# Patient Record
Sex: FEMALE | Race: WHITE | HISPANIC OR LATINO | Employment: FULL TIME | URBAN - METROPOLITAN AREA
[De-identification: names, ages, dates, MRNs, and addresses within clinical notes are randomized per-mention and may not be internally consistent; named-entity substitution may affect disease eponyms.]

---

## 2017-03-30 ENCOUNTER — GENERIC CONVERSION - ENCOUNTER (OUTPATIENT)
Dept: OTHER | Facility: OTHER | Age: 22
End: 2017-03-30

## 2017-04-06 ENCOUNTER — GENERIC CONVERSION - ENCOUNTER (OUTPATIENT)
Dept: OTHER | Facility: OTHER | Age: 22
End: 2017-04-06

## 2017-04-06 ENCOUNTER — ALLSCRIPTS OFFICE VISIT (OUTPATIENT)
Dept: PERINATAL CARE | Facility: CLINIC | Age: 22
End: 2017-04-06
Payer: COMMERCIAL

## 2017-04-06 PROCEDURE — G0108 DIAB MANAGE TRN  PER INDIV: HCPCS | Performed by: OBSTETRICS & GYNECOLOGY

## 2017-04-10 ENCOUNTER — APPOINTMENT (OUTPATIENT)
Dept: PERINATAL CARE | Facility: CLINIC | Age: 22
End: 2017-04-10
Payer: COMMERCIAL

## 2017-04-10 PROCEDURE — G0108 DIAB MANAGE TRN  PER INDIV: HCPCS | Performed by: OBSTETRICS & GYNECOLOGY

## 2017-04-12 ENCOUNTER — GENERIC CONVERSION - ENCOUNTER (OUTPATIENT)
Dept: OTHER | Facility: OTHER | Age: 22
End: 2017-04-12

## 2017-04-24 ENCOUNTER — LAB CONVERSION - ENCOUNTER (OUTPATIENT)
Dept: OTHER | Facility: OTHER | Age: 22
End: 2017-04-24

## 2017-04-24 ENCOUNTER — APPOINTMENT (OUTPATIENT)
Dept: PERINATAL CARE | Facility: CLINIC | Age: 22
End: 2017-04-24
Payer: COMMERCIAL

## 2017-04-24 DIAGNOSIS — O24.414 INSULIN CONTROLLED GESTATIONAL DIABETES MELLITUS DURING PREGNANCY: ICD-10-CM

## 2017-04-24 PROCEDURE — G0108 DIAB MANAGE TRN  PER INDIV: HCPCS | Performed by: OBSTETRICS & GYNECOLOGY

## 2017-04-27 ENCOUNTER — ALLSCRIPTS OFFICE VISIT (OUTPATIENT)
Dept: PERINATAL CARE | Facility: CLINIC | Age: 22
End: 2017-04-27
Payer: COMMERCIAL

## 2017-04-27 ENCOUNTER — GENERIC CONVERSION - ENCOUNTER (OUTPATIENT)
Dept: OTHER | Facility: OTHER | Age: 22
End: 2017-04-27

## 2017-04-27 PROCEDURE — 76811 OB US DETAILED SNGL FETUS: CPT | Performed by: OBSTETRICS & GYNECOLOGY

## 2017-04-27 PROCEDURE — 76817 TRANSVAGINAL US OBSTETRIC: CPT | Performed by: OBSTETRICS & GYNECOLOGY

## 2017-05-04 ENCOUNTER — LAB CONVERSION - ENCOUNTER (OUTPATIENT)
Dept: OTHER | Facility: OTHER | Age: 22
End: 2017-05-04

## 2017-05-04 LAB
A/G RATIO (HISTORICAL): 1.4 (CALC) (ref 1–2.5)
ALBUMIN SERPL BCP-MCNC: 3.2 G/DL (ref 3.6–5.1)
ALP SERPL-CCNC: 79 U/L (ref 33–115)
ALT SERPL W P-5'-P-CCNC: 14 U/L (ref 6–29)
AST SERPL W P-5'-P-CCNC: 11 U/L (ref 10–30)
BILIRUB SERPL-MCNC: 0.4 MG/DL (ref 0.2–1.2)
BUN SERPL-MCNC: 5 MG/DL (ref 7–25)
BUN/CREA RATIO (HISTORICAL): 8 (CALC) (ref 6–22)
CALCIUM SERPL-MCNC: 8.4 MG/DL (ref 8.6–10.2)
CHLORIDE SERPL-SCNC: 106 MMOL/L (ref 98–110)
CO2 SERPL-SCNC: 23 MMOL/L (ref 20–31)
CREAT SERPL-MCNC: 0.62 MG/DL (ref 0.5–1.1)
EGFR AFRICAN AMERICAN (HISTORICAL): 150 ML/MIN/1.73M2
EGFR-AMERICAN CALC (HISTORICAL): 130 ML/MIN/1.73M2
EST. AVERAGE GLUCOSE BLD GHB EST-MCNC: 108 (CALC)
EST. AVERAGE GLUCOSE BLD GHB EST-MCNC: 6 (CALC)
GAMMA GLOBULIN (HISTORICAL): 2.3 G/DL (CALC) (ref 1.9–3.7)
GLUCOSE (HISTORICAL): 186 MG/DL (ref 65–99)
HBA1C MFR BLD HPLC: 5.4 % OF TOTAL HGB
POTASSIUM SERPL-SCNC: 3.8 MMOL/L (ref 3.5–5.3)
SODIUM SERPL-SCNC: 137 MMOL/L (ref 135–146)
TOTAL PROTEIN (HISTORICAL): 5.5 G/DL (ref 6.1–8.1)

## 2017-05-15 ENCOUNTER — APPOINTMENT (OUTPATIENT)
Dept: PERINATAL CARE | Facility: CLINIC | Age: 22
End: 2017-05-15
Payer: COMMERCIAL

## 2017-05-15 PROCEDURE — G0108 DIAB MANAGE TRN  PER INDIV: HCPCS | Performed by: OBSTETRICS & GYNECOLOGY

## 2017-05-25 ENCOUNTER — GENERIC CONVERSION - ENCOUNTER (OUTPATIENT)
Dept: OTHER | Facility: OTHER | Age: 22
End: 2017-05-25

## 2017-05-25 ENCOUNTER — ALLSCRIPTS OFFICE VISIT (OUTPATIENT)
Dept: PERINATAL CARE | Facility: CLINIC | Age: 22
End: 2017-05-25
Payer: COMMERCIAL

## 2017-05-25 PROCEDURE — 76816 OB US FOLLOW-UP PER FETUS: CPT | Performed by: OBSTETRICS & GYNECOLOGY

## 2017-06-19 ENCOUNTER — APPOINTMENT (OUTPATIENT)
Dept: PERINATAL CARE | Facility: CLINIC | Age: 22
End: 2017-06-19
Payer: COMMERCIAL

## 2017-06-19 PROCEDURE — G0108 DIAB MANAGE TRN  PER INDIV: HCPCS | Performed by: OBSTETRICS & GYNECOLOGY

## 2017-07-13 ENCOUNTER — GENERIC CONVERSION - ENCOUNTER (OUTPATIENT)
Dept: OTHER | Facility: OTHER | Age: 22
End: 2017-07-13

## 2017-07-17 ENCOUNTER — APPOINTMENT (OUTPATIENT)
Dept: PERINATAL CARE | Facility: CLINIC | Age: 22
End: 2017-07-17
Payer: COMMERCIAL

## 2017-07-17 ENCOUNTER — ALLSCRIPTS OFFICE VISIT (OUTPATIENT)
Dept: PERINATAL CARE | Facility: CLINIC | Age: 22
End: 2017-07-17
Payer: COMMERCIAL

## 2017-07-17 ENCOUNTER — GENERIC CONVERSION - ENCOUNTER (OUTPATIENT)
Dept: OTHER | Facility: OTHER | Age: 22
End: 2017-07-17

## 2017-07-17 PROCEDURE — 76816 OB US FOLLOW-UP PER FETUS: CPT | Performed by: OBSTETRICS & GYNECOLOGY

## 2017-07-17 PROCEDURE — G0108 DIAB MANAGE TRN  PER INDIV: HCPCS | Performed by: OBSTETRICS & GYNECOLOGY

## 2017-07-17 PROCEDURE — 76820 UMBILICAL ARTERY ECHO: CPT | Performed by: OBSTETRICS & GYNECOLOGY

## 2017-08-03 ENCOUNTER — GENERIC CONVERSION - ENCOUNTER (OUTPATIENT)
Dept: OTHER | Facility: OTHER | Age: 22
End: 2017-08-03

## 2017-08-03 ENCOUNTER — ALLSCRIPTS OFFICE VISIT (OUTPATIENT)
Dept: PERINATAL CARE | Facility: CLINIC | Age: 22
End: 2017-08-03
Payer: COMMERCIAL

## 2017-08-03 PROCEDURE — 76816 OB US FOLLOW-UP PER FETUS: CPT | Performed by: OBSTETRICS & GYNECOLOGY

## 2017-08-03 PROCEDURE — 76819 FETAL BIOPHYS PROFIL W/O NST: CPT | Performed by: OBSTETRICS & GYNECOLOGY

## 2018-01-08 ENCOUNTER — TRANSCRIBE ORDERS (OUTPATIENT)
Dept: URGENT CARE | Age: 23
End: 2018-01-08

## 2018-01-08 ENCOUNTER — APPOINTMENT (OUTPATIENT)
Dept: URGENT CARE | Age: 23
End: 2018-01-08
Payer: COMMERCIAL

## 2018-01-08 PROCEDURE — G0382 LEV 3 HOSP TYPE B ED VISIT: HCPCS | Performed by: PREVENTIVE MEDICINE

## 2018-01-09 NOTE — MISCELLANEOUS
Message   DM Non-compliance [facility]:     Date: 2017     REED: 2017     Dear Lucy Muniz:     It has come to our attention that you have not followed through with your recommended diabetes plan of care  As a patient that is currently receiving treatment for diabetes during pregnancy, you have been counseled regarding the following: the importance of regular blood glucose (sugar) monitoring; reporting the blood glucose levels to our office; nutrition and medication adjustments as directed by our office  You are receiving this letter because our records indicate that you are currently non-compliant with your treatment for the following reason(s):   Reporting of your blood glucose monitoring is not up to date  The last report of your blood sugar was on 2017  Blood glucose (sugar) levels that are high during pregnancy can result in problems for both mother and unborn child  These include, but are not limited to;     - Fetal macrosomia (large baby)  This can lead to shoulder dystocia (dislocated shoulder in the baby during delivery), need for a , vaginal and rectal tearing for the mother      -  hypoglycemia (low blood sugar in the baby after delivery)  This can lead to admission to the intensive care unit, and the need for intravenous glucose (sugar) given to the baby  - Fetal demise (death) or stillbirth  - [place] respiratory distress ( being unable to breathe on it's own)  Can lead to intensive care unit admission      - Pre term labor  We have been unable to resolve these concerns with you directly  Due to the complexity of this treatment plan, we kindly request that you contact us to resolve these outstanding issues  Please be advised that continued non-compliance may result in consequences, including but not limited to, our inability to continue to prescribe this treatment plan for you, and possible discharge from our care       Upon receipt of this notice, please contact us at [phone] to arrange a necessary follow-up  Thank you for allowing us to continue to care for you  Sincerely,     [facility] Diabetes and Pregnancy Team            Patient Care Team    Care Team Member Role Specialty Office Number   Randa RODRÍGUEZ , MD, error Specialist - [phone]   Harshal Monge [phone]   Segundo RODRÍGUEZ  Specialist - [phone]   Cristofer RODRÍGUEZ   Specialist - [phone]   Michelle Tate MD  Obstetrics/Gynecology [phone]     Signatures   Electronically signed by : Mya Woods; 2017  1:32PM EST                       (Author)

## 2018-01-12 VITALS
HEIGHT: 72 IN | WEIGHT: 267 LBS | DIASTOLIC BLOOD PRESSURE: 77 MMHG | SYSTOLIC BLOOD PRESSURE: 112 MMHG | BODY MASS INDEX: 36.16 KG/M2

## 2018-01-12 VITALS
SYSTOLIC BLOOD PRESSURE: 127 MMHG | BODY MASS INDEX: 39.68 KG/M2 | HEIGHT: 72 IN | WEIGHT: 293 LBS | DIASTOLIC BLOOD PRESSURE: 77 MMHG

## 2018-01-12 VITALS
HEIGHT: 72 IN | BODY MASS INDEX: 38.71 KG/M2 | SYSTOLIC BLOOD PRESSURE: 119 MMHG | DIASTOLIC BLOOD PRESSURE: 79 MMHG | WEIGHT: 285.8 LBS

## 2018-01-14 VITALS
DIASTOLIC BLOOD PRESSURE: 71 MMHG | HEIGHT: 72 IN | WEIGHT: 262 LBS | BODY MASS INDEX: 35.49 KG/M2 | SYSTOLIC BLOOD PRESSURE: 108 MMHG

## 2018-01-15 NOTE — PROGRESS NOTES
MAY 25 2017         RE: Neoma Stains                                To: BIANCA Márquez    MR#: [id number]   : OCT Kavithaurt: [id number]:[facility]                             Fax: [phone]   (Exam #: [id number])      The LMP of this 22year old,  G1, P0-0-0-0 patient was unknown, her   working REED is AUG 23 2017 and the current gestational age is 32 weeks 5   days by 1st Trimester Sono  A sonographic examination was performed on MAY   25 2017 using real time equipment  The ultrasound examination was   performed using abdominal technique  The patient has a BMI of 36 2  Her   blood pressure today was 112/77  Earliest ultrasound found in her record: 2017   8w2d  2017 REED      Cardiac motion was observed at 134 bpm       INDICATIONS      diabetes, gestational, class A2   increased BMI      Exam Types      Level I      RESULTS      Fetus # 1 of 1   Vertex presentation   Fetal growth appeared normal   Placenta Location = Anterior   No placenta previa   Placenta Grade = I      MEASUREMENTS (* Included In Average GA)      AC              24 6 cm        28 weeks 6 days* (65%)   BPD              7 7 cm        31 weeks 0 days* (>95%)   HC              28 5 cm        31 weeks 0 days* (>95%)   Femur            5 6 cm        29 weeks 4 days* (75%)      Cerebellum       3 7 cm        32 weeks 1 day   CisternaMagna    9 3 mm      HC/AC           1 16   FL/AC           0 23   FL/BPD          0 73   EFW (Ac/Fl/Hc)  1404 grams - 3 lbs 2 oz                 (76%)      THE AVERAGE GESTATIONAL AGE is 30 weeks 1 day +/- 18 days  AMNIOTIC FLUID      Q1: 4 6      Q2: 6 2      Q3: 1 7      Q4: 6 3   ROBERTA Total = 18 8 cm   Amniotic Fluid: Normal      ANATOMY DETAILS      Visualized Appearing Sonographically Normal:   HEAD: (Calvarium, BPD Level, Cavum, Lateral Ventricles, Cerebellum);      TH  CAV : (Diaphragm);     HEART: (Four Chamber View, Proximal Left   Outflow, Proximal Right Outflow, Interventricular Septum, Interatrial   Septum, Cardiac Axis, Cardiac Position);    STOMACH, RIGHT KIDNEY, LEFT   KIDNEY, BLADDER, PLACENTA      ANATOMY COMMENTS      The fetal anatomy was completed on a previous scan  IMPRESSION      Tomlinson IUP   30 weeks and 1 day by this ultrasound  (REED=AUG 2 2017)   27 weeks and 5 days by 1st Tri Sono  (REED=AUG 19 2017)   Vertex presentation   Fetal growth appeared normal   Regular fetal heart rate of 134 bpm   Anterior placenta   No placenta previa      GENERAL COMMENT      The patient was informed of the findings and counseled about the   limitations of the exam in detecting all forms of fetal congenital   abnormalities  She denies any vaginal bleeding or uterine cramping/contractions  She does   feel fetal movement  She last called in her sugars on May 15  At that   time fastings were  and 2 hours were 83- 207 with most less than   130  Her doses at that time were increased to Basaglar 42 units at   nighttime and Humalog 5 units 3 times a day with meals  She had her book   of sugars with her today which I reviewed as she did not call them in on   5/18/17 as she was instructed to  Her fasting blood sugars are still   elevated to 90- 119  but her 2hr pp are now less then 131  The baby is   measuring 17 days ahead of dates  She and the FOB are both 6 ft tall  Follow up recommended:   1  Recommend a follow up growth scan in 4 weeks  2  Recommend starting twice weekly nst and weekly amanda from 32 weeks on   locally  3  I have faxed her glucoses to our diabetes educators for their review  Up to this point they have been increasing her Basaglar by 6 u when ever   her sugars are not in control  She will get an email in the next 24 hrs   with her new insulin doses           The face to face time, in addition to time spent discussing ultrasound   results, was approximately 15 minutes, greater than 50% of which was spent   during counseling and coordination of care  HILL Vargas M D     Maternal-Fetal Medicine   Electronically signed 05/25/17 19:06

## 2018-01-16 NOTE — PROGRESS NOTES
Social History    · Never a smoker    Current Meds   1  Pre- TABS; Take 1 tablet daily; Therapy: (Recorded:2017) to Recorded    Allergies    1  No Known Drug Allergies    2  No Known Environmental Allergies   3  No Known Food Allergies    Provider Comments  Provider Comments:   REED: 2017  EGA: 20 5/7 weeks             Dear Dr Rodriguez Backers: Thank you for referring your patient to the Diabetes and Pregnancy Program at [address]  Speaks mostly Bulgarian; used [employer] #[id number]  The patient attended Class 1 received the following education:    Â· Pathophysiology of diabetes and pregnancy  This includes maternal-fetal complications such as fetal macrosomia,  hypoglycemia, polyhydramnios, increased incidence of  section, pre-term labor and in severe cases, fetal demise and stillbirth  Â· Self-monitoring of blood glucose levels: fasting (goal 60mg/dl to 90mg/dl) and two hours after the start of the meal less (goal less than 120mg/dl)  The patient was provided with a Niurka blood glucose meter and minimal supplies  Advised patient to get remainder of supplies through her [facility] physician  Â· Medical Nutrition Therapy for diabetes and pregnancy  The patient was provided with a 2500 calorie gestational diabetes meal plan and the following was reviewed:   o Basic review of macronutrients   o Meal pattern should consist of three small meals and three snacks daily  o Carbohydrate gram amounts per meal   o Appropriate serving sizes for carbohydrates and proteins  o Incorporating protein at each meal and snack  o Maintain a three day food diary and bring to class 2  Â· Report blood glucose levels to the [facility]'s Maternal-Fetal Medicine office weekly or as directed:  o Phone : [phone]  o Fax: [phone]  o Email: [email or web address]    The patient is scheduled to attend class 2 on Monday, 4/10/17   Additionally, fetal ultrasound evaluation by the Perinatologist has been scheduled to assure continuity of care  Please contact the Diabetes and Pregnancy Program at [phone] if you have any questions  Time spent with patient 11:35-12:45; time spent face to face counseling greater than 50% of the appointment  Sincerely,   Dary , MS, RD, CDE, LDN  Diabetes Educator   Diabetes and Pregnancy Program          Future Appointments    Date/Time Provider Specialty Site   04/10/2017 03:00 PM Gestational Diabetic, Schedule  [facility] [address]     Signatures   Electronically signed by : Aris Meehan RD; Apr 6 2017  4:26PM EST                       (Author)    Electronically signed by : Amelia Schmidt;  Apr 10 2017 12:54PM EST                       (Author)

## 2018-01-16 NOTE — PROGRESS NOTES
2017         RE: Bridgette Old                                To: BIANCA Goodrich    MR#: [id number]   : OCT Kavithaurt: [id number]:[facility]                             Fax: [phone]   (Exam #: [id number])      The LMP of this 22year old,  G1, P0-0-0-0 patient was unknown, her   working REED is AUG 23 2017 and the current gestational age is 27 weeks 2   days by 1st Trimester Sono  A sonographic examination was performed on 2017 using real time equipment  The ultrasound examination was   performed using abdominal technique  The patient has a BMI of 38 6  Her   blood pressure today was 119/79  Earliest ultrasound found in her record: 2017   8w2d  2017 REED            Aster Quinteros has no complaints today  She reports regular fetal movements and   denies problems related to vaginal bleeding,  labor, or   hypertension  She is currently treated with 50 units of Basaglar at night,   along with 5 units of Humalog before breakfast and lunch and 6 units   before dinner  She has been non-compliant with respect to self reporting   of blood glucose values to the Diabetes and Pregnancy program in the   [address] [place] [address]        Cardiac motion was observed at 129 bpm       INDICATIONS      diabetes, gestational, class A2   obesity      Exam Types      Level I      RESULTS      Fetus # 1 of 1   Vertex presentation   Possible macrosomia   Placenta Location = Anterior   No placenta previa   Placenta Grade = I      MEASUREMENTS (* Included In Average GA)      AC              38 0 cm        42 weeks 2 days* (>95%)   BPD              9 8 cm        40 weeks 0 days* (>95%)   HC              34 4 cm        39 weeks 1 day * (95%)   Femur            6 6 cm        33 weeks 6 days* (31%)      HC/AC           0 91   FL/AC           0 17   FL/BPD          0 68   EFW (Ac/Fl/Hc)  3830 grams - 8 lbs 7 oz                 (>95%)      THE AVERAGE GESTATIONAL AGE is 38 weeks 6 days +/- 21 days       AMNIOTIC FLUID      Q1: 4 7      Q2: 9 3      Q3: 3 2      Q4: 5 4   ROBERTA Total = 22 6 cm   Amniotic Fluid: POLYHYDRAMNIOS      ANATOMY DETAILS      Visualized Appearing Sonographically Normal:   HEAD: (Calvarium, BPD Level);    TH  CAV : (Diaphragm); HEART: (Four   Chamber View, Proximal Left Outflow, Proximal Right Outflow);    STOMACH,   RIGHT KIDNEY, LEFT KIDNEY, BLADDER, PLACENTA      Not Visualized:   HEAD: (Cavum, Lateral Ventricles, Cerebellum)      BIOPHYSICAL PROFILE      The Biophysical Profile score was 8/8  Breathin  Movement: 2  Tone: 2  AFV: 2      IMPRESSION      Tomlinson IUP   38 weeks and 6 days by this ultrasound  (REED=2017)   35 weeks and 2 days by 1st Tri Sono  (REED=AUG 19 2017)   Vertex presentation   Growth assessment indicated possible macrosomia   Regular fetal heart rate of 129 bpm   Polyhydramnios   Anterior placenta   No placenta previa      GENERAL COMMENT      No fetal structural abnormality is identified on the Level I survey today   in a difficult and limited study secondary to constraints related to   maternal obesity and the late gestational age  In particular, cardiac   anatomy is suboptimally imaged  Polyhydramnios is present  Evaluation of   biometry reveals an estimated fetal weight placed at greater than the 95th   percentile for this gestational age  Today's ultrasound findings and suggested follow-up were discussed in   detail with [facility]  She will return to the [facility]  in 2 weeks   for ultrasound evaluation to assess fetal growth  Twice per week non   stress testing and weekly biophysical profile testing is recommended for   the indications of insulin-requiring gestational diabetes and   polyhydramnios  [facility] should maintain contact with the Diabetes and   Pregnancy program in the [facility]  at least once a week for review   of her blood glucose values and adjustment of insulin doses        The face to face time, in addition to time spent discussing ultrasound   results, was approximately 10 minutes, greater than 50% of which was spent   during counseling and coordination of care  HILL Jalloh M D     Maternal-Fetal Medicine   Electronically signed 07/17/17 14:25

## 2018-01-17 NOTE — PROGRESS NOTES
2017         RE: Janelle Bocanegra                                To: BIANCA Albright    MR#: [id number]   : OCT Janae: [id number]:[facility]                             Fax: [phone]   (Exam #: [id number])      The LMP of this 22year old,  G1, P0-0-0-0 patient was unknown, her   working REED is AUG 23 2017 and the current gestational age is 20 weeks 5   days by 1st Trimester Sono  A sonographic examination was performed on 2017 using real time equipment  The ultrasound examination was   performed using abdominal & vaginal techniques  The patient has a BMI of   35 5  Her blood pressure today was 108/71  Earliest ultrasound found in her record: 2017   8w2d  2017 REED      This is the patient's first pregnancy and she was diagnosed with early   gestational diabetes based upon a very elevated one hour glucose challenge   of 245 mg/dL  The patient has morbid obesity with a BMI of greater than   35  She denies the current use of tobacco, alcohol, or drugs  She   currently takes Tylenol with codeine when necessary for headaches as well   as Fioricet when necessary  She takes prenatal vitamins and is currently   being treated with Macrobid for urinary tract infection  She has no   significant drug allergies  Multiple family members have had medical   conditions none of which appear to be significantly genetic in nature from   a congenital birth defects standpoint  A review of systems is otherwise   negative  On exam, the patient appears well, in no acute distress, and her   abdomen is nontender        Cardiac motion was observed at 136 bpm       INDICATIONS      morbid obesity   fetal anatomical survey   diabetes, gestational, class A2      Exam Types      LEVEL II   Transvaginal      RESULTS      Fetus # 1 of 1   Vertex presentation   Fetal growth appeared normal   Placenta Location = Anterior   No placenta previa   Placenta Grade = I      MEASUREMENTS (* Included In Average GA)      AC              19 6 cm        24 weeks 1 day * (54%)   BPD              6 1 cm        24 weeks 6 days* (73%)   HC              22 0 cm        23 weeks 6 days* (48%)   Femur            4 4 cm        24 weeks 5 days* (57%)      Nuchal Fold      4 8 mm   NBL              8 0 mm      Humerus          4 1 cm        24 weeks 6 days  (67%)      Cerebellum       2 7 cm        25 weeks 1 day   Biorbit          3 7 cm        23 weeks 4 days   CisternaMagna    6 2 mm      HC/AC           1 12   FL/AC           0 22   FL/BPD          0 72   EFW (Ac/Fl/Hc)   682 grams - 1 lbs 8 oz                 (50%)      THE AVERAGE GESTATIONAL AGE is 24 weeks 3 days +/- 14 days  AMNIOTIC FLUID         Largest Vertical Pocket = 4 0 cm   Amniotic Fluid: Normal      CERVICAL EVALUATION      SUPINE      Cervical Length: 4 40 cm      OTHER TEST RESULTS           Funneling?: No             Dynamic Changes?: No        Resp  To TFP?: No      ANATOMY      Head                                    Normal   Face/Neck                               Normal   Th  Cav  Normal   Heart                                   Normal   Abd  Cav  Normal   Stomach                                 Normal   Right Kidney                            Normal   Left Kidney                             Normal   Bladder                                 Normal   Abd  Wall                               Normal   Spine                                   Normal   Extrems                                 Normal   Genitalia                               Normal   Placenta                                Normal   Umbl   Cord                              Normal   Uterus                                  Normal   PCI                                     Normal      ANATOMY DETAILS      Visualized Appearing Sonographically Normal:   HEAD: (Calvarium, BPD Level, Cavum, Lateral Ventricles, Choroid Plexus, Cerebellum, Cisterna Magna);    FACE/NECK: (Neck, Nuchal Fold, Profile,   Orbits, Nose/Lips, Palate, Face);    TH  CAV  : (Lungs, Diaphragm); HEART: (Four Chamber View, Proximal Left Outflow, Proximal Right Outflow,   3VV, 3 Vessel Trachea, Short Axis of Greater Vessels, Ductal Arch, Aortic   Arch, Interventricular Septum, Interatrial Septum, IVC, SVC, Cardiac Axis,   Cardiac Position);    ABD  CAV : (Liver);    STOMACH, RIGHT KIDNEY, LEFT   KIDNEY, BLADDER, ABD  WALL, SPINE: (Cervical Spine, Thoracic Spine, Lumbar   Spine, Sacrum);    EXTREMS: (Lt Humerus, Rt Humerus, Lt Forearm, Rt   Forearm, Lt Hand, Rt Hand, Lt Femur, Rt Femur, Lt Low Leg, Rt Low Leg, Lt   Foot, Rt Foot);    GENITALIA (Male), PLACENTA, UMBL  CORD, UTERUS, PCI      ADNEXA      The left ovary appeared normal and measured 2 5 x 1 8 x 1 6 cm with a   volume of 3 8 cc  The right ovary appeared normal and measured 4 2 x 2 7 x   2 5 cm with a volume of 14 8 cc  IMPRESSION      Tomlinson IUP   24 weeks and 3 days by this ultrasound  (REED=AUG 14 2017)   23 weeks and 5 days by 1st Tri Sono  (REED=AUG 19 2017)   Vertex presentation   Fetal growth appeared normal   Normal anatomy survey   Regular fetal heart rate of 136 bpm   Anterior placenta   No placenta previa      GENERAL COMMENT      The patient had a quad screen which adjusted her age-related risk for   Trisomy 21 of 1:1171 to a risk of 1:5000  Her risk for trisomy 25 after   screening is 1:3019  Her MSAFP is negative for an open neural tube   defect  The patient understands that while reassuring, a normal   ultrasound cannot exclude the possibility of fetal aneuploidy  The fetal anatomic survey is complete  There is no sonographic evidence   of fetal abnormalities at this time  Good fetal movement and tone are   seen    The amniotic fluid volume appears normal   The placenta is anterior   and it appears sonographically normal   A transvaginal ultrasound was   performed to assess the cervix, which was not seen well transabdominally  The cervical length was 4 4 centimeters, which is normal for the current   gestational age  There was no significant funneling or dynamic changes   appreciated  The patient was informed of today's findings and all of her   questions were answered  The limitations of ultrasound were reviewed with   the patient, which she accepts  We talked about the patient's current state of her diabetes  She   continues to maintain close contact with our diabetes in pregnancy program   and is checking her blood sugars regularly and communicating with our   diabetes in pregnancy program   Her fasting this morning was 96 mg/dL  She continues to have chronically elevated fasting blood sugars and   postprandial breakfast and dinners as well  She recently began on 30   units of Basalglar but will likely need to increase both her basal insulin   and perhaps add a meal insulin if her dietary changes do not suffice  We discussed appropriate follow-up in detail and I recommend the patient   return in 4 weeks for a fetal growth evaluation  Please note, in addition to the time spent discussing the results of the   ultrasound, I spent approximately 10 minutes of face-to-face time with the   patient, greater than 50% of which was spent in counseling and the   coordination of care for this patient  Thank you very much for allowing us to participate in the care of this   very nice patient  Should you have any questions, please do not hesitate   to contact our office  HILL Bueno M D     Electronically signed 04/27/17 15:42

## 2018-01-18 NOTE — PROGRESS NOTES
AUG 3 2017         RE: Cynthia Zaman                                To: BIANCA Saldivar    MR#: [id number]   : OCT Janae: [id number]                             Fax: [phone]   (Exam #: [id number])      The LMP of this 22year old,  G1, P0-0-0-0 patient was unknown, her   working REED is AUG 23 2017 and the current gestational age is 42 weeks 5   days by 1st Trimester Sono  A sonographic examination was performed on AUG   3 2017 using real time equipment  The ultrasound examination was performed   using abdominal technique  The patient has a BMI of 41 0  Her blood   pressure today was 127/77  Earliest ultrasound found in her record: 2017   8w2d  2017 REED            Renay Hinojosa has no complaints  She reports regular fetal movements  She is   currently treated with 50 units of Basaglar at night along with 5 units of   Humalog before breakfast, 5 units before lunch, and 6 units before dinner  Cardiac motion was observed at 140 bpm       INDICATIONS      diabetes, gestational, class A2   morbid obesity      Exam Types      Level I   Biophysical Profile      RESULTS      Fetus # 1 of 1   Vertex presentation   Probable macrosomia   Placenta Location = Anterior   Placenta Grade = II      MEASUREMENTS (* Included In Average GA)      AC              41 9 cm        46 weeks 4 days* (>95%)   BPD             10 7 cm        44 weeks 2 days* (>95%)   HC              38 0 cm        45 weeks 0 days* (>95%)   Femur            8 0 cm        40 weeks 0 days* (>95%)      HC/AC           0 91   FL/AC           0 19   FL/BPD          0 75   EFW (Ac/Fl/Hc)  5479 grams - 12 lbs 1 oz                 (>95%)      THE AVERAGE GESTATIONAL AGE is 44 weeks 0 days +/- 21 days  AMNIOTIC FLUID      Q1: 6 8      Q2: 7 1      Q3: 4 5      Q4:   ROBERTA Total = 18 4 cm      ANATOMY DETAILS      Visualized Appearing Sonographically Normal:   HEAD: (Calvarium, BPD Level);     HEART: (Proximal Left Outflow, Proximal   Right Outflow, Cardiac Axis, Cardiac Position);    STOMACH, RIGHT KIDNEY,   PLACENTA      Suboptimally Visualized:   HEART: (Four Chamber View);    LEFT KIDNEY, BLADDER      Not Visualized:   HEAD: (Cavum, Lateral Ventricles, Cerebellum);    TH  CAV : (Diaphragm)      BIOPHYSICAL PROFILE      The Biophysical Profile score was 8/8  Breathin  Movement: 2  Tone: 2  AFV: 2      IMPRESSION      [place] IUP   44 weeks and 0 days by this ultrasound  (REED=2017)   37 weeks and 5 days by 1st Tri Sono  (REED=AUG 19 2017)   Vertex presentation   Growth assessment indicated probable macrosomia   Fetal heart rate of 140 bpm   Anterior placenta      GENERAL COMMENT      No fetal structural abnormality is identified on the Level I survey today   in an extremely difficult and very limited study secondary to the   constraints related to maternal morbid obesity and the late gestational   age  The amniotic fluid volume is normal  Evaluation of biometry reveals   suspected evolving macrosomia  The biophysical profile is normal       Today's ultrasound findings and suggested management were discussed in   detail with [facility]  Daily third trimester fetal kick counting was   discussed at the visit today  We discussed my concerns related to   suspected macrosomia in this pregnancy complicated by insulin-requiring   gestational diabetes  The offspring of women with gestational diabetes are   at an increased risk of macrosomia,  hypoglycemia,   hyperbilirubinemia, shoulder dystocia, and birth trauma  The risk for   stillbirth is also significantly increased  Given the ultrasound findings   today, consideration of delivery by elective  section should be   considered within the next few days  I do not recommend waiting until 39   weeks gestation for delivery, given my concerns related to an increased   stillbirth risk    Neonatalology should be made aware prior to delivery of   the potential for hypoglycemia, hyperbilirubinemia, and other metabolic   problems  The face to face time, in addition to time spent discussing ultrasound   results, was approximately 10 minutes, greater than 50% of which was spent   during counseling and coordination of care  HILL Rader M D     Maternal-Fetal Medicine   Electronically signed 08/03/17 19:07

## 2018-01-22 VITALS
SYSTOLIC BLOOD PRESSURE: 117 MMHG | WEIGHT: 264 LBS | DIASTOLIC BLOOD PRESSURE: 53 MMHG | BODY MASS INDEX: 35.76 KG/M2 | HEIGHT: 72 IN

## 2018-03-29 LAB
AMORPHOUS MATERIAL (HISTORICAL): ABNORMAL
BACTERIA UR QL AUTO: ABNORMAL
BILIRUB UR QL STRIP: NEGATIVE MG/DL
BILIRUB UR QL STRIP: NEGATIVE MG/DL
CASTS/CASTS TYPE (HISTORICAL): ABNORMAL /LPF
CLARITY UR: ABNORMAL
CLARITY UR: CLEAR
COLOR UR: YELLOW
COLOR UR: YELLOW
COMMENT (HISTORICAL): ABNORMAL
CRYSTAL TYPE (HISTORICAL): ABNORMAL /HPF
GLUCOSE UR STRIP-MCNC: NEGATIVE MG/DL
GLUCOSE UR STRIP-MCNC: NEGATIVE MG/DL
HGB UR QL STRIP.AUTO: ABNORMAL
HGB UR QL STRIP.AUTO: NEGATIVE
KETONES UR STRIP-MCNC: NEGATIVE MG/DL
KETONES UR STRIP-MCNC: NEGATIVE MG/DL
LEUKOCYTE ESTERASE UR QL STRIP: 100
LEUKOCYTE ESTERASE UR QL STRIP: ABNORMAL
MUCOUS THREADS URNS QL MICRO: ABNORMAL
NITRITE UR QL STRIP: NEGATIVE
NITRITE UR QL STRIP: NEGATIVE
NON-SQ EPI CELLS URNS QL MICRO: ABNORMAL
OTHER STN SPEC: ABNORMAL
PH UR STRIP.AUTO: 5 [PH] (ref 4.5–8)
PH UR STRIP.AUTO: 6 [PH] (ref 4.5–8)
PREGNANCY TEST URINE (HISTORICAL): NEGATIVE
PROT UR STRIP-MCNC: NEGATIVE MG/DL
PROT UR STRIP-MCNC: NEGATIVE MG/DL
RBC #/AREA URNS AUTO: ABNORMAL /HPF
SP GR UR STRIP.AUTO: 1.02 (ref 1–1.04)
SP GR UR STRIP.AUTO: 1.02 (ref 1–1.04)
UROBILINOGEN UR QL STRIP.AUTO: NEGATIVE MG/DL (ref 0–1)
UROBILINOGEN UR QL STRIP.AUTO: NEGATIVE MG/DL (ref 0–1)
WBC #/AREA URNS AUTO: 6 /HPF

## 2018-04-23 LAB — PREGNANCY TEST URINE (HISTORICAL): NEGATIVE

## 2018-06-27 ENCOUNTER — APPOINTMENT (OUTPATIENT)
Dept: URGENT CARE | Facility: CLINIC | Age: 23
End: 2018-06-27
Payer: OTHER MISCELLANEOUS

## 2018-06-27 ENCOUNTER — APPOINTMENT (OUTPATIENT)
Dept: RADIOLOGY | Facility: CLINIC | Age: 23
End: 2018-06-27
Payer: OTHER MISCELLANEOUS

## 2018-06-27 DIAGNOSIS — M25.531 RIGHT WRIST PAIN: ICD-10-CM

## 2018-06-27 DIAGNOSIS — M25.531 RIGHT WRIST PAIN: Primary | ICD-10-CM

## 2018-06-27 PROCEDURE — 73130 X-RAY EXAM OF HAND: CPT

## 2018-06-27 PROCEDURE — 99283 EMERGENCY DEPT VISIT LOW MDM: CPT | Performed by: PHYSICIAN ASSISTANT

## 2018-06-27 PROCEDURE — G0382 LEV 3 HOSP TYPE B ED VISIT: HCPCS | Performed by: PHYSICIAN ASSISTANT

## 2018-06-27 PROCEDURE — 73110 X-RAY EXAM OF WRIST: CPT

## 2018-07-03 ENCOUNTER — APPOINTMENT (OUTPATIENT)
Dept: URGENT CARE | Facility: CLINIC | Age: 23
End: 2018-07-03
Payer: OTHER MISCELLANEOUS

## 2018-07-03 PROCEDURE — 99213 OFFICE O/P EST LOW 20 MIN: CPT | Performed by: PHYSICIAN ASSISTANT

## 2018-07-13 ENCOUNTER — APPOINTMENT (OUTPATIENT)
Dept: URGENT CARE | Facility: CLINIC | Age: 23
End: 2018-07-13
Payer: OTHER MISCELLANEOUS

## 2018-07-13 PROCEDURE — 99213 OFFICE O/P EST LOW 20 MIN: CPT | Performed by: PHYSICIAN ASSISTANT

## 2018-08-27 ENCOUNTER — TRANSCRIBE ORDERS (OUTPATIENT)
Dept: ADMINISTRATIVE | Facility: HOSPITAL | Age: 23
End: 2018-08-27

## 2018-08-27 ENCOUNTER — HOSPITAL ENCOUNTER (OUTPATIENT)
Dept: NON INVASIVE DIAGNOSTICS | Facility: HOSPITAL | Age: 23
Discharge: HOME | End: 2018-08-27

## 2018-08-27 DIAGNOSIS — E66.01 MORBID OBESITY (HCC): Primary | ICD-10-CM

## 2018-08-27 DIAGNOSIS — E66.01 MORBID OBESITY (HCC): ICD-10-CM

## 2018-12-05 ENCOUNTER — APPOINTMENT (EMERGENCY)
Dept: ULTRASOUND IMAGING | Facility: HOSPITAL | Age: 23
End: 2018-12-05
Payer: COMMERCIAL

## 2018-12-05 ENCOUNTER — HOSPITAL ENCOUNTER (EMERGENCY)
Facility: HOSPITAL | Age: 23
Discharge: HOME | End: 2018-12-05
Attending: EMERGENCY MEDICINE | Admitting: EMERGENCY MEDICINE
Payer: COMMERCIAL

## 2018-12-05 VITALS
DIASTOLIC BLOOD PRESSURE: 62 MMHG | TEMPERATURE: 99.3 F | BODY MASS INDEX: 34.58 KG/M2 | HEART RATE: 89 BPM | OXYGEN SATURATION: 100 % | WEIGHT: 255 LBS | RESPIRATION RATE: 16 BRPM | SYSTOLIC BLOOD PRESSURE: 122 MMHG

## 2018-12-05 DIAGNOSIS — R11.10 VOMITING: Primary | ICD-10-CM

## 2018-12-05 DIAGNOSIS — R19.7 DIARRHEA: ICD-10-CM

## 2018-12-05 DIAGNOSIS — R74.01 TRANSAMINITIS: ICD-10-CM

## 2018-12-05 LAB
ALBUMIN SERPL BCP-MCNC: 4.2 G/DL (ref 3–5.2)
ALP SERPL-CCNC: 104 U/L (ref 43–122)
ALT SERPL W P-5'-P-CCNC: 279 U/L (ref 9–52)
ANION GAP SERPL CALCULATED.3IONS-SCNC: 8 MMOL/L (ref 5–14)
AST SERPL W P-5'-P-CCNC: 411 U/L (ref 14–36)
BILIRUB SERPL-MCNC: 2.5 MG/DL
BILIRUB UR QL STRIP: NEGATIVE
BUN SERPL-MCNC: 12 MG/DL (ref 5–25)
CALCIUM SERPL-MCNC: 9 MG/DL (ref 8.4–10.2)
CHLORIDE SERPL-SCNC: 105 MMOL/L (ref 97–108)
CLARITY UR: CLEAR
CO2 SERPL-SCNC: 26 MMOL/L (ref 22–30)
COLOR UR: YELLOW
CREAT SERPL-MCNC: 0.75 MG/DL (ref 0.6–1.2)
ERYTHROCYTE [DISTWIDTH] IN BLOOD BY AUTOMATED COUNT: 12.9 %
EXT PREG TEST URINE: NEGATIVE
GFR SERPL CREATININE-BSD FRML MDRD: 114 ML/MIN/1.73SQ M
GLUCOSE SERPL-MCNC: 179 MG/DL (ref 70–99)
GLUCOSE UR STRIP-MCNC: NEGATIVE MG/DL
HCT VFR BLD AUTO: 42 % (ref 36–46)
HGB BLD-MCNC: 13.8 G/DL (ref 12–16)
HGB UR QL STRIP.AUTO: NEGATIVE
KETONES UR STRIP-MCNC: NEGATIVE MG/DL
LEUKOCYTE ESTERASE UR QL STRIP: NEGATIVE
LIPASE SERPL-CCNC: 45 U/L (ref 23–300)
LYMPHOCYTES # BLD AUTO: 0.69 THOUSAND/UL (ref 0.5–4)
LYMPHOCYTES # BLD AUTO: 8 % (ref 20–50)
MCH RBC QN AUTO: 29.2 PG (ref 26–34)
MCHC RBC AUTO-ENTMCNC: 32.8 G/DL (ref 31–36)
MCV RBC AUTO: 89 FL (ref 80–100)
MONOCYTES # BLD AUTO: 0.26 THOUSAND/UL (ref 0.2–0.9)
MONOCYTES NFR BLD AUTO: 3 % (ref 1–10)
NEUTS BAND NFR BLD MANUAL: 5 % (ref 0–8)
NEUTS SEG # BLD: 7.65 THOUSAND/UL (ref 1.8–7.8)
NEUTS SEG NFR BLD AUTO: 84 %
NITRITE UR QL STRIP: NEGATIVE
PH UR STRIP.AUTO: 6 [PH] (ref 4.5–8)
PLATELET # BLD AUTO: 140 THOUSANDS/UL (ref 150–450)
PLATELET BLD QL SMEAR: ADEQUATE
PMV BLD AUTO: 10.5 FL (ref 8.9–12.7)
POTASSIUM SERPL-SCNC: 4.2 MMOL/L (ref 3.6–5)
PROT SERPL-MCNC: 7.4 G/DL (ref 5.9–8.4)
PROT UR STRIP-MCNC: NEGATIVE MG/DL
RBC # BLD AUTO: 4.72 MILLION/UL (ref 4–5.2)
RBC MORPH BLD: NORMAL
S PYO AG THROAT QL: NEGATIVE
SODIUM SERPL-SCNC: 139 MMOL/L (ref 137–147)
SP GR UR STRIP.AUTO: 1.01 (ref 1–1.04)
TOTAL CELLS COUNTED SPEC: 100
UROBILINOGEN UA: NEGATIVE MG/DL
WBC # BLD AUTO: 8.6 THOUSAND/UL (ref 4.5–11)

## 2018-12-05 PROCEDURE — 76705 ECHO EXAM OF ABDOMEN: CPT

## 2018-12-05 PROCEDURE — 87430 STREP A AG IA: CPT | Performed by: PHYSICIAN ASSISTANT

## 2018-12-05 PROCEDURE — 81025 URINE PREGNANCY TEST: CPT | Performed by: PHYSICIAN ASSISTANT

## 2018-12-05 PROCEDURE — 81003 URINALYSIS AUTO W/O SCOPE: CPT | Performed by: PHYSICIAN ASSISTANT

## 2018-12-05 PROCEDURE — 96374 THER/PROPH/DIAG INJ IV PUSH: CPT

## 2018-12-05 PROCEDURE — 80053 COMPREHEN METABOLIC PANEL: CPT | Performed by: PHYSICIAN ASSISTANT

## 2018-12-05 PROCEDURE — 83690 ASSAY OF LIPASE: CPT | Performed by: PHYSICIAN ASSISTANT

## 2018-12-05 PROCEDURE — 85007 BL SMEAR W/DIFF WBC COUNT: CPT | Performed by: PHYSICIAN ASSISTANT

## 2018-12-05 PROCEDURE — 80074 ACUTE HEPATITIS PANEL: CPT | Performed by: PHYSICIAN ASSISTANT

## 2018-12-05 PROCEDURE — 36415 COLL VENOUS BLD VENIPUNCTURE: CPT | Performed by: PHYSICIAN ASSISTANT

## 2018-12-05 PROCEDURE — 99284 EMERGENCY DEPT VISIT MOD MDM: CPT

## 2018-12-05 PROCEDURE — 96375 TX/PRO/DX INJ NEW DRUG ADDON: CPT

## 2018-12-05 PROCEDURE — 85027 COMPLETE CBC AUTOMATED: CPT | Performed by: PHYSICIAN ASSISTANT

## 2018-12-05 PROCEDURE — 96361 HYDRATE IV INFUSION ADD-ON: CPT

## 2018-12-05 RX ORDER — ONDANSETRON 2 MG/ML
4 INJECTION INTRAMUSCULAR; INTRAVENOUS ONCE
Status: COMPLETED | OUTPATIENT
Start: 2018-12-05 | End: 2018-12-05

## 2018-12-05 RX ORDER — ONDANSETRON 4 MG/1
4 TABLET, FILM COATED ORAL EVERY 8 HOURS PRN
Qty: 10 TABLET | Refills: 0 | Status: SHIPPED | OUTPATIENT
Start: 2018-12-05 | End: 2019-06-04

## 2018-12-05 RX ORDER — KETOROLAC TROMETHAMINE 30 MG/ML
15 INJECTION, SOLUTION INTRAMUSCULAR; INTRAVENOUS ONCE
Status: COMPLETED | OUTPATIENT
Start: 2018-12-05 | End: 2018-12-05

## 2018-12-05 RX ADMIN — SODIUM CHLORIDE 1000 ML: 9 INJECTION, SOLUTION INTRAVENOUS at 12:10

## 2018-12-05 RX ADMIN — ONDANSETRON HYDROCHLORIDE 4 MG: 2 INJECTION, SOLUTION INTRAMUSCULAR; INTRAVENOUS at 12:10

## 2018-12-05 RX ADMIN — KETOROLAC TROMETHAMINE 15 MG: 30 INJECTION, SOLUTION INTRAMUSCULAR; INTRAVENOUS at 12:58

## 2018-12-05 NOTE — ED NOTES
Pt returns from [address] at this time via transporter        Ocie Reason, RN  12/05/18 6833

## 2018-12-05 NOTE — ED NOTES
Pt encouraged to urinate when able  Pt denies complaints at this time  NSS infusing, pt stable, and in no distress        Nickolas Watts RN  12/05/18 4400

## 2018-12-05 NOTE — ED NOTES
Pt has not had urge to urinate yet, encouraged to provide specimen when able  NSS Infusing per order, pt comfortable, in no distress at this time        Cindy Fuchs, ROSALINO  12/05/18 0176

## 2018-12-05 NOTE — DISCHARGE INSTRUCTIONS
You need to follow up with Gastroenterology doctor because to have increased liver function tests  At this time we do not know why they are elevated however there is a hepatitis panel sent out for evaluation  Please return with any worsening symptoms  Náuseas y vómitos agudos   LO QUE NECESITA SABER:   Las náuseas y los vómitos agudos comienzan de forma repentina, [place] rápidamente y davila un período breve de [place]  INSTRUCCIONES SOBRE EL JAIME HOSPITALARIA:   Regrese a la clover de emergencias si:   · Usted nota jonathan en sanchez vómito o en deidra evacuaciones  · Usted siente un dolor súbito e intenso en el pecho y la parte superior de sanchez abdomen después de tener vómitos qamar o tratar de vomitar  · Usted tiene el lev y el pecho inflamados  · [employer], tiene frío, sed y sequedad en los ojos y la boca  · Usted está orinando muy poco o nada en absoluto  · Usted tiene debilidad muscular, calambres en las piernas y dificultad para respirar  · Sanchez corazón late más rápido que de costumbre  · Usted continúa vomitando por más de 48 horas  Pregúntele a sanchez Leamon Handsome vitaminas y minerales son adecuados para usted  · Usted tiene arcadas secas (vómitos sin que salga nada) frecuentes  · Deidra náusea y vómitos no mejoran ni desaparecen después de usar el medicamento  · Usted tiene preguntas o inquietudes acerca de sanchez condición o tratamiento  Medicamentos:  Es posible que usted necesite alguno de los siguientes:  · Medicamentos,  se puede administrar para calmarle el estómago y detener deidra vómitos  Usted también puede necesitar medicamentos para ayudarlo a sentirse más relajado o para detener las náuseas y los vómitos causados por el mareo por movimiento  · Se usan los estimulantes gastrointestinales  para ayudar a vaciar sanchez estómago y los intestinos  [place] puede ayudar para reducir las náuseas y el vómito  · [place] deidra medicamentos chato se le haya indicado    Consulte con sanchez médico si usted yvan que escamilla medicamento no le está ayudando o si presenta efectos secundarios  Infórmele si es alérgico a cualquier medicamento  Mantenga ashlee lista actualizada de los [place], las vitaminas y los productos herbales que yogesh  Incluya los siguientes datos de los medicamentos: cantidad, frecuencia y motivo de administración  Traiga con usted la lista o los envases de la píldoras a deidra citas de seguimiento  Lleve la lista de los medicamentos con usted en tanya de ashlee emergencia  Evite o controle las náuseas y los vómitos agudos:   · No consuma alcohol  El alcohol podría causarle malestar o irritación estomacal  Ashlee cantidad elevada de alcohol también puede causar náuseas y vómitos agudos  · Controle el estrés  Los irais de [place] que son el resultado de tensión nerviosa pueden causar náuseas y vómitos  Busque la manera de relajarse y controlar el estrés  Descanse y [employer]  · [employer] líquidos chato se le indique  Los vómitos pueden llevar a la deshidratación  Es importante beber más líquidos para ayudar a reemplazar los fluidos corporales perdidos  Pregunte a escamilla médico sobre la cantidad de líquido que necesita yusef todos los días y cuáles le recomienda  Escamilla médico podría recomendarle que tome ashlee solución de rehidratación oral (SRO)  Las soluciones de rehidratación oral contienen la cantidad [place] de [place], sales y azúcar que necesita para restituir los líquidos que perdió escamilla organismo  Pregunte qué tipo de solución de rehidratación oral debe usar, qué cantidad debe yusef y dónde puede obtenerla  · Ingiera comidas más pequeñas, más a menudo  Coma pequeñas cantidades de comida cada 2 o 3 horas, incluso si no tiene hambre  Deidra náuseas podrían disminuir si tiene comida en el estómago  · Hable con escamilla médico antes de yusef medicamentos de [employer]  Estos medicamentos pueden causar problemas serios si los [place] junto con ciertos medicamentos o si tiene determinadas condiciones médicas  Podrían tener problemas si [place] yazan dosis demasiado radha o los [place] phyllis más tiempo de lo indicado  Siga las indicaciones de la etiqueta al pie de la [place]  Acuda a deidra consultas de control con sanchez médico según le indicaron  Anote las preguntas que tenga para no olvidarse de [employer] visitas de seguimiento  © 2017 [address] Information is for End User's use only and may not be sold, redistributed or otherwise used for commercial purposes  All illustrations and images included in CareNotes® are the copyrighted property of A D A M , Inc  or Ritesh Pelaez  Esta información es sólo para uso en educación  Sanchez intención no es darle un consejo médico sobre enfermedades o tratamientos  Colsulte con sanchez Verlon Stacey farmacéutico antes de seguir cualquier régimen médico para saber si es seguro y efectivo para usted  Diarrea aguda   CUIDADO AMBULATORIO:   La diarrea aguda  comienza rápidamente y dura poco tiempo, usualmente de 1 a 3 días  Puede durar hasta 2 semanas  Signos y síntomas que pueden ocurrir con la diarrea:  Que tenga 3 o más episodios de diarrea  Puede ser difícil de controlar sanchez diarrea  Usted también podría presentar alguno de los siguientes:  · Efrain Spoon y escalofríos    · Dolor de zachary o dolor abdominal    · Náuseas y vómitos    · Síntomas de deshidratación chato sed, disminución de la orina, piel seca, ojos hundidos, o latido cardíaco rápido y yana  Busque atención médica de inmediato si:   · Se siente confundido  · Sanchez ritmo cardíaco es más lento que lo normal      · Deidra ojos se arleen demasiado hundidos o no le salen lágrimas cuando llora  · Usted orina menos de lo normal o sanchez orina es de color amarillo oscuro  · Deidra evacuaciones intestinales tienen mucosidad o [employer]  · Usted tiene dolor abdominal intenso  · Usted no puede yusef ningún líquido  Pregúntele a sanchez Ritika Pel vitaminas y minerales son adecuados para usted    · Deidra síntomas no mejoran con el tratamiento  · Usted tiene fiebre por encima de los 38 50? (38 5?)  · Tiene dificultad para ingerir alimentos y líquidos porque tiene vómitos  · Usted tiene sed o la boca seca  · Escamilla diarrea no mejora dentro de 7 días  · Usted tiene preguntas o inquietudes acerca de escamilla condición o cuidado  El tratamiento para la diarrea aguda  puede incluir medicamentos para disminuir o detener escamilla diarrea  También es probable que usted necesite medicamentos para tratar [place]  Cuidados personales:   · [place] líquidos chato se le haya indicado  Los líquidos evitarán la deshidratación que es provocada por la diarrea  Pregunte a escamilla médico sobre la cantidad de líquido que necesita yusef todos los días y cuáles le recomienda  Es posible que necesite yusef yazan solución de rehidratación oral (SRO)  [address] cantidades exactas de agua, sal y azúcar que usted necesita para reemplazar los líquidos corporales  Se puede comprar sales para rehidratación oral en la mayoría de los supermercados y [place]  · Coma alimentos que micheal fáciles de digerir  Algunos ejemplos incluyen arroz, lentejas, cereales, plátanos, patatas y pan  También se incluyen algunas frutas (plátano, melón), verduras jaqueline cocidas y augusto [place]  Evite alimentos altos en [place] y azúcar  También evite la cafeína, el alcohol, los lácteos y las augusto kim hasta que la diarrea haya desaparecido  Prevenga la diarrea:   · Lávese las jac frecuentemente  Utilice agua y [place]  Lávese las jac antes de comer o preparar alimentos  También lávese alo jac después del ir al baño  Utilice alcohol en gel si no tiene [place] y [place]  · Mantenga las superficies del baño limpias  para ayudar a evitar la propagación de gérmenes que provocan la diarrea [place]  · [address] frutas y verduras antes de consumirlas  [place] puede ayudar a [facility] gérmenes causantes de diarrea   Si es posible, retire la piel de frutas y verduras o cocínelas jaqueline antes de comerlas  · Cocine la carne chato se indica  ¨ Cocine la carne picada  a 160 °F      ¨ Cocine la carne de ave picada, la carne de ave entera o los fitzgerald de carne de ave  a 165 °F chato mínimo  Retire la carne del noreen  Deje reposar phyllis 3 minutos antes de comerla  ¨ Cocine los fitzgerald enteros de carne que no sea de ave  a 145 °F chato mínimo  Retire la carne del noreen  Deje reposar phyllis 3 minutos antes de comerla  · [employer] platos que tocaron la carne cruda con [place] con jabón  [place] incluye tablas de cortar, utensilios, platos y recipientes  · Coloque carne cruda o cocida en el refrigerador tan pronto chato sea posible  Las bacterias pueden crecer en la carne que permanece a temperatura ambiente phyllis [place]  · No coma ostras, almejas o mejillones crudos o poco cocidos  Estos alimentos pueden estar contaminados y causar infección  · [place] agua filtrada o tratada solo cuando viaja  No ponga hielo en alo bebidas  Rochelle agua embotellada siempre que sea posible  Acuda a alo consultas de control con sanchez médico según le indicaron  Anote alo preguntas para que se acuerde de hacerlas phyllis alo visitas  © 2017 [address] Information is for End User's use only and may not be sold, redistributed or otherwise used for commercial purposes  All illustrations and images included in CareNotes® are the copyrighted property of A D A M , Inc  or Ritesh Pelaez  Esta información es sólo para uso en educación  Sanchez intención no es darle un consejo médico sobre enfermedades o tratamientos  Colsulte con sanchez Davida [place] farmacéutico antes de seguir cualquier régimen médico para saber si es seguro y efectivo para usted  Panel de función hepática   INFORMACIÓN GENERAL:  ¿Qué es anand panel? Los paneles de laboratorio (un conjunto de exámenes) se realizan por muchas razones   Pueden llevarse a cabo para investigación rutinaria de evelia o sospecha de enfermedad o toxicidad  También pueden ser utilizados para determinar si la efectividad de un medicamento mejora o PAMELAGEORGEYOUSIFANGELICA  Los paneles de laboratorio pueden medir el éxito o fracaso de un tratamiento y pueden ser solicitados por razones médicas o legales  Los exámenes incluidos en un panel pueden requerir yazan o más muestras de diferentes tipos, por [place], [place] de [place] y [facility]  Las [place] pueden ser tomadas en diferentes tiempos y [place] métodos  Los siguientes son exámenes que pueden ser incluidos en anand panel:   · Medición de alanina aminotransferasa  · Medición de albúmina, marcial  · Medición de aspartato aminotransferasa  · Medición de fosfatasa alcalina sérica  · Medición directa de bilirrubina  · Medición sérica de bilirrubina no conjugada  · Medición sérica de gamma-glutamil transferasa  · Medición sérica de proteínas totales  · Medición total de bilirrubina  · Tiempo de protrombina    ACUERDOS SOBRE ESCAMILLA CUIDADO:   ted tiene el derecho de participar en la planificación de alo cuidados  Para ayudar en esta planificación; ted debe informarse acerca de escamilla estado de evelia y [place] la forma chato puede tratarse  De gail [place] y alo médicos pueden hablar acerca de alo opciones y decidir el cuidado que se usará phyllis escamilla tratamiento  Usted siempre tiene el derecho a rechazar escamilla tratamiento  © Copyright [employer] 2018  Information is for End User's use only and may not be sold, redistributed or otherwise used for commercial purposes  The above information is an  only  It is not intended as a substitute for medical advice for your individual conditions or treatments  Talk to your doctor, nurse or pharmacist before following any medical regimen to see if it is safe and effective for you

## 2018-12-05 NOTE — ED PROVIDER NOTES
History  Chief Complaint   Patient presents with    Vomiting     "I have vomiting and fever and sore throat and headache"     This is a 72-year-old female patient whose had vomiting chills sore throat headache with intermittent diarrhea for the last 2 days  Father with the same  She is having difficulty holding water down  The headache is diffuse nonfocal no blurred vision or double vision no photophobia  No neck pain  She not take a temperature  No cough congestion mild sore throat when she swallows  No chest pain or shortness of breath no toe older back pain no flank pain  No urgency frequency dysuria  Nothing makes it better or worse  Patient received fluids antiemetics and blood work  Patient denies alcohol use            Prior to Admission Medications   Prescriptions Last Dose Informant Patient Reported? Taking?   metFORMIN (GLUCOPHAGE) 1000 MG tablet   Yes Yes   Sig: Take 700 mg by mouth 2 (two) times a day with meals      Facility-Administered Medications: None       History reviewed  No pertinent past medical history  Past Surgical History:   Procedure Laterality Date     SECTION  2017    CHOLECYSTECTOMY         Family History   Problem Relation Age of Onset    Heart disease Family         CARDIOVASCULAR DISEASE     I have reviewed and agree with the history as documented      Social History   Substance Use Topics    Smoking status: Never Smoker    Smokeless tobacco: Never Used      Comment: NO TOBACCO USE    Alcohol use No        Review of Systems    Physical Exam  Physical Exam    Vital Signs  ED Triage Vitals   Temperature Pulse Respirations Blood Pressure SpO2   18 1123 18 1123 18 1123 18 1123 18 1123   99 °F (37 2 °C) (!) 120 18 107/66 98 %      Temp Source Heart Rate Source Patient Position - Orthostatic VS BP Location FiO2 (%)   18 1123 18 1123 18 1255 18 1255 --   Tympanic Monitor Sitting Left arm       Pain Score       12/05/18 1123       Worst Possible Pain           Vitals:    12/05/18 1123 12/05/18 1255   BP: 107/66 122/62   Pulse: (!) 120 89   Patient Position - Orthostatic VS:  Sitting       Visual Acuity      ED Medications  Medications   sodium chloride 0 9 % bolus 1,000 mL (0 mL Intravenous Stopped 12/5/18 1310)   ondansetron (ZOFRAN) injection 4 mg (4 mg Intravenous Given 12/5/18 1210)   ketorolac (TORADOL) injection 15 mg (15 mg Intravenous Given 12/5/18 1258)       Diagnostic Studies  Results Reviewed     Procedure Component Value Units Date/Time    UA w Reflex to Microscopic w Reflex to Culture [[id number]]  (Normal) Collected:  12/05/18 1410    Lab Status:  Final result Specimen:  Urine from Urine, Clean Catch Updated:  12/05/18 1435     Color, UA Yellow     Clarity, UA Clear     Specific Gravity, UA 1 010     pH, UA 6 0     Leukocytes, UA Negative     Nitrite, UA Negative     Protein, UA Negative mg/dl      Glucose, UA Negative mg/dl      Ketones, UA Negative mg/dl      Bilirubin, UA Negative     Blood, UA Negative     UROBILINOGEN UA Negative mg/dL     Hepatitis panel, acute [[id number]] Collected:  12/05/18 1344    Lab Status:   In process Specimen:  Blood from Arm, Right Updated:  12/05/18 1359    Lipase [[id number]]  (Normal) Collected:  12/05/18 1209    Lab Status:  Final result Specimen:  Blood from Arm, Right Updated:  12/05/18 1313     Lipase 45 u/L     POCT pregnancy, urine [[id number]]  (Normal) Resulted:  12/05/18 1256    Lab Status:  Final result Updated:  12/05/18 1256     EXT PREG TEST UR (Ref: Negative) negative    Rapid Strep A Screen Only, Adults [[id number]]  (Normal) Collected:  12/05/18 1209    Lab Status:  Final result Specimen:  Throat from Throat Updated:  12/05/18 1251     Rapid Strep A Screen Negative    Comprehensive metabolic panel [[id number]]  (Abnormal) Collected:  12/05/18 1209    Lab Status:  Final result Specimen:  Blood from Arm, Right Updated:  12/05/18 1250     Sodium 139 mmol/L      Potassium 4 2 mmol/L      Chloride 105 mmol/L      CO2 26 mmol/L      ANION GAP 8 mmol/L      BUN 12 mg/dL      Creatinine 0 75 mg/dL      Glucose 179 (H) mg/dL      Calcium 9 0 mg/dL       (H) U/L       (H) U/L      Alkaline Phosphatase 104 U/L      Total Protein 7 4 g/dL      Albumin 4 2 g/dL      Total Bilirubin 2 50 (H) mg/dL      eGFR 114 ml/min/1 73sq m     Narrative:         National Kidney Disease Education Program recommendations are as follows:  GFR calculation is accurate only with a steady state creatinine  Chronic Kidney disease less than 60 ml/min/1 73 sq  meters  Kidney failure less than 15 ml/min/1 73 sq  meters  CBC and differential [[id number]]  (Abnormal) Collected:  12/05/18 1209    Lab Status:  Final result Specimen:  Blood from Arm, Right Updated:  12/05/18 1222     WBC 8 60 Thousand/uL      RBC 4 72 Million/uL      Hemoglobin 13 8 g/dL      Hematocrit 42 0 %      MCV 89 fL      MCH 29 2 pg      MCHC 32 8 g/dL      RDW 12 9 %      MPV 10 5 fL      Platelets 424 (L) Thousands/uL                  US gallbladder   Final Result by Vincent Hewitt MD (12/05 1346)   Suboptimal study due to patient's body habitus and overlying bowel gas  1   Hepatomegaly with moderate diffuse hepatic steatosis  2   Status post cholecystectomy  No intrahepatic biliary ductal dilation  Proximal CBD is within normal limits  Distal CBD is obscured by bowel gas           Workstation performed: [id number]                    Procedures  Procedures       Phone Contacts  ED Phone Contact    ED Course                               MDM  Number of Diagnoses or Management Options  Diarrhea:   Transaminitis:   Vomiting:   Diagnosis management comments: Patient feels better however she does have some transaminitis I did send her for an ultrasound even though she did have a cholecystitis which showed no bile duct dilatation her lipase is normal   Patient did have a hepatitis panel sent off today  Patient not jaundice  I explained to the patient detail or discharge her home on Zofran with a follow-up to GI and give her family doctor  CritCare Time    Disposition  Final diagnoses:   Vomiting   Diarrhea   Transaminitis     Time reflects when diagnosis was documented in both MDM as applicable and the Disposition within this note     Time User Action Codes Description Comment    12/5/2018  2:37 PM Andie Slider Add [R11 10] Vomiting     12/5/2018  2:38 PM DinbalaiFélix Add [R19 7] Diarrhea     12/5/2018  2:38 PM [place], [address] Add [R74 0] Transaminitis       ED Disposition     ED Disposition Condition Comment    Discharge  [facility] discharge to home/self care  Condition at discharge: Good        Follow-up Information     Follow up With Specialties Details Why Contact Info Additional Information    Taylor Castellanos MD Family Medicine Schedule an appointment as soon as possible for a visit  [address] [place] [address] [facility] G[facility]ology Schedule an appointment as soon as possible for a visit You must follow up because your elevated liver functions  [facility] [id number]  Juanjose Almazan [address] [facility], [address] [place] [address], [place], [place], [phone]          Patient's Medications   Discharge Prescriptions    ONDANSETRON (ZOFRAN) 4 MG TABLET    Take 1 tablet (4 mg total) by mouth every 8 (eight) hours as needed for nausea or vomiting for up to 3 days       Start Date: 12/5/2018 End Date: 12/8/2018       Order Dose: 4 mg       Quantity: 10 tablet    Refills: 0     No discharge procedures on file      ED Provider  Electronically Signed by           Dianne Mary PA-C  12/05/18 6806

## 2018-12-06 LAB
HAV IGM SER QL: NORMAL
HBV CORE IGM SER QL: NORMAL
HBV SURFACE AG SER QL: NORMAL
HCV AB SER QL: NORMAL

## 2019-02-14 ENCOUNTER — TELEPHONE (OUTPATIENT)
Dept: BARIATRICS | Facility: CLINIC | Age: 24
End: 2019-02-14

## 2019-03-18 PROBLEM — K82.9 GALLBLADDER DISEASE: Status: ACTIVE | Noted: 2017-09-26

## 2019-03-22 ENCOUNTER — HOSPITAL ENCOUNTER (EMERGENCY)
Facility: HOSPITAL | Age: 24
Discharge: HOME | End: 2019-03-22
Attending: EMERGENCY MEDICINE
Payer: COMMERCIAL

## 2019-03-22 VITALS
HEIGHT: 72 IN | OXYGEN SATURATION: 98 % | RESPIRATION RATE: 20 BRPM | WEIGHT: 260 LBS | SYSTOLIC BLOOD PRESSURE: 127 MMHG | TEMPERATURE: 98.3 F | HEART RATE: 74 BPM | BODY MASS INDEX: 35.21 KG/M2 | DIASTOLIC BLOOD PRESSURE: 72 MMHG

## 2019-03-22 DIAGNOSIS — Z32.02 NEGATIVE PREGNANCY TEST: ICD-10-CM

## 2019-03-22 DIAGNOSIS — M54.9 MUSCULOSKELETAL BACK PAIN: Primary | ICD-10-CM

## 2019-03-22 LAB
BILIRUB UR QL STRIP: NEGATIVE
CLARITY UR: CLEAR
COLOR UR: YELLOW
COLOR, POC: YELLOW
EXT PREG TEST URINE: NEGATIVE
GLUCOSE UR STRIP-MCNC: NEGATIVE MG/DL
HGB UR QL STRIP.AUTO: NEGATIVE
KETONES UR STRIP-MCNC: NEGATIVE MG/DL
LEUKOCYTE ESTERASE UR QL STRIP: NEGATIVE
NITRITE UR QL STRIP: NEGATIVE
PH UR STRIP.AUTO: 6 [PH] (ref 4.5–8)
PROT UR STRIP-MCNC: NEGATIVE MG/DL
SP GR UR STRIP.AUTO: 1.02 (ref 1–1.03)
UROBILINOGEN UR QL STRIP.AUTO: 0.2 E.U./DL

## 2019-03-22 PROCEDURE — 99283 EMERGENCY DEPT VISIT LOW MDM: CPT

## 2019-03-22 PROCEDURE — 81025 URINE PREGNANCY TEST: CPT | Performed by: PHYSICIAN ASSISTANT

## 2019-03-22 PROCEDURE — 81003 URINALYSIS AUTO W/O SCOPE: CPT

## 2019-03-22 RX ORDER — LIDOCAINE 50 MG/G
1 PATCH TOPICAL ONCE
Status: DISCONTINUED | OUTPATIENT
Start: 2019-03-22 | End: 2019-03-22 | Stop reason: HOSPADM

## 2019-03-22 RX ORDER — NAPROXEN 500 MG/1
500 TABLET ORAL 2 TIMES DAILY WITH MEALS
Qty: 10 TABLET | Refills: 0 | Status: SHIPPED | OUTPATIENT
Start: 2019-03-22 | End: 2019-06-04 | Stop reason: ALTCHOICE

## 2019-03-22 RX ORDER — CYCLOBENZAPRINE HCL 10 MG
10 TABLET ORAL 3 TIMES DAILY PRN
Qty: 15 TABLET | Refills: 0 | Status: SHIPPED | OUTPATIENT
Start: 2019-03-22 | End: 2019-06-04 | Stop reason: ALTCHOICE

## 2019-03-22 RX ADMIN — LIDOCAINE 1 PATCH: 50 PATCH CUTANEOUS at 08:44

## 2019-03-22 NOTE — ED PROVIDER NOTES
History  Chief Complaint   Patient presents with    Back Pain     Pt c/o right shoulder and right lower back pain     72-year-old female with no significant past history presents for evaluation of back pain started this morning  Patient reports that she woke up and started having mid back pain as well as abdominal cramping  Patient reports that she does not get her period due to being on a specific birth control which patient is unable to say  She reports that 3 days ago she had some nausea  Reports that she took a home pregnancy test which was negative however reports that she would like when here  She denies any vaginal bleeding, vaginal discharge, dysuria, hematuria, diarrhea, constipation, fever  Patient denies any paresthesias, loss of bowel or bladder control, saddle anesthesia  She is able to walk without any difficulty  Prior to Admission Medications   Prescriptions Last Dose Informant Patient Reported? Taking?   metFORMIN (GLUCOPHAGE) 1000 MG tablet   Yes No   Sig: Take 700 mg by mouth 2 (two) times a day with meals   ondansetron (ZOFRAN) 4 mg tablet   No No   Sig: Take 1 tablet (4 mg total) by mouth every 8 (eight) hours as needed for nausea or vomiting for up to 3 days   phentermine (ADIPEX-P) 37 5 MG tablet   Yes No   Sig: TAKE 1 TABLET EVERY DAY IN THE MORNING BEFORE BREAKFAST      Facility-Administered Medications: None       History reviewed  No pertinent past medical history  Past Surgical History:   Procedure Laterality Date     SECTION  2017    CHOLECYSTECTOMY         Family History   Problem Relation Age of Onset    Heart disease Family         CARDIOVASCULAR DISEASE     I have reviewed and agree with the history as documented      Social History     Tobacco Use    Smoking status: Never Smoker    Smokeless tobacco: Never Used    Tobacco comment: NO TOBACCO USE   Substance Use Topics    Alcohol use: No    Drug use: No        Review of Systems Constitutional: Negative for chills and fever  Gastrointestinal: Positive for abdominal pain  Negative for nausea and vomiting  Genitourinary: Negative for flank pain  Musculoskeletal: Positive for back pain and myalgias  Negative for arthralgias and joint swelling  Skin: Negative for color change and wound  Neurological: Negative for weakness and numbness  Physical Exam  Physical Exam   Constitutional: She is oriented to person, place, and time  She appears well-developed and well-nourished  She is cooperative  No distress  HENT:   Head: Normocephalic and atraumatic  Cardiovascular: Normal rate and normal heart sounds  No murmur heard  Pulmonary/Chest: Effort normal and breath sounds normal    Abdominal: Soft  Bowel sounds are normal  She exhibits no distension and no mass  There is generalized tenderness  There is no rebound and no guarding  No hernia  Obese abdomen  Musculoskeletal: Normal range of motion  She exhibits tenderness  She exhibits no edema or deformity  Right shoulder: She exhibits tenderness  She exhibits normal range of motion, no bony tenderness, no swelling, no effusion, no crepitus, no deformity, no laceration, no spasm, normal pulse and normal strength  Lumbar back: She exhibits tenderness  She exhibits normal range of motion, no bony tenderness, no swelling, no edema, no deformity and no laceration  Back:    Full AROM and 5/5 strength in LE and UE b/l  Full AROM of back  No bruising, deformity, rash, step-off, midline tenderness noted  Neurological: She is alert and oriented to person, place, and time  Skin: Skin is warm  No rash noted  She is not diaphoretic  No erythema  Psychiatric: She has a normal mood and affect         Vital Signs  ED Triage Vitals [03/22/19 0828]   Temperature Pulse Respirations Blood Pressure SpO2   98 3 °F (36 8 °C) 74 20 127/72 98 %      Temp Source Heart Rate Source Patient Position - Orthostatic VS BP Location FiO2 (%)   Tympanic Monitor Sitting Right arm --      Pain Score       Worst Possible Pain           Vitals:    03/22/19 0828   BP: 127/72   Pulse: 74   Patient Position - Orthostatic VS: Sitting         Visual Acuity      ED Medications  Medications   lidocaine (LIDODERM) 5 % patch 1 patch (1 patch Topical Medication Applied 3/22/19 0844)       Diagnostic Studies  Results Reviewed     Procedure Component Value Units Date/Time    POCT pregnancy, urine [[id number]]  (Normal) Resulted:  03/22/19 0858    Lab Status:  Final result Updated:  03/22/19 0858     EXT PREG TEST UR (Ref: Negative) negative    POCT urinalysis dipstick [[id number]]  (Normal) Resulted:  03/22/19 0854    Lab Status:  Final result Specimen:  Urine Updated:  03/22/19 0854     Color, UA yellow    ED Urine Macroscopic [[id number]] Collected:  03/22/19 0854    Lab Status:  Final result Specimen:  Urine Updated:  03/22/19 0854     Color, UA Yellow     Clarity, UA Clear     pH, UA 6 0     Leukocytes, UA Negative     Nitrite, UA Negative     Protein, UA Negative mg/dl      Glucose, UA Negative mg/dl      Ketones, UA Negative mg/dl      Urobilinogen, UA 0 2 E U /dl      Bilirubin, UA Negative     Blood, UA Negative     Specific Gravity, UA 1 020    Narrative:       CLINITEK RESULT                 No orders to display              Procedures  Procedures       Phone Contacts  ED Phone Contact    ED Course                               MDM    Disposition  Final diagnoses:   Musculoskeletal back pain   Negative pregnancy test     Time reflects when diagnosis was documented in both MDM as applicable and the Disposition within this note     Time User Action Codes Description Comment    3/22/2019  9:01 AM Amilcar [place] Add [M54 9] Musculoskeletal back pain     3/22/2019  9:01 AM Amilcar [place] Add [Z32 02] Negative pregnancy test       ED Disposition     ED Disposition Condition Date/Time Comment    Discharge Stable Fri Mar 22, 2019  9:01 AM Christiano Oakley discharge to home/self care  Follow-up Information     Follow up With Specialties Details Why Contact Info Additional [address] [facility] Schedule an appointment as soon as possible for a visit in 1 week Follow up for re-check of symptoms [place] [address] [address] [facility] [address]  [address] [place] [address], [phone]          Patient's Medications   Discharge Prescriptions    CYCLOBENZAPRINE (FLEXERIL) 10 MG TABLET    Take 1 tablet (10 mg total) by mouth 3 (three) times a day as needed for muscle spasms for up to 5 days       Start Date: 3/22/2019 End Date: 3/27/2019       Order Dose: 10 mg       Quantity: 15 tablet    Refills: 0    NAPROXEN (NAPROSYN) 500 MG TABLET    Take 1 tablet (500 mg total) by mouth 2 (two) times a day with meals for 5 days       Start Date: 3/22/2019 End Date: 3/27/2019       Order Dose: 500 mg       Quantity: 10 tablet    Refills: 0     No discharge procedures on file      ED Provider  Electronically Signed by           Mary Tate PA-C  03/22/19 5805

## 2019-04-03 ENCOUNTER — OFFICE VISIT (OUTPATIENT)
Dept: BARIATRICS | Facility: CLINIC | Age: 24
End: 2019-04-03

## 2019-04-03 VITALS
DIASTOLIC BLOOD PRESSURE: 68 MMHG | TEMPERATURE: 98 F | SYSTOLIC BLOOD PRESSURE: 112 MMHG | HEART RATE: 80 BPM | WEIGHT: 264.4 LBS | RESPIRATION RATE: 16 BRPM | BODY MASS INDEX: 37.85 KG/M2 | HEIGHT: 70 IN

## 2019-04-03 DIAGNOSIS — E66.9 OBESITY, CLASS II, BMI 35-39.9: Primary | ICD-10-CM

## 2019-04-03 PROCEDURE — RECHECK

## 2019-04-08 ENCOUNTER — OFFICE VISIT (OUTPATIENT)
Dept: BARIATRICS | Facility: CLINIC | Age: 24
End: 2019-04-08

## 2019-04-08 VITALS — WEIGHT: 264.3 LBS | BODY MASS INDEX: 37.84 KG/M2 | HEIGHT: 70 IN

## 2019-04-08 DIAGNOSIS — E66.9 OBESITY, CLASS II, BMI 35-39.9: Primary | ICD-10-CM

## 2019-04-08 PROCEDURE — RECHECK

## 2019-05-13 ENCOUNTER — HOSPITAL ENCOUNTER (EMERGENCY)
Facility: HOSPITAL | Age: 24
Discharge: HOME | End: 2019-05-13
Attending: EMERGENCY MEDICINE | Admitting: EMERGENCY MEDICINE
Payer: COMMERCIAL

## 2019-05-13 ENCOUNTER — APPOINTMENT (EMERGENCY)
Dept: RADIOLOGY | Facility: HOSPITAL | Age: 24
End: 2019-05-13
Payer: COMMERCIAL

## 2019-05-13 VITALS
WEIGHT: 268.9 LBS | DIASTOLIC BLOOD PRESSURE: 55 MMHG | BODY MASS INDEX: 38.58 KG/M2 | OXYGEN SATURATION: 99 % | RESPIRATION RATE: 14 BRPM | SYSTOLIC BLOOD PRESSURE: 128 MMHG | TEMPERATURE: 100.8 F | HEART RATE: 108 BPM

## 2019-05-13 DIAGNOSIS — R50.9 FEVER: Primary | ICD-10-CM

## 2019-05-13 LAB
BACTERIA UR QL AUTO: ABNORMAL /HPF
BILIRUB UR QL STRIP: NEGATIVE
CLARITY UR: ABNORMAL
COLOR UR: YELLOW
EXT PREG TEST URINE: NEGATIVE
FLUAV + FLUBV RNA ISLT NAA+PROBE: NOT DETECTED
FLUAV + FLUBV RNA ISLT NAA+PROBE: NOT DETECTED
GLUCOSE UR STRIP-MCNC: NEGATIVE MG/DL
HGB UR QL STRIP.AUTO: NEGATIVE
KETONES UR STRIP-MCNC: NEGATIVE MG/DL
LEUKOCYTE ESTERASE UR QL STRIP: 100
MUCOUS THREADS UR QL AUTO: ABNORMAL
NITRITE UR QL STRIP: NEGATIVE
NON-SQ EPI CELLS URNS QL MICRO: ABNORMAL /HPF
PH UR STRIP.AUTO: 6 [PH]
PROT UR STRIP-MCNC: NEGATIVE MG/DL
RBC #/AREA URNS AUTO: ABNORMAL /HPF
SP GR UR STRIP.AUTO: 1.01 (ref 1–1.04)
UROBILINOGEN UA: NEGATIVE MG/DL
WBC #/AREA URNS AUTO: ABNORMAL /HPF

## 2019-05-13 PROCEDURE — 81001 URINALYSIS AUTO W/SCOPE: CPT | Performed by: EMERGENCY MEDICINE

## 2019-05-13 PROCEDURE — 99283 EMERGENCY DEPT VISIT LOW MDM: CPT

## 2019-05-13 PROCEDURE — 99283 EMERGENCY DEPT VISIT LOW MDM: CPT | Performed by: EMERGENCY MEDICINE

## 2019-05-13 PROCEDURE — 71046 X-RAY EXAM CHEST 2 VIEWS: CPT

## 2019-05-13 PROCEDURE — 87502 INFLUENZA DNA AMP PROBE: CPT | Performed by: EMERGENCY MEDICINE

## 2019-05-13 PROCEDURE — 81003 URINALYSIS AUTO W/O SCOPE: CPT | Performed by: EMERGENCY MEDICINE

## 2019-05-13 PROCEDURE — 81025 URINE PREGNANCY TEST: CPT | Performed by: EMERGENCY MEDICINE

## 2019-05-13 RX ORDER — ACETAMINOPHEN 325 MG/1
975 TABLET ORAL ONCE
Status: COMPLETED | OUTPATIENT
Start: 2019-05-13 | End: 2019-05-13

## 2019-05-13 RX ORDER — IBUPROFEN 600 MG/1
600 TABLET ORAL ONCE
Status: COMPLETED | OUTPATIENT
Start: 2019-05-13 | End: 2019-05-13

## 2019-05-13 RX ADMIN — IBUPROFEN 600 MG: 600 TABLET ORAL at 18:52

## 2019-05-13 RX ADMIN — ACETAMINOPHEN 975 MG: 325 TABLET ORAL at 17:39

## 2019-06-02 PROBLEM — E66.812 CLASS 2 OBESITY DUE TO EXCESS CALORIES IN ADULT: Status: ACTIVE | Noted: 2019-06-02

## 2019-06-02 PROBLEM — E66.09 CLASS 2 OBESITY DUE TO EXCESS CALORIES IN ADULT: Status: ACTIVE | Noted: 2019-06-02

## 2019-06-02 PROBLEM — E11.9 DIABETES MELLITUS TYPE 2 IN NONOBESE (HCC): Status: ACTIVE | Noted: 2019-06-02

## 2019-06-04 ENCOUNTER — HOSPITAL ENCOUNTER (EMERGENCY)
Facility: HOSPITAL | Age: 24
Discharge: HOME | End: 2019-06-05
Attending: EMERGENCY MEDICINE | Admitting: EMERGENCY MEDICINE
Payer: COMMERCIAL

## 2019-06-04 ENCOUNTER — OFFICE VISIT (OUTPATIENT)
Dept: BARIATRICS | Facility: CLINIC | Age: 24
End: 2019-06-04

## 2019-06-04 ENCOUNTER — OFFICE VISIT (OUTPATIENT)
Dept: FAMILY MEDICINE CLINIC | Facility: CLINIC | Age: 24
End: 2019-06-04

## 2019-06-04 VITALS
OXYGEN SATURATION: 98 % | RESPIRATION RATE: 18 BRPM | SYSTOLIC BLOOD PRESSURE: 142 MMHG | HEART RATE: 94 BPM | TEMPERATURE: 97.2 F | BODY MASS INDEX: 37.88 KG/M2 | WEIGHT: 264 LBS | DIASTOLIC BLOOD PRESSURE: 80 MMHG

## 2019-06-04 VITALS
RESPIRATION RATE: 16 BRPM | OXYGEN SATURATION: 99 % | HEART RATE: 74 BPM | SYSTOLIC BLOOD PRESSURE: 135 MMHG | DIASTOLIC BLOOD PRESSURE: 96 MMHG | TEMPERATURE: 98.5 F

## 2019-06-04 VITALS — WEIGHT: 265.3 LBS | BODY MASS INDEX: 38.07 KG/M2

## 2019-06-04 DIAGNOSIS — R10.13 EPIGASTRIC PAIN: ICD-10-CM

## 2019-06-04 DIAGNOSIS — E11.8 TYPE 2 DIABETES MELLITUS WITH COMPLICATION, WITHOUT LONG-TERM CURRENT USE OF INSULIN (HCC): Primary | ICD-10-CM

## 2019-06-04 DIAGNOSIS — E66.9 OBESITY, CLASS II, BMI 35-39.9: Primary | ICD-10-CM

## 2019-06-04 DIAGNOSIS — E66.01 CLASS 2 SEVERE OBESITY DUE TO EXCESS CALORIES WITH SERIOUS COMORBIDITY AND BODY MASS INDEX (BMI) OF 37.0 TO 37.9 IN ADULT (HCC): ICD-10-CM

## 2019-06-04 DIAGNOSIS — K29.70 GASTRITIS: Primary | ICD-10-CM

## 2019-06-04 DIAGNOSIS — N39.0 UTI (URINARY TRACT INFECTION): ICD-10-CM

## 2019-06-04 PROBLEM — E11.69 DIABETES MELLITUS TYPE 2 IN OBESE (HCC): Status: ACTIVE | Noted: 2019-06-02

## 2019-06-04 LAB — SL AMB POCT HEMOGLOBIN AIC: 7.3 (ref ?–6.5)

## 2019-06-04 PROCEDURE — 96361 HYDRATE IV INFUSION ADD-ON: CPT

## 2019-06-04 PROCEDURE — 83036 HEMOGLOBIN GLYCOSYLATED A1C: CPT | Performed by: INTERNAL MEDICINE

## 2019-06-04 PROCEDURE — RECHECK

## 2019-06-04 PROCEDURE — 81025 URINE PREGNANCY TEST: CPT | Performed by: EMERGENCY MEDICINE

## 2019-06-04 PROCEDURE — 99214 OFFICE O/P EST MOD 30 MIN: CPT | Performed by: INTERNAL MEDICINE

## 2019-06-04 PROCEDURE — 99282 EMERGENCY DEPT VISIT SF MDM: CPT | Performed by: EMERGENCY MEDICINE

## 2019-06-04 PROCEDURE — 3051F HG A1C>EQUAL 7.0%<8.0%: CPT | Performed by: FAMILY MEDICINE

## 2019-06-04 PROCEDURE — 99283 EMERGENCY DEPT VISIT LOW MDM: CPT

## 2019-06-04 RX ORDER — PANTOPRAZOLE SODIUM 40 MG/1
40 TABLET, DELAYED RELEASE ORAL DAILY
COMMUNITY
End: 2019-06-11 | Stop reason: SDUPTHER

## 2019-06-04 RX ORDER — LIDOCAINE HYDROCHLORIDE 20 MG/ML
15 SOLUTION OROPHARYNGEAL ONCE
Status: COMPLETED | OUTPATIENT
Start: 2019-06-05 | End: 2019-06-05

## 2019-06-04 RX ORDER — PANTOPRAZOLE SODIUM 40 MG/1
40 INJECTION, POWDER, FOR SOLUTION INTRAVENOUS ONCE
Status: COMPLETED | OUTPATIENT
Start: 2019-06-05 | End: 2019-06-05

## 2019-06-04 RX ORDER — PANTOPRAZOLE SODIUM 40 MG/1
40 TABLET, DELAYED RELEASE ORAL DAILY
Qty: 7 TABLET | Refills: 0 | Status: SHIPPED | OUTPATIENT
Start: 2019-06-04 | End: 2019-06-04

## 2019-06-04 RX ORDER — MAGNESIUM HYDROXIDE/ALUMINUM HYDROXICE/SIMETHICONE 120; 1200; 1200 MG/30ML; MG/30ML; MG/30ML
15 SUSPENSION ORAL ONCE
Status: COMPLETED | OUTPATIENT
Start: 2019-06-05 | End: 2019-06-05

## 2019-06-04 RX ADMIN — SODIUM CHLORIDE 1000 ML: 9 INJECTION, SOLUTION INTRAVENOUS at 23:59

## 2019-06-05 LAB
ALBUMIN SERPL BCP-MCNC: 4 G/DL (ref 3–5.2)
ALP SERPL-CCNC: 90 U/L (ref 43–122)
ALT SERPL W P-5'-P-CCNC: 36 U/L (ref 9–52)
ANION GAP SERPL CALCULATED.3IONS-SCNC: 7 MMOL/L (ref 5–14)
AST SERPL W P-5'-P-CCNC: 29 U/L (ref 14–36)
BACTERIA UR QL AUTO: ABNORMAL /HPF
BASOPHILS # BLD AUTO: 0 THOUSANDS/ΜL (ref 0–0.1)
BASOPHILS NFR BLD AUTO: 0 % (ref 0–1)
BILIRUB SERPL-MCNC: 1.3 MG/DL
BILIRUB UR QL STRIP: NEGATIVE
BUN SERPL-MCNC: 8 MG/DL (ref 5–25)
CALCIUM SERPL-MCNC: 9.8 MG/DL (ref 8.4–10.2)
CHLORIDE SERPL-SCNC: 100 MMOL/L (ref 97–108)
CLARITY UR: CLEAR
CO2 SERPL-SCNC: 29 MMOL/L (ref 22–30)
COLOR UR: YELLOW
CREAT SERPL-MCNC: 0.56 MG/DL (ref 0.6–1.2)
EOSINOPHIL # BLD AUTO: 0.1 THOUSAND/ΜL (ref 0–0.4)
EOSINOPHIL NFR BLD AUTO: 1 % (ref 0–6)
ERYTHROCYTE [DISTWIDTH] IN BLOOD BY AUTOMATED COUNT: 13.2 %
EXT PREG TEST URINE: NEGATIVE
GFR SERPL CREATININE-BSD FRML MDRD: 133 ML/MIN/1.73SQ M
GLUCOSE SERPL-MCNC: 155 MG/DL (ref 70–99)
GLUCOSE UR STRIP-MCNC: ABNORMAL MG/DL
HCT VFR BLD AUTO: 39.5 % (ref 36–46)
HGB BLD-MCNC: 13.1 G/DL (ref 12–16)
HGB UR QL STRIP.AUTO: NEGATIVE
KETONES UR STRIP-MCNC: NEGATIVE MG/DL
LEUKOCYTE ESTERASE UR QL STRIP: NEGATIVE
LYMPHOCYTES # BLD AUTO: 1.2 THOUSANDS/ΜL (ref 0.5–4)
LYMPHOCYTES NFR BLD AUTO: 17 % (ref 25–45)
MCH RBC QN AUTO: 29.3 PG (ref 26–34)
MCHC RBC AUTO-ENTMCNC: 33.1 G/DL (ref 31–36)
MCV RBC AUTO: 89 FL (ref 80–100)
MONOCYTES # BLD AUTO: 0.7 THOUSAND/ΜL (ref 0.2–0.9)
MONOCYTES NFR BLD AUTO: 10 % (ref 1–10)
NEUTROPHILS # BLD AUTO: 5.3 THOUSANDS/ΜL (ref 1.8–7.8)
NEUTS SEG NFR BLD AUTO: 72 % (ref 45–65)
NITRITE UR QL STRIP: POSITIVE
NON-SQ EPI CELLS URNS QL MICRO: ABNORMAL /HPF
PH UR STRIP.AUTO: 6.5 [PH]
PLATELET # BLD AUTO: 146 THOUSANDS/UL (ref 150–450)
PMV BLD AUTO: 10.7 FL (ref 8.9–12.7)
POTASSIUM SERPL-SCNC: 3.9 MMOL/L (ref 3.6–5)
PROT SERPL-MCNC: 7.2 G/DL (ref 5.9–8.4)
PROT UR STRIP-MCNC: NEGATIVE MG/DL
RBC # BLD AUTO: 4.46 MILLION/UL (ref 4–5.2)
RBC #/AREA URNS AUTO: ABNORMAL /HPF
SODIUM SERPL-SCNC: 136 MMOL/L (ref 137–147)
SP GR UR STRIP.AUTO: 1.02 (ref 1–1.04)
UROBILINOGEN UA: NEGATIVE MG/DL
WBC # BLD AUTO: 7.3 THOUSAND/UL (ref 4.5–11)
WBC #/AREA URNS AUTO: ABNORMAL /HPF

## 2019-06-05 PROCEDURE — C9113 INJ PANTOPRAZOLE SODIUM, VIA: HCPCS | Performed by: EMERGENCY MEDICINE

## 2019-06-05 PROCEDURE — 81001 URINALYSIS AUTO W/SCOPE: CPT | Performed by: EMERGENCY MEDICINE

## 2019-06-05 PROCEDURE — 85025 COMPLETE CBC W/AUTO DIFF WBC: CPT | Performed by: EMERGENCY MEDICINE

## 2019-06-05 PROCEDURE — 96374 THER/PROPH/DIAG INJ IV PUSH: CPT

## 2019-06-05 PROCEDURE — 36415 COLL VENOUS BLD VENIPUNCTURE: CPT | Performed by: EMERGENCY MEDICINE

## 2019-06-05 PROCEDURE — 80053 COMPREHEN METABOLIC PANEL: CPT | Performed by: EMERGENCY MEDICINE

## 2019-06-05 RX ORDER — NITROFURANTOIN 25; 75 MG/1; MG/1
100 CAPSULE ORAL 2 TIMES DAILY
Qty: 10 CAPSULE | Refills: 0 | Status: SHIPPED | OUTPATIENT
Start: 2019-06-05 | End: 2020-01-30 | Stop reason: ALTCHOICE

## 2019-06-05 RX ADMIN — ALUMINUM HYDROXIDE, MAGNESIUM HYDROXIDE, AND SIMETHICONE 15 ML: 200; 200; 20 SUSPENSION ORAL at 00:03

## 2019-06-05 RX ADMIN — LIDOCAINE HYDROCHLORIDE 15 ML: 20 SOLUTION ORAL; TOPICAL at 00:03

## 2019-06-05 RX ADMIN — PANTOPRAZOLE SODIUM 40 MG: 40 INJECTION, POWDER, FOR SOLUTION INTRAVENOUS at 00:01

## 2019-06-11 ENCOUNTER — OFFICE VISIT (OUTPATIENT)
Dept: FAMILY MEDICINE CLINIC | Facility: CLINIC | Age: 24
End: 2019-06-11

## 2019-06-11 VITALS
BODY MASS INDEX: 36.3 KG/M2 | DIASTOLIC BLOOD PRESSURE: 82 MMHG | OXYGEN SATURATION: 98 % | WEIGHT: 253 LBS | RESPIRATION RATE: 16 BRPM | TEMPERATURE: 97 F | SYSTOLIC BLOOD PRESSURE: 120 MMHG | HEART RATE: 94 BPM

## 2019-06-11 DIAGNOSIS — R10.13 EPIGASTRIC PAIN: Primary | ICD-10-CM

## 2019-06-11 PROCEDURE — 99213 OFFICE O/P EST LOW 20 MIN: CPT | Performed by: INTERNAL MEDICINE

## 2019-06-11 PROCEDURE — 3725F SCREEN DEPRESSION PERFORMED: CPT | Performed by: INTERNAL MEDICINE

## 2019-06-11 RX ORDER — SUCRALFATE 1 G/1
1 TABLET ORAL 2 TIMES DAILY
Qty: 60 TABLET | Refills: 1 | Status: SHIPPED | OUTPATIENT
Start: 2019-06-11 | End: 2020-01-30 | Stop reason: ALTCHOICE

## 2019-06-11 RX ORDER — PANTOPRAZOLE SODIUM 40 MG/1
40 TABLET, DELAYED RELEASE ORAL DAILY
Qty: 30 TABLET | Refills: 1 | Status: SHIPPED | OUTPATIENT
Start: 2019-06-11 | End: 2020-01-30 | Stop reason: ALTCHOICE

## 2019-06-14 ENCOUNTER — TELEPHONE (OUTPATIENT)
Dept: FAMILY MEDICINE CLINIC | Facility: CLINIC | Age: 24
End: 2019-06-14

## 2019-08-06 ENCOUNTER — OFFICE VISIT (OUTPATIENT)
Dept: BARIATRICS | Facility: CLINIC | Age: 24
End: 2019-08-06

## 2019-08-06 VITALS — WEIGHT: 263 LBS | BODY MASS INDEX: 37.74 KG/M2

## 2019-08-06 DIAGNOSIS — E66.9 DIABETES MELLITUS TYPE 2 IN OBESE (HCC): ICD-10-CM

## 2019-08-06 DIAGNOSIS — E66.9 OBESITY, CLASS I, BMI 30-34.9: Primary | ICD-10-CM

## 2019-08-06 DIAGNOSIS — E66.9 OBESITY, CLASS II, BMI 35-39.9: ICD-10-CM

## 2019-08-06 DIAGNOSIS — Z01.810 PREOPERATIVE CARDIOVASCULAR EXAMINATION: ICD-10-CM

## 2019-08-06 DIAGNOSIS — E11.69 DIABETES MELLITUS TYPE 2 IN OBESE (HCC): ICD-10-CM

## 2019-08-06 DIAGNOSIS — E66.01 CLASS 2 SEVERE OBESITY DUE TO EXCESS CALORIES WITH SERIOUS COMORBIDITY AND BODY MASS INDEX (BMI) OF 37.0 TO 37.9 IN ADULT (HCC): Primary | ICD-10-CM

## 2019-08-06 PROCEDURE — RECHECK

## 2019-08-06 NOTE — PROGRESS NOTES
[facility]  # [id number]  [address] F/U  Pt reports eliminating soda  Reviewed handout on emotional eating  pt shares she usually eats for physical reasons but agreed to complete food/mood log for next visit  She states she has greatly decreased portion size but isn't always able to measure, kate when at work  She denies any increase in current stress  She says she is walking - an hour- in the am  She should continue to read Germán Rico manual re: ongoing lifestyle changes needed  Pt goals include completing food/mood log , exercising, practicing mindful eating bhvrs as well as downloading [place] [employer] sylvia

## 2019-09-10 ENCOUNTER — OFFICE VISIT (OUTPATIENT)
Dept: CARDIOLOGY CLINIC | Facility: CLINIC | Age: 24
End: 2019-09-10
Payer: COMMERCIAL

## 2019-09-10 VITALS
HEIGHT: 72 IN | SYSTOLIC BLOOD PRESSURE: 92 MMHG | WEIGHT: 265 LBS | HEART RATE: 73 BPM | BODY MASS INDEX: 35.89 KG/M2 | DIASTOLIC BLOOD PRESSURE: 68 MMHG

## 2019-09-10 DIAGNOSIS — Z01.818 PRE-OPERATIVE CLEARANCE: Primary | ICD-10-CM

## 2019-09-10 DIAGNOSIS — I10 ESSENTIAL HYPERTENSION: ICD-10-CM

## 2019-09-10 PROCEDURE — 99243 OFF/OP CNSLTJ NEW/EST LOW 30: CPT | Performed by: INTERNAL MEDICINE

## 2019-09-10 PROCEDURE — 93000 ELECTROCARDIOGRAM COMPLETE: CPT | Performed by: INTERNAL MEDICINE

## 2019-09-10 NOTE — LETTER
September 10, 2019     Sofie Watkins MD  [address]  [facility]    Patient: Naye Cano   YOB: 1995   Date of Visit: 9/10/2019       Dear Dr Sanjay Garibay: Thank you for referring Naye Cano to me for evaluation  Below are my notes for this consultation  If you have questions, please do not hesitate to call me  I look forward to following your patient along with you  Sincerely,        Maurilio Patel MD        CC: Dino Franks MD  9/10/2019 10:48 AM  Sign at close encounter                                            Cardiology Consultation     Naye Cano  [id number]  1995  [address]  [address] [facility] [address]      HPI:  Patient is here for cardiovascular evaluation preoperatively for bariatric surgery  I previously saw the patient in 2017 for postpartum hypertension and possible postpartum cardiomyopathy  Patient with a history of eclampsia with her pregnancy ending with a   She had a healthy baby  There was some question of post partum cardiomyopathy but it never was conclusively documented  She did have postpartum hypertension which has subsequently resolved and she is off all antihypertensive medications with a normal to low blood pressure  She denies chest discomfort of any kind  She denies unusual shortness of breath  She denies palpitations  She denies leg edema  She denies dizziness/lightheadedness or near-syncope/syncope  1  Pre-operative clearance  POCT ECG    Echo complete with contrast if indicated   2   Essential hypertension  Echo complete with contrast if indicated     Patient Active Problem List   Diagnosis    Postpartum cardiomyopathy    Gallbladder disease    Class 2 obesity due to excess calories in adult    Diabetes mellitus type 2 in obese (HCC)    Epigastric pain    Essential hypertension     Past Medical History:   Diagnosis Date    Diabetes mellitus ([facility] [address] )     Obesity (BMI 35 0-39 9 without comorbidity)      Social History     Socioeconomic History    Marital status: Single     Spouse name: Not on file    Number of children: Not on file    Years of education: Not on file    Highest education level: Not on file   Occupational History    Not on file   Social Needs    Financial resource strain: Not on file    Food insecurity:     Worry: Not on file     Inability: Not on file    Transportation needs:     Medical: Not on file     Non-medical: Not on file   Tobacco Use    Smoking status: Never Smoker    Smokeless tobacco: Never Used    Tobacco comment: NO TOBACCO USE   Substance and Sexual Activity    Alcohol use: No    Drug use: No    Sexual activity: Yes     Partners: Female     Birth control/protection: IUD   Lifestyle    Physical activity:     Days per week: Not on file     Minutes per session: Not on file    Stress: Not on file   Relationships    Social connections:     Talks on phone: Not on file     Gets together: Not on file     Attends Denominational service: Not on file     Active member of club or organization: Not on file     Attends meetings of clubs or organizations: Not on file     Relationship status: Not on file    Intimate partner violence:     Fear of current or ex partner: Not on file     Emotionally abused: Not on file     Physically abused: Not on file     Forced sexual activity: Not on file   Other Topics Concern    Not on file   Social History Narrative    Not on file      Family History   Problem Relation Age of Onset    Heart disease Family         CARDIOVASCULAR DISEASE    Diabetes Mother     Other Mother         gastric bypass    Diabetes Father     Diabetes Sister     Sleep apnea Sister     Sleep apnea Brother         2 of the 3 borthers have EAGLE    Diabetes Maternal Grandmother     Diabetes Paternal Grandmother      Past Surgical History:   Procedure Laterality Date     SECTION 08/05/2017    CHOLECYSTECTOMY         Current Outpatient Medications:     metFORMIN (GLUCOPHAGE) 1000 MG tablet, Take 1 tablet (1,000 mg total) by mouth 2 (two) times a day with meals, Disp: 60 tablet, Rfl: 2    nitrofurantoin (MACROBID) 100 mg capsule, Take 1 capsule (100 mg total) by mouth 2 (two) times a day (Patient not taking: Reported on 6/11/2019), Disp: 10 capsule, Rfl: 0    pantoprazole (PROTONIX) 40 mg tablet, Take 1 tablet (40 mg total) by mouth daily (Patient not taking: Reported on 9/10/2019), Disp: 30 tablet, Rfl: 1    sucralfate (CARAFATE) 1 g tablet, Take 1 tablet (1 g total) by mouth 2 (two) times a day (Patient not taking: Reported on 9/10/2019), Disp: 60 tablet, Rfl: 1  No Known Allergies  Vitals:    09/10/19 1026   BP: 92/68   BP Location: Left arm   Patient Position: Sitting   Cuff Size: Large   Pulse: 73   Weight: 120 kg (265 lb)   Height: 6' (1 829 m)       Review of Systems:  Review of Systems   Constitutional: Negative  HENT: Negative  Respiratory: Negative for chest tightness and shortness of breath  Cardiovascular: Negative for chest pain and leg swelling  Gastrointestinal: Negative  Endocrine: Negative  Genitourinary: Negative  Musculoskeletal: Negative  Skin: Negative  Allergic/Immunologic: Negative  Neurological: Negative  Hematological: Negative  Psychiatric/Behavioral: Negative  Physical Exam:  Physical Exam   Constitutional: She is oriented to person, place, and time  She appears well-developed and well-nourished  Obese, BMI 35 9   HENT:   Head: Normocephalic and atraumatic  Neck: Normal range of motion  Neck supple  No JVD present  No tracheal deviation present  No thyromegaly present  Cardiovascular: Normal rate, regular rhythm, normal heart sounds and intact distal pulses  Exam reveals no gallop and no friction rub  No murmur heard  Pulmonary/Chest: Effort normal  No respiratory distress  She has no rales  Abdominal: Soft  Bowel sounds are normal    Musculoskeletal: Normal range of motion  She exhibits no edema  Neurological: She is alert and oriented to person, place, and time  Skin: Skin is warm and dry  Psychiatric: She has a normal mood and affect  Her behavior is normal        Discussion/Summary:  1  Echocardiogram  2  If patient has maintained good left ventricular systolic function, she will be approved for bariatric surgery

## 2019-09-10 NOTE — PROGRESS NOTES
Cardiology Consultation     Juwan Walters  [id number]  1995  [address] [facility] CARDIOLOGY [place] [id number]      HPI:  Patient is here for cardiovascular evaluation preoperatively for bariatric surgery  I previously saw the patient in 2017 for postpartum hypertension and possible postpartum cardiomyopathy  Patient with a history of eclampsia with her pregnancy ending with a   She had a healthy baby  There was some question of post partum cardiomyopathy but it never was conclusively documented  She did have postpartum hypertension which has subsequently resolved and she is off all antihypertensive medications with a normal to low blood pressure  She denies chest discomfort of any kind  She denies unusual shortness of breath  She denies palpitations  She denies leg edema  She denies dizziness/lightheadedness or near-syncope/syncope  1  Pre-operative clearance  POCT ECG    Echo complete with contrast if indicated   2   Essential hypertension  Echo complete with contrast if indicated     Patient Active Problem List   Diagnosis    Postpartum cardiomyopathy    Gallbladder disease    Class 2 obesity due to excess calories in adult    Diabetes mellitus type 2 in obese ([place] [address] )    Epigastric pain    Essential hypertension     Past Medical History:   Diagnosis Date    Diabetes mellitus ([place] [address] )     Obesity (BMI 35 0-39 9 without comorbidity)      Social History     Socioeconomic History    Marital status: Single     Spouse name: Not on file    Number of children: Not on file    Years of education: Not on file    Highest education level: Not on file   Occupational History    Not on file   Social Needs    Financial resource strain: Not on file    Food insecurity:     Worry: Not on file     Inability: Not on file    Transportation needs:     Medical: Not on file     Non-medical: Not on file   Tobacco Use    Smoking status: Never Smoker    Smokeless tobacco: Never Used    Tobacco comment: NO TOBACCO USE   Substance and Sexual Activity    Alcohol use: No    Drug use: No    Sexual activity: Yes     Partners: Female     Birth control/protection: IUD   Lifestyle    Physical activity:     Days per week: Not on file     Minutes per session: Not on file    Stress: Not on file   Relationships    Social connections:     Talks on phone: Not on file     Gets together: Not on file     Attends Zoroastrianism service: Not on file     Active member of club or organization: Not on file     Attends meetings of clubs or organizations: Not on file     Relationship status: Not on file    Intimate partner violence:     Fear of current or ex partner: Not on file     Emotionally abused: Not on file     Physically abused: Not on file     Forced sexual activity: Not on file   Other Topics Concern    Not on file   Social History Narrative    Not on file      Family History   Problem Relation Age of Onset    Heart disease Family         CARDIOVASCULAR DISEASE    Diabetes Mother     Other Mother         gastric bypass    Diabetes Father     Diabetes Sister     Sleep apnea Sister     Sleep apnea Brother         2 of the 3 borthers have EAGLE    Diabetes Maternal Grandmother     Diabetes Paternal Grandmother      Past Surgical History:   Procedure Laterality Date     SECTION  2017    CHOLECYSTECTOMY         Current Outpatient Medications:     metFORMIN (GLUCOPHAGE) 1000 MG tablet, Take 1 tablet (1,000 mg total) by mouth 2 (two) times a day with meals, Disp: 60 tablet, Rfl: 2    nitrofurantoin (MACROBID) 100 mg capsule, Take 1 capsule (100 mg total) by mouth 2 (two) times a day (Patient not taking: Reported on 2019), Disp: 10 capsule, Rfl: 0    pantoprazole (PROTONIX) 40 mg tablet, Take 1 tablet (40 mg total) by mouth daily (Patient not taking: Reported on 9/10/2019), Disp: 30 tablet, Rfl: 1    sucralfate (CARAFATE) 1 g tablet, Take 1 tablet (1 g total) by mouth 2 (two) times a day (Patient not taking: Reported on 9/10/2019), Disp: 60 tablet, Rfl: 1  No Known Allergies  Vitals:    09/10/19 1026   BP: 92/68   BP Location: Left arm   Patient Position: Sitting   Cuff Size: Large   Pulse: 73   Weight: 120 kg (265 lb)   Height: 6' (1 829 m)       Review of Systems:  Review of Systems   Constitutional: Negative  HENT: Negative  Respiratory: Negative for chest tightness and shortness of breath  Cardiovascular: Negative for chest pain and leg swelling  Gastrointestinal: Negative  Endocrine: Negative  Genitourinary: Negative  Musculoskeletal: Negative  Skin: Negative  Allergic/Immunologic: Negative  Neurological: Negative  Hematological: Negative  Psychiatric/Behavioral: Negative  Physical Exam:  Physical Exam   Constitutional: She is oriented to person, place, and time  She appears well-developed and well-nourished  Obese, BMI 35 9   HENT:   Head: Normocephalic and atraumatic  Neck: Normal range of motion  Neck supple  No JVD present  No tracheal deviation present  No thyromegaly present  Cardiovascular: Normal rate, regular rhythm, normal heart sounds and intact distal pulses  Exam reveals no gallop and no friction rub  No murmur heard  Pulmonary/Chest: Effort normal  No respiratory distress  She has no rales  Abdominal: Soft  Bowel sounds are normal    Musculoskeletal: Normal range of motion  She exhibits no edema  Neurological: She is alert and oriented to person, place, and time  Skin: Skin is warm and dry  Psychiatric: She has a normal mood and affect  Her behavior is normal        Discussion/Summary:  1  Echocardiogram  2  If patient has maintained good left ventricular systolic function, she will be approved for bariatric surgery

## 2019-09-10 NOTE — LETTER
September 10, 2019     Shaun Reyes MD  [address]  [facility]    Patient: [facility]   YOB: 1995   Date of Visit: 9/10/2019       Dear Dr Geena Muhammad: Thank you for referring [facility] to me for evaluation  Below are my notes for this consultation  If you have questions, please do not hesitate to call me  I look forward to following your patient along with you  Sincerely,        Alanna Bassett MD        CC: No Recipients  Alanna Bassett MD  9/10/2019 10:48 AM  Sign at close encounter                                            Cardiology Consultation     [facility]  [id number]  1995  [address]  [address] [facility] [address]      HPI:  Patient is here for cardiovascular evaluation preoperatively for bariatric surgery  I previously saw the patient in 2017 for postpartum hypertension and possible postpartum cardiomyopathy  Patient with a history of eclampsia with her pregnancy ending with a   She had a healthy baby  There was some question of post partum cardiomyopathy but it never was conclusively documented  She did have postpartum hypertension which has subsequently resolved and she is off all antihypertensive medications with a normal to low blood pressure  She denies chest discomfort of any kind  She denies unusual shortness of breath  She denies palpitations  She denies leg edema  She denies dizziness/lightheadedness or near-syncope/syncope  1  Pre-operative clearance  POCT ECG    Echo complete with contrast if indicated   2   Essential hypertension  Echo complete with contrast if indicated     Patient Active Problem List   Diagnosis    Postpartum cardiomyopathy    Gallbladder disease    Class 2 obesity due to excess calories in adult    Diabetes mellitus type 2 in obese (HCC)    Epigastric pain    Essential hypertension     Past Medical History:   Diagnosis Date    Diabetes mellitus ([facility] [address] )     Obesity (BMI 35 0-39 9 without comorbidity)      Social History     Socioeconomic History    Marital status: Single     Spouse name: Not on file    Number of children: Not on file    Years of education: Not on file    Highest education level: Not on file   Occupational History    Not on file   Social Needs    Financial resource strain: Not on file    Food insecurity:     Worry: Not on file     Inability: Not on file    Transportation needs:     Medical: Not on file     Non-medical: Not on file   Tobacco Use    Smoking status: Never Smoker    Smokeless tobacco: Never Used    Tobacco comment: NO TOBACCO USE   Substance and Sexual Activity    Alcohol use: No    Drug use: No    Sexual activity: Yes     Partners: Female     Birth control/protection: IUD   Lifestyle    Physical activity:     Days per week: Not on file     Minutes per session: Not on file    Stress: Not on file   Relationships    Social connections:     Talks on phone: Not on file     Gets together: Not on file     Attends Spiritism service: Not on file     Active member of club or organization: Not on file     Attends meetings of clubs or organizations: Not on file     Relationship status: Not on file    Intimate partner violence:     Fear of current or ex partner: Not on file     Emotionally abused: Not on file     Physically abused: Not on file     Forced sexual activity: Not on file   Other Topics Concern    Not on file   Social History Narrative    Not on file      Family History   Problem Relation Age of Onset    Heart disease Family         CARDIOVASCULAR DISEASE    Diabetes Mother     Other Mother         gastric bypass    Diabetes Father     Diabetes Sister     Sleep apnea Sister     Sleep apnea Brother         2 of the 3 borthers have EAGLE    Diabetes Maternal Grandmother     Diabetes Paternal Grandmother      Past Surgical History:   Procedure Laterality Date     SECTION 08/05/2017    CHOLECYSTECTOMY         Current Outpatient Medications:     metFORMIN (GLUCOPHAGE) 1000 MG tablet, Take 1 tablet (1,000 mg total) by mouth 2 (two) times a day with meals, Disp: 60 tablet, Rfl: 2    nitrofurantoin (MACROBID) 100 mg capsule, Take 1 capsule (100 mg total) by mouth 2 (two) times a day (Patient not taking: Reported on 6/11/2019), Disp: 10 capsule, Rfl: 0    pantoprazole (PROTONIX) 40 mg tablet, Take 1 tablet (40 mg total) by mouth daily (Patient not taking: Reported on 9/10/2019), Disp: 30 tablet, Rfl: 1    sucralfate (CARAFATE) 1 g tablet, Take 1 tablet (1 g total) by mouth 2 (two) times a day (Patient not taking: Reported on 9/10/2019), Disp: 60 tablet, Rfl: 1  No Known Allergies  Vitals:    09/10/19 1026   BP: 92/68   BP Location: Left arm   Patient Position: Sitting   Cuff Size: Large   Pulse: 73   Weight: 120 kg (265 lb)   Height: 6' (1 829 m)       Review of Systems:  Review of Systems   Constitutional: Negative  HENT: Negative  Respiratory: Negative for chest tightness and shortness of breath  Cardiovascular: Negative for chest pain and leg swelling  Gastrointestinal: Negative  Endocrine: Negative  Genitourinary: Negative  Musculoskeletal: Negative  Skin: Negative  Allergic/Immunologic: Negative  Neurological: Negative  Hematological: Negative  Psychiatric/Behavioral: Negative  Physical Exam:  Physical Exam   Constitutional: She is oriented to person, place, and time  She appears well-developed and well-nourished  Obese, BMI 35 9   HENT:   Head: Normocephalic and atraumatic  Neck: Normal range of motion  Neck supple  No JVD present  No tracheal deviation present  No thyromegaly present  Cardiovascular: Normal rate, regular rhythm, normal heart sounds and intact distal pulses  Exam reveals no gallop and no friction rub  No murmur heard  Pulmonary/Chest: Effort normal  No respiratory distress  She has no rales  Abdominal: Soft  Bowel sounds are normal    Musculoskeletal: Normal range of motion  She exhibits no edema  Neurological: She is alert and oriented to person, place, and time  Skin: Skin is warm and dry  Psychiatric: She has a normal mood and affect  Her behavior is normal        Discussion/Summary:  1  Echocardiogram  2  If patient has maintained good left ventricular systolic function, she will be approved for bariatric surgery

## 2019-09-12 ENCOUNTER — OFFICE VISIT (OUTPATIENT)
Dept: BARIATRICS | Facility: CLINIC | Age: 24
End: 2019-09-12

## 2019-09-12 VITALS — BODY MASS INDEX: 37.56 KG/M2 | HEIGHT: 70 IN | WEIGHT: 262.4 LBS

## 2019-09-12 DIAGNOSIS — E66.9 OBESITY, CLASS II, BMI 35-39.9: Primary | ICD-10-CM

## 2019-09-12 PROCEDURE — RECHECK

## 2019-09-12 NOTE — PROGRESS NOTES
Bariatric Follow Up Nutrition Note    Preop  Preop Program 2/6 weight checks-missed appts, need to go back    Type of surgery  Preop  Surgery Date: TBD  Surgeon: [address]  26 y o   female  Height 6' (1 829 m), weight 119 kg (262 lb 6 4 oz)  Body mass index is 35 59 kg/m²      Weight on Day of Weight Loss Surgery: preop  Weight in (lb) to have BMI = 25: 174 lbs  Pre-Op Excess Wt: 90 5 lbs  Pt to maintain weight now with BMI of [address] Equation-2600 cals to maintain wt    Review of History and Medications   Past Medical History:   Diagnosis Date    Diabetes mellitus ([facility] [address] )     Obesity (BMI 35 0-39 9 without comorbidity)      Past Surgical History:   Procedure Laterality Date     SECTION  2017    CHOLECYSTECTOMY       Social History     Socioeconomic History    Marital status: Single     Spouse name: Not on file    Number of children: Not on file    Years of education: Not on file    Highest education level: Not on file   Occupational History    Not on file   Social Needs    Financial resource strain: Not on file    Food insecurity:     Worry: Not on file     Inability: Not on file    Transportation needs:     Medical: Not on file     Non-medical: Not on file   Tobacco Use    Smoking status: Never Smoker    Smokeless tobacco: Never Used    Tobacco comment: NO TOBACCO USE   Substance and Sexual Activity    Alcohol use: No    Drug use: No    Sexual activity: Yes     Partners: Female     Birth control/protection: IUD   Lifestyle    Physical activity:     Days per week: Not on file     Minutes per session: Not on file    Stress: Not on file   Relationships    Social connections:     Talks on phone: Not on file     Gets together: Not on file     Attends Sikhism service: Not on file     Active member of club or organization: Not on file     Attends meetings of clubs or organizations: Not on file     Relationship status: Not on file  Intimate partner violence:     Fear of current or ex partner: Not on file     Emotionally abused: Not on file     Physically abused: Not on file     Forced sexual activity: Not on file   Other Topics Concern    Not on file   Social History Narrative    Not on file       Current Outpatient Medications:     metFORMIN (GLUCOPHAGE) 1000 MG tablet, Take 1 tablet (1,000 mg total) by mouth 2 (two) times a day with meals, Disp: 60 tablet, Rfl: 2    nitrofurantoin (MACROBID) 100 mg capsule, Take 1 capsule (100 mg total) by mouth 2 (two) times a day (Patient not taking: Reported on 6/11/2019), Disp: 10 capsule, Rfl: 0    pantoprazole (PROTONIX) 40 mg tablet, Take 1 tablet (40 mg total) by mouth daily (Patient not taking: Reported on 9/10/2019), Disp: 30 tablet, Rfl: 1    sucralfate (CARAFATE) 1 g tablet, Take 1 tablet (1 g total) by mouth 2 (two) times a day (Patient not taking: Reported on 9/10/2019), Disp: 60 tablet, Rfl: 1    Food Intake and Lifestyle Assessment   Food Intake Assessment completed via 24 hour recall  Breakfast: 2 eggs 1 pc cheese and water  Snack: cheese stick   Lunch: salad with chicken with oil and salt  Snack: 2 strawberries or 5 grapes  Dinner: rice,(1/3 c ) salad, meat  Snack: none  Beverage intake: more water, cut juice down to 2 oz  Diet texture/stage: regular  Protein supplement: none  Estimated protein intake per day: 60 gm/day  Estimated fluid intake per day: ~100 oz  Meals eaten away from home: 1-2 times/week  Typical meal pattern: 3 meals per day and 2-3 snacks per day  Eating Behaviors: Appropriate portion sizes, Does not drink with meals and waits 60-minutes after meal before resuming drinking and Large portion sizes    Food allergies or intolerances: none  Cultural or Restorationism considerations:     Physical Assessment  Nutrition Related Findings  Stopped drinking juice, no fried foods   Less carbs    Physical Activity  Types of exercise: Walking 1 hour 5 days/week  Current physical limitations: none    Psychosocial Assessment   Support systems: parent(s) significant other  Socioeconomic factors: lives with child and boyfriend    Nutrition Diagnosis  Diagnosis: Overweight / Obesity (NC-3 3)  Related to: Physical inactivity and Excessive energy intake  As Evidenced by: BMI >25     Interventions and Teaching   Patient educated on post-op nutrition guidelines  Patient educated and handouts provided    Surgical changes to stomach / GI  Capacity of post-surgery stomach  Diet progression  Adequate hydration  Sugar and fat restriction to decrease "dumping syndrome"  Fat restriction to decrease steatorrhea  Exercise  Suggestions for pre-op diet  Nutrition considerations after surgery  Protein supplements  Meal planning and preparation  Appropriate carbohydrate, protein, and fat intake, and food/fluid choices to maximize safe weight loss, nutrient intake, and tolerance   Dietary and lifestyle changes  Possible problems with poor eating habits  Intuitive eating  Techniques for self monitoring and keeping daily food journal  Vitamin / Mineral supplementation of Multivitamin with minerals and Vitamin D    Education provided to: patient    Barriers to learning: Did not need   Readiness to change: action    Comprehension: verbalizes understanding     Expected Compliance: good    Goals  Food journal, Complete lession plans 1-6, Eat 3 meals per day and eat less carbs and smaller portions     Time Spent:   30 Minutes

## 2019-09-19 ENCOUNTER — HOSPITAL ENCOUNTER (OUTPATIENT)
Dept: NON INVASIVE DIAGNOSTICS | Facility: CLINIC | Age: 24
Discharge: HOME | End: 2019-09-19
Payer: COMMERCIAL

## 2019-09-19 DIAGNOSIS — I10 ESSENTIAL HYPERTENSION: ICD-10-CM

## 2019-09-19 DIAGNOSIS — Z01.818 PRE-OPERATIVE CLEARANCE: ICD-10-CM

## 2019-09-19 PROCEDURE — 93306 TTE W/DOPPLER COMPLETE: CPT

## 2019-09-19 PROCEDURE — 93306 TTE W/DOPPLER COMPLETE: CPT | Performed by: INTERNAL MEDICINE

## 2019-10-04 ENCOUNTER — TELEPHONE (OUTPATIENT)
Dept: OTHER | Facility: OTHER | Age: 24
End: 2019-10-04

## 2019-10-21 ENCOUNTER — OFFICE VISIT (OUTPATIENT)
Dept: FAMILY MEDICINE CLINIC | Facility: CLINIC | Age: 24
End: 2019-10-21

## 2019-10-21 VITALS
TEMPERATURE: 97.5 F | OXYGEN SATURATION: 98 % | HEIGHT: 70 IN | DIASTOLIC BLOOD PRESSURE: 70 MMHG | WEIGHT: 260 LBS | HEART RATE: 82 BPM | BODY MASS INDEX: 37.22 KG/M2 | SYSTOLIC BLOOD PRESSURE: 100 MMHG | RESPIRATION RATE: 18 BRPM

## 2019-10-21 DIAGNOSIS — E66.9 DIABETES MELLITUS TYPE 2 IN OBESE (HCC): ICD-10-CM

## 2019-10-21 DIAGNOSIS — Z23 NEED FOR INFLUENZA VACCINATION: ICD-10-CM

## 2019-10-21 DIAGNOSIS — E11.69 DIABETES MELLITUS TYPE 2 IN OBESE (HCC): ICD-10-CM

## 2019-10-21 DIAGNOSIS — R07.9 CHEST PAIN, UNSPECIFIED TYPE: Primary | ICD-10-CM

## 2019-10-21 PROCEDURE — 99213 OFFICE O/P EST LOW 20 MIN: CPT | Performed by: FAMILY MEDICINE

## 2019-10-21 PROCEDURE — 90472 IMMUNIZATION ADMIN EACH ADD: CPT | Performed by: FAMILY MEDICINE

## 2019-10-21 PROCEDURE — 1036F TOBACCO NON-USER: CPT | Performed by: FAMILY MEDICINE

## 2019-10-21 PROCEDURE — 90682 RIV4 VACC RECOMBINANT DNA IM: CPT | Performed by: FAMILY MEDICINE

## 2019-10-21 NOTE — LETTER
October 21, 2019     Patient: Fabiola Green   YOB: 1995   Date of Visit: 10/21/2019       To Whom it May Concern: Fabiola Green is under my professional care  She was seen in my office on 10/21/2019  She may return to work on 10/22 with no limitations       If you have any questions or concerns, please don't hesitate to call           Sincerely,          Carmencita Loera MD

## 2019-10-21 NOTE — ASSESSMENT & PLAN NOTE
- One episode of acute chest pain and shortness of breath for which patient was seen in the Emergency department   - Patient's labs in the ED were unremarkable  EKG showed no evidence of acute ischemia or abnormalities and chest x-ray was negative  - Most recent ECHO in September 9361: Systolic function was normal  Ejection fraction was estimated to be 60 %  There were no regional wall motion abnormalities; wall thickness was normal    - Asymptomatic at this time; will continue to monitor

## 2019-10-21 NOTE — PROGRESS NOTES
Assessment/Plan:    Chest pain  - One episode of acute chest pain and shortness of breath for which patient was seen in the Emergency department   - Patient's labs in the ED were unremarkable  EKG showed no evidence of acute ischemia or abnormalities and chest x-ray was negative  - Most recent ECHO in September 7148: Systolic function was normal  Ejection fraction was estimated to be 60 %  There were no regional wall motion abnormalities; wall thickness was normal    - Asymptomatic at this time; will continue to monitor        Diagnoses and all orders for this visit:    Chest pain, unspecified type    Diabetes mellitus type 2 in obese ([facility] [address] )  -     Hemoglobin A1C; Future    Need for influenza vaccination  -     Cancel: influenza vaccine, 2186-5517, quadrivalent, 0 5 mL, preservative-free, for adult and pediatric patients 6 mos+ (AFLURIA, FLUARIX, FLULAVAL, FLUZONE)  -     influenza vaccine, 8212-2308, quadrivalent, recombinant, PF, 0 5 mL, for patients 18 yr+ (FLUBLOK)          Subjective:      Patient ID: Greg Bell is a 26 y o  female  HPI     Greg Bell is a 52-year-old female with a past medical history of diabetes, morbid obesity and postpartum cardiomyopathy who is being seen for follow up visit after she was seen in the Emergency department 10/17 due to acute onset chest pain and shortness of breath  Patient states prior to the onset of her symptoms she was at work prior to arrival when they activated a fire simulation without warning the workers  Patient states as she was running outside she began to feel the chest pain and shortness of breath  Patient has been following with cardiology since being diagnosed with postpartum cardiomyopathy with her last appointment in September 2019 where she had a repeat ECHO which was normal   Patient was also previously on medication for hypertension which was discontinued by her PCP at the time  Patient's labs in the ED were unremarkable   EKG showed no evidence of acute ischemia or abnormalities and chest x-ray was negative  Patient was discharged in a stable condition and states that she Patient currently states she is feeling much better  The following portions of the patient's history were reviewed and updated as appropriate: allergies, current medications, past family history, past medical history, past social history, past surgical history and problem list     Review of Systems   Constitutional: Negative for activity change, appetite change, chills and fever  HENT: Negative for congestion, ear pain, rhinorrhea, sinus pain and sore throat  Eyes: Negative for pain and visual disturbance  Respiratory: Negative for cough, chest tightness, shortness of breath and wheezing  Cardiovascular: Negative for chest pain, palpitations and leg swelling  Gastrointestinal: Negative for abdominal pain, constipation, diarrhea, nausea and vomiting  Endocrine: Negative for cold intolerance, heat intolerance and polyuria  Genitourinary: Negative for difficulty urinating, dysuria and pelvic pain  Musculoskeletal: Negative for arthralgias, back pain, gait problem and myalgias  Skin: Negative for color change, pallor, rash and wound  Neurological: Negative for dizziness, weakness, light-headedness, numbness and headaches  Hematological: Negative for adenopathy  Does not bruise/bleed easily  Psychiatric/Behavioral: Negative for agitation, behavioral problems, confusion, decreased concentration and dysphoric mood  The patient is not nervous/anxious  Objective:      /70   Pulse 82   Temp 97 5 °F (36 4 °C) (Temporal)   Resp 18   Ht 5' 10" (1 778 m)   Wt 118 kg (260 lb)   SpO2 98%   BMI 37 31 kg/m²          Physical Exam   Constitutional: She is oriented to person, place, and time  She appears well-developed and well-nourished  No distress  HENT:   Head: Normocephalic and atraumatic     Right Ear: External ear normal    Left Ear: External ear normal    Nose: Nose normal    Mouth/Throat: Oropharynx is clear and moist  No oropharyngeal exudate  Eyes: Pupils are equal, round, and reactive to light  Conjunctivae and EOM are normal  Right eye exhibits no discharge  Left eye exhibits no discharge  No scleral icterus  Neck: Normal range of motion  Neck supple  No thyromegaly present  Cardiovascular: Normal rate, regular rhythm and intact distal pulses  No murmur heard  Pulmonary/Chest: Effort normal  No respiratory distress  She has no wheezes  She has no rales  She exhibits no tenderness  Abdominal: Soft  Bowel sounds are normal  She exhibits no distension and no mass  There is no tenderness  There is no rebound and no guarding  Musculoskeletal: Normal range of motion  She exhibits no edema, tenderness or deformity  Neurological: She is alert and oriented to person, place, and time  She has normal reflexes  Coordination normal    Skin: Skin is warm  No rash noted  She is not diaphoretic  No erythema  No pallor  Psychiatric: She has a normal mood and affect   Her behavior is normal  Judgment and thought content normal

## 2019-10-23 ENCOUNTER — OFFICE VISIT (OUTPATIENT)
Dept: BARIATRICS | Facility: CLINIC | Age: 24
End: 2019-10-23

## 2019-10-23 VITALS — WEIGHT: 261.9 LBS | BODY MASS INDEX: 37.58 KG/M2

## 2019-10-23 DIAGNOSIS — E66.9 OBESITY, CLASS II, BMI 35-39.9: Primary | ICD-10-CM

## 2019-10-23 PROCEDURE — RECHECK

## 2019-10-23 NOTE — PROGRESS NOTES
Behavioral health F/U  Pt reports she continues to make positive changes  She reports continuing to decrease carbs, sugary foods and portion size  She also shares she exercises daily walking or yoga  She says she has become more aware of eating triggers and states she rarely eats for emotional reasons  She is having difficulty with baristastic sylvia  Tried to assist   She will talk with RD at next appt

## 2019-10-24 PROCEDURE — 90471 IMMUNIZATION ADMIN: CPT | Performed by: FAMILY MEDICINE

## 2019-10-24 PROCEDURE — 90686 IIV4 VACC NO PRSV 0.5 ML IM: CPT | Performed by: FAMILY MEDICINE

## 2019-11-21 ENCOUNTER — OFFICE VISIT (OUTPATIENT)
Dept: BARIATRICS | Facility: CLINIC | Age: 24
End: 2019-11-21

## 2019-11-21 VITALS — BODY MASS INDEX: 38.07 KG/M2 | WEIGHT: 265.9 LBS | HEIGHT: 70 IN

## 2019-11-21 DIAGNOSIS — E66.9 OBESITY, CLASS II, BMI 35-39.9: Primary | ICD-10-CM

## 2019-11-21 PROCEDURE — RECHECK

## 2019-11-21 NOTE — PROGRESS NOTES
Bariatric Follow Up Nutrition Note    Preop  Preop Program 3/6 weight checks-missed appts, need to go back    Type of surgery  Preop  Surgery Date: TBD  Surgeon: [address]  22 y o   female  Height 5' 10" (1 778 m), weight 121 kg (265 lb 14 4 oz)  Body mass index is 38 15 kg/m²      Weight on Day of Weight Loss Surgery: preop  Weight in (lb) to have BMI = 25: 174 lbs  Pre-Op Excess Wt: 90 5 lbs    [address]  Jeor Equation-2600 cals to maintain wt                                         2100 cals to lose 1 lb/week                                         1600 cals to lose 2 lb/week    Review of History and Medications   Past Medical History:   Diagnosis Date    Diabetes mellitus ([facility] [address] )     Obesity (BMI 35 0-39 9 without comorbidity)      Past Surgical History:   Procedure Laterality Date     SECTION  2017    CHOLECYSTECTOMY       Social History     Socioeconomic History    Marital status: Single     Spouse name: Not on file    Number of children: Not on file    Years of education: Not on file    Highest education level: Not on file   Occupational History    Not on file   Social Needs    Financial resource strain: Not on file    Food insecurity:     Worry: Not on file     Inability: Not on file    Transportation needs:     Medical: Not on file     Non-medical: Not on file   Tobacco Use    Smoking status: Never Smoker    Smokeless tobacco: Never Used    Tobacco comment: NO TOBACCO USE   Substance and Sexual Activity    Alcohol use: No    Drug use: No    Sexual activity: Yes     Partners: Female     Birth control/protection: IUD   Lifestyle    Physical activity:     Days per week: Not on file     Minutes per session: Not on file    Stress: Not on file   Relationships    Social connections:     Talks on phone: Not on file     Gets together: Not on file     Attends Holiness service: Not on file     Active member of club or organization: Not on file     Attends meetings of clubs or organizations: Not on file     Relationship status: Not on file    Intimate partner violence:     Fear of current or ex partner: Not on file     Emotionally abused: Not on file     Physically abused: Not on file     Forced sexual activity: Not on file   Other Topics Concern    Not on file   Social History Narrative    Not on file       Current Outpatient Medications:     metFORMIN (GLUCOPHAGE) 1000 MG tablet, Take 1 tablet (1,000 mg total) by mouth 2 (two) times a day with meals, Disp: 60 tablet, Rfl: 2    nitrofurantoin (MACROBID) 100 mg capsule, Take 1 capsule (100 mg total) by mouth 2 (two) times a day (Patient not taking: Reported on 6/11/2019), Disp: 10 capsule, Rfl: 0    pantoprazole (PROTONIX) 40 mg tablet, Take 1 tablet (40 mg total) by mouth daily (Patient not taking: Reported on 9/10/2019), Disp: 30 tablet, Rfl: 1    sucralfate (CARAFATE) 1 g tablet, Take 1 tablet (1 g total) by mouth 2 (two) times a day (Patient not taking: Reported on 9/10/2019), Disp: 60 tablet, Rfl: 1    Food Intake and Lifestyle Assessment   Food Intake Assessment completed via 24 hour recall  Breakfast: oatmeal or 2 eggs  1 pc cheese and water  Snack: cheese stick   Lunch: salad with chicken with oil and salt  Snack: 2 strawberries or 5 grapes  Dinner:  salad, meat  Snack: juice and cheese stick  Beverage intake: more water, back to drinking a little soda and more juice  Diet texture/stage: regular  Protein supplement: none  Estimated protein intake per day: 60 gm/day  Estimated fluid intake per day: ~100 oz  Meals eaten away from home: 1-2 times/week  Typical meal pattern: 3 meals per day and 2-3 snacks per day  Eating Behaviors: Appropriate portion sizes, Does not drink with meals and waits 60-minutes after meal before resuming drinking and Large portion sizes    Food allergies or intolerances: none  Cultural or Confucianist considerations:     Physical Assessment  Nutrition Related Findings  Drinking juice, no fried foods  Less carbs    Physical Activity  Types of exercise: Walking 1 hour 5 days/week  Current physical limitations: none    Psychosocial Assessment   Support systems: parent(s) significant other  Socioeconomic factors: lives with child and boyfriend    Nutrition Diagnosis  Diagnosis: Overweight / Obesity (NC-3 3)  Related to: Physical inactivity and Excessive energy intake  As Evidenced by: BMI >25     Interventions and Teaching   Patient educated on post-op nutrition guidelines  Patient educated and handouts provided    Surgical changes to stomach / GI  Capacity of post-surgery stomach  Diet progression  Adequate hydration  Sugar and fat restriction to decrease "dumping syndrome"  Fat restriction to decrease steatorrhea  Exercise  Suggestions for pre-op diet  Nutrition considerations after surgery  Protein supplements  Meal planning and preparation  Appropriate carbohydrate, protein, and fat intake, and food/fluid choices to maximize safe weight loss, nutrient intake, and tolerance   Dietary and lifestyle changes  Possible problems with poor eating habits  Intuitive eating  Techniques for self monitoring and keeping daily food journal  Vitamin / Mineral supplementation of Multivitamin with minerals and Vitamin D    Education provided to: patient    Barriers to learning: Language used  #[id number]  Readiness to change: action    Comprehension: verbalizes understanding     Expected Compliance: good    Goals  Food journal, Complete lession plans 1-6, Eat 3 meals per day and eat less carbs and smaller portions     Time Spent:   30 Minutes

## 2019-12-29 ENCOUNTER — HOSPITAL ENCOUNTER (EMERGENCY)
Facility: HOSPITAL | Age: 24
Discharge: HOME | End: 2019-12-29
Attending: EMERGENCY MEDICINE
Payer: COMMERCIAL

## 2019-12-29 ENCOUNTER — APPOINTMENT (EMERGENCY)
Dept: CT IMAGING | Facility: HOSPITAL | Age: 24
End: 2019-12-29
Payer: COMMERCIAL

## 2019-12-29 ENCOUNTER — APPOINTMENT (EMERGENCY)
Dept: RADIOLOGY | Facility: HOSPITAL | Age: 24
End: 2019-12-29
Payer: COMMERCIAL

## 2019-12-29 VITALS
WEIGHT: 271.17 LBS | OXYGEN SATURATION: 100 % | TEMPERATURE: 98.2 F | BODY MASS INDEX: 38.91 KG/M2 | HEART RATE: 66 BPM | SYSTOLIC BLOOD PRESSURE: 150 MMHG | DIASTOLIC BLOOD PRESSURE: 53 MMHG | RESPIRATION RATE: 16 BRPM

## 2019-12-29 DIAGNOSIS — S20.20XA TRAUMATIC ECCHYMOSIS OF CHEST, INITIAL ENCOUNTER: ICD-10-CM

## 2019-12-29 DIAGNOSIS — M62.838 NECK MUSCLE SPASM: ICD-10-CM

## 2019-12-29 DIAGNOSIS — M25.551 RIGHT HIP PAIN: ICD-10-CM
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
## 2019-12-29 DIAGNOSIS — M25.511 RIGHT SHOULDER PAIN: ICD-10-CM

## 2019-12-29 LAB
ANION GAP BLD CALC-SCNC: 15 MMOL/L (ref 4–13)
BUN BLD-MCNC: 11 MG/DL (ref 5–25)
CA-I BLD-SCNC: 1.2 MMOL/L (ref 1.12–1.32)
CHLORIDE BLD-SCNC: 102 MMOL/L (ref 100–108)
CREAT BLD-MCNC: 0.6 MG/DL (ref 0.6–1.3)
GFR SERPL CREATININE-BSD FRML MDRD: 129 ML/MIN/1.73SQ M
GLUCOSE SERPL-MCNC: 239 MG/DL (ref 65–140)
HCT VFR BLD CALC: 40 % (ref 34.8–46.1)
HGB BLDA-MCNC: 13.6 G/DL (ref 11.5–15.4)
PCO2 BLD: 27 MMOL/L (ref 21–32)
POTASSIUM BLD-SCNC: 3.6 MMOL/L (ref 3.5–5.3)
SODIUM BLD-SCNC: 140 MMOL/L (ref 136–145)
SPECIMEN SOURCE: ABNORMAL

## 2019-12-29 PROCEDURE — 96361 HYDRATE IV INFUSION ADD-ON: CPT

## 2019-12-29 PROCEDURE — 71260 CT THORAX DX C+: CPT

## 2019-12-29 PROCEDURE — 99285 EMERGENCY DEPT VISIT HI MDM: CPT

## 2019-12-29 PROCEDURE — 73030 X-RAY EXAM OF SHOULDER: CPT

## 2019-12-29 PROCEDURE — 85014 HEMATOCRIT: CPT

## 2019-12-29 PROCEDURE — 96360 HYDRATION IV INFUSION INIT: CPT

## 2019-12-29 PROCEDURE — 74177 CT ABD & PELVIS W/CONTRAST: CPT

## 2019-12-29 PROCEDURE — 80047 BASIC METABLC PNL IONIZED CA: CPT

## 2019-12-29 PROCEDURE — 99284 EMERGENCY DEPT VISIT MOD MDM: CPT | Performed by: EMERGENCY MEDICINE

## 2019-12-29 RX ORDER — METHOCARBAMOL 500 MG/1
500 TABLET, FILM COATED ORAL ONCE
Status: COMPLETED | OUTPATIENT
Start: 2019-12-29 | End: 2019-12-29

## 2019-12-29 RX ORDER — METHOCARBAMOL 500 MG/1
500 TABLET, FILM COATED ORAL 2 TIMES DAILY
Qty: 14 TABLET | Refills: 0 | Status: SHIPPED | OUTPATIENT
Start: 2019-12-29 | End: 2020-02-12 | Stop reason: ALTCHOICE

## 2019-12-29 RX ORDER — KETOROLAC TROMETHAMINE 30 MG/ML
15 INJECTION, SOLUTION INTRAMUSCULAR; INTRAVENOUS ONCE
Status: DISCONTINUED | OUTPATIENT
Start: 2019-12-29 | End: 2019-12-29

## 2019-12-29 RX ORDER — ACETAMINOPHEN 325 MG/1
975 TABLET ORAL ONCE
Status: COMPLETED | OUTPATIENT
Start: 2019-12-29 | End: 2019-12-29

## 2019-12-29 RX ADMIN — ACETAMINOPHEN 975 MG: 325 TABLET ORAL at 12:44

## 2019-12-29 RX ADMIN — IOHEXOL 100 ML: 350 INJECTION, SOLUTION INTRAVENOUS at 13:22

## 2019-12-29 RX ADMIN — SODIUM CHLORIDE 1000 ML: 0.9 INJECTION, SOLUTION INTRAVENOUS at 12:52

## 2019-12-29 RX ADMIN — METHOCARBAMOL TABLETS 500 MG: 500 TABLET, COATED ORAL at 12:44
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
## 2019-12-29 NOTE — DISCHARGE INSTRUCTIONS
Take Tylenol, Advil and Robaxin as prescribed  Return to the emergency department for the following, but not limited to numbness, tingling, inability to move her neck, chest pain, shortness of breath, lightheadedness or dizziness, fevers, abdominal pain, loss of bowel or bladder control

## 2019-12-29 NOTE — ED NOTES
Patient sleeping, wakes to verbal stimuli, reports 10/10 pain, no improvement following medication administration        Nesha Angel RN  12/29/19 0955

## 2020-01-30 ENCOUNTER — OFFICE VISIT (OUTPATIENT)
Dept: BARIATRICS | Facility: CLINIC | Age: 25
End: 2020-01-30
Payer: COMMERCIAL

## 2020-01-30 VITALS
BODY MASS INDEX: 37.29 KG/M2 | SYSTOLIC BLOOD PRESSURE: 118 MMHG | HEIGHT: 70 IN | HEART RATE: 73 BPM | DIASTOLIC BLOOD PRESSURE: 60 MMHG | TEMPERATURE: 98.3 F | WEIGHT: 260.5 LBS

## 2020-01-30 DIAGNOSIS — E66.9 DIABETES MELLITUS TYPE 2 IN OBESE (HCC): ICD-10-CM

## 2020-01-30 DIAGNOSIS — E11.69 DIABETES MELLITUS TYPE 2 IN OBESE (HCC): ICD-10-CM

## 2020-01-30 DIAGNOSIS — E66.01 CLASS 2 SEVERE OBESITY DUE TO EXCESS CALORIES WITH SERIOUS COMORBIDITY AND BODY MASS INDEX (BMI) OF 37.0 TO 37.9 IN ADULT (HCC): Primary | ICD-10-CM

## 2020-01-30 DIAGNOSIS — I10 ESSENTIAL HYPERTENSION: ICD-10-CM

## 2020-01-30 PROCEDURE — 3074F SYST BP LT 130 MM HG: CPT | Performed by: SURGERY

## 2020-01-30 PROCEDURE — 3078F DIAST BP <80 MM HG: CPT | Performed by: SURGERY

## 2020-01-30 PROCEDURE — 99204 OFFICE O/P NEW MOD 45 MIN: CPT | Performed by: SURGERY

## 2020-01-30 NOTE — PROGRESS NOTES
BARIATRIC INITIAL CONSULT - BARIATRIC SURGERY    Helen Barfield 22 y o  female MRN: [id number]  Unit/Bed#:  Encounter: [id number]      HPI:  Helen Barfield is a 22 y o  female who presents with a longstanding history of morbid obesity and inability to sustain a meaningful weight loss  Here today to discuss bariatric options  Visit type: initial visit    Symptoms: excess weight and inability to loss weight    Associated Symptoms: depressed mood and anxiety    Associated Conditions: abdominal obesity and hypergycemia  Disease Complications: diabetes and hypertension  Weight Loss Interest: high  Previous Diet Trials: low calorie     Exercise Frequency:infrequency  Types of Exercise: walking        Review of Systems   All other systems reviewed and are negative  Historical Information   Past Medical History:   Diagnosis Date    Diabetes mellitus ([address] )     Obesity (BMI 35 0-39 9 without comorbidity)      Past Surgical History:   Procedure Laterality Date     SECTION  2017    CHOLECYSTECTOMY       Social History   Social History     Substance and Sexual Activity   Alcohol Use No     Social History     Substance and Sexual Activity   Drug Use No     Social History     Tobacco Use   Smoking Status Never Smoker   Smokeless Tobacco Never Used   Tobacco Comment    NO TOBACCO USE     Family History: non-contributory    Meds/Allergies   all medications and allergies reviewed  No Known Allergies    Objective       Current Vitals:   Blood Pressure: 118/60 (20 1152)  Pulse: 73 (20 1152)  Temperature: 98 3 °F (36 8 °C) (20 1152)  Temp Source: Tympanic (20 1152)  Height: 5' 10" (177 8 cm) (20 1152)  Weight - Scale: 118 kg (260 lb 8 oz) (20 1152)        Invasive Devices     None                 Physical Exam   Constitutional: She is oriented to person, place, and time  She appears well-developed and well-nourished  No distress     HENT:   Head: Normocephalic and atraumatic  Right Ear: External ear normal    Left Ear: External ear normal    Nose: Nose normal    Eyes: Conjunctivae are normal  Right eye exhibits no discharge  Left eye exhibits no discharge  No scleral icterus  Neurological: She is alert and oriented to person, place, and time  Skin: She is not diaphoretic  Psychiatric: She has a normal mood and affect  Her behavior is normal  Judgment and thought content normal    Vitals reviewed  Lab Results: I have personally reviewed pertinent lab results  Imaging: I have personally reviewed pertinent reports  EKG, Pathology, and Other Studies: I have personally reviewed pertinent reports  Assessment/PLAN:    22 y o  yo female with a long standing h/o of obesity and inability to sustain any meaningful weight loss on her own despite several attempts  She is interested in the Laparoscopic sleeve gastrectomy  As a part of her pre op evaluation, she will be referred to a cardiologist and for a sleep evaluation and consult  She needs an EGD to evaluate the anatomy of her GI tract prior to the operation  I have spent over 45 minutes with her face to face in the office today discussing her options and details of the surgery  We have seen an animation of the surgery on the computer that illustrates how the operation is done and how the anatomy will be altered with the procedure  Over 50% of this was coordinating care  She was given the opportunity to ask questions and I have answered all of them  I have discussed and educated the patient with regards to the components of our multidisciplinary program and the importance of compliance and follow up in the post operative period  The patient was also instructed with regards to the importance of behavior modification, nutritional counseling, support meeting attendance and lifestyle changes that are important to ensure success       Although there is a great statistical chance of improvement or even resolution of most of her associated comorbidities, the results vary from patient to patient and they largely depend on her commitment and compliance  She needs to lose 12 lbs prior to the operation        Justin Alvarez MD  1/30/2020  12:02 PM

## 2020-01-30 NOTE — H&P (VIEW-ONLY)
BARIATRIC INITIAL CONSULT - BARIATRIC SURGERY    Apple Stein 22 y o  female MRN: [id number]  Unit/Bed#:  Encounter: [id number]      HPI:  Apple Stein is a 22 y o  female who presents with a longstanding history of morbid obesity and inability to sustain a meaningful weight loss  Here today to discuss bariatric options  Visit type: initial visit    Symptoms: excess weight and inability to loss weight    Associated Symptoms: depressed mood and anxiety    Associated Conditions: abdominal obesity and hypergycemia  Disease Complications: diabetes and hypertension  Weight Loss Interest: high  Previous Diet Trials: low calorie     Exercise Frequency:infrequency  Types of Exercise: walking        Review of Systems   All other systems reviewed and are negative  Historical Information   Past Medical History:   Diagnosis Date    Diabetes mellitus ([address] )     Obesity (BMI 35 0-39 9 without comorbidity)      Past Surgical History:   Procedure Laterality Date     SECTION  2017    CHOLECYSTECTOMY       Social History   Social History     Substance and Sexual Activity   Alcohol Use No     Social History     Substance and Sexual Activity   Drug Use No     Social History     Tobacco Use   Smoking Status Never Smoker   Smokeless Tobacco Never Used   Tobacco Comment    NO TOBACCO USE     Family History: non-contributory    Meds/Allergies   all medications and allergies reviewed  No Known Allergies    Objective       Current Vitals:   Blood Pressure: 118/60 (20 1152)  Pulse: 73 (20 1152)  Temperature: 98 3 °F (36 8 °C) (20 1152)  Temp Source: Tympanic (20 1152)  Height: 5' 10" (177 8 cm) (20 1152)  Weight - Scale: 118 kg (260 lb 8 oz) (20 1152)        Invasive Devices     None                 Physical Exam   Constitutional: She is oriented to person, place, and time  She appears well-developed and well-nourished  No distress     HENT:   Head: Normocephalic and atraumatic  Right Ear: External ear normal    Left Ear: External ear normal    Nose: Nose normal    Eyes: Conjunctivae are normal  Right eye exhibits no discharge  Left eye exhibits no discharge  No scleral icterus  Neurological: She is alert and oriented to person, place, and time  Skin: She is not diaphoretic  Psychiatric: She has a normal mood and affect  Her behavior is normal  Judgment and thought content normal    Vitals reviewed  Lab Results: I have personally reviewed pertinent lab results  Imaging: I have personally reviewed pertinent reports  EKG, Pathology, and Other Studies: I have personally reviewed pertinent reports  Assessment/PLAN:    22 y o  yo female with a long standing h/o of obesity and inability to sustain any meaningful weight loss on her own despite several attempts  She is interested in the Laparoscopic sleeve gastrectomy  As a part of her pre op evaluation, she will be referred to a cardiologist and for a sleep evaluation and consult  She needs an EGD to evaluate the anatomy of her GI tract prior to the operation  I have spent over 45 minutes with her face to face in the office today discussing her options and details of the surgery  We have seen an animation of the surgery on the computer that illustrates how the operation is done and how the anatomy will be altered with the procedure  Over 50% of this was coordinating care  She was given the opportunity to ask questions and I have answered all of them  I have discussed and educated the patient with regards to the components of our multidisciplinary program and the importance of compliance and follow up in the post operative period  The patient was also instructed with regards to the importance of behavior modification, nutritional counseling, support meeting attendance and lifestyle changes that are important to ensure success       Although there is a great statistical chance of improvement or even resolution of most of her associated comorbidities, the results vary from patient to patient and they largely depend on her commitment and compliance  She needs to lose 12 lbs prior to the operation        Nettie Ernst MD  1/30/2020  12:02 PM

## 2020-01-30 NOTE — LETTER
2020     Kody Echeverria MD  [address]  [facility]    Patient: Anthony Llamas   YOB: 1995   Date of Visit: 2020       Dear Dr Jeanette Gunderson: Thank you for referring Anthony Llamas to me for evaluation  Below are my notes for this consultation  If you have questions, please do not hesitate to call me  I look forward to following your patient along with you  Sincerely,        Dasia Jaramillo MD        CC: No Recipients  Dasia Jaramillo MD  2020 12:09 PM  Sign at close encounter      [address] [place] [address] 22 y o  female MRN: [id number]  Unit/Bed#:  Encounter: [id number]      HPI:  Anthony Llamas is a 22 y o  female who presents with a longstanding history of morbid obesity and inability to sustain a meaningful weight loss  Here today to discuss bariatric options  Visit type: initial visit    Symptoms: excess weight and inability to loss weight    Associated Symptoms: depressed mood and anxiety    Associated Conditions: abdominal obesity and hypergycemia  Disease Complications: diabetes and hypertension  Weight Loss Interest: high  Previous Diet Trials: low calorie     Exercise Frequency:infrequency  Types of Exercise: walking        Review of Systems   All other systems reviewed and are negative        Historical Information   Past Medical History:   Diagnosis Date    Diabetes mellitus ([address] )     Obesity (BMI 35 0-39 9 without comorbidity)      Past Surgical History:   Procedure Laterality Date     SECTION  2017    CHOLECYSTECTOMY       Social History   Social History     Substance and Sexual Activity   Alcohol Use No     Social History     Substance and Sexual Activity   Drug Use No     Social History     Tobacco Use   Smoking Status Never Smoker   Smokeless Tobacco Never Used   Tobacco Comment    NO TOBACCO USE     Family History: non-contributory    Meds/Allergies   all medications and allergies reviewed  No Known Allergies    Objective       Current Vitals:   Blood Pressure: 118/60 (01/30/20 1152)  Pulse: 73 (01/30/20 1152)  Temperature: 98 3 °F (36 8 °C) (01/30/20 1152)  Temp Source: Tympanic (01/30/20 1152)  Height: 5' 10" (177 8 cm) (01/30/20 1152)  Weight - Scale: 118 kg (260 lb 8 oz) (01/30/20 1152)        Invasive Devices     None                 Physical Exam   Constitutional: She is oriented to person, place, and time  She appears well-developed and well-nourished  No distress  HENT:   Head: Normocephalic and atraumatic  Right Ear: External ear normal    Left Ear: External ear normal    Nose: Nose normal    Eyes: Conjunctivae are normal  Right eye exhibits no discharge  Left eye exhibits no discharge  No scleral icterus  Neurological: She is alert and oriented to person, place, and time  Skin: She is not diaphoretic  Psychiatric: She has a normal mood and affect  Her behavior is normal  Judgment and thought content normal    Vitals reviewed  Lab Results: I have personally reviewed pertinent lab results  Imaging: I have personally reviewed pertinent reports  EKG, Pathology, and Other Studies: I have personally reviewed pertinent reports  Assessment/PLAN:    22 y o  yo female with a long standing h/o of obesity and inability to sustain any meaningful weight loss on her own despite several attempts  She is interested in the Laparoscopic sleeve gastrectomy  As a part of her pre op evaluation, she will be referred to a cardiologist and for a sleep evaluation and consult  She needs an EGD to evaluate the anatomy of her GI tract prior to the operation  I have spent over 45 minutes with her face to face in the office today discussing her options and details of the surgery  We have seen an animation of the surgery on the computer that illustrates how the operation is done and how the anatomy will be altered with the procedure   Over 50% of this was coordinating care  She was given the opportunity to ask questions and I have answered all of them  I have discussed and educated the patient with regards to the components of our multidisciplinary program and the importance of compliance and follow up in the post operative period  The patient was also instructed with regards to the importance of behavior modification, nutritional counseling, support meeting attendance and lifestyle changes that are important to ensure success  Although there is a great statistical chance of improvement or even resolution of most of her associated comorbidities, the results vary from patient to patient and they largely depend on her commitment and compliance  She needs to lose 12 lbs prior to the operation        Nettie Ernst MD  1/30/2020  12:02 PM

## 2020-02-11 ENCOUNTER — ANESTHESIA EVENT (OUTPATIENT)
Dept: GASTROENTEROLOGY | Facility: HOSPITAL | Age: 25
End: 2020-02-11

## 2020-02-12 ENCOUNTER — HOSPITAL ENCOUNTER (OUTPATIENT)
Dept: GASTROENTEROLOGY | Facility: HOSPITAL | Age: 25
Setting detail: OUTPATIENT SURGERY
Discharge: HOME | End: 2020-02-12
Attending: SURGERY
Payer: COMMERCIAL

## 2020-02-12 ENCOUNTER — ANESTHESIA (OUTPATIENT)
Dept: GASTROENTEROLOGY | Facility: HOSPITAL | Age: 25
End: 2020-02-12

## 2020-02-12 VITALS
HEIGHT: 70 IN | DIASTOLIC BLOOD PRESSURE: 64 MMHG | BODY MASS INDEX: 37.22 KG/M2 | OXYGEN SATURATION: 98 % | WEIGHT: 260 LBS | RESPIRATION RATE: 16 BRPM | TEMPERATURE: 98.5 F | SYSTOLIC BLOOD PRESSURE: 115 MMHG | HEART RATE: 84 BPM

## 2020-02-12 DIAGNOSIS — E66.01 MORBID OBESITY (HCC): ICD-10-CM

## 2020-02-12 LAB
EXT PREGNANCY TEST URINE: NEGATIVE
EXT. CONTROL: NORMAL

## 2020-02-12 PROCEDURE — 81025 URINE PREGNANCY TEST: CPT | Performed by: ANESTHESIOLOGY

## 2020-02-12 PROCEDURE — 88305 TISSUE EXAM BY PATHOLOGIST: CPT | Performed by: PATHOLOGY

## 2020-02-12 PROCEDURE — 43239 EGD BIOPSY SINGLE/MULTIPLE: CPT | Performed by: SURGERY

## 2020-02-12 RX ORDER — PROPOFOL 10 MG/ML
INJECTION, EMULSION INTRAVENOUS AS NEEDED
Status: DISCONTINUED | OUTPATIENT
Start: 2020-02-12 | End: 2020-02-12 | Stop reason: SURG

## 2020-02-12 RX ORDER — SODIUM CHLORIDE 9 MG/ML
125 INJECTION, SOLUTION INTRAVENOUS CONTINUOUS
Status: DISCONTINUED | OUTPATIENT
Start: 2020-02-12 | End: 2020-02-16 | Stop reason: HOSPADM

## 2020-02-12 RX ADMIN — PROPOFOL 200 MG: 10 INJECTION, EMULSION INTRAVENOUS at 10:16

## 2020-02-12 RX ADMIN — PROPOFOL 100 MG: 10 INJECTION, EMULSION INTRAVENOUS at 10:21

## 2020-02-12 RX ADMIN — PROPOFOL 100 MG: 10 INJECTION, EMULSION INTRAVENOUS at 10:17

## 2020-02-12 RX ADMIN — PROPOFOL 100 MG: 10 INJECTION, EMULSION INTRAVENOUS at 10:18

## 2020-02-12 RX ADMIN — SODIUM CHLORIDE 125 ML/HR: 0.9 INJECTION, SOLUTION INTRAVENOUS at 09:17

## 2020-02-12 NOTE — DISCHARGE INSTRUCTIONS
Gastritis   WHAT YOU NEED TO KNOW:   Gastritis is inflammation or irritation of the lining of your stomach  DISCHARGE INSTRUCTIONS:   Call 911 for any of the following:   · You develop chest pain or shortness of breath  Seek care immediately if:   · You vomit blood  · You have black or bloody bowel movements  · You have severe stomach or back pain  Contact your healthcare provider if:   · You have a fever  · You have new or worsening symptoms, even after treatment  · You have questions or concerns about your condition or care  Medicines:   · Medicines  may be given to help treat a bacterial infection or decrease stomach acid  · Take your medicine as directed  Contact your healthcare provider if you think your medicine is not helping or if you have side effects  Tell him or her if you are allergic to any medicine  Keep a list of the medicines, vitamins, and herbs you take  Include the amounts, and when and why you take them  Bring the list or the pill bottles to follow-up visits  Carry your medicine list with you in case of an emergency  Manage or prevent gastritis:   · Do not smoke  Nicotine and other chemicals in cigarettes and cigars can make your symptoms worse and cause lung damage  Ask your healthcare provider for information if you currently smoke and need help to quit  E-cigarettes or smokeless tobacco still contain nicotine  Talk to your healthcare provider before you use these products  · Do not drink alcohol  Alcohol can prevent healing and make your gastritis worse  Talk to your healthcare provider if you need help to stop drinking  · Do not take NSAIDs or aspirin unless directed  These and similar medicines can cause irritation  If your healthcare provider says it is okay to take NSAIDs, take them with food  · Do not eat foods that cause irritation  Foods such as oranges and salsa can cause burning or pain  Eat a variety of healthy foods   Examples include fruits (not citrus), vegetables, low-fat dairy products, beans, whole-grain breads, and lean meats and fish  Try to eat small meals, and drink water with your meals  Do not eat for at least 3 hours before you go to bed  · Find ways to relax and decrease stress  Stress can increase stomach acid and make gastritis worse  Activities such as yoga, meditation, or listening to music can help you relax  Spend time with friends, or do things you enjoy  Follow up with your healthcare provider as directed: You may need ongoing tests or treatment, or referral to a gastroenterologist  Write down your questions so you remember to ask them during your visits  © 2017 [employer] Information is for End User's use only and may not be sold, redistributed or otherwise used for commercial purposes  All illustrations and images included in CareNotes® are the copyrighted property of [employer]  or Ritesh Pelaez  The above information is an  only  It is not intended as medical advice for individual conditions or treatments  Talk to your doctor, nurse or pharmacist before following any medical regimen to see if it is safe and effective for you

## 2020-02-12 NOTE — INTERVAL H&P NOTE
H&P reviewed  After examining the patient I find no changes in the patients condition since the H&P had been written      Vitals:    02/12/20 0914   BP: 112/76   Pulse: 80   Resp: 18   Temp: 98 5 °F (36 9 °C)   SpO2: 98%

## 2020-02-12 NOTE — ANESTHESIA PREPROCEDURE EVALUATION
Review of Systems/Medical History  Patient summary reviewed  Chart reviewed  No history of anesthetic complications     Cardiovascular  Hypertension controlled,   Comment: Postpartum CMP,  Pulmonary  Negative pulmonary ROS        GI/Hepatic  Negative GI/hepatic ROS          Negative  ROS        Endo/Other  Diabetes well controlled type 2 Oral agent,   Obesity    GYN  Negative gynecology ROS          Hematology  Negative hematology ROS      Musculoskeletal  Negative musculoskeletal ROS        Neurology  Negative neurology ROS      Psychology   Negative psychology ROS              Physical Exam    Airway    Mallampati score: II  TM Distance: >3 FB  Neck ROM: full     Dental   No notable dental hx     Cardiovascular  Rhythm: regular, Rate: normal, Cardiovascular exam normal    Pulmonary  Pulmonary exam normal Breath sounds clear to auscultation,     Other Findings        Anesthesia Plan  ASA Score- 2     Anesthesia Type- IV sedation with anesthesia with ASA Monitors  Additional Monitors:   Airway Plan:         Plan Factors-Patient not instructed to abstain from smoking on day of procedure  Patient did not smoke on day of surgery  Induction- intravenous  Postoperative Plan-     Informed Consent- Anesthetic plan and risks discussed with patient  I personally reviewed this patient with the CRNA  Discussed and agreed on the Anesthesia Plan with the CRNA  Kaleigh Burroughs

## 2020-02-20 ENCOUNTER — OFFICE VISIT (OUTPATIENT)
Dept: BARIATRICS | Facility: CLINIC | Age: 25
End: 2020-02-20

## 2020-02-20 VITALS — WEIGHT: 260 LBS | BODY MASS INDEX: 37.22 KG/M2 | HEIGHT: 70 IN

## 2020-02-20 DIAGNOSIS — E66.9 OBESITY, CLASS II, BMI 35-39.9: Primary | ICD-10-CM

## 2020-02-20 PROCEDURE — RECHECK

## 2020-02-20 NOTE — PROGRESS NOTES
# [id number] ( part of interview- d/c by ) pt able to understand quite a bit of English on her own  She reports greatly decreasing soda, juice, bread- still struggling with eating less rice  She reports increasing protein and vegetables inclusing eggs cheese, fish, broccoli  Reports following 30/60 rule, thoroughly chewing food as well as sipping water  Reports little desire to eat for emotions but stress management skills/ relations skills explored with pt  Working 6 days/wk at [employer]- She reports lots of walking/exercise   Coworkers, boyfriend, sibs, parents supportive of plan for surgery and in general Overall seems to be doing well and understanding of lifestyle changes needed for success post surgery                       Weight History

## 2020-03-19 ENCOUNTER — OFFICE VISIT (OUTPATIENT)
Dept: BARIATRICS | Facility: CLINIC | Age: 25
End: 2020-03-19

## 2020-03-19 VITALS — BODY MASS INDEX: 37.31 KG/M2 | WEIGHT: 260.6 LBS | HEIGHT: 70 IN

## 2020-03-19 DIAGNOSIS — E66.9 OBESITY, CLASS II, BMI 35-39.9: Primary | ICD-10-CM

## 2020-03-19 PROCEDURE — RECHECK

## 2020-03-19 NOTE — PROGRESS NOTES
Bariatric Follow Up Nutrition Note    Preop  Preop Program  weight checks    Type of surgery  Preop  Surgery Date: TBD  Surgeon: Undecided    Nutrition Assessment   Hope   22 y o   female  Height 5' 10" (1 778 m), weight 118 kg (260 lb 9 6 oz)  Body mass index is 37 39 kg/m²      Weight on Day of Weight Loss Surgery: preop  Weight in (lb) to have BMI = 25: 174 lbs  Pre-Op Excess Wt: 90 5 lbs    [address]  Jeor Equation-2600 cals to maintain wt                                         2100 cals to lose 1 lb/week                                         1600 cals to lose 2 lb/week    Review of History and Medications   Past Medical History:   Diagnosis Date    Diabetes mellitus [address] [address] )     Obesity (BMI 35 0-39 9 without comorbidity)      Past Surgical History:   Procedure Laterality Date     SECTION  2017    CHOLECYSTECTOMY       Social History     Socioeconomic History    Marital status: Single     Spouse name: Not on file    Number of children: Not on file    Years of education: Not on file    Highest education level: Not on file   Occupational History    Not on file   Social Needs    Financial resource strain: Not on file    Food insecurity:     Worry: Not on file     Inability: Not on file    Transportation needs:     Medical: Not on file     Non-medical: Not on file   Tobacco Use    Smoking status: Never Smoker    Smokeless tobacco: Never Used    Tobacco comment: NO TOBACCO USE   Substance and Sexual Activity    Alcohol use: No    Drug use: No    Sexual activity: Yes     Partners: Female     Birth control/protection: IUD   Lifestyle    Physical activity:     Days per week: Not on file     Minutes per session: Not on file    Stress: Not on file   Relationships    Social connections:     Talks on phone: Not on file     Gets together: Not on file     Attends Yazdanism service: Not on file     Active member of club or organization: Not on file     Attends meetings of clubs or organizations: Not on file     Relationship status: Not on file    Intimate partner violence:     Fear of current or ex partner: Not on file     Emotionally abused: Not on file     Physically abused: Not on file     Forced sexual activity: Not on file   Other Topics Concern    Not on file   Social History Narrative    Not on file       Current Outpatient Medications:     metFORMIN (GLUCOPHAGE) 1000 MG tablet, Take 1 tablet (1,000 mg total) by mouth 2 (two) times a day with meals, Disp: 60 tablet, Rfl: 2    Pediatric Multiple Vit-Vit C (VITAMIN DAILY PO), Take by mouth, Disp: , Rfl:     Food Intake and Lifestyle Assessment   Food Intake Assessment completed via 24 hour recall  Breakfast: oatmeal or 2 eggs  1 pc cheese and water  Snack: cheese stick   Lunch: salad with chicken with oil and salt in a  spinach wrap  Snack: 2 strawberries or 5 grapes  Dinner:  Salad with meat and cheese and protein shake  Snack:  cheese stick  Beverage intake: water and Gatorade zero  Diet texture/stage: regular  Protein supplement: none  Estimated protein intake per day: 60 gm/day  Estimated fluid intake per day: ~100 oz  Meals eaten away from home: 0  Typical meal pattern: 3 meals per day and 2-3 snacks per day  Eating Behaviors: Appropriate portion sizes, Does not drink with meals and waits 60-minutes after meal before resuming drinking and Large portion sizes    Food allergies or intolerances: none  Cultural or Roman Catholic considerations:     Physical Assessment  Nutrition Related Findings  Drinking juice, no fried foods   Less carbs    Physical Activity  Types of exercise: 30 minutes of stairs and ab work 3 times/week  Current physical limitations: none    Psychosocial Assessment   Support systems: parent(s) significant other  Socioeconomic factors: lives with child and boyfriend    Nutrition Diagnosis  Diagnosis: Overweight / Obesity (NC-3 3)  Related to: Physical inactivity and Excessive energy intake  As Evidenced by: BMI >25     Interventions and Teaching   Patient educated on post-op nutrition guidelines  Patient educated and handouts provided    Surgical changes to stomach / GI  Capacity of post-surgery stomach  Diet progression  Adequate hydration  Sugar and fat restriction to decrease "dumping syndrome"  Fat restriction to decrease steatorrhea  Exercise  Suggestions for pre-op diet  Nutrition considerations after surgery  Protein supplements  Meal planning and preparation  Appropriate carbohydrate, protein, and fat intake, and food/fluid choices to maximize safe weight loss, nutrient intake, and tolerance   Dietary and lifestyle changes  Possible problems with poor eating habits  Intuitive eating  Techniques for self monitoring and keeping daily food journal  Vitamin / Mineral supplementation of Multivitamin with minerals and Vitamin D    Education provided to: patient    Barriers to learning: Language used  #[id number]  Readiness to change: action    Comprehension: verbalizes understanding     Expected Compliance: good    Goals  Food journal, Complete lession plans 1-6, Eat 3 meals per day and eat less carbs and smaller portions   Increase exercise to 5 days/week, suggested youtube, Workouts with Alessia     Time Spent:   30 Minutes

## 2020-04-08 ENCOUNTER — APPOINTMENT (EMERGENCY)
Dept: RADIOLOGY | Facility: HOSPITAL | Age: 25
End: 2020-04-08
Payer: COMMERCIAL

## 2020-04-08 ENCOUNTER — HOSPITAL ENCOUNTER (EMERGENCY)
Facility: HOSPITAL | Age: 25
Discharge: HOME | End: 2020-04-09
Attending: EMERGENCY MEDICINE | Admitting: EMERGENCY MEDICINE
Payer: COMMERCIAL

## 2020-04-08 VITALS
DIASTOLIC BLOOD PRESSURE: 71 MMHG | HEART RATE: 104 BPM | OXYGEN SATURATION: 97 % | SYSTOLIC BLOOD PRESSURE: 99 MMHG | RESPIRATION RATE: 20 BRPM | TEMPERATURE: 100.1 F

## 2020-04-08 DIAGNOSIS — U07.1 COVID-19: Primary | ICD-10-CM

## 2020-04-08 LAB
ALBUMIN SERPL BCP-MCNC: 4 G/DL (ref 3–5.2)
ALP SERPL-CCNC: 82 U/L (ref 43–122)
ALT SERPL W P-5'-P-CCNC: 43 U/L (ref 9–52)
ANION GAP SERPL CALCULATED.3IONS-SCNC: 9 MMOL/L (ref 5–14)
AST SERPL W P-5'-P-CCNC: 32 U/L (ref 14–36)
BASOPHILS # BLD AUTO: 0 THOUSANDS/ΜL (ref 0–0.1)
BASOPHILS NFR BLD AUTO: 0 % (ref 0–1)
BILIRUB SERPL-MCNC: 0.7 MG/DL
BUN SERPL-MCNC: 5 MG/DL (ref 5–25)
CALCIUM SERPL-MCNC: 8.8 MG/DL (ref 8.4–10.2)
CHLORIDE SERPL-SCNC: 102 MMOL/L (ref 97–108)
CO2 SERPL-SCNC: 27 MMOL/L (ref 22–30)
CREAT SERPL-MCNC: 0.54 MG/DL (ref 0.6–1.2)
EOSINOPHIL # BLD AUTO: 0 THOUSAND/ΜL (ref 0–0.4)
EOSINOPHIL NFR BLD AUTO: 0 % (ref 0–6)
ERYTHROCYTE [DISTWIDTH] IN BLOOD BY AUTOMATED COUNT: 12.7 %
EXT PREG TEST URINE: NEGATIVE
EXT. CONTROL ED NAV: NORMAL
GFR SERPL CREATININE-BSD FRML MDRD: 133 ML/MIN/1.73SQ M
GLUCOSE SERPL-MCNC: 211 MG/DL (ref 70–99)
HCT VFR BLD AUTO: 40.9 % (ref 36–46)
HGB BLD-MCNC: 14.1 G/DL (ref 12–16)
LYMPHOCYTES # BLD AUTO: 1.1 THOUSANDS/ΜL (ref 0.5–4)
LYMPHOCYTES NFR BLD AUTO: 21 % (ref 25–45)
MCH RBC QN AUTO: 30.2 PG (ref 26–34)
MCHC RBC AUTO-ENTMCNC: 34.5 G/DL (ref 31–36)
MCV RBC AUTO: 88 FL (ref 80–100)
MONOCYTES # BLD AUTO: 0.4 THOUSAND/ΜL (ref 0.2–0.9)
MONOCYTES NFR BLD AUTO: 8 % (ref 1–10)
NEUTROPHILS # BLD AUTO: 3.8 THOUSANDS/ΜL (ref 1.8–7.8)
NEUTS SEG NFR BLD AUTO: 71 % (ref 45–65)
PLATELET # BLD AUTO: 133 THOUSANDS/UL (ref 150–450)
PMV BLD AUTO: 10.1 FL (ref 8.9–12.7)
POTASSIUM SERPL-SCNC: 3.9 MMOL/L (ref 3.6–5)
PROT SERPL-MCNC: 7.6 G/DL (ref 5.9–8.4)
RBC # BLD AUTO: 4.67 MILLION/UL (ref 4–5.2)
SODIUM SERPL-SCNC: 138 MMOL/L (ref 137–147)
WBC # BLD AUTO: 5.3 THOUSAND/UL (ref 4.5–11)

## 2020-04-08 PROCEDURE — 80053 COMPREHEN METABOLIC PANEL: CPT | Performed by: EMERGENCY MEDICINE

## 2020-04-08 PROCEDURE — 96361 HYDRATE IV INFUSION ADD-ON: CPT

## 2020-04-08 PROCEDURE — 36415 COLL VENOUS BLD VENIPUNCTURE: CPT | Performed by: EMERGENCY MEDICINE

## 2020-04-08 PROCEDURE — 81025 URINE PREGNANCY TEST: CPT | Performed by: EMERGENCY MEDICINE

## 2020-04-08 PROCEDURE — 99285 EMERGENCY DEPT VISIT HI MDM: CPT

## 2020-04-08 PROCEDURE — 85025 COMPLETE CBC W/AUTO DIFF WBC: CPT | Performed by: EMERGENCY MEDICINE

## 2020-04-08 PROCEDURE — 71045 X-RAY EXAM CHEST 1 VIEW: CPT

## 2020-04-08 PROCEDURE — 96374 THER/PROPH/DIAG INJ IV PUSH: CPT

## 2020-04-08 PROCEDURE — 99284 EMERGENCY DEPT VISIT MOD MDM: CPT | Performed by: EMERGENCY MEDICINE

## 2020-04-08 RX ORDER — AZITHROMYCIN 250 MG/1
TABLET, FILM COATED ORAL
Qty: 6 TABLET | Refills: 0 | Status: SHIPPED | OUTPATIENT
Start: 2020-04-08 | End: 2020-04-12

## 2020-04-08 RX ORDER — KETOROLAC TROMETHAMINE 30 MG/ML
15 INJECTION, SOLUTION INTRAMUSCULAR; INTRAVENOUS ONCE
Status: COMPLETED | OUTPATIENT
Start: 2020-04-08 | End: 2020-04-08

## 2020-04-08 RX ADMIN — SODIUM CHLORIDE 1000 ML: 0.9 INJECTION, SOLUTION INTRAVENOUS at 23:16

## 2020-04-08 RX ADMIN — KETOROLAC TROMETHAMINE 15 MG: 30 INJECTION, SOLUTION INTRAMUSCULAR; INTRAVENOUS at 23:16

## 2020-04-15 DIAGNOSIS — Z01.818 PREOP TESTING: Primary | ICD-10-CM

## 2020-04-27 ENCOUNTER — OFFICE VISIT (OUTPATIENT)
Dept: BARIATRICS | Facility: CLINIC | Age: 25
End: 2020-04-27

## 2020-04-27 VITALS — BODY MASS INDEX: 37.39 KG/M2 | HEIGHT: 70 IN

## 2020-04-27 DIAGNOSIS — E66.9 OBESITY, CLASS II, BMI 35-39.9: Primary | ICD-10-CM

## 2020-04-27 PROCEDURE — RECHECK

## 2020-05-11 ENCOUNTER — HOSPITAL ENCOUNTER (EMERGENCY)
Facility: HOSPITAL | Age: 25
Discharge: HOME | End: 2020-05-11
Attending: EMERGENCY MEDICINE | Admitting: EMERGENCY MEDICINE
Payer: COMMERCIAL

## 2020-05-11 VITALS
DIASTOLIC BLOOD PRESSURE: 76 MMHG | OXYGEN SATURATION: 99 % | SYSTOLIC BLOOD PRESSURE: 122 MMHG | TEMPERATURE: 97.7 F | BODY MASS INDEX: 36.19 KG/M2 | HEART RATE: 77 BPM | WEIGHT: 252.25 LBS | RESPIRATION RATE: 18 BRPM

## 2020-05-11 DIAGNOSIS — R21 RASH: Primary | ICD-10-CM

## 2020-05-11 DIAGNOSIS — Z76.0 ENCOUNTER FOR MEDICATION REFILL: ICD-10-CM

## 2020-05-11 LAB — GLUCOSE SERPL-MCNC: 203 MG/DL (ref 65–140)

## 2020-05-11 PROCEDURE — 82948 REAGENT STRIP/BLOOD GLUCOSE: CPT

## 2020-05-11 PROCEDURE — 99284 EMERGENCY DEPT VISIT MOD MDM: CPT | Performed by: EMERGENCY MEDICINE

## 2020-05-11 PROCEDURE — 99283 EMERGENCY DEPT VISIT LOW MDM: CPT

## 2020-05-11 RX ORDER — CLOTRIMAZOLE 1 %
CREAM (GRAM) TOPICAL
Qty: 15 G | Refills: 0 | Status: SHIPPED | OUTPATIENT
Start: 2020-05-11 | End: 2020-05-13 | Stop reason: ALTCHOICE

## 2020-05-13 ENCOUNTER — TELEMEDICINE (OUTPATIENT)
Dept: FAMILY MEDICINE CLINIC | Facility: CLINIC | Age: 25
End: 2020-05-13

## 2020-05-13 DIAGNOSIS — R21 RASH: ICD-10-CM

## 2020-05-13 DIAGNOSIS — Z76.0 ENCOUNTER FOR MEDICATION REFILL: Primary | ICD-10-CM

## 2020-05-13 PROBLEM — R10.13 EPIGASTRIC PAIN: Status: RESOLVED | Noted: 2019-06-04 | Resolved: 2020-05-13

## 2020-05-13 PROBLEM — R07.9 CHEST PAIN: Status: RESOLVED | Noted: 2019-10-21 | Resolved: 2020-05-13

## 2020-05-13 PROCEDURE — 99213 OFFICE O/P EST LOW 20 MIN: CPT | Performed by: FAMILY MEDICINE

## 2020-05-13 PROCEDURE — T1015 CLINIC SERVICE: HCPCS | Performed by: FAMILY MEDICINE

## 2020-05-13 RX ORDER — CLOTRIMAZOLE 1 %
CREAM (GRAM) TOPICAL
Qty: 15 G | Refills: 0 | Status: SHIPPED | OUTPATIENT
Start: 2020-05-13 | End: 2020-07-16

## 2020-06-03 ENCOUNTER — OFFICE VISIT (OUTPATIENT)
Dept: BARIATRICS | Facility: CLINIC | Age: 25
End: 2020-06-03

## 2020-06-03 DIAGNOSIS — E66.9 OBESITY, CLASS II, BMI 35-39.9: Primary | ICD-10-CM

## 2020-06-03 PROCEDURE — RECHECK

## 2020-06-04 VITALS — HEIGHT: 70 IN | BODY MASS INDEX: 36.19 KG/M2

## 2020-06-16 ENCOUNTER — TELEPHONE (OUTPATIENT)
Dept: CARDIOLOGY CLINIC | Facility: CLINIC | Age: 25
End: 2020-06-16

## 2020-06-17 ENCOUNTER — OFFICE VISIT (OUTPATIENT)
Dept: CARDIOLOGY CLINIC | Facility: CLINIC | Age: 25
End: 2020-06-17
Payer: COMMERCIAL

## 2020-06-17 VITALS
DIASTOLIC BLOOD PRESSURE: 70 MMHG | WEIGHT: 254.2 LBS | BODY MASS INDEX: 36.47 KG/M2 | SYSTOLIC BLOOD PRESSURE: 120 MMHG | HEART RATE: 66 BPM | TEMPERATURE: 98.3 F

## 2020-06-17 DIAGNOSIS — Z01.810 PREOP CARDIOVASCULAR EXAM: Primary | ICD-10-CM

## 2020-06-17 DIAGNOSIS — E11.69 DIABETES MELLITUS TYPE 2 IN OBESE (HCC): ICD-10-CM

## 2020-06-17 DIAGNOSIS — E66.9 DIABETES MELLITUS TYPE 2 IN OBESE (HCC): ICD-10-CM

## 2020-06-17 DIAGNOSIS — E66.9 OBESITY (BMI 35.0-39.9 WITHOUT COMORBIDITY): ICD-10-CM

## 2020-06-17 DIAGNOSIS — I10 ESSENTIAL HYPERTENSION: ICD-10-CM

## 2020-06-17 PROCEDURE — 93000 ELECTROCARDIOGRAM COMPLETE: CPT | Performed by: INTERNAL MEDICINE

## 2020-06-17 PROCEDURE — 99243 OFF/OP CNSLTJ NEW/EST LOW 30: CPT | Performed by: INTERNAL MEDICINE

## 2020-07-06 ENCOUNTER — OFFICE VISIT (OUTPATIENT)
Dept: BARIATRICS | Facility: CLINIC | Age: 25
End: 2020-07-06

## 2020-07-06 VITALS — HEIGHT: 70 IN | BODY MASS INDEX: 36.36 KG/M2 | WEIGHT: 254 LBS

## 2020-07-06 DIAGNOSIS — E66.9 OBESITY, CLASS II, BMI 35-39.9: Primary | ICD-10-CM

## 2020-07-06 PROCEDURE — RECHECK

## 2020-07-06 NOTE — PROGRESS NOTES
Bariatric Follow Up Nutrition Note Televisit  Name was verified by patient stating name? yes   verified by patient stating? yes  You verified the patient is alone? yes  This visit is free       Preop  Preop Program  weight checks    Type of surgery  Preop  Surgery Date: TBD  Surgeon: Belen AGUILAR, [facility]  22 y o   female  Height 5' 10" (1 778 m), weight 115 kg (254 lb), not currently breastfeeding  Body mass index is 36 45 kg/m²     Pt does not have a scale to weigh herself    Weight on Day of Weight Loss Surgery: preop  Weight in (lb) to have BMI = 25: 174 lbs  Pre-Op Excess Wt: 90 5 lbs    [address]  Jeor Equation-2600 cals to maintain wt                                         2100 cals to lose 1 lb/week                                         1600 cals to lose 2 lb/week    Review of History and Medications   Past Medical History:   Diagnosis Date    Diabetes mellitus ([facility] [address] )     Obesity (BMI 35 0-39 9 without comorbidity)      Past Surgical History:   Procedure Laterality Date     SECTION  2017    CHOLECYSTECTOMY       Social History     Socioeconomic History    Marital status: Single     Spouse name: Not on file    Number of children: Not on file    Years of education: Not on file    Highest education level: Not on file   Occupational History    Not on file   Social Needs    Financial resource strain: Not on file    Food insecurity:     Worry: Not on file     Inability: Not on file    Transportation needs:     Medical: Not on file     Non-medical: Not on file   Tobacco Use    Smoking status: Never Smoker    Smokeless tobacco: Never Used    Tobacco comment: NO TOBACCO USE   Substance and Sexual Activity    Alcohol use: No    Drug use: No    Sexual activity: Yes     Partners: Female     Birth control/protection: IUD   Lifestyle    Physical activity:     Days per week: Not on file     Minutes per session: Not on file    Stress: Not on file Relationships    Social connections:     Talks on phone: Not on file     Gets together: Not on file     Attends Lutheran service: Not on file     Active member of club or organization: Not on file     Attends meetings of clubs or organizations: Not on file     Relationship status: Not on file    Intimate partner violence:     Fear of current or ex partner: Not on file     Emotionally abused: Not on file     Physically abused: Not on file     Forced sexual activity: Not on file   Other Topics Concern    Not on file   Social History Narrative    Not on file       Current Outpatient Medications:     clotrimazole (LOTRIMIN) 1 % cream, Apply to affected area 2 times daily (Patient not taking: Reported on 6/17/2020), Disp: 15 g, Rfl: 0    hydrocortisone 2 5 % cream, Apply topically 2 (two) times a day (Patient not taking: Reported on 6/17/2020), Disp: 30 g, Rfl: 1    metFORMIN (GLUCOPHAGE) 1000 MG tablet, Take 1 tablet (1,000 mg total) by mouth 2 (two) times a day with meals, Disp: 90 tablet, Rfl: 1    Pediatric Multiple Vit-Vit C (VITAMIN DAILY PO), Take by mouth, Disp: , Rfl:     Food Intake and Lifestyle Assessment   Food Intake Assessment completed via 24 hour recall  Breakfast: oatmeal or 2 eggs  1 pc cheese and water  Snack: cheese stick   Lunch: salad with chicken with oil and salt in a  spinach wrap  Snack: 2 strawberries or 5 grapes  Dinner:  Salad with meat and cheese and protein shake  Snack:  cheese stick  Beverage intake: water and Gatorade zero  Diet texture/stage: regular  Protein supplement: none  Estimated protein intake per day: 60 gm/day  Estimated fluid intake per day: ~100 oz  Meals eaten away from home: 0  Typical meal pattern: 3 meals per day and 2-3 snacks per day  Eating Behaviors: Appropriate portion sizes, Does not drink with meals and waits 60-minutes after meal before resuming drinking and Large portion sizes    Food allergies or intolerances: none  Cultural or Lutheran considerations:     Physical Assessment  Nutrition Related Findings  Drinking juice, no fried foods  Less carbs    Physical Activity  Types of exercise: Doing exercises on BIMA 3-4 times/week for 40 miinutes  Current physical limitations: none    Psychosocial Assessment   Support systems: parent(s) significant other  Socioeconomic factors: lives with child and boyfriend    Nutrition Diagnosis  Diagnosis: Overweight / Obesity (NC-3 3)  Related to: Physical inactivity and Excessive energy intake  As Evidenced by: BMI >25     Interventions and Teaching   Patient educated on post-op nutrition guidelines  Patient educated and handouts provided    Surgical changes to stomach / GI  Capacity of post-surgery stomach  Diet progression  Adequate hydration  Sugar and fat restriction to decrease "dumping syndrome"  Fat restriction to decrease steatorrhea  Exercise  Suggestions for pre-op diet  Nutrition considerations after surgery  Protein supplements  Meal planning and preparation  Appropriate carbohydrate, protein, and fat intake, and food/fluid choices to maximize safe weight loss, nutrient intake, and tolerance   Dietary and lifestyle changes  Possible problems with poor eating habits  Intuitive eating  Techniques for self monitoring and keeping daily food journal  Vitamin / Mineral supplementation of Multivitamin with minerals and Vitamin D    Education provided to: patient    Barriers to learning: Language used  [id number] on [employer]  Readiness to change: action    Comprehension: verbalizes understanding     Expected Compliance: good    Goals  Food journal, Complete lession plans 1-6, Eat 3 meals per day and eat less carbs and smaller portions     Pt needs labs including HgA1C  Pt thought her appt was tomorrow, I did a televisit with her since she was weighed at cardiology appt 6/17       Time Spent:   15 Minutes

## 2020-07-07 ENCOUNTER — APPOINTMENT (EMERGENCY)
Dept: RADIOLOGY | Facility: HOSPITAL | Age: 25
End: 2020-07-07
Payer: COMMERCIAL

## 2020-07-07 ENCOUNTER — APPOINTMENT (OUTPATIENT)
Dept: LAB | Facility: HOSPITAL | Age: 25
End: 2020-07-07
Payer: COMMERCIAL

## 2020-07-07 ENCOUNTER — HOSPITAL ENCOUNTER (EMERGENCY)
Facility: HOSPITAL | Age: 25
Discharge: HOME | End: 2020-07-07
Attending: EMERGENCY MEDICINE | Admitting: EMERGENCY MEDICINE
Payer: COMMERCIAL

## 2020-07-07 VITALS
SYSTOLIC BLOOD PRESSURE: 113 MMHG | RESPIRATION RATE: 18 BRPM | DIASTOLIC BLOOD PRESSURE: 64 MMHG | TEMPERATURE: 98 F | WEIGHT: 252.3 LBS | BODY MASS INDEX: 36.2 KG/M2 | HEART RATE: 74 BPM | OXYGEN SATURATION: 100 %

## 2020-07-07 DIAGNOSIS — Z01.818 PREOP TESTING: ICD-10-CM

## 2020-07-07 DIAGNOSIS — R07.89 ATYPICAL CHEST PAIN: Primary | ICD-10-CM

## 2020-07-07 LAB
ALBUMIN SERPL BCP-MCNC: 4 G/DL (ref 3–5.2)
ALP SERPL-CCNC: 84 U/L (ref 43–122)
ALT SERPL W P-5'-P-CCNC: 34 U/L (ref 9–52)
ANION GAP SERPL CALCULATED.3IONS-SCNC: 10 MMOL/L (ref 5–14)
AST SERPL W P-5'-P-CCNC: 28 U/L (ref 14–36)
BASOPHILS # BLD AUTO: 0 THOUSANDS/ΜL (ref 0–0.1)
BASOPHILS NFR BLD AUTO: 0 % (ref 0–1)
BILIRUB SERPL-MCNC: 2.3 MG/DL
BUN SERPL-MCNC: 12 MG/DL (ref 5–25)
CALCIUM SERPL-MCNC: 9.3 MG/DL (ref 8.4–10.2)
CHLORIDE SERPL-SCNC: 101 MMOL/L (ref 97–108)
CHOLEST SERPL-MCNC: 148 MG/DL
CO2 SERPL-SCNC: 23 MMOL/L (ref 22–30)
CREAT SERPL-MCNC: 0.65 MG/DL (ref 0.6–1.2)
EOSINOPHIL # BLD AUTO: 0 THOUSAND/ΜL (ref 0–0.4)
EOSINOPHIL NFR BLD AUTO: 0 % (ref 0–6)
ERYTHROCYTE [DISTWIDTH] IN BLOOD BY AUTOMATED COUNT: 13 %
EST. AVERAGE GLUCOSE BLD GHB EST-MCNC: 169 MG/DL
GFR SERPL CREATININE-BSD FRML MDRD: 126 ML/MIN/1.73SQ M
GLUCOSE P FAST SERPL-MCNC: 227 MG/DL (ref 70–99)
GLUCOSE SERPL-MCNC: 265 MG/DL (ref 65–140)
HBA1C MFR BLD: 7.5 %
HCT VFR BLD AUTO: 41 % (ref 36–46)
HDLC SERPL-MCNC: 31 MG/DL
HGB BLD-MCNC: 13.7 G/DL (ref 12–16)
LDLC SERPL CALC-MCNC: 88 MG/DL
LYMPHOCYTES # BLD AUTO: 1.5 THOUSANDS/ΜL (ref 0.5–4)
LYMPHOCYTES NFR BLD AUTO: 21 % (ref 25–45)
MCH RBC QN AUTO: 30.3 PG (ref 26–34)
MCHC RBC AUTO-ENTMCNC: 33.5 G/DL (ref 31–36)
MCV RBC AUTO: 90 FL (ref 80–100)
MONOCYTES # BLD AUTO: 0.5 THOUSAND/ΜL (ref 0.2–0.9)
MONOCYTES NFR BLD AUTO: 6 % (ref 1–10)
NEUTROPHILS # BLD AUTO: 5.3 THOUSANDS/ΜL (ref 1.8–7.8)
NEUTS SEG NFR BLD AUTO: 72 % (ref 45–65)
NONHDLC SERPL-MCNC: 117 MG/DL
PLATELET # BLD AUTO: 145 THOUSANDS/UL (ref 150–450)
PMV BLD AUTO: 11.2 FL (ref 8.9–12.7)
POTASSIUM SERPL-SCNC: 3.9 MMOL/L (ref 3.6–5)
PROT SERPL-MCNC: 7.2 G/DL (ref 5.9–8.4)
RBC # BLD AUTO: 4.53 MILLION/UL (ref 4–5.2)
SODIUM SERPL-SCNC: 134 MMOL/L (ref 137–147)
TRIGL SERPL-MCNC: 146 MG/DL
TSH SERPL DL<=0.05 MIU/L-ACNC: 0.99 UIU/ML (ref 0.47–4.68)
WBC # BLD AUTO: 7.4 THOUSAND/UL (ref 4.5–11)

## 2020-07-07 PROCEDURE — 83036 HEMOGLOBIN GLYCOSYLATED A1C: CPT

## 2020-07-07 PROCEDURE — 85025 COMPLETE CBC W/AUTO DIFF WBC: CPT

## 2020-07-07 PROCEDURE — 82948 REAGENT STRIP/BLOOD GLUCOSE: CPT

## 2020-07-07 PROCEDURE — 96372 THER/PROPH/DIAG INJ SC/IM: CPT

## 2020-07-07 PROCEDURE — 80061 LIPID PANEL: CPT

## 2020-07-07 PROCEDURE — 84443 ASSAY THYROID STIM HORMONE: CPT

## 2020-07-07 PROCEDURE — 99284 EMERGENCY DEPT VISIT MOD MDM: CPT | Performed by: EMERGENCY MEDICINE

## 2020-07-07 PROCEDURE — 80053 COMPREHEN METABOLIC PANEL: CPT

## 2020-07-07 PROCEDURE — 71046 X-RAY EXAM CHEST 2 VIEWS: CPT

## 2020-07-07 PROCEDURE — 36415 COLL VENOUS BLD VENIPUNCTURE: CPT

## 2020-07-07 PROCEDURE — 3051F HG A1C>EQUAL 7.0%<8.0%: CPT | Performed by: FAMILY MEDICINE

## 2020-07-07 PROCEDURE — 93005 ELECTROCARDIOGRAM TRACING: CPT

## 2020-07-07 PROCEDURE — 3051F HG A1C>EQUAL 7.0%<8.0%: CPT | Performed by: SURGERY

## 2020-07-07 PROCEDURE — 99285 EMERGENCY DEPT VISIT HI MDM: CPT

## 2020-07-07 RX ORDER — KETOROLAC TROMETHAMINE 30 MG/ML
30 INJECTION, SOLUTION INTRAMUSCULAR; INTRAVENOUS ONCE
Status: COMPLETED | OUTPATIENT
Start: 2020-07-07 | End: 2020-07-07

## 2020-07-07 RX ORDER — CYCLOBENZAPRINE HCL 10 MG
10 TABLET ORAL ONCE
Status: COMPLETED | OUTPATIENT
Start: 2020-07-07 | End: 2020-07-07

## 2020-07-07 RX ORDER — NAPROXEN 500 MG/1
500 TABLET ORAL 2 TIMES DAILY WITH MEALS
Qty: 30 TABLET | Refills: 0 | Status: SHIPPED | OUTPATIENT
Start: 2020-07-07 | End: 2020-07-16

## 2020-07-07 RX ORDER — CYCLOBENZAPRINE HCL 10 MG
10 TABLET ORAL 2 TIMES DAILY PRN
Qty: 20 TABLET | Refills: 0 | Status: SHIPPED | OUTPATIENT
Start: 2020-07-07 | End: 2020-07-16

## 2020-07-07 RX ADMIN — CYCLOBENZAPRINE HYDROCHLORIDE 10 MG: 10 TABLET, FILM COATED ORAL at 22:20

## 2020-07-07 RX ADMIN — KETOROLAC TROMETHAMINE 30 MG: 30 INJECTION, SOLUTION INTRAMUSCULAR at 22:19

## 2020-07-08 ENCOUNTER — TELEPHONE (OUTPATIENT)
Dept: BARIATRICS | Facility: CLINIC | Age: 25
End: 2020-07-08

## 2020-07-08 DIAGNOSIS — R17 ELEVATED BILIRUBIN: Primary | ICD-10-CM

## 2020-07-08 DIAGNOSIS — Z01.818 PREOPERATIVE TESTING: ICD-10-CM

## 2020-07-08 LAB
ATRIAL RATE: 69 BPM
P AXIS: 45 DEGREES
PR INTERVAL: 168 MS
QRS AXIS: 12 DEGREES
QRSD INTERVAL: 98 MS
QT INTERVAL: 378 MS
QTC INTERVAL: 405 MS
T WAVE AXIS: 26 DEGREES
VENTRICULAR RATE: 69 BPM

## 2020-07-08 PROCEDURE — 93010 ELECTROCARDIOGRAM REPORT: CPT | Performed by: INTERNAL MEDICINE

## 2020-07-08 NOTE — ED PROVIDER NOTES
History  Chief Complaint   Patient presents with    Chest Pain     Pt reports midsternal non radiating chest pain (pressure) since 1030 this am  Pt reports dizziness, intermittent nausea, and dyspnea  23 yo morbidly obese female with a history of DM presents to the ED complaining of chest pain  The patient reports an intermittent midsternal chest "pressure" since around 10:30 am today  The pain is nonradiating and nonexertional  (+) Mild shortness of breath at onset of pain, now resolved  (+) Nausea but no vomiting  (+) Transient dizziness earlier today (also resolved)  No LE swelling/pain  No cough or hemoptysis  She denies fevers/chills  No other specific complaints  Prior to Admission Medications   Prescriptions Last Dose Informant Patient Reported? Taking?    Pediatric Multiple Vit-Vit C (VITAMIN DAILY PO)   Yes No   Sig: Take by mouth   clotrimazole (LOTRIMIN) 1 % cream   No No   Sig: Apply to affected area 2 times daily   Patient not taking: Reported on 2020   hydrocortisone 2 5 % cream   No No   Sig: Apply topically 2 (two) times a day   Patient not taking: Reported on 2020   metFORMIN (GLUCOPHAGE) 1000 MG tablet   No No   Sig: Take 1 tablet (1,000 mg total) by mouth 2 (two) times a day with meals      Facility-Administered Medications: None       Past Medical History:   Diagnosis Date    Diabetes mellitus ([address] )     Obesity (BMI 35 0-39 9 without comorbidity)        Past Surgical History:   Procedure Laterality Date     SECTION  2017    CHOLECYSTECTOMY         Family History   Problem Relation Age of Onset    Heart disease Family         CARDIOVASCULAR DISEASE    Diabetes Mother     Other Mother         gastric bypass    Diabetes Father     Diabetes Sister     Sleep apnea Sister     Sleep apnea Brother         2 of the 3 borthers have EAGLE    Diabetes Maternal Grandmother     Diabetes Paternal Grandmother      I have reviewed and agree with the history as documented  E-Cigarette/Vaping    E-Cigarette Use Never User      E-Cigarette/Vaping Substances     Social History     Tobacco Use    Smoking status: Never Smoker    Smokeless tobacco: Never Used    Tobacco comment: NO TOBACCO USE   Substance Use Topics    Alcohol use: No    Drug use: No       Review of Systems   Constitutional: Negative for chills and fever  HENT: Negative for sore throat  Eyes: Negative for visual disturbance  Respiratory: Positive for shortness of breath  Negative for cough and wheezing  Cardiovascular: Positive for chest pain  Gastrointestinal: Positive for nausea  Negative for abdominal pain, diarrhea and vomiting  Endocrine: Negative for cold intolerance and heat intolerance  Genitourinary: Negative for dysuria and frequency  Musculoskeletal: Negative for back pain  Skin: Negative for rash  Allergic/Immunologic: Negative for immunocompromised state  Neurological: Positive for dizziness  Negative for weakness, light-headedness, numbness and headaches  Hematological: Negative for adenopathy  Psychiatric/Behavioral: Negative for self-injury  Physical Exam  Physical Exam   Constitutional: She is oriented to person, place, and time  She appears well-developed and well-nourished  No distress  HENT:   Head: Normocephalic and atraumatic  Eyes: Pupils are equal, round, and reactive to light  EOM are normal    Neck: Normal range of motion  Neck supple  Cardiovascular: Normal rate and regular rhythm  Pulmonary/Chest: Effort normal and breath sounds normal    Abdominal: Soft  She exhibits no distension  There is no tenderness  Musculoskeletal: Normal range of motion  She exhibits no edema  Neurological: She is alert and oriented to person, place, and time  Skin: Skin is warm and dry  Psychiatric: She has a normal mood and affect         Vital Signs  ED Triage Vitals [07/07/20 2102]   Temperature Pulse Respirations Blood Pressure SpO2   98 °F (36 7 °C) 83 18 130/74 96 %      Temp Source Heart Rate Source Patient Position - Orthostatic VS BP Location FiO2 (%)   Tympanic Monitor Sitting Left arm --      Pain Score       Worst Possible Pain           Vitals:    07/07/20 2102 07/07/20 2223   BP: 130/74 113/64   Pulse: 83 74   Patient Position - Orthostatic VS: Sitting Lying         Visual Acuity      ED Medications  Medications   cyclobenzaprine (FLEXERIL) tablet 10 mg (10 mg Oral Given 7/7/20 2220)   ketorolac (TORADOL) injection 30 mg (30 mg Intramuscular Given 7/7/20 2219)       Diagnostic Studies  Results Reviewed     Procedure Component Value Units Date/Time    Fingerstick Glucose (POCT) [[id number]]  (Abnormal) Collected:  07/07/20 2115    Lab Status:  Final result Updated:  07/07/20 2120     POC Glucose 265 mg/dl                  XR chest 2 views    (Results Pending)              Procedures  ECG 12 Lead Documentation Only  Date/Time: 7/7/2020 10:25 PM  Performed by: Karlee Vera MD  Authorized by: Karlee Vera MD     Indications / Diagnosis:  Chest pain  ECG reviewed by me, the ED Provider: yes    Patient location:  ED  Interpretation:     Interpretation: normal    Rate:     ECG rate:  69 bpm    ECG rate assessment: normal    Rhythm:     Rhythm: sinus rhythm    Ectopy:     Ectopy: none    QRS:     QRS axis:  Normal  Conduction:     Conduction: normal    ST segments:     ST segments:  Normal  T waves:     T waves: normal               ED Course       US AUDIT      Most Recent Value   Initial Alcohol Screen: US AUDIT-C    1  How often do you have a drink containing alcohol?  0 Filed at: 07/07/2020 2103   2  How many drinks containing alcohol do you have on a typical day you are drinking? 0 Filed at: 07/07/2020 2103   3a  Male UNDER 65: How often do you have five or more drinks on one occasion? 0 Filed at: 07/07/2020 2103   3b  FEMALE Any Age, or MALE 65+: How often do you have 4 or more drinks on one occassion?   0 Filed at: 07/07/2020 2103 Audit-C Score  0 Filed at: 07/07/2020 2103                  CHERRI/DAST-10      Most Recent Value   How many times in the past year have you    Used an illegal drug or used a prescription medication for non-medical reasons? Never Filed at: 07/07/2020 2103                                [facility] [place]  Number of Diagnoses or Management Options  Atypical chest pain:   Diagnosis management comments: The patient is well appearing with stable vital signs and a benign exam  EKG and CXR unremarkable  Blood glucose mildly elevated  Very low clinical suspicion for ACS, TAD, and PE  The patient is PERC negative  Plan for supportive care and close PCP follow up later this week for reassessment/further workup  The patient is agreeable to this plan  Strict return precautions provided  Amount and/or Complexity of Data Reviewed  Clinical lab tests: reviewed and ordered  Tests in the radiology section of CPT®: ordered and reviewed          Disposition  Final diagnoses:   Atypical chest pain     Time reflects when diagnosis was documented in both MDM as applicable and the Disposition within this note     Time User Action Codes Description Comment    7/7/2020 10:15 PM Scottie Frazier [R07 [id number]] Atypical chest pain       ED Disposition     ED Disposition Condition Date/Time Comment    Discharge Stable Tu Jul 7, 2020 10:15 PM [facility] discharge to home/self care              Follow-up Information     Follow up With Specialties Details Why Contact Info Additional [address] Family Medicine Schedule an appointment as soon as possible for a visit   [address], [address] [id number]  [address], [address], [address] [place], [place], [facility]          Patient's Medications   Discharge Prescriptions    CYCLOBENZAPRINE (FLEXERIL) 10 MG TABLET    Take 1 tablet (10 mg total) by mouth 2 (two) times a day as needed for muscle spasms       Start Date: 7/7/2020  End Date: --       Order Dose: 10 mg       Quantity: 20 tablet    Refills: 0    NAPROXEN (NAPROSYN) 500 MG TABLET    Take 1 tablet (500 mg total) by mouth 2 (two) times a day with meals       Start Date: 7/7/2020  End Date: --       Order Dose: 500 mg       Quantity: 30 tablet    Refills: 0     No discharge procedures on file      PDMP Review     None          ED Provider  Electronically Signed by           Kassi Brice MD  07/07/20 5450

## 2020-07-09 NOTE — TELEPHONE ENCOUNTER
I called the patient today advising of the pre-op blood work needed to check on kidney functions  Patient reports she is going on Saturday morning  I told her I will call her once I hear from the insurance company on her prior-auth

## 2020-07-10 ENCOUNTER — TELEPHONE (OUTPATIENT)
Dept: BARIATRICS | Facility: CLINIC | Age: 25
End: 2020-07-10

## 2020-07-10 ENCOUNTER — PREP FOR PROCEDURE (OUTPATIENT)
Dept: BARIATRICS | Facility: CLINIC | Age: 25
End: 2020-07-10

## 2020-07-10 DIAGNOSIS — E66.9 TYPE 2 DIABETES MELLITUS WITH OBESITY (HCC): ICD-10-CM

## 2020-07-10 DIAGNOSIS — K82.9 GALL BLADDER DISEASE: ICD-10-CM

## 2020-07-10 DIAGNOSIS — E66.01 MORBID OBESITY (HCC): Primary | ICD-10-CM

## 2020-07-10 DIAGNOSIS — E11.69 TYPE 2 DIABETES MELLITUS WITH OBESITY (HCC): ICD-10-CM

## 2020-07-10 DIAGNOSIS — I10 BENIGN HYPERTENSION: ICD-10-CM

## 2020-07-10 DIAGNOSIS — Z01.818 PRE-OPERATIVE CLEARANCE: Primary | ICD-10-CM

## 2020-07-10 NOTE — TELEPHONE ENCOUNTER
Left message for patient asking for a return call  I need to tell her she's been approved for surgery from insurance  Calling to offer her a surgery date     Awaiting call

## 2020-07-11 ENCOUNTER — APPOINTMENT (OUTPATIENT)
Dept: LAB | Facility: MEDICAL CENTER | Age: 25
End: 2020-07-11
Payer: COMMERCIAL

## 2020-07-11 DIAGNOSIS — Z01.818 PREOPERATIVE TESTING: Primary | ICD-10-CM

## 2020-07-11 DIAGNOSIS — R17 ELEVATED BILIRUBIN: ICD-10-CM

## 2020-07-11 DIAGNOSIS — Z01.818 PRE-OPERATIVE CLEARANCE: ICD-10-CM

## 2020-07-11 LAB
BILIRUB DIRECT SERPL-MCNC: 0.29 MG/DL (ref 0–0.2)
BILIRUB SERPL-MCNC: 1.37 MG/DL (ref 0.2–1)

## 2020-07-11 PROCEDURE — 82247 BILIRUBIN TOTAL: CPT

## 2020-07-11 PROCEDURE — U0003 INFECTIOUS AGENT DETECTION BY NUCLEIC ACID (DNA OR RNA); SEVERE ACUTE RESPIRATORY SYNDROME CORONAVIRUS 2 (SARS-COV-2) (CORONAVIRUS DISEASE [COVID-19]), AMPLIFIED PROBE TECHNIQUE, MAKING USE OF HIGH THROUGHPUT TECHNOLOGIES AS DESCRIBED BY CMS-2020-01-R: HCPCS

## 2020-07-11 PROCEDURE — 36415 COLL VENOUS BLD VENIPUNCTURE: CPT

## 2020-07-11 PROCEDURE — 82248 BILIRUBIN DIRECT: CPT

## 2020-07-13 LAB
INPATIENT: NORMAL
SARS-COV-2 RNA SPEC QL NAA+PROBE: NOT DETECTED

## 2020-07-14 ENCOUNTER — TELEPHONE (OUTPATIENT)
Dept: BARIATRICS | Facility: CLINIC | Age: 25
End: 2020-07-14

## 2020-07-16 ENCOUNTER — CLINICAL SUPPORT (OUTPATIENT)
Dept: BARIATRICS | Facility: CLINIC | Age: 25
End: 2020-07-16

## 2020-07-16 DIAGNOSIS — E66.9 OBESITY (BMI 35.0-39.9 WITHOUT COMORBIDITY): Primary | ICD-10-CM

## 2020-07-16 PROCEDURE — RECHECK: Performed by: DIETITIAN, REGISTERED

## 2020-07-16 RX ORDER — POLYETHYLENE GLYCOL 3350 17 G/17G
17 POWDER, FOR SOLUTION ORAL
COMMUNITY
End: 2020-11-12 | Stop reason: ALTCHOICE

## 2020-07-16 NOTE — PRE-PROCEDURE INSTRUCTIONS
Pre-Surgery Instructions:   Medication Instructions    metFORMIN (GLUCOPHAGE) 1000 MG tablet Instructed patient per Anesthesia Guidelines   Multiple Vitamins-Minerals (MULTIVITAMIN ADULT PO) Instructed patient per Anesthesia Guidelines   polyethylene glycol (MIRALAX) 17 g packet Instructed patient per Anesthesia Guidelines  No meds needed am of surgery per anesthesia

## 2020-07-16 NOTE — PROGRESS NOTES
Weight Management Nutrition Virtual TEAMS Class      Diagnosis: Morbid Obesity     Bariatric Surgeon: Dr Nolan Husbands     Surgery: Sleeve Gastrectomy scheduled for 7/20/2020     Class: Pre-operative class     Topics discussed today include:      fluid goals post op, protein goals post op, constipation, chew food well, exercise, avoidance of alcohol, PPI use, diet progression, hypoglycemia, dumping syndrome, protein supplems, vitamin/mineral supplements, calcium supplements, additional vitamin B12, iron supplements and fat soluble vitamins   Pt  educated on the pre operative liver shrinking diet  Pt understands that the diet needs to be followed for 2 weeks prior to surgery  Handout reviewed  Emphasized the need to drink 80 ounces of fluid per day while on the diet  Contact information for any questions/concerns     Patient was able to verbalize basic diet (protein, fluid, vitamin and mineral) recommendations and possible nutrition-related complications   Yes

## 2020-07-17 ENCOUNTER — ANESTHESIA EVENT (OUTPATIENT)
Dept: PERIOP | Facility: HOSPITAL | Age: 25
DRG: 403 | End: 2020-07-17
Payer: COMMERCIAL

## 2020-07-17 ENCOUNTER — TELEPHONE (OUTPATIENT)
Dept: BARIATRICS | Facility: CLINIC | Age: 25
End: 2020-07-17

## 2020-07-17 ENCOUNTER — OFFICE VISIT (OUTPATIENT)
Dept: BARIATRICS | Facility: CLINIC | Age: 25
End: 2020-07-17
Payer: COMMERCIAL

## 2020-07-17 VITALS
SYSTOLIC BLOOD PRESSURE: 130 MMHG | HEIGHT: 70 IN | BODY MASS INDEX: 35.43 KG/M2 | DIASTOLIC BLOOD PRESSURE: 72 MMHG | TEMPERATURE: 98 F | HEART RATE: 76 BPM | WEIGHT: 247.5 LBS

## 2020-07-17 DIAGNOSIS — E66.9 DIABETES MELLITUS TYPE 2 IN OBESE (HCC): ICD-10-CM

## 2020-07-17 DIAGNOSIS — E11.69 DIABETES MELLITUS TYPE 2 IN OBESE (HCC): ICD-10-CM

## 2020-07-17 DIAGNOSIS — E66.9 OBESITY, CLASS II, BMI 35-39.9: Primary | ICD-10-CM

## 2020-07-17 DIAGNOSIS — E66.9 OBESITY (BMI 35.0-39.9 WITHOUT COMORBIDITY): Primary | ICD-10-CM

## 2020-07-17 DIAGNOSIS — I10 ESSENTIAL HYPERTENSION: ICD-10-CM

## 2020-07-17 PROBLEM — E66.812 OBESITY, CLASS II, BMI 35-39.9: Status: ACTIVE | Noted: 2020-07-17

## 2020-07-17 PROCEDURE — 1036F TOBACCO NON-USER: CPT | Performed by: SURGERY

## 2020-07-17 PROCEDURE — 3051F HG A1C>EQUAL 7.0%<8.0%: CPT | Performed by: SURGERY

## 2020-07-17 PROCEDURE — 99213 OFFICE O/P EST LOW 20 MIN: CPT | Performed by: SURGERY

## 2020-07-17 RX ORDER — CEFAZOLIN SODIUM 2 G/50ML
2000 SOLUTION INTRAVENOUS ONCE
Status: CANCELLED | OUTPATIENT
Start: 2020-07-20 | End: 2020-07-17

## 2020-07-17 RX ORDER — CELECOXIB 200 MG/1
200 CAPSULE ORAL ONCE
Status: CANCELLED | OUTPATIENT
Start: 2020-07-20 | End: 2020-07-17

## 2020-07-17 RX ORDER — OMEPRAZOLE 20 MG/1
20 CAPSULE, DELAYED RELEASE ORAL DAILY
Qty: 30 CAPSULE | Refills: 3 | Status: SHIPPED | OUTPATIENT
Start: 2020-07-17 | End: 2021-05-27 | Stop reason: ALTCHOICE

## 2020-07-17 RX ORDER — ACETAMINOPHEN 325 MG/1
975 TABLET ORAL ONCE
Status: CANCELLED | OUTPATIENT
Start: 2020-07-20 | End: 2020-07-17

## 2020-07-17 RX ORDER — SCOLOPAMINE TRANSDERMAL SYSTEM 1 MG/1
1 PATCH, EXTENDED RELEASE TRANSDERMAL ONCE
Status: CANCELLED | OUTPATIENT
Start: 2020-07-20 | End: 2020-07-17

## 2020-07-17 RX ORDER — GABAPENTIN 300 MG/1
600 CAPSULE ORAL ONCE
Status: CANCELLED | OUTPATIENT
Start: 2020-07-20 | End: 2020-07-17

## 2020-07-17 RX ORDER — OXYCODONE HYDROCHLORIDE 5 MG/1
5 TABLET ORAL EVERY 4 HOURS PRN
Qty: 10 TABLET | Refills: 0 | Status: SHIPPED | OUTPATIENT
Start: 2020-07-20 | End: 2020-11-12 | Stop reason: ALTCHOICE

## 2020-07-17 NOTE — H&P
BARIATRIC H&P - BARIATRIC SURGERY  David Ibrahim 22 y o  female MRN: [id number]  Unit/Bed#:  Encounter: [id number]      HPI:  David Ibrahim is a 22 y o  female who presents with a long-standing history of morbid obesity  She was found to be a good candidate to undergo a bariatric operation upon [facility]  She is here today to discuss details of her surgery  Review of Systems   All other systems reviewed and are negative  Historical Information   Past Medical History:   Diagnosis Date    Diabetes mellitus ([address] )     NIDDM    History of transfusion     after  - had fever with transfusion    Obesity (BMI 35 0-39 9 without comorbidity)     Uses Faroese as primary spoken language      Past Surgical History:   Procedure Laterality Date     SECTION  2017    CHOLECYSTECTOMY       Social History   Social History     Substance and Sexual Activity   Alcohol Use No     Social History     Substance and Sexual Activity   Drug Use No     Social History     Tobacco Use   Smoking Status Never Smoker   Smokeless Tobacco Never Used   Tobacco Comment    NO TOBACCO USE     Family History: non-contributory    Meds/Allergies   all medications and allergies reviewed  No Known Allergies    Objective     Current Vitals:   Blood Pressure: 130/72 (20 1120)  Pulse: 76 (20 1120)  Temperature: 98 °F (36 7 °C) (20 1120)  Temp Source: Temporal (20 1120)  Height: 5' 10" (177 8 cm) (20 1120)  Weight - Scale: 112 kg (247 lb 8 oz) (20 1120)      Invasive Devices     None                 Physical Exam   Constitutional: She is oriented to person, place, and time  Vital signs are normal  She appears well-developed and well-nourished  No distress  HENT:   Head: Normocephalic and atraumatic  Nose: Nose normal    Mouth/Throat: Oropharynx is clear and moist    Eyes: Conjunctivae are normal  Right eye exhibits no discharge   Left eye exhibits no discharge  No scleral icterus  Neck: Normal range of motion  Neck supple  Cardiovascular: Normal rate, regular rhythm, normal heart sounds and intact distal pulses  Pulmonary/Chest: Effort normal and breath sounds normal  No stridor  No respiratory distress  She has no wheezes  She has no rales  She exhibits no tenderness  Abdominal: Soft  Normal appearance and bowel sounds are normal  There is no tenderness  There is no rebound, no guarding and no CVA tenderness  Abdomen is obese, soft and benign  Well-healed Pfannenstiel incision from previous    Musculoskeletal: Normal range of motion  She exhibits no deformity  Lymphadenopathy:     She has no cervical adenopathy  Neurological: She is alert and oriented to person, place, and time  Skin: Skin is warm and dry  No rash noted  She is not diaphoretic  No erythema  Psychiatric: She has a normal mood and affect  Her behavior is normal  Judgment and thought content normal    Nursing note and vitals reviewed  Lab Results: I have personally reviewed pertinent lab results  Imaging: I have personally reviewed pertinent reports  EKG, Pathology, and Other Studies: I have personally reviewed pertinent reports  The endoscopy showed gastritis and moderate amount of bile refluxing into the antrum and body of the stomach  The biopsies revealed  A  Stomach, gastric bx, r/o h  pylori:  - Mild chronic inactive gastritis  - Negative for H  pylori by routine H&E   - Negative for malignancy       Assessment/PLAN:    22 y o  female morbidly obese found to be a good candidate to undergo a weight loss operation upon being enrolled here at the [facility]     Patient has a long history of morbid obesity and is presenting to discuss the surgical weight loss options  Despite the patient best efforts patient was unable to lose any meaningful or sustainable weight using nonsurgical means  We had a long discussion regarding all the surgical weight-loss options at our disposal at this point and reviewed the risks and benefits of each procedure in details as it relates to her age, BMI and medical conditions  She has been pre certified to undergo a Laparoscopic sleeve gastrectomy  We have discussed the 14%-20% incidence of post op heartburn after the sleeve gastrectomy and the fact that if this cannot be controlled with medication or conservative measures it might need to be converted to a Allen-en-Y gastric bypass  We have also discussed the fact that the patient needs to be followed up postoperatively and potentially get screening endoscopies in the future to rule out any development of pathology as a result of the sleeve gastrectomy  Here today to review her pre op test results  Has been medically cleared for the procedure  I have discussed with her at length the risks and benefits of the operation and reiterated the components of our multidisciplinary program and the importance of compliance and follow up in the post operative period  Although there is a great statistical chance of improvement or even resolution of most of her associated comorbidities, the results vary from patient to patient and they largely depend on her commitment  The patient was also instructed with regards to the importance of behavior modification, nutritional counseling, support meeting attendance and lifestyle changes that are important to ensure success  She was given the opportunity to ask questions and I have answered all of them  I have addressed with the patient the level of CODE STATUS for this hospital stay and after explaining the different options currently she wishes to be a Level I  She understands and wishes to proceed  She has lost all the weight required prior to surgery      Tiffanie Pacheco MD  7/17/2020  11:36 AM

## 2020-07-17 NOTE — TELEPHONE ENCOUNTER
Pre-op call was made to patient to follow up on how they are doing and to remind them to continue with all medical and dietary directions that were given at pre-op class  They were encouraged to purchase all vitamins and protein shakes for post op use as well as to begin Miralax three days prior to surgery as directed in Section 6 of their manual   They were reminded of the Ensure Pre-surgery drinks protocol and to bring their completed yellow form with them to surgery as well as their CPAP-BiPAP machine if they use one  Lastly, they were informed that they would be weighed the morning of surgery

## 2020-07-17 NOTE — H&P (VIEW-ONLY)
BARIATRIC H&P - BARIATRIC SURGERY  Graciela Flores 22 y o  female MRN: [id number]  Unit/Bed#:  Encounter: [id number]      HPI:  Graciela Flores is a 22 y o  female who presents with a long-standing history of morbid obesity  She was found to be a good candidate to undergo a bariatric operation upon [facility]  She is here today to discuss details of her surgery  Review of Systems   All other systems reviewed and are negative  Historical Information   Past Medical History:   Diagnosis Date    Diabetes mellitus ([facility] [address] )     NIDDM    History of transfusion     after  - had fever with transfusion    Obesity (BMI 35 0-39 9 without comorbidity)     Uses Croatian as primary spoken language      Past Surgical History:   Procedure Laterality Date     SECTION  2017    CHOLECYSTECTOMY       Social History   Social History     Substance and Sexual Activity   Alcohol Use No     Social History     Substance and Sexual Activity   Drug Use No     Social History     Tobacco Use   Smoking Status Never Smoker   Smokeless Tobacco Never Used   Tobacco Comment    NO TOBACCO USE     Family History: non-contributory    Meds/Allergies   all medications and allergies reviewed  No Known Allergies    Objective     Current Vitals:   Blood Pressure: 130/72 (20 1120)  Pulse: 76 (20 1120)  Temperature: 98 °F (36 7 °C) (20 1120)  Temp Source: Temporal (20 1120)  Height: 5' 10" (177 8 cm) (20 1120)  Weight - Scale: 112 kg (247 lb 8 oz) (20 1120)      Invasive Devices     None                 Physical Exam   Constitutional: She is oriented to person, place, and time  Vital signs are normal  She appears well-developed and well-nourished  No distress  HENT:   Head: Normocephalic and atraumatic  Nose: Nose normal    Mouth/Throat: Oropharynx is clear and moist    Eyes: Conjunctivae are normal  Right eye exhibits no discharge   Left eye exhibits no discharge  No scleral icterus  Neck: Normal range of motion  Neck supple  Cardiovascular: Normal rate, regular rhythm, normal heart sounds and intact distal pulses  Pulmonary/Chest: Effort normal and breath sounds normal  No stridor  No respiratory distress  She has no wheezes  She has no rales  She exhibits no tenderness  Abdominal: Soft  Normal appearance and bowel sounds are normal  There is no tenderness  There is no rebound, no guarding and no CVA tenderness  Abdomen is obese, soft and benign  Well-healed Pfannenstiel incision from previous    Musculoskeletal: Normal range of motion  She exhibits no deformity  Lymphadenopathy:     She has no cervical adenopathy  Neurological: She is alert and oriented to person, place, and time  Skin: Skin is warm and dry  No rash noted  She is not diaphoretic  No erythema  Psychiatric: She has a normal mood and affect  Her behavior is normal  Judgment and thought content normal    Nursing note and vitals reviewed  Lab Results: I have personally reviewed pertinent lab results  Imaging: I have personally reviewed pertinent reports  EKG, Pathology, and Other Studies: I have personally reviewed pertinent reports  The endoscopy showed gastritis and moderate amount of bile refluxing into the antrum and body of the stomach  The biopsies revealed  A  Stomach, gastric bx, r/o h  pylori:  - Mild chronic inactive gastritis  - Negative for H  pylori by routine H&E   - Negative for malignancy       Assessment/PLAN:    22 y o  female morbidly obese found to be a good candidate to undergo a weight loss operation upon being enrolled here at the [facility]     Patient has a long history of morbid obesity and is presenting to discuss the surgical weight loss options  Despite the patient best efforts patient was unable to lose any meaningful or sustainable weight using nonsurgical means  We had a long discussion regarding all the surgical weight-loss options at our disposal at this point and reviewed the risks and benefits of each procedure in details as it relates to her age, BMI and medical conditions  She has been pre certified to undergo a Laparoscopic sleeve gastrectomy  We have discussed the 14%-20% incidence of post op heartburn after the sleeve gastrectomy and the fact that if this cannot be controlled with medication or conservative measures it might need to be converted to a Allen-en-Y gastric bypass  We have also discussed the fact that the patient needs to be followed up postoperatively and potentially get screening endoscopies in the future to rule out any development of pathology as a result of the sleeve gastrectomy  Here today to review her pre op test results  Has been medically cleared for the procedure  I have discussed with her at length the risks and benefits of the operation and reiterated the components of our multidisciplinary program and the importance of compliance and follow up in the post operative period  Although there is a great statistical chance of improvement or even resolution of most of her associated comorbidities, the results vary from patient to patient and they largely depend on her commitment  The patient was also instructed with regards to the importance of behavior modification, nutritional counseling, support meeting attendance and lifestyle changes that are important to ensure success  She was given the opportunity to ask questions and I have answered all of them  I have addressed with the patient the level of CODE STATUS for this hospital stay and after explaining the different options currently she wishes to be a Level I  She understands and wishes to proceed  She has lost all the weight required prior to surgery      Dasia Jaramillo MD  7/17/2020  11:36 AM

## 2020-07-20 ENCOUNTER — HOSPITAL ENCOUNTER (INPATIENT)
Facility: HOSPITAL | Age: 25
LOS: 1 days | Discharge: HOME | DRG: 403 | End: 2020-07-21
Attending: SURGERY | Admitting: SURGERY
Payer: COMMERCIAL

## 2020-07-20 ENCOUNTER — ANESTHESIA (OUTPATIENT)
Dept: PERIOP | Facility: HOSPITAL | Age: 25
DRG: 403 | End: 2020-07-20
Payer: COMMERCIAL

## 2020-07-20 DIAGNOSIS — E66.9 OBESITY (BMI 35.0-39.9 WITHOUT COMORBIDITY): ICD-10-CM

## 2020-07-20 DIAGNOSIS — E66.01 MORBID OBESITY (HCC): ICD-10-CM

## 2020-07-20 DIAGNOSIS — E66.01 CLASS 2 SEVERE OBESITY DUE TO EXCESS CALORIES WITH SERIOUS COMORBIDITY AND BODY MASS INDEX (BMI) OF 37.0 TO 37.9 IN ADULT (HCC): ICD-10-CM

## 2020-07-20 DIAGNOSIS — E11.69 DIABETES MELLITUS TYPE 2 IN OBESE (HCC): Primary | ICD-10-CM

## 2020-07-20 DIAGNOSIS — E66.9 TYPE 2 DIABETES MELLITUS WITH OBESITY (HCC): ICD-10-CM

## 2020-07-20 DIAGNOSIS — E11.69 TYPE 2 DIABETES MELLITUS WITH OBESITY (HCC): ICD-10-CM

## 2020-07-20 DIAGNOSIS — E66.9 DIABETES MELLITUS TYPE 2 IN OBESE (HCC): Primary | ICD-10-CM

## 2020-07-20 DIAGNOSIS — K82.9 GALL BLADDER DISEASE: ICD-10-CM

## 2020-07-20 DIAGNOSIS — I10 BENIGN HYPERTENSION: ICD-10-CM

## 2020-07-20 PROBLEM — I15.8 OTHER SECONDARY HYPERTENSION: Status: ACTIVE | Noted: 2019-09-10

## 2020-07-20 LAB
EXT PREGNANCY TEST URINE: NEGATIVE
EXT. CONTROL: NORMAL
GLUCOSE SERPL-MCNC: 156 MG/DL (ref 65–140)
GLUCOSE SERPL-MCNC: 218 MG/DL (ref 65–140)
GLUCOSE SERPL-MCNC: 245 MG/DL (ref 65–140)
GLUCOSE SERPL-MCNC: 260 MG/DL (ref 65–140)
GLUCOSE SERPL-MCNC: 290 MG/DL (ref 65–140)

## 2020-07-20 PROCEDURE — 43775 LAP SLEEVE GASTRECTOMY: CPT | Performed by: SURGERY

## 2020-07-20 PROCEDURE — C9290 INJ, BUPIVACAINE LIPOSOME: HCPCS | Performed by: SURGERY

## 2020-07-20 PROCEDURE — 82948 REAGENT STRIP/BLOOD GLUCOSE: CPT

## 2020-07-20 PROCEDURE — 88307 TISSUE EXAM BY PATHOLOGIST: CPT | Performed by: PATHOLOGY

## 2020-07-20 PROCEDURE — 99219 PR INITIAL OBSERVATION CARE/DAY 50 MINUTES: CPT | Performed by: PHYSICIAN ASSISTANT

## 2020-07-20 PROCEDURE — 0DB64Z3 EXCISION OF STOMACH, PERCUTANEOUS ENDOSCOPIC APPROACH, VERTICAL: ICD-10-PCS | Performed by: SURGERY

## 2020-07-20 PROCEDURE — C9113 INJ PANTOPRAZOLE SODIUM, VIA: HCPCS | Performed by: SURGERY

## 2020-07-20 PROCEDURE — 81025 URINE PREGNANCY TEST: CPT | Performed by: ANESTHESIOLOGY

## 2020-07-20 DEVICE — SEAMGUARD STPL REINF ENDO GIA ULTRA UNIV 60 PURPLE: Type: IMPLANTABLE DEVICE | Site: ABDOMEN | Status: FUNCTIONAL

## 2020-07-20 DEVICE — SEAMGUARD STPL REINF ENDO GIA ULTRA UNV 60 BLACK: Type: IMPLANTABLE DEVICE | Site: ABDOMEN | Status: FUNCTIONAL

## 2020-07-20 RX ORDER — PROMETHAZINE HYDROCHLORIDE 25 MG/ML
25 INJECTION, SOLUTION INTRAMUSCULAR; INTRAVENOUS EVERY 4 HOURS PRN
Status: DISCONTINUED | OUTPATIENT
Start: 2020-07-20 | End: 2020-07-21 | Stop reason: HOSPADM

## 2020-07-20 RX ORDER — CELECOXIB 200 MG/1
200 CAPSULE ORAL ONCE
Status: COMPLETED | OUTPATIENT
Start: 2020-07-20 | End: 2020-07-20

## 2020-07-20 RX ORDER — ACETAMINOPHEN 325 MG/1
975 TABLET ORAL EVERY 8 HOURS SCHEDULED
Status: DISCONTINUED | OUTPATIENT
Start: 2020-07-20 | End: 2020-07-21 | Stop reason: HOSPADM

## 2020-07-20 RX ORDER — SODIUM CHLORIDE, SODIUM LACTATE, POTASSIUM CHLORIDE, CALCIUM CHLORIDE 600; 310; 30; 20 MG/100ML; MG/100ML; MG/100ML; MG/100ML
75 INJECTION, SOLUTION INTRAVENOUS CONTINUOUS
Status: DISCONTINUED | OUTPATIENT
Start: 2020-07-20 | End: 2020-07-21 | Stop reason: HOSPADM

## 2020-07-20 RX ORDER — SODIUM CHLORIDE 9 MG/ML
125 INJECTION, SOLUTION INTRAVENOUS CONTINUOUS
Status: DISCONTINUED | OUTPATIENT
Start: 2020-07-20 | End: 2020-07-20 | Stop reason: HOSPADM

## 2020-07-20 RX ORDER — MORPHINE SULFATE 4 MG/ML
4 INJECTION, SOLUTION INTRAMUSCULAR; INTRAVENOUS EVERY 2 HOUR PRN
Status: DISCONTINUED | OUTPATIENT
Start: 2020-07-20 | End: 2020-07-21 | Stop reason: HOSPADM

## 2020-07-20 RX ORDER — ONDANSETRON 2 MG/ML
INJECTION INTRAMUSCULAR; INTRAVENOUS AS NEEDED
Status: DISCONTINUED | OUTPATIENT
Start: 2020-07-20 | End: 2020-07-20 | Stop reason: SURG

## 2020-07-20 RX ORDER — FENTANYL CITRATE/PF 50 MCG/ML
50 SYRINGE (ML) INJECTION
Status: DISCONTINUED | OUTPATIENT
Start: 2020-07-20 | End: 2020-07-20 | Stop reason: HOSPADM

## 2020-07-20 RX ORDER — OXYCODONE HCL 5 MG/5 ML
10 SOLUTION, ORAL ORAL EVERY 4 HOURS PRN
Status: DISCONTINUED | OUTPATIENT
Start: 2020-07-20 | End: 2020-07-21 | Stop reason: HOSPADM

## 2020-07-20 RX ORDER — ROCURONIUM BROMIDE 10 MG/ML
INJECTION, SOLUTION INTRAVENOUS AS NEEDED
Status: DISCONTINUED | OUTPATIENT
Start: 2020-07-20 | End: 2020-07-20 | Stop reason: SURG

## 2020-07-20 RX ORDER — SIMETHICONE 80 MG
80 TABLET,CHEWABLE ORAL EVERY 12 HOURS
Status: DISCONTINUED | OUTPATIENT
Start: 2020-07-20 | End: 2020-07-21 | Stop reason: HOSPADM

## 2020-07-20 RX ORDER — ONDANSETRON 2 MG/ML
4 INJECTION INTRAMUSCULAR; INTRAVENOUS EVERY 4 HOURS PRN
Status: DISCONTINUED | OUTPATIENT
Start: 2020-07-20 | End: 2020-07-21 | Stop reason: HOSPADM

## 2020-07-20 RX ORDER — MAGNESIUM HYDROXIDE 1200 MG/15ML
LIQUID ORAL AS NEEDED
Status: DISCONTINUED | OUTPATIENT
Start: 2020-07-20 | End: 2020-07-20 | Stop reason: HOSPADM

## 2020-07-20 RX ORDER — ONDANSETRON 2 MG/ML
4 INJECTION INTRAMUSCULAR; INTRAVENOUS ONCE AS NEEDED
Status: DISCONTINUED | OUTPATIENT
Start: 2020-07-20 | End: 2020-07-20 | Stop reason: HOSPADM

## 2020-07-20 RX ORDER — ACETAMINOPHEN 325 MG/1
975 TABLET ORAL ONCE
Status: COMPLETED | OUTPATIENT
Start: 2020-07-20 | End: 2020-07-20

## 2020-07-20 RX ORDER — HYDROMORPHONE HCL/PF 1 MG/ML
SYRINGE (ML) INJECTION AS NEEDED
Status: DISCONTINUED | OUTPATIENT
Start: 2020-07-20 | End: 2020-07-20 | Stop reason: SURG

## 2020-07-20 RX ORDER — HYDROMORPHONE HCL/PF 1 MG/ML
0.5 SYRINGE (ML) INJECTION
Status: DISCONTINUED | OUTPATIENT
Start: 2020-07-20 | End: 2020-07-20 | Stop reason: HOSPADM

## 2020-07-20 RX ORDER — HYDROMORPHONE HCL/PF 1 MG/ML
1 SYRINGE (ML) INJECTION EVERY 4 HOURS PRN
Status: DISCONTINUED | OUTPATIENT
Start: 2020-07-20 | End: 2020-07-21 | Stop reason: HOSPADM

## 2020-07-20 RX ORDER — BUPIVACAINE HYDROCHLORIDE 5 MG/ML
INJECTION, SOLUTION EPIDURAL; INTRACAUDAL AS NEEDED
Status: DISCONTINUED | OUTPATIENT
Start: 2020-07-20 | End: 2020-07-20 | Stop reason: HOSPADM

## 2020-07-20 RX ORDER — GABAPENTIN 300 MG/1
600 CAPSULE ORAL ONCE
Status: COMPLETED | OUTPATIENT
Start: 2020-07-20 | End: 2020-07-20

## 2020-07-20 RX ORDER — KETOROLAC TROMETHAMINE 30 MG/ML
15 INJECTION, SOLUTION INTRAMUSCULAR; INTRAVENOUS EVERY 6 HOURS SCHEDULED
Status: DISPENSED | OUTPATIENT
Start: 2020-07-20 | End: 2020-07-21

## 2020-07-20 RX ORDER — PROPOFOL 10 MG/ML
INJECTION, EMULSION INTRAVENOUS AS NEEDED
Status: DISCONTINUED | OUTPATIENT
Start: 2020-07-20 | End: 2020-07-20 | Stop reason: SURG

## 2020-07-20 RX ORDER — FENTANYL CITRATE 50 UG/ML
INJECTION, SOLUTION INTRAMUSCULAR; INTRAVENOUS AS NEEDED
Status: DISCONTINUED | OUTPATIENT
Start: 2020-07-20 | End: 2020-07-20 | Stop reason: SURG

## 2020-07-20 RX ORDER — SCOLOPAMINE TRANSDERMAL SYSTEM 1 MG/1
1 PATCH, EXTENDED RELEASE TRANSDERMAL ONCE
Status: DISCONTINUED | OUTPATIENT
Start: 2020-07-20 | End: 2020-07-20

## 2020-07-20 RX ORDER — PANTOPRAZOLE SODIUM 40 MG/1
40 INJECTION, POWDER, FOR SOLUTION INTRAVENOUS
Status: DISCONTINUED | OUTPATIENT
Start: 2020-07-20 | End: 2020-07-21 | Stop reason: HOSPADM

## 2020-07-20 RX ORDER — MEPERIDINE HYDROCHLORIDE 25 MG/ML
12.5 INJECTION INTRAMUSCULAR; INTRAVENOUS; SUBCUTANEOUS ONCE AS NEEDED
Status: DISCONTINUED | OUTPATIENT
Start: 2020-07-20 | End: 2020-07-20 | Stop reason: HOSPADM

## 2020-07-20 RX ORDER — GLYCOPYRROLATE 0.2 MG/ML
INJECTION INTRAMUSCULAR; INTRAVENOUS AS NEEDED
Status: DISCONTINUED | OUTPATIENT
Start: 2020-07-20 | End: 2020-07-20 | Stop reason: SURG

## 2020-07-20 RX ORDER — MIDAZOLAM HYDROCHLORIDE 2 MG/2ML
INJECTION, SOLUTION INTRAMUSCULAR; INTRAVENOUS AS NEEDED
Status: DISCONTINUED | OUTPATIENT
Start: 2020-07-20 | End: 2020-07-20 | Stop reason: SURG

## 2020-07-20 RX ORDER — ACETAMINOPHEN 160 MG/5ML
325 SUSPENSION, ORAL (FINAL DOSE FORM) ORAL EVERY 8 HOURS PRN
Status: DISCONTINUED | OUTPATIENT
Start: 2020-07-20 | End: 2020-07-21 | Stop reason: HOSPADM

## 2020-07-20 RX ORDER — OXYCODONE HCL 5 MG/5 ML
5 SOLUTION, ORAL ORAL EVERY 4 HOURS PRN
Status: DISCONTINUED | OUTPATIENT
Start: 2020-07-20 | End: 2020-07-21 | Stop reason: HOSPADM

## 2020-07-20 RX ORDER — NEOSTIGMINE METHYLSULFATE 1 MG/ML
INJECTION INTRAVENOUS AS NEEDED
Status: DISCONTINUED | OUTPATIENT
Start: 2020-07-20 | End: 2020-07-20 | Stop reason: SURG

## 2020-07-20 RX ORDER — CEFAZOLIN SODIUM 2 G/50ML
2000 SOLUTION INTRAVENOUS ONCE
Status: COMPLETED | OUTPATIENT
Start: 2020-07-20 | End: 2020-07-20

## 2020-07-20 RX ORDER — METOCLOPRAMIDE HYDROCHLORIDE 5 MG/ML
10 INJECTION INTRAMUSCULAR; INTRAVENOUS EVERY 6 HOURS PRN
Status: DISCONTINUED | OUTPATIENT
Start: 2020-07-20 | End: 2020-07-21 | Stop reason: HOSPADM

## 2020-07-20 RX ADMIN — ENOXAPARIN SODIUM 40 MG: 40 INJECTION SUBCUTANEOUS at 09:33

## 2020-07-20 RX ADMIN — FENTANYL CITRATE 50 MCG: 50 INJECTION, SOLUTION INTRAMUSCULAR; INTRAVENOUS at 12:26

## 2020-07-20 RX ADMIN — ACETAMINOPHEN 975 MG: 325 TABLET ORAL at 08:30

## 2020-07-20 RX ADMIN — SCOPALAMINE 1 PATCH: 1 PATCH, EXTENDED RELEASE TRANSDERMAL at 08:33

## 2020-07-20 RX ADMIN — SIMETHICONE 80 MG: 80 TABLET, CHEWABLE ORAL at 17:30

## 2020-07-20 RX ADMIN — KETOROLAC TROMETHAMINE 15 MG: 30 INJECTION, SOLUTION INTRAMUSCULAR at 14:43

## 2020-07-20 RX ADMIN — ACETAMINOPHEN 975 MG: 325 TABLET ORAL at 14:43

## 2020-07-20 RX ADMIN — FENTANYL CITRATE 100 MCG: 50 INJECTION, SOLUTION INTRAMUSCULAR; INTRAVENOUS at 10:31

## 2020-07-20 RX ADMIN — INSULIN LISPRO 2 UNITS: 100 INJECTION, SOLUTION INTRAVENOUS; SUBCUTANEOUS at 17:47

## 2020-07-20 RX ADMIN — FENTANYL CITRATE 100 MCG: 50 INJECTION, SOLUTION INTRAMUSCULAR; INTRAVENOUS at 10:44

## 2020-07-20 RX ADMIN — ROCURONIUM BROMIDE 50 MG: 10 INJECTION, SOLUTION INTRAVENOUS at 10:31

## 2020-07-20 RX ADMIN — CELECOXIB 200 MG: 200 CAPSULE ORAL at 08:30

## 2020-07-20 RX ADMIN — ONDANSETRON 4 MG: 2 INJECTION INTRAMUSCULAR; INTRAVENOUS at 10:37

## 2020-07-20 RX ADMIN — ACETAMINOPHEN 975 MG: 325 TABLET ORAL at 21:37

## 2020-07-20 RX ADMIN — INSULIN LISPRO 1 UNITS: 100 INJECTION, SOLUTION INTRAVENOUS; SUBCUTANEOUS at 21:43

## 2020-07-20 RX ADMIN — LIDOCAINE HYDROCHLORIDE 100 MG: 20 INJECTION, SOLUTION INTRAVENOUS at 10:31

## 2020-07-20 RX ADMIN — NEOSTIGMINE METHYLSULFATE 3 MG: 1 INJECTION, SOLUTION INTRAVENOUS at 11:32

## 2020-07-20 RX ADMIN — GABAPENTIN 600 MG: 300 CAPSULE ORAL at 08:30

## 2020-07-20 RX ADMIN — PROPOFOL 200 MG: 10 INJECTION, EMULSION INTRAVENOUS at 10:31

## 2020-07-20 RX ADMIN — HYDROMORPHONE HYDROCHLORIDE 0.5 MG: 1 INJECTION, SOLUTION INTRAMUSCULAR; INTRAVENOUS; SUBCUTANEOUS at 10:35

## 2020-07-20 RX ADMIN — SODIUM CHLORIDE, SODIUM LACTATE, POTASSIUM CHLORIDE, AND CALCIUM CHLORIDE 75 ML/HR: .6; .31; .03; .02 INJECTION, SOLUTION INTRAVENOUS at 13:59

## 2020-07-20 RX ADMIN — KETOROLAC TROMETHAMINE 15 MG: 30 INJECTION, SOLUTION INTRAMUSCULAR at 21:39

## 2020-07-20 RX ADMIN — PANTOPRAZOLE SODIUM 40 MG: 40 INJECTION, POWDER, FOR SOLUTION INTRAVENOUS at 14:47

## 2020-07-20 RX ADMIN — FENTANYL CITRATE 50 MCG: 50 INJECTION, SOLUTION INTRAMUSCULAR; INTRAVENOUS at 11:04

## 2020-07-20 RX ADMIN — MIDAZOLAM 2 MG: 1 INJECTION INTRAMUSCULAR; INTRAVENOUS at 10:26

## 2020-07-20 RX ADMIN — SODIUM CHLORIDE 125 ML/HR: 0.9 INJECTION, SOLUTION INTRAVENOUS at 09:06

## 2020-07-20 RX ADMIN — CEFAZOLIN SODIUM 2000 MG: 2 SOLUTION INTRAVENOUS at 09:46

## 2020-07-20 RX ADMIN — GLYCOPYRROLATE 0.4 MG: 0.2 INJECTION, SOLUTION INTRAMUSCULAR; INTRAVENOUS at 11:32

## 2020-07-20 RX ADMIN — FENTANYL CITRATE 50 MCG: 50 INJECTION, SOLUTION INTRAMUSCULAR; INTRAVENOUS at 12:20

## 2020-07-20 RX ADMIN — SODIUM CHLORIDE: 0.9 INJECTION, SOLUTION INTRAVENOUS at 10:43

## 2020-07-20 NOTE — ASSESSMENT & PLAN NOTE
Lab Results   Component Value Date    HGBA1C 7 5 (H) 07/07/2020       Recent Labs     07/20/20  0901 07/20/20  1211 07/20/20  1327   POCGLU 290* 218* 260*       Blood Sugar Average: Last 72 hrs:  (P) 256   Glucose elevated postop >200  Will initiate sliding scale insulin with Accu-Cheks  Home regimen metformin, likely can be discharged off this medication with PCP follow-up

## 2020-07-20 NOTE — ASSESSMENT & PLAN NOTE
Patient is postop day 0 from lap sleeve gastrectomy with Dr Aliza Frank and pain control per bariatric surgery   Encourage incentive spirometry   DVT prophylaxis with Lovenox

## 2020-07-20 NOTE — ASSESSMENT & PLAN NOTE
History of postpartum hypertension  BP control at this time  Continue to monitor frequently while inpatient  Not maintained on any medications outpatient  Continue PCP follow up

## 2020-07-20 NOTE — ANESTHESIA PREPROCEDURE EVALUATION
Review of Systems/Medical History  Patient summary reviewed  Chart reviewed  No history of anesthetic complications     Cardiovascular  Hypertension controlled,   Comment: Postpartum CMP,  Pulmonary  Negative pulmonary ROS        GI/Hepatic  Negative GI/hepatic ROS          Negative  ROS        Endo/Other  Diabetes well controlled type 2 Oral agent,   Obesity    GYN  Negative gynecology ROS          Hematology  Negative hematology ROS      Musculoskeletal  Negative musculoskeletal ROS        Neurology  Negative neurology ROS      Psychology   Negative psychology ROS              Physical Exam    Airway    Mallampati score: II  TM Distance: >3 FB  Neck ROM: full     Dental   No notable dental hx     Cardiovascular  Rhythm: regular, Rate: normal, Cardiovascular exam normal    Pulmonary  Pulmonary exam normal Breath sounds clear to auscultation,     Other Findings        Anesthesia Plan  ASA Score- 2     Anesthesia Type- general with ASA Monitors  Additional Monitors:   Airway Plan: ETT  Comment: SCOP patch ordered  POCT glucose=290 (after carb drink this AM)  HgbA1C in 7's  No prior insulin  No Tx ordered preo-procedure        Plan Factors-Patient not instructed to abstain from smoking on day of procedure  Patient did not smoke on day of surgery  Induction- intravenous  Postoperative Plan- Plan for postoperative opioid use  Planned trial extubation    Informed Consent- Anesthetic plan and risks discussed with patient

## 2020-07-20 NOTE — INTERVAL H&P NOTE
H&P reviewed  After examining the patient I find no changes in the patients condition since the H&P had been written      Vitals:    07/20/20 0843   BP: 103/57   Pulse: 75   Resp: 16   Temp: 98 1 °F (36 7 °C)   SpO2: 94%

## 2020-07-20 NOTE — PLAN OF CARE
Problem: PAIN - ADULT  Goal: Verbalizes/displays adequate comfort level or baseline comfort level  Description  Interventions:  - Encourage patient to monitor pain and request assistance  - Assess pain using appropriate pain scale  - Administer analgesics based on type and severity of pain and evaluate response  - Implement non-pharmacological measures as appropriate and evaluate response  - Consider cultural and social influences on pain and pain management  - Notify physician/advanced practitioner if interventions unsuccessful or patient reports new pain  Outcome: Progressing     Problem: SAFETY ADULT  Goal: Patient will remain free of falls  Description  INTERVENTIONS:  - Assess patient frequently for physical needs  -  Identify cognitive and physical deficits and behaviors that affect risk of falls    -  [place] fall precautions as indicated by assessment   - Educate patient/family on patient safety including physical limitations  - Instruct patient to call for assistance with activity based on assessment  - Modify environment to reduce risk of injury  - Consider OT/PT consult to assist with strengthening/mobility  Outcome: Progressing  Goal: Maintain or return to baseline ADL function  Description  INTERVENTIONS:  -  Assess patient's ability to carry out ADLs; assess patient's baseline for ADL function and identify physical deficits which impact ability to perform ADLs (bathing, care of mouth/teeth, toileting, grooming, dressing, etc )  - Assess/evaluate cause of self-care deficits   - Assess range of motion  - Assess patient's mobility; develop plan if impaired  - Assess patient's need for assistive devices and provide as appropriate  - Encourage maximum independence but intervene and supervise when necessary  - Involve family in performance of ADLs  - Assess for home care needs following discharge   - Consider OT consult to assist with ADL evaluation and planning for discharge  - Provide patient education as appropriate  Outcome: Progressing  Goal: Maintain or return mobility status to optimal level  Description  INTERVENTIONS:  - Assess patient's baseline mobility status (ambulation, transfers, stairs, etc )    - Identify cognitive and physical deficits and behaviors that affect mobility  - Identify mobility aids required to assist with transfers and/or ambulation (gait belt, sit-to-stand, lift, walker, cane, etc )  - [place] fall precautions as indicated by assessment  - Record patient progress and toleration of activity level on Mobility SBAR; progress patient to next Phase/Stage  - Instruct patient to call for assistance with activity based on assessment  - Consider rehabilitation consult to assist with strengthening/weightbearing, etc   Outcome: Progressing     Problem: DISCHARGE PLANNING  Goal: Discharge to home or other facility with appropriate resources  Description  INTERVENTIONS:  - Identify barriers to discharge w/patient and caregiver  - Arrange for needed discharge resources and transportation as appropriate  - Identify discharge learning needs (meds, wound care, etc )  - Arrange for interpretive services to assist at discharge as needed  - Refer to Case Management Department for coordinating discharge planning if the patient needs post-hospital services based on physician/advanced practitioner order or complex needs related to functional status, cognitive ability, or social support system  Outcome: Progressing     Problem: Knowledge Deficit  Goal: Patient/family/caregiver demonstrates understanding of disease process, treatment plan, medications, and discharge instructions  Description  Complete learning assessment and assess knowledge base    Interventions:  - Provide teaching at level of understanding  - Provide teaching via preferred learning methods  Outcome: Progressing

## 2020-07-20 NOTE — OP NOTE
[facility]   [address] [facility], [address]  [phone] (Fax)      Operative Report  LAP SLEEVE GASTRECTOMY, INTRAOP EGD     Patient Name: Aileen Doe    :  1995  MRN: [id number]  Patient Location: [facility] OR ROOM 08  Surgery Date : 2020  Surgeons:  Surgeon(s) and Role:     * Nicole Mckeon MD - Primary     * Vivian Hansen MD  - [address]    Diagnosis:    Pre-Op Diagnosis Codes: Morbid obesity ([facility] [address] ) [E66 01]  Body mass index is 36 16 kg/m²  Benign hypertension [I10]  Type 2 diabetes mellitus with obesity (HCC) [E11 69, E66 9]  Gall bladder disease [K82 9]  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]    Post-Op Diagnosis Codes:     * Morbid obesity ([facility] [address] ) [E66 01]     * Body mass index is 36 16 kg/m²  * Benign hypertension [I10]     * Type 2 diabetes mellitus with obesity (HCC) [E11 69, E66 9]     * Gall bladder disease [K82 9]     * 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]    Procedure  1  Laparoscopic Sleeve Gastrectomy  2  Intraoperative Endoscopy    Specimen(s):  ID Type Source Tests Collected by Time Destination   1 : Portion of stomach Tissue Stomach TISSUE EXAM Nicole Mckeon MD 2020 1059        Estimated Blood Loss:    20 cc    Anesthesia Type:     General    Operative Indications: Morbid obesity ([facility] [address] ) [E66 01]  Benign hypertension [I10]  Type 2 diabetes mellitus with obesity (HCC) [E11 69, E66 9]  Gall bladder disease [K82 9]  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]  Body mass index is 36 16 kg/m²  Operative Findings:    Normal    Complications:     None    Procedure and Technique:    INDICATION    Aileen Doe is a 22 y o  female with a Body mass index is 36 16 kg/m²  and a long standing history of morbid obesity and inability to lose a significant amount of weight on its own    This patient was found to be a good candidate to undergo a bariatric procedure upon being enrolled here at the [facility]  OPERATIVE TECHNIQUE    The patient was taken to the operating room and placed in a supine position  A dose of IV antibiotic prophylaxis that consisted of Ancef 2g was given  Also 40 mg of subcutaneous Enoxaparin to prevent deep vein thrombosis were administered  Sequential compression devices were placed on both lower extremities  After satisfactory general anesthesia induction and endotracheal intubation was achieved, the extremities were placed and properly secured to prevent neuromuscular damage as best as possible  Subsequently, the abdominal wall was prepped and draped in a surgical standard sterile fashion  After a timeout was done and the patient was properly identified and the type of procedure was confirmed a supra-umbilical transverse skin incision was made and the subcutaneous tissues dissected  Access to the peritoneal cavity was gained with the Visiport trocar  With this device, we were able to visualize the layers of the abdominal wall, and enter the peritoneal cavity under direct visualization  Pneumoperitoneum was then established with CO2 insufflation  A four quadrant transversus abdominis plane block was performed under direct laparoscopic vision  After this was completed four additional trocars were placed: a 12 mm in the right upper quadrant subcostal position in the anterior axillary line, a 15-mm port was placed in the right flank midclavicular line, a 12-mm port was placed in the left upper quadrant subcostal position in the midclavicular line and another 12-mm port was placed in the left quadrant anterior axillary line lateral to the supraumbilical port  The [facility] liver retractor was placed in the subxiphoid position through the use of a 5-mm trocar incision  The patient was repositioned to a reverse Trendelenburg position  With the trocars in place, the dissection was begun   We divided the gastrocolic ligament with the energy device to enter the lesser sac  We continued to divide this ligament along the greater curvature of the stomach towards the angle of His  Special care was taken while dividing the short gastric vessels close to the spleen  This process was completely hemostatic  We then turned to the creation of an elongated and thin gastric pouch  A 36 Salvadorean calibration tube was placed by the anesthesia staff into the stomach under our laparoscopic surveillance  Once the tip of the bougie was confirmed to be next to the pylorus, serial firings of the laparoscopic stapler with 60-mm cartridges were utilized  We started in a point inferior to the incisura angularis and the Crows foot nerve looking to preserve the gastric emptying  This was 5 to 6 centimeters proximal to the pylorus  The staple lines were reinforced with buttressing material  We created a pouch based on the lesser curve, and in vertical orientation  We continued the vertical serial firings of the stapler to the angle of His gently cinching the bougie with our laparoscopic stapler looking to create a thin pouch  As we approached the fundus of the stomach and the angle of His, the stapler loads were changed appropriately according to the variable thickness of the tissue  This completely  the pouch from the remnant stomach  We then turned our attention to the newly created pouch and examined it for bleeding or obvious defects on the staple lines and none were found  The distal stomach pouch was occluded with a Ebony clamp, and an EGD as well as an air insufflation test was performed  Neither intraoperative bleeding nor leaks were detected  The periumbilical trocar site was dilated and the gastric remnant was externalized through it and passed off the surgical field to be sent to pathology  The sponge, needle and instrument count was reported complete      The previously dilated trocar site and the 15 mm were then closed with use of a suture closure device and a figure-of-eight with absorbable suture  The pneumoperitoneum was evacuated using the [facility] filter and smoke evacuator  The liver retractor and the remainder ports were then removed under direct laparoscopic visualization and no back bleeding was noted  The skin incisions were all closed with 4-0 absorbable subcuticular suture  The patient tolerated the procedure well, was extubated uneventfully and was transferred to the recovery room in stable condition  I was present for the entire length of the procedure as the attending of record  No qualified resident was available to assist   The presence of an assistant was necessary for camera holding, traction and counter traction and for help with suturing and stapling in addition to performing the intraop-EGD        Patient Disposition:    PACU     Signature: Raffy Alvarez MD  Date: July 20, 2020  Time: 11:22 AM

## 2020-07-20 NOTE — CONSULTS
[address] Internal Medicine  Consult- Helen Barfield 1995, 22 y o  female MRN: [id number]    Unit/Bed#: E5 -01 Encounter: [id number]    Primary Care Provider: Lilli Singh MD   Date and time admitted to hospital: 7/20/2020  7:58 AM      Inpatient consult to Internal Medicine  Consult performed by: Bobbi aBshir PA-C  Consult ordered by: Nettie Ernst MD          Class 2 obesity due to excess calories in adult  Assessment & Plan  Patient is postop day 0 from lap sleeve gastrectomy with Dr Juliette Gaitan and pain control per bariatric surgery   Encourage incentive spirometry   DVT prophylaxis with Lovenox    * Diabetes mellitus type 2 in obese [facility])  Assessment & Plan  Lab Results   Component Value Date    HGBA1C 7 5 (H) 07/07/2020       Recent Labs     07/20/20  0901 07/20/20  1211 07/20/20  1327   POCGLU 290* 218* 260*       Blood Sugar Average: Last 72 hrs:  (P) 256   Glucose elevated postop >200  Will initiate sliding scale insulin with Accu-Cheks  Home regimen metformin, likely can be discharged off this medication with PCP follow-up    Other secondary hypertension  Assessment & Plan  History of postpartum hypertension  BP control at this time  Continue to monitor frequently while inpatient  Not maintained on any medications outpatient  Continue PCP follow up       VTE Prophylaxis: Enoxaparin (Lovenox)  / sequential compression device     Recommendations for Discharge:  · Monitor blood glucose while inpatient, likely discontinue metformin at discharge  · Close PCP follow-up    Counseling / Coordination of Care Time: 45 minutes  Greater than 50% of total time spent on patient counseling and coordination of care  Collaboration of Care: Were Recommendations Directly Discussed with Primary Treatment Team? - No     History of Present Illness:    Helen Barfield is a 22 y o  female who is originally admitted to the bariatric surgery service due to morbid obesity   We are consulted for medical management  Patient notes she is feeling okay after surgery  Rates her pain a 7 on a 10, location in her abdomen  Patient reports she follows outpatient with her PCP for her diabetes  Takes metformin, has never been on insulin  Patient with history of hypertension in her postpartum period, notes this has completely resolved  Is not maintained on any antihypertensives outpatient  Patient denies any chest pain or shortness of breath  Review of Systems:    Review of Systems   Constitutional: Negative for chills and fever  HENT: Negative for trouble swallowing  Eyes: Negative for visual disturbance  Respiratory: Negative for cough and shortness of breath  Cardiovascular: Negative for chest pain  Gastrointestinal: Positive for abdominal pain  Negative for vomiting  Musculoskeletal: Negative for gait problem  Skin: Negative for rash  Allergic/Immunologic: Negative for immunocompromised state  Neurological: Negative for dizziness  Psychiatric/Behavioral: Negative for confusion  Past Medical and Surgical History:     Past Medical History:   Diagnosis Date    Diabetes mellitus ([facility] [address] )     NIDDM    History of transfusion     after  - had fever with transfusion    Obesity (BMI 35 0-39 9 without comorbidity)     Uses Maldivian as primary spoken language        Past Surgical History:   Procedure Laterality Date     SECTION  2017    CHOLECYSTECTOMY         Meds/Allergies:    PTA meds:   Prior to Admission Medications   Prescriptions Last Dose Informant Patient Reported? Taking?    Multiple Vitamins-Minerals (MULTIVITAMIN ADULT PO) 2020 at Unknown time  Yes Yes   Sig: Take by mouth daily   enoxaparin (LOVENOX) 40 mg/0 4 mL   No No   Sig: Inject 0 4 mL (40 mg total) under the skin daily for 13 days   Patient taking differently: Inject 40 mg under the skin daily Start PostOp   metFORMIN (GLUCOPHAGE) 1000 MG tablet 2020 at Unknown time  No Yes   Sig: Take 1 tablet (1,000 mg total) by mouth 2 (two) times a day with meals   omeprazole (PriLOSEC) 20 mg delayed release capsule   No No   Sig: Take 1 capsule (20 mg total) by mouth daily   Patient taking differently: Take 20 mg by mouth daily Start PostOp   oxyCODONE (ROXICODONE) 5 mg immediate release tablet   No No   Sig: Take 1 tablet (5 mg total) by mouth every 4 (four) hours as needed for moderate painMax Daily Amount: 30 mg   Patient taking differently: Take 5 mg by mouth every 4 (four) hours as needed for moderate pain Start Post Op   polyethylene glycol (MIRALAX) 17 g packet 7/19/2020 at Unknown time  Yes Yes   Sig: Take 17 g by mouth 3 days prior to surgery      Facility-Administered Medications: None       Allergies: No Known Allergies    Social History:     Marital Status: Single    Substance Use History:   Social History     Substance and Sexual Activity   Alcohol Use No     Social History     Tobacco Use   Smoking Status Never Smoker   Smokeless Tobacco Never Used     Social History     Substance and Sexual Activity   Drug Use No       Family History:    Family History   Problem Relation Age of Onset    Heart disease Family         CARDIOVASCULAR DISEASE    Diabetes Mother     Other Mother         gastric bypass    Diabetes Father     Diabetes Sister     Sleep apnea Sister     Sleep apnea Brother         2 of the 3 borthers have EAGLE    Diabetes Maternal Grandmother     Diabetes Paternal Grandmother        Physical Exam:     Vitals:   Blood Pressure: 133/83 (07/20/20 1600)  Pulse: 83 (07/20/20 1600)  Temperature: (!) 96 9 °F (36 1 °C) (07/20/20 1325)  Temp Source: Temporal (07/20/20 1325)  Respirations: 18 (07/20/20 1600)  Height: 5' 10" (177 8 cm) (07/20/20 0843)  Weight - Scale: 114 kg (251 lb 15 8 oz) (07/20/20 0843)  SpO2: 100 % (07/20/20 1600)    Physical Exam   Constitutional: She is oriented to person, place, and time  No distress  HENT:   Head: Normocephalic and atraumatic     Eyes: Conjunctivae are normal  No scleral icterus  Neck: Neck supple  Cardiovascular: Normal rate, regular rhythm and normal heart sounds  Pulmonary/Chest: Effort normal and breath sounds normal  She has no wheezes  Abdominal: Soft  Bowel sounds are normal  She exhibits no distension  Musculoskeletal: She exhibits no edema  Neurological: She is alert and oriented to person, place, and time  Skin: Skin is warm and dry  Psychiatric: She has a normal mood and affect  Her behavior is normal  Thought content normal          Additional Data:     Lab Results: I have personally reviewed pertinent reports  Lab Results   Component Value Date/Time    HGBA1C 7 5 (H) 07/07/2020 01:10 PM    HGBA1C 7 3 (A) 06/04/2019 02:16 PM    HGBA1C 5 4 05/03/2017 08:35 AM     Results from last 7 days   Lab Units 07/20/20  1701 07/20/20  1327 07/20/20  1211 07/20/20  0901   POC GLUCOSE mg/dl 245* 260* 218* 290*           Imaging: I have personally reviewed pertinent reports  No orders to display       EKG, Pathology, and Other Studies Reviewed on Admission:   · EKG:  Normal sinus rhythm    ** Please Note: This note has been constructed using a voice recognition system   **

## 2020-07-21 VITALS
OXYGEN SATURATION: 99 % | WEIGHT: 251.99 LBS | TEMPERATURE: 97.5 F | DIASTOLIC BLOOD PRESSURE: 72 MMHG | RESPIRATION RATE: 18 BRPM | BODY MASS INDEX: 36.07 KG/M2 | HEIGHT: 70 IN | HEART RATE: 67 BPM | SYSTOLIC BLOOD PRESSURE: 120 MMHG

## 2020-07-21 LAB
ANION GAP SERPL CALCULATED.3IONS-SCNC: 7 MMOL/L (ref 4–13)
BUN SERPL-MCNC: 10 MG/DL (ref 5–25)
CALCIUM SERPL-MCNC: 8.3 MG/DL (ref 8.3–10.1)
CHLORIDE SERPL-SCNC: 104 MMOL/L (ref 100–108)
CO2 SERPL-SCNC: 27 MMOL/L (ref 21–32)
CREAT SERPL-MCNC: 0.74 MG/DL (ref 0.6–1.3)
ERYTHROCYTE [DISTWIDTH] IN BLOOD BY AUTOMATED COUNT: 12.2 % (ref 11.6–15.1)
GFR SERPL CREATININE-BSD FRML MDRD: 115 ML/MIN/1.73SQ M
GLUCOSE SERPL-MCNC: 165 MG/DL (ref 65–140)
GLUCOSE SERPL-MCNC: 184 MG/DL (ref 65–140)
HCT VFR BLD AUTO: 39.3 % (ref 34.8–46.1)
HGB BLD-MCNC: 12.4 G/DL (ref 11.5–15.4)
MCH RBC QN AUTO: 29.7 PG (ref 26.8–34.3)
MCHC RBC AUTO-ENTMCNC: 31.6 G/DL (ref 31.4–37.4)
MCV RBC AUTO: 94 FL (ref 82–98)
PLATELET # BLD AUTO: 142 THOUSANDS/UL (ref 149–390)
PMV BLD AUTO: 12.2 FL (ref 8.9–12.7)
POTASSIUM SERPL-SCNC: 4 MMOL/L (ref 3.5–5.3)
RBC # BLD AUTO: 4.17 MILLION/UL (ref 3.81–5.12)
SODIUM SERPL-SCNC: 138 MMOL/L (ref 136–145)
WBC # BLD AUTO: 7.24 THOUSAND/UL (ref 4.31–10.16)

## 2020-07-21 PROCEDURE — C9113 INJ PANTOPRAZOLE SODIUM, VIA: HCPCS | Performed by: SURGERY

## 2020-07-21 PROCEDURE — 80048 BASIC METABOLIC PNL TOTAL CA: CPT | Performed by: SURGERY

## 2020-07-21 PROCEDURE — 99024 POSTOP FOLLOW-UP VISIT: CPT | Performed by: PHYSICIAN ASSISTANT

## 2020-07-21 PROCEDURE — 99024 POSTOP FOLLOW-UP VISIT: CPT | Performed by: SURGERY

## 2020-07-21 PROCEDURE — 99232 SBSQ HOSP IP/OBS MODERATE 35: CPT | Performed by: INTERNAL MEDICINE

## 2020-07-21 PROCEDURE — 82948 REAGENT STRIP/BLOOD GLUCOSE: CPT

## 2020-07-21 PROCEDURE — 85027 COMPLETE CBC AUTOMATED: CPT | Performed by: SURGERY

## 2020-07-21 RX ORDER — ACETAMINOPHEN 325 MG/1
975 TABLET ORAL EVERY 8 HOURS SCHEDULED
Qty: 30 TABLET | Refills: 0 | Status: SHIPPED | OUTPATIENT
Start: 2020-07-21 | End: 2020-11-12 | Stop reason: ALTCHOICE

## 2020-07-21 RX ADMIN — PANTOPRAZOLE SODIUM 40 MG: 40 INJECTION, POWDER, FOR SOLUTION INTRAVENOUS at 08:56

## 2020-07-21 RX ADMIN — ENOXAPARIN SODIUM 40 MG: 40 INJECTION SUBCUTANEOUS at 08:56

## 2020-07-21 RX ADMIN — KETOROLAC TROMETHAMINE 15 MG: 30 INJECTION, SOLUTION INTRAMUSCULAR at 02:33

## 2020-07-21 RX ADMIN — SIMETHICONE 80 MG: 80 TABLET, CHEWABLE ORAL at 05:30

## 2020-07-21 RX ADMIN — INSULIN LISPRO 1 UNITS: 100 INJECTION, SOLUTION INTRAVENOUS; SUBCUTANEOUS at 08:57

## 2020-07-21 RX ADMIN — ACETAMINOPHEN 975 MG: 325 TABLET ORAL at 05:30

## 2020-07-21 RX ADMIN — SODIUM CHLORIDE, SODIUM LACTATE, POTASSIUM CHLORIDE, AND CALCIUM CHLORIDE 75 ML/HR: .6; .31; .03; .02 INJECTION, SOLUTION INTRAVENOUS at 02:32

## 2020-07-21 NOTE — UTILIZATION REVIEW
Initial Clinical Review    Elective   IP      surgical procedure    Age/Sex: 22 y o  female     Surgery Date:    7/20/20    1  Procedure: Laparoscopic Sleeve Gastrectomy  2   Intraoperative Endoscopy       Anesthesia:    general    Operative Findings:    normal    POD#1 Progress Note:   7/21   D/C  home    Admission Orders: Date/Time/Statement: Admission Orders (From admission, onward)     Ordered        07/20/20 1127  Inpatient Admission  Once                   Orders Placed This Encounter   Procedures    Inpatient Admission     Standing Status:   Standing     Number of Occurrences:   1     Order Specific Question:   Admitting Physician     Answer:   Ritika Bonilla [[id number]]     Order Specific Question:   Level of Care     Answer:   Med Surg [16]     Order Specific Question:   Bed Type     Answer:   Bariatric [1]     Order Specific Question:   Estimated length of stay     Answer:   One Midnight     Vital Signs: /72 (BP Location: Right arm)   Pulse 67   Temp 97 5 °F (36 4 °C) (Temporal)   Resp 18   Ht 5' 10" (1 778 m)   Wt 114 kg (251 lb 15 8 oz)   LMP 07/13/2020   SpO2 99%   Breastfeeding No   BMI 36 16 kg/m²      Diet:   Bariatric  Cl liq  diet    Mobility:    OOB as  tolerated    DVT Prophylaxis:    SCD'S    Medications/Pain Control:   Scheduled Medications:    Medications:  acetaminophen 975 mg Oral Q8H [address]   enoxaparin 40 mg Subcutaneous Q24H [address]   insulin lispro 1-5 Units Subcutaneous TID AC   insulin lispro 1-5 Units Subcutaneous HS   pantoprazole 40 mg Intravenous Q24H LINN   simethicone 80 mg Oral Q12H     Continuous IV Infusions:    lactated ringers 75 mL/hr Intravenous Continuous     PRN Meds:    acetaminophen 325 mg Oral Q8H PRN   HYDROmorphone 1 mg Intravenous Q4H PRN   metoclopramide 10 mg Intravenous Q6H PRN   morphine injection 4 mg Intravenous Q2H PRN   morphine injection 2 mg Intravenous Q2H PRN   ondansetron 4 mg Intravenous Q4H PRN   oxyCODONE 10 mg Oral Q4H PRN   oxyCODONE 5 mg Oral Q4H PRN   phenol 2 spray Mouth/Throat Q2H PRN   promethazine 25 mg Intravenous Q4H PRN         Network Utilization Review Department  [email or web address]  ATTENTION: Please call with any questions or concerns to [phone] and carefully listen to the prompts so that you are directed to the right person  All voicemails are confidential   Cely Neves all requests for admission clinical reviews, approved or denied determinations and any other requests to dedicated fax number below belonging to the campus where the patient is receiving treatment   List of dedicated fax numbers for the Facilities:  [address] DENIALS (Administrative/Medical Necessity) [phone]   1000  [facility] (Maternity/NICU/Pediatrics) [phone] 5400 [facility] [phone]   Val Thrasher [phone]   [facility] [phone]   [facility] [phone]   [address] [place] [address] [phone]   [facility]  [phone]   [facility]  2401 [facility] 1000 [address] [facility] [phone]

## 2020-07-21 NOTE — NURSING NOTE
Agree with previous nurse's assessment  Incisions CDI, pain adequately controlled  IV removed  Lovenox teaching provided  Discussed medications, follow-up appts, etc  Patient walked out with family and RN upon discharge  Picked up meds and [facility] on the way out

## 2020-07-21 NOTE — UTILIZATION REVIEW
Notification of Inpatient Admission/Inpatient Authorization Request   This is a Notification of Inpatient Admission for [address]  Be advised that this patient was admitted to our facility under Inpatient Status  Contact Ruba Higuera at [phone] for additional admission information  [address] [facility] DEPT  DEDICATED [phone]  Patient Name:   Apple Stein   YOB: 1995       [address]   [address]:   Leonardo Roblero  Tax ID: [id number]  NPI: [id number] Attending Provider/NPI:  Phone:  Address: Radha Dye [[id number]]  [phone]  Same as the facility   Place of Service Code: 24 Place of Service Name:  [address] [place] [address]   Start Date: 7/20/20 1127 Discharge Date & Time: No discharge date for patient encounter  Type of Admission: Inpatient Status Discharge Disposition   (if discharged): Home/Self Care   Patient Diagnoses: Morbid obesity ([facility] [address] ) [E66 01]  Benign hypertension [I10]  Type 2 diabetes mellitus with obesity ([facility] [address] ) [E11 69, E66 9]  Gall bladder disease [K82 9]  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]     Orders: Admission Orders (From admission, onward)     Ordered        07/20/20 1127  Inpatient Admission  Once                    Assigned Utilization Review Contact: [address] [place] [address] Utilization Review Department  Phone: [phone]; Fax [phone]  Email: [email or web address]   ATTENTION PAYERS: Please call the assigned Utilization  directly with any questions or concerns ALL voicemails in the department are confidential  Send all requests for admission clinical reviews, approved or denied determinations and any other requests to dedicated fax number belonging to the campus where the patient is receiving treatment

## 2020-07-21 NOTE — DISCHARGE SUMMARY
Discharge Summary - Irma Bobo 22 y o  female MRN: [id number]    Unit/Bed#: E5 -01 Encounter: [id number]      Pre-Operative Diagnosis: Pre-Op Diagnosis Codes:     * Morbid obesity ([facility] [address] ) [E66 01]     * Benign hypertension [I10]     * Type 2 diabetes mellitus with obesity ([facility] ) [E11 69, E66 9]     * Gall bladder disease [K82 9]     * 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]    Post-Operative Diagnosis: Post-Op Diagnosis Codes:     * Morbid obesity ([facility] [address] ) [E66 01]     * Benign hypertension [I10]     * Type 2 diabetes mellitus with obesity ([facility] ) [E11 69, E66 9]     * Gall bladder disease [K82 9]     * 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]    Procedures Performed:  Procedure(s):  LAP SLEEVE GASTRECTOMY, INTRAOP EGD    Surgeon: Nitin Chisholm MD    See H & P for full details of admission and Operative Note for full details of operations performed  Patient tolerated surgery well without complications  In the morning postoperative Day 1, the patient had mild nausea and abdominal pain  Tolerated a clear liquid diet without vomiting  Able to ambulate and voiding independently  Patient was deemed ready for discharge home  Patient was seen and examined prior to discharge  Provisions for Follow-Up Care:  See After Visit Summary/Discharge Instructions for information related to follow-up care and home orders  Disposition: Home, in stable condition  Planned Readmission: No    Discharge Medications:  See After Visit Summary/Discharge Instructions for reconciled discharge medications provided to patient and family  Post Operative instructions: Reviewed with patient and/or family      Signature:   Lazaro Calhoun PA-C  Date: 7/21/2020 Time: 9:28 AM

## 2020-07-21 NOTE — PROGRESS NOTES
Progress Note - Vincent Sheehan 1995, 22 y o  female MRN: [id number]    Unit/Bed#: E5 -01 Encounter: [id number]    Primary Care Provider: Mily Richardson MD   Date and time admitted to hospital: 2020  7:58 AM        * Class 2 obesity due to excess calories in adult  Assessment & Plan  Patient is postop day 1 from lap sleeve gastrectomy with Dr De Leon Sees and pain control per bariatric surgery   Encourage incentive spirometry   DVT prophylaxis with Lovenox    Other secondary hypertension  Assessment & Plan  History of postpartum hypertension  BP control at this time  Not maintained on any medications outpatient  Continue PCP follow up     Diabetes mellitus type 2 in obese [facility])  Assessment & Plan  Lab Results   Component Value Date    HGBA1C 7 5 (H) 2020       Recent Labs     20  1327 20  1701 20  2045 20  0804   POCGLU 260* 245* 156* 184*       Blood Sugar Average: Last 72 hrs:  (P) 225 5     SS while IP  Home regimen metformin, likely can be discharged off this medication with PCP follow-up        VTE Pharmacologic Prophylaxis:   Pharmacologic: Enoxaparin (Lovenox)  Mechanical VTE Prophylaxis in Place: Yes    Patient Centered Rounds: I have performed bedside rounds with nursing staff today  Discussions with Specialists or Other Care Team Provider:  Nurse,    Education and Discussions with Family / Patient:  Patient    Time Spent for Care: 20 minutes  More than 50% of total time spent on counseling and coordination of care as described above  Current Length of Stay: 1 day(s)    Current Patient Status: Inpatient   Certification Statement: The patient will continue to require additional inpatient hospital stay due to Pending discharge    Discharge Plan:  Discharge per bariatric    Code Status: Level 1 - Full Code      Subjective:   Patient seen and evaluated ascites    Tolerating diet well, offers no complaints    Objective:     Vitals:   Temp (24hrs), Av 9 °F (36 6 °C), Min:96 9 °F (36 1 °C), Max:98 5 °F (36 9 °C)    Temp:  [96 9 °F (36 1 °C)-98 5 °F (36 9 °C)] 97 5 °F (36 4 °C)  HR:  [] 67  Resp:  [16-22] 18  BP: (113-144)/(64-88) 120/72  SpO2:  [96 %-100 %] 99 %  Body mass index is 36 16 kg/m²  Input and Output Summary (last 24 hours): Intake/Output Summary (Last 24 hours) at 7/21/2020 1021  Last data filed at 7/21/2020 0534  Gross per 24 hour   Intake 3190 ml   Output 220 ml   Net 2970 ml       Physical Exam:     Physical Exam   Constitutional: She is oriented to person, place, and time  No distress  obese   HENT:   Head: Atraumatic  Eyes: EOM are normal    Neck: Neck supple  Cardiovascular: Normal rate, regular rhythm and normal heart sounds  Exam reveals no gallop and no friction rub  No murmur heard  Pulmonary/Chest: Effort normal and breath sounds normal  No respiratory distress  She has no wheezes  Abdominal: Soft  She exhibits no distension  There is tenderness  Musculoskeletal: She exhibits no edema  Neurological: She is alert and oriented to person, place, and time  No cranial nerve deficit  Skin: Skin is warm and dry  Psychiatric: Her behavior is normal        Additional Data:     Labs:    Results from last 7 days   Lab Units 07/21/20  0520   WBC Thousand/uL 7 24   HEMOGLOBIN g/dL 12 4   HEMATOCRIT % 39 3   PLATELETS Thousands/uL 142*     Results from last 7 days   Lab Units 07/21/20  0520   SODIUM mmol/L 138   POTASSIUM mmol/L 4 0   CHLORIDE mmol/L 104   CO2 mmol/L 27   BUN mg/dL 10   CREATININE mg/dL 0 74   ANION GAP mmol/L 7   CALCIUM mg/dL 8 3   GLUCOSE RANDOM mg/dL 165*         Results from last 7 days   Lab Units 07/21/20  0804 07/20/20  2045 07/20/20  1701 07/20/20  1327 07/20/20  1211 07/20/20  0901   POC GLUCOSE mg/dl 184* 156* 245* 260* 218* 290*                   * I Have Reviewed All Lab Data Listed Above  * Additional Pertinent Lab Tests Reviewed:  [address] Admission Reviewed    Imaging:    Imaging Reports Reviewed Today Include: all  Imaging Personally Reviewed by Myself Includes:      Recent Cultures (last 7 days):           Last 24 Hours Medication List:     Current Facility-Administered Medications:  acetaminophen 325 mg Oral Q8H PRN Irma Lara MD    acetaminophen 975 mg Oral [facility] Irma Lara MD    enoxaparin 40 mg Subcutaneous Q24H Willy Winslow MD    HYDROmorphone 1 mg Intravenous Q4H PRN Irma Lara MD    insulin lispro 1-5 Units Subcutaneous TID AC TiannaDAREK Cheek-FRANCESCA    insulin lispro 1-5 Units Subcutaneous HS Tianna Evans PA-C    lactated ringers 75 mL/hr Intravenous Continuous Irma Lara MD Last Rate: 75 mL/hr (07/21/20 0232)   metoclopramide 10 mg Intravenous Q6H PRN Irma Lara MD    morphine injection 4 mg Intravenous Q2H PRN Irma Lara MD    morphine injection 2 mg Intravenous Q2H PRN Irma Lara MD    ondansetron 4 mg Intravenous Q4H PRN Irma Lara MD    oxyCODONE 10 mg Oral Q4H PRN Irma Lara MD    oxyCODONE 5 mg Oral Q4H PRN Irma Lara MD    pantoprazole 40 mg Intravenous Q24H Willy Winslow MD    phenol 2 spray Mouth/Throat Q2H PRN Irma Lara MD    promethazine 25 mg Intravenous Q4H PRN Irma Lara MD    simethicone 80 mg Oral Q12H Irma Lara MD         Today, Patient Was Seen By: Kathy Odom MD    ** Please Note: Dictation voice to text software may have been used in the creation of this document   **

## 2020-07-21 NOTE — ASSESSMENT & PLAN NOTE
History of postpartum hypertension  BP control at this time  Not maintained on any medications outpatient  Continue PCP follow up

## 2020-07-21 NOTE — ASSESSMENT & PLAN NOTE
Lab Results   Component Value Date    HGBA1C 7 5 (H) 07/07/2020       Recent Labs     07/20/20  1327 07/20/20  1701 07/20/20  2045 07/21/20  0804   POCGLU 260* 245* 156* 184*       Blood Sugar Average: Last 72 hrs:  (P) 225 5     SS while IP  Home regimen metformin, likely can be discharged off this medication with PCP follow-up

## 2020-07-21 NOTE — ASSESSMENT & PLAN NOTE
Patient is postop day 1 from lap sleeve gastrectomy with Dr Eliu Leonard and pain control per bariatric surgery   Encourage incentive spirometry   DVT prophylaxis with Lovenox

## 2020-07-21 NOTE — PROGRESS NOTES
Progress Note - Bariatric Surgery   Michael Martin 22 y o  female MRN: [id number]  Unit/Bed#: E5 -01 Encounter: [id number]      Subjective/Objective     Subjective: Patient with morbid obesity s/p laparoscopic sleeve gastrectomy   Tolerating liquid diet without nausea or vomiting, pain adequately controlled on oral pain medication, ambulating without assistance, voiding well, using incentive spirometer  Denies fevers, chills, sweats, SOB, CP, calf pain  Objective:    /83 (BP Location: Right arm)   Pulse 72   Temp 97 5 °F (36 4 °C) (Temporal)   Resp 18   Ht 5' 10" (1 778 m)   Wt 114 kg (251 lb 15 8 oz)   LMP 07/13/2020   SpO2 97%   Breastfeeding No   BMI 36 16 kg/m²       Intake/Output Summary (Last 24 hours) at 7/21/2020 0730  Last data filed at 7/21/2020 0534  Gross per 24 hour   Intake 3190 ml   Output 220 ml   Net 2970 ml       Invasive Devices     Peripheral Intravenous Line            Peripheral IV 07/20/20 Left Hand less than 1 day                  Physical Exam    General Appearance:    Alert, cooperative, no distress, appears stated age   Head:    Normocephalic, without obvious abnormality, atraumatic   Lungs:     Respirations unlabored   Heart:    Regular rate and rhythm   Abdomen:     Soft, appropriate tenderness,  no masses, no organomegaly,   non distended   Extremities:   Extremities normal, atraumatic, no cyanosis or edema   Neurologic:  Incision:  Psych:   Normal strength and sensation    Clean, dry, and intact    Normal mood and affect       Lab, Imaging and other studies:  I have personally reviewed pertinent lab results    , CBC:   Lab Results   Component Value Date    WBC 7 24 07/21/2020    HGB 12 4 07/21/2020    HCT 39 3 07/21/2020    MCV 94 07/21/2020     (L) 07/21/2020    MCH 29 7 07/21/2020    MCHC 31 6 07/21/2020    RDW 12 2 07/21/2020    MPV 12 2 07/21/2020   , CMP:   Lab Results   Component Value Date    SODIUM 138 07/21/2020    K 4 0 07/21/2020     07/21/2020    CO2 27 07/21/2020    BUN 10 07/21/2020    CREATININE 0 74 07/21/2020    CALCIUM 8 3 07/21/2020    EGFR 115 07/21/2020        VTE Mechanical Prophylaxis: sequential compression device    Assessment/Plan  1)  Morbid Obesity s/p laparoscopic sleeve gastrectomy with stable post op course  Patient afebrile and hemodynamically stable  Encourage PO fluids, ambulation, and incentive spirometry  Labs reviewed  Okay to administer Lovenox  Plan of care was discussed with patient and patient's nurse  Care plan discussed with Dr Gallo Generous: Continue bariatric clear liquid diet, ambulation, incentive spirometry and if patient continues to improve clinically will discharge to home this afternoon

## 2020-07-21 NOTE — DISCHARGE INSTR - AVS FIRST PAGE
[facility] [address] [facility]   Take Tylenol as instructed  Stay hydrated and follow your discharge diet progression   Mild nausea is ok as long as you can drink fluids  Take your omeprazole daily  Crush or cut your pills and open capsules, mix with liquid to drink    Follow up with Dr Eduardo Mcfadden and your PCP within the next week

## 2020-07-21 NOTE — DISCHARGE INSTRUCTIONS
Bariatric/Weight Loss Surgery  Hospital Discharge Instructions  1  ACTIVITY:  a  Progress as feels comfortable - a good rule is:  if you are doing something and it begins to hurt, stop doing the activity  Walk every hour while at home  b  Doreen Glasgow may walk stairs if you do so slowly  c  You may shower 48 hours after surgery  d  Use your incentive spirometer 10 times per hour while awake for 1 week  e  Do NOT drive for 48 hours after surgery  No driving 24 hours after taking certain prescription pain medications   Examples of such medication are Percocet, Darvocet, Oxycodone, Tylenol #3, and Tylenol with Codeine  2  DIET  a  Stay on a liquid diet for 7 days after your surgery date, sipping slowly  Refer to your manual for examples of choices  Remember to keep your liquids sugar free or low calorie  You may have protein drinks  Make sure to drink 48 to 64 ounces per day of fluids  b  Doreen Glasgow may advance to a pureed diet one week after surgery as instructed by your diet progression pamphlet  Once you get approval from your surgeon at your first post operative visit you may advance to the soft diet  3  MEDICATIONS:  a  The abdominal nerve block will wear off during the first 1-2 days that you are home, and you may become sore  Continue to take your Tylenol and your pain medication as instructed  b  Start vitamins and minerals when you get home  c  Anti-acid Medication as per prescription  d  Other medications as indicated on the Physician Patient Discharge Instructions form given to you at the time of discharge  e  Make sure that you are splitting your pill or tablet medications in halves or fourths or even crushing them before you take them  Capsules should be opened and mixed with water or jello  You need to do this for at least 4 weeks after surgery  Eventually you will be able to take your medications the regular way as they were prescribed     f  Doreen Glasgow will need to consult with your Family Doctor in regards to all your prescribed medication, particularly those for blood pressure and diabetes  As you lose weight, medical conditions may change, requiring an alteration or elimination of the drug dose  g  DO NOT TAKE BIRTH CONTROL(BC) MEDICATIONS, INSERT BC VAGINAL RINGS, OR PLACE IUD OR ANY OTHER BC METHODS UNTIL 31 DAYS FROM DAY OF DISCHARGE FROM HOSPITAL  THIS PLACES YOU AT HIGH RISK FOR A POTENTIALLY LIFE THREATENING BLOOD CLOT  Remember to always use barrier methods for birth control and speak to your GYN about using two forms of birth control to start 31 days after surgery  It is very important to avoid pregnancy until at least 18-24 months after surgery  4  INCISION CARE  a  You may shower and get incisions wet 2 days after surgery  No soaking tub baths or swimming for 30 days after surgery  Keep abdominal area and incisions clean  Use soap and water to create a good lather and rinse off  Do not scrub incisions  b  If you have a drain, empty the drain as the nurses instructed  5  FOLLOW-UP APPOINTMENT should be made for one week after discharge  Call surgeons office at [phone] to schedule an appointment      6  CALL YOUR DOCTOR FOR:  pain not controlled by pain medications, a temperature greater than 101 5° F, any increase or change in drainage or redness from any incision, any vomiting or inability to keep liquids down, shortness of breath, shoulder pain, or bleeding

## 2020-07-22 ENCOUNTER — TELEPHONE (OUTPATIENT)
Dept: MEDSURG UNIT | Facility: HOSPITAL | Age: 25
End: 2020-07-22

## 2020-07-22 NOTE — UTILIZATION REVIEW
Notification of Discharge  This is a Notification of Discharge from our facility [address]  Please be advised that this patient has been discharge from our facility  Below you will find the admission and discharge date and time including the patients disposition  PRESENTATION DATE: 7/20/2020  7:58 AM  OBS ADMISSION DATE:   IP ADMISSION DATE: 7/20/20 1127   DISCHARGE DATE: 7/21/2020 11:25 AM  DISPOSITION: Home/Self Care Home/Self Care   Admission Orders listed below:  Admission Orders (From admission, onward)     Ordered        07/20/20 1127  Inpatient Admission  Once                   Please contact the UR Department if additional information is required to close this patient's authorization/case  [address] [employer] Utilization Review Department  Main: [phone] x carefully listen to the prompts  All voicemails are confidential   [email or web address]  Send all requests for admission clinical reviews, approved or denied determinations and any other requests to dedicated fax number below belonging to the campus where the patient is receiving treatment   List of dedicated fax numbers:  [address] DENIALS (Administrative/Medical Necessity) [phone]   [address] (Maternity/NICU/Pediatrics) [phone]   Shyam Congress [phone]     [address] [phone]   [facility] [phone]   Lisa Favio [facility] [address] [facility] [phone]   [facility]  [phone]   [address] [facility] [address]  [address] [facility] [address] [facility] [phone]

## 2020-07-30 ENCOUNTER — OFFICE VISIT (OUTPATIENT)
Dept: BARIATRICS | Facility: CLINIC | Age: 25
End: 2020-07-30

## 2020-07-30 VITALS
DIASTOLIC BLOOD PRESSURE: 80 MMHG | WEIGHT: 231.5 LBS | HEIGHT: 70 IN | SYSTOLIC BLOOD PRESSURE: 122 MMHG | TEMPERATURE: 98 F | BODY MASS INDEX: 33.14 KG/M2 | HEART RATE: 84 BPM

## 2020-07-30 DIAGNOSIS — E66.9 OBESITY, CLASS I, BMI 30-34.9: Primary | ICD-10-CM

## 2020-07-30 DIAGNOSIS — Z98.84 BARIATRIC SURGERY STATUS: ICD-10-CM

## 2020-07-30 DIAGNOSIS — E66.9 OBESITY, CLASS II, BMI 35-39.9: Primary | ICD-10-CM

## 2020-07-30 PROCEDURE — 99024 POSTOP FOLLOW-UP VISIT: CPT | Performed by: SURGERY

## 2020-07-30 PROCEDURE — 3008F BODY MASS INDEX DOCD: CPT | Performed by: FAMILY MEDICINE

## 2020-07-30 PROCEDURE — 1111F DSCHRG MED/CURRENT MED MERGE: CPT | Performed by: SURGERY

## 2020-07-30 PROCEDURE — RECHECK: Performed by: DIETITIAN, REGISTERED

## 2020-07-30 PROCEDURE — 3008F BODY MASS INDEX DOCD: CPT | Performed by: SURGERY

## 2020-07-30 NOTE — PROGRESS NOTES
Assessment/Plan:     Patient ID: Carole Parker is a 22 y o  female  There are no diagnoses linked to this encounter       - Status post Vertical Sleeve Gastrectomy with Dr Julian Fuentes on 07/20/2020  Patient is presenting for the first postoperative visit  Patient's hospital stay was uneventful without any complications  Patient is doing well, has no complaints, and is taking vitamins and omeprazole as instructed  Currently tolerating the blenderized diet, will advance to soft diet  Patient will also be meeting with our dietician today to review her vitamin and mineral supplements and also go over her diet and emphasize postoperative commitment and compliance  The patient was also instructed to start exercising on a regular basis  F/U in 4 weeks  - Follow-up in 1 month  We kindly ask that your arrive 15 minutes before your scheduled appointment time with your provider to allow our staff to room you, get your vital signs and update your chart  - Get lab work done prior to next visit  Please call the office if you need a script  - Call our office if you have any problems with abdominal pain especially associated with fever, chills, nausea, vomiting or any other concerns  - All  Post-bariatric surgery patients should be aware that very small quantities of any alcohol can cause impairment and it is very possible not to feel the effect  The effect can be in the system for several hours  It is also a stomach irritant  - It is advised to AVOID alcohol, Nonsteroidal antiinflammatory drugs (NSAIDS) and nicotine of all forms   Any of these can cause stomach irritation/pain  Keep up the good work! Subjective:      Patient ID: Carole Parker is a 22 y o  female  -s/p Vertical Sleeve Gastrectomy with Dr Julian Fuentes on 07/20/2020  Presents to the office today for first post operative visit  Tolerating diet without issues; denies N/V, dysphagia, reflux  Overall doing Well      Current BMI is Body mass index is 33 22 kg/m²  The following portions of the patient's history were reviewed and updated as appropriate: allergies, current medications, past family history, past medical history, past social history, past surgical history and problem list     Review of Systems   All other systems reviewed and are negative  Objective:    /80 (BP Location: Right arm, Patient Position: Sitting)   Pulse 84   Temp 98 °F (36 7 °C) (Temporal)   Ht 5' 10" (1 778 m)   Wt 105 kg (231 lb 8 oz)   LMP 07/13/2020   BMI 33 22 kg/m²      Physical Exam   Constitutional: She is oriented to person, place, and time  She appears well-developed and well-nourished  Eyes: Pupils are equal, round, and reactive to light  Conjunctivae and EOM are normal    Neck: Normal range of motion  Neck supple  Cardiovascular: Normal rate, regular rhythm and normal heart sounds  Pulmonary/Chest: Effort normal and breath sounds normal    Abdominal:   Abdomen soft, nondistended, minimally tender at incision sites  Incisions healing well without evidence of infection  Musculoskeletal: Normal range of motion  Neurological: She is alert and oriented to person, place, and time  Skin: Skin is warm and dry  Psychiatric: She has a normal mood and affect   Her behavior is normal  Judgment and thought content normal

## 2020-07-30 NOTE — PROGRESS NOTES
Weight Management Nutrition Class     Diagnosis: Obesity    Bariatric Surgeon: Dr Nito Dowling    Surgery: Vertical Sleeve Gastrectomy    Class: first post op note    Topics discussed today include:     fluid goals post op, protein goals post op, constipation, chew food well, exercise, avoidance of alcohol, PPI use, diet progression, protein supplems, vitamin/mineral supplements and calcium supplements    Patient was able to verbalize basic diet (protein, fluid, vitamin and mineral) recommendations and possible nutrition-related complications   Yes     Cas [address], #[id number]

## 2020-08-14 ENCOUNTER — HOSPITAL ENCOUNTER (EMERGENCY)
Facility: HOSPITAL | Age: 25
Discharge: HOME | End: 2020-08-14
Attending: EMERGENCY MEDICINE
Payer: COMMERCIAL

## 2020-08-14 ENCOUNTER — APPOINTMENT (EMERGENCY)
Dept: RADIOLOGY | Facility: HOSPITAL | Age: 25
End: 2020-08-14
Payer: COMMERCIAL

## 2020-08-14 VITALS
BODY MASS INDEX: 32.87 KG/M2 | RESPIRATION RATE: 16 BRPM | OXYGEN SATURATION: 100 % | DIASTOLIC BLOOD PRESSURE: 70 MMHG | SYSTOLIC BLOOD PRESSURE: 123 MMHG | TEMPERATURE: 99 F | WEIGHT: 229.06 LBS | HEART RATE: 89 BPM

## 2020-08-14 DIAGNOSIS — M25.562 BILATERAL KNEE PAIN: Primary | ICD-10-CM

## 2020-08-14 DIAGNOSIS — M25.561 BILATERAL KNEE PAIN: Primary | ICD-10-CM

## 2020-08-14 LAB
EXT PREG TEST URINE: NEGATIVE
EXT. CONTROL ED NAV: NORMAL

## 2020-08-14 PROCEDURE — 99284 EMERGENCY DEPT VISIT MOD MDM: CPT

## 2020-08-14 PROCEDURE — 96372 THER/PROPH/DIAG INJ SC/IM: CPT

## 2020-08-14 PROCEDURE — 99284 EMERGENCY DEPT VISIT MOD MDM: CPT | Performed by: PHYSICIAN ASSISTANT

## 2020-08-14 PROCEDURE — 73560 X-RAY EXAM OF KNEE 1 OR 2: CPT

## 2020-08-14 PROCEDURE — 81025 URINE PREGNANCY TEST: CPT | Performed by: PHYSICIAN ASSISTANT

## 2020-08-14 RX ORDER — KETOROLAC TROMETHAMINE 10 MG/1
10 TABLET, FILM COATED ORAL EVERY 6 HOURS PRN
Qty: 20 TABLET | Refills: 0 | Status: SHIPPED | OUTPATIENT
Start: 2020-08-14 | End: 2020-11-12 | Stop reason: ALTCHOICE

## 2020-08-14 RX ORDER — KETOROLAC TROMETHAMINE 30 MG/ML
15 INJECTION, SOLUTION INTRAMUSCULAR; INTRAVENOUS ONCE
Status: COMPLETED | OUTPATIENT
Start: 2020-08-14 | End: 2020-08-14

## 2020-08-14 RX ADMIN — KETOROLAC TROMETHAMINE 15 MG: 30 INJECTION, SOLUTION INTRAMUSCULAR at 02:13

## 2020-08-14 NOTE — ED PROVIDER NOTES
History  Chief Complaint   Patient presents with    Leg Pain     Bilateral leg pain     Patient presents for evaluation of bilateral knee pain ongoing for 20 minutes  Denies any injury or trauma  No fall  Denies any swelling or calf pain  Patient recently had gastric sleeve surgery  Patient is obese  Denies any activity yesterday  Cannot recall an inciting event  Did not try anything for symptoms prior to arrival   Pain is localized only to her knees and describes pain as aching and worse with movement and putting weight on her knees  No other symptoms or complaints  Prior to Admission Medications   Prescriptions Last Dose Informant Patient Reported? Taking?    Multiple Vitamins-Minerals (MULTIVITAMIN ADULT PO)   Yes No   Sig: Take by mouth daily   acetaminophen (TYLENOL) 325 mg tablet   No No   Sig: Take 3 tablets (975 mg total) by mouth every 8 (eight) hours   enoxaparin (LOVENOX) 40 mg/0 4 mL   No No   Sig: Inject 0 4 mL (40 mg total) under the skin daily for 13 days   Patient taking differently: Inject 40 mg under the skin daily Start PostOp   omeprazole (PriLOSEC) 20 mg delayed release capsule   No No   Sig: Take 1 capsule (20 mg total) by mouth daily   Patient taking differently: Take 20 mg by mouth daily Start PostOp   oxyCODONE (ROXICODONE) 5 mg immediate release tablet   No No   Sig: Take 1 tablet (5 mg total) by mouth every 4 (four) hours as needed for moderate painMax Daily Amount: 30 mg   Patient not taking: Reported on 2020   polyethylene glycol (MIRALAX) 17 g packet   Yes No   Sig: Take 17 g by mouth 3 days prior to surgery      Facility-Administered Medications: None       Past Medical History:   Diagnosis Date    Diabetes mellitus ([facility] [address] )     NIDDM    History of transfusion     after  - had fever with transfusion    Obesity (BMI 35 0-39 9 without comorbidity)     Uses Amharic as primary spoken language        Past Surgical History:   Procedure Laterality Date     SECTION  2017    CHOLECYSTECTOMY      CT LAP, URI RESTRICT PROC, LONGITUDINAL GASTRECTOMY N/A 2020    Procedure: LAP SLEEVE GASTRECTOMY, INTRAOP EGD;  Surgeon: Aure Coronel MD;  Location: [facility] OR;  Service: Bariatrics       Family History   Problem Relation Age of Onset    Heart disease Family         CARDIOVASCULAR DISEASE    Diabetes Mother     Other Mother         gastric bypass    Diabetes Father     Diabetes Sister     Sleep apnea Sister     Sleep apnea Brother         2 of the 3 borthers have EAGLE    Diabetes Maternal Grandmother     Diabetes Paternal Grandmother      I have reviewed and agree with the history as documented  E-Cigarette/Vaping    E-Cigarette Use Never User      E-Cigarette/Vaping Substances     Social History     Tobacco Use    Smoking status: Never Smoker    Smokeless tobacco: Never Used   Substance Use Topics    Alcohol use: No    Drug use: No       Review of Systems   Constitutional: Negative for chills and fever  HENT: Negative for congestion, ear pain and sore throat  Eyes: Negative for pain  Respiratory: Negative for cough and shortness of breath  Cardiovascular: Negative for chest pain  Gastrointestinal: Negative for abdominal pain, nausea and vomiting  Genitourinary: Negative for dysuria  Musculoskeletal: Positive for arthralgias  Negative for back pain  Skin: Negative for rash  Neurological: Negative for dizziness and numbness  Psychiatric/Behavioral: Negative for suicidal ideas  All other systems reviewed and are negative  Physical Exam  Physical Exam  Vitals signs reviewed  Constitutional:       Appearance: She is well-developed  HENT:      Head: Normocephalic and atraumatic  Right Ear: External ear normal       Left Ear: External ear normal       Nose: Nose normal    Neck:      Musculoskeletal: Normal range of motion and neck supple     Cardiovascular:      Rate and Rhythm: Normal rate and regular rhythm  Heart sounds: Normal heart sounds  Pulmonary:      Effort: Pulmonary effort is normal       Breath sounds: Normal breath sounds  Abdominal:      General: Bowel sounds are normal       Palpations: Abdomen is soft  Tenderness: There is no abdominal tenderness  Musculoskeletal: Normal range of motion  General: Tenderness present  No swelling, deformity or signs of injury  Right lower leg: No edema  Left lower leg: No edema  Comments: Pain noted at bilateral knee caps  No swelling, redness, effusion noted  No calf pain  No swelling or edema  Passive range of motion intact  Patient states she cannot perform active range of motion of her knees  Skin:     General: Skin is warm and dry  Capillary Refill: Capillary refill takes less than 2 seconds  Neurological:      Mental Status: She is alert and oriented to person, place, and time     Psychiatric:         Behavior: Behavior normal          Vital Signs  ED Triage Vitals [08/14/20 0158]   Temperature Pulse Respirations Blood Pressure SpO2   99 °F (37 2 °C) 89 16 123/70 100 %      Temp Source Heart Rate Source Patient Position - Orthostatic VS BP Location FiO2 (%)   Tympanic Monitor Sitting Left arm --      Pain Score       Worst Possible Pain           Vitals:    08/14/20 0158   BP: 123/70   Pulse: 89   Patient Position - Orthostatic VS: Sitting         Visual Acuity      ED Medications  Medications   ketorolac (TORADOL) injection 15 mg (15 mg Intramuscular Given 8/14/20 0213)       Diagnostic Studies  Results Reviewed     Procedure Component Value Units Date/Time    POCT pregnancy, urine [[id number]]  (Normal) Resulted:  08/14/20 0213    Lab Status:  Final result Updated:  08/14/20 0213     EXT PREG TEST UR (Ref: Negative) negative     Control valid                 XR knee 1 or 2 views right    (Results Pending)   XR knee 1 or 2 views left    (Results Pending)              Procedures  Procedures         ED Course US AUDIT      Most Recent Value   Initial Alcohol Screen: US AUDIT-C    1  How often do you have a drink containing alcohol?  0 Filed at: 08/14/2020 0157   2  How many drinks containing alcohol do you have on a typical day you are drinking? 0 Filed at: 08/14/2020 0157   3b  FEMALE Any Age, or MALE 65+: How often do you have 4 or more drinks on one occassion? 0 Filed at: 08/14/2020 0157   Audit-C Score  0 Filed at: 08/14/2020 0157                  CHERRI/DAST-10      Most Recent Value   How many times in the past year have you    Used an illegal drug or used a prescription medication for non-medical reasons? Never Filed at: 08/14/2020 0159                                MDM  Number of Diagnoses or Management Options  Bilateral knee pain:   Diagnosis management comments: Imaging negative  Ambulating without difficulty  Given Toradol in ED  Stable for discharge  Patient verbalizes understanding and agrees with plan  The management plan was discussed in detail with the patient at bedside and all questions were answered  Prior to discharge, I provided both verbal and written instructions  I discussed with the patient the signs and symptoms for which to return to the emergency department  All questions were answered and patient was comfortable with the plan of care and discharged to home  The patient agrees to return to the Emergency Department for concerns and/or progression of illness  Disposition  Final diagnoses:   Bilateral knee pain     Time reflects when diagnosis was documented in both MDM as applicable and the Disposition within this note     Time User Action Codes Description Comment    8/14/2020  2:40 AM Major Johnson Add [O60 307,  M25 562] Bilateral knee pain       ED Disposition     ED Disposition Condition Date/Time Comment    Discharge Stable Fri Aug 14, 2020  2:40 AM Santos Juarez discharge to home/self care              Follow-up Information     Follow up With Specialties Details Why Contact Info Additional Information     [facility]   [address]  [place] [address] [facility] [address] [phone] [address] [place] [address] Specialists [place], [address], [address], [place], [place], [address] [phone]          Discharge Medication List as of 8/14/2020  2:48 AM      START taking these medications    Details   ketorolac (TORADOL) 10 mg tablet Take 1 tablet (10 mg total) by mouth every 6 (six) hours as needed for moderate pain, Starting Fri 8/14/2020, Print         CONTINUE these medications which have NOT CHANGED    Details   acetaminophen (TYLENOL) 325 mg tablet Take 3 tablets (975 mg total) by mouth every 8 (eight) hours, Starting Tue 7/21/2020, Normal      enoxaparin (LOVENOX) 40 mg/0 4 mL Inject 0 4 mL (40 mg total) under the skin daily for 13 days, Starting Mon 7/20/2020, Until Sun 8/2/2020, Normal      Multiple Vitamins-Minerals (MULTIVITAMIN ADULT PO) Take by mouth daily, Historical Med      omeprazole (PriLOSEC) 20 mg delayed release capsule Take 1 capsule (20 mg total) by mouth daily, Starting Fri 7/17/2020, Normal      oxyCODONE (ROXICODONE) 5 mg immediate release tablet Take 1 tablet (5 mg total) by mouth every 4 (four) hours as needed for moderate painMax Daily Amount: 30 mg, Starting Mon 7/20/2020, Normal      polyethylene glycol (MIRALAX) 17 g packet Take 17 g by mouth 3 days prior to surgery, Historical Med           No discharge procedures on file      PDMP Review     None          ED Provider  Electronically Signed by           Mary Pollard PA-C  08/14/20 0793

## 2020-08-14 NOTE — DISCHARGE INSTRUCTIONS
Please follow up with orthopedics  Take medication as prescribed  Please return to the ER with any worsening symptoms

## 2020-09-02 ENCOUNTER — TELEPHONE (OUTPATIENT)
Dept: BARIATRICS | Facility: CLINIC | Age: 25
End: 2020-09-02

## 2020-09-02 NOTE — TELEPHONE ENCOUNTER
Reached out to patient to follow up on how she was feeling per shyanne, patient did not answer left her a message and also informed her that she would be receiving a questionnaire in the mail and if she could please fill out and send back

## 2020-09-17 ENCOUNTER — OFFICE VISIT (OUTPATIENT)
Dept: BARIATRICS | Facility: CLINIC | Age: 25
End: 2020-09-17

## 2020-09-17 VITALS — HEIGHT: 70 IN | WEIGHT: 219.6 LBS | BODY MASS INDEX: 31.44 KG/M2 | TEMPERATURE: 97.7 F

## 2020-09-17 DIAGNOSIS — E66.9 OBESITY, CLASS I, BMI 30-34.9: Primary | ICD-10-CM

## 2020-09-17 PROCEDURE — RECHECK

## 2020-09-17 NOTE — PROGRESS NOTES
Weight Management Nutrition Class     Diagnosis: Morbid Obesity    Bariatric Surgeon: Dr Harini Ovalles    Surgery: Vertical Sleeve Gastrectomy    Class: 5 week post op     Topics discussed today include:     fluid goals post op, protein goals post op, constipation, chew food well, exercise, avoidance of alcohol, PPI use, diet progression, hypoglycemia, dumping syndrome, protein supplems, vitamin/mineral supplements, calcium supplements and need to continue omeprozole    Patient was able to verbalize basic diet (protein, fluid, vitamin and mineral) recommendations and possible nutrition-related complications  Yes     [place], [address]

## 2020-10-29 ENCOUNTER — TELEPHONE (OUTPATIENT)
Dept: BARIATRICS | Facility: CLINIC | Age: 25
End: 2020-10-29

## 2020-11-12 ENCOUNTER — LAB (OUTPATIENT)
Dept: LAB | Facility: HOSPITAL | Age: 25
End: 2020-11-12
Payer: COMMERCIAL

## 2020-11-12 ENCOUNTER — OFFICE VISIT (OUTPATIENT)
Dept: BARIATRICS | Facility: CLINIC | Age: 25
End: 2020-11-12
Payer: COMMERCIAL

## 2020-11-12 ENCOUNTER — OFFICE VISIT (OUTPATIENT)
Dept: FAMILY MEDICINE CLINIC | Facility: CLINIC | Age: 25
End: 2020-11-12

## 2020-11-12 VITALS
HEIGHT: 70 IN | WEIGHT: 208.1 LBS | BODY MASS INDEX: 29.79 KG/M2 | HEART RATE: 60 BPM | RESPIRATION RATE: 15 BRPM | TEMPERATURE: 97.4 F | SYSTOLIC BLOOD PRESSURE: 126 MMHG | DIASTOLIC BLOOD PRESSURE: 64 MMHG

## 2020-11-12 VITALS
HEART RATE: 80 BPM | HEIGHT: 70 IN | RESPIRATION RATE: 18 BRPM | WEIGHT: 211 LBS | SYSTOLIC BLOOD PRESSURE: 120 MMHG | DIASTOLIC BLOOD PRESSURE: 64 MMHG | BODY MASS INDEX: 30.21 KG/M2 | TEMPERATURE: 97.9 F | OXYGEN SATURATION: 98 %

## 2020-11-12 DIAGNOSIS — Z48.815 ENCOUNTER FOR SURGICAL AFTERCARE FOLLOWING SURGERY OF DIGESTIVE SYSTEM: Primary | ICD-10-CM

## 2020-11-12 DIAGNOSIS — Z23 NEED FOR VACCINATION: ICD-10-CM

## 2020-11-12 DIAGNOSIS — K91.2 POSTSURGICAL MALABSORPTION: ICD-10-CM

## 2020-11-12 DIAGNOSIS — Z98.84 BARIATRIC SURGERY STATUS: ICD-10-CM

## 2020-11-12 DIAGNOSIS — R42 DIZZINESS: ICD-10-CM

## 2020-11-12 DIAGNOSIS — E11.69 DIABETES MELLITUS TYPE 2 IN OBESE (HCC): Primary | ICD-10-CM

## 2020-11-12 DIAGNOSIS — Z48.815 ENCOUNTER FOR SURGICAL AFTERCARE FOLLOWING SURGERY OF DIGESTIVE SYSTEM: ICD-10-CM

## 2020-11-12 DIAGNOSIS — E66.9 DIABETES MELLITUS TYPE 2 IN OBESE (HCC): Primary | ICD-10-CM

## 2020-11-12 PROBLEM — E66.09 CLASS 2 OBESITY DUE TO EXCESS CALORIES IN ADULT: Status: RESOLVED | Noted: 2019-06-02 | Resolved: 2020-11-12

## 2020-11-12 PROBLEM — E66.812 CLASS 2 OBESITY DUE TO EXCESS CALORIES IN ADULT: Status: RESOLVED | Noted: 2019-06-02 | Resolved: 2020-11-12

## 2020-11-12 PROBLEM — Z01.810 PREOPERATIVE CARDIOVASCULAR EXAMINATION: Status: RESOLVED | Noted: 2020-06-17 | Resolved: 2020-11-12

## 2020-11-12 PROBLEM — I15.8 OTHER SECONDARY HYPERTENSION: Status: RESOLVED | Noted: 2019-09-10 | Resolved: 2020-11-12

## 2020-11-12 PROBLEM — R21 RASH: Status: RESOLVED | Noted: 2020-05-13 | Resolved: 2020-11-12

## 2020-11-12 LAB
25(OH)D3 SERPL-MCNC: 18.8 NG/ML (ref 30–100)
ALBUMIN SERPL BCP-MCNC: 3.9 G/DL (ref 3–5.2)
ALP SERPL-CCNC: 72 U/L (ref 43–122)
ALT SERPL W P-5'-P-CCNC: 21 U/L (ref 9–52)
ANION GAP SERPL CALCULATED.3IONS-SCNC: 6 MMOL/L (ref 5–14)
AST SERPL W P-5'-P-CCNC: 19 U/L (ref 14–36)
BILIRUB SERPL-MCNC: 1.5 MG/DL
BUN SERPL-MCNC: 11 MG/DL (ref 5–25)
CALCIUM SERPL-MCNC: 9.1 MG/DL (ref 8.4–10.2)
CHLORIDE SERPL-SCNC: 105 MMOL/L (ref 97–108)
CO2 SERPL-SCNC: 27 MMOL/L (ref 22–30)
CREAT SERPL-MCNC: 0.56 MG/DL (ref 0.6–1.2)
ERYTHROCYTE [DISTWIDTH] IN BLOOD BY AUTOMATED COUNT: 13.8 %
FERRITIN SERPL-MCNC: 140 NG/ML (ref 8–388)
FOLATE SERPL-MCNC: 1.3 NG/ML (ref 3.1–17.5)
GFR SERPL CREATININE-BSD FRML MDRD: 131 ML/MIN/1.73SQ M
GLUCOSE P FAST SERPL-MCNC: 112 MG/DL (ref 70–99)
HCT VFR BLD AUTO: 37.4 % (ref 36–46)
HGB BLD-MCNC: 12.4 G/DL (ref 12–16)
IRON SATN MFR SERPL: 33 %
IRON SERPL-MCNC: 82 UG/DL (ref 50–170)
MCH RBC QN AUTO: 30.4 PG (ref 26–34)
MCHC RBC AUTO-ENTMCNC: 33.1 G/DL (ref 31–36)
MCV RBC AUTO: 92 FL (ref 80–100)
PLATELET # BLD AUTO: 128 THOUSANDS/UL (ref 150–450)
PMV BLD AUTO: 11.9 FL (ref 8.9–12.7)
POTASSIUM SERPL-SCNC: 3.8 MMOL/L (ref 3.6–5)
PROT SERPL-MCNC: 6.9 G/DL (ref 5.9–8.4)
PTH-INTACT SERPL-MCNC: 45.8 PG/ML (ref 16.7–78.9)
RBC # BLD AUTO: 4.08 MILLION/UL (ref 4–5.2)
SODIUM SERPL-SCNC: 138 MMOL/L (ref 137–147)
TIBC SERPL-MCNC: 250 UG/DL (ref 250–450)
VIT B12 SERPL-MCNC: 181 PG/ML (ref 100–900)
WBC # BLD AUTO: 5.3 THOUSAND/UL (ref 4.5–11)

## 2020-11-12 PROCEDURE — 3008F BODY MASS INDEX DOCD: CPT | Performed by: PHYSICIAN ASSISTANT

## 2020-11-12 PROCEDURE — 90682 RIV4 VACC RECOMBINANT DNA IM: CPT

## 2020-11-12 PROCEDURE — 3725F SCREEN DEPRESSION PERFORMED: CPT | Performed by: FAMILY MEDICINE

## 2020-11-12 PROCEDURE — 82728 ASSAY OF FERRITIN: CPT

## 2020-11-12 PROCEDURE — 83540 ASSAY OF IRON: CPT

## 2020-11-12 PROCEDURE — 3078F DIAST BP <80 MM HG: CPT | Performed by: PHYSICIAN ASSISTANT

## 2020-11-12 PROCEDURE — 3074F SYST BP LT 130 MM HG: CPT | Performed by: PHYSICIAN ASSISTANT

## 2020-11-12 PROCEDURE — 84590 ASSAY OF VITAMIN A: CPT

## 2020-11-12 PROCEDURE — 90471 IMMUNIZATION ADMIN: CPT

## 2020-11-12 PROCEDURE — 84425 ASSAY OF VITAMIN B-1: CPT

## 2020-11-12 PROCEDURE — 1036F TOBACCO NON-USER: CPT | Performed by: PHYSICIAN ASSISTANT

## 2020-11-12 PROCEDURE — 90715 TDAP VACCINE 7 YRS/> IM: CPT

## 2020-11-12 PROCEDURE — 90732 PPSV23 VACC 2 YRS+ SUBQ/IM: CPT

## 2020-11-12 PROCEDURE — 82746 ASSAY OF FOLIC ACID SERUM: CPT

## 2020-11-12 PROCEDURE — 83550 IRON BINDING TEST: CPT

## 2020-11-12 PROCEDURE — 80053 COMPREHEN METABOLIC PANEL: CPT

## 2020-11-12 PROCEDURE — 85027 COMPLETE CBC AUTOMATED: CPT

## 2020-11-12 PROCEDURE — 3008F BODY MASS INDEX DOCD: CPT | Performed by: FAMILY MEDICINE

## 2020-11-12 PROCEDURE — 1036F TOBACCO NON-USER: CPT | Performed by: FAMILY MEDICINE

## 2020-11-12 PROCEDURE — 36415 COLL VENOUS BLD VENIPUNCTURE: CPT

## 2020-11-12 PROCEDURE — 99213 OFFICE O/P EST LOW 20 MIN: CPT | Performed by: FAMILY MEDICINE

## 2020-11-12 PROCEDURE — 84630 ASSAY OF ZINC: CPT

## 2020-11-12 PROCEDURE — 82607 VITAMIN B-12: CPT

## 2020-11-12 PROCEDURE — 82306 VITAMIN D 25 HYDROXY: CPT

## 2020-11-12 PROCEDURE — 90472 IMMUNIZATION ADMIN EACH ADD: CPT

## 2020-11-12 PROCEDURE — 99214 OFFICE O/P EST MOD 30 MIN: CPT | Performed by: PHYSICIAN ASSISTANT

## 2020-11-12 PROCEDURE — 83970 ASSAY OF PARATHORMONE: CPT

## 2020-11-12 RX ORDER — IBUPROFEN 200 MG
1 CAPSULE ORAL DAILY
COMMUNITY
End: 2021-05-27 | Stop reason: ALTCHOICE

## 2020-11-14 ENCOUNTER — HOSPITAL ENCOUNTER (EMERGENCY)
Facility: HOSPITAL | Age: 25
Discharge: HOME | End: 2020-11-14
Attending: EMERGENCY MEDICINE | Admitting: EMERGENCY MEDICINE
Payer: COMMERCIAL

## 2020-11-14 VITALS
DIASTOLIC BLOOD PRESSURE: 55 MMHG | TEMPERATURE: 98.7 F | WEIGHT: 210.98 LBS | HEART RATE: 55 BPM | RESPIRATION RATE: 16 BRPM | OXYGEN SATURATION: 97 % | BODY MASS INDEX: 30.27 KG/M2 | SYSTOLIC BLOOD PRESSURE: 105 MMHG

## 2020-11-14 DIAGNOSIS — R42 LIGHTHEADEDNESS: ICD-10-CM

## 2020-11-14 DIAGNOSIS — R51.9 HEADACHE: Primary | ICD-10-CM

## 2020-11-14 PROCEDURE — 96374 THER/PROPH/DIAG INJ IV PUSH: CPT

## 2020-11-14 PROCEDURE — 96375 TX/PRO/DX INJ NEW DRUG ADDON: CPT

## 2020-11-14 PROCEDURE — 99284 EMERGENCY DEPT VISIT MOD MDM: CPT

## 2020-11-14 PROCEDURE — 96361 HYDRATE IV INFUSION ADD-ON: CPT

## 2020-11-14 PROCEDURE — 99285 EMERGENCY DEPT VISIT HI MDM: CPT | Performed by: EMERGENCY MEDICINE

## 2020-11-14 RX ORDER — METOCLOPRAMIDE HYDROCHLORIDE 5 MG/ML
10 INJECTION INTRAMUSCULAR; INTRAVENOUS ONCE
Status: COMPLETED | OUTPATIENT
Start: 2020-11-14 | End: 2020-11-14

## 2020-11-14 RX ORDER — KETOROLAC TROMETHAMINE 30 MG/ML
15 INJECTION, SOLUTION INTRAMUSCULAR; INTRAVENOUS ONCE
Status: COMPLETED | OUTPATIENT
Start: 2020-11-14 | End: 2020-11-14

## 2020-11-14 RX ADMIN — KETOROLAC TROMETHAMINE 15 MG: 30 INJECTION, SOLUTION INTRAMUSCULAR at 10:09

## 2020-11-14 RX ADMIN — SODIUM CHLORIDE 1000 ML: 0.9 INJECTION, SOLUTION INTRAVENOUS at 10:10

## 2020-11-14 RX ADMIN — METOCLOPRAMIDE 10 MG: 5 INJECTION, SOLUTION INTRAMUSCULAR; INTRAVENOUS at 10:09

## 2020-11-15 LAB
ATRIAL RATE: 65 BPM
P AXIS: 38 DEGREES
PR INTERVAL: 158 MS
QRS AXIS: 90 DEGREES
QRSD INTERVAL: 98 MS
QT INTERVAL: 394 MS
QTC INTERVAL: 409 MS
T WAVE AXIS: 31 DEGREES
VENTRICULAR RATE: 65 BPM

## 2020-11-15 PROCEDURE — 93010 ELECTROCARDIOGRAM REPORT: CPT | Performed by: INTERNAL MEDICINE

## 2020-11-16 ENCOUNTER — TELEPHONE (OUTPATIENT)
Dept: BARIATRICS | Facility: CLINIC | Age: 25
End: 2020-11-16

## 2020-11-17 DIAGNOSIS — E55.9 VITAMIN D DEFICIENCY: Primary | ICD-10-CM

## 2020-11-17 DIAGNOSIS — K91.2 POSTSURGICAL MALABSORPTION: ICD-10-CM

## 2020-11-17 DIAGNOSIS — Z98.84 BARIATRIC SURGERY STATUS: ICD-10-CM

## 2020-11-17 DIAGNOSIS — Z48.815 ENCOUNTER FOR SURGICAL AFTERCARE FOLLOWING SURGERY OF DIGESTIVE SYSTEM: ICD-10-CM

## 2020-11-17 RX ORDER — ERGOCALCIFEROL 1.25 MG/1
50000 CAPSULE ORAL
Qty: 24 CAPSULE | Refills: 0 | Status: SHIPPED | OUTPATIENT
Start: 2020-11-19 | End: 2021-05-27 | Stop reason: ALTCHOICE

## 2020-11-19 LAB — ZINC SERPL-MCNC: 74 UG/DL (ref 56–134)

## 2020-11-21 LAB — VIT B1 BLD-SCNC: 79 NMOL/L (ref 66.5–200)

## 2020-11-23 DIAGNOSIS — Z98.84 BARIATRIC SURGERY STATUS: Primary | ICD-10-CM

## 2020-11-23 DIAGNOSIS — E66.9 OBESITY, CLASS I, BMI 30-34.9: ICD-10-CM

## 2020-11-23 DIAGNOSIS — Z48.815 ENCOUNTER FOR SURGICAL AFTERCARE FOLLOWING SURGERY OF DIGESTIVE SYSTEM: ICD-10-CM

## 2020-11-23 DIAGNOSIS — K91.2 POSTSURGICAL MALABSORPTION: ICD-10-CM

## 2020-11-28 LAB — VIT A SERPL-MCNC: 37.2 UG/DL (ref 18.9–57.3)

## 2020-12-03 ENCOUNTER — OFFICE VISIT (OUTPATIENT)
Dept: BARIATRICS | Facility: CLINIC | Age: 25
End: 2020-12-03

## 2020-12-03 VITALS — BODY MASS INDEX: 29.19 KG/M2 | TEMPERATURE: 97.5 F | WEIGHT: 203.9 LBS | HEIGHT: 70 IN

## 2020-12-03 DIAGNOSIS — E66.3 OVERWEIGHT: Primary | ICD-10-CM

## 2020-12-03 PROCEDURE — 3008F BODY MASS INDEX DOCD: CPT | Performed by: FAMILY MEDICINE

## 2020-12-03 PROCEDURE — RECHECK

## 2020-12-03 PROCEDURE — 3008F BODY MASS INDEX DOCD: CPT | Performed by: PHYSICIAN ASSISTANT

## 2021-03-02 ENCOUNTER — HOSPITAL ENCOUNTER (EMERGENCY)
Facility: HOSPITAL | Age: 26
Discharge: HOME | End: 2021-03-02
Attending: EMERGENCY MEDICINE | Admitting: EMERGENCY MEDICINE
Payer: COMMERCIAL

## 2021-03-02 VITALS
SYSTOLIC BLOOD PRESSURE: 116 MMHG | HEART RATE: 72 BPM | TEMPERATURE: 97.8 F | OXYGEN SATURATION: 100 % | DIASTOLIC BLOOD PRESSURE: 56 MMHG | RESPIRATION RATE: 16 BRPM | WEIGHT: 201.6 LBS | BODY MASS INDEX: 28.93 KG/M2

## 2021-03-02 DIAGNOSIS — M54.9 BACK PAIN: Primary | ICD-10-CM

## 2021-03-02 LAB
EXT PREG TEST URINE: NEGATIVE
EXT. CONTROL ED NAV: NORMAL

## 2021-03-02 PROCEDURE — 99283 EMERGENCY DEPT VISIT LOW MDM: CPT

## 2021-03-02 PROCEDURE — 99284 EMERGENCY DEPT VISIT MOD MDM: CPT | Performed by: EMERGENCY MEDICINE

## 2021-03-02 PROCEDURE — 81025 URINE PREGNANCY TEST: CPT | Performed by: EMERGENCY MEDICINE

## 2021-03-02 RX ORDER — IBUPROFEN 600 MG/1
600 TABLET ORAL ONCE
Status: COMPLETED | OUTPATIENT
Start: 2021-03-02 | End: 2021-03-02

## 2021-03-02 RX ORDER — LIDOCAINE 50 MG/G
1 PATCH TOPICAL ONCE
Status: DISCONTINUED | OUTPATIENT
Start: 2021-03-02 | End: 2021-03-02 | Stop reason: HOSPADM

## 2021-03-02 RX ORDER — IBUPROFEN 600 MG/1
600 TABLET ORAL EVERY 6 HOURS PRN
Qty: 30 TABLET | Refills: 0 | Status: SHIPPED | OUTPATIENT
Start: 2021-03-02 | End: 2021-04-02 | Stop reason: ALTCHOICE

## 2021-03-02 RX ORDER — LIDOCAINE 50 MG/G
OINTMENT TOPICAL AS NEEDED
Qty: 35.44 G | Refills: 0 | Status: SHIPPED | OUTPATIENT
Start: 2021-03-02 | End: 2021-04-02 | Stop reason: ALTCHOICE

## 2021-03-02 RX ADMIN — LIDOCAINE 1 PATCH: 50 PATCH TOPICAL at 18:43

## 2021-03-02 RX ADMIN — IBUPROFEN 600 MG: 600 TABLET, FILM COATED ORAL at 18:43

## 2021-03-02 NOTE — ED PROVIDER NOTES
History  Chief Complaint   Patient presents with    Back Pain     x5 days intermittednt fron the neck down to the lower back, denies injury/trauma  Took tyleon at 1300 with no help  Denies fevers N/V/D  Patient is a 81-year-old female complaining of a 5 day history of intermittent pain in the lower cervical area back that radiates down to the lower back  Denies any trauma to the area  No numbness weakness or tingling  Has been taking Tylenol without any relief  No bowel or bladder incontinence no history of similar pain in the past   Movement activity makes it worse rest makes it better  Prior to Admission Medications   Prescriptions Last Dose Informant Patient Reported? Taking?    Multiple Vitamins-Minerals (MULTIVITAMIN ADULT PO)   Yes No   Sig: Take by mouth daily   calcium carbonate (OS-GABI) 1250 (500 Ca) MG tablet   Yes No   Sig: Take 1 tablet by mouth daily   ergocalciferol (VITAMIN D2) 50,000 units   No No   Sig: Take 1 capsule (50,000 Units total) by mouth 2 (two) times a week with meals   omeprazole (PriLOSEC) 20 mg delayed release capsule   No No   Sig: Take 1 capsule (20 mg total) by mouth daily   Patient taking differently: Take 20 mg by mouth daily Start PostOp      Facility-Administered Medications: None       Past Medical History:   Diagnosis Date    Diabetes mellitus ([facility] [address] )     NIDDM    History of transfusion     after  - had fever with transfusion    Obesity (BMI 35 0-39 9 without comorbidity)     Uses Frisian as primary spoken language        Past Surgical History:   Procedure Laterality Date     SECTION  2017    CHOLECYSTECTOMY      HI LAP, URI RESTRICT PROC, LONGITUDINAL GASTRECTOMY N/A 2020    Procedure: LAP SLEEVE GASTRECTOMY, INTRAOP EGD;  Surgeon: Abdiel Ventura MD;  Location: [facility];  Service: Bariatrics       Family History   Problem Relation Age of Onset    Heart disease Family         CARDIOVASCULAR DISEASE    Diabetes Mother  Other Mother         gastric bypass    Diabetes Father     Diabetes Sister     Sleep apnea Sister     Sleep apnea Brother         2 of the 3 borthers have EAGLE    Diabetes Maternal Grandmother     Diabetes Paternal Grandmother      I have reviewed and agree with the history as documented  E-Cigarette/Vaping    E-Cigarette Use Never User      E-Cigarette/Vaping Substances     Social History     Tobacco Use    Smoking status: Never Smoker    Smokeless tobacco: Never Used   Substance Use Topics    Alcohol use: No    Drug use: No       Review of Systems   Constitutional: Negative  HENT: Negative  Eyes: Negative  Respiratory: Negative  Cardiovascular: Negative  Gastrointestinal: Negative  Endocrine: Negative  Genitourinary: Negative  Musculoskeletal: Positive for back pain  Skin: Negative  Allergic/Immunologic: Negative  Neurological: Negative  Negative for weakness and numbness  Hematological: Negative  Psychiatric/Behavioral: Negative  All other systems reviewed and are negative  Physical Exam  Physical Exam  Vitals signs and nursing note reviewed  Constitutional:       Appearance: Normal appearance  She is normal weight  HENT:      Head: Normocephalic and atraumatic  Nose: Nose normal    Neck:      Musculoskeletal: Normal range of motion and neck supple  Cardiovascular:      Rate and Rhythm: Normal rate and regular rhythm  Pulses: Normal pulses  Heart sounds: Normal heart sounds  Pulmonary:      Effort: Pulmonary effort is normal       Breath sounds: Normal breath sounds  Abdominal:      General: Bowel sounds are normal       Palpations: Abdomen is soft  Musculoskeletal: Normal range of motion  Comments: No point tenderness palpation no step-off or deformity along the spine   Skin:     General: Skin is warm and dry  Capillary Refill: Capillary refill takes less than 2 seconds     Neurological:      General: No focal deficit present  Mental Status: She is alert and oriented to person, place, and time  Mental status is at baseline  Sensory: No sensory deficit  Motor: No weakness  Gait: Gait normal       Deep Tendon Reflexes: Reflexes normal    Psychiatric:         Mood and Affect: Mood normal          Behavior: Behavior normal          Vital Signs  ED Triage Vitals [03/02/21 1828]   Temperature Pulse Respirations Blood Pressure SpO2   97 8 °F (36 6 °C) 72 16 116/56 100 %      Temp Source Heart Rate Source Patient Position - Orthostatic VS BP Location FiO2 (%)   Tympanic Monitor Sitting Left arm --      Pain Score       9           Vitals:    03/02/21 1828   BP: 116/56   Pulse: 72   Patient Position - Orthostatic VS: Sitting         Visual Acuity      ED Medications  Medications   lidocaine (LIDODERM) 5 % patch 1 patch (has no administration in time range)   ibuprofen (MOTRIN) tablet 600 mg (has no administration in time range)       Diagnostic Studies  Results Reviewed     Procedure Component Value Units Date/Time    POCT pregnancy, urine [[id number]]  (Normal) Resulted: 03/02/21 1837    Lab Status: Final result Updated: 03/02/21 1838     EXT PREG TEST UR (Ref: Negative) negative     Control valid                 No orders to display              Procedures  Procedures         ED Course                             SBIRT 20yo+      Most Recent Value   SBIRT (24 yo +)   In order to provide better care to our patients, we are screening all of our patients for alcohol and drug use  Would it be okay to ask you these screening questions? Yes Filed at: 03/02/2021 1836   Initial Alcohol Screen: US AUDIT-C    1  How often do you have a drink containing alcohol?  0 Filed at: 03/02/2021 1836   2  How many drinks containing alcohol do you have on a typical day you are drinking? 0 Filed at: 03/02/2021 1836   3b  FEMALE Any Age, or MALE 65+: How often do you have 4 or more drinks on one occassion?   0 Filed at: 03/02/2021 1836   Audit-C Score  0 Filed at: 03/02/2021 1836   CHERRI: How many times in the past year have you    Used an illegal drug or used a prescription medication for non-medical reasons? Never Filed at: 03/02/2021 1836                    [facility]  Number of Diagnoses or Management Options  Back pain:      Amount and/or Complexity of Data Reviewed  Obtain history from someone other than the patient: yes        Disposition  Final diagnoses:   Back pain     Time reflects when diagnosis was documented in both MDM as applicable and the Disposition within this note     Time User Action Codes Description Comment    3/2/2021  6:40 PM Vicenta Nissen Add [M54 9] Back pain       ED Disposition     ED Disposition Condition Date/Time Comment    Discharge Stable Tue Mar 2, 2021  6:40 PM [facility] discharge to home/self care              Follow-up Information     Follow up With Specialties Details Why Contact Info Additional [address]   [address], [facility] [address]  [address] [place] [address], [address], [address] [place], [place], [address] [place] [address]          Patient's Medications   Discharge Prescriptions    IBUPROFEN (MOTRIN) 600 MG TABLET    Take 1 tablet (600 mg total) by mouth every 6 (six) hours as needed for mild pain       Start Date: 3/2/2021  End Date: --       Order Dose: 600 mg       Quantity: 30 tablet    Refills: 0    LIDOCAINE (XYLOCAINE) 5 % OINTMENT    Apply topically as needed for mild pain       Start Date: 3/2/2021  End Date: --       Order Dose: --       Quantity: 35 44 g    Refills: 0         PDMP Review     None          ED Provider  Electronically Signed by           Mecca Ortiz MD  03/02/21 6257

## 2021-03-03 ENCOUNTER — NURSE TRIAGE (OUTPATIENT)
Dept: PHYSICAL THERAPY | Facility: OTHER | Age: 26
End: 2021-03-03

## 2021-03-03 DIAGNOSIS — M54.9 ACUTE BILATERAL BACK PAIN, UNSPECIFIED BACK LOCATION: Primary | ICD-10-CM

## 2021-03-03 NOTE — TELEPHONE ENCOUNTER
used for triage  #[id number]    Additional Information   Negative: Is this related to a work injury?  Negative: Is this related to an MVA?  Negative: Are you currently recieving homecare services?  Negative: Has the patient had unexplained weight loss?  Negative: Does the patient have a fever?  Negative: Is the patient experiencing urine retention?  Negative: Is the patient experiencing acute drop foot or paralysis?  Negative: Is the patient experiencing blood in sputum?  Negative: Has the patient experienced major trauma? (fall from height, high speed collision, direct blow to spine) and is also experiencing nausea, light-headedness, or loss of consciousness?  Negative: Is this a chronic condition? Background - Initial Assessment  Clinical complaint: c/o "pain in the entire back starting at the bottom of my neck down to the lower back " States itradiates to right leg at times  Pain has been present for 6 days  No injury or incident noted   No history of back pain  Date of onset: 6 days  Frequency of pain: constant  Quality of pain: squeezing type pain    Protocols used: [facility] COMPREHENSIVE SPINE PROGRAM PROTOCOL    This RN did review in detail the Comprehensive Spine Program and what we can provide for their back pain  Patient is agreeable to being triaged by this RN and would like to proceed with Physical Therapy  Referral was placed for Physical Therapy at the [facility] site  Patients information was sent to the  to make evaluation appointment  Patient made aware that the PT office  will be calling to schedule the appointment  Patient was provided with the phone number to the PT office  No further questions and/or concerns were voiced by the patient at this time  Patient states understanding of the referral that was placed  Referral Closed

## 2021-03-09 ENCOUNTER — EVALUATION (OUTPATIENT)
Dept: PHYSICAL THERAPY | Facility: CLINIC | Age: 26
End: 2021-03-09
Payer: COMMERCIAL

## 2021-03-09 VITALS — DIASTOLIC BLOOD PRESSURE: 60 MMHG | HEART RATE: 60 BPM | SYSTOLIC BLOOD PRESSURE: 112 MMHG

## 2021-03-09 DIAGNOSIS — M54.9 ACUTE BILATERAL BACK PAIN, UNSPECIFIED BACK LOCATION: ICD-10-CM

## 2021-03-09 PROCEDURE — 97162 PT EVAL MOD COMPLEX 30 MIN: CPT | Performed by: PHYSICAL THERAPIST

## 2021-03-09 PROCEDURE — 97112 NEUROMUSCULAR REEDUCATION: CPT | Performed by: PHYSICAL THERAPIST

## 2021-03-09 PROCEDURE — 97110 THERAPEUTIC EXERCISES: CPT | Performed by: PHYSICAL THERAPIST

## 2021-03-09 NOTE — PROGRESS NOTES
PT Evaluation     Today's date: 3/9/2021  Patient name: Raeann Anna  : 1995  MRN: [id number]  Referring provider: Tra Bolaños PT  Dx:   Encounter Diagnosis     ICD-10-CM    1  Acute bilateral back pain, unspecified back location  M54 9 Ambulatory referral to PT spine                  Assessment  Assessment details: Pt is a 26y o  year old female presenting to physical therapy for Acute bilateral back pain in thoracic and lumbar spine over the past 2 weeks  She presents via comp spine program and is mod risk based on Start Back Assessment tool  She presents with thoracic hypomobility with limited flexion AROM and will benefit from mobility based program with stability training to address muscle and core weakness  Pt will benefit from skilled physical therapy to address functional limitations noted in evaluation and meet patient goals  Impairments: abnormal or restricted ROM, abnormal movement, activity intolerance, impaired physical strength, lacks appropriate home exercise program, pain with function, poor posture  and poor body mechanics    Symptom irritability: highUnderstanding of Dx/Px/POC: good   Prognosis: good    Goals  ST  Pt will reduce pain to 6/10 or less  2  Pt will improve TA activation to good    LT  Pt will improve b/l trunk rotation to nil deficits to allow for trunk rotation  2  Pt will have min deficits or better with lumbar flexion to allow for forward bending    3  Pt will be I w HEP    Plan  Patient would benefit from: PT eval and skilled physical therapy  Planned modality interventions: biofeedback, TENS, thermotherapy: hydrocollator packs and electrical stimulation/Russian stimulation  Planned therapy interventions: abdominal trunk stabilization, joint mobilization, manual therapy, neuromuscular re-education, patient education, postural training, strengthening, stretching, therapeutic activities, therapeutic exercise, functional ROM exercises, flexibility and home exercise program  Frequency: 2x week  Duration in weeks: 4        Subjective Evaluation    History of Present Illness  Mechanism of injury: Pt reports onset of cervical and thoracic back pain about 2 weeks ago with insidious onset  She occasionally gets pain down the R UE and LE but hasn't had it in the past week  Pain tends to be in different spots and feels like a stabbing sensation  Works as  but does not have pain at work  Pain is worse in the evening  Denies numbness/tingling, night pain, no sensory changes  Not a recurrent problem   Pain  Current pain rating: 10  At worst pain rating: 10  Quality: sharp  Aggravating factors: standing, walking and lifting      Diagnostic Tests  No diagnostic tests performed  Treatments  Current treatment: medication and physical therapy  Patient Goals  Patient goals for therapy: decreased pain, increased motion, increased strength and independence with ADLs/IADLs          Objective     Palpation   Left   Tenderness of the erector spinae, cervical interspinals, cervical paraspinals, lumbar paraspinals and quadratus lumborum  Right   Tenderness of the erector spinae, cervical interspinals, cervical paraspinals, lumbar paraspinals and quadratus lumborum       Active Range of Motion   Cervical/Thoracic Spine       Cervical    Flexion:  WFL  Extension:  WFL  Left lateral flexion:  WFL  Right lateral flexion:  WFL  Left rotation:  WFL  Right rotation:  [facility]    Thoracic    Flexion:  Restriction level: minimal  Extension:  with pain Restriction level: minimal  Left lateral flexion:  with pain Restriction level: minimal  Right lateral flexion:  with pain Restriction level: minimal  Left rotation:  Restriction level: moderate  Right rotation:  with pain Restriction level: moderate    Lumbar   Flexion:  Restriction level: moderate  Extension:  with pain Restriction level: minimal  Left lateral flexion:  WFL  Right lateral flexion: WFL  Left rotation:  with pain Restriction level: minimal  Right rotation:  [facility]    Joint Play   Joints within functional limits: L1, L2, L3, L4, L5 and S1     Hypomobile: T1, T2, T3, T4, T5, T6, T7, T8, T9, T10, T11 and T12     Pain: T3, T4, T5, T6, T7, T8, T9, T10, T11, T12, L1, L2, L3, L4, L5 and S1     Strength/Myotome Testing   Cervical Spine     Left   Interossei strength (t1): 4    Right   Interossei strength (t1): 4    Left Shoulder     Planes of Motion   Flexion: 4-   Abduction: 4-     Isolated Muscles   Middle trapezius: 3+     Right Shoulder     Planes of Motion   Flexion: 4-   Abduction: 4-     Isolated Muscles   Middle trapezius: 3+     Left Elbow   Flexion: 4  Extension: 4    Right Elbow   Flexion: 4  Extension: 4    Left Wrist/Hand   Wrist extension: 4+  Wrist flexion: 4+  Thumb extension: 4    Right Wrist/Hand   Wrist extension: 4+  Wrist flexion: 4+  Thumb extension: 4    Lumbar   Left   Heel walk: normal  Toe walk: normal    Right   Heel walk: normal  Toe walk: normal    Left Hip   Planes of Motion   Flexion: 4-    Right Hip   Planes of Motion   Flexion: 3+    Left Knee   Flexion: 4+  Extension: 4+    Right Knee   Flexion: 4+  Extension: 4+    Additional Strength Details  Poor squat mechanics w mild back pain limiting depth  Muscle Activation   Patient able to activate left transverse abdominals and right transverse abdominals  Additional Muscle Activation Details  Poor TA activation    Tests       Thoracic   Positive slump test      Left Shoulder   Negative ULTT1  Right Shoulder   Negative ULTT1  Lumbar   Negative prone instability   Left   Positive passive SLR     Negative slump test      Right   Positive passive SLR and slump test               Precautions:     Date 3/9            Visit # 1            FOTO 49/70             Re-eval IE              Manuals             PA Mobs             LE stretching                                       Neuro Re-Ed             Cat-Camel 10x Bird Dog 10x LE only            Bridge             Clam                                                    Ther Ex             Elliptical/Bike             Pec Stretch             Open Book 2x20"            Ham/ITB Str 2x20"            Prone I,Y,T's             Rows/Ext             UTR             SB Rolling             Ther Activity             Squat                          Gait Training                                       Modalities

## 2021-03-11 ENCOUNTER — OFFICE VISIT (OUTPATIENT)
Dept: BARIATRICS | Facility: CLINIC | Age: 26
End: 2021-03-11
Payer: COMMERCIAL

## 2021-03-11 VITALS
BODY MASS INDEX: 27.92 KG/M2 | DIASTOLIC BLOOD PRESSURE: 58 MMHG | RESPIRATION RATE: 17 BRPM | HEART RATE: 99 BPM | SYSTOLIC BLOOD PRESSURE: 102 MMHG | WEIGHT: 195 LBS | TEMPERATURE: 97.7 F | HEIGHT: 70 IN

## 2021-03-11 DIAGNOSIS — K91.2 POSTSURGICAL MALABSORPTION: ICD-10-CM

## 2021-03-11 DIAGNOSIS — Z98.84 BARIATRIC SURGERY STATUS: ICD-10-CM

## 2021-03-11 DIAGNOSIS — Z48.815 ENCOUNTER FOR SURGICAL AFTERCARE FOLLOWING SURGERY OF DIGESTIVE SYSTEM: Primary | ICD-10-CM

## 2021-03-11 PROCEDURE — 99214 OFFICE O/P EST MOD 30 MIN: CPT | Performed by: PHYSICIAN ASSISTANT

## 2021-03-11 PROCEDURE — 1036F TOBACCO NON-USER: CPT | Performed by: PHYSICIAN ASSISTANT

## 2021-03-11 NOTE — PATIENT INSTRUCTIONS
· Follow-up for annual  We kindly ask that your arrive 15 minutes before your scheduled appointment time with your provider to allow our staff to room you, get your vital signs and update your chart  · Recheck your vitamin D and folate  Please call the office if you need a script  It is recommended to check with your insurance BEFORE getting labs done to make sure they are covered by your policy  · Call our office if you have any problems with abdominal pain especially associated with fever, chills, nausea, vomiting or any other concerns  · All  Post-bariatric surgery patients should be aware that very small quantities of any alcohol can cause impairment and it is very possible not to feel the effect  The effect can be in the system for several hours  It is also a stomach irritant  · It is advised to AVOID alcohol, Nonsteroidal antiinflammatory drugs (NSAIDS) and nicotine of all forms   Any of these can cause stomach irritation/pain  · Discussed the effects of alcohol on a bariatric patient and the increased impairment risk  · Keep up the good work!

## 2021-03-11 NOTE — PROGRESS NOTES
Assessment/Plan:  [employer] used      Patient ID: Tony Marks is a 26 y o  female  Bariatric Surgery Status    Status Herman Perales 07/20/2020  Pavithrarefugio Coronado Presents to the office today for 3rd postop  · Continued/Maintain healthy weight loss with good nutrition intakes  · Adequate hydration with at least 64oz  fluid intake  · Follow diet as discussed  · Follow vitamin and mineral recommendations as reviewed with you  · Exercise as tolerated  · Follow-up for annual  We kindly ask that your arrive 15 minutes before your scheduled appointment time with your provider to allow our staff to room you, get your vital signs and update your chart  · Recheck your vitamin D and folate  Please call the office if you need a script  It is recommended to check with your insurance BEFORE getting labs done to make sure they are covered by your policy  · Call our office if you have any problems with abdominal pain especially associated with fever, chills, nausea, vomiting or any other concerns  · All  Post-bariatric surgery patients should be aware that very small quantities of any alcohol can cause impairment and it is very possible not to feel the effect  The effect can be in the system for several hours  It is also a stomach irritant  · It is advised to AVOID alcohol, Nonsteroidal antiinflammatory drugs (NSAIDS) and nicotine of all forms   Any of these can cause stomach irritation/pain  · Discussed the effects of alcohol on a bariatric patient and the increased impairment risk  · Keep up the good work! Postsurgical Malabsorption   -At risk for malabsorption of vitamins/minerals secondary to malabsorption and restriction of intake from bariatric surgery  -Currently taking adequate postop bariatric surgery vitamin supplementation  -Last set of bariatric labs completed on 11/2020  Showed low folate and vit D   Patient completed prescription vit D and is taking folic acid daily  -repeat vit D and folate now; remainder of labs will be checked again at annual  -Patient received education about the importance of adhering to a lifelong supplementation regimen to avoid vitamin/mineral deficiencies      Diagnoses and all orders for this visit:    Encounter for surgical aftercare following surgery of digestive system    Bariatric surgery status    Postsurgical malabsorption         Subjective:      Patient ID: Raeann Anna is a 26 y o  female  Luis Dickey 07/20/2020  Ray Wakefield Presents to the office today for 3rd postop  Tolerating diet without issues; denies N/V, dysphagia, reflux  Overall doing Well  Initial:260  Current:195  EWL: (Weight loss is ahead of schedule at this post surgical period )  Roshan: current  Current BMI is Body mass index is 27 98 kg/m²  · Tolerating a regular diet-yes  · Eating at least 60 grams of protein per day-yes  · Following 30/60 minute rule with liquids-yes  · Drinking at least 64 ounces of fluid per day-yes  · Drinking carbonated beverages-no  · Sufficient exercise-yes  · Using NSAIDs regularly-no  · Using nicotine-no  · Using alcohol-no  · Supplements: calcium + bariatric mvi + folic acid   Will start to taper off omeprazole as we discussed     · EWL is 77%, which places the patient ahead of schedule for expected post surgical weight loss at this time  The following portions of the patient's history were reviewed and updated as appropriate: allergies, current medications, past family history, past medical history, past social history, past surgical history and problem list     Review of Systems   Constitutional: Negative  Respiratory: Negative  Cardiovascular: Negative  Gastrointestinal: Negative      Neurological: Positive for headaches (frequent HAs is scheduled to follow up with PCP - advised on increasing fluid intakes daily and consistency with vitamins in the meantime)  Psychiatric/Behavioral: Negative  Objective:    /58 (BP Location: Left arm, Patient Position: Sitting, Cuff Size: Large)   Pulse 99   Temp 97 7 °F (36 5 °C) (Tympanic)   Resp 17   Ht 5' 10" (1 778 m)   Wt 88 5 kg (195 lb)   BMI 27 98 kg/m²      Physical Exam  Vitals signs and nursing note reviewed  Constitutional:       Appearance: Normal appearance  HENT:      Head: Normocephalic and atraumatic  Eyes:      Extraocular Movements: Extraocular movements intact  Pupils: Pupils are equal, round, and reactive to light  Neck:      Musculoskeletal: Normal range of motion  Cardiovascular:      Rate and Rhythm: Normal rate and regular rhythm  Pulmonary:      Effort: Pulmonary effort is normal       Breath sounds: Normal breath sounds  Abdominal:      Palpations: Abdomen is soft  Musculoskeletal: Normal range of motion  Skin:     General: Skin is warm and dry  Neurological:      General: No focal deficit present  Mental Status: She is alert and oriented to person, place, and time     Psychiatric:         Mood and Affect: Mood normal          Behavior: Behavior normal

## 2021-03-16 ENCOUNTER — APPOINTMENT (OUTPATIENT)
Dept: PHYSICAL THERAPY | Facility: CLINIC | Age: 26
End: 2021-03-16
Payer: COMMERCIAL

## 2021-03-19 ENCOUNTER — APPOINTMENT (OUTPATIENT)
Dept: PHYSICAL THERAPY | Facility: CLINIC | Age: 26
End: 2021-03-19
Payer: COMMERCIAL

## 2021-03-23 ENCOUNTER — APPOINTMENT (OUTPATIENT)
Dept: PHYSICAL THERAPY | Facility: CLINIC | Age: 26
End: 2021-03-23
Payer: COMMERCIAL

## 2021-03-26 ENCOUNTER — APPOINTMENT (OUTPATIENT)
Dept: PHYSICAL THERAPY | Facility: CLINIC | Age: 26
End: 2021-03-26
Payer: COMMERCIAL

## 2021-04-01 ENCOUNTER — OFFICE VISIT (OUTPATIENT)
Dept: FAMILY MEDICINE CLINIC | Facility: CLINIC | Age: 26
End: 2021-04-01

## 2021-04-01 VITALS
TEMPERATURE: 98 F | BODY MASS INDEX: 28.49 KG/M2 | HEIGHT: 70 IN | HEART RATE: 80 BPM | DIASTOLIC BLOOD PRESSURE: 64 MMHG | OXYGEN SATURATION: 98 % | RESPIRATION RATE: 18 BRPM | SYSTOLIC BLOOD PRESSURE: 114 MMHG | WEIGHT: 199 LBS

## 2021-04-01 DIAGNOSIS — G43.109 CHRONIC MIGRAINE WITH AURA: ICD-10-CM

## 2021-04-01 DIAGNOSIS — E11.69 DIABETES MELLITUS TYPE 2 IN OBESE (HCC): Primary | ICD-10-CM

## 2021-04-01 DIAGNOSIS — E66.9 DIABETES MELLITUS TYPE 2 IN OBESE (HCC): Primary | ICD-10-CM

## 2021-04-01 LAB — SL AMB POCT HEMOGLOBIN AIC: 5.8 (ref ?–6.5)

## 2021-04-01 PROCEDURE — 3044F HG A1C LEVEL LT 7.0%: CPT | Performed by: PHYSICIAN ASSISTANT

## 2021-04-01 PROCEDURE — 3008F BODY MASS INDEX DOCD: CPT | Performed by: PHYSICIAN ASSISTANT

## 2021-04-01 PROCEDURE — 83036 HEMOGLOBIN GLYCOSYLATED A1C: CPT | Performed by: FAMILY MEDICINE

## 2021-04-01 PROCEDURE — 99213 OFFICE O/P EST LOW 20 MIN: CPT | Performed by: FAMILY MEDICINE

## 2021-04-01 RX ORDER — SUMATRIPTAN 25 MG/1
25 TABLET, FILM COATED ORAL ONCE AS NEEDED
Qty: 15 TABLET | Refills: 0 | Status: SHIPPED | OUTPATIENT
Start: 2021-04-01 | End: 2021-07-26

## 2021-04-01 RX ORDER — TOPIRAMATE 25 MG/1
25 TABLET ORAL 2 TIMES DAILY
Qty: 60 TABLET | Refills: 1 | Status: SHIPPED | OUTPATIENT
Start: 2021-04-01 | End: 2021-05-28

## 2021-04-01 NOTE — PROGRESS NOTES
Assessment/Plan:    Chronic migraine with aura  - No abnormalities on neurological examination however due to the increased frequency of patient's  headaches she would benefit from a daily prophylactic medication  Will start patient on Topamax 25 mg BID and titrate upwards as necessary  Patient can also take sumatriptan at the start of headaches and can repeat one dose an hour later and will also start patient on a daily magnesium supplement  Advised patient to avoid foods containing caffeine, MSG, aged cheeses etc  Will follow up in 6 weeks  Diabetes mellitus type 2 in obese [facility])    Lab Results   Component Value Date    HGBA1C 5 8 04/01/2021     Well controlled        Diagnoses and all orders for this visit:    Diabetes mellitus type 2 in obese ([facility] [address] )  -     POCT hemoglobin A1c    Chronic migraine with aura  -     topiramate (TOPAMAX) 25 mg tablet; Take 1 tablet (25 mg total) by mouth 2 (two) times a day  -     SUMAtriptan (IMITREX) 25 mg tablet; Take 1 tablet (25 mg total) by mouth once as needed for migraine for up to 1 dose  -     Magnesium Oxide -Mg Supplement 400 MG CAPS; Take 1 capsule (400 mg total) by mouth daily    Other orders  -     Cancel: IRIS Diabetic eye exam  -     Cancel: Microalbumin / creatinine urine ratio          Subjective:      Patient ID: Shonda Addison is a 26 y o  female  [facility] Interpretation Services utilized as patient is primarily Polish speaking  HPI     Shonda Addison is a 26year old female with a past medical history of well controlled type 2 diabetes who presents today with a chief complaint of migraine headaches  Patient states that this has been a chronic issue and that before she was getting headaches maybe once or twice a week but now they are occurring on a daily basis   Patient states that these headaches occur in the frontal region and are associated with light and noise sensitivity are accompanied by flashes of light in the vision and blurry vision also  Patient denies any nausea and vomiting  Review of Systems   Constitutional: Negative  HENT: Negative  Eyes: Negative  Respiratory: Negative  Cardiovascular: Negative  Gastrointestinal: Negative  Genitourinary: Negative  Musculoskeletal: Negative  Skin: Negative  Neurological: Positive for headaches  Negative for dizziness  Psychiatric/Behavioral: Negative  Objective:      /64 (BP Location: Left arm, Patient Position: Sitting, Cuff Size: Large)   Pulse 80   Temp 98 °F (36 7 °C) (Temporal)   Resp 18   Ht 5' 10" (1 778 m)   Wt 90 3 kg (199 lb)   SpO2 98%   BMI 28 55 kg/m²          Physical Exam  Constitutional:       General: She is not in acute distress  Appearance: Normal appearance  HENT:      Head: Normocephalic and atraumatic  Nose: Nose normal       Mouth/Throat:      Mouth: Mucous membranes are moist       Pharynx: No oropharyngeal exudate or posterior oropharyngeal erythema  Eyes:      General:         Right eye: No discharge  Left eye: No discharge  Extraocular Movements: Extraocular movements intact  Pupils: Pupils are equal, round, and reactive to light  Neck:      Musculoskeletal: Normal range of motion  Cardiovascular:      Rate and Rhythm: Normal rate  Pulmonary:      Effort: Pulmonary effort is normal  No respiratory distress  Musculoskeletal: Normal range of motion  Neurological:      General: No focal deficit present  Mental Status: She is alert and oriented to person, place, and time  Cranial Nerves: No cranial nerve deficit  Sensory: No sensory deficit  Motor: No weakness  Coordination: Romberg sign negative  Coordination normal  Heel to [facility] normal       Gait: Gait normal       Deep Tendon Reflexes: Reflexes normal       Reflex Scores:       Bicep reflexes are 2+ on the right side and 2+ on the left side         Brachioradialis reflexes are 2+ on the right side and 2+ on the left side  Patellar reflexes are 2+ on the right side and 2+ on the left side    Psychiatric:         Mood and Affect: Mood normal          Behavior: Behavior normal

## 2021-04-02 PROBLEM — G43.E09 CHRONIC MIGRAINE WITH AURA: Status: ACTIVE | Noted: 2021-04-02

## 2021-04-02 PROBLEM — G43.109 CHRONIC MIGRAINE WITH AURA: Status: ACTIVE | Noted: 2021-04-02

## 2021-04-02 PROBLEM — Z98.84 STATUS POST LAPAROSCOPIC SLEEVE GASTRECTOMY: Status: ACTIVE | Noted: 2021-04-02

## 2021-04-02 PROBLEM — Z23 NEED FOR VACCINATION: Status: RESOLVED | Noted: 2020-11-12 | Resolved: 2021-04-02

## 2021-04-02 PROBLEM — E66.3 OVERWEIGHT WITH BODY MASS INDEX (BMI) OF 28 TO 28.9 IN ADULT: Status: ACTIVE | Noted: 2020-07-17

## 2021-04-02 NOTE — ASSESSMENT & PLAN NOTE
- No abnormalities on neurological examination however due to the increased frequency of patient's  headaches she would benefit from a daily prophylactic medication  Will start patient on Topamax 25 mg BID and titrate upwards as necessary  Patient can also take sumatriptan at the start of headaches and can repeat one dose an hour later and will also start patient on a daily magnesium supplement  Advised patient to avoid foods containing caffeine, MSG, aged cheeses etc  Will follow up in 6 weeks

## 2021-04-21 ENCOUNTER — OFFICE VISIT (OUTPATIENT)
Dept: FAMILY MEDICINE CLINIC | Facility: CLINIC | Age: 26
End: 2021-04-21

## 2021-04-21 VITALS
OXYGEN SATURATION: 98 % | BODY MASS INDEX: 28.06 KG/M2 | SYSTOLIC BLOOD PRESSURE: 110 MMHG | TEMPERATURE: 98 F | HEIGHT: 70 IN | HEART RATE: 76 BPM | WEIGHT: 196 LBS | RESPIRATION RATE: 16 BRPM | DIASTOLIC BLOOD PRESSURE: 80 MMHG

## 2021-04-21 DIAGNOSIS — N90.7 VULVAR CYST: Primary | ICD-10-CM

## 2021-04-21 PROCEDURE — 3008F BODY MASS INDEX DOCD: CPT | Performed by: PHYSICIAN ASSISTANT

## 2021-04-21 PROCEDURE — 99214 OFFICE O/P EST MOD 30 MIN: CPT | Performed by: NURSE PRACTITIONER

## 2021-04-21 RX ORDER — CEPHALEXIN 500 MG/1
500 CAPSULE ORAL EVERY 6 HOURS SCHEDULED
Qty: 40 CAPSULE | Refills: 0 | Status: SHIPPED | OUTPATIENT
Start: 2021-04-21 | End: 2021-05-01

## 2021-04-21 NOTE — PATIENT INSTRUCTIONS
Quiste   LO QUE NECESITA SABER:   Un quiste es un bulto delio y firme que se puede encontrar en cualquier parte del cuerpo  Los quistes pueden crecer [employer] no son cancerosos  No se necesita tratamiento si usted no presenta síntomas  Los quistes se pueden abrir y drenar si se infectan o causan problemas  Los [employer] crecer, crecer y dificultarle a usted alo actividades diarias  Es probable que usted también necesite antibióticos si hay infección  También es probable que usted necesite cirugía para remover el quiste por completo  INSTRUCCIONES SOBRE EL JAIME HOSPITALARIA:   Medicamentos:  · Los antibióticos podrían administrarse para tratar o evitar yazan infección  · [place] alo medicamentos chato se le haya indicado  Consulte con escamilla médico si usted yvan que escamilla medicamento no le está ayudando o si presenta efectos secundarios  Infórmele si es alérgico a algún medicamento  Mantenga yazan lista actualizada de los [place], las vitaminas y los productos herbales que yogesh  Incluya los siguientes datos de los medicamentos: cantidad, frecuencia y motivo de administración  Traiga con usted la lista o los envases de las píldoras a alo citas de seguimiento  Lleve la lista de los medicamentos con usted en tanya de yazan emergencia  Regrese a la clover de emergencias si:  · Usted presenta fiebre  · El área alrededor de escamilla quiste se inflama, se enrojece y le duele  · Escamilla quiste continúa drenando por 2 días después que usted empieza a yusef antibióticos  Siga las instrucciones de escamilla médico sobre el cuidado de alo heridas: Si a usted le willson removido o drenado un quiste, cuide de escamilla herida según indicaciones  Lave cuidadosamente la herida con agua y [place]  Seque el área y póngale vendajes nuevos y limpios chato le indicaron  Cambie alo vendajes cuando se mojen o ensucien  Acuda a alo consultas de control con escamilla médico según le indicaron   Es probable que necesiten revisar escamilla herida si escamilla Prabhakar Pontiff removido en el departamento de emergencias  Es posible que deba consultar con un cirujano si no fue posible quitarle el quiste  Anote alo preguntas para que se acuerde de hacerlas phyllis alo consultas  © Copyright [facility] [address] Information is for End User's use only and may not be sold, redistributed or otherwise used for commercial purposes  All illustrations and images included in CareNotes® are the copyrighted property of A D A M , [employer]  or [address] [place] [address] es sólo para uso en educación  Escamilla intención no es darle un consejo médico sobre enfermedades o tratamientos  Colsulte con escamilla Verlon Stacey farmacéutico antes de seguir cualquier régimen médico para saber si es seguro y efectivo para usted

## 2021-04-21 NOTE — LETTER
April 21, 2021     Patient: Haily Bonilla   YOB: 1995   Date of Visit: 4/21/2021       To Whom it May Concern: Haily Bonilla is under my professional care  She was seen in my office on 4/21/2021  She may return to work on 04/25/2021  If you have any questions or concerns, please don't hesitate to call           Sincerely,          [employer] [facility]        CC: No Recipients

## 2021-04-22 PROBLEM — N90.7 VULVAR CYST: Status: ACTIVE | Noted: 2021-04-22

## 2021-04-22 NOTE — PROGRESS NOTES
Assessment/Plan:    Problem List Items Addressed This Visit        Genitourinary    Vulvar cyst - Primary     Likely cyst vs  Ingrown hair or folliculitis  No erythema, swelling, or drainage  Since this has recently grown and increased in pain per pt report, will treat with abx but will defer I&D and/or cystectomy to OBGYN - advised pt to make f/u appt with GYN  Relevant Medications    cephalexin (KEFLEX) 500 mg capsule            No follow-ups on file  Subjective:     HPI: Aly Medina is a 26 y o  female who  has a past medical history of Diabetes mellitus ([address] ), History of transfusion, Obesity (BMI 35 0-39 9 without comorbidity), and Uses Slovenian as primary spoken language  who presented to the office today for Same-Day Care appointment for evaluation of cyst on right labia majora  She states this has been present for many months and sometimes shrinks but only swells back again  It was tolerable up until now wherein it causes pain to even walk  She states there is sometimes clear or cloudy discharge  She denies fever  She has not seen her GYN for this problem        The following portions of the patient's history were reviewed and updated as appropriate: allergies, current medications, past family history, past medical history, past social history, past surgical history, and problem list     Current Outpatient Medications on File Prior to Visit   Medication Sig Dispense Refill    calcium carbonate (OS-GABI) 1250 (500 Ca) MG tablet Take 1 tablet by mouth daily      Magnesium Oxide -Mg Supplement 400 MG CAPS Take 1 capsule (400 mg total) by mouth daily 90 capsule 0    Multiple Vitamins-Minerals (MULTIVITAMIN ADULT PO) Take by mouth daily      SUMAtriptan (IMITREX) 25 mg tablet Take 1 tablet (25 mg total) by mouth once as needed for migraine for up to 1 dose 15 tablet 0    topiramate (TOPAMAX) 25 mg tablet Take 1 tablet (25 mg total) by mouth 2 (two) times a day 60 tablet 1    ergocalciferol (VITAMIN D2) 50,000 units Take 1 capsule (50,000 Units total) by mouth 2 (two) times a week with meals 24 capsule 0    omeprazole (PriLOSEC) 20 mg delayed release capsule Take 1 capsule (20 mg total) by mouth daily (Patient taking differently: Take 20 mg by mouth daily Start PostOp) 30 capsule 3     No current facility-administered medications on file prior to visit  Review of Systems   Constitutional: Negative for chills and fever  HENT: Negative for ear pain and sore throat  Eyes: Negative for pain and visual disturbance  Respiratory: Negative for cough and shortness of breath  Cardiovascular: Negative for chest pain and palpitations  Gastrointestinal: Negative for abdominal pain and vomiting  Genitourinary: Negative for dysuria and hematuria  Vulvar cyst, tender   Musculoskeletal: Negative for arthralgias and back pain  Skin: Negative for color change and rash  Neurological: Negative for seizures and syncope  All other systems reviewed and are negative  BMI Counseling: Body mass index is 28 12 kg/m²  The BMI is above normal  Nutrition recommendations include decreasing portion sizes, encouraging healthy choices of fruits and vegetables, decreasing fast food intake, consuming healthier snacks, limiting drinks that contain sugar, moderation in carbohydrate intake, increasing intake of lean protein, reducing intake of saturated and trans fat and reducing intake of cholesterol  Exercise recommendations include moderate physical activity 150 minutes/week, exercising 3-5 times per week and obtaining a gym membership  No pharmacotherapy was ordered  Depression Screening and Follow-up Plan: Patient's depression screening was positive with a PHQ-2 score of 0  Clincally patient does not have depression  No treatment is required            Objective:    /80 (BP Location: Right arm, Patient Position: Sitting, Cuff Size: Standard)   Pulse 76   Temp 98 °F (36 7 °C) (Temporal)   Resp 16   Ht 5' 10" (1 778 m)   Wt 88 9 kg (196 lb)   SpO2 98%   BMI 28 12 kg/m²     Physical Exam  Vitals signs reviewed  Constitutional:       General: She is not in acute distress  Appearance: Normal appearance  HENT:      Head: Normocephalic  Right Ear: External ear normal       Left Ear: External ear normal       Nose: Nose normal  No congestion  Mouth/Throat:      Mouth: Mucous membranes are moist    Eyes:      Extraocular Movements: Extraocular movements intact  Conjunctiva/sclera: Conjunctivae normal       Pupils: Pupils are equal, round, and reactive to light  Neck:      Musculoskeletal: Normal range of motion and neck supple  No muscular tenderness  Cardiovascular:      Rate and Rhythm: Normal rate and regular rhythm  Pulses: Normal pulses  Heart sounds: Normal heart sounds  No murmur  No friction rub  No gallop  Pulmonary:      Effort: Pulmonary effort is normal       Breath sounds: Normal breath sounds  No wheezing  Abdominal:      General: Bowel sounds are normal       Palpations: Abdomen is soft  Tenderness: There is no abdominal tenderness  Genitourinary:      Musculoskeletal: Normal range of motion  Right lower leg: No edema  Left lower leg: No edema  Skin:     General: Skin is warm and dry  Capillary Refill: Capillary refill takes less than 2 seconds  Neurological:      General: No focal deficit present  Mental Status: She is alert and oriented to person, place, and time  Psychiatric:         Mood and Affect: Mood normal          Behavior: Behavior normal          LES Spivey  04/22/21  11:05 AM    Patient Instructions   Quiste   LO QUE NECESITA SABER:   Un quiste es un bulto delio y [employer]  Rubina se puede encontrar en cualquier parte del cuerpo  Los quistes pueden crecer [employer] no son cancerosos  No se necesita tratamiento si usted no presenta síntomas   Los quistes se pueden abrir y drenar si se infectan o causan problemas  Los [employer] crecer, crecer y dificultarle a usted alo actividades diarias  Es probable que usted también necesite antibióticos si hay infección  También es probable que usted necesite cirugía para remover el quiste por completo  INSTRUCCIONES SOBRE EL JAIME HOSPITALARIA:   Medicamentos:  · Los antibióticos podrían administrarse para tratar o evitar yazan infección  · [place] alo medicamentos chato se le haya indicado  Consulte con escamilla médico si usted yvan que escamilla medicamento no le está ayudando o si presenta efectos secundarios  Infórmele si es alérgico a algún medicamento  Mantenga yazan lista actualizada de los [place], las vitaminas y los productos herbales que yogesh  Incluya los siguientes datos de los medicamentos: cantidad, frecuencia y motivo de administración  Traiga con usted la lista o los envases de las píldoras a alo citas de seguimiento  Lleve la lista de los medicamentos con usted en tanya de yazan emergencia  Regrese a la clover de emergencias si:  · Usted presenta fiebre  · El área alrededor de escamilla quiste se inflama, se enrojece y le duele  · Escamilla quiste continúa drenando por 2 días después que usted empieza a yusef antibióticos  Siga las instrucciones de escamilla médico sobre el cuidado de alo heridas: Si a usted le willson removido o drenado un quiste, cuide de escamilla herida según indicaciones  Lave cuidadosamente la herida con agua y [place]  Seque el área y póngale vendajes nuevos y limpios chato le indicaron  Cambie alo vendajes cuando se mojen o ensucien  Acuda a alo consultas de control con escamilla médico según le indicaron  Es probable que necesiten revisar escamilla herida si escamilla quiste haya sido removido en el departamento de emergencias  Es posible que deba consultar con un cirujano si no fue posible quitarle el quiste  Anote alo preguntas para que se acuerde de hacerlas phyllis alo consultas    © Copyright [employer] 2020 Information is for End User's use only and may not be sold, redistributed or otherwise used for commercial purposes  All illustrations and images included in CareNotes® are the copyrighted property of A D A M , Inc  or [address] [place] [address] es sólo para uso en educación  Escamilla intención no es darle un consejo médico sobre enfermedades o tratamientos  Colsulte con escamilla Nidia Antis farmacéutico antes de seguir cualquier régimen médico para saber si es seguro y efectivo para usted

## 2021-04-22 NOTE — ASSESSMENT & PLAN NOTE
Likely cyst vs  Ingrown hair or folliculitis  No erythema, swelling, or drainage  Since this has recently grown and increased in pain per pt report, will treat with abx but will defer I&D and/or cystectomy to OBGYN - advised pt to make f/u appt with GYN

## 2021-05-05 ENCOUNTER — HOSPITAL ENCOUNTER (EMERGENCY)
Facility: HOSPITAL | Age: 26
Discharge: HOME | End: 2021-05-05
Attending: EMERGENCY MEDICINE | Admitting: EMERGENCY MEDICINE
Payer: COMMERCIAL

## 2021-05-05 ENCOUNTER — APPOINTMENT (EMERGENCY)
Dept: CT IMAGING | Facility: HOSPITAL | Age: 26
End: 2021-05-05
Payer: COMMERCIAL

## 2021-05-05 VITALS
BODY MASS INDEX: 28.02 KG/M2 | SYSTOLIC BLOOD PRESSURE: 108 MMHG | DIASTOLIC BLOOD PRESSURE: 58 MMHG | OXYGEN SATURATION: 100 % | TEMPERATURE: 98.3 F | WEIGHT: 195.31 LBS | HEART RATE: 71 BPM | RESPIRATION RATE: 16 BRPM

## 2021-05-05 DIAGNOSIS — N20.0 KIDNEY STONE: Primary | ICD-10-CM

## 2021-05-05 LAB
ANION GAP SERPL CALCULATED.3IONS-SCNC: 5 MMOL/L (ref 5–14)
BACTERIA UR QL AUTO: ABNORMAL /HPF
BASOPHILS # BLD AUTO: 0.08 THOUSAND/UL (ref 0–0.1)
BASOPHILS NFR MAR MANUAL: 1 % (ref 0–1)
BILIRUB UR QL STRIP: NEGATIVE
BUN SERPL-MCNC: 8 MG/DL (ref 5–25)
CALCIUM SERPL-MCNC: 8.8 MG/DL (ref 8.4–10.2)
CAOX CRY URNS QL MICRO: ABNORMAL /HPF
CHLORIDE SERPL-SCNC: 106 MMOL/L (ref 97–108)
CLARITY UR: CLEAR
CO2 SERPL-SCNC: 27 MMOL/L (ref 22–30)
COLOR UR: YELLOW
CREAT SERPL-MCNC: 0.52 MG/DL (ref 0.6–1.2)
EOSINOPHIL # BLD AUTO: 0.08 THOUSAND/UL (ref 0–0.4)
EOSINOPHIL NFR BLD MANUAL: 1 % (ref 0–6)
ERYTHROCYTE [DISTWIDTH] IN BLOOD BY AUTOMATED COUNT: 12.8 %
EXT PREG TEST URINE: NEGATIVE
EXT. CONTROL ED NAV: NORMAL
GFR SERPL CREATININE-BSD FRML MDRD: 134 ML/MIN/1.73SQ M
GLUCOSE SERPL-MCNC: 90 MG/DL (ref 70–99)
GLUCOSE UR STRIP-MCNC: NEGATIVE MG/DL
HCT VFR BLD AUTO: 39.2 % (ref 36–46)
HGB BLD-MCNC: 13 G/DL (ref 12–16)
HGB UR QL STRIP.AUTO: 50
KETONES UR STRIP-MCNC: NEGATIVE MG/DL
LEUKOCYTE ESTERASE UR QL STRIP: NEGATIVE
LYMPHOCYTES # BLD AUTO: 1.76 THOUSAND/UL (ref 0.5–4)
LYMPHOCYTES # BLD AUTO: 22 % (ref 25–45)
MCH RBC QN AUTO: 30.4 PG (ref 26–34)
MCHC RBC AUTO-ENTMCNC: 33.2 G/DL (ref 31–36)
MCV RBC AUTO: 92 FL (ref 80–100)
MONOCYTES # BLD AUTO: 0.32 THOUSAND/UL (ref 0.2–0.9)
MONOCYTES NFR BLD AUTO: 4 % (ref 1–10)
MUCOUS THREADS UR QL AUTO: ABNORMAL
NEUTS BAND NFR BLD MANUAL: 2 % (ref 0–8)
NEUTS SEG # BLD: 5.76 THOUSAND/UL (ref 1.8–7.8)
NEUTS SEG NFR BLD AUTO: 70 %
NITRITE UR QL STRIP: NEGATIVE
NON-SQ EPI CELLS URNS QL MICRO: ABNORMAL /HPF
PH UR STRIP.AUTO: 7 [PH]
PLATELET # BLD AUTO: 127 THOUSANDS/UL (ref 150–450)
PLATELET BLD QL SMEAR: ADEQUATE
PMV BLD AUTO: 10.8 FL (ref 8.9–12.7)
POTASSIUM SERPL-SCNC: 4 MMOL/L (ref 3.6–5)
PROT UR STRIP-MCNC: NEGATIVE MG/DL
RBC # BLD AUTO: 4.27 MILLION/UL (ref 4–5.2)
RBC #/AREA URNS AUTO: ABNORMAL /HPF
RBC MORPH BLD: NORMAL
SODIUM SERPL-SCNC: 138 MMOL/L (ref 137–147)
SP GR UR STRIP.AUTO: 1.01 (ref 1–1.04)
TOTAL CELLS COUNTED SPEC: 100
UROBILINOGEN UA: 4 MG/DL
WBC # BLD AUTO: 8 THOUSAND/UL (ref 4.5–11)
WBC #/AREA URNS AUTO: ABNORMAL /HPF

## 2021-05-05 PROCEDURE — 81003 URINALYSIS AUTO W/O SCOPE: CPT | Performed by: EMERGENCY MEDICINE

## 2021-05-05 PROCEDURE — 85007 BL SMEAR W/DIFF WBC COUNT: CPT | Performed by: EMERGENCY MEDICINE

## 2021-05-05 PROCEDURE — 74176 CT ABD & PELVIS W/O CONTRAST: CPT

## 2021-05-05 PROCEDURE — 36415 COLL VENOUS BLD VENIPUNCTURE: CPT | Performed by: EMERGENCY MEDICINE

## 2021-05-05 PROCEDURE — 96374 THER/PROPH/DIAG INJ IV PUSH: CPT

## 2021-05-05 PROCEDURE — 81001 URINALYSIS AUTO W/SCOPE: CPT | Performed by: EMERGENCY MEDICINE

## 2021-05-05 PROCEDURE — 85027 COMPLETE CBC AUTOMATED: CPT | Performed by: EMERGENCY MEDICINE

## 2021-05-05 PROCEDURE — 80048 BASIC METABOLIC PNL TOTAL CA: CPT | Performed by: EMERGENCY MEDICINE

## 2021-05-05 PROCEDURE — 81025 URINE PREGNANCY TEST: CPT | Performed by: EMERGENCY MEDICINE

## 2021-05-05 PROCEDURE — 99284 EMERGENCY DEPT VISIT MOD MDM: CPT | Performed by: EMERGENCY MEDICINE

## 2021-05-05 PROCEDURE — 99284 EMERGENCY DEPT VISIT MOD MDM: CPT

## 2021-05-05 PROCEDURE — 96375 TX/PRO/DX INJ NEW DRUG ADDON: CPT

## 2021-05-05 RX ORDER — ONDANSETRON 2 MG/ML
4 INJECTION INTRAMUSCULAR; INTRAVENOUS ONCE
Status: COMPLETED | OUTPATIENT
Start: 2021-05-05 | End: 2021-05-05

## 2021-05-05 RX ORDER — OXYCODONE HYDROCHLORIDE AND ACETAMINOPHEN 5; 325 MG/1; MG/1
1 TABLET ORAL EVERY 6 HOURS PRN
Qty: 14 TABLET | Refills: 0 | Status: SHIPPED | OUTPATIENT
Start: 2021-05-05 | End: 2021-05-15

## 2021-05-05 RX ORDER — TAMSULOSIN HYDROCHLORIDE 0.4 MG/1
0.4 CAPSULE ORAL
Qty: 7 CAPSULE | Refills: 0 | Status: SHIPPED | OUTPATIENT
Start: 2021-05-05 | End: 2021-06-13

## 2021-05-05 RX ORDER — KETOROLAC TROMETHAMINE 30 MG/ML
30 INJECTION, SOLUTION INTRAMUSCULAR; INTRAVENOUS ONCE
Status: COMPLETED | OUTPATIENT
Start: 2021-05-05 | End: 2021-05-05

## 2021-05-05 RX ADMIN — KETOROLAC TROMETHAMINE 30 MG: 30 INJECTION, SOLUTION INTRAMUSCULAR; INTRAVENOUS at 16:37

## 2021-05-05 RX ADMIN — ONDANSETRON 4 MG: 2 INJECTION INTRAMUSCULAR; INTRAVENOUS at 16:37

## 2021-05-05 NOTE — Clinical Note
Onelia Green was seen and treated in our emergency department on 5/5/2021  Diagnosis:     Tracy Tejada  may return to work on return date  She may return on this date: 05/06/2021         If you have any questions or concerns, please don't hesitate to call        Lexie Osborne MD    ______________________________           _______________          _______________  Hospital Representative                              Date                                Time

## 2021-05-07 ENCOUNTER — IMMUNIZATIONS (OUTPATIENT)
Dept: FAMILY MEDICINE CLINIC | Facility: HOSPITAL | Age: 26
End: 2021-05-07

## 2021-05-07 DIAGNOSIS — Z23 ENCOUNTER FOR IMMUNIZATION: Primary | ICD-10-CM

## 2021-05-07 PROCEDURE — 0012A SARS-COV-2 / COVID-19 MRNA VACCINE (MODERNA) 100 MCG: CPT

## 2021-05-07 PROCEDURE — 91301 SARS-COV-2 / COVID-19 MRNA VACCINE (MODERNA) 100 MCG: CPT

## 2021-05-10 ENCOUNTER — TELEPHONE (OUTPATIENT)
Dept: UROLOGY | Facility: MEDICAL CENTER | Age: 26
End: 2021-05-10

## 2021-05-10 NOTE — TELEPHONE ENCOUNTER
Please Triage - Abiola  New Patient-     What is the reason for the patients appointment? patient called stating she has 4 mm kidney stone right side  She is having pain a lot of pain pain level 9   Patient is at work and gets out at 230 pm  She would like to know how to proceed  Imaging/Lab Results:ct scan in epc      Do we accept the patient's insurance or is the patient Self-Pay? Provider & Plan: Candice Felipe  Member ID#: Has the patient had any previous urologist(s)?no        Have patient records been requested? Has the patient had any outside testing done?       Does the patient have a personal history of cancer?no      Patient can be reached at :[phone]

## 2021-05-11 NOTE — TELEPHONE ENCOUNTER
Called patient - LMOM to call the office to set up an appointment  If patient calls back, Dr Wade Frost has openings on 05/13/21

## 2021-05-13 ENCOUNTER — OFFICE VISIT (OUTPATIENT)
Dept: UROLOGY | Facility: MEDICAL CENTER | Age: 26
End: 2021-05-13
Payer: COMMERCIAL

## 2021-05-13 VITALS — WEIGHT: 195 LBS | HEIGHT: 70 IN | BODY MASS INDEX: 27.92 KG/M2

## 2021-05-13 DIAGNOSIS — N20.1 CALCULUS OF URETER: Primary | ICD-10-CM

## 2021-05-13 PROCEDURE — 3008F BODY MASS INDEX DOCD: CPT | Performed by: UROLOGY

## 2021-05-13 PROCEDURE — 1036F TOBACCO NON-USER: CPT | Performed by: UROLOGY

## 2021-05-13 PROCEDURE — 99244 OFF/OP CNSLTJ NEW/EST MOD 40: CPT | Performed by: UROLOGY

## 2021-05-13 NOTE — PROGRESS NOTES
HISTORY:    One week severe Rt flank pain  Ct shows 4 mm stone just above bladder  Still with bad pain         ASSESSMENT / PLAN:    CT films reviewed and shown to pt  Options discussed - she wants to try to pass the stone  But, if no luck, will need uscope, laser, stent  Risks outlined  She knows to call within few days if needed  The following portions of the patient's history were reviewed and updated as appropriate: allergies, current medications, past family history, past medical history, past social history, past surgical history and problem list     Review of Systems   All other systems reviewed and are negative  Objective:     Physical Exam  Constitutional:       General: She is not in acute distress  Appearance: She is well-developed  She is not diaphoretic  HENT:      Head: Normocephalic and atraumatic  Eyes:      General: No scleral icterus  Pulmonary:      Effort: Pulmonary effort is normal    Skin:     Coloration: Skin is not pale  Neurological:      Mental Status: She is alert and oriented to person, place, and time  Psychiatric:         Behavior: Behavior normal          Thought Content:  Thought content normal          Judgment: Judgment normal            No results found for: PSA]  BUN   Date Value Ref Range Status   05/05/2021 8 5 - 25 mg/dL Final   05/03/2017 5 (L) 7 - 25 mg/dL Final     Creatinine   Date Value Ref Range Status   05/05/2021 0 52 (L) 0 60 - 1 20 mg/dL Final     Comment:     Standardized to IDMS reference method   05/03/2017 0 62 0 50 - 1 10 mg/dL Final     No components found for: CBC      Patient Active Problem List   Diagnosis    Postpartum cardiomyopathy    Gallbladder disease    Diabetes mellitus type 2 in obese ([facility] [address] )    Overweight with body mass index (BMI) of 28 to 28 9 in adult    Chronic migraine with aura    Status post laparoscopic sleeve gastrectomy    Vulvar cyst    Calculus of ureter        Diagnoses and all orders for this visit:    Calculus of ureter           Patient ID: Jaylan Mancia is a 26 y o  female        Current Outpatient Medications:     calcium carbonate (OS-GABI) 1250 (500 Ca) MG tablet, Take 1 tablet by mouth daily, Disp: , Rfl:     ergocalciferol (VITAMIN D2) 50,000 units, Take 1 capsule (50,000 Units total) by mouth 2 (two) times a week with meals, Disp: 24 capsule, Rfl: 0    Magnesium Oxide -Mg Supplement 400 MG CAPS, Take 1 capsule (400 mg total) by mouth daily, Disp: 90 capsule, Rfl: 0    Multiple Vitamins-Minerals (MULTIVITAMIN ADULT PO), Take by mouth daily, Disp: , Rfl:     omeprazole (PriLOSEC) 20 mg delayed release capsule, Take 1 capsule (20 mg total) by mouth daily (Patient taking differently: Take 20 mg by mouth daily Start PostOp), Disp: 30 capsule, Rfl: 3    oxyCODONE-acetaminophen (PERCOCET) 5-325 mg per tablet, Take 1 tablet by mouth every 6 (six) hours as needed for moderate pain for up to 10 daysMax Daily Amount: 4 tablets, Disp: 14 tablet, Rfl: 0    SUMAtriptan (IMITREX) 25 mg tablet, Take 1 tablet (25 mg total) by mouth once as needed for migraine for up to 1 dose, Disp: 15 tablet, Rfl: 0    tamsulosin (FLOMAX) 0 4 mg, Take 1 capsule (0 4 mg total) by mouth daily with dinner, Disp: 7 capsule, Rfl: 0    topiramate (TOPAMAX) 25 mg tablet, Take 1 tablet (25 mg total) by mouth 2 (two) times a day, Disp: 60 tablet, Rfl: 1    Past Medical History:   Diagnosis Date    Diabetes mellitus (HCC)     NIDDM    History of transfusion     after  - had fever with transfusion    Obesity (BMI 35 0-39 9 without comorbidity)     Uses Ukrainian as primary spoken language        Past Surgical History:   Procedure Laterality Date     SECTION  2017    CHOLECYSTECTOMY      SC LAP, URI RESTRICT PROC, LONGITUDINAL GASTRECTOMY N/A 2020    Procedure: LAP SLEEVE GASTRECTOMY, INTRAOP EGD;  Surgeon: Jay Rivas MD;  Location: [facility];  Service: Bariatrics       Social History

## 2021-05-14 ENCOUNTER — TELEPHONE (OUTPATIENT)
Dept: UROLOGY | Facility: MEDICAL CENTER | Age: 26
End: 2021-05-14

## 2021-05-14 NOTE — TELEPHONE ENCOUNTER
Dr Andrade Ball, please call patient to discuss surgery  I am holding next Thursday 5/20/2021 at [facility] if you want to offer it to her

## 2021-05-14 NOTE — TELEPHONE ENCOUNTER
----- Message from Amanda Thrasher MD sent at 5/13/2021  2:00 PM EDT -----  OV today, but couldn't finish discussion of procedure due to building evac for 'gas leak ' I think she'll need #5, help me to remember to call her with  tomorrow to discuss

## 2021-05-27 ENCOUNTER — HOSPITAL ENCOUNTER (EMERGENCY)
Facility: HOSPITAL | Age: 26
Discharge: HOME | End: 2021-05-27
Attending: EMERGENCY MEDICINE
Payer: COMMERCIAL

## 2021-05-27 VITALS
DIASTOLIC BLOOD PRESSURE: 56 MMHG | OXYGEN SATURATION: 98 % | HEART RATE: 75 BPM | TEMPERATURE: 98.2 F | WEIGHT: 193.12 LBS | BODY MASS INDEX: 27.71 KG/M2 | RESPIRATION RATE: 18 BRPM | SYSTOLIC BLOOD PRESSURE: 116 MMHG

## 2021-05-27 DIAGNOSIS — J02.9 PHARYNGITIS: Primary | ICD-10-CM

## 2021-05-27 DIAGNOSIS — J30.2 SEASONAL ALLERGIC RHINITIS, UNSPECIFIED TRIGGER: ICD-10-CM

## 2021-05-27 LAB — S PYO DNA THROAT QL NAA+PROBE: NORMAL

## 2021-05-27 PROCEDURE — 99282 EMERGENCY DEPT VISIT SF MDM: CPT | Performed by: STUDENT IN AN ORGANIZED HEALTH CARE EDUCATION/TRAINING PROGRAM

## 2021-05-27 PROCEDURE — 99283 EMERGENCY DEPT VISIT LOW MDM: CPT

## 2021-05-27 PROCEDURE — 87651 STREP A DNA AMP PROBE: CPT | Performed by: STUDENT IN AN ORGANIZED HEALTH CARE EDUCATION/TRAINING PROGRAM

## 2021-05-27 RX ORDER — FLUTICASONE PROPIONATE 50 MCG
1 SPRAY, SUSPENSION (ML) NASAL DAILY
Qty: 16 G | Refills: 0 | Status: SHIPPED | OUTPATIENT
Start: 2021-05-27 | End: 2021-07-26

## 2021-05-27 RX ORDER — FEXOFENADINE HYDROCHLORIDE 60 MG/1
60 TABLET, FILM COATED ORAL 2 TIMES DAILY
Qty: 20 TABLET | Refills: 0 | Status: SHIPPED | OUTPATIENT
Start: 2021-05-27 | End: 2021-07-26

## 2021-05-27 NOTE — ED PROVIDER NOTES
History  Chief Complaint   Patient presents with    Sore Throat     Pt  reports sore throat for the past 5 days with nasal congestion and left ear pain  Pietro Dillard is a 27 yo female with no PMHx who presents to the ED with sore throat and bilateral ear pain for 5 days  She notes her congestion started 2 days ago  She states she was previously healthy  She denies palliative or provactive factors, she states she has not tried taking any medications for the pain  She describes her throat and ear pain as a stabbing 10/10 pain with no radiation  She states her symptoms has been constant since onset  She denies facial and teeth pain  She states she has never experienced these symptoms before  Patient received the first dose of her COVID vaccination series 5/7  History provided by:  Parent   used: No    Sore Throat  Location:  Generalized  Quality:  Sharp  Severity:  Severe  Onset quality:  Sudden  Duration:  5 days  Timing:  Constant  Chronicity:  New  Relieved by:  Nothing  Worsened by:  Nothing  Ineffective treatments:  None tried  Associated symptoms: ear pain and sinus congestion    Associated symptoms: no abdominal pain, no adenopathy, no chest pain, no chills, no cough, no drooling, no fever, no shortness of breath, no trouble swallowing and no voice change    Ear pain:     Location:  Bilateral    Severity:  Severe    Onset quality:  Sudden    Duration:  5 days    Timing:  Constant    Chronicity:  New  Risk factors: no sick contacts        Prior to Admission Medications   Prescriptions Last Dose Informant Patient Reported? Taking?    Multiple Vitamins-Minerals (MULTIVITAMIN ADULT PO) 5/27/2021 at Unknown time Self Yes Yes   Sig: Take by mouth daily   SUMAtriptan (IMITREX) 25 mg tablet Past Month at Unknown time Self No Yes   Sig: Take 1 tablet (25 mg total) by mouth once as needed for migraine for up to 1 dose   tamsulosin (FLOMAX) 0 4 mg 5/26/2021 at Unknown time Self No Yes   Sig: Take 1 capsule (0 4 mg total) by mouth daily with dinner   topiramate (TOPAMAX) 25 mg tablet Past Month at Unknown time Self No Yes   Sig: Take 1 tablet (25 mg total) by mouth 2 (two) times a day      Facility-Administered Medications: None       Past Medical History:   Diagnosis Date    Diabetes mellitus ([facility] [address] )     NIDDM    History of transfusion     after  - had fever with transfusion    Obesity (BMI 35 0-39 9 without comorbidity)     Uses Lithuanian as primary spoken language        Past Surgical History:   Procedure Laterality Date     SECTION  2017    CHOLECYSTECTOMY      MS LAP, URI RESTRICT PROC, LONGITUDINAL GASTRECTOMY N/A 2020    Procedure: LAP SLEEVE GASTRECTOMY, INTRAOP EGD;  Surgeon: Tiffanie Pacheco MD;  Location: [facility];  Service: Bariatrics       Family History   Problem Relation Age of Onset    Heart disease Family         CARDIOVASCULAR DISEASE    Diabetes Mother     Other Mother         gastric bypass    Diabetes Father     Diabetes Sister     Sleep apnea Sister     Sleep apnea Brother         2 of the 3 borthers have EAGLE    Diabetes Maternal Grandmother     Diabetes Paternal Grandmother      I have reviewed and agree with the history as documented  E-Cigarette/Vaping    E-Cigarette Use Never User      E-Cigarette/Vaping Substances     Social History     Tobacco Use    Smoking status: Never Smoker    Smokeless tobacco: Never Used   Substance Use Topics    Alcohol use: No    Drug use: No       Review of Systems   Constitutional: Negative for chills and fever  HENT: Positive for ear pain and sore throat  Negative for drooling, trouble swallowing and voice change  Eyes: Negative for pain and visual disturbance  Respiratory: Negative for cough and shortness of breath  Cardiovascular: Negative for chest pain and palpitations  Gastrointestinal: Negative for abdominal pain and vomiting  Musculoskeletal: Negative for arthralgias and back pain  Hematological: Negative for adenopathy  All other systems reviewed and are negative  Physical Exam  Physical Exam  Constitutional:       General: She is not in acute distress  Appearance: She is not ill-appearing  HENT:      Head: Normocephalic and atraumatic  Right Ear: Tympanic membrane and ear canal normal       Left Ear: Tympanic membrane and ear canal normal       Nose: No rhinorrhea  Mouth/Throat:      Mouth: Mucous membranes are moist       Pharynx: Uvula midline  Posterior oropharyngeal erythema (mild erythema) present  No pharyngeal swelling, oropharyngeal exudate or uvula swelling  Tonsils: No tonsillar exudate or tonsillar abscesses  Neck:      Musculoskeletal: Normal range of motion and neck supple  Cardiovascular:      Rate and Rhythm: Normal rate and regular rhythm  Heart sounds: Normal heart sounds  Pulmonary:      Effort: Pulmonary effort is normal       Breath sounds: Normal breath sounds  Abdominal:      Palpations: Abdomen is soft  Tenderness: There is no abdominal tenderness  Lymphadenopathy:      Cervical: No cervical adenopathy  Skin:     General: Skin is warm and dry  Neurological:      General: No focal deficit present  Mental Status: She is alert     Psychiatric:         Mood and Affect: Mood normal          Vital Signs  ED Triage Vitals [05/27/21 1606]   Temperature Pulse Respirations Blood Pressure SpO2   98 2 °F (36 8 °C) 75 18 116/56 98 %      Temp Source Heart Rate Source Patient Position - Orthostatic VS BP Location FiO2 (%)   Oral Monitor Sitting Right arm --      Pain Score       --           Vitals:    05/27/21 1606   BP: 116/56   Pulse: 75   Patient Position - Orthostatic VS: Sitting         Visual Acuity      ED Medications  Medications - No data to display    Diagnostic Studies  Results Reviewed     Procedure Component Value Units Date/Time    Strep A PCR [[id number]]  (Normal) Collected: 05/27/21 1633    Lab Status: Final result Specimen: Throat Updated: 05/27/21 1752     STREP A PCR None Detected                 No orders to display              Procedures  Procedures         ED Course                             SBIRT 20yo+      Most Recent Value   SBIRT (25 yo +)   In order to provide better care to our patients, we are screening all of our patients for alcohol and drug use  Would it be okay to ask you these screening questions? No Filed at: 05/27/2021 1634                    MDM  Number of Diagnoses or Management Options  Pharyngitis: new and requires workup  Seasonal allergic rhinitis, unspecified trigger: new and requires workup     Amount and/or Complexity of Data Reviewed  Clinical lab tests: ordered and reviewed    Patient Progress  Patient progress: stable      Disposition  Final diagnoses:   Pharyngitis   Seasonal allergic rhinitis, unspecified trigger     Time reflects when diagnosis was documented in both MDM as applicable and the Disposition within this note     Time User Action Codes Description Comment    5/27/2021  4:31 PM Shamika Frazier [J02 9] Pharyngitis     5/27/2021  4:34 PM Shamika Frazier [J30 2] Seasonal allergic rhinitis, unspecified trigger       ED Disposition     ED Disposition Condition Date/Time Comment    Discharge Stable Thu May 27, 2021  4:31 PM Richar Germain discharge to home/self care              Follow-up Information     Follow up With Specialties Details Why Contact Info    [employer]  Call   [phone]            Discharge Medication List as of 5/27/2021  4:37 PM      START taking these medications    Details   fexofenadine (ALLEGRA) 60 MG tablet Take 1 tablet (60 mg total) by mouth 2 (two) times a day, Starting Thu 5/27/2021, Normal      fluticasone (FLONASE) 50 mcg/act nasal spray 1 spray into each nostril daily, Starting Thu 5/27/2021, Normal         CONTINUE these medications which have NOT CHANGED    Details   Multiple Vitamins-Minerals (MULTIVITAMIN ADULT PO) Take by mouth daily, Historical Med      SUMAtriptan (IMITREX) 25 mg tablet Take 1 tablet (25 mg total) by mouth once as needed for migraine for up to 1 dose, Starting Thu 4/1/2021, Normal      tamsulosin (FLOMAX) 0 4 mg Take 1 capsule (0 4 mg total) by mouth daily with dinner, Starting Wed 5/5/2021, Normal      topiramate (TOPAMAX) 25 mg tablet Take 1 tablet (25 mg total) by mouth 2 (two) times a day, Starting Thu 4/1/2021, Normal           No discharge procedures on file      PDMP Review     None          ED Provider  Electronically Signed by           Denisse Benz PA-C  05/27/21 1922

## 2021-05-28 DIAGNOSIS — G43.109 CHRONIC MIGRAINE WITH AURA: ICD-10-CM

## 2021-05-28 RX ORDER — TOPIRAMATE 25 MG/1
TABLET ORAL
Qty: 60 TABLET | Refills: 1 | Status: SHIPPED | OUTPATIENT
Start: 2021-05-28 | End: 2021-07-26

## 2021-06-02 ENCOUNTER — IMMUNIZATIONS (OUTPATIENT)
Dept: FAMILY MEDICINE CLINIC | Facility: HOSPITAL | Age: 26
End: 2021-06-02

## 2021-06-02 DIAGNOSIS — Z23 ENCOUNTER FOR IMMUNIZATION: Primary | ICD-10-CM

## 2021-06-02 PROCEDURE — 0012A SARS-COV-2 / COVID-19 MRNA VACCINE (MODERNA) 100 MCG: CPT

## 2021-06-02 PROCEDURE — 91301 SARS-COV-2 / COVID-19 MRNA VACCINE (MODERNA) 100 MCG: CPT

## 2021-06-13 ENCOUNTER — HOSPITAL ENCOUNTER (EMERGENCY)
Facility: HOSPITAL | Age: 26
Discharge: HOME | End: 2021-06-13
Attending: EMERGENCY MEDICINE | Admitting: EMERGENCY MEDICINE
Payer: COMMERCIAL

## 2021-06-13 ENCOUNTER — APPOINTMENT (EMERGENCY)
Dept: CT IMAGING | Facility: HOSPITAL | Age: 26
End: 2021-06-13
Payer: COMMERCIAL

## 2021-06-13 VITALS
OXYGEN SATURATION: 100 % | RESPIRATION RATE: 16 BRPM | DIASTOLIC BLOOD PRESSURE: 56 MMHG | SYSTOLIC BLOOD PRESSURE: 103 MMHG | HEART RATE: 58 BPM | TEMPERATURE: 98.2 F | BODY MASS INDEX: 27.39 KG/M2 | WEIGHT: 190.92 LBS

## 2021-06-13 DIAGNOSIS — N20.1 LEFT URETERAL STONE: Primary | ICD-10-CM

## 2021-06-13 LAB
ANION GAP SERPL CALCULATED.3IONS-SCNC: 8 MMOL/L (ref 4–13)
BACTERIA UR QL AUTO: ABNORMAL /HPF
BASOPHILS # BLD AUTO: 0.02 THOUSANDS/ΜL (ref 0–0.1)
BASOPHILS NFR BLD AUTO: 0 % (ref 0–1)
BILIRUB UR QL STRIP: NEGATIVE
BUN SERPL-MCNC: 16 MG/DL (ref 5–25)
CALCIUM SERPL-MCNC: 8.6 MG/DL (ref 8.3–10.1)
CHLORIDE SERPL-SCNC: 106 MMOL/L (ref 100–108)
CLARITY UR: ABNORMAL
CO2 SERPL-SCNC: 28 MMOL/L (ref 21–32)
COLOR UR: YELLOW
CREAT SERPL-MCNC: 0.81 MG/DL (ref 0.6–1.3)
EOSINOPHIL # BLD AUTO: 0.04 THOUSAND/ΜL (ref 0–0.61)
EOSINOPHIL NFR BLD AUTO: 1 % (ref 0–6)
ERYTHROCYTE [DISTWIDTH] IN BLOOD BY AUTOMATED COUNT: 12.1 % (ref 11.6–15.1)
EXT PREG TEST URINE: NEGATIVE
EXT. CONTROL ED NAV: NORMAL
GFR SERPL CREATININE-BSD FRML MDRD: 102 ML/MIN/1.73SQ M
GLUCOSE SERPL-MCNC: 155 MG/DL (ref 65–140)
GLUCOSE UR STRIP-MCNC: NEGATIVE MG/DL
HCT VFR BLD AUTO: 34.6 % (ref 34.8–46.1)
HGB BLD-MCNC: 11.3 G/DL (ref 11.5–15.4)
HGB UR QL STRIP.AUTO: ABNORMAL
IMM GRANULOCYTES # BLD AUTO: 0.02 THOUSAND/UL (ref 0–0.2)
IMM GRANULOCYTES NFR BLD AUTO: 0 % (ref 0–2)
KETONES UR STRIP-MCNC: NEGATIVE MG/DL
LEUKOCYTE ESTERASE UR QL STRIP: NEGATIVE
LYMPHOCYTES # BLD AUTO: 1.8 THOUSANDS/ΜL (ref 0.6–4.47)
LYMPHOCYTES NFR BLD AUTO: 22 % (ref 14–44)
MCH RBC QN AUTO: 30 PG (ref 26.8–34.3)
MCHC RBC AUTO-ENTMCNC: 32.7 G/DL (ref 31.4–37.4)
MCV RBC AUTO: 92 FL (ref 82–98)
MONOCYTES # BLD AUTO: 0.61 THOUSAND/ΜL (ref 0.17–1.22)
MONOCYTES NFR BLD AUTO: 8 % (ref 4–12)
NEUTROPHILS # BLD AUTO: 5.64 THOUSANDS/ΜL (ref 1.85–7.62)
NEUTS SEG NFR BLD AUTO: 69 % (ref 43–75)
NITRITE UR QL STRIP: NEGATIVE
NON-SQ EPI CELLS URNS QL MICRO: ABNORMAL /HPF
NRBC BLD AUTO-RTO: 0 /100 WBCS
PH UR STRIP.AUTO: 6 [PH] (ref 4.5–8)
PLATELET # BLD AUTO: 170 THOUSANDS/UL (ref 149–390)
PMV BLD AUTO: 11.3 FL (ref 8.9–12.7)
POTASSIUM SERPL-SCNC: 3.9 MMOL/L (ref 3.5–5.3)
PROT UR STRIP-MCNC: ABNORMAL MG/DL
RBC # BLD AUTO: 3.77 MILLION/UL (ref 3.81–5.12)
RBC #/AREA URNS AUTO: ABNORMAL /HPF
SODIUM SERPL-SCNC: 142 MMOL/L (ref 136–145)
SP GR UR STRIP.AUTO: >=1.03 (ref 1–1.03)
UROBILINOGEN UR QL STRIP.AUTO: 2 E.U./DL
WBC # BLD AUTO: 8.13 THOUSAND/UL (ref 4.31–10.16)
WBC #/AREA URNS AUTO: ABNORMAL /HPF

## 2021-06-13 PROCEDURE — 96361 HYDRATE IV INFUSION ADD-ON: CPT

## 2021-06-13 PROCEDURE — 81001 URINALYSIS AUTO W/SCOPE: CPT

## 2021-06-13 PROCEDURE — G1004 CDSM NDSC: HCPCS

## 2021-06-13 PROCEDURE — 85025 COMPLETE CBC W/AUTO DIFF WBC: CPT | Performed by: PHYSICIAN ASSISTANT

## 2021-06-13 PROCEDURE — 81025 URINE PREGNANCY TEST: CPT | Performed by: PHYSICIAN ASSISTANT

## 2021-06-13 PROCEDURE — 96375 TX/PRO/DX INJ NEW DRUG ADDON: CPT

## 2021-06-13 PROCEDURE — 99284 EMERGENCY DEPT VISIT MOD MDM: CPT | Performed by: PHYSICIAN ASSISTANT

## 2021-06-13 PROCEDURE — 74176 CT ABD & PELVIS W/O CONTRAST: CPT

## 2021-06-13 PROCEDURE — 80048 BASIC METABOLIC PNL TOTAL CA: CPT | Performed by: PHYSICIAN ASSISTANT

## 2021-06-13 PROCEDURE — 99284 EMERGENCY DEPT VISIT MOD MDM: CPT

## 2021-06-13 PROCEDURE — 96374 THER/PROPH/DIAG INJ IV PUSH: CPT

## 2021-06-13 PROCEDURE — 36415 COLL VENOUS BLD VENIPUNCTURE: CPT | Performed by: PHYSICIAN ASSISTANT

## 2021-06-13 RX ORDER — ONDANSETRON 4 MG/1
4 TABLET, ORALLY DISINTEGRATING ORAL EVERY 6 HOURS PRN
Qty: 20 TABLET | Refills: 0 | Status: SHIPPED | OUTPATIENT
Start: 2021-06-13 | End: 2021-07-26

## 2021-06-13 RX ORDER — TAMSULOSIN HYDROCHLORIDE 0.4 MG/1
0.4 CAPSULE ORAL
Qty: 7 CAPSULE | Refills: 0 | Status: SHIPPED | OUTPATIENT
Start: 2021-06-13 | End: 2021-07-06 | Stop reason: SDUPTHER

## 2021-06-13 RX ORDER — OXYCODONE HYDROCHLORIDE AND ACETAMINOPHEN 5; 325 MG/1; MG/1
1 TABLET ORAL EVERY 6 HOURS PRN
Qty: 10 TABLET | Refills: 0 | Status: SHIPPED | OUTPATIENT
Start: 2021-06-13 | End: 2021-07-26

## 2021-06-13 RX ORDER — KETOROLAC TROMETHAMINE 30 MG/ML
15 INJECTION, SOLUTION INTRAMUSCULAR; INTRAVENOUS ONCE
Status: DISCONTINUED | OUTPATIENT
Start: 2021-06-13 | End: 2021-06-13

## 2021-06-13 RX ORDER — ONDANSETRON 2 MG/ML
4 INJECTION INTRAMUSCULAR; INTRAVENOUS ONCE
Status: COMPLETED | OUTPATIENT
Start: 2021-06-13 | End: 2021-06-13

## 2021-06-13 RX ORDER — KETOROLAC TROMETHAMINE 30 MG/ML
15 INJECTION, SOLUTION INTRAMUSCULAR; INTRAVENOUS ONCE
Status: COMPLETED | OUTPATIENT
Start: 2021-06-13 | End: 2021-06-13

## 2021-06-13 RX ORDER — KETOROLAC TROMETHAMINE 10 MG/1
10 TABLET, FILM COATED ORAL EVERY 6 HOURS PRN
Qty: 20 TABLET | Refills: 0 | Status: SHIPPED | OUTPATIENT
Start: 2021-06-13 | End: 2021-07-26

## 2021-06-13 RX ADMIN — KETOROLAC TROMETHAMINE 15 MG: 30 INJECTION, SOLUTION INTRAMUSCULAR; INTRAVENOUS at 20:39

## 2021-06-13 RX ADMIN — ONDANSETRON 4 MG: 2 INJECTION INTRAMUSCULAR; INTRAVENOUS at 20:33

## 2021-06-13 RX ADMIN — SODIUM CHLORIDE 1000 ML: 0.9 INJECTION, SOLUTION INTRAVENOUS at 20:33

## 2021-06-13 NOTE — Clinical Note
Althea Casillas was seen and treated in our emergency department on 6/13/2021  Diagnosis:     Yamarys    She may return on this date: 06/16/2021         If you have any questions or concerns, please don't hesitate to call        Mary Jane Valentin PA-C    ______________________________           _______________          _______________  Hospital Representative                              Date                                Time

## 2021-06-14 NOTE — DISCHARGE INSTRUCTIONS
Please refer to the attached information for strict return instructions  If symptoms worsen or new symptoms develop please return to the ER  Please follow up with urology for re-evaluation  Drink plenty of water at home

## 2021-06-17 NOTE — ED PROVIDER NOTES
History  Chief Complaint   Patient presents with    Flank Pain     reports difficutly urinating, pain with urination, left flank pain  Víctor Villa is a 25 yo F presenting with left sided flank pain as well as some burning with urination and urinary frequency over the past day  She reports symptoms feel similar to previous episode of kidney stone  She reports nausea without vomiting  Denies diarrhea, constipation, or blood in stool  No medications prior to arrival for symptoms  History provided by:  Patient   used: No    Flank Pain  Pain location:  L flank  Pain radiates to:  Does not radiate  Duration:  1 day  Timing:  Constant  Chronicity:  Recurrent  Context: not alcohol use, not eating, not sick contacts and not trauma    Relieved by:  None tried  Worsened by:  Nothing  Ineffective treatments:  None tried  Associated symptoms: dysuria and nausea    Associated symptoms: no chest pain, no chills, no constipation, no cough, no diarrhea, no fever, no hematuria, no melena, no shortness of breath, no sore throat, no vaginal bleeding, no vaginal discharge and no vomiting    Risk factors: no alcohol abuse, has not had multiple surgeries and not pregnant        Prior to Admission Medications   Prescriptions Last Dose Informant Patient Reported? Taking?    Multiple Vitamins-Minerals (MULTIVITAMIN ADULT PO) Not Taking at Unknown time Self Yes No   Sig: Take by mouth daily   SUMAtriptan (IMITREX) 25 mg tablet Not Taking at Unknown time Self No No   Sig: Take 1 tablet (25 mg total) by mouth once as needed for migraine for up to 1 dose   Patient not taking: Reported on 2021   fexofenadine (ALLEGRA) 60 MG tablet Not Taking at Unknown time  No No   Sig: Take 1 tablet (60 mg total) by mouth 2 (two) times a day   Patient not taking: Reported on 2021   fluticasone (FLONASE) 50 mcg/act nasal spray Not Taking at Unknown time  No No   Si spray into each nostril daily   Patient not taking: Reported on 2021   topiramate (TOPAMAX) 25 mg tablet Not Taking at Unknown time  No No   Sig: TAKE 1 TABLET BY MOUTH TWICE A DAY   Patient not taking: Reported on 2021      Facility-Administered Medications: None       Past Medical History:   Diagnosis Date    Diabetes mellitus ([facility] [address] )     NIDDM    History of transfusion     after  - had fever with transfusion    Obesity (BMI 35 0-39 9 without comorbidity)     Uses Panamanian as primary spoken language        Past Surgical History:   Procedure Laterality Date     SECTION  2017    CHOLECYSTECTOMY      NH LAP, URI RESTRICT PROC, LONGITUDINAL GASTRECTOMY N/A 2020    Procedure: LAP SLEEVE GASTRECTOMY, INTRAOP EGD;  Surgeon: Sheyla Peres MD;  Location: [facility];  Service: Bariatrics       Family History   Problem Relation Age of Onset    Heart disease Family         CARDIOVASCULAR DISEASE    Diabetes Mother     Other Mother         gastric bypass    Diabetes Father     Diabetes Sister     Sleep apnea Sister     Sleep apnea Brother         2 of the 3 borthers have EAGLE    Diabetes Maternal Grandmother     Diabetes Paternal Grandmother      I have reviewed and agree with the history as documented  E-Cigarette/Vaping    E-Cigarette Use Never User      E-Cigarette/Vaping Substances     Social History     Tobacco Use    Smoking status: Never Smoker    Smokeless tobacco: Never Used   Vaping Use    Vaping Use: Never used   Substance Use Topics    Alcohol use: No    Drug use: No       Review of Systems   Constitutional: Negative for chills and fever  HENT: Negative for congestion, rhinorrhea and sore throat  Eyes: Negative for pain and visual disturbance  Respiratory: Negative for cough, shortness of breath and wheezing  Cardiovascular: Negative for chest pain and palpitations  Gastrointestinal: Positive for nausea  Negative for abdominal pain, blood in stool, constipation, diarrhea, melena and vomiting  Genitourinary: Positive for dysuria, flank pain and frequency  Negative for hematuria, urgency, vaginal bleeding and vaginal discharge  Musculoskeletal: Negative for back pain, neck pain and neck stiffness  Skin: Negative for rash and wound  Neurological: Negative for dizziness, weakness, light-headedness and numbness  Physical Exam  Physical Exam  Constitutional:       General: She is not in acute distress  Appearance: She is well-developed  She is not diaphoretic  HENT:      Head: Normocephalic and atraumatic  Right Ear: External ear normal       Left Ear: External ear normal    Eyes:      Conjunctiva/sclera: Conjunctivae normal       Pupils: Pupils are equal, round, and reactive to light  Cardiovascular:      Rate and Rhythm: Normal rate and regular rhythm  Heart sounds: Normal heart sounds  No murmur heard  No friction rub  No gallop  Pulmonary:      Effort: Pulmonary effort is normal  No respiratory distress  Breath sounds: Normal breath sounds  No wheezing  Abdominal:      General: There is no distension  Palpations: Abdomen is soft  Tenderness: There is no abdominal tenderness  There is left CVA tenderness  There is no right CVA tenderness  Musculoskeletal:      Cervical back: Normal range of motion and neck supple  Lymphadenopathy:      Cervical: No cervical adenopathy  Skin:     General: Skin is warm and dry  Capillary Refill: Capillary refill takes less than 2 seconds  Findings: No erythema or rash  Neurological:      Mental Status: She is alert and oriented to person, place, and time  Motor: No abnormal muscle tone  Coordination: Coordination normal    Psychiatric:         Behavior: Behavior normal          Thought Content:  Thought content normal          Judgment: Judgment normal          Vital Signs  ED Triage Vitals   Temperature Pulse Respirations Blood Pressure SpO2   06/13/21 1932 06/13/21 1932 06/13/21 1932 06/13/21 1932 06/13/21 1932   98 2 °F (36 8 °C) 74 14 113/60 100 %      Temp Source Heart Rate Source Patient Position - Orthostatic VS BP Location FiO2 (%)   06/13/21 1932 06/13/21 2159 06/13/21 1932 06/13/21 1932 --   Oral Monitor Sitting Right arm       Pain Score       06/13/21 1932       Worst Possible Pain           Vitals:    06/13/21 1932 06/13/21 2159   BP: 113/60 103/56   Pulse: 74 58   Patient Position - Orthostatic VS: Sitting Sitting         Visual Acuity      ED Medications  Medications   sodium chloride 0 9 % bolus 1,000 mL (0 mL Intravenous Stopped 6/13/21 2158)   ondansetron (ZOFRAN) injection 4 mg (4 mg Intravenous Given 6/13/21 2033)   ketorolac (TORADOL) injection 15 mg (15 mg Intravenous Given 6/13/21 2039)       Diagnostic Studies  Results Reviewed     Procedure Component Value Units Date/Time    Urine Microscopic [[id number]]  (Abnormal) Collected: 06/13/21 2008    Lab Status: Final result Specimen: Urine, Clean Catch Updated: 06/13/21 2056     RBC, UA Innumerable /hpf      WBC, UA None Seen /hpf      Epithelial Cells Occasional /hpf      Bacteria, UA Occasional /hpf     Basic metabolic panel [[id number]]  (Abnormal) Collected: 06/13/21 2035    Lab Status: Final result Specimen: Blood from Arm, Right Updated: 06/13/21 2052     Sodium 142 mmol/L      Potassium 3 9 mmol/L      Chloride 106 mmol/L      CO2 28 mmol/L      ANION GAP 8 mmol/L      BUN 16 mg/dL      Creatinine 0 81 mg/dL      Glucose 155 mg/dL      Calcium 8 6 mg/dL      eGFR 102 ml/min/1 73sq m     Narrative:      Agustin guidelines for Chronic Kidney Disease (CKD):     Stage 1 with normal or high GFR (GFR > 90 mL/min/1 73 square meters)    Stage 2 Mild CKD (GFR = 60-89 mL/min/1 73 square meters)    Stage 3A Moderate CKD (GFR = 45-59 mL/min/1 73 square meters)    Stage 3B Moderate CKD (GFR = 30-44 mL/min/1 73 square meters)    Stage 4 Severe CKD (GFR = 15-29 mL/min/1 73 square meters)    Stage 5 End Stage CKD (GFR <15 mL/min/1 73 square meters)  Note: GFR calculation is accurate only with a steady state creatinine    CBC and differential [[id number]]  (Abnormal) Collected: 06/13/21 2035    Lab Status: Final result Specimen: Blood from Arm, Right Updated: 06/13/21 2042     WBC 8 13 Thousand/uL      RBC 3 77 Million/uL      Hemoglobin 11 3 g/dL      Hematocrit 34 6 %      MCV 92 fL      MCH 30 0 pg      MCHC 32 7 g/dL      RDW 12 1 %      MPV 11 3 fL      Platelets 884 Thousands/uL      nRBC 0 /100 WBCs      Neutrophils Relative 69 %      Immat GRANS % 0 %      Lymphocytes Relative 22 %      Monocytes Relative 8 %      Eosinophils Relative 1 %      Basophils Relative 0 %      Neutrophils Absolute 5 64 Thousands/µL      Immature Grans Absolute 0 02 Thousand/uL      Lymphocytes Absolute 1 80 Thousands/µL      Monocytes Absolute 0 61 Thousand/µL      Eosinophils Absolute 0 04 Thousand/µL      Basophils Absolute 0 02 Thousands/µL     POCT pregnancy, urine [[id number]]  (Normal) Resulted: 06/13/21 2010    Lab Status: Final result Updated: 06/13/21 2011     EXT PREG TEST UR (Ref: Negative) negative     Control valid    Urine Macroscopic, POC [[id number]]  (Abnormal) Collected: 06/13/21 2008    Lab Status: Final result Specimen: Urine Updated: 06/13/21 2010     Color, UA Yellow     Clarity, UA Slightly Cloudy     pH, UA 6 0     Leukocytes, UA Negative     Nitrite, UA Negative     Protein, UA Trace mg/dl      Glucose, UA Negative mg/dl      Ketones, UA Negative mg/dl      Urobilinogen, UA 2 0 E U /dl      Bilirubin, UA Negative     Blood, UA Large     Specific Gravity, UA >=1 030    Narrative:      CLINITEK RESULT                 CT renal stone study abdomen pelvis wo contrast   Final Result by Radha Rosas MD (06/13 2318)      Very minimal left hydroureter secondary to 3 mm stone at the distal left ureter       Nonobstructive right nephrolithiasis                Workstation performed: [id number] Procedures  Procedures         ED Course                                           MDM  Number of Diagnoses or Management Options  Left ureteral stone  Diagnosis management comments: Left sided flank pain, frequency, dysuria over past day  Urine dip with large blood, no evidence of UTI  Will check labs including CBC, BMP for renal function, provide toradol, IV fluids, Zofran for nausea  Check CT renal study for exclusion of ureteral stone  Amount and/or Complexity of Data Reviewed  Clinical lab tests: ordered and reviewed  Tests in the radiology section of CPT®: ordered    Patient Progress  Patient progress: improved      Disposition  Final diagnoses:   Left ureteral stone     Time reflects when diagnosis was documented in both MDM as applicable and the Disposition within this note     Time User Action Codes Description Comment    6/13/2021 11:36 PM Brazoriabeata Tariq Add [N20 1] Left ureteral stone       ED Disposition     ED Disposition Condition Date/Time Comment    Discharge Stable Sun Jun 13, 2021 11:36 PM Keisha Pederson discharge to home/self care              Follow-up Information     Follow up With Specialties Details Why Contact Info Additional [address] [facility] Schedule an appointment as soon as possible for a visit   [facility]  [phone]  1  [facility] For Urology [facility], [address] [facility], [place], [place] [address]   [address] Emergency Department Emergency Medicine  If symptoms worsen [facility] [phone]  [address] [facility] [place] Emergency Department, [address] [facility] , [place], [place], [address]          Discharge Medication List as of 6/13/2021 11:43 PM      START taking these medications    Details   ketorolac (TORADOL) 10 mg tablet Take 1 tablet (10 mg total) by mouth every 6 (six) hours as needed for moderate pain, Starting Sun 6/13/2021, Normal      ondansetron (ZOFRAN-ODT) 4 mg disintegrating tablet Take 1 tablet (4 mg total) by mouth every 6 (six) hours as needed for nausea or vomiting, Starting Sun 6/13/2021, Normal      oxyCODONE-acetaminophen (PERCOCET) 5-325 mg per tablet Take 1 tablet by mouth every 6 (six) hours as needed for severe pain for up to 10 dosesMax Daily Amount: 4 tablets, Starting Sun 6/13/2021, Normal      tamsulosin (FLOMAX) 0 4 mg Take 1 capsule (0 4 mg total) by mouth daily with dinner for 7 days, Starting Sun 6/13/2021, Until Sun 6/20/2021, Normal         CONTINUE these medications which have NOT CHANGED    Details   fexofenadine (ALLEGRA) 60 MG tablet Take 1 tablet (60 mg total) by mouth 2 (two) times a day, Starting Thu 5/27/2021, Normal      fluticasone (FLONASE) 50 mcg/act nasal spray 1 spray into each nostril daily, Starting Thu 5/27/2021, Normal      Multiple Vitamins-Minerals (MULTIVITAMIN ADULT PO) Take by mouth daily, Historical Med      SUMAtriptan (IMITREX) 25 mg tablet Take 1 tablet (25 mg total) by mouth once as needed for migraine for up to 1 dose, Starting Thu 4/1/2021, Normal      topiramate (TOPAMAX) 25 mg tablet TAKE 1 TABLET BY MOUTH TWICE A DAY, Normal           No discharge procedures on file      PDMP Review       Value Time User    PDMP Reviewed  Yes 6/13/2021 11:41 PM Mey Grove PA-C          ED Provider  Electronically Signed by           Mey Grove PA-C  06/17/21 4435

## 2021-07-01 ENCOUNTER — TELEPHONE (OUTPATIENT)
Dept: UROLOGY | Facility: AMBULATORY SURGERY CENTER | Age: 26
End: 2021-07-01

## 2021-07-01 NOTE — TELEPHONE ENCOUNTER
Patient seen by Danae Thompson was in emergency room yesterday for kidney stones  Needs er follow up  [place] speaking

## 2021-07-02 NOTE — TELEPHONE ENCOUNTER
Call placed to Pt and she states that currently she is not having any pain, she was in the ED yesterday with pain and she called for a follow up appointment  Pt was put in for 07/06 with AP in our [employer]  Pt accept Appointment and was given the Address to this Location

## 2021-07-04 NOTE — PROGRESS NOTES
Assessment and plan:       1  Ureteral calculi   case request for cystoscopy, ureteroscopy, laser lithotripsy, retrograde pyelogram insertion ureteral stent    Schedule us of kidney and bladder and KUB now   Send urine for micro and culture   Restart flomax 0 4mg    ibuprofen as needed for pain    encouraged hydration with water        LES Barroso    History of Present Illness     Daniel Jimenez is a 26 y o  Female who was evaluated by Dr Kylee Hoyt on 05/13/2021 with severe right-sided flank pain  CT scan revealed 4 mm stone just above the bladder at that time case was requested for cystoscopy, ureteroscopy, laser lithotripsy, retrograde pyelogram and insertion of ureteral stent  She present to the emergency department on 06/13, 6/30 and 7/1 for ongoing flank pain  last imaging was in June  Will update her imaging and request surgery  Interestingly her CVA tenderness was on the right side, CT scan reveals left-sided kidney stone  Visit was done with   she is diabetic with an A1c of 7 5 in 2020  Laboratory     Lab Results   Component Value Date    BUN 16 06/13/2021    CREATININE 0 81 06/13/2021       No components found for: GFR    Lab Results   Component Value Date    GLUCOSE 239 (H) 12/29/2019    CALCIUM 8 6 06/13/2021     05/03/2017    K 3 9 06/13/2021    CO2 28 06/13/2021     06/13/2021       Lab Results   Component Value Date    WBC 8 13 06/13/2021    HGB 11 3 (L) 06/13/2021    HCT 34 6 (L) 06/13/2021    MCV 92 06/13/2021     06/13/2021       No results found for: PSA    No results found for this or any previous visit (from the past 1 hour(s))      @RESULT(URINEMICROSCOPIC)@    @RESULT(URINECULTURE)@    Radiology     CT ABDOMEN AND PELVIS WITHOUT IV CONTRAST - LOW DOSE RENAL STONE  06/13/2021     INDICATION:   Flank pain, kidney stone suspected  L flank/LLQ pain, ureteral stone suspected      COMPARISON:  5/5/2021      TECHNIQUE:  Low dose thin section CT examination of the abdomen and pelvis was performed without intravenous or oral contrast according to a protocol specifically designed to evaluate for urinary tract calculus  Axial, sagittal, and coronal 2D   reformatted images were created from the source data and submitted for interpretation  Evaluation for pathology in the abdomen and pelvis that is unrelated to urinary tract calculi is limited       Radiation dose length product (DLP) for this visit:  353 mGy-cm   This examination, like all CT scans performed in the [facility], was performed utilizing techniques to minimize radiation dose exposure, including the use of iterative   reconstruction and automated exposure control       FINDINGS:     RIGHT KIDNEY AND URETER:  There are nonobstructing intrarenal calculi measuring on the order of 1 mm at the inferior pole  No hydronephrosis or hydroureter      LEFT KIDNEY AND URETER:  Very minimal left hydroureter secondary to 3 mm stone at the distal left ureter (series 2, image 138)        URINARY BLADDER:   Unremarkable      No significant abnormality in the visualized lung bases      Limited low radiation dose noncontrast CT evaluation demonstrates no clinically significant abnormality of liver, spleen, pancreas, or adrenal glands  The gallbladder is surgically absent  No ascites or bulky lymphadenopathy on this limited noncontrast study  Status post sleeve gastrectomy  Limited evaluation demonstrates no evidence to suggest acute appendicitis  No acute fracture or destructive osseous lesion is identified  IUD in place     IMPRESSION:     Very minimal left hydroureter secondary to 3 mm stone at the distal left ureter   Review of Systems     Review of Systems   Constitutional: Positive for chills  Negative for activity change, appetite change, fatigue, fever and unexpected weight change  HENT: Negative for facial swelling  Eyes: Negative for discharge     Respiratory: Negative  Negative for cough and shortness of breath  Cardiovascular: Negative for chest pain and leg swelling  Gastrointestinal: Negative  Negative for abdominal distention, abdominal pain, constipation, diarrhea, nausea and vomiting  Cramping   Endocrine: Negative  Genitourinary: Positive for dysuria, flank pain and frequency  Negative for decreased urine volume, difficulty urinating, enuresis, genital sores, hematuria and urgency  Musculoskeletal: Negative for back pain and myalgias  Skin: Negative for pallor and rash  Allergic/Immunologic: Negative  Negative for immunocompromised state  Neurological: Negative for facial asymmetry and speech difficulty  Psychiatric/Behavioral: Negative for agitation and confusion  Allergies     No Known Allergies    Physical Exam     Physical Exam  Vitals reviewed  Constitutional:       General: She is not in acute distress  Appearance: Normal appearance  She is normal weight  She is not ill-appearing, toxic-appearing or diaphoretic  HENT:      Head: Normocephalic and atraumatic  Eyes:      General: No scleral icterus  Cardiovascular:      Rate and Rhythm: Normal rate  Pulmonary:      Effort: Pulmonary effort is normal  No respiratory distress  Abdominal:      General: Abdomen is flat  There is no distension  Palpations: Abdomen is soft  Tenderness: There is no abdominal tenderness  There is right CVA tenderness  There is no left CVA tenderness, guarding or rebound  Musculoskeletal:         General: No swelling  Cervical back: Normal range of motion  Right lower leg: No edema  Left lower leg: No edema  Skin:     General: Skin is warm and dry  Coloration: Skin is not jaundiced or pale  Findings: No rash  Neurological:      General: No focal deficit present  Mental Status: She is alert and oriented to person, place, and time        Gait: Gait normal    Psychiatric:         Mood and Affect: Mood normal          Behavior: Behavior normal          Thought Content:  Thought content normal          Judgment: Judgment normal          Vital Signs     Vitals:    07/06/21 1111   BP: 118/70   Weight: 84 8 kg (187 lb)   Height: 5' 10" (1 778 m)       Current Medications       Current Outpatient Medications:     fexofenadine (ALLEGRA) 60 MG tablet, Take 1 tablet (60 mg total) by mouth 2 (two) times a day (Patient not taking: Reported on 6/13/2021), Disp: 20 tablet, Rfl: 0    fluticasone (FLONASE) 50 mcg/act nasal spray, 1 spray into each nostril daily (Patient not taking: Reported on 6/13/2021), Disp: 16 g, Rfl: 0    ketorolac (TORADOL) 10 mg tablet, Take 1 tablet (10 mg total) by mouth every 6 (six) hours as needed for moderate pain, Disp: 20 tablet, Rfl: 0    Multiple Vitamins-Minerals (MULTIVITAMIN ADULT PO), Take by mouth daily, Disp: , Rfl:     ondansetron (ZOFRAN-ODT) 4 mg disintegrating tablet, Take 1 tablet (4 mg total) by mouth every 6 (six) hours as needed for nausea or vomiting, Disp: 20 tablet, Rfl: 0    oxyCODONE-acetaminophen (PERCOCET) 5-325 mg per tablet, Take 1 tablet by mouth every 6 (six) hours as needed for severe pain for up to 10 dosesMax Daily Amount: 4 tablets, Disp: 10 tablet, Rfl: 0    SUMAtriptan (IMITREX) 25 mg tablet, Take 1 tablet (25 mg total) by mouth once as needed for migraine for up to 1 dose (Patient not taking: Reported on 6/13/2021), Disp: 15 tablet, Rfl: 0    tamsulosin (FLOMAX) 0 4 mg, Take 1 capsule (0 4 mg total) by mouth daily with dinner for 7 days, Disp: 30 capsule, Rfl: 0    topiramate (TOPAMAX) 25 mg tablet, TAKE 1 TABLET BY MOUTH TWICE A DAY (Patient not taking: Reported on 6/13/2021), Disp: 60 tablet, Rfl: 1    Active Problems     Patient Active Problem List   Diagnosis    Postpartum cardiomyopathy    Gallbladder disease    Diabetes mellitus type 2 in obese ([facility] [address] )    Overweight with body mass index (BMI) of 28 to 28 9 in adult    Chronic migraine with aura    Status post laparoscopic sleeve gastrectomy    Vulvar cyst    Calculus of ureter       Past Medical History     Past Medical History:   Diagnosis Date    Diabetes mellitus ([address] )     NIDDM    History of transfusion     after  - had fever with transfusion    Obesity (BMI 35 0-39 9 without comorbidity)     Uses Grenadian as primary spoken language        Surgical History     Past Surgical History:   Procedure Laterality Date     SECTION  2017    CHOLECYSTECTOMY      [place] LAP, URI RESTRICT PROC, LONGITUDINAL GASTRECTOMY N/A 2020    Procedure: LAP SLEEVE GASTRECTOMY, INTRAOP EGD;  Surgeon: Pily Romano MD;  [facility];  Service: Bariatrics       Family History     Family History   Problem Relation Age of Onset    Heart disease Family         CARDIOVASCULAR DISEASE    Diabetes Mother     Other Mother         gastric bypass    Diabetes Father     Diabetes Sister     Sleep apnea Sister     Sleep apnea Brother         2 of the 3 borthers have EAGLE    Diabetes Maternal Grandmother     Diabetes Paternal Grandmother        Social History     Social History     Social History     Tobacco Use   Smoking Status Never Smoker   Smokeless Tobacco Never Used       Past Surgical History:   Procedure Laterality Date     SECTION  2017    CHOLECYSTECTOMY      [place] LAP, URI RESTRICT PROC, LONGITUDINAL GASTRECTOMY N/A 2020    Procedure: LAP SLEEVE GASTRECTOMY, INTRAOP EGD;  Surgeon: Pily Romano MD;  [facility];  Service: Bariatrics         The following portions of the patient's history were reviewed and updated as appropriate: allergies, current medications, past family history, past medical history, past social history, past surgical history and problem list    Please note :  Voice dictation software has been used to create this document  There may be inadvertent transcription errors      [id number] [address]

## 2021-07-06 ENCOUNTER — OFFICE VISIT (OUTPATIENT)
Dept: UROLOGY | Facility: CLINIC | Age: 26
End: 2021-07-06
Payer: COMMERCIAL

## 2021-07-06 VITALS
BODY MASS INDEX: 26.77 KG/M2 | SYSTOLIC BLOOD PRESSURE: 118 MMHG | HEIGHT: 70 IN | WEIGHT: 187 LBS | DIASTOLIC BLOOD PRESSURE: 70 MMHG

## 2021-07-06 DIAGNOSIS — N20.1 CALCULUS OF URETER: Primary | ICD-10-CM

## 2021-07-06 DIAGNOSIS — N20.1 LEFT URETERAL STONE: ICD-10-CM

## 2021-07-06 LAB
SL AMB  POCT GLUCOSE, UA: NORMAL
SL AMB LEUKOCYTE ESTERASE,UA: NORMAL
SL AMB POCT BILIRUBIN,UA: NORMAL
SL AMB POCT BLOOD,UA: NORMAL
SL AMB POCT CLARITY,UA: CLEAR
SL AMB POCT COLOR,UA: YELLOW
SL AMB POCT KETONES,UA: NORMAL
SL AMB POCT NITRITE,UA: NORMAL
SL AMB POCT PH,UA: 5
SL AMB POCT SPECIFIC GRAVITY,UA: 1.02
SL AMB POCT URINE PROTEIN: NORMAL
SL AMB POCT UROBILINOGEN: NORMAL

## 2021-07-06 PROCEDURE — 99213 OFFICE O/P EST LOW 20 MIN: CPT | Performed by: NURSE PRACTITIONER

## 2021-07-06 PROCEDURE — 1036F TOBACCO NON-USER: CPT | Performed by: NURSE PRACTITIONER

## 2021-07-06 PROCEDURE — 3008F BODY MASS INDEX DOCD: CPT | Performed by: NURSE PRACTITIONER

## 2021-07-06 PROCEDURE — 81002 URINALYSIS NONAUTO W/O SCOPE: CPT | Performed by: NURSE PRACTITIONER

## 2021-07-06 PROCEDURE — 3061F NEG MICROALBUMINURIA REV: CPT | Performed by: NURSE PRACTITIONER

## 2021-07-06 RX ORDER — TAMSULOSIN HYDROCHLORIDE 0.4 MG/1
0.4 CAPSULE ORAL
Qty: 30 CAPSULE | Refills: 0 | Status: SHIPPED | OUTPATIENT
Start: 2021-07-06 | End: 2021-07-26

## 2021-07-06 NOTE — PATIENT INSTRUCTIONS
Cálculos renales   LO QUE NECESITA SABER:   Los cálculos renales se james en el sistema urinario cuando el agua y los residuos de la orina no están jaqueline balanceados  Cuando esto sucede, ciertos tipos de cristian de desecho se separan de la orina  Los cristian se acumulan y james piedras en los riñones  Podría tener más de un cálculo  INSTRUCCIONES SOBRE EL [facility] HOSPITALARIA:   Regrese a la clover de emergencias si:  · Usted tiene vómitos que no se alivian con medicación  Comuníquese con escamilla médico si:  · Tiene fiebre  · Usted tiene dificultad para orinar  · Usted orina con jonathan  · Usted tiene dolor intenso  · Tiene alguna pregunta o inquietud acerca de escamilla condición o cuidado  Medicamentos:  · [place], chato el ibuprofeno, Urdu [facility] a disminuir la inflamación, el dolor y la [place]  Anand medicamento está disponible con o sin yazan receta médica  Los JAXSON pueden causar sangrado estomacal o problemas renales en ciertas personas  Si usted yogesh un medicamento anticoagulante, siempre pregúntele a escamilla médico si los JAXSON son seguros para usted  Siempre abdiel la etiqueta de anand medicamento y [place] instrucciones  · Puede administrarse podrían administrarse  Pregunte al médico cómo debe yusef anand medicamento de forma irvin  Algunos medicamentos recetados para el dolor contienen acetaminofén  No tome otros medicamentos que contengan acetaminofén sin consultarlo con escamilla médico  Demasiado acetaminofeno puede causar daño al hígado  Los medicamentos recetados para el dolor podrían causar estreñimiento  Pregunte a escamilla médico chato prevenir o tratar estreñimiento  · Los medicamentos para balancear el nivel de los electrolitos  · [place] alo medicamentos chato se le haya indicado  Consulte con escamilla médico si usted yvan que escamilla medicamento no le está ayudando o si presenta efectos secundarios  Infórmele si es alérgico a cualquier medicamento   Mantenga yazan lista actualizada de los [place], las vitaminas y los productos herbales que yogesh  Incluya los siguientes datos de los medicamentos: cantidad, frecuencia y motivo de administración  Traiga con usted la lista o los envases de las píldoras a deidra citas de seguimiento  Lleve la lista de los medicamentos con usted en tanya de yazan emergencia  Acuda a deidra consultas de control con escamilla médico según le indicaron  Es posible que necesite regresar para que le realicen más exámenes  Anote deidra preguntas para que se acuerde de hacerlas phyllis deidra visitas  Lo que puede hacer para controlar los cálculos:  · Ingiera más líquidos  Es probable que escamilla médico le indique que tome al menos 8 a 12 vasos (de ocho onzas) de líquidos al día  [place] ayuda a [employer] cálculos renales cuando usted orina  El agua es el mejor líquido para yusef  · Cuele la orina cada vez que use el baño  Orine por medio de un colador o un pedazo de mayra salcedo para así recolectar los cálculos  Lleve los cálculos donde escamilla médico para que pueda enviarlos al laboratorio y realizarles exámenes  [place] ayudará a escamilla médico a planear el mejor tratamiento para usted  · Consuma alimentos saludables y variados  Los alimentos sanos incluyen frutas, vegetales, panes integrales, productos lácteos bajos en grasas, frijoles y pescado  Es probable que usted tenga que limitar la cantidad de sodio (sal) o proteínas que usted come  Pida más información sobre los mejores alimentos para usted  · Manténgase activo  Deidra cálculos pueden pasar con más facilidad si usted permanece activo  También puede ayudarle a controlar escamilla peso  Pregunte sobre cuáles son las mejores actividades para usted  Después de eliminar los cálculos renales: Escamilla médico puede  ordenar un examen de orina de 24 horas  Los [employer] del examen de orina de 24 horas ayudarán a escamilla médico a planear las formas de evitar la formación de más cálculos  Escamilla médico le proporcionará más instrucciones    © Copyright [facility] 2020 Information is for End User's use only and may not be sold, redistributed or otherwise used for commercial purposes  All illustrations and images included in CareNotes® are the copyrighted property of A D A M , Inc  or [address] [place] [address] es sólo para uso en educación  Escamilla intención no es darle un consejo médico sobre enfermedades o tratamientos  Colsulte con escamilla [place] farmacéutico antes de seguir cualquier régimen médico para saber si es seguro y efectivo para usted  Cistoscopia   CUIDADO AMBULATORIO:   Randell Gerard  es un procedimiento para mirar dentro de la uretra y la vejiga usando un cistoscopio  Un cistoscopio es un tubo pequeño con Johnny Art cheyenne y [place] con lente de aumento en el extremo  El procedimiento se [place] para diagnosticar y tratar condiciones de la vejiga, uretra y próstata  El procedimiento también se realiza para remover cálculos o coágulos de jonathan de la uretra o la vejiga  Es posible que escamilla médico realice otros exámenes, chato yazan ureteroscopía, phyllis yazan cistoscopía  Prepárese para escamilla cistoscopía:  Es posible que usted necesite dejar de fumar por varios días antes de escamilla procedimiento si va a recibir anestesia general  Informe a escamilla médico sobre los [employer] usted yogesh  Escamilla médico le informará que medicamentos yusef o no yusef en el día de escamilla procedimiento  Es posible que necesite dejar de yusef medicamentos chato anticoagulantes, aspirina e ibuprofeno varios días antes de escamilla procedimiento  Es posible que él le diga que no coma después de la medianoche la noche antes de escamilla procedimiento  Es posible que le pidan que tome yazan gran cantidad de líquidos antes de escamilla procedimiento  Póngase de acuerdo con alguien para que lo lleve a escamilla casa después del procedimiento  Phyllis escamilla cistoscopía:   · Es posible que le administren anestesia general para mantenerlo dormido y sin dolor phyllis escamilla procedimiento   Es posible que escamilla médico le administre anestesia en escamilla columna vertebral  Con la anestesia raquídea la parte baja de sanchez cuerpo estará entumecida  Usted no sentirá dolor phyllis el procedimiento  Es posible que sanchez médico use en cambio anestesia local que es colocada dentro de sanchez uretra y vejiga  Usted no sentirá dolor, bari es posible que usted sienta algo de presión phyllis el procedimiento  Con la anestesia local, es posible que sienta ardor o la necesidad de orinar cuando el cistoscopio es insertado o removido  · Usted será colocado sobre sanchez espalda y es posible que alo pies micheal colocados en estribos  El cistoscopio será colocado a través de sanchez uretra y dentro de sanchez vejiga  El urólogo mirará las beatty de sanchez uretra a medida que el cistoscopio atraviesa hacia sanchez vejiga  Es posible que sanchez vejiga sea llenada con un liquido de irrigación para ayudar al urólogo a kyree dentro de la vejiga más claramente  Es posible que se usen instrumentos médicos para remover tejido o cálculos  Es posible que sanchez urólogo use un instrumento especial para detener el sangrado en sanchez vejiga  Si hay coágulos sanguíneos en sanchez vejiga, sanchez médico inyectará un fluido de irrigación dentro de sanchez vejiga  Entonces él usará succión para remover el fluido y los coágulos sanguíneos  Después de yazan cistoscopía:  Después de que usted esté completamente despierto, usted se irá a sanchez casa  Después de la cistoscopía, es normal tener orina de color barron  También es normal tener mayor necesidad de orinar  Es posible que tenga ardor cuando [facility]  Si tuvo anestesia general, es posible que le tome 24 horas antes de sentirse chato usted mismo  Riesgos de yazan cistoscopía:  Es posible que usted jonathan más de lo esperado o que desarrolle yazan infección  La inflamación causada por la cistoscopía podría causar un bloqueo o un flujo lento de [facility]  Busque atención médica de inmediato si:   · Sanchez orina se torna de color barron a johnson o si usted tiene coágulos en sanchez orina  · Usted no puede orinar y sanchez vejiga se siente llena      · El dolor o el ardor se vuelve peor o dura más de 2 días  Comuníquese con escamilla médico o urólogo si:   · Escamilla orina permanece rosada por más de 3 días  · El dolor o el ardor JAYNEANGELICA  · Escamilla piel tiene comezón, inflamación o tiene un sarpullido nuevo  · Usted tiene fiebre o escalofríos  · Usted tiene preguntas o inquietudes acerca de escamilla condición o cuidado  Después de escamilla cistoscopía:  Es normal tener orina de color barron  También es normal tener mayor necesidad de orinar y ardor cuando orine  Si tuvo anestesia general, es posible que le tome 24 horas antes de sentirse chato usted mismo  · [place] por lo menos 3 a 4 vasos de agua al día phyllis 2 días después de escamilla procedimiento  Evite jugos ácidos chato el jugo de naranja y la [place]  El agua puede ayudar a prevenir la formación de coágulos sanguíneos  También puede ayudar a disminuir la cantidad de ácido en escamilla orina que puede causar ardor  · Siéntese en yazan binta con agua tibia  Los jessica tibios [employer] y los espasmos de la vejiga  · No tenga relaciones sexuales  hasta que escamilla médico le diga que está [place]  Es posible que tener relaciones sexuales aumente el riesgo de yazan infección del tracto urinario  Medicamentos:  A usted  podrían  darle alguno de lo siguientes:  · Antibióticos  ayudan a tratar o prevenir infecciones bacteriales  · Acetaminofeno:  randell el dolor y baja la fiebre  Está disponible sin receta médica  Pregunte la cantidad y la frecuencia con que debe tomarlos  [address]  Coleen las etiquetas de todos los demás medicamentos que esté usando para saber si también contienen acetaminofén, o pregunte a escamilla médico o farmacéutico  El acetaminofén puede causar daño en el hígado cuando no se yogesh de forma correcta  No use más de 4 gramos (4000 miligramos) en total de acetaminofeno en un día  · [place] alo medicamentos chato se le haya indicado    Consulte con escamilla médico si usted yvan que escamilla medicamento no le está ayudando o si presenta efectos secundarios  Infórmele si es alérgico a cualquier medicamento  Mantenga yazan lista actualizada de los [place], las vitaminas y los productos herbales que yogesh  Incluya los siguientes datos de los medicamentos: cantidad, frecuencia y motivo de administración  Traiga con usted la lista o los envases de la píldoras a alo citas de seguimiento  Lleve la lista de los medicamentos con usted en tanya de yazan emergencia  Acuda a alo consultas de control con sanchez médico según le indicaron  Es posible que usted necesite regresar para tener otra cistoscopía  Anote alo preguntas para que se acuerde de hacerlas phyllis alo visitas  © 2017 [address] Information is for End User's use only and may not be sold, redistributed or otherwise used for commercial purposes  All illustrations and images included in CareNotes® are the copyrighted property of A D A M , Inc  or Ritesh Pelaez  Esta información es sólo para uso en educación  Sanchez intención no es darle un consejo médico sobre enfermedades o tratamientos  Colsulte con sanchez Benuel [place]anda[place] farmacéutico antes de seguir cualquier régimen médico para saber si es seguro y efectivo para usted

## 2021-07-09 ENCOUNTER — HOSPITAL ENCOUNTER (OUTPATIENT)
Dept: RADIOLOGY | Facility: HOSPITAL | Age: 26
Discharge: HOME | End: 2021-07-09
Payer: COMMERCIAL

## 2021-07-09 ENCOUNTER — HOSPITAL ENCOUNTER (OUTPATIENT)
Dept: ULTRASOUND IMAGING | Facility: HOSPITAL | Age: 26
Discharge: HOME | End: 2021-07-09
Payer: COMMERCIAL

## 2021-07-09 DIAGNOSIS — N20.1 CALCULUS OF URETER: ICD-10-CM

## 2021-07-09 PROCEDURE — 74018 RADEX ABDOMEN 1 VIEW: CPT

## 2021-07-09 PROCEDURE — 76770 US EXAM ABDO BACK WALL COMP: CPT

## 2021-07-11 ENCOUNTER — APPOINTMENT (EMERGENCY)
Dept: CT IMAGING | Facility: HOSPITAL | Age: 26
End: 2021-07-11
Payer: COMMERCIAL

## 2021-07-11 ENCOUNTER — HOSPITAL ENCOUNTER (EMERGENCY)
Facility: HOSPITAL | Age: 26
Discharge: HOME | End: 2021-07-11
Attending: EMERGENCY MEDICINE | Admitting: EMERGENCY MEDICINE
Payer: COMMERCIAL

## 2021-07-11 VITALS
OXYGEN SATURATION: 100 % | DIASTOLIC BLOOD PRESSURE: 45 MMHG | SYSTOLIC BLOOD PRESSURE: 95 MMHG | HEART RATE: 57 BPM | TEMPERATURE: 97.4 F | BODY MASS INDEX: 27.39 KG/M2 | WEIGHT: 190.9 LBS | RESPIRATION RATE: 16 BRPM

## 2021-07-11 DIAGNOSIS — N20.0 RENAL STONE: ICD-10-CM

## 2021-07-11 DIAGNOSIS — N39.0 UTI (URINARY TRACT INFECTION): Primary | ICD-10-CM

## 2021-07-11 LAB
ALBUMIN SERPL BCP-MCNC: 4.2 G/DL (ref 3–5.2)
ALP SERPL-CCNC: 61 U/L (ref 43–122)
ALT SERPL W P-5'-P-CCNC: 14 U/L
ANION GAP SERPL CALCULATED.3IONS-SCNC: 8 MMOL/L (ref 5–14)
AST SERPL W P-5'-P-CCNC: 16 U/L (ref 14–36)
BACTERIA UR QL AUTO: ABNORMAL /HPF
BASOPHILS # BLD AUTO: 0 THOUSANDS/ΜL (ref 0–0.1)
BASOPHILS NFR BLD AUTO: 0 % (ref 0–1)
BILIRUB SERPL-MCNC: 1.88 MG/DL
BILIRUB UR QL STRIP: NEGATIVE
BUN SERPL-MCNC: 16 MG/DL (ref 5–25)
CALCIUM SERPL-MCNC: 9.3 MG/DL (ref 8.4–10.2)
CHLORIDE SERPL-SCNC: 105 MMOL/L (ref 97–108)
CLARITY UR: ABNORMAL
CO2 SERPL-SCNC: 26 MMOL/L (ref 22–30)
COLOR UR: YELLOW
CREAT SERPL-MCNC: 0.68 MG/DL (ref 0.6–1.2)
EOSINOPHIL # BLD AUTO: 0 THOUSAND/ΜL (ref 0–0.4)
EOSINOPHIL NFR BLD AUTO: 1 % (ref 0–6)
ERYTHROCYTE [DISTWIDTH] IN BLOOD BY AUTOMATED COUNT: 13.4 %
EXT PREG TEST URINE: NEGATIVE
EXT. CONTROL ED NAV: NORMAL
GFR SERPL CREATININE-BSD FRML MDRD: 123 ML/MIN/1.73SQ M
GLUCOSE SERPL-MCNC: 111 MG/DL (ref 70–99)
GLUCOSE UR STRIP-MCNC: NEGATIVE MG/DL
HCT VFR BLD AUTO: 35.8 % (ref 36–46)
HGB BLD-MCNC: 12 G/DL (ref 12–16)
HGB UR QL STRIP.AUTO: 250
KETONES UR STRIP-MCNC: NEGATIVE MG/DL
LEUKOCYTE ESTERASE UR QL STRIP: 25
LIPASE SERPL-CCNC: 28 U/L (ref 23–300)
LYMPHOCYTES # BLD AUTO: 1 THOUSANDS/ΜL (ref 0.5–4)
LYMPHOCYTES NFR BLD AUTO: 17 % (ref 25–45)
MCH RBC QN AUTO: 30.1 PG (ref 26–34)
MCHC RBC AUTO-ENTMCNC: 33.5 G/DL (ref 31–36)
MCV RBC AUTO: 90 FL (ref 80–100)
MONOCYTES # BLD AUTO: 0.4 THOUSAND/ΜL (ref 0.2–0.9)
MONOCYTES NFR BLD AUTO: 7 % (ref 1–10)
MUCOUS THREADS UR QL AUTO: ABNORMAL
NEUTROPHILS # BLD AUTO: 4.6 THOUSANDS/ΜL (ref 1.8–7.8)
NEUTS SEG NFR BLD AUTO: 75 % (ref 45–65)
NITRITE UR QL STRIP: NEGATIVE
NON-SQ EPI CELLS URNS QL MICRO: ABNORMAL /HPF
PH UR STRIP.AUTO: 6 [PH]
PLATELET # BLD AUTO: 129 THOUSANDS/UL (ref 150–450)
PMV BLD AUTO: 10.8 FL (ref 8.9–12.7)
POTASSIUM SERPL-SCNC: 3.7 MMOL/L (ref 3.6–5)
PROT SERPL-MCNC: 7.1 G/DL (ref 5.9–8.4)
PROT UR STRIP-MCNC: ABNORMAL MG/DL
RBC # BLD AUTO: 3.99 MILLION/UL (ref 4–5.2)
RBC #/AREA URNS AUTO: ABNORMAL /HPF
SODIUM SERPL-SCNC: 139 MMOL/L (ref 137–147)
SP GR UR STRIP.AUTO: 1.02 (ref 1–1.04)
UROBILINOGEN UA: NEGATIVE MG/DL
WBC # BLD AUTO: 6.1 THOUSAND/UL (ref 4.5–11)
WBC #/AREA URNS AUTO: ABNORMAL /HPF

## 2021-07-11 PROCEDURE — 96375 TX/PRO/DX INJ NEW DRUG ADDON: CPT

## 2021-07-11 PROCEDURE — 74176 CT ABD & PELVIS W/O CONTRAST: CPT

## 2021-07-11 PROCEDURE — 96374 THER/PROPH/DIAG INJ IV PUSH: CPT

## 2021-07-11 PROCEDURE — 81001 URINALYSIS AUTO W/SCOPE: CPT | Performed by: PHYSICIAN ASSISTANT

## 2021-07-11 PROCEDURE — 99284 EMERGENCY DEPT VISIT MOD MDM: CPT

## 2021-07-11 PROCEDURE — 81003 URINALYSIS AUTO W/O SCOPE: CPT | Performed by: PHYSICIAN ASSISTANT

## 2021-07-11 PROCEDURE — 80053 COMPREHEN METABOLIC PANEL: CPT | Performed by: PHYSICIAN ASSISTANT

## 2021-07-11 PROCEDURE — 81025 URINE PREGNANCY TEST: CPT | Performed by: PHYSICIAN ASSISTANT

## 2021-07-11 PROCEDURE — 85025 COMPLETE CBC W/AUTO DIFF WBC: CPT | Performed by: PHYSICIAN ASSISTANT

## 2021-07-11 PROCEDURE — 36415 COLL VENOUS BLD VENIPUNCTURE: CPT | Performed by: PHYSICIAN ASSISTANT

## 2021-07-11 PROCEDURE — 99285 EMERGENCY DEPT VISIT HI MDM: CPT | Performed by: PHYSICIAN ASSISTANT

## 2021-07-11 PROCEDURE — 96361 HYDRATE IV INFUSION ADD-ON: CPT

## 2021-07-11 PROCEDURE — 83690 ASSAY OF LIPASE: CPT | Performed by: PHYSICIAN ASSISTANT

## 2021-07-11 RX ORDER — CEPHALEXIN 500 MG/1
500 CAPSULE ORAL EVERY 8 HOURS SCHEDULED
Qty: 30 CAPSULE | Refills: 0 | Status: SHIPPED | OUTPATIENT
Start: 2021-07-11 | End: 2021-07-11 | Stop reason: SDUPTHER

## 2021-07-11 RX ORDER — SODIUM CHLORIDE 9 MG/ML
250 INJECTION, SOLUTION INTRAVENOUS CONTINUOUS
Status: DISCONTINUED | OUTPATIENT
Start: 2021-07-11 | End: 2021-07-11 | Stop reason: HOSPADM

## 2021-07-11 RX ORDER — KETOROLAC TROMETHAMINE 30 MG/ML
30 INJECTION, SOLUTION INTRAMUSCULAR; INTRAVENOUS ONCE
Status: COMPLETED | OUTPATIENT
Start: 2021-07-11 | End: 2021-07-11

## 2021-07-11 RX ORDER — ONDANSETRON 2 MG/ML
4 INJECTION INTRAMUSCULAR; INTRAVENOUS ONCE
Status: COMPLETED | OUTPATIENT
Start: 2021-07-11 | End: 2021-07-11

## 2021-07-11 RX ORDER — CEPHALEXIN 500 MG/1
500 CAPSULE ORAL EVERY 8 HOURS SCHEDULED
Qty: 30 CAPSULE | Refills: 0 | Status: SHIPPED | OUTPATIENT
Start: 2021-07-11 | End: 2021-07-21

## 2021-07-11 RX ORDER — MORPHINE SULFATE 4 MG/ML
4 INJECTION, SOLUTION INTRAMUSCULAR; INTRAVENOUS ONCE
Status: COMPLETED | OUTPATIENT
Start: 2021-07-11 | End: 2021-07-11

## 2021-07-11 RX ADMIN — KETOROLAC TROMETHAMINE 30 MG: 30 INJECTION, SOLUTION INTRAMUSCULAR; INTRAVENOUS at 08:31

## 2021-07-11 RX ADMIN — MORPHINE SULFATE 4 MG: 4 INJECTION INTRAVENOUS at 09:30

## 2021-07-11 RX ADMIN — ONDANSETRON 4 MG: 2 INJECTION INTRAMUSCULAR; INTRAVENOUS at 08:31

## 2021-07-11 RX ADMIN — SODIUM CHLORIDE 250 ML/HR: 0.9 INJECTION, SOLUTION INTRAVENOUS at 08:34

## 2021-07-11 NOTE — ED PROVIDER NOTES
History  Chief Complaint   Patient presents with    Flank Pain     R flank - x2 weeks  L flank - this morning  Saw urologist last week who prescribed Ketoralac and Tamulosin, pt has been taking both as prescribed  Last took this morning  +dysuria  Pt denies n/v/d/fevers  hx of kidney stones     Pt with left flank pain and urine burning since yesterday       History provided by:  Patient  Flank Pain  Pain location:  L flank  Pain quality: sharp and stabbing    Pain radiates to:  L flank  Pain severity:  Moderate  Onset quality:  Gradual  Duration:  2 days  Timing:  Constant  Progression:  Worsening  Chronicity:  Recurrent  Context: not alcohol use    Relieved by:  Nothing  Worsened by:  Nothing  Ineffective treatments:  None tried  Associated symptoms: no anorexia    Risk factors: no alcohol abuse        Prior to Admission Medications   Prescriptions Last Dose Informant Patient Reported? Taking?    Multiple Vitamins-Minerals (MULTIVITAMIN ADULT PO) 2021 at Unknown time Self Yes Yes   Sig: Take by mouth daily   SUMAtriptan (IMITREX) 25 mg tablet Not Taking at Unknown time Self No No   Sig: Take 1 tablet (25 mg total) by mouth once as needed for migraine for up to 1 dose   Patient not taking: Reported on 2021   fexofenadine (ALLEGRA) 60 MG tablet Not Taking at Unknown time  No No   Sig: Take 1 tablet (60 mg total) by mouth 2 (two) times a day   Patient not taking: Reported on 2021   fluticasone (FLONASE) 50 mcg/act nasal spray Not Taking at Unknown time  No No   Si spray into each nostril daily   Patient not taking: Reported on 2021   ketorolac (TORADOL) 10 mg tablet 2021 at Unknown time  No Yes   Sig: Take 1 tablet (10 mg total) by mouth every 6 (six) hours as needed for moderate pain   ondansetron (ZOFRAN-ODT) 4 mg disintegrating tablet Not Taking at Unknown time  No No   Sig: Take 1 tablet (4 mg total) by mouth every 6 (six) hours as needed for nausea or vomiting   Patient not taking: Reported on 2021   oxyCODONE-acetaminophen (PERCOCET) 5-325 mg per tablet 2021 at Unknown time  No Yes   Sig: Take 1 tablet by mouth every 6 (six) hours as needed for severe pain for up to 10 dosesMax Daily Amount: 4 tablets   tamsulosin (FLOMAX) 0 4 mg 2021 at Unknown time  No Yes   Sig: Take 1 capsule (0 4 mg total) by mouth daily with dinner for 7 days   topiramate (TOPAMAX) 25 mg tablet Not Taking at Unknown time  No No   Sig: TAKE 1 TABLET BY MOUTH TWICE A DAY   Patient not taking: Reported on 2021      Facility-Administered Medications: None       Past Medical History:   Diagnosis Date    Diabetes mellitus ([facility] [address] )     NIDDM    History of transfusion     after  - had fever with transfusion    Obesity (BMI 35 0-39 9 without comorbidity)     Uses Hebrew as primary spoken language        Past Surgical History:   Procedure Laterality Date     SECTION  2017    CHOLECYSTECTOMY      NE LAP, URI RESTRICT PROC, LONGITUDINAL GASTRECTOMY N/A 2020    Procedure: LAP SLEEVE GASTRECTOMY, INTRAOP EGD;  Surgeon: Linnette Louise MD;  Location: [facility] OR;  Service: Bariatrics       Family History   Problem Relation Age of Onset    Heart disease Family         CARDIOVASCULAR DISEASE    Diabetes Mother     Other Mother         gastric bypass    Diabetes Father     Diabetes Sister     Sleep apnea Sister     Sleep apnea Brother         2 of the 3 borthers have EAGLE    Diabetes Maternal Grandmother     Diabetes Paternal Grandmother      I have reviewed and agree with the history as documented  E-Cigarette/Vaping    E-Cigarette Use Never User      E-Cigarette/Vaping Substances     Social History     Tobacco Use    Smoking status: Never Smoker    Smokeless tobacco: Never Used   Vaping Use    Vaping Use: Never used   Substance Use Topics    Alcohol use: No    Drug use: No       Review of Systems   Constitutional: Negative  HENT: Negative  Eyes: Negative  Respiratory: Negative  Cardiovascular: Negative  Gastrointestinal: Negative  Negative for anorexia  Endocrine: Negative  Genitourinary: Positive for flank pain  Allergic/Immunologic: Negative  Neurological: Negative  Hematological: Negative  Psychiatric/Behavioral: Negative  All other systems reviewed and are negative  Physical Exam  Physical Exam  Vitals and nursing note reviewed  Constitutional:       Appearance: Normal appearance  She is normal weight  HENT:      Head: Normocephalic and atraumatic  Right Ear: Tympanic membrane, ear canal and external ear normal       Left Ear: Tympanic membrane, ear canal and external ear normal       Nose: Nose normal       Mouth/Throat:      Mouth: Mucous membranes are moist    Eyes:      Conjunctiva/sclera: Conjunctivae normal       Pupils: Pupils are equal, round, and reactive to light  Cardiovascular:      Rate and Rhythm: Normal rate and regular rhythm  Pulses: Normal pulses  Heart sounds: Normal heart sounds  Pulmonary:      Effort: Pulmonary effort is normal       Breath sounds: Normal breath sounds  Abdominal:      General: Abdomen is flat  Bowel sounds are normal       Palpations: Abdomen is soft  Musculoskeletal:         General: Normal range of motion  Cervical back: Normal range of motion and neck supple  Skin:     General: Skin is warm  Capillary Refill: Capillary refill takes less than 2 seconds  Neurological:      General: No focal deficit present  Mental Status: She is alert and oriented to person, place, and time     Psychiatric:         Mood and Affect: Mood normal          Behavior: Behavior normal          Vital Signs  ED Triage Vitals   Temperature Pulse Respirations Blood Pressure SpO2   07/11/21 0759 07/11/21 0759 07/11/21 0759 07/11/21 0759 07/11/21 0759   (!) 97 4 °F (36 3 °C) 69 20 117/78 100 %      Temp Source Heart Rate Source Patient Position - Orthostatic VS BP Location FiO2 (%)   07/11/21 0759 07/11/21 0759 07/11/21 0759 07/11/21 0759 --   Tympanic Monitor Sitting Left arm       Pain Score       07/11/21 0831       Worst Possible Pain           Vitals:    07/11/21 0759 07/11/21 0959   BP: 117/78 (!) 95/45   Pulse: 69 57   Patient Position - Orthostatic VS: Sitting Lying         Visual Acuity      ED Medications  Medications   ondansetron (ZOFRAN) injection 4 mg (4 mg Intravenous Given 7/11/21 0831)   ketorolac (TORADOL) injection 30 mg (30 mg Intravenous Given 7/11/21 0831)   morphine (PF) 4 mg/mL injection 4 mg (4 mg Intravenous Given 7/11/21 0930)       Diagnostic Studies  Results Reviewed     Procedure Component Value Units Date/Time    Lipase [[id number]]  (Normal) Collected: 07/11/21 0830    Lab Status: Final result Specimen: Blood from Arm, Right Updated: 07/11/21 0854     Lipase 28 u/L     Comprehensive metabolic panel [[id number]]  (Abnormal) Collected: 07/11/21 0830    Lab Status: Final result Specimen: Blood from Arm, Right Updated: 07/11/21 0854     Sodium 139 mmol/L      Potassium 3 7 mmol/L      Chloride 105 mmol/L      CO2 26 mmol/L      ANION GAP 8 mmol/L      BUN 16 mg/dL      Creatinine 0 68 mg/dL      Glucose 111 mg/dL      Calcium 9 3 mg/dL      AST 16 U/L      ALT 14 U/L      Alkaline Phosphatase 61 U/L      Total Protein 7 1 g/dL      Albumin 4 2 g/dL      Total Bilirubin 1 88 mg/dL      eGFR 123 ml/min/1 73sq m     Narrative:      Agustin guidelines for Chronic Kidney Disease (CKD):     Stage 1 with normal or high GFR (GFR > 90 mL/min/1 73 square meters)    Stage 2 Mild CKD (GFR = 60-89 mL/min/1 73 square meters)    Stage 3A Moderate CKD (GFR = 45-59 mL/min/1 73 square meters)    Stage 3B Moderate CKD (GFR = 30-44 mL/min/1 73 square meters)    Stage 4 Severe CKD (GFR = 15-29 mL/min/1 73 square meters)    Stage 5 End Stage CKD (GFR <15 mL/min/1 73 square meters)  Note: GFR calculation is accurate only with a steady state creatinine    Urine Microscopic [[id number]]  (Abnormal) Collected: 07/11/21 0815    Lab Status: Final result Specimen: Urine, Other Updated: 07/11/21 0845     RBC, UA 30-50 /hpf      WBC, UA 2-4 /hpf      Epithelial Cells Moderate /hpf      Bacteria, UA Moderate /hpf      MUCUS THREADS Moderate    CBC and differential [[id number]]  (Abnormal) Collected: 07/11/21 0830    Lab Status: Final result Specimen: Blood from Arm, Right Updated: 07/11/21 0843     WBC 6 10 Thousand/uL      RBC 3 99 Million/uL      Hemoglobin 12 0 g/dL      Hematocrit 35 8 %      MCV 90 fL      MCH 30 1 pg      MCHC 33 5 g/dL      RDW 13 4 %      MPV 10 8 fL      Platelets 318 Thousands/uL      Neutrophils Relative 75 %      Lymphocytes Relative 17 %      Monocytes Relative 7 %      Eosinophils Relative 1 %      Basophils Relative 0 %      Neutrophils Absolute 4 60 Thousands/µL      Lymphocytes Absolute 1 00 Thousands/µL      Monocytes Absolute 0 40 Thousand/µL      Eosinophils Absolute 0 00 Thousand/µL      Basophils Absolute 0 00 Thousands/µL     UA w Reflex to Microscopic w Reflex to Culture [[id number]]  (Abnormal) Collected: 07/11/21 0815    Lab Status: Final result Specimen: Urine, Other Updated: 07/11/21 0824     Color, UA Yellow     Clarity, UA Slightly Cloudy     Specific Gravity, UA 1 025     pH, UA 6 0     Leukocytes, UA 25 0     Nitrite, UA Negative     Protein, UA 30 (1+) mg/dl      Glucose, UA Negative mg/dl      Ketones, UA Negative mg/dl      Bilirubin, UA Negative     Blood,  0     UROBILINOGEN UA Negative mg/dL     POCT pregnancy, urine [[id number]]  (Normal) Resulted: 07/11/21 0818    Lab Status: Final result Updated: 07/11/21 0818     EXT PREG TEST UR (Ref: Negative) NEGATIVE     Control VALID                 CT renal stone study abdomen pelvis wo contrast   Final Result by Kathy Churchill DO (07/11 1914)   Addendum 1 of 1 by Kathy Churchill DO (07/11 1942)   ADDENDUM:      ADDENDUM      There is a voice recognition software related error in the IMPRESSION of    the report  A phrase which reads    " IMPRESSION:     1  Slight inferior migration of the moderately obstructing 3 mm distal    right ureteric calculus, located above the ureterovesical junction  2   1 mm nonobstructing right lower pole renal calyceal calculus  Follow-up urology consultation recommended "                should read,    " IMPRESSION:     1  Slight inferior migration of the moderately obstructing 3 mm distal    LEFT ureteric calculus, located above the ureterovesical junction  2   1 mm nonobstructing right lower pole renal calyceal calculus  Follow-up urology consultation recommended "  Final   1  Slight inferior migration of the moderately obstructing 3 mm distal right ureteric calculus, located above the ureterovesical junction  2   1 mm nonobstructing right lower pole renal calyceal calculus  Follow-up urology consultation recommended  The study was marked in [facility] for immediate notification  Workstation performed: [id number]                    Procedures  Procedures         ED Course                             SBIRT 20yo+      Most Recent Value   SBIRT (22 yo +)   In order to provide better care to our patients, we are screening all of our patients for alcohol and drug use  Would it be okay to ask you these screening questions? Yes Filed at: 07/11/2021 0816   Initial Alcohol Screen: US AUDIT-C    1  How often do you have a drink containing alcohol?  0 Filed at: 07/11/2021 0816   2  How many drinks containing alcohol do you have on a typical day you are drinking? 0 Filed at: 07/11/2021 0816   3b  FEMALE Any Age, or MALE 65+: How often do you have 4 or more drinks on one occassion? 0 Filed at: 07/11/2021 0816   Audit-C Score  0 Filed at: 07/11/2021 5936   CHERRI: How many times in the past year have you       Used an illegal drug or used a prescription medication for non-medical reasons? Never Filed at: 07/11/2021 9677                    MDM    Disposition  Final diagnoses:   UTI (urinary tract infection)   Renal stone     Time reflects when diagnosis was documented in both MDM as applicable and the Disposition within this note     Time User Action Codes Description Comment    7/11/2021 10:02 AM Alaina Rome  Add [N39 0] UTI (urinary tract infection)     7/11/2021 10:02 AM Keiko Tsai  Add [N20 0] Renal stone       ED Disposition     ED Disposition Condition Date/Time Comment    Discharge Stable Sun Jul 11, 2021 10:02 AM Syed Akhtar discharge to home/self care              Follow-up Information     Follow up With Specialties Details Why Parveen Samuels MD Family Medicine  recheck with your urologist [address]  [phone]            Discharge Medication List as of 7/11/2021 10:05 AM      CONTINUE these medications which have CHANGED    Details   cephalexin (KEFLEX) 500 mg capsule Take 1 capsule (500 mg total) by mouth every 8 (eight) hours for 10 days, Starting Sun 7/11/2021, Until Wed 7/21/2021, Print         CONTINUE these medications which have NOT CHANGED    Details   ketorolac (TORADOL) 10 mg tablet Take 1 tablet (10 mg total) by mouth every 6 (six) hours as needed for moderate pain, Starting Sun 6/13/2021, Normal      Multiple Vitamins-Minerals (MULTIVITAMIN ADULT PO) Take by mouth daily, Historical Med      oxyCODONE-acetaminophen (PERCOCET) 5-325 mg per tablet Take 1 tablet by mouth every 6 (six) hours as needed for severe pain for up to 10 dosesMax Daily Amount: 4 tablets, Starting Sun 6/13/2021, Normal      tamsulosin (FLOMAX) 0 4 mg Take 1 capsule (0 4 mg total) by mouth daily with dinner for 7 days, Starting Tue 7/6/2021, Until Tue 7/13/2021, Normal      fexofenadine (ALLEGRA) 60 MG tablet Take 1 tablet (60 mg total) by mouth 2 (two) times a day, Starting Thu 5/27/2021, Normal      fluticasone (FLONASE) 50 mcg/act nasal spray 1 spray into each nostril daily, Starting u 5/27/2021, Normal      ondansetron (ZOFRAN-ODT) 4 mg disintegrating tablet Take 1 tablet (4 mg total) by mouth every 6 (six) hours as needed for nausea or vomiting, Starting Sun 6/13/2021, Normal      SUMAtriptan (IMITREX) 25 mg tablet Take 1 tablet (25 mg total) by mouth once as needed for migraine for up to 1 dose, Starting u 4/1/2021, Normal      topiramate (TOPAMAX) 25 mg tablet TAKE 1 TABLET BY MOUTH TWICE A DAY, Normal           No discharge procedures on file      PDMP Review       Value Time User    PDMP Reviewed  Yes 6/13/2021 11:41 PM Mague Humphries PA-C          ED Provider  Electronically Signed by           Brenna Shone , PA-C  07/11/21 5337

## 2021-07-11 NOTE — Clinical Note
Ary Andrews was seen and treated in our emergency department on 7/11/2021  Diagnosis:     Martinez Díaz  may return to work on return date  She may return on this date: 07/14/2021         If you have any questions or concerns, please don't hesitate to call        Radha Murillo PA-C    ______________________________           _______________          _______________  Hospital Representative                              Date                                Time

## 2021-07-11 NOTE — Clinical Note
Angeliquemihcael Phoenix was seen and treated in our emergency department on 7/11/2021  Diagnosis:     Ruben Gallo  may return to work on return date  She may return on this date: 07/14/2021         If you have any questions or concerns, please don't hesitate to call        Yoselyn Beltran PA-C    ______________________________           _______________          _______________  Hospital Representative                              Date                                Time

## 2021-07-11 NOTE — ED ATTENDING ATTESTATION
I was the attending physician on duty at the time the patient visited the emergency department  The patient was evaluated and dispositioned by the APC  I was personally available for consultation  I am administratively signing the chart after the fact      Cabrera Gonzalez MD

## 2021-07-13 ENCOUNTER — TELEPHONE (OUTPATIENT)
Dept: UROLOGY | Facility: MEDICAL CENTER | Age: 26
End: 2021-07-13

## 2021-07-13 NOTE — TELEPHONE ENCOUNTER
Nepalese Speaking Patient    Patient managed by Vani Johnson  Patient calling to see if the results are in to tell her if she needs surgery  Patient is anxious because she started to get pain on left side as well    Please advise

## 2021-07-13 NOTE — TELEPHONE ENCOUNTER
Yes case request had been placed for OR date for persistent left ureteral stone she should continue with motrin/flomax and the keflex from the er 7/11   Continue to strain urine if she passes stone we can cancel OR, await f/u from surg coordinator for OR date

## 2021-07-13 NOTE — TELEPHONE ENCOUNTER
Returned call to patient, via Jimmie Russell,  to assist in translation  Advised of CT results and recommendation to proceed with surgery  Patient instructed to continue with ibuprofen and Flomax for symptoms relief  Advised, surgery scheduler will contact with date  Patient reports she needs to go to [place], will be leaving Friday and will be back to the area next Wednesday 7/21  Patient reports symptoms are tolerable at this point and is ok to wait until she returns for surgery  Will direct to  to contact patient to arrange surgery

## 2021-07-14 ENCOUNTER — HOSPITAL ENCOUNTER (EMERGENCY)
Facility: HOSPITAL | Age: 26
Discharge: HOME | End: 2021-07-14
Attending: EMERGENCY MEDICINE | Admitting: EMERGENCY MEDICINE
Payer: COMMERCIAL

## 2021-07-14 ENCOUNTER — APPOINTMENT (EMERGENCY)
Dept: CT IMAGING | Facility: HOSPITAL | Age: 26
End: 2021-07-14
Payer: COMMERCIAL

## 2021-07-14 VITALS
SYSTOLIC BLOOD PRESSURE: 117 MMHG | DIASTOLIC BLOOD PRESSURE: 77 MMHG | OXYGEN SATURATION: 98 % | TEMPERATURE: 97.8 F | RESPIRATION RATE: 18 BRPM | HEART RATE: 78 BPM

## 2021-07-14 DIAGNOSIS — R10.9 LEFT FLANK PAIN: Primary | ICD-10-CM

## 2021-07-14 DIAGNOSIS — N20.0 NEPHROLITHIASIS: ICD-10-CM

## 2021-07-14 LAB
ALBUMIN SERPL BCP-MCNC: 3.7 G/DL (ref 3–5.2)
ALP SERPL-CCNC: 51 U/L (ref 43–122)
ALT SERPL W P-5'-P-CCNC: 12 U/L
ANION GAP SERPL CALCULATED.3IONS-SCNC: 8 MMOL/L (ref 5–14)
AST SERPL W P-5'-P-CCNC: 16 U/L (ref 14–36)
BACTERIA UR QL AUTO: ABNORMAL /HPF
BASOPHILS # BLD AUTO: 0 THOUSANDS/ΜL (ref 0–0.1)
BASOPHILS NFR BLD AUTO: 1 % (ref 0–1)
BILIRUB SERPL-MCNC: 2.25 MG/DL
BILIRUB UR QL STRIP: NEGATIVE
BUN SERPL-MCNC: 16 MG/DL (ref 5–25)
CALCIUM SERPL-MCNC: 8.9 MG/DL (ref 8.4–10.2)
CHLORIDE SERPL-SCNC: 107 MMOL/L (ref 97–108)
CLARITY UR: CLEAR
CO2 SERPL-SCNC: 25 MMOL/L (ref 22–30)
COLOR UR: ABNORMAL
CREAT SERPL-MCNC: 0.79 MG/DL (ref 0.6–1.2)
EOSINOPHIL # BLD AUTO: 0 THOUSAND/ΜL (ref 0–0.4)
EOSINOPHIL NFR BLD AUTO: 0 % (ref 0–6)
ERYTHROCYTE [DISTWIDTH] IN BLOOD BY AUTOMATED COUNT: 13.3 %
EXT PREG TEST URINE: NORMAL
EXT. CONTROL ED NAV: NORMAL
GFR SERPL CREATININE-BSD FRML MDRD: 105 ML/MIN/1.73SQ M
GLUCOSE SERPL-MCNC: 179 MG/DL (ref 70–99)
GLUCOSE UR STRIP-MCNC: NEGATIVE MG/DL
HCT VFR BLD AUTO: 35.6 % (ref 36–46)
HGB BLD-MCNC: 11.9 G/DL (ref 12–16)
HGB UR QL STRIP.AUTO: 150
KETONES UR STRIP-MCNC: NEGATIVE MG/DL
LEUKOCYTE ESTERASE UR QL STRIP: NEGATIVE
LIPASE SERPL-CCNC: 45 U/L (ref 23–300)
LYMPHOCYTES # BLD AUTO: 0.9 THOUSANDS/ΜL (ref 0.5–4)
LYMPHOCYTES NFR BLD AUTO: 10 % (ref 25–45)
MCH RBC QN AUTO: 30.3 PG (ref 26–34)
MCHC RBC AUTO-ENTMCNC: 33.4 G/DL (ref 31–36)
MCV RBC AUTO: 91 FL (ref 80–100)
MONOCYTES # BLD AUTO: 0.5 THOUSAND/ΜL (ref 0.2–0.9)
MONOCYTES NFR BLD AUTO: 6 % (ref 1–10)
NEUTROPHILS # BLD AUTO: 7.6 THOUSANDS/ΜL (ref 1.8–7.8)
NEUTS SEG NFR BLD AUTO: 83 % (ref 45–65)
NITRITE UR QL STRIP: NEGATIVE
NON-SQ EPI CELLS URNS QL MICRO: ABNORMAL /HPF
PH UR STRIP.AUTO: 7 [PH]
PLATELET # BLD AUTO: 135 THOUSANDS/UL (ref 150–450)
PMV BLD AUTO: 11.1 FL (ref 8.9–12.7)
POTASSIUM SERPL-SCNC: 4.1 MMOL/L (ref 3.6–5)
PROT SERPL-MCNC: 6.5 G/DL (ref 5.9–8.4)
PROT UR STRIP-MCNC: ABNORMAL MG/DL
RBC # BLD AUTO: 3.93 MILLION/UL (ref 4–5.2)
RBC #/AREA URNS AUTO: ABNORMAL /HPF
SODIUM SERPL-SCNC: 140 MMOL/L (ref 137–147)
SP GR UR STRIP.AUTO: 1.01 (ref 1–1.04)
UROBILINOGEN UA: 4 MG/DL
WBC # BLD AUTO: 9.1 THOUSAND/UL (ref 4.5–11)
WBC #/AREA URNS AUTO: ABNORMAL /HPF

## 2021-07-14 PROCEDURE — 81003 URINALYSIS AUTO W/O SCOPE: CPT | Performed by: PHYSICIAN ASSISTANT

## 2021-07-14 PROCEDURE — 85025 COMPLETE CBC W/AUTO DIFF WBC: CPT | Performed by: PHYSICIAN ASSISTANT

## 2021-07-14 PROCEDURE — 36415 COLL VENOUS BLD VENIPUNCTURE: CPT | Performed by: PHYSICIAN ASSISTANT

## 2021-07-14 PROCEDURE — 99284 EMERGENCY DEPT VISIT MOD MDM: CPT

## 2021-07-14 PROCEDURE — 81025 URINE PREGNANCY TEST: CPT | Performed by: PHYSICIAN ASSISTANT

## 2021-07-14 PROCEDURE — 81001 URINALYSIS AUTO W/SCOPE: CPT | Performed by: PHYSICIAN ASSISTANT

## 2021-07-14 PROCEDURE — 99284 EMERGENCY DEPT VISIT MOD MDM: CPT | Performed by: PHYSICIAN ASSISTANT

## 2021-07-14 PROCEDURE — 80053 COMPREHEN METABOLIC PANEL: CPT | Performed by: PHYSICIAN ASSISTANT

## 2021-07-14 PROCEDURE — 96361 HYDRATE IV INFUSION ADD-ON: CPT

## 2021-07-14 PROCEDURE — 96375 TX/PRO/DX INJ NEW DRUG ADDON: CPT

## 2021-07-14 PROCEDURE — 96374 THER/PROPH/DIAG INJ IV PUSH: CPT

## 2021-07-14 PROCEDURE — G1004 CDSM NDSC: HCPCS

## 2021-07-14 PROCEDURE — 83690 ASSAY OF LIPASE: CPT | Performed by: PHYSICIAN ASSISTANT

## 2021-07-14 PROCEDURE — 74176 CT ABD & PELVIS W/O CONTRAST: CPT

## 2021-07-14 RX ORDER — KETOROLAC TROMETHAMINE 30 MG/ML
15 INJECTION, SOLUTION INTRAMUSCULAR; INTRAVENOUS ONCE
Status: COMPLETED | OUTPATIENT
Start: 2021-07-14 | End: 2021-07-14

## 2021-07-14 RX ORDER — ONDANSETRON 2 MG/ML
4 INJECTION INTRAMUSCULAR; INTRAVENOUS ONCE
Status: COMPLETED | OUTPATIENT
Start: 2021-07-14 | End: 2021-07-14

## 2021-07-14 RX ADMIN — KETOROLAC TROMETHAMINE 15 MG: 30 INJECTION, SOLUTION INTRAMUSCULAR; INTRAVENOUS at 15:04

## 2021-07-14 RX ADMIN — ONDANSETRON 4 MG: 2 INJECTION INTRAMUSCULAR; INTRAVENOUS at 15:05

## 2021-07-14 RX ADMIN — SODIUM CHLORIDE 1000 ML: 0.9 INJECTION, SOLUTION INTRAVENOUS at 14:31

## 2021-07-14 NOTE — ED PROVIDER NOTES
History  Chief Complaint   Patient presents with    Flank Pain      Pt complains of L flank pain that radiates to the front  Pt also complains of Nausea  27 yo F with PMH DM, kidney stone presenting for evaluation of L flank pain  Pt seen and evaluated 3 days ago where she was found to have UTI and moderately obstructing 3mm L distal ureteric calculus  Pt reports she has been taking toradol, flomax and keflex at home wtihout relief  She has also been taking olive oil with lemon without relief  She reports pain has now worsened since being discharged and has progressed into the LLQ  She reports continued dysuria  No hematuria  No n/v/d  Does not report passing any stones prior to arrival-has cystoscopy procedure scheduled for   Prior to Admission Medications   Prescriptions Last Dose Informant Patient Reported? Taking?    Multiple Vitamins-Minerals (MULTIVITAMIN ADULT PO)  Self Yes No   Sig: Take by mouth daily   SUMAtriptan (IMITREX) 25 mg tablet  Self No No   Sig: Take 1 tablet (25 mg total) by mouth once as needed for migraine for up to 1 dose   Patient not taking: Reported on 2021   cephalexin (KEFLEX) 500 mg capsule   No No   Sig: Take 1 capsule (500 mg total) by mouth every 8 (eight) hours for 10 days   fexofenadine (ALLEGRA) 60 MG tablet   No No   Sig: Take 1 tablet (60 mg total) by mouth 2 (two) times a day   Patient not taking: Reported on 2021   fluticasone (FLONASE) 50 mcg/act nasal spray   No No   Si spray into each nostril daily   Patient not taking: Reported on 2021   ketorolac (TORADOL) 10 mg tablet   No No   Sig: Take 1 tablet (10 mg total) by mouth every 6 (six) hours as needed for moderate pain   ondansetron (ZOFRAN-ODT) 4 mg disintegrating tablet   No No   Sig: Take 1 tablet (4 mg total) by mouth every 6 (six) hours as needed for nausea or vomiting   Patient not taking: Reported on 2021   oxyCODONE-acetaminophen (PERCOCET) 5-325 mg per tablet   No No Sig: Take 1 tablet by mouth every 6 (six) hours as needed for severe pain for up to 10 dosesMax Daily Amount: 4 tablets   tamsulosin (FLOMAX) 0 4 mg   No No   Sig: Take 1 capsule (0 4 mg total) by mouth daily with dinner for 7 days   topiramate (TOPAMAX) 25 mg tablet   No No   Sig: TAKE 1 TABLET BY MOUTH TWICE A DAY   Patient not taking: Reported on 2021      Facility-Administered Medications: None       Past Medical History:   Diagnosis Date    Diabetes mellitus ([facility] [address] )     NIDDM    History of transfusion     after  - had fever with transfusion    Obesity (BMI 35 0-39 9 without comorbidity)     Uses Hungarian as primary spoken language        Past Surgical History:   Procedure Laterality Date     SECTION  2017    CHOLECYSTECTOMY      RI LAP, URI RESTRICT PROC, LONGITUDINAL GASTRECTOMY N/A 2020    Procedure: LAP SLEEVE GASTRECTOMY, INTRAOP EGD;  Surgeon: Tara Ledesma MD;  Location: [facility] OR;  Service: Bariatrics       Family History   Problem Relation Age of Onset    Heart disease Family         CARDIOVASCULAR DISEASE    Diabetes Mother     Other Mother         gastric bypass    Diabetes Father     Diabetes Sister     Sleep apnea Sister     Sleep apnea Brother         2 of the 3 borthers have EAGLE    Diabetes Maternal Grandmother     Diabetes Paternal Grandmother      I have reviewed and agree with the history as documented  E-Cigarette/Vaping    E-Cigarette Use Never User      E-Cigarette/Vaping Substances     Social History     Tobacco Use    Smoking status: Never Smoker    Smokeless tobacco: Never Used   Vaping Use    Vaping Use: Never used   Substance Use Topics    Alcohol use: No    Drug use: No       Review of Systems   All other systems reviewed and are negative  Physical Exam  Physical Exam  Vitals and nursing note reviewed  Constitutional:       General: She is not in acute distress  Appearance: She is well-developed  She is obese   She is not ill-appearing, toxic-appearing or diaphoretic  Comments: Appears uncomfortable   HENT:      Head: Normocephalic and atraumatic  Eyes:      Conjunctiva/sclera: Conjunctivae normal       Comments: EOM grossly intact   Neck:      Vascular: No JVD  Cardiovascular:      Rate and Rhythm: Normal rate  Pulmonary:      Effort: Pulmonary effort is normal  No respiratory distress  Breath sounds: No wheezing  Abdominal:      Palpations: Abdomen is soft  Tenderness: There is abdominal tenderness (LLQ tenderness to palpation)  There is no left CVA tenderness  Musculoskeletal:      Cervical back: Normal range of motion and neck supple  Comments: FROM, steady gait, cap refill brisk, strength and sensation grossly intact throughout   Skin:     General: Skin is warm and dry  Capillary Refill: Capillary refill takes less than 2 seconds  Neurological:      Mental Status: She is alert and oriented to person, place, and time     Psychiatric:         Behavior: Behavior normal          Vital Signs  ED Triage Vitals   Temperature Pulse Respirations Blood Pressure SpO2   07/14/21 1404 07/14/21 1404 07/14/21 1404 07/14/21 1406 07/14/21 1404   97 8 °F (36 6 °C) 78 18 117/77 98 %      Temp Source Heart Rate Source Patient Position - Orthostatic VS BP Location FiO2 (%)   07/14/21 1404 07/14/21 1404 07/14/21 1404 -- --   Tympanic Monitor Sitting        Pain Score       07/14/21 1504       Worst Possible Pain           Vitals:    07/14/21 1404 07/14/21 1406   BP:  117/77   Pulse: 78    Patient Position - Orthostatic VS: Sitting          Visual Acuity      ED Medications  Medications   sodium chloride 0 9 % bolus 1,000 mL (0 mL Intravenous Stopped 7/14/21 1547)   ketorolac (TORADOL) injection 15 mg (15 mg Intravenous Given 7/14/21 1504)   ondansetron (ZOFRAN) injection 4 mg (4 mg Intravenous Given 7/14/21 1505)       Diagnostic Studies  Results Reviewed     Procedure Component Value Units Date/Time Urine Microscopic [[id number]]  (Abnormal) Collected: 07/14/21 1503    Lab Status: Final result Specimen: Urine, Other Updated: 07/14/21 1523     RBC, UA 10-20 /hpf      WBC, UA 1-2 /hpf      Epithelial Cells Moderate /hpf      Bacteria, UA Moderate /hpf     UA w Reflex to Microscopic w Reflex to Culture [[id number]]  (Abnormal) Collected: 07/14/21 1503    Lab Status: Final result Specimen: Urine, Other Updated: 07/14/21 1516     Color, UA Shelbi     Clarity, UA Clear     Specific Gravity, UA 1 015     pH, UA 7 0     Leukocytes, UA Negative     Nitrite, UA Negative     Protein, UA 15 (Trace) mg/dl      Glucose, UA Negative mg/dl      Ketones, UA Negative mg/dl      Bilirubin, UA Negative     Blood,  0     UROBILINOGEN UA 4 0 mg/dL     POCT pregnancy, urine [[id number]]  (Normal) Resulted: 07/14/21 1504    Lab Status: Final result Updated: 07/14/21 1504     EXT PREG TEST UR (Ref: Negative) 7negative     Control valid    Comprehensive metabolic panel [[id number]]  (Abnormal) Collected: 07/14/21 1432    Lab Status: Final result Specimen: Blood from Arm, Right Updated: 07/14/21 1458     Sodium 140 mmol/L      Potassium 4 1 mmol/L      Chloride 107 mmol/L      CO2 25 mmol/L      ANION GAP 8 mmol/L      BUN 16 mg/dL      Creatinine 0 79 mg/dL      Glucose 179 mg/dL      Calcium 8 9 mg/dL      AST 16 U/L      ALT 12 U/L      Alkaline Phosphatase 51 U/L      Total Protein 6 5 g/dL      Albumin 3 7 g/dL      Total Bilirubin 2 25 mg/dL      eGFR 105 ml/min/1 73sq m     Narrative:      [place] guidelines for Chronic Kidney Disease (CKD):     Stage 1 with normal or high GFR (GFR > 90 mL/min/1 73 square meters)    Stage 2 Mild CKD (GFR = 60-89 mL/min/1 73 square meters)    Stage 3A Moderate CKD (GFR = 45-59 mL/min/1 73 square meters)    Stage 3B Moderate CKD (GFR = 30-44 mL/min/1 73 square meters)    Stage 4 Severe CKD (GFR = 15-29 mL/min/1 73 square meters)    Stage 5 End Stage CKD (GFR <15 mL/min/1 73 square meters)  Note: GFR calculation is accurate only with a steady state creatinine    Lipase [[id number]]  (Normal) Collected: 07/14/21 1432    Lab Status: Final result Specimen: Blood from Arm, Right Updated: 07/14/21 1458     Lipase 45 u/L     CBC and differential [[id number]]  (Abnormal) Collected: 07/14/21 1432    Lab Status: Final result Specimen: Blood from Arm, Right Updated: 07/14/21 1448     WBC 9 10 Thousand/uL      RBC 3 93 Million/uL      Hemoglobin 11 9 g/dL      Hematocrit 35 6 %      MCV 91 fL      MCH 30 3 pg      MCHC 33 4 g/dL      RDW 13 3 %      MPV 11 1 fL      Platelets 670 Thousands/uL      Neutrophils Relative 83 %      Lymphocytes Relative 10 %      Monocytes Relative 6 %      Eosinophils Relative 0 %      Basophils Relative 1 %      Neutrophils Absolute 7 60 Thousands/µL      Lymphocytes Absolute 0 90 Thousands/µL      Monocytes Absolute 0 50 Thousand/µL      Eosinophils Absolute 0 00 Thousand/µL      Basophils Absolute 0 00 Thousands/µL                  CT renal stone study abdomen pelvis wo contrast   Final Result by Keiry Wright MD (07/14 1556)      Previously described 2 mm distal left ureteral calculus has progressed into the urinary bladder  Mild persistent left-sided hydroureteronephrosis  Workstation performed: [id number]                    Procedures  Procedures         ED Course  ED Course as of Jul 14 1605   Wed Jul 14, 2021   1601 Spoke with pt who reports is feeling much better, advised that stone has moved into the urinary bladder, she does have follow up with urology on July 27                                SBIRT 22yo+      Most Recent Value   SBIRT (23 yo +)   In order to provide better care to our patients, we are screening all of our patients for alcohol and drug use  Would it be okay to ask you these screening questions?   No Filed at: 07/14/2021 1408                    MDM  Number of Diagnoses or Management Options  Diagnosis management comments: 25 yo F presenting for evaluation of L flank pain now progressing to the LLQ, pt diagnosed with moderately obstructing ureteral stone which is now found to be urinary bladder, pt already on toradol, keflex and flomax, advised to continue meds, she is requesting percocet and tramadol which was denied during this visit, f/u with pcp and urology as an outpatient    All labs and imaging discussed with patient, strict return to ED precautions discussed  Pt verbalizes understanding and agrees with plan  Pt is stable for discharge    Portions of the record may have been created with voice recognition software  Occasional wrong word or "sound a like" substitutions may have occurred due to the inherent limitations of voice recognition software  Read the chart carefully and recognize, using context, where substitutions have occurred  Disposition  Final diagnoses:   Left flank pain   Nephrolithiasis     Time reflects when diagnosis was documented in both MDM as applicable and the Disposition within this note     Time User Action Codes Description Comment    7/14/2021  4:05 PM Carmine Thompson Add [R10 9] Left flank pain     7/14/2021  4:05 PM Carmine Thompson Add [N20 0] Nephrolithiasis       ED Disposition     ED Disposition Condition Date/Time Comment    Discharge Stable Wed Jul 14, 2021  4:05 PM Miki Mercer discharge to home/self care              Follow-up Information     Follow up With Specialties Details Why Contact Info Additional [address] [facility] Medicine Call in 1 day  [address], [address] [facility] [address]  [address], [address], [address] [place], [place], [place] [address] [facility] Emergency Department Emergency Medicine Go to  If symptoms worsen [address] [address]  [address] [place] [address] [address] Urology [place] [facility] Call in 1 day  [address]  [address]  9  [facility] [place], [address] [facility], [place], [address] [phone]          Patient's Medications   Discharge Prescriptions    No medications on file     No discharge procedures on file      PDMP Review       Value Time User    PDMP Reviewed  Yes 7/14/2021  2:12 PM Torito Patel PA-C          ED Provider  Electronically Signed by           Torito Patel PA-C  07/14/21 0620

## 2021-07-14 NOTE — TELEPHONE ENCOUNTER
I spoke to patient with  [phone] Edu Snider, to scheduled surgery  She is scheduled for surgery on 7/27/21 at [facility] with Dr Bon Arnett  She will have a urine culture done prior to surgery  I am e mailing her a surgery packet

## 2021-07-20 ENCOUNTER — TELEPHONE (OUTPATIENT)
Dept: UROLOGY | Facility: CLINIC | Age: 26
End: 2021-07-20

## 2021-07-20 ENCOUNTER — TELEPHONE (OUTPATIENT)
Dept: UROLOGY | Facility: MEDICAL CENTER | Age: 26
End: 2021-07-20

## 2021-07-20 NOTE — TELEPHONE ENCOUNTER
----- Message from [address] [place] [address] sent at 7/19/2021  8:26 AM EDT -----   She is CT scan on 07/14/2021 which revealed that her to mm distal left ureteral calculus progressed in the urinary bladder  We can cancel her surgery at this time  Please let patient know her surgery will be canceled  @Gloria and DWAIN: please cancel surgery as patient passed her stone  @clerical: please let patient know we are cancelling her surgery and she will need to follow-up in the office in 6 weeks with an ultrasound prior   Please provide scheduling number

## 2021-07-20 NOTE — TELEPHONE ENCOUNTER
I called the pt, spoke with her and advised surgery is cancelled due to pt passing stone  Pt was busy, and asked if I can call back later  I notified pt she needs an US and 6 wk FU, pt undertsood  Will reach out to pt later

## 2021-07-20 NOTE — TELEPHONE ENCOUNTER
----- Message from [address] [place] [address] sent at 7/19/2021  8:26 AM EDT -----   She is CT scan on 07/14/2021 which revealed that her to mm distal left ureteral calculus progressed in the urinary bladder  We can cancel her surgery at this time  Please let patient know her surgery will be canceled  @Gloria and DWAIN: please cancel surgery as patient passed her stone  @clerical: please let patient know we are cancelling her surgery and she will need to follow-up in the office in 6 weeks with an ultrasound prior   Please provide scheduling number

## 2021-07-23 NOTE — TELEPHONE ENCOUNTER
Patient called in stating she needs a return back to work letter ( 7/26/21) sent to her email address [email or web address]    Patient can be reached at [phone]

## 2021-07-26 ENCOUNTER — OFFICE VISIT (OUTPATIENT)
Dept: OBGYN CLINIC | Facility: MEDICAL CENTER | Age: 26
End: 2021-07-26
Payer: COMMERCIAL

## 2021-07-26 ENCOUNTER — TELEPHONE (OUTPATIENT)
Dept: UROLOGY | Facility: MEDICAL CENTER | Age: 26
End: 2021-07-26

## 2021-07-26 VITALS
WEIGHT: 190 LBS | SYSTOLIC BLOOD PRESSURE: 108 MMHG | DIASTOLIC BLOOD PRESSURE: 80 MMHG | BODY MASS INDEX: 27.2 KG/M2 | HEIGHT: 70 IN

## 2021-07-26 DIAGNOSIS — Z01.419 WELL WOMAN EXAM WITH ROUTINE GYNECOLOGICAL EXAM: Primary | ICD-10-CM

## 2021-07-26 DIAGNOSIS — Z11.3 ROUTINE SCREENING FOR STI (SEXUALLY TRANSMITTED INFECTION): ICD-10-CM

## 2021-07-26 DIAGNOSIS — Z30.431 IUD CHECK UP: ICD-10-CM

## 2021-07-26 PROCEDURE — 3008F BODY MASS INDEX DOCD: CPT | Performed by: NURSE PRACTITIONER

## 2021-07-26 PROCEDURE — 87591 N.GONORRHOEAE DNA AMP PROB: CPT | Performed by: NURSE PRACTITIONER

## 2021-07-26 PROCEDURE — G0145 SCR C/V CYTO,THINLAYER,RESCR: HCPCS | Performed by: NURSE PRACTITIONER

## 2021-07-26 PROCEDURE — 0503F POSTPARTUM CARE VISIT: CPT | Performed by: NURSE PRACTITIONER

## 2021-07-26 PROCEDURE — 87491 CHLMYD TRACH DNA AMP PROBE: CPT | Performed by: NURSE PRACTITIONER

## 2021-07-26 PROCEDURE — 99385 PREV VISIT NEW AGE 18-39: CPT | Performed by: NURSE PRACTITIONER

## 2021-07-26 RX ORDER — PHENOL 1.4 %
600 AEROSOL, SPRAY (ML) MUCOUS MEMBRANE 2 TIMES DAILY WITH MEALS
COMMUNITY

## 2021-07-26 NOTE — TELEPHONE ENCOUNTER
Patient called back asked for Salvadorean speaker  Called  line Spoke with Zohaib Li #[phone]  Patient advised that she still is waiting for the work note and she would like to know because she is not getting the surgery what restrictions does she have  Please advise

## 2021-07-26 NOTE — PROGRESS NOTES
Patient is primarily Tamazight-speaking Onelia Suarez at bedside Radha Rodríguez assist translation as needed    Subjective      Serg Johnson is a 26 y o  female who presents for annual well woman exam   Last Pap smear 1-2 years ago  Patient denies history of abnormal Pap smears  Pap smear due today  Periods are regular every 28-30 days, lasting 1-2 days very light spotting  No intermenstrual bleeding, spotting, or discharge  Current contraception: Mirena IUD  History of abnormal Pap smear:   No  Family history of uterine or ovarian cancer: no  Regular self breast exam: no  History of abnormal mammogram: to begin at age 37 unless otherwise clinically indicated   Family history of breast cancer: no  History of abnormal lipids: no  Gardasil vaccine: had series     Menstrual History:  OB History        1    Para   1    Term   1            AB        Living   1       SAB        TAB        Ectopic        Multiple        Live Births               Obstetric Comments    18            Menarche age: 16  No LMP recorded  Patient has had an implant  Period Cycle (Days):  (monthly)  Period Duration (Days): 1-2  Period Pattern: Regular  Menstrual Flow: Light  Dysmenorrhea: None    The following portions of the patient's history were reviewed and updated as appropriate: allergies, current medications, past family history, past medical history, past social history, past surgical history and problem list     Review of Systems  Pertinent items are noted in HPI        Objective      /80   Ht 5' 10" (1 778 m)   Wt 86 2 kg (190 lb)   BMI 27 26 kg/m²     General:   alert and oriented, in no acute distress, alert, appears stated age and cooperative   Heart: regular rate and rhythm, S1, S2 normal, no murmur, click, rub or gallop   Lungs: clear to auscultation bilaterally   Abdomen: soft, non-tender, without masses or organomegaly, nondistended and normal bowel sounds   Vulva: normal, Bartholin's, Urethra, [place]'s normal   Vagina: normal mucosa, normal discharge, no palpable nodules   Cervix: no bleeding following Pap, no cervical motion tenderness and no lesions IUD strings easily visualized on speculum exam approximately 4 cm   Uterus: normal size, non-tender, normal shape and consistency   Adnexa: normal adnexa and no mass, fullness, tenderness   Breast: breasts appear normal, no suspicious masses, no skin or nipple changes or axillary nodes  Assessment      @well woman@   Plan      Abdominal ultrasound  All questions answered  Await pap smear results  Breast self exam technique reviewed and patient encouraged to perform self-exam monthly  Chlamydia specimen  Contraception: IUD effective through 2023     Diagnosis explained in detail, including differential   Dietary diary  Discussed healthy lifestyle modifications  Educational material distributed  Follow up in 1 Year for annual exam   Follow up as needed  Thin prep Pap smear  Breast awareness reviewed   Encouraged healthy diet, exercise and lifestyle  Encouraged follow-up with PCP as needed  Encouraged seasonal influenza vaccination  Encouraged social distancing, good hand hygiene, avoidance of crowds and masking  Written information provided about COVID-19  Had vaccine   Will call with results  VBI-    BMI Counseling: Body mass index is 27 26 kg/m²  The BMI is above normal  Nutrition recommendations include reducing portion sizes, decreasing overall calorie intake and 3-5 servings of fruits/vegetables daily  Exercise recommendations include exercising 3-5 times per week, joining a gym and strength training exercises   encouraged to continue follow-up with bariatric

## 2021-07-26 NOTE — PATIENT INSTRUCTIONS
COVID-19: Slow the Coronavirus Spread   WHAT YOU NEED TO KNOW:   Why is it important to slow the spread of the 2019 coronavirus? The virus that causes coronavirus disease 2019 (COVID-19) is highly contagious (easily spread)  The virus has also changed into new forms, called variants  Some variants are spreading even more quickly and easily than the original virus  COVID-19 can cause severe or life-threatening health problems in some people  Health problems can continue for weeks, months, or possibly years  Signs and symptoms of infection from any form of the virus can take up to 14 days to begin, or may not develop at all  This means you or someone around you may have the virus and pass it to others without knowing it  How does the 2019 coronavirus spread? The virus can be passed starting 2 days before symptoms begin or before a positive test if symptoms never begin  The following are ways the virus is thought to spread, but more information may be coming:  · Droplets are the main way all coronaviruses spread  The virus travels in droplets that form when a person talks, coughs, or sneezes  The droplets can also float in the air for minutes or hours  Infection happens when you breathe in the droplets or get them in your eyes or nose  Close personal contact with an infected person increases your risk for infection  This means being within 6 feet (2 meters) of the person for at least 15 minutes over 24 hours  · Person-to-person contact can spread the virus  For example, a person with the virus on his or her hands can spread it by shaking hands with someone  · The virus can stay on objects and surfaces for a short time  You may become infected by touching the object or surface and then touching your eyes or mouth  · An infected animal may be able to infect a person who touches it  This may happen at live markets or on a farm  What do I need to know about COVID-19 vaccines?   The current COVID-19 vaccines are given as a shot in 1 or 2 doses  The vaccines currently have emergency use authorization  This means they are not officially approved but are being given because the benefits outweigh the risks  Your healthcare provider can give you more information about when a vaccine may be available to you  Even after you receive a vaccine, you will need to continue social distancing and other measures to help slow the spread  Experts are still learning how well the vaccines work to prevent infection, transmission, and severe illness  How should I wash my hands to help prevent the virus from spreading? Use soap and water  Rub your soapy hands together, lacing your fingers  Wash the front and back of each hand, and in between your fingers  Use the fingers of one hand to scrub under the fingernails of the other hand  Wash for at least 20 seconds  Rinse with warm, running water for several seconds  Then dry your hands with a clean towel or paper towel  Do not touch your eyes, nose, or mouth without washing your hands first  Wash your hands often throughout the day  Use hand  that contains alcohol if soap and water are not available  Teach children how to wash their hands and use hand   How should I cover sneezes and coughs to help prevent the virus from spreading? Turn your face away and cover your mouth and nose with a tissue  Throw the tissue away  Use the bend of your arm if a tissue is not available  Then wash your hands well with soap and water or use hand   Turn your head and cover your face when you are around someone who is sneezing or coughing  Teach children how to cover a cough or sneeze  Remind them to wash their hands  How will social distancing help prevent the virus from spreading? The best way to prevent infection is to avoid anyone who is infected, but this can be hard to do   An infected person can spread the virus before symptoms begin, or even if symptoms never develop  Social distancing means people avoid close personal contact so the virus cannot spread from one person to another  Close personal contact means being within 6 feet (2 meters) of someone for at least 15 minutes over 24 hours  National and local social distancing rules vary  Rules may change over time as restrictions are lifted  Restrictions may return if an outbreak happens where you live  It is important to know and follow all current social distancing rules in your area  The following are general guidelines:  · Stay home if you are sick or think you may have COVID-19  It is important to stay home if you are waiting for a testing appointment or for test results  Even if you do not have symptoms, you can pass the virus to others  · Wear a face covering (mask) around anyone who does not live in your home  Use a cloth covering with at least 2 layers  You can also create layers by putting a cloth covering over a disposable non-medical mask  Cover your mouth and your nose  The covering should fit snugly against the bridge of your nose  Securely fasten it under your chin and on the sides of your face  Do not  wear a plastic face shield instead of a covering  Continue social distancing and washing your hands often  A face covering is not a substitute for social distancing safety measures  · Do not have close physical contact with anyone who does not live in your home  Do not shake hands with, hug, or kiss a person as a greeting  Stand or walk as far from others as possible, especially around anyone who is sneezing or coughing  If you must use public transportation (such as a bus or subway), try to sit or stand away from others  You can stay safely connected with others through phone calls, e-mail messages, social media websites, and video chats  Check in on anyone who lives alone  · Only allow medical professionals or other necessary helpers into your home    Wear a face covering (mask), and remind others to wear a face covering  Remind them to wash their hands when they arrive and before they leave  Do not  let a visitor into your home, even if the person is not sick  A person can pass the virus to others before symptoms of COVID-19 begin  Some people never even develop symptoms  Children commonly have mild symptoms or no symptoms  It may be hard to tell a child not to hug or kiss you  Explain that this is how he or she can help you stay healthy  · Do not go to someone else's home unless it is necessary  Do not go over to visit, even if the person is lonely  Only go if you need to help him or her  · Avoid in-person gatherings and crowds  Gatherings or crowds of 10 or more individuals can cause the virus to spread  Avoid places such as sharma, beaches, sporting events, and tourist attractions  For events such as parties, holiday meals, Mosque services, and conferences, attend virtually (on a computer), if possible  · Ask your healthcare provider for other ways to have appointments  Some providers offer phone, video, or other types of appointments  You may also be able to get prescriptions for a few months of your medicines at a time  · Stay safe if you must go out to work  Keep physical distance between you and other workers as much as possible  Follow your employer's rules so everyone stays safe  What can I do to prevent an infection in my home? · Wash your hands often  Make it a habit to wash your hands throughout the day  Wash before and after you care for anyone who thinks or knows he or she has COVID-19  Use soap and water  Remember to wash for at least 20 seconds  You can use hand  that contains alcohol if soap and water are not available  · Clean and disinfect high-touch surfaces and objects in your home often  It is not known for sure how long the virus can stay on surfaces and objects   The following are general disinfection guidelines:    ? Wear disposable gloves while you are cleaning  Do not touch surfaces or items with your bare hands  ? Use disinfecting wipes or a disinfecting solution  You can make a solution by diluting 4 teaspoons of bleach in 1 quart (4 cups) of water  Clean with a sponge or cloth that can be thrown away or washed in hot, soapy water and reused  ? Be careful with   Open windows or have circulating air as you clean  Do not  mix ammonia with bleach  This will create toxic fumes  Read the labels of all products you use  ? Clean and disinfect surfaces throughout your home  Surfaces include countertops, cupboard doors, desks, handrails, doorknobs, toilet handles, faucets, chairs, and light switches  ? Clean and disinfect items well  Objects include keys, phones, computer keyboards and mice, video games, remote controls, and children's toys  ? Clean used dishes and utensils well  Use hot, soapy water or a   ? Wash clothing and bedding well  You can use regular laundry detergent  Follow instructions on the clothing or bedding label  Wash and dry on the warmest settings for the fabric  · Do not share items with anyone  This includes food, drinks, dishes, and eating utensils  · Safely handle food you have delivered or  from a restaurant  If possible, have food left at your door or placed into your car  This helps prevent close physical contact with anyone  Wash your hands before you eat  · Keep anyone who is infected away from others in the home  If possible, keep the infected person isolated (away from others in the home) to prevent infections  The person should have his or her own dishes and eating utensils  Items the person uses need to be cleaned separately  Anyone who touches the person's items, clothing, or bedding needs to wear gloves that can be thrown away after use  It is important for the person to feel connected with others  Isolation can be lonely   The person may feel anxious or depressed  He or she can safely stay connected through phone calls, video chats, social media, and e-mail messages  · Anyone who is infected should not handle live animals unless it is necessary  Until more is known, it is best for the person not to touch, play with, or handle live animals until well  Some animals, including pets, have been infected with the new coronavirus  Care includes feeding, petting, and cuddling your pet  A pet should not lick the person or share his or her food  Someone who is not infected should take care of the pet, if possible  If the infected person must care for a pet, he or she needs to wear a face covering  Handwashing is important before and after the person gives care  Talk to a healthcare provider about how to keep a service animal safe, if needed  How can I stay safe if I must go out of my home? Until more is known, follow these and any local recommendations to prevent infection:  · Limit trips out of your home  You may be able to have food, medicines, and other supplies delivered  · If you must go out:      ? Plan your route  This will help you make the fewest stops possible and help prevent close contact  Keep track of places you go  This will help contact tracers notify others if you become infected  ? Remember to wash your hands  Wash before you leave your home, so you do not bring the virus with you  Wash again when you get home, after you put away any items you bought  ? Bring disinfecting wipes or hand  with you  Use the wipes or hand  between stops  Remember to wash your hands with soap and water when you get home  ? Make a list of items to buy before you go out  This will limit the items you touch in the store  The more items you handle, the more risk you take of getting the virus on your hands  · While you are out:      ? Wear a face covering (mask) around others  Bring extra coverings with you   You will need to wear a covering in restaurants, stores, and other public buildings  You will also need a covering on mass transit, such as a bus, subway, or airplane  Remember to use a covering made from thick material or wear a cloth covering over a disposable non-medical mask  ? If possible, use a debit or credit card to pay  The virus may be able to stay on paper money  You can become infected when you touch the money  · When you get home:      ? Carefully take the face covering off and wash your hands  Put used coverings together  If possible, keep them in a closed laundry bag or basket until you can wash them  ? Unpack your items safely  Wipe or wash your hands before you open packaging  Where can I find more information? · Centers for Disease Control and Prevention  [address] [place] , [address]  Phone: 1- 134 - 9942353  Phone: 1- 908 - 4064499  Web Address: [employer]    When should I call my doctor? · You have questions about social distancing or ways to keep your home and family safe  · You have questions or concerns about the new coronavirus or COVID-19  CARE AGREEMENT:   You have the right to help plan your care  Learn about your health condition and how it may be treated  Discuss treatment options with your healthcare providers to decide what care you want to receive  You always have the right to refuse treatment  The above information is an  only  It is not intended as medical advice for individual conditions or treatments  Talk to your doctor, nurse or pharmacist before following any medical regimen to see if it is safe and effective for you  © Copyright [employer] 2021 Information is for End User's use only and may not be sold, redistributed or otherwise used for commercial purposes   All illustrations and images included in CareNotes® are the copyrighted property of [employer] , [place]  or [address] [place] [address] Road NOYER:   Brennan Jimenez son las prácticas de sexo seguro? Las prácticas de sexo seguro son formas de prevenir el [place] y la propagación de infecciones de transmisión sexual (ITS)  Yazan ITS ocurre cuando un virus o yazan bacteria se propagan a través de la actividad sexual  Las prácticas de sexo seguro ayudan a disminuir o prevenir el intercambio de fluidos corporales phyllis el sexo  Los fluidos corporales incluyen saliva, orina, jonathan, flujo vaginal, y semen  El sexo oral, vaginal y anal puede propagar las ITS  ¿Cuáles son algunas de las prácticas de sexo seguro que kaitlyn seguir antes de tener relaciones sexuales? · Hable con escamilla nueva rosalva antes de [employer]  Informe a escamilla rosalva si usted tiene yazan ITS  Pregúntele acerca de escamilla historial sexual y de cualquier ITS actual o previa  Escamilla rosalva podría necesitar que le carole exámenes y recibir tratamiento  No tenga relaciones sexuales mientras esté siendo tratado por yazan ITS, ni con escamilla rosalva que está recibiendo [place]  · Limite la cantidad de parejas sexuales  El [address] [place] [address] Ave de Maci Shorts rosalva sexual puede aumentar escamilla riesgo de yazan ITS  No tenga relaciones sexuales con personas cuyo historial sexual usted desconozca  · Hágase pruebas para detectar ITS si es necesario  Hágase pruebas si ha tenido sexo con alguien que tiene yazan ITS  También hágase exámenes si tiene relaciones sexuales sin protección con yazan rosalva nueva  · Consulte con escamilla médico sobre los métodos anticonceptivos  Los métodos anticonceptivos ayudan a prevenir un embarazo no deseado  Existen muchos tipos diferentes de métodos anticonceptivos  Consulte con escamilla médico sobre cuál es el método anticonceptivo adecuado para usted  · Pregunte acerca de los medicamentos para reducir el riesgo de algunas ITS:     ? Las vacunas pueden ayudar a protegerlo contra la hepatitis A, la hepatitis B y el virus del papiloma humano (VPH)   La vacuna contra el VPH se suele administrar a los 11 años, bari se puede administrar a lo neli de 26 años tanto a mujeres chato a hombres  Escamilla médico puede darle más información sobre las vacunas para prevenir las ITS  ? La profilaxis preexposición (PPE) se puede administrar si usted tiene un riesgo alto de VIH  La profilaxis preexposición se yogesh todos los días para evitar que el virus infecte completamente el cuerpo  · No use alcohol ni drogas antes de [employer]  Estos pueden evitar que usted piense claramente y aumentar escamilla riesgo de tener relaciones sexuales sin protección  ¿Cuáles son algunas de las prácticas de sexo seguro que kaitlyn seguir mientras tengo relaciones sexuales? · Use condones y métodos de kirby en todo tipo de contacto sexual  [place] incluye el sexo oral, vaginal y anal  Hay condones masculinos y femeninos disponibles  Asegúrese que el condón se ajuste y esté jaqueline colocado  Hojas de látex o látex bucal podrían usarse para el sexo oral  Use un condón nuevo o yazan kirby de látex cada vez que tenga relaciones sexuales  Use condones de látex, si es posible  Los condones naturales o de piel de ramires no previenen las ITS  Si usted o escamilla rosalva son alérgicos al látex, usen un producto sin base de látex, chato poliuretano  Use yazan segunda forma de control de la natalidad con el condón para prevenir el embarazo y las ITS  No use condones masculinos y femeninos juntos  · Solamente use lubricantes a base de agua phyllis las [employer]  Los lubricantes a base de [place] a prevenir úlceras o cortadas en la vagina o en el pene  Evite las úlceras o cortadas para disminuir escamilla riesgo de yazan ITS  No use lubricantes a base de aceite, chato aceite para bebé, ni lociones para jac con los condones de látex y de kirby  Estos debilitarán al látex y Wm  Yeyo   Rober el condón se rompa  · No use productos químicos que irriten la piel  Los productos que contienen irritantes químicos, chato espermicidas, pueden irritar el revestimiento de escamilla vagina o recto   Althea Josee irritación podría provocar úlceras que aumentan escamilla riesgo de yazan ITS  · Tenga cuidado cuando tenga relaciones sexuales si usted tiene úlceras o cortadas abiertas  Las [employer] o cortadas abiertas podrían aumentar escamilla riesgo de ITS  [facility] y cortadas abiertas protegidas [facility]  No tenga sexo oral si usted tiene cortadas o úlceras en escamilla boca  · No realice actividades que puedan transmitir gérmenes  No utilice la saliva chato lubricante ni comparta juguetes sexuales  ¿Dónde puedo obtener más información? · [id number] (MERRITT)  P O  [address] [facility] , [address] [place] [address]  Web Address: [email or web address]    ¿Cuándo kaitlyn buscar atención inmediata? · Un condón se rompe, se filtra, o se sale de lugar phyllis la relación sexual     · Usted nota llagas en escamilla pene, vagina, emile anal o en la piel a escamilla alrededor  · Usted ha tenido sexo sin protección y quiere solicitar información sobre un anticonceptivo de emergencia o un tratamiento por exposición a yazan ITS  ¿Cuándo kaitlyn llamar a mi médico?  · Marcela que usted o escamilla rosalva sexual femenina podría estar embarazada  · Usted tiene preguntas o inquietudes acerca de escamilla condición o cuidado  ACUERDOS SOBRE ESCAMILLA CUIDADO:   Usted tiene el derecho de ayudar a planear escamilla cuidado  Aprenda todo lo que pueda sobre escamilla condición y chato darle tratamiento  Discuta alo opciones de tratamiento con alo médicos para decidir el cuidado que usted desea recibir  Usted siempre tiene el derecho de rechazar el tratamiento  Esta información es sólo para uso en educación  Escamilla intención no es darle un consejo médico sobre enfermedades o tratamientos  Colsulte con escamilla Melven Rimes farmacéutico antes de seguir cualquier régimen médico para saber si es seguro y efectivo para usted    © Copyright [employer] 2021 Information is for End User's use only and may not be sold, redistributed or otherwise used for commercial purposes  All illustrations and images included in CareNotes® are the copyrighted property of A D A M , Inc  or [address] de mamas para las mujeres   LO QUE NECESITA SABER:   ¿Qué es el autoexamen de las mamas? Un autoexamen de las mamas es yazan manera de revisar si alo mamas tienen protuberancias u otros cambios  Los autoexámenes de las mamas regulares pueden ayudar a conocer cómo se arleen y se sienten alo mamas normalmente  La mayoría de las protuberancias o cambios en la mama no son cáncer, bari usted siempre debería ir con escamilla médico para que la revise  Escamilla médico también puede observarla e indicarle si usted está realizando escamilla autoexamen correctamente  ¿Por qué debería realizarme un autoexamen de las [place]? El cáncer de mama es el tipo de cáncer más común en las mujeres  Aún si a usted le [employer], todavía puede seguir revisándose regularmente  Si usted sabe cómo se sienten y se arleen alo mamas normalmente, eso podría ayudarle a determinar cuándo comunicarse con escamilla médico  Es posible que yazan mamografía no detecte algún cáncer  Aurea el autoexamen de [place], podría encontrar yazan protuberancia que no se detectó en [place]  ¿Cuándo debería realizarme un autoexamen de las [place]? Si usted tiene [place], es posible que lo mejor sea que se jamia un autoexamen 1 semana después de que terminó escamilla periodo  Grenora es cuando alo mamas podrían estar menos inflamadas, abultadas o sensibles  Usted puede realizarse autoexámenes de las mamas regulares incluso si está amamantando o si tiene implantes en la mama  ¿Cómo debería realizarme un autoexamen de las [place]? · Observe alo mamas en un ambreen  Observe el tamaño y la forma de cada mama y del pezón  Revise si hay inflamación, protuberancias, hoyuelos, piel descamada u otros cambios en la piel  Vigile los cambios en el pezón, chato cuando tiene dolor o que comienza a hundirse   Apriete cuidadosamente ambos pezones y revise si sale líquido (que no sea [place]) de ellos  Si usted detecta cualquiera de estos u otros cambios en alo mamas, comuníquese con escamilla médico  Revise alo mamas mientras está sentada o champagne en las 3 siguientes posiciones:    ? Cuelgue alo brazos en alo costados  ? Levante alo jac y [place] detrás de escamilla zachary  ? Ejerza presión firme con alo jac sobre alo caderas  Inclínese un poco hacia adelante mientras observa alo mamas en el ambreen  · Acuéstese y palpe alo mamas  Cuando usted se [place], el tejido de alo Muslim se extiende uniformemente sobre escamilla pecho  [place] facilita que usted sienta protuberancias o cualquier cosa que podría no ser normal para alo mamas  Realice un autoexamen con yazan mama a la vez     ? Coloque yazan almohada pequeña o yazan toalla debajo de escamilla hombro sarah  Coloque escamilla brazo sarah detrás de escamilla zachary  ? Use los 3 dedos medios de escamilla mano derecha  Use las yemas de los dedos, en la parte superior  La yema del dedo es la parte más sensible de escamilla dedo  ? Huseyin círculos pequeños para sentir el tejido de la mama  Use las yemas de alo dedos para hacer círculos pequeños empalmados sobre alo mamas y [place]  Use presión ligera, media y firme  [place], presione ligeramente  Después, presione con yazan presión media para sentir un poco más profundo en escamilla mama  Por último, use presión firme para sentir escamilla mama en lo más profundo  ? Examine el área completa de escamilla mama  Examine el área de la mama desde arriba hasta abajo donde usted siente las [place]  [place]ma S[place] pequeños con las yemas de los dedos, comenzando en la parte media de escamilla axila  Huseyin círculos subiendo y [place] el área de la mama  Continúe hacia escamilla mama, hasta llegar al [employer]  Examine toda el área desde la axila hasta el centro de escamilla pecho ([place])  Deténgase en el centro de escamilla pecho     ? Mueva la almohada o toalla hacia escamilla hombro derecho y coloque escamilla brazo derecho detrás de escamilla zachary   Use las cérvix  El lev uterino es la abertura estrecha en la parte inferior de [place]  El lev uterino se une a la parte superior de la vagina  ¿Cómo me preparo para yazan extensión de Pap? El mejor momento para programar yazan prueba es inmediatamente después que termine sam menstruación  No se realice yazan extensión de Pap phyllis sam menstruación  No tenga relaciones sexuales o inserte nada dentro de sam vagina phyllis 24 horas antes de sam examen de extensión de Pap  ¿Qué ocurrirá phyllis yazan extensión de Pap? · Usted se acostará sobre sam espalda y colocará alo pies en unos posapiés conocidos chato estribos  Sam médico cuidadosamente insertará un aparato conocido chato espéculo dentro de sam vagina  El espéculo se [place] para expandir las beatty de sam vagina para que él pueda kyree sam cérvix  Él utilizará yazan brocha delgada o un hisopo de algodón para obtener células de la parte interior de sam cérvix  · Sam médico también obtendrá células de la superficie de sam cérvix con yazan herramienta de plástico o [place] conocida chato espátula  Es posible que él también raspe cuidadosamente la parte superior de sam vagina para obtener [place]  Olene Sides son colocadas en un envase con líquido o en yazan lámina de sandhya  Estos se envían a un laboratorio para ser analizados en busca de células anormales  ¿Con qué frecuencia necesito yazan extensión de Pap? Las extensiones de Pap generalmente son realizadas cada 1 a 3 años  Usted puede que necesite yazan extensión de Pap más frecuentemente si usted tiene cualquiera de lo siguiente:  · Un resultado positivo de la prueba del virus del papiloma humano (VPH)    · Intraepitelial neoplasma cervical o cáncer cervical    · VIH    · Un sistema inmune débil    · Exposición al [facility]  [employer] dietilstilbestrol (HIGINIO) cuando sam madre estuvo embarazada de usted  ACUERDOS SOBRE SAM CUIDADO:   Alexis tiene el derecho de participar en la planificación de sam cuidado   Aprenda todo lo que pueda sobre sam condición y chato sharon [id number]  Discuta con alo médicos alo opciones de tratamiento para juntos decidir el cuidado que usted quiere recibir  Usted siempre tiene el derecho a rechazar sanchez tratamiento  Esta información es sólo para uso en educación  Sanchez intención no es darle un consejo médico sobre enfermedades o tratamientos  Colsulte con sanchez Rajiv Pier farmacéutico antes de seguir cualquier régimen médico para saber si es seguro y efectivo para usted  © 2014 [address] is for End User's use only and may not be sold, redistributed or otherwise used for commercial purposes  All illustrations and images included in CareNotes® are the copyrighted property of A D A M , Inc  or Ritesh Pelaez  Levonorgestrel (Kathyro bo Landis)   Levonorgestrel ([facility])  Sirve para prevenir el embarazo y tratar sangrado menstrual abundante  IXL es un dispositivo intrauterino (DIU), el cual es un método anticonceptivo reversible  Melanie dispositivo va liberando lentamente levonorgestrel, yazan hormona  Diane(s) : Alan Menon, Myriam, Sri   Existen muchas otras marcas de Sanjay  Melanie medicamento no debe ser usado cuando:   Melanie dispositivo no es adecuado para todas las personas  No lo use si ha tenido yazan reacción alérgica al levonorgestrel, silicona, polietileno, sílice, sulfato de bario u óxido de mukund, o si está embarazada  Forma de usar melanie medicamento:   Dispositivo  · Yazan enfermera o un paramédico entrenado le aplicará sanchez medicamento  · El DIU por lo general es insertado por sanchez médico phyllis sanchez período menstrual  Usted necesitará acudir al [facility] 4 a 6 semanas después de que le coloquen el dispositivo y [employer] vez al año  · Sanchez DIU tiene yazan cuerda o "cola" que está hecha de hilo plástico  Cerca de Carren Quince a dos pulgadas quedan colgando dentro de sanchez vagina   El cordón no se puede kyree y no le causará ningún problema cuando tenga relaciones sexuales  Revise el hilo del dispositivo después de cada período menstrual  Es posible que no esté protegida contra el embarazo si usted no puede tocar la cuerda o si puede tocar el plástico del DIU  Huseyin lo siguiente para comprobar que el DIU está en sanchez lugar:  ? Erika Holms jac con Ping Melissa y agua tibia  Séqueselas con yazan toalla limpia  ? Fernando Frandy y póngase en cuclillas cerca del suelo  ? Inserte suavemente sanchez dedo índice dentro de la vagina  El lev uterino (cérvix) está en la parte superior de la vagina  Busque la cuerda del DIU que sale de sanchez cérvix  Nunca courtney de la cuerda  Usted no debe sentir el plástico del DIU  Lávese las [place], después de terminar el chequeo de la cuerda de sanchez dispositivo  · Sanchez médico deberá retirarle el DIU Sri® después de 3 años, el DIU [address] de 5 años o el DIU Liletta® o Mirena® después de 6 años  También será necesario reemplazarlo en tanya que se salga de sanchez útero  Si usted está usando Mirena® o [place] y quiere dejar de [place], sanchez médico puede quitarlo en cualquier momento  Sin embargo, usted puede quedar embarazada tan pronto chato se quite el DIU [facility], [place] Mirena® o Sri® o si tiene [employer] sexuales la semana antes de quitarse el DIU [place]  Use otro método anticonceptivo o colóquese un nuevo DIU para evitar quedar embarazada  Medicamentos y [place] [employer] que debe evitar:   Consulte con sanchez médico o farmacéutico antes de usar cualquier medicamento, incluyendo los que compra sin receta médica, las vitaminas y los productos herbales  · Algunos medicamento pueden afectar el funcionamiento de anand dispositivo  Dígale a sanchez médico si usted está usando un anticoagulante (incluyendo Kenzie Nguyen)  Precauciones phyllis el uso de anand medicamento:   · Informe a sanchez médico si ha tenido problemas, infecciones u otras condiciones que le willson afectado el sistema reproductivo   Existen demasiados problemas que pueden hacer que el DIU no sea la mejor opción, entre ellos fibrosis, sangrados sin explicación, útero de forma inusual, infección reciente, antecedentes de enfermedad de inflamación pélvica, resultado anormal de la prueba de Papanicoláu, embarazo ectópico, cáncer o sospecha de cáncer o un DIU previo  · Informe a escamilla médico si está amamantando o si ha tenido un bebé, un aborto espontáneo o un aborto en los últimos 3 meses  Informe a escamilla médico si usted tiene enfermedad hepática (incluidos cáncer o un tumor), enfermedad cardíaca, cáncer de mama, problemas cardíacos o circulatorios, migrañas, presión arterial radha o antecedentes de problemas con las válvulas cardíacas, problemas de coagulación, derrame cerebral o ataque cardíaco  Informe a escamilla médico si usted tiene problemas con escamilla sistema inmune o si se ha sometido a [facility] de alo órganos femeninos (especialmente las trompas de [place])  · Existe yazan mínima posibilidad que usted pueda quedar embarazada mientras utiliza un dispositivo uterino, así chato la hay con cualquier método de kirby para el control de la natalidad  Si usted Adra Awe, escamilla médico puede extraer el dispositivo para disminuir el riesgo de un aborto espontáneo u otros problemas  · Melanie medicamento puede causar los siguientes problemas:  ? Un mayor riesgo de presentar un embarazo ectópico (un [place])  ? Mayor riesgo de infecciones graves, incluso septicemia  ? Mayor riesgo de enfermedad pélvica inflamatoria (EPI) o endometritis  ? Perforación o un orificio en la pared del [place], lo cual puede dañar otros órganos  ? Un mayor riesgo de presentar quistes en el ovario  ? Aumento del riesgo de cáncer de mama, [place] o lev uterino  ? Mayor riesgo de presión arterial radha, accidente cerebrovascular, ataque cardíaco o problemas de coagulación  ? Ictericia (piel u ojos amarillos)  · Es posible que usted presente manchado y cólicos phyllis las primeras semanas después de mohit sido insertado el [place]   Estos síntomas deben disminuir o desaparecer en un par de semanas o duraran hasta 6 meses  · Usted puede tener menos sangrado o incluso la ausencia del período o mariah hacia el final del primer año  Consulte a escamilla médico si usted tiene un cambio del patrón de escamilla sangrado habitual después de mohit tenido escamilla dispositivo uterino por un tiempo, incluyendo aumento del sangrado o si tiene la ausencia de un período (y usted estaba teniendo períodos aún con escamilla DIU)  · Un DIU se puede deslizar parcialmente o totalmente fuera del útero  En tanya que esto suceda, use preservativos u otro método de control anticonceptivo y llame a escamilla médico de inmediato  · Melanie DIU no la protegerá contra el VIH/SIDA u otras enfermedades de transmisión sexual   · Si usted tiene Sri® o [facility], informe a escamilla médico antes de que le realicen [place]  · Escamilla médico tendrá que revisar escamilla progreso y los efectos de melanie medicamento phyllis alo citas regulares  Cumpla sin falta con todas alo citas médicas  Asista a todas alo citas  Efectos secundarios que pueden presentarse phyllis el uso de melanie medicamento:   Consulte inmediatamente con el médico si nota cualquiera de estos efectos secundarios:  · Reacción alérgica: Comezón o ronchas, hinchazón del flores o las jac, hinchazón u hormigueo en la boca o garganta, opresión en el pecho, dificultad para respirar  · Distensión, dolor de estómago o en la pelvis, espasmos, sensibilidad o cólicos súbitos o intensos  · Dolor en el pecho, problemas con el habla o para caminar, adormecimiento o debilidad en escamilla brazo o pierna, o en un lado de escamilla cuerpo  · Sangrado abundante de escamilla vagina  · Dolor phyllis el coito o escamilla rosalva siente el plástico tayla del DIU phyllis el coito  · Rian dolor de [place], cambios de la visión  · [place]ato, moretones o debilidad inusuales    · Flujo vaginal con mal olor, fiebre, escalofríos, llagas en el área genital  · Piel u ojos amarillos  Consulte con el médico si nota los siguientes efectos secundarios menos graves:   · Acné, caspa, piel grasa u otros cambios en la piel  · Dolor o [place] en el seno  · Cambio en el patrón de sangrado después de los primeros meses  · [employer], desvanecimientos después de introducido el DIU  · Comezón leve alrededor de escamilla vagina o en el área genital  · Aumento de peso  Consulte con el médico si nota otros efectos secundarios que yvan son causados por anand medicamento  Llame a escamilla médico para consultarle Jazzmine Espinoza puede notificar alo efectos secundarios al FDA al [phone]-YVP-[phone]  © Copyright [employer] 2021 Information is for End User's use only and may not be sold, redistributed or otherwise used for commercial purposes  Esta información es sólo para uso en educación  Escamilla intención no es darle un consejo médico sobre enfermedades o tratamientos  Colsulte con escamilla Alexia Kind farmacéutico antes de seguir cualquier régimen médico para saber si es seguro y efectivo para usted

## 2021-07-28 NOTE — TELEPHONE ENCOUNTER
Patient called to say she has not gone back to work since her surgery was cancel  She needs a return to work note and if there are any restriction sent to her email so she can go back to work on Sunday which is her scheduled day

## 2021-07-29 LAB
C TRACH DNA SPEC QL NAA+PROBE: NEGATIVE
N GONORRHOEA DNA SPEC QL NAA+PROBE: NEGATIVE

## 2021-07-30 ENCOUNTER — HOSPITAL ENCOUNTER (OUTPATIENT)
Dept: ULTRASOUND IMAGING | Facility: HOSPITAL | Age: 26
Discharge: HOME | End: 2021-07-30
Payer: COMMERCIAL

## 2021-07-30 DIAGNOSIS — N13.2 HYDRONEPHROSIS CONCURRENT WITH AND DUE TO CALCULI OF KIDNEY AND URETER: ICD-10-CM

## 2021-07-30 PROCEDURE — 76770 US EXAM ABDO BACK WALL COMP: CPT

## 2021-08-02 LAB
LAB AP GYN PRIMARY INTERPRETATION: NORMAL
Lab: NORMAL

## 2021-08-02 NOTE — TELEPHONE ENCOUNTER
Contacted and spoke with patient using Indonesian interpretor Fernando Malave [id number] in regards to her return to work note  Patient called the office several times asking for a work note and the office did not call her back  I told Fernando Malave the office will write a letter stating patient may be able to return to work on 08/01/2021 without restrictions  Patient will  note

## 2021-08-12 ENCOUNTER — OFFICE VISIT (OUTPATIENT)
Dept: BARIATRICS | Facility: CLINIC | Age: 26
End: 2021-08-12
Payer: COMMERCIAL

## 2021-08-12 VITALS
DIASTOLIC BLOOD PRESSURE: 68 MMHG | BODY MASS INDEX: 27.63 KG/M2 | HEART RATE: 82 BPM | HEIGHT: 70 IN | SYSTOLIC BLOOD PRESSURE: 100 MMHG | WEIGHT: 193 LBS

## 2021-08-12 DIAGNOSIS — Z48.815 ENCOUNTER FOR SURGICAL AFTERCARE FOLLOWING SURGERY OF DIGESTIVE SYSTEM: Primary | ICD-10-CM

## 2021-08-12 DIAGNOSIS — K91.2 POSTSURGICAL MALABSORPTION: ICD-10-CM

## 2021-08-12 DIAGNOSIS — Z98.84 BARIATRIC SURGERY STATUS: ICD-10-CM

## 2021-08-12 PROCEDURE — 3074F SYST BP LT 130 MM HG: CPT | Performed by: PHYSICIAN ASSISTANT

## 2021-08-12 PROCEDURE — 3078F DIAST BP <80 MM HG: CPT | Performed by: PHYSICIAN ASSISTANT

## 2021-08-12 PROCEDURE — 99214 OFFICE O/P EST MOD 30 MIN: CPT | Performed by: PHYSICIAN ASSISTANT

## 2021-08-12 NOTE — PROGRESS NOTES
Assessment/Plan:     Patient ID: Milla Rodriguez is a 26 y o  female  Bariatric Surgery Status       Status Tab Srinivasan Degree 07/20/2020  Jozef Fletcher Presents to the office today for annual    · Continued/Maintain healthy weight loss with good nutrition intakes  · Adequate hydration with at least 64oz  fluid intake  · Follow diet as discussed  · Follow vitamin and mineral recommendations as reviewed with you  · Exercise as tolerated  · Colonoscopy referral made: out of age range     · Follow-up in 6 months  We kindly ask that your arrive 15 minutes before your scheduled appointment time with your provider to allow our staff to room you, get your vital signs and update your chart  · Get lab work done   Please call the office if you need a script  It is recommended to check with your insurance BEFORE getting labs done to make sure they are covered by your policy  · Call our office if you have any problems with abdominal pain especially associated with fever, chills, nausea, vomiting or any other concerns  · All  Post-bariatric surgery patients should be aware that very small quantities of any alcohol can cause impairment and it is very possible not to feel the effect  The effect can be in the system for several hours  It is also a stomach irritant  · It is advised to AVOID alcohol, Nonsteroidal antiinflammatory drugs (NSAIDS) and nicotine of all forms   Any of these can cause stomach irritation/pain  · Discussed the effects of alcohol on a bariatric patient and the increased impairment risk  · Keep up the good work!      Postsurgical Malabsorption   -At risk for malabsorption of vitamins/minerals secondary to malabsorption and restriction of intake from bariatric surgery  -Currently taking adequate postop bariatric surgery vitamin supplementation  -Last set of bariatric labs completed 11/2020- she completed vit d therapy since   -Next set of bariatric labs ordered for approximately 2 weeks  -Patient received education about the importance of adhering to a lifelong supplementation regimen to avoid vitamin/mineral deficiencies      There are no diagnoses linked to this encounter  Subjective:      Patient ID: Serg Johnson is a 26 y o  female  Donel Em Dr Luis Riojas 07/20/2020  Lulu Lightning Presents to the office today for annual  Tolerating diet without issues; denies N/V, dysphagia, reflux  Overall doing Well  Initial: 260  Current: 193  EWL: (Weight loss is ahead of schedule at this post surgical period )  Roshan: current   Current BMI is Body mass index is 27 69 kg/m²  · Tolerating a regular diet-yes  · Eating at least 60 grams of protein per day- yes  · Following 30/60 minute rule with liquids-yes  · Drinking at least 64 ounces of fluid per day-yes  · Drinking carbonated beverages-no  · Sufficient exercise- active at work   · Using NSAIDs regularly-no  · Using nicotine-no  · Using alcohol-no  · Supplements: calcium + bariatric mvi    · EWL is 80%, which places the patient ahead of schedule for expected post surgical weight loss at this time  The following portions of the patient's history were reviewed and updated as appropriate: allergies, current medications, past family history, past medical history, past social history, past surgical history and problem list     Review of Systems   Constitutional: Negative  Respiratory: Negative  Cardiovascular: Negative  Gastrointestinal: Negative  Neurological: Negative  Psychiatric/Behavioral: Negative  Objective:    /68 (BP Location: Left arm, Patient Position: Sitting, Cuff Size: Standard)   Pulse 82   Ht 5' 10" (1 778 m)   Wt 87 5 kg (193 lb)   BMI 27 69 kg/m²      Physical Exam  Vitals and nursing note reviewed  Constitutional:       Appearance: Normal appearance  HENT:      Head: Normocephalic and atraumatic     Eyes:      Extraocular Movements: Extraocular movements intact  Pupils: Pupils are equal, round, and reactive to light  Cardiovascular:      Rate and Rhythm: Normal rate  Pulmonary:      Effort: Pulmonary effort is normal    Abdominal:      General: Bowel sounds are normal    Musculoskeletal:         General: Normal range of motion  Cervical back: Normal range of motion  Skin:     General: Skin is warm and dry  Neurological:      General: No focal deficit present  Mental Status: She is alert and oriented to person, place, and time     Psychiatric:         Mood and Affect: Mood normal

## 2021-08-12 NOTE — PATIENT INSTRUCTIONS
· Follow-up in 6 months  We kindly ask that your arrive 15 minutes before your scheduled appointment time with your provider to allow our staff to room you, get your vital signs and update your chart  · Get lab work done   Please call the office if you need a script  It is recommended to check with your insurance BEFORE getting labs done to make sure they are covered by your policy  · Call our office if you have any problems with abdominal pain especially associated with fever, chills, nausea, vomiting or any other concerns  · All  Post-bariatric surgery patients should be aware that very small quantities of any alcohol can cause impairment and it is very possible not to feel the effect  The effect can be in the system for several hours  It is also a stomach irritant  · It is advised to AVOID alcohol, Nonsteroidal antiinflammatory drugs (NSAIDS) and nicotine of all forms   Any of these can cause stomach irritation/pain  · Discussed the effects of alcohol on a bariatric patient and the increased impairment risk  · Keep up the good work!

## 2021-09-28 ENCOUNTER — APPOINTMENT (OUTPATIENT)
Dept: LAB | Facility: HOSPITAL | Age: 26
End: 2021-09-28
Payer: COMMERCIAL

## 2021-09-28 ENCOUNTER — HOSPITAL ENCOUNTER (EMERGENCY)
Facility: HOSPITAL | Age: 26
Discharge: HOME | End: 2021-09-28
Attending: EMERGENCY MEDICINE | Admitting: EMERGENCY MEDICINE
Payer: COMMERCIAL

## 2021-09-28 VITALS
TEMPERATURE: 98.5 F | BODY MASS INDEX: 27.84 KG/M2 | WEIGHT: 194 LBS | DIASTOLIC BLOOD PRESSURE: 55 MMHG | RESPIRATION RATE: 16 BRPM | OXYGEN SATURATION: 100 % | SYSTOLIC BLOOD PRESSURE: 121 MMHG | HEART RATE: 65 BPM

## 2021-09-28 DIAGNOSIS — L03.211 CELLULITIS OF FACE: Primary | ICD-10-CM

## 2021-09-28 DIAGNOSIS — Z48.815 ENCOUNTER FOR SURGICAL AFTERCARE FOLLOWING SURGERY OF DIGESTIVE SYSTEM: ICD-10-CM

## 2021-09-28 DIAGNOSIS — K91.2 POSTSURGICAL MALABSORPTION: ICD-10-CM

## 2021-09-28 DIAGNOSIS — Z98.84 BARIATRIC SURGERY STATUS: ICD-10-CM

## 2021-09-28 LAB
25(OH)D3 SERPL-MCNC: 23.9 NG/ML (ref 30–100)
FERRITIN SERPL-MCNC: 113 NG/ML (ref 8–388)
IRON SATN MFR SERPL: 41 % (ref 15–50)
IRON SERPL-MCNC: 114 UG/DL (ref 50–170)
PTH-INTACT SERPL-MCNC: 50.9 PG/ML (ref 16.7–78.9)
TIBC SERPL-MCNC: 276 UG/DL (ref 250–450)
VIT B12 SERPL-MCNC: 230 PG/ML (ref 100–900)

## 2021-09-28 PROCEDURE — 82607 VITAMIN B-12: CPT

## 2021-09-28 PROCEDURE — 82728 ASSAY OF FERRITIN: CPT

## 2021-09-28 PROCEDURE — 84630 ASSAY OF ZINC: CPT

## 2021-09-28 PROCEDURE — 82306 VITAMIN D 25 HYDROXY: CPT

## 2021-09-28 PROCEDURE — 99284 EMERGENCY DEPT VISIT MOD MDM: CPT | Performed by: PHYSICIAN ASSISTANT

## 2021-09-28 PROCEDURE — 83550 IRON BINDING TEST: CPT

## 2021-09-28 PROCEDURE — 99283 EMERGENCY DEPT VISIT LOW MDM: CPT

## 2021-09-28 PROCEDURE — 84590 ASSAY OF VITAMIN A: CPT

## 2021-09-28 PROCEDURE — 83540 ASSAY OF IRON: CPT

## 2021-09-28 PROCEDURE — 84425 ASSAY OF VITAMIN B-1: CPT

## 2021-09-28 PROCEDURE — 83970 ASSAY OF PARATHORMONE: CPT

## 2021-09-28 PROCEDURE — 36415 COLL VENOUS BLD VENIPUNCTURE: CPT

## 2021-09-28 RX ORDER — CEPHALEXIN 500 MG/1
500 CAPSULE ORAL EVERY 6 HOURS SCHEDULED
Qty: 28 CAPSULE | Refills: 0 | Status: SHIPPED | OUTPATIENT
Start: 2021-09-28 | End: 2021-10-05

## 2021-10-03 LAB
VIT A SERPL-MCNC: 43.3 UG/DL (ref 18.9–57.3)
ZINC SERPL-MCNC: 82 UG/DL (ref 44–115)

## 2021-10-04 LAB — VIT B1 BLD-SCNC: 112.8 NMOL/L (ref 66.5–200)

## 2021-10-07 ENCOUNTER — TELEMEDICINE (OUTPATIENT)
Dept: FAMILY MEDICINE CLINIC | Facility: CLINIC | Age: 26
End: 2021-10-07

## 2021-10-07 DIAGNOSIS — Z11.52 ENCOUNTER FOR SCREENING FOR COVID-19: Primary | ICD-10-CM

## 2021-10-07 PROCEDURE — G2012 BRIEF CHECK IN BY MD/QHP: HCPCS | Performed by: FAMILY MEDICINE

## 2021-11-17 ENCOUNTER — APPOINTMENT (EMERGENCY)
Dept: CT IMAGING | Facility: HOSPITAL | Age: 26
End: 2021-11-17
Payer: COMMERCIAL

## 2021-11-17 ENCOUNTER — HOSPITAL ENCOUNTER (EMERGENCY)
Facility: HOSPITAL | Age: 26
Discharge: HOME | End: 2021-11-17
Attending: EMERGENCY MEDICINE | Admitting: EMERGENCY MEDICINE
Payer: COMMERCIAL

## 2021-11-17 VITALS
RESPIRATION RATE: 18 BRPM | WEIGHT: 192.24 LBS | BODY MASS INDEX: 27.52 KG/M2 | SYSTOLIC BLOOD PRESSURE: 108 MMHG | OXYGEN SATURATION: 99 % | TEMPERATURE: 98.2 F | DIASTOLIC BLOOD PRESSURE: 63 MMHG | HEART RATE: 71 BPM | HEIGHT: 70 IN

## 2021-11-17 DIAGNOSIS — R51.9 HEADACHE: Primary | ICD-10-CM

## 2021-11-17 DIAGNOSIS — R17 ELEVATED BILIRUBIN: ICD-10-CM

## 2021-11-17 LAB
ALBUMIN SERPL BCP-MCNC: 4.1 G/DL (ref 3.5–5)
ALP SERPL-CCNC: 69 U/L (ref 46–116)
ALT SERPL W P-5'-P-CCNC: 18 U/L (ref 12–78)
ANION GAP SERPL CALCULATED.3IONS-SCNC: 9 MMOL/L (ref 4–13)
AST SERPL W P-5'-P-CCNC: 8 U/L (ref 5–45)
BASOPHILS # BLD AUTO: 0.01 THOUSANDS/ΜL (ref 0–0.1)
BASOPHILS NFR BLD AUTO: 0 % (ref 0–1)
BILIRUB SERPL-MCNC: 1.92 MG/DL (ref 0.2–1)
BILIRUB UR QL STRIP: NEGATIVE
BUN SERPL-MCNC: 13 MG/DL (ref 5–25)
CALCIUM SERPL-MCNC: 8.9 MG/DL (ref 8.3–10.1)
CHLORIDE SERPL-SCNC: 106 MMOL/L (ref 100–108)
CLARITY UR: CLEAR
CO2 SERPL-SCNC: 27 MMOL/L (ref 21–32)
COLOR UR: YELLOW
CREAT SERPL-MCNC: 0.78 MG/DL (ref 0.6–1.3)
EOSINOPHIL # BLD AUTO: 0.02 THOUSAND/ΜL (ref 0–0.61)
EOSINOPHIL NFR BLD AUTO: 0 % (ref 0–6)
ERYTHROCYTE [DISTWIDTH] IN BLOOD BY AUTOMATED COUNT: 12.3 % (ref 11.6–15.1)
EXT PREG TEST URINE: NORMAL
EXT. CONTROL ED NAV: NORMAL
GFR SERPL CREATININE-BSD FRML MDRD: 106 ML/MIN/1.73SQ M
GLUCOSE SERPL-MCNC: 81 MG/DL (ref 65–140)
GLUCOSE UR STRIP-MCNC: NEGATIVE MG/DL
HCT VFR BLD AUTO: 38.6 % (ref 34.8–46.1)
HGB BLD-MCNC: 12.5 G/DL (ref 11.5–15.4)
HGB UR QL STRIP.AUTO: NEGATIVE
IMM GRANULOCYTES # BLD AUTO: 0.02 THOUSAND/UL (ref 0–0.2)
IMM GRANULOCYTES NFR BLD AUTO: 0 % (ref 0–2)
KETONES UR STRIP-MCNC: NEGATIVE MG/DL
LEUKOCYTE ESTERASE UR QL STRIP: NEGATIVE
LYMPHOCYTES # BLD AUTO: 1.46 THOUSANDS/ΜL (ref 0.6–4.47)
LYMPHOCYTES NFR BLD AUTO: 25 % (ref 14–44)
MCH RBC QN AUTO: 30.1 PG (ref 26.8–34.3)
MCHC RBC AUTO-ENTMCNC: 32.4 G/DL (ref 31.4–37.4)
MCV RBC AUTO: 93 FL (ref 82–98)
MONOCYTES # BLD AUTO: 0.36 THOUSAND/ΜL (ref 0.17–1.22)
MONOCYTES NFR BLD AUTO: 6 % (ref 4–12)
NEUTROPHILS # BLD AUTO: 3.98 THOUSANDS/ΜL (ref 1.85–7.62)
NEUTS SEG NFR BLD AUTO: 69 % (ref 43–75)
NITRITE UR QL STRIP: NEGATIVE
NRBC BLD AUTO-RTO: 0 /100 WBCS
PH UR STRIP.AUTO: 5.5 [PH] (ref 4.5–8)
PLATELET # BLD AUTO: 140 THOUSANDS/UL (ref 149–390)
PMV BLD AUTO: 12.5 FL (ref 8.9–12.7)
POTASSIUM SERPL-SCNC: 4.2 MMOL/L (ref 3.5–5.3)
PROT SERPL-MCNC: 7.6 G/DL (ref 6.4–8.2)
PROT UR STRIP-MCNC: NEGATIVE MG/DL
RBC # BLD AUTO: 4.15 MILLION/UL (ref 3.81–5.12)
SODIUM SERPL-SCNC: 142 MMOL/L (ref 136–145)
SP GR UR STRIP.AUTO: >=1.03 (ref 1–1.03)
UROBILINOGEN UR QL STRIP.AUTO: 1 E.U./DL
WBC # BLD AUTO: 5.85 THOUSAND/UL (ref 4.31–10.16)

## 2021-11-17 PROCEDURE — 99284 EMERGENCY DEPT VISIT MOD MDM: CPT | Performed by: PHYSICIAN ASSISTANT

## 2021-11-17 PROCEDURE — 96374 THER/PROPH/DIAG INJ IV PUSH: CPT

## 2021-11-17 PROCEDURE — 81025 URINE PREGNANCY TEST: CPT | Performed by: PHYSICIAN ASSISTANT

## 2021-11-17 PROCEDURE — 80053 COMPREHEN METABOLIC PANEL: CPT | Performed by: PHYSICIAN ASSISTANT

## 2021-11-17 PROCEDURE — 70450 CT HEAD/BRAIN W/O DYE: CPT

## 2021-11-17 PROCEDURE — 96375 TX/PRO/DX INJ NEW DRUG ADDON: CPT

## 2021-11-17 PROCEDURE — 36415 COLL VENOUS BLD VENIPUNCTURE: CPT | Performed by: PHYSICIAN ASSISTANT

## 2021-11-17 PROCEDURE — 99284 EMERGENCY DEPT VISIT MOD MDM: CPT

## 2021-11-17 PROCEDURE — 85025 COMPLETE CBC W/AUTO DIFF WBC: CPT | Performed by: PHYSICIAN ASSISTANT

## 2021-11-17 PROCEDURE — 96361 HYDRATE IV INFUSION ADD-ON: CPT

## 2021-11-17 PROCEDURE — 81003 URINALYSIS AUTO W/O SCOPE: CPT

## 2021-11-17 RX ORDER — DIPHENHYDRAMINE HYDROCHLORIDE 50 MG/ML
25 INJECTION INTRAMUSCULAR; INTRAVENOUS ONCE
Status: COMPLETED | OUTPATIENT
Start: 2021-11-17 | End: 2021-11-17

## 2021-11-17 RX ORDER — METOCLOPRAMIDE HYDROCHLORIDE 5 MG/ML
10 INJECTION INTRAMUSCULAR; INTRAVENOUS ONCE
Status: COMPLETED | OUTPATIENT
Start: 2021-11-17 | End: 2021-11-17

## 2021-11-17 RX ORDER — KETOROLAC TROMETHAMINE 30 MG/ML
15 INJECTION, SOLUTION INTRAMUSCULAR; INTRAVENOUS ONCE
Status: COMPLETED | OUTPATIENT
Start: 2021-11-17 | End: 2021-11-17

## 2021-11-17 RX ADMIN — METOCLOPRAMIDE 10 MG: 5 INJECTION, SOLUTION INTRAMUSCULAR; INTRAVENOUS at 09:47

## 2021-11-17 RX ADMIN — DIPHENHYDRAMINE HYDROCHLORIDE 25 MG: 50 INJECTION, SOLUTION INTRAMUSCULAR; INTRAVENOUS at 09:47

## 2021-11-17 RX ADMIN — SODIUM CHLORIDE 1000 ML: 0.9 INJECTION, SOLUTION INTRAVENOUS at 09:45

## 2021-11-17 RX ADMIN — KETOROLAC TROMETHAMINE 15 MG: 30 INJECTION, SOLUTION INTRAMUSCULAR; INTRAVENOUS at 09:47

## 2021-11-30 ENCOUNTER — OFFICE VISIT (OUTPATIENT)
Dept: FAMILY MEDICINE CLINIC | Facility: CLINIC | Age: 26
End: 2021-11-30

## 2021-11-30 VITALS
WEIGHT: 188 LBS | BODY MASS INDEX: 26.92 KG/M2 | OXYGEN SATURATION: 97 % | DIASTOLIC BLOOD PRESSURE: 70 MMHG | TEMPERATURE: 97.7 F | HEART RATE: 89 BPM | SYSTOLIC BLOOD PRESSURE: 116 MMHG | RESPIRATION RATE: 18 BRPM | HEIGHT: 70 IN

## 2021-11-30 DIAGNOSIS — Z23 FLU VACCINE NEED: ICD-10-CM

## 2021-11-30 DIAGNOSIS — E11.9 TYPE 2 DIABETES MELLITUS WITHOUT COMPLICATION, WITHOUT LONG-TERM CURRENT USE OF INSULIN (HCC): Primary | ICD-10-CM

## 2021-11-30 DIAGNOSIS — R17 ELEVATED BILIRUBIN: ICD-10-CM

## 2021-11-30 DIAGNOSIS — R51.9 ACUTE NONINTRACTABLE HEADACHE, UNSPECIFIED HEADACHE TYPE: ICD-10-CM

## 2021-11-30 DIAGNOSIS — Z23 ENCOUNTER FOR IMMUNIZATION: ICD-10-CM

## 2021-11-30 PROBLEM — E11.69 DIABETES MELLITUS TYPE 2 IN OBESE (HCC): Status: RESOLVED | Noted: 2019-06-02 | Resolved: 2021-11-30

## 2021-11-30 PROBLEM — E66.9 DIABETES MELLITUS TYPE 2 IN OBESE (HCC): Status: RESOLVED | Noted: 2019-06-02 | Resolved: 2021-11-30

## 2021-11-30 LAB — SL AMB POCT HEMOGLOBIN AIC: 5.8 (ref ?–6.5)

## 2021-11-30 PROCEDURE — 83036 HEMOGLOBIN GLYCOSYLATED A1C: CPT | Performed by: FAMILY MEDICINE

## 2021-11-30 PROCEDURE — 99213 OFFICE O/P EST LOW 20 MIN: CPT | Performed by: FAMILY MEDICINE

## 2021-11-30 PROCEDURE — 90686 IIV4 VACC NO PRSV 0.5 ML IM: CPT | Performed by: FAMILY MEDICINE

## 2021-11-30 PROCEDURE — 90471 IMMUNIZATION ADMIN: CPT | Performed by: FAMILY MEDICINE

## 2022-02-10 ENCOUNTER — OFFICE VISIT (OUTPATIENT)
Dept: BARIATRICS | Facility: CLINIC | Age: 27
End: 2022-02-10
Payer: COMMERCIAL

## 2022-02-10 VITALS
HEIGHT: 70 IN | RESPIRATION RATE: 16 BRPM | SYSTOLIC BLOOD PRESSURE: 96 MMHG | TEMPERATURE: 97.8 F | DIASTOLIC BLOOD PRESSURE: 63 MMHG | BODY MASS INDEX: 27.92 KG/M2 | HEART RATE: 74 BPM | WEIGHT: 195 LBS

## 2022-02-10 DIAGNOSIS — K91.2 POSTSURGICAL MALABSORPTION: ICD-10-CM

## 2022-02-10 DIAGNOSIS — Z98.84 BARIATRIC SURGERY STATUS: ICD-10-CM

## 2022-02-10 DIAGNOSIS — R10.9 ABDOMINAL PAIN: ICD-10-CM

## 2022-02-10 DIAGNOSIS — Z48.815 ENCOUNTER FOR SURGICAL AFTERCARE FOLLOWING SURGERY OF DIGESTIVE SYSTEM: Primary | ICD-10-CM

## 2022-02-10 DIAGNOSIS — L98.7 EXCESS SKIN OF ABDOMEN: ICD-10-CM

## 2022-02-10 DIAGNOSIS — E66.3 OVERWEIGHT: ICD-10-CM

## 2022-02-10 PROCEDURE — 3074F SYST BP LT 130 MM HG: CPT | Performed by: PHYSICIAN ASSISTANT

## 2022-02-10 PROCEDURE — 3078F DIAST BP <80 MM HG: CPT | Performed by: PHYSICIAN ASSISTANT

## 2022-02-10 PROCEDURE — 99213 OFFICE O/P EST LOW 20 MIN: CPT | Performed by: PHYSICIAN ASSISTANT

## 2022-02-10 RX ORDER — OMEPRAZOLE 20 MG/1
20 CAPSULE, DELAYED RELEASE ORAL 2 TIMES DAILY
Qty: 60 CAPSULE | Refills: 1 | Status: SHIPPED | OUTPATIENT
Start: 2022-02-10 | End: 2022-04-07 | Stop reason: SDUPTHER

## 2022-02-10 RX ORDER — SUCRALFATE 1 G/1
1 TABLET ORAL 4 TIMES DAILY
Qty: 120 TABLET | Refills: 1 | Status: SHIPPED | OUTPATIENT
Start: 2022-02-10 | End: 2022-04-07

## 2022-02-10 NOTE — PATIENT INSTRUCTIONS
· Follow-up in 6-8 weeks and again in 6 months for your annual  We kindly ask that your arrive 15 minutes before your scheduled appointment time with your provider to allow our staff to room you, get your vital signs and update your chart  · Call our office if you have any problems with abdominal pain especially associated with fever, chills, nausea, vomiting or any other concerns  · All  Post-bariatric surgery patients should be aware that very small quantities of any alcohol can cause impairment and it is very possible not to feel the effect  The effect can be in the system for several hours  It is also a stomach irritant  · It is advised to AVOID alcohol, Nonsteroidal antiinflammatory drugs (NSAIDS) and nicotine of all forms   Any of these can cause stomach irritation/pain  · Discussed the effects of alcohol on a bariatric patient and the increased impairment risk  · Keep up the good work!

## 2022-02-10 NOTE — PROGRESS NOTES
Assessment/Plan:     Patient ID: Shonda Addison is a 23 y o  female  Bariatric Surgery Status       Status Micaela Gao 07/20/2020  Marilin Vanegas Presents to the office today for follow up  C/o epigastric pain with food  States this can happen with any type of food and described as feels "tight" and "burning  No vomiting or nausea and she has NO pain unless she eats  She has no trouble with liquids  She bought OTC omeprazole but only takes prn  Denies NSAID use or alcohol or nicotine use  Does not drink a lot of caffeine  Prior to restarting omeprazole she had been off of it a couple months  She had taken it for at leasts 6 months after surgery  On exam she had mild TTP in her epigastrium, otherwise unremarkable  Will treat with Bid PPI and Qid carafate starting now and follow up with patient in 8 weeks  If pain continues will need EGD  For new or worsening symptoms will let us know  ER precautions also given and patient verbalized understanding  · Continued/Maintain healthy weight loss with good nutrition intakes  · Adequate hydration with at least 64oz  fluid intake  · Follow diet as discussed  · Follow vitamin and mineral recommendations as reviewed with you  · Exercise as tolerated  · Colonoscopy referral made: not in age range     · Follow-up in 6-8 weeks and again in 6 months for your annual  We kindly ask that your arrive 15 minutes before your scheduled appointment time with your provider to allow our staff to room you, get your vital signs and update your chart  · Call our office if you have any problems with abdominal pain especially associated with fever, chills, nausea, vomiting or any other concerns  · All  Post-bariatric surgery patients should be aware that very small quantities of any alcohol can cause impairment and it is very possible not to feel the effect  The effect can be in the system for several hours  It is also a stomach irritant  · It is advised to AVOID alcohol, Nonsteroidal antiinflammatory drugs (NSAIDS) and nicotine of all forms   Any of these can cause stomach irritation/pain  · Discussed the effects of alcohol on a bariatric patient and the increased impairment risk  · Keep up the good work! Postsurgical Malabsorption   -At risk for malabsorption of vitamins/minerals secondary to malabsorption and restriction of intake from bariatric surgery  -Currently taking adequate postop bariatric surgery vitamin supplementation  -Last set of bariatric labs completed 9/2021  -Patient received education about the importance of adhering to a lifelong supplementation regimen to avoid vitamin/mineral deficiencies      Diagnoses and all orders for this visit:    Encounter for surgical aftercare following surgery of digestive system  -     omeprazole (PriLOSEC) 20 mg delayed release capsule; Take 1 capsule (20 mg total) by mouth 2 (two) times a day  -     sucralfate (CARAFATE) 1 g tablet; Take 1 tablet (1 g total) by mouth 4 (four) times a day Crush and mix with water to take as a suspension    Bariatric surgery status  -     omeprazole (PriLOSEC) 20 mg delayed release capsule; Take 1 capsule (20 mg total) by mouth 2 (two) times a day  -     sucralfate (CARAFATE) 1 g tablet; Take 1 tablet (1 g total) by mouth 4 (four) times a day Crush and mix with water to take as a suspension    Postsurgical malabsorption    Overweight    Excess skin of abdomen    Abdominal pain  -     omeprazole (PriLOSEC) 20 mg delayed release capsule; Take 1 capsule (20 mg total) by mouth 2 (two) times a day  -     sucralfate (CARAFATE) 1 g tablet; Take 1 tablet (1 g total) by mouth 4 (four) times a day Crush and mix with water to take as a suspension         Subjective:      Patient ID: Rhae Kidney is a 23 y o  female  Maryjo Melara 07/20/2020  Gerhardt Sep Presents to the office today for follow up       Initial: 260  Current: 195  EWL: (Weight loss is ahead of schedule at this post surgical period )  Roshan  Current BMI is Body mass index is 27 98 kg/m²  · Tolerating a regular diet-yes but with discomfort currently   · Eating at least 60 grams of protein per day-yes  · Following 30/60 minute rule with liquids-yes  · Drinking at least 64 ounces of fluid per day-yes  · Drinking carbonated beverages-no  · Sufficient exercise-gym  · Using NSAIDs regularly-no  · Using nicotine-no  · Using alcohol-no  · Supplements: shawna mvi + calcium    ·     The following portions of the patient's history were reviewed and updated as appropriate: allergies, current medications, past family history, past medical history, past social history, past surgical history and problem list     Review of Systems   Constitutional: Negative  Respiratory: Negative  Cardiovascular: Negative  Gastrointestinal: Positive for abdominal pain  Negative for constipation, diarrhea, nausea and vomiting  Musculoskeletal: Negative  Neurological: Negative  Psychiatric/Behavioral: Negative  Objective:    BP 96/63 (BP Location: Left arm, Patient Position: Sitting, Cuff Size: Large)   Pulse 74   Temp 97 8 °F (36 6 °C) (Tympanic)   Resp 16   Ht 5' 10" (1 778 m)   Wt 88 5 kg (195 lb)   BMI 27 98 kg/m²      Physical Exam  Vitals and nursing note reviewed  Constitutional:       Appearance: Normal appearance  HENT:      Head: Normocephalic and atraumatic  Eyes:      Extraocular Movements: Extraocular movements intact  Pupils: Pupils are equal, round, and reactive to light  Cardiovascular:      Rate and Rhythm: Normal rate and regular rhythm  Pulmonary:      Effort: Pulmonary effort is normal       Breath sounds: Normal breath sounds  Abdominal:      General: Bowel sounds are normal       Tenderness: There is abdominal tenderness (epigastric )  Musculoskeletal:         General: Normal range of motion        Cervical back: Normal range of motion  Skin:     General: Skin is warm and dry  Neurological:      General: No focal deficit present  Mental Status: She is alert and oriented to person, place, and time     Psychiatric:         Mood and Affect: Mood normal

## 2022-02-15 ENCOUNTER — TELEPHONE (OUTPATIENT)
Dept: PLASTIC SURGERY | Facility: CLINIC | Age: 27
End: 2022-02-15

## 2022-02-21 ENCOUNTER — COSMETIC (OUTPATIENT)
Dept: PLASTIC SURGERY | Facility: CLINIC | Age: 27
End: 2022-02-21

## 2022-02-21 DIAGNOSIS — Z41.1 ENCOUNTER FOR COSMETIC SURGERY: Primary | ICD-10-CM

## 2022-02-21 PROCEDURE — COSCON: Performed by: STUDENT IN AN ORGANIZED HEALTH CARE EDUCATION/TRAINING PROGRAM

## 2022-02-22 NOTE — H&P
Plastic Surgery Consult    Reason for visit: abdominoplasty and BBL      HPI:  Patient is a 22 y/o female who presents for abdominoplasty and BBL  She is done having children  She is also massive weight loss patient, who underwent gastric sleeve in July 2020 and her weight is at her goal weight and stable for the last year  She lost 70 lbs with her gastric sleeve  She would like improved abdominal and gluteal contour  ROS: 12 pt ROS negative, except as otherwise noted in HPI  PMH: none, no history of blood clots  FamHx: none  SurgHx: gastric bypass, c -section, 1 child  SocHx: no tobacco, ETOH  Meds: no blood thinners, or steroids  Allergies: NKDA    PE:  There were no vitals filed for this visit  General: NC/AT, breathing comfortably on RA  Neuro: CN II-XII grossly intact, symmetric reflexes  HEENT: PERRLA, EOMI, external ears normal, no lesions or deformities, neck supple, trachea midline  Respiratory: CTAB, normal respiratory effort  Cardio: RRR, normal S1, S2, no murmur, rubs, gallops  GI: soft, non-tender, non-distended  Extremities/MSK: normal alignment, mobility, gait, no edema  Skin: no rashes, lesions, subcutaneous nodules    BMI 29 2    Exam:  Moderate abdominal lipodystrophy and skin  Striae above and below umbilicus  Minimal diastasis, no hernia  Prominent bra rolls    Gluteal exam:   Flattened buttock with diminished projection bilaterally  Poor skin quality and elasticity    A/P: 22 y/o female who presents for abdominoplasty and BBL  -I discussed in depth with the patient that massive weight loss patients have poor skin quality and elasticity  Patient also does not have significant abdominal lipodystrophy that would provide for ample augmentation of her buttock given her existing deflated buttock and the fact that 40-50% of the fat would be reabsorbed  I explained that the contour may only be slightly improved    -I did discuss that the patient would benefit from lipoabdominoplasty and discussed the risks, procedure, complications  I also informed her that the lateral bra rolls would likely still be evident, but diminished, due to the nature of her extra skin from weight loss   Patient acknowleged   -Will email cosmetic quote for [address] Joan Villegas MD   [address] [place] [address] Plastic and Reconstructive Surgery   [address] [facility] [address], [address]   Office: [phone]

## 2022-02-25 NOTE — PROGRESS NOTES
Plastic Surgery Consult     Reason for visit: abdominoplasty and BBL        HPI:  Patient is a 24 y/o female who presents for abdominoplasty and BBL  She is done having children  She is also massive weight loss patient, who underwent gastric sleeve in July 2020 and her weight is at her goal weight and stable for the last year  She lost 70 lbs with her gastric sleeve  She would like improved abdominal and gluteal contour      ROS: 12 pt ROS negative, except as otherwise noted in HPI      PMH: none, no history of blood clots  FamHx: none  SurgHx: gastric bypass, c -section, 1 child  SocHx: no tobacco, ETOH  Meds: no blood thinners, or steroids  Allergies: NKDA     PE: There were no vitals filed for this visit      General: NC/AT, breathing comfortably on RA  Neuro: CN II-XII grossly intact, symmetric reflexes  HEENT: PERRLA, EOMI, external ears normal, no lesions or deformities, neck supple, trachea midline  Respiratory: CTAB, normal respiratory effort  Cardio: RRR, normal S1, S2, no murmur, rubs, gallops  GI: soft, non-tender, non-distended  Extremities/MSK: normal alignment, mobility, gait, no edema  Skin: no rashes, lesions, subcutaneous nodules     BMI 29 2     Exam:  Moderate abdominal lipodystrophy and skin  Striae above and below umbilicus  Minimal diastasis, no hernia  Prominent bra rolls     Gluteal exam:   Flattened buttock with diminished projection bilaterally  Poor skin quality and elasticity     A/P: 24 y/o female who presents for abdominoplasty and BBL  -I discussed in depth with the patient that massive weight loss patients have poor skin quality and elasticity  Patient also does not have significant abdominal lipodystrophy that would provide for ample augmentation of her buttock given her existing deflated buttock and the fact that 40-50% of the fat would be reabsorbed  I explained that the contour may only be slightly improved    -I did discuss that the patient would benefit from lipoabdominoplasty and discussed the risks, procedure, complications  I also informed her that the lateral bra rolls would likely still be evident, but diminished, due to the nature of her extra skin from weight loss   Patient acknowleged   -Will email cosmetic quote for [phone] [facility], MD   [facility] Plastic and Reconstructive Surgery   [address]   Office: [phone]

## 2022-03-16 ENCOUNTER — HOSPITAL ENCOUNTER (EMERGENCY)
Facility: HOSPITAL | Age: 27
Discharge: HOME | End: 2022-03-16
Attending: EMERGENCY MEDICINE | Admitting: EMERGENCY MEDICINE
Payer: COMMERCIAL

## 2022-03-16 VITALS
DIASTOLIC BLOOD PRESSURE: 60 MMHG | HEART RATE: 78 BPM | OXYGEN SATURATION: 98 % | WEIGHT: 200.4 LBS | RESPIRATION RATE: 17 BRPM | BODY MASS INDEX: 29.68 KG/M2 | SYSTOLIC BLOOD PRESSURE: 116 MMHG | TEMPERATURE: 97.8 F | HEIGHT: 69 IN

## 2022-03-16 DIAGNOSIS — R21 RASH: Primary | ICD-10-CM

## 2022-03-16 PROCEDURE — 99282 EMERGENCY DEPT VISIT SF MDM: CPT

## 2022-03-16 PROCEDURE — 99284 EMERGENCY DEPT VISIT MOD MDM: CPT | Performed by: EMERGENCY MEDICINE

## 2022-03-16 RX ORDER — CETIRIZINE HYDROCHLORIDE 10 MG/1
10 TABLET ORAL DAILY
Qty: 7 TABLET | Refills: 0 | Status: SHIPPED | OUTPATIENT
Start: 2022-03-16 | End: 2022-03-23

## 2022-03-16 RX ORDER — TRIAMCINOLONE ACETONIDE 1 MG/G
CREAM TOPICAL 2 TIMES DAILY
Qty: 30 G | Refills: 0 | Status: SHIPPED | OUTPATIENT
Start: 2022-03-16 | End: 2022-03-21

## 2022-03-17 NOTE — ED PROVIDER NOTES
History  Chief Complaint   Patient presents with    Itching     states started with itching on abdomen yesterday that has spread all throughout body  denies new lotions, creams, detergents, body wash  Patient is a 22 YO F, PMHx of DM, who presents to the ED for an itchy rash  Patient states the itchy rash started about 3 days ago  Initially, it was mainly located over her abdomen  Over the past day or two, it has started to spread over her body  She denies any prior history of rashes like this in the past  She has not tried taking any medications for the rash  She denies any new medications, soaps, detergents, foods, or any other allergens  She denies any intraoral involvement  She denies any fevers, chills, N/V/D, abdominal pain, chest pain, SOB, or any other new or concerning symptoms  Prior to Admission Medications   Prescriptions Last Dose Informant Patient Reported? Taking?    Multiple Vitamins-Minerals (MULTIVITAMIN ADULT PO)  Self Yes No   Sig: Take by mouth daily   calcium carbonate (OS-GABI) 600 MG tablet   Yes No   Sig: Take 600 mg by mouth 2 (two) times a day with meals   levonorgestrel (MIRENA) 20 MCG/24HR IUD   Yes No   Si each by Intrauterine route once   omeprazole (PriLOSEC) 20 mg delayed release capsule   No No   Sig: Take 1 capsule (20 mg total) by mouth 2 (two) times a day   sucralfate (CARAFATE) 1 g tablet   No No   Sig: Take 1 tablet (1 g total) by mouth 4 (four) times a day Crush and mix with water to take as a suspension      Facility-Administered Medications: None       Past Medical History:   Diagnosis Date    Diabetes mellitus ([facility] [address] )     NIDDM    History of transfusion     after  - had fever with transfusion    Obesity (BMI 35 0-39 9 without comorbidity)     S/P laparoscopic sleeve gastrectomy 2020    Uses Latvian as primary spoken language        Past Surgical History:   Procedure Laterality Date     SECTION  2017    CHOLECYSTECTOMY  AK LAP, URI RESTRICT PROC, LONGITUDINAL GASTRECTOMY N/A 7/20/2020    Procedure: LAP SLEEVE GASTRECTOMY, INTRAOP EGD;  Surgeon: Zain Mesa MD;  Location: [facility];  Service: Bariatrics       Family History   Problem Relation Age of Onset    Heart disease Family         CARDIOVASCULAR DISEASE    Diabetes Mother     Other Mother         gastric bypass    Diabetes Father     Diabetes Sister     Sleep apnea Brother         2 of the 3 borthers have EAGLE    Diabetes Maternal Grandmother     Diabetes Paternal Grandmother     No Known Problems Son     No Known Problems Maternal Grandfather     No Known Problems Paternal Grandfather     Sleep apnea Sister     No Known Problems Brother      I have reviewed and agree with the history as documented  E-Cigarette/Vaping    E-Cigarette Use Never User      E-Cigarette/Vaping Substances    Nicotine No     THC No     CBD No     Flavoring No     Other No     Unknown No      Social History     Tobacco Use    Smoking status: Never Smoker    Smokeless tobacco: Never Used   Vaping Use    Vaping Use: Never used   Substance Use Topics    Alcohol use: No    Drug use: No        Review of Systems   Constitutional: Negative for chills and fever  Respiratory: Negative for shortness of breath  Cardiovascular: Negative for chest pain  Gastrointestinal: Negative for abdominal pain, diarrhea, nausea and vomiting  Skin: Positive for rash  Negative for wound  All other systems reviewed and are negative        Physical Exam  ED Triage Vitals [03/16/22 1945]   Temperature Pulse Respirations Blood Pressure SpO2   97 8 °F (36 6 °C) 78 17 116/60 98 %      Temp Source Heart Rate Source Patient Position - Orthostatic VS BP Location FiO2 (%)   Oral Monitor Sitting Right arm --      Pain Score       No Pain             Orthostatic Vital Signs  Vitals:    03/16/22 1945   BP: 116/60   Pulse: 78   Patient Position - Orthostatic VS: Sitting       Physical Exam  Vitals and nursing note reviewed  Constitutional:       General: She is not in acute distress  Appearance: She is well-developed  She is not diaphoretic  HENT:      Head: Normocephalic and atraumatic  Right Ear: External ear normal       Left Ear: External ear normal       Nose: Nose normal       Mouth/Throat:      Mouth: Mucous membranes are moist  No oral lesions  Pharynx: Oropharynx is clear  No pharyngeal swelling or posterior oropharyngeal erythema  Eyes:      General: Lids are normal  No scleral icterus  Cardiovascular:      Rate and Rhythm: Normal rate and regular rhythm  Heart sounds: Normal heart sounds  No murmur heard  No friction rub  No gallop  Pulmonary:      Effort: Pulmonary effort is normal  No respiratory distress  Breath sounds: Normal breath sounds  No wheezing or rales  Abdominal:      Palpations: Abdomen is soft  Tenderness: There is no abdominal tenderness  There is no guarding or rebound  Musculoskeletal:         General: No deformity  Normal range of motion  Right lower leg: No edema  Left lower leg: No edema  Skin:     General: Skin is warm and dry  Findings: Rash present  Rash is papular  Comments: Scattered, dry papules noted, mainly over abdomen  There are one to two papules over the anterior chest  No intraoral lesions  No lesions on palms or feet  Evidence of scratching  Neurological:      General: No focal deficit present  Mental Status: She is alert  Psychiatric:         Mood and Affect: Mood normal          Behavior: Behavior normal          ED Medications  Medications - No data to display    Diagnostic Studies  Results Reviewed     None                 No orders to display         Procedures  Procedures      ED Course  ED Course as of 03/16/22 2057   Wed Mar 16, 2022   2036 Discussed with patient no emergent causes of her rash identified  Recommended PCP/Derm follow up   Rx for zytec and steroid cream sent to pharmacy  Return precautions discussed  Patient verbalized understanding and agreed with plan of care  MDM  Number of Diagnoses or Management Options  Rash  Diagnosis management comments: Patient is a 23 y o  female who presents to the ED for an itchy rash  Differential diagnosis for rash includes dermatitis  History and exam findings not consistent with dangerous etiologies of rash such as SJS/TEN, or secondary dangerous causes such as petechial rashes from thrombocytopenia or rickettsial infections  Plan: Symptomatic treatment, D/C with derm/PCP follow up                Portions of the record may have been created with voice recognition software  Occasional wrong word or "sound a like" substitutions may have occurred due to the inherent limitations of voice recognition software  Read the chart carefully and recognize, using context, where substitutions have occurred  Risk of Complications, Morbidity, and/or Mortality  Presenting problems: low  Diagnostic procedures: minimal  Management options: low    Patient Progress  Patient progress: stable      Disposition  Final diagnoses:   Rash     Time reflects when diagnosis was documented in both MDM as applicable and the Disposition within this note     Time User Action Codes Description Comment    3/16/2022  8:37 PM Galdino Aleman Rash       ED Disposition     ED Disposition Condition Date/Time Comment    Discharge Stable Wed Mar 16, 2022  8:36 PM Daphney Mcbride discharge to home/self care              Follow-up Information     Follow up With Specialties Details Why Contact Info Additional Information    [facility] Emergency Department Emergency Medicine  As needed [facility] [id number]  [address] [facility] Emergency Department, [address] [facility]  Call in 1 day  [phone]       [facility]   [address] [phone]  [facility] , [facility], [place], [address] [facility]          Patient's Medications   Discharge Prescriptions    CETIRIZINE (ZYRTEC) 10 MG TABLET    Take 1 tablet (10 mg total) by mouth daily for 7 days       Start Date: 3/16/2022 End Date: 3/23/2022       Order Dose: 10 mg       Quantity: 7 tablet    Refills: 0    TRIAMCINOLONE (KENALOG) 0 1 % CREAM    Apply topically 2 (two) times a day for 5 days       Start Date: 3/16/2022 End Date: 3/21/2022       Order Dose: --       Quantity: 30 g    Refills: 0     No discharge procedures on file  PDMP Review       Value Time User    PDMP Reviewed  Yes 7/14/2021  2:12 PM Valencia Garcia PA-C           ED Provider  Attending physically available and evaluated [facility]  I managed the patient along with the ED Attending      Electronically Signed by         Cesar Alonzo DO  03/16/22 0271

## 2022-03-17 NOTE — ED ATTENDING ATTESTATION
3/16/2022  I, Joe Ibarra MD, saw and evaluated the patient  I have discussed the patient with the resident/non-physician practitioner and agree with the resident's/non-physician practitioner's findings, Plan of Care, and MDM as documented in the resident's/non-physician practitioner's note, except where noted  All available labs and Radiology studies were reviewed  I was present for key portions of any procedure(s) performed by the resident/non-physician practitioner and I was immediately available to provide assistance  At this point I agree with the current assessment done in the Emergency Department  I have conducted an independent evaluation of this patient a history and physical is as follows:    ED Course         Critical Care Time  Procedures    C/o itchy rash on abd  For 3 days and now it spread all over  No known allergens  No new soaps, lotions, foods  No fevers, no n/v       Well-appearing, no distress, RRR, CTA b/l, abd  Nontender, ext   No edema, rash is dried skin ,mild eythema in some areas, no vesicles, no drainage, nontender

## 2022-03-17 NOTE — DISCHARGE INSTRUCTIONS
You were evaluated in the Emergency Department today for a rash  Please follow up with your primary care physician within 2-3 days  In addition, please follow up with dermatology  You may call the number above to schedule an appointment     Return to the Emergency Department if you experience worsening or spreading rash, worsening or uncontrolled pain, fevers 100 4°F or greater, recurrent vomiting, shortness of breath, discharge from your rash, or any other concerning symptoms

## 2022-04-07 ENCOUNTER — OFFICE VISIT (OUTPATIENT)
Dept: BARIATRICS | Facility: CLINIC | Age: 27
End: 2022-04-07
Payer: COMMERCIAL

## 2022-04-07 VITALS
SYSTOLIC BLOOD PRESSURE: 107 MMHG | HEART RATE: 82 BPM | RESPIRATION RATE: 14 BRPM | TEMPERATURE: 97.9 F | DIASTOLIC BLOOD PRESSURE: 66 MMHG | BODY MASS INDEX: 28.56 KG/M2 | HEIGHT: 70 IN | WEIGHT: 199.5 LBS

## 2022-04-07 DIAGNOSIS — Z98.84 BARIATRIC SURGERY STATUS: Primary | ICD-10-CM

## 2022-04-07 DIAGNOSIS — Z98.84 BARIATRIC SURGERY STATUS: ICD-10-CM

## 2022-04-07 DIAGNOSIS — Z48.815 ENCOUNTER FOR SURGICAL AFTERCARE FOLLOWING SURGERY OF DIGESTIVE SYSTEM: ICD-10-CM

## 2022-04-07 DIAGNOSIS — K91.2 POSTSURGICAL MALABSORPTION: ICD-10-CM

## 2022-04-07 DIAGNOSIS — E66.3 OVERWEIGHT: ICD-10-CM

## 2022-04-07 DIAGNOSIS — R10.9 ABDOMINAL PAIN: ICD-10-CM

## 2022-04-07 PROCEDURE — 3074F SYST BP LT 130 MM HG: CPT | Performed by: PHYSICIAN ASSISTANT

## 2022-04-07 PROCEDURE — 99213 OFFICE O/P EST LOW 20 MIN: CPT | Performed by: PHYSICIAN ASSISTANT

## 2022-04-07 PROCEDURE — 3078F DIAST BP <80 MM HG: CPT | Performed by: PHYSICIAN ASSISTANT

## 2022-04-07 RX ORDER — OMEPRAZOLE 20 MG/1
20 CAPSULE, DELAYED RELEASE ORAL 2 TIMES DAILY
Qty: 60 CAPSULE | Refills: 2 | Status: SHIPPED | OUTPATIENT
Start: 2022-04-07 | End: 2022-05-07

## 2022-04-07 NOTE — PROGRESS NOTES
Assessment/Plan:       Diagnoses and all orders for this visit:    Encounter for surgical aftercare following surgery of digestive system  -     omeprazole (PriLOSEC) 20 mg delayed release capsule; Take 1 capsule (20 mg total) by mouth 2 (two) times a day    Bariatric surgery status  -     omeprazole (PriLOSEC) 20 mg delayed release capsule; Take 1 capsule (20 mg total) by mouth 2 (two) times a day    Abdominal pain  -     omeprazole (PriLOSEC) 20 mg delayed release capsule; Take 1 capsule (20 mg total) by mouth 2 (two) times a day            Status Shimon Chapman Friday 07/20/2020  Severo Aponte Presents to the office today for follow up  At last visit had c/o epigastric pain, was treated with PPI BID and Carafate for the last 8 weeks and states pain has resolved  She stopped the Carafate but continues on omeprazole  Occasional nausea but denies any vomiting will have her continue PPI BID for another month then can go back down to once daily  Will see her as scheduled for her annual in August or sooner prn     Subjective:      Patient ID: Colleen Reina is a 23 y o  female  HPI  Follow up abd pain     The following portions of the patient's history were reviewed and updated as appropriate: allergies, current medications, past family history, past medical history, past social history, past surgical history and problem list     Review of Systems   Constitutional: Negative  Respiratory: Negative  Cardiovascular: Negative  Gastrointestinal: Negative  Neurological: Negative  Psychiatric/Behavioral: Negative  Objective:      /66   Pulse 82   Temp 97 9 °F (36 6 °C) (Tympanic)   Resp 14   Ht 5' 10" (1 778 m)   Wt 90 5 kg (199 lb 8 oz)   BMI 28 63 kg/m²          Physical Exam  Vitals and nursing note reviewed  Constitutional:       Appearance: Normal appearance  She is obese  HENT:      Head: Normocephalic and atraumatic     Eyes:      Extraocular Movements: Extraocular movements intact  Pupils: Pupils are equal, round, and reactive to light  Cardiovascular:      Rate and Rhythm: Normal rate and regular rhythm  Pulmonary:      Effort: Pulmonary effort is normal       Breath sounds: Normal breath sounds  Abdominal:      General: Bowel sounds are normal       Tenderness: There is no abdominal tenderness  Musculoskeletal:         General: Normal range of motion  Cervical back: Normal range of motion  Skin:     General: Skin is warm and dry  Neurological:      General: No focal deficit present  Mental Status: She is alert and oriented to person, place, and time     Psychiatric:         Mood and Affect: Mood normal

## 2022-04-08 ENCOUNTER — TELEPHONE (OUTPATIENT)
Dept: BARIATRICS | Facility: CLINIC | Age: 27
End: 2022-04-08

## 2022-04-08 DIAGNOSIS — E80.6 HYPERBILIRUBINEMIA: Primary | ICD-10-CM

## 2022-04-08 DIAGNOSIS — Z98.84 BARIATRIC SURGERY STATUS: ICD-10-CM

## 2022-04-08 NOTE — TELEPHONE ENCOUNTER
Called pt to let her know blood work is in the system  Last labs 11/2021 showed elevated bilirubin but no follow up on this with her pcp  She had her cholecystectomy a few years ago   Will order US abdomen, if unremarkable continue follow up with PCP or GI for eval of hyperbilirubinemia

## 2022-04-19 ENCOUNTER — TELEMEDICINE (OUTPATIENT)
Dept: UROLOGY | Facility: CLINIC | Age: 27
End: 2022-04-19
Payer: COMMERCIAL

## 2022-04-19 DIAGNOSIS — R10.9 ACUTE FLANK PAIN: Primary | ICD-10-CM

## 2022-04-19 PROCEDURE — 99214 OFFICE O/P EST MOD 30 MIN: CPT | Performed by: PHYSICIAN ASSISTANT

## 2022-04-19 RX ORDER — ACETAMINOPHEN 325 MG/1
650 TABLET ORAL EVERY 6 HOURS PRN
Qty: 30 TABLET | Refills: 0 | Status: SHIPPED | OUTPATIENT
Start: 2022-04-19 | End: 2022-05-24

## 2022-04-19 RX ORDER — TAMSULOSIN HYDROCHLORIDE 0.4 MG/1
0.4 CAPSULE ORAL
Qty: 14 CAPSULE | Refills: 0 | Status: SHIPPED | OUTPATIENT
Start: 2022-04-19 | End: 2022-05-24

## 2022-04-19 RX ORDER — ONDANSETRON 4 MG/1
4 TABLET, ORALLY DISINTEGRATING ORAL EVERY 6 HOURS PRN
Qty: 20 TABLET | Refills: 0 | Status: SHIPPED | OUTPATIENT
Start: 2022-04-19 | End: 2022-05-24

## 2022-04-19 NOTE — PROGRESS NOTES
Virtual Regular Visit    Verification of patient location:    Patient is located in the following state in which I hold an active license [place]      Assessment/Plan:    Acute right flank pain- labs and ct ordered to eval for obstructing stone  Last scan was stone free after spontaneous passage July 2021  Bariatric surgery status on calcium supplementation  Aside from pain no current systemic symptoms  Will call with results this week  rx for supportive care sent  Problem List Items Addressed This Visit     None      Visit Diagnoses     Acute flank pain    -  Primary    Relevant Medications    tamsulosin (FLOMAX) 0 4 mg    acetaminophen (TYLENOL) 325 mg tablet    ondansetron (Zofran ODT) 4 mg disintegrating tablet    Other Relevant Orders    CT renal stone study abdomen pelvis wo contrast    Urinalysis with microscopic    Urine culture               Reason for visit is   Chief Complaint   Patient presents with    Virtual Regular Visit        Encounter provider Brook Esparza PA-C    Provider located at [address] [place], [place] [id number]      Recent Visits  No visits were found meeting these conditions  Showing recent visits within past 7 days and meeting all other requirements  Today's Visits  Date Type Provider Dept   04/19/22 Telemedicine rBook Esparza PA-C [facility]   Showing today's visits and meeting all other requirements  Future Appointments  No visits were found meeting these conditions  Showing future appointments within next 150 days and meeting all other requirements       The patient was identified by name and date of birth  Catalina Renéjoey was informed that this is a telemedicine visit and that the visit is being conducted through Telephone  My office door was closed  No one else was in the room  She acknowledged consent and understanding of privacy and security of the video platform   The patient has agreed to participate and understands they can discontinue the visit at any time  Patient is aware this is a billable service  Subjective  Lexie Van is a 23 y o  female acute visit for right flank pain came on suddenly 3-4 days ago  Feels similar to previous stone episodes  last stone episode with spontaneous passage in 2021  She reports no fevers nausea or vomiting  Does have diarrhea 2 days as well  Voiding OK no hematuria  Very mild dysuria  No left sided symptoms  She is s/p bariatric surgery  She is eating and drinking OK  She has not tried any medications at home for symptoms        HPI     Past Medical History:   Diagnosis Date    Diabetes mellitus ([facility] [address] )     NIDDM    History of transfusion     after  - had fever with transfusion    Obesity (BMI 35 0-39 9 without comorbidity)     S/P laparoscopic sleeve gastrectomy 2020    Uses Divehi as primary spoken language        Past Surgical History:   Procedure Laterality Date     SECTION  2017    CHOLECYSTECTOMY      UT LAP, URI RESTRICT PROC, LONGITUDINAL GASTRECTOMY N/A 2020    Procedure: LAP SLEEVE GASTRECTOMY, INTRAOP EGD;  Surgeon: Kevin Lawton MD;  Location: [facility];  Service: Bariatrics       Current Outpatient Medications   Medication Sig Dispense Refill    acetaminophen (TYLENOL) 325 mg tablet Take 2 tablets (650 mg total) by mouth every 6 (six) hours as needed for mild pain or moderate pain 30 tablet 0    calcium carbonate (OS-GABI) 600 MG tablet Take 600 mg by mouth 2 (two) times a day with meals      cetirizine (ZyrTEC) 10 mg tablet Take 1 tablet (10 mg total) by mouth daily for 7 days 7 tablet 0    levonorgestrel (MIRENA) 20 MCG/24HR IUD 1 each by Intrauterine route once      Multiple Vitamins-Minerals (MULTIVITAMIN ADULT PO) Take by mouth daily      omeprazole (PriLOSEC) 20 mg delayed release capsule Take 1 capsule (20 mg total) by mouth 2 (two) times a day 60 capsule 2    ondansetron (Zofran ODT) 4 mg disintegrating tablet Take 1 tablet (4 mg total) by mouth every 6 (six) hours as needed for nausea or vomiting 20 tablet 0    tamsulosin (FLOMAX) 0 4 mg Take 1 capsule (0 4 mg total) by mouth daily with dinner 14 capsule 0    triamcinolone (KENALOG) 0 1 % cream Apply topically 2 (two) times a day for 5 days 30 g 0     No current facility-administered medications for this visit  No Known Allergies    Review of Systems   Constitutional: Negative for activity change, appetite change, chills, fever and unexpected weight change  HENT: Negative  Respiratory: Negative  Negative for shortness of breath  Cardiovascular: Negative  Negative for chest pain  Gastrointestinal: Negative for abdominal pain, diarrhea, nausea and vomiting  Endocrine: Negative  Genitourinary: Positive for dysuria and flank pain  Negative for decreased urine volume, difficulty urinating, frequency, hematuria and urgency  Musculoskeletal: Negative for back pain and gait problem  Skin: Negative  Allergic/Immunologic: Negative  Neurological: Negative  Hematological: Negative for adenopathy  Does not bruise/bleed easily  Video Exam    There were no vitals filed for this visit  Physical Exam     I spent 10 minutes directly with the patient during this visit    VIRTUAL VISIT DISCLAIMER      Reyna Vera verbally agrees to participate in [employer]  Pt is aware that [employer] could be limited without vital signs or the ability to perform a full hands-on physical Mart Olive understands she or the provider may request at any time to terminate the video visit and request the patient to seek care or treatment in person

## 2022-04-29 ENCOUNTER — TELEPHONE (OUTPATIENT)
Dept: UROLOGY | Facility: CLINIC | Age: 27
End: 2022-04-29

## 2022-04-29 ENCOUNTER — APPOINTMENT (OUTPATIENT)
Dept: LAB | Facility: HOSPITAL | Age: 27
End: 2022-04-29
Payer: COMMERCIAL

## 2022-04-29 ENCOUNTER — HOSPITAL ENCOUNTER (OUTPATIENT)
Dept: CT IMAGING | Facility: HOSPITAL | Age: 27
Discharge: HOME | End: 2022-04-29
Payer: COMMERCIAL

## 2022-04-29 DIAGNOSIS — E66.3 OVERWEIGHT: ICD-10-CM

## 2022-04-29 DIAGNOSIS — R10.9 ACUTE FLANK PAIN: ICD-10-CM

## 2022-04-29 DIAGNOSIS — K91.2 POSTSURGICAL MALABSORPTION: ICD-10-CM

## 2022-04-29 DIAGNOSIS — Z48.815 ENCOUNTER FOR SURGICAL AFTERCARE FOLLOWING SURGERY OF DIGESTIVE SYSTEM: ICD-10-CM

## 2022-04-29 DIAGNOSIS — Z98.84 BARIATRIC SURGERY STATUS: ICD-10-CM

## 2022-04-29 LAB
25(OH)D3 SERPL-MCNC: 30.9 NG/ML (ref 30–100)
ALBUMIN SERPL BCP-MCNC: 4.3 G/DL (ref 3–5.2)
ALP SERPL-CCNC: 68 U/L (ref 43–122)
ALT SERPL W P-5'-P-CCNC: 16 U/L
ANION GAP SERPL CALCULATED.3IONS-SCNC: 7 MMOL/L (ref 5–14)
AST SERPL W P-5'-P-CCNC: 23 U/L (ref 14–36)
BACTERIA UR QL AUTO: ABNORMAL /HPF
BILIRUB SERPL-MCNC: 2.19 MG/DL
BILIRUB UR QL STRIP: NEGATIVE
BUN SERPL-MCNC: 13 MG/DL (ref 5–25)
CALCIUM SERPL-MCNC: 9.1 MG/DL (ref 8.4–10.2)
CHLORIDE SERPL-SCNC: 103 MMOL/L (ref 97–108)
CLARITY UR: CLEAR
CO2 SERPL-SCNC: 28 MMOL/L (ref 22–30)
COLOR UR: YELLOW
CREAT SERPL-MCNC: 0.73 MG/DL (ref 0.6–1.2)
ERYTHROCYTE [DISTWIDTH] IN BLOOD BY AUTOMATED COUNT: 12.3 % (ref 11.6–15.1)
FERRITIN SERPL-MCNC: 145 NG/ML (ref 8–388)
FOLATE SERPL-MCNC: 18.3 NG/ML (ref 3.1–17.5)
GFR SERPL CREATININE-BSD FRML MDRD: 114 ML/MIN/1.73SQ M
GLUCOSE P FAST SERPL-MCNC: 89 MG/DL (ref 70–99)
GLUCOSE UR STRIP-MCNC: NEGATIVE MG/DL
HCT VFR BLD AUTO: 38.5 % (ref 34.8–46.1)
HGB BLD-MCNC: 12.5 G/DL (ref 11.5–15.4)
HGB UR QL STRIP.AUTO: 25
IRON SATN MFR SERPL: 42 % (ref 15–50)
IRON SERPL-MCNC: 116 UG/DL (ref 50–170)
KETONES UR STRIP-MCNC: ABNORMAL MG/DL
LEUKOCYTE ESTERASE UR QL STRIP: NEGATIVE
MCH RBC QN AUTO: 29.4 PG (ref 26.8–34.3)
MCHC RBC AUTO-ENTMCNC: 32.5 G/DL (ref 31.4–37.4)
MCV RBC AUTO: 91 FL (ref 82–98)
NITRITE UR QL STRIP: NEGATIVE
NON-SQ EPI CELLS URNS QL MICRO: ABNORMAL /HPF
PH UR STRIP.AUTO: 6 [PH]
PLATELET # BLD AUTO: 154 THOUSANDS/UL (ref 149–390)
PMV BLD AUTO: 12.4 FL (ref 8.9–12.7)
POTASSIUM SERPL-SCNC: 3.9 MMOL/L (ref 3.6–5)
PROT SERPL-MCNC: 7.8 G/DL (ref 5.9–8.4)
PROT UR STRIP-MCNC: NEGATIVE MG/DL
PTH-INTACT SERPL-MCNC: 56.7 PG/ML (ref 18.4–80.1)
RBC # BLD AUTO: 4.25 MILLION/UL (ref 3.81–5.12)
RBC #/AREA URNS AUTO: ABNORMAL /HPF
SODIUM SERPL-SCNC: 138 MMOL/L (ref 137–147)
SP GR UR STRIP.AUTO: 1.02 (ref 1–1.04)
TIBC SERPL-MCNC: 278 UG/DL (ref 250–450)
UROBILINOGEN UA: 1 MG/DL
VIT B12 SERPL-MCNC: 317 PG/ML (ref 100–900)
WBC # BLD AUTO: 4.78 THOUSAND/UL (ref 4.31–10.16)
WBC #/AREA URNS AUTO: ABNORMAL /HPF

## 2022-04-29 PROCEDURE — 81001 URINALYSIS AUTO W/SCOPE: CPT | Performed by: PHYSICIAN ASSISTANT

## 2022-04-29 PROCEDURE — 83970 ASSAY OF PARATHORMONE: CPT

## 2022-04-29 PROCEDURE — 82746 ASSAY OF FOLIC ACID SERUM: CPT

## 2022-04-29 PROCEDURE — 74176 CT ABD & PELVIS W/O CONTRAST: CPT

## 2022-04-29 PROCEDURE — 83550 IRON BINDING TEST: CPT

## 2022-04-29 PROCEDURE — 82306 VITAMIN D 25 HYDROXY: CPT

## 2022-04-29 PROCEDURE — 83540 ASSAY OF IRON: CPT

## 2022-04-29 PROCEDURE — 36415 COLL VENOUS BLD VENIPUNCTURE: CPT

## 2022-04-29 PROCEDURE — G1004 CDSM NDSC: HCPCS

## 2022-04-29 PROCEDURE — 82607 VITAMIN B-12: CPT

## 2022-04-29 PROCEDURE — 84590 ASSAY OF VITAMIN A: CPT

## 2022-04-29 PROCEDURE — 80053 COMPREHEN METABOLIC PANEL: CPT

## 2022-04-29 PROCEDURE — 84630 ASSAY OF ZINC: CPT

## 2022-04-29 PROCEDURE — 84425 ASSAY OF VITAMIN B-1: CPT

## 2022-04-29 PROCEDURE — 87086 URINE CULTURE/COLONY COUNT: CPT

## 2022-04-29 PROCEDURE — 82728 ASSAY OF FERRITIN: CPT

## 2022-04-29 PROCEDURE — 85027 COMPLETE CBC AUTOMATED: CPT

## 2022-04-29 NOTE — TELEPHONE ENCOUNTER
Called patient with [facility] Argu[facility],  [phone]  Voicemail message was left per communication with CT scan results and that office will contact her when urine culture is resulted  Advised patient to contact the office at [phone] with any questions

## 2022-04-29 NOTE — TELEPHONE ENCOUNTER
[facility] pt had visit last week for flank pain  Please let her know the CT scan is all normal no stones, no hydro   Will update with urine testing sent today

## 2022-04-30 LAB — BACTERIA UR CULT: NORMAL

## 2022-05-04 ENCOUNTER — OFFICE VISIT (OUTPATIENT)
Dept: OBGYN CLINIC | Facility: MEDICAL CENTER | Age: 27
End: 2022-05-04
Payer: COMMERCIAL

## 2022-05-04 VITALS
DIASTOLIC BLOOD PRESSURE: 70 MMHG | BODY MASS INDEX: 28.63 KG/M2 | HEIGHT: 70 IN | SYSTOLIC BLOOD PRESSURE: 110 MMHG | WEIGHT: 200 LBS

## 2022-05-04 DIAGNOSIS — B37.3 YEAST VAGINITIS: ICD-10-CM

## 2022-05-04 DIAGNOSIS — N89.8 VAGINAL DISCHARGE: Primary | ICD-10-CM

## 2022-05-04 LAB — ZINC SERPL-MCNC: 80 UG/DL (ref 44–115)

## 2022-05-04 PROCEDURE — 99213 OFFICE O/P EST LOW 20 MIN: CPT | Performed by: STUDENT IN AN ORGANIZED HEALTH CARE EDUCATION/TRAINING PROGRAM

## 2022-05-04 PROCEDURE — 3078F DIAST BP <80 MM HG: CPT | Performed by: STUDENT IN AN ORGANIZED HEALTH CARE EDUCATION/TRAINING PROGRAM

## 2022-05-04 PROCEDURE — 3074F SYST BP LT 130 MM HG: CPT | Performed by: STUDENT IN AN ORGANIZED HEALTH CARE EDUCATION/TRAINING PROGRAM

## 2022-05-04 RX ORDER — FLUCONAZOLE 150 MG/1
150 TABLET ORAL
Qty: 2 TABLET | Refills: 0 | Status: SHIPPED | OUTPATIENT
Start: 2022-05-04 | End: 2022-05-08

## 2022-05-04 NOTE — PROGRESS NOTES
[facility] [address] [place] [address]    Assessment/Plan:  Mena Rhodes is a 23 y o  Aleck Said who presents with vaginal itching and discharge  Wet mount nonspecific but given her prediabetes and symptoms treated empirically for yeast vaginitis  No problem-specific Assessment & Plan notes found for this encounter  Diagnoses and all orders for this visit:    Vaginal discharge    Yeast vaginitis  -     fluconazole (DIFLUCAN) 150 mg tablet; Take 1 tablet (150 mg total) by mouth every third day for 2 doses          Subjective: Mena Rhodes is a 23 y o  Aleck Said female  CC: vaginal itching and discharge    HPI: Syed Choi presents with 4 days of vaginal itchign and discharge  She is prediabetic  ROS: Review of Systems   Constitutional: Negative for chills and fever  Respiratory: Negative for cough and shortness of breath  Cardiovascular: Negative for chest pain and leg swelling  Gastrointestinal: Negative for abdominal pain, nausea and vomiting  Genitourinary: Negative for dysuria, frequency and urgency  Neurological: Negative for dizziness, light-headedness and headaches  PFSH: The following portions of the patient's history were reviewed and updated as appropriate: allergies, current medications, past family history, past medical history, obstetric history, gynecologic history, past social history, past surgical history and problem list        Objective:  /70   Ht 5' 10" (1 778 m)   Wt 90 7 kg (200 lb)   LMP 04/30/2022   BMI 28 70 kg/m²    Physical Exam  Constitutional:       Appearance: Normal appearance  HENT:      Head: Normocephalic and atraumatic  Cardiovascular:      Rate and Rhythm: Normal rate  Pulmonary:      Effort: Pulmonary effort is normal    Abdominal:      General: There is no distension  Tenderness: There is no abdominal tenderness  There is no guarding     Genitourinary:     Exam position: Lithotomy position  Pubic Area: No rash  Labia:         Right: No rash, tenderness or lesion  Left: No rash, tenderness or lesion  Urethra: No prolapse, urethral swelling or urethral lesion  Vagina: Vaginal discharge present  No erythema, tenderness, bleeding or lesions  Cervix: No cervical motion tenderness, discharge, friability or erythema  Lymphadenopathy:      Lower Body: No right inguinal adenopathy  No left inguinal adenopathy  Neurological:      Mental Status: She is alert  Wet mount nonspecific        Marichuy Garrido MD  [facility] [address] [place] [address]  5/4/2022 11:20 AM

## 2022-05-09 LAB — VIT A SERPL-MCNC: 18.8 UG/DL (ref 18.9–57.3)

## 2022-05-16 DIAGNOSIS — Z98.84 BARIATRIC SURGERY STATUS: Primary | ICD-10-CM

## 2022-05-16 DIAGNOSIS — R79.89 LOW SERUM VITAMIN A: ICD-10-CM

## 2022-05-16 DIAGNOSIS — K91.2 POSTSURGICAL MALABSORPTION: ICD-10-CM

## 2022-05-16 LAB — VIT B1 BLD-SCNC: 142.6 NMOL/L (ref 66.5–200)

## 2022-05-24 ENCOUNTER — HOSPITAL ENCOUNTER (EMERGENCY)
Facility: HOSPITAL | Age: 27
Discharge: HOME | End: 2022-05-24
Attending: EMERGENCY MEDICINE
Payer: COMMERCIAL

## 2022-05-24 VITALS
OXYGEN SATURATION: 98 % | HEART RATE: 58 BPM | DIASTOLIC BLOOD PRESSURE: 52 MMHG | TEMPERATURE: 98.8 F | RESPIRATION RATE: 18 BRPM | SYSTOLIC BLOOD PRESSURE: 99 MMHG

## 2022-05-24 DIAGNOSIS — R51.9 HEADACHE: Primary | ICD-10-CM

## 2022-05-24 LAB
EXT PREG TEST URINE: NEGATIVE
EXT. CONTROL ED NAV: NORMAL

## 2022-05-24 PROCEDURE — 99283 EMERGENCY DEPT VISIT LOW MDM: CPT

## 2022-05-24 PROCEDURE — 96372 THER/PROPH/DIAG INJ SC/IM: CPT

## 2022-05-24 PROCEDURE — 99284 EMERGENCY DEPT VISIT MOD MDM: CPT

## 2022-05-24 PROCEDURE — 81025 URINE PREGNANCY TEST: CPT

## 2022-05-24 RX ORDER — ACETAMINOPHEN 500 MG
500 TABLET ORAL EVERY 6 HOURS PRN
Qty: 30 TABLET | Refills: 0 | Status: SHIPPED | OUTPATIENT
Start: 2022-05-24

## 2022-05-24 RX ORDER — METOCLOPRAMIDE 10 MG/1
10 TABLET ORAL ONCE
Status: COMPLETED | OUTPATIENT
Start: 2022-05-24 | End: 2022-05-24

## 2022-05-24 RX ORDER — LIDOCAINE 50 MG/G
1 PATCH TOPICAL ONCE
Status: DISCONTINUED | OUTPATIENT
Start: 2022-05-24 | End: 2022-05-24 | Stop reason: HOSPADM

## 2022-05-24 RX ORDER — ACETAMINOPHEN 325 MG/1
975 TABLET ORAL ONCE
Status: COMPLETED | OUTPATIENT
Start: 2022-05-24 | End: 2022-05-24

## 2022-05-24 RX ORDER — ONDANSETRON 4 MG/1
4 TABLET, FILM COATED ORAL EVERY 8 HOURS PRN
Qty: 20 TABLET | Refills: 0 | Status: SHIPPED | OUTPATIENT
Start: 2022-05-24

## 2022-05-24 RX ORDER — NAPROXEN 500 MG/1
500 TABLET ORAL 2 TIMES DAILY WITH MEALS
Qty: 30 TABLET | Refills: 0 | Status: SHIPPED | OUTPATIENT
Start: 2022-05-24

## 2022-05-24 RX ORDER — DIPHENHYDRAMINE HCL 25 MG
25 TABLET ORAL EVERY 6 HOURS
Qty: 20 TABLET | Refills: 0 | Status: SHIPPED | OUTPATIENT
Start: 2022-05-24

## 2022-05-24 RX ORDER — KETOROLAC TROMETHAMINE 30 MG/ML
15 INJECTION, SOLUTION INTRAMUSCULAR; INTRAVENOUS ONCE
Status: COMPLETED | OUTPATIENT
Start: 2022-05-24 | End: 2022-05-24

## 2022-05-24 RX ADMIN — ACETAMINOPHEN 975 MG: 325 TABLET ORAL at 06:37

## 2022-05-24 RX ADMIN — METOCLOPRAMIDE 10 MG: 10 TABLET ORAL at 06:37

## 2022-05-24 RX ADMIN — KETOROLAC TROMETHAMINE 15 MG: 30 INJECTION, SOLUTION INTRAMUSCULAR at 06:38

## 2022-05-24 RX ADMIN — LIDOCAINE 1 PATCH: 50 PATCH CUTANEOUS at 06:38

## 2022-05-24 NOTE — Clinical Note
Jorge A Mclaughlin was seen and treated in our emergency department on 5/24/2022  Diagnosis:     Yamarys    She may return on this date: 05/25/2022         If you have any questions or concerns, please don't hesitate to call        Morenita Chavez PA-C    ______________________________           _______________          _______________  Hospital Representative                              Date                                Time

## 2022-05-24 NOTE — DISCHARGE INSTRUCTIONS
Take Tylenol, Naprosyn, and Benadryl for headache as needed    Do not drive after taking Benadryl, or take any other medication that can cause sedation     Take Zofran as needed for nausea    Follow up with a primary care doctor regarding your recurrent headaches, and discuss them with your OB/GYN as it began after your IUD     Return for worsening headache, fever, neck stiffness, vision changes, dizziness, passing out, numbness/tingling in face/arm/leg, weakness

## 2022-05-24 NOTE — ED PROVIDER NOTES
History  Chief Complaint   Patient presents with    Headache     Pt c/o headache for two days  Back pain as well  The patient is a 78-year-old female with history of diabetes mellitus and laparoscopic sleeve gastrectomy (2020) who presents to the ED with a 2 day history of lower back pain and headache  She reports that the headache began gradually and feels similar to headaches that she has had previously  She does report that, with headaches, she has 1 hour long episodes of blurred vision in the past   She states that these headaches have occurred ever since she got her IUD, and believes that her IUD may have caused her to have migraines  She has not had neurology evaluation  She currently reports blurred vision for the past hour, however reports that this is similar in nature to previous episodes  She otherwise denies fever, neck pain, neck stiffness, diplopia, rash, numbness, paresthesia, focal weakness, nausea, vomiting, chest pain, dyspnea, saddle anesthesia, loss of bowel or bladder function, urinary retention, constipation, history of IV drug use, history of spinal surgery  She took Naprosyn yesterday, which she reports did not significantly improve her headache  No medication prior to arrival today  Prior to Admission Medications   Prescriptions Last Dose Informant Patient Reported? Taking?    Multiple Vitamins-Minerals (MULTIVITAMIN ADULT PO)  Self Yes No   Sig: Take by mouth daily   calcium carbonate (OS-GABI) 600 MG tablet   Yes No   Sig: Take 600 mg by mouth 2 (two) times a day with meals   cetirizine (ZyrTEC) 10 mg tablet   No No   Sig: Take 1 tablet (10 mg total) by mouth daily for 7 days   levonorgestrel (MIRENA) 20 MCG/24HR IUD   Yes No   Si each by Intrauterine route once   omeprazole (PriLOSEC) 20 mg delayed release capsule   No No   Sig: Take 1 capsule (20 mg total) by mouth 2 (two) times a day   triamcinolone (KENALOG) 0 1 % cream   No No   Sig: Apply topically 2 (two) times a day for 5 days      Facility-Administered Medications: None       Past Medical History:   Diagnosis Date    Diabetes mellitus ([facility] [address] )     NIDDM    History of transfusion     after  - had fever with transfusion    Obesity (BMI 35 0-39 9 without comorbidity)     S/P laparoscopic sleeve gastrectomy 2020    Uses Austrian as primary spoken language        Past Surgical History:   Procedure Laterality Date     SECTION  2017    CHOLECYSTECTOMY      MN LAP, URI RESTRICT PROC, LONGITUDINAL GASTRECTOMY N/A 2020    Procedure: LAP SLEEVE GASTRECTOMY, INTRAOP EGD;  Surgeon: Griffin Claude, MD;  Location: [facility] OR;  Service: Bariatrics       Family History   Problem Relation Age of Onset    Heart disease Family         CARDIOVASCULAR DISEASE    Diabetes Mother     Other Mother         gastric bypass    Diabetes Father     Diabetes Sister     Sleep apnea Brother         2 of the 3 borthers have EAGLE    Diabetes Maternal Grandmother     Diabetes Paternal Grandmother     No Known Problems Son     No Known Problems Maternal Grandfather     No Known Problems Paternal Grandfather     Sleep apnea Sister     No Known Problems Brother      I have reviewed and agree with the history as documented  E-Cigarette/Vaping    E-Cigarette Use Never User      E-Cigarette/Vaping Substances    Nicotine No     THC No     CBD No     Flavoring No     Other No     Unknown No      Social History     Tobacco Use    Smoking status: Never Smoker    Smokeless tobacco: Never Used   Vaping Use    Vaping Use: Never used   Substance Use Topics    Alcohol use: No    Drug use: No       Review of Systems   Constitutional: Negative for chills and fever  HENT: Negative for congestion and rhinorrhea  Eyes: Positive for visual disturbance  Negative for pain  Respiratory: Negative for cough and shortness of breath  Cardiovascular: Negative for chest pain and leg swelling  Gastrointestinal: Negative for abdominal pain, constipation, diarrhea, nausea and vomiting  Genitourinary: Negative for dysuria and flank pain  Musculoskeletal: Negative for arthralgias, myalgias, neck pain and neck stiffness  Skin: Negative for rash and wound  Neurological: Positive for headaches  Negative for dizziness, weakness and numbness  Psychiatric/Behavioral: Negative for behavioral problems  Physical Exam  Physical Exam  Vitals and nursing note reviewed  Constitutional:       General: She is not in acute distress  Appearance: She is well-developed  HENT:      Head: Normocephalic and atraumatic  Eyes:      General: Vision grossly intact  Extraocular Movements:      Right eye: Normal extraocular motion and no nystagmus  Left eye: Normal extraocular motion and no nystagmus  Conjunctiva/sclera: Conjunctivae normal       Pupils: Pupils are equal, round, and reactive to light  Pupils are equal       Visual Fields: Right eye visual fields normal and left eye visual fields normal    Cardiovascular:      Rate and Rhythm: Normal rate and regular rhythm  Heart sounds: No murmur heard  Pulmonary:      Effort: Pulmonary effort is normal  No respiratory distress  Breath sounds: Normal breath sounds  Abdominal:      Palpations: Abdomen is soft  Tenderness: There is no abdominal tenderness  Musculoskeletal:         General: No tenderness  Cervical back: Neck supple  Right lower leg: No edema  Left lower leg: No edema  Skin:     General: Skin is warm and dry  Neurological:      Mental Status: She is alert  Cranial Nerves: Cranial nerves are intact  Sensory: Sensation is intact  Motor: Motor function is intact  Coordination: Coordination is intact  Comments: Alert and oriented x 3  Patient is following commands  No cranial nerve deficit, no facial palsy  No pronator drift  Strength 5/5 in all extremities  Sensation grossly intact  Finger to nose without ataxia  No aphasia  No dysarthria  No dysdiadochokinesia         Vital Signs  ED Triage Vitals   Temperature Pulse Respirations Blood Pressure SpO2   05/24/22 0524 05/24/22 0524 05/24/22 0524 05/24/22 0524 05/24/22 0524   98 8 °F (37 1 °C) 60 18 114/58 100 %      Temp Source Heart Rate Source Patient Position - Orthostatic VS BP Location FiO2 (%)   05/24/22 0524 05/24/22 0524 05/24/22 0645 05/24/22 0524 --   Oral Monitor Lying Right arm       Pain Score       05/24/22 0638       8           Vitals:    05/24/22 0524 05/24/22 0645   BP: 114/58 99/52   Pulse: 60 58   Patient Position - Orthostatic VS:  Lying         Visual Acuity      ED Medications  Medications   lidocaine (LIDODERM) 5 % patch 1 patch (1 patch Topical Medication Applied 5/24/22 0638)   ketorolac (TORADOL) injection 15 mg (15 mg Intramuscular Given 5/24/22 6470)   acetaminophen (TYLENOL) tablet 975 mg (975 mg Oral Given 5/24/22 0637)   metoclopramide (REGLAN) tablet 10 mg (10 mg Oral Given 5/24/22 2230)       Diagnostic Studies  Results Reviewed     Procedure Component Value Units Date/Time    POCT pregnancy, urine [[id number]]  (Normal) Resulted: 05/24/22 0635    Lab Status: Final result Updated: 05/24/22 0645     EXT PREG TEST UR (Ref: Negative) negative     Control valid                 No orders to display              Procedures  Procedures         ED Course           SBIRT 22yo+    Flowsheet Row Most Recent Value   SBIRT (25 yo +)    In order to provide better care to our patients, we are screening all of our patients for alcohol and drug use  Would it be okay to ask you these screening questions? Yes Filed at: 05/24/2022 8198   Initial Alcohol Screen: US AUDIT-C     1  How often do you have a drink containing alcohol? 0 Filed at: 05/24/2022 0526   2  How many drinks containing alcohol do you have on a typical day you are drinking?   0 Filed at: 05/24/2022 0526   3b  FEMALE Any Age, or MALE 65+: How often do you have 4 or more drinks on one occassion? 0 Filed at: 05/24/2022 0526   Audit-C Score 0 Filed at: 05/24/2022 4479   CHERRI: How many times in the past year have you    Used an illegal drug or used a prescription medication for non-medical reasons? Never Filed at: 05/24/2022 1949            MDM  Number of Diagnoses or Management Options     Amount and/or Complexity of Data Reviewed  Clinical lab tests: ordered and reviewed  Decide to obtain previous medical records or to obtain history from someone other than the patient: yes  Review and summarize past medical records: yes    Risk of Complications, Morbidity, and/or Mortality  Presenting problems: moderate  Management options: low    Patient Progress  Patient progress: improved     Patient is a 50-year-old female presenting to the ED for evaluation of a 2 day history of lower back pain and headache  Has no associated fever, chills, numbness, paresthesia, weakness  She reports similar headaches in the past, which began after she received her IUD  She reports that when these headaches occur, she has an hour long period of visual disturbance, which she is currently experiencing  She reports this has been present for the past hour  It is unchanged from previous episodes  On exam, patient is without neurologic deficit  No visual field deficit  Sensation grossly intact, strength 5/5 in all extremities  EOMI  PERRLA  No meningismus  Patient is afebrile, vital signs stable  She is in no acute distress  Will treat with migraine cocktail and re-evaluate  Patient's headache is  unchanged from previous episodes  The patient has had a CT head in the ED for a similar headache which was normal  Will hold off on imaging unless symptoms do not improve or worsen  On reexamination, patient reports that her headache is improving  She reports that her visual abnormality has resolved, similar to previous episodes which resolve after 1 hour    She reports that she feels comfortable with discharge, and understands return precautions  At the time of discharge, the patient is in no acute distress  I discussed with the patient the diagnosis, treatment plan, follow-up, return precautions, and discharge instructions; they were given the opportunity to ask questions and verbalized understanding  Disposition  Final diagnoses:   Headache     Time reflects when diagnosis was documented in both MDM as applicable and the Disposition within this note     Time User Action Codes Description Comment    5/24/2022  7:05 AM Ruy Rodriguez Add [R51 9] Headache       ED Disposition     ED Disposition   Discharge    Condition   Stable    Date/Time   Tue May 24, 2022  7:05 AM    Comment   Deepali Bales discharge to home/self care  Follow-up Information    None         Patient's Medications   Discharge Prescriptions    ACETAMINOPHEN (TYLENOL) 500 MG TABLET    Take 1 tablet (500 mg total) by mouth every 6 (six) hours as needed for mild pain       Start Date: 5/24/2022 End Date: --       Order Dose: 500 mg       Quantity: 30 tablet    Refills: 0    DIPHENHYDRAMINE (BENADRYL) 25 MG TABLET    Take 1 tablet (25 mg total) by mouth every 6 (six) hours       Start Date: 5/24/2022 End Date: --       Order Dose: 25 mg       Quantity: 20 tablet    Refills: 0    NAPROXEN (NAPROSYN) 500 MG TABLET    Take 1 tablet (500 mg total) by mouth in the morning and 1 tablet (500 mg total) in the evening  Take with meals  Start Date: 5/24/2022 End Date: --       Order Dose: 500 mg       Quantity: 30 tablet    Refills: 0    ONDANSETRON (ZOFRAN) 4 MG TABLET    Take 1 tablet (4 mg total) by mouth every 8 (eight) hours as needed for nausea or vomiting       Start Date: 5/24/2022 End Date: --       Order Dose: 4 mg       Quantity: 20 tablet    Refills: 0       No discharge procedures on file      PDMP Review       Value Time User    PDMP Reviewed  Yes 7/14/2021  2:12 PM [place] Lauro Cuevas PA-C          ED Provider  Electronically Signed by           Gianni Guzman PA-C  05/24/22 8597

## 2022-05-26 ENCOUNTER — TELEPHONE (OUTPATIENT)
Dept: BARIATRICS | Facility: CLINIC | Age: 27
End: 2022-05-26

## 2022-05-26 NOTE — TELEPHONE ENCOUNTER
----- Message from Tremaine Chahal PA-C sent at 5/16/2022  2:52 PM EDT -----  Can you please call patient with her blood work: Your vitamin A level is  low, which can affect your night vision  Recommend that you take 10,000  IU of retinyl acetate or retinyl palmitate ( Vitamin A) per day for 3 months  It is important that you take an actual vitamin A supplement for repletion, and not a carotene based supplement  Vitamin A can cause birth defects if you are pregnant  If you are pregnant, consult with your OB for recommendations  Your OB may recommend carotene supplements  If you are not sure if you are pregnant, take a home pregnancy test to determine if you are pregnant  You are not to take vitamin A supplements if you are pregnant  After 3 months,  discontinue the vitamin A supplement and check your vitamin A level  I HAVE ORDERED TO RECHECK THIS LEVEL IN 3 MONTHS  Long term vitamin A supplementation can be toxic, so it is important to discontinue your vitamin A supplementation after 12 weeks  If you experience symptoms of toxicity such as :  Nausea, vomiting, blurred vision, severe headache, and vertigo, discontinue the vitamin A supplement immediately and call the office  Your liver function tests are abnormal   Liver enzymes can be high for many reasons  If you are experiencing right upper abdominal pain, please let us know/make a follow-up in the office  Sometimes liver enzymes can be high when you carry fat around your liver  Levels can be high from certain medications and from viruses as well  Levels can  be up when there may be liver or bone issues as well  Excess amounts of tylenol/acetaminophen should be avoided  I recommend not taking more than 2000 mg of these products in a 24 hour period  Alcohol intakes can also raise liver enzymes  As a gastric surgery patient it is recommended to avoid alcohol intakes      You should also review these levels with your PCP at a routine visit

## 2022-08-11 ENCOUNTER — OFFICE VISIT (OUTPATIENT)
Dept: BARIATRICS | Facility: CLINIC | Age: 27
End: 2022-08-11
Payer: COMMERCIAL

## 2022-08-11 VITALS
DIASTOLIC BLOOD PRESSURE: 68 MMHG | TEMPERATURE: 96.8 F | WEIGHT: 205 LBS | BODY MASS INDEX: 29.35 KG/M2 | SYSTOLIC BLOOD PRESSURE: 102 MMHG | HEART RATE: 75 BPM | HEIGHT: 70 IN

## 2022-08-11 DIAGNOSIS — E66.3 OVERWEIGHT: ICD-10-CM

## 2022-08-11 DIAGNOSIS — Z98.84 BARIATRIC SURGERY STATUS: ICD-10-CM

## 2022-08-11 DIAGNOSIS — R79.89 LOW SERUM VITAMIN A: ICD-10-CM

## 2022-08-11 DIAGNOSIS — K91.2 POSTSURGICAL MALABSORPTION: ICD-10-CM

## 2022-08-11 DIAGNOSIS — Z48.815 ENCOUNTER FOR SURGICAL AFTERCARE FOLLOWING SURGERY OF DIGESTIVE SYSTEM: Primary | ICD-10-CM

## 2022-08-11 PROCEDURE — 99213 OFFICE O/P EST LOW 20 MIN: CPT | Performed by: PHYSICIAN ASSISTANT

## 2022-08-11 PROCEDURE — 3078F DIAST BP <80 MM HG: CPT | Performed by: PHYSICIAN ASSISTANT

## 2022-08-11 PROCEDURE — 3074F SYST BP LT 130 MM HG: CPT | Performed by: PHYSICIAN ASSISTANT

## 2022-08-11 RX ORDER — DIPHENHYDRAMINE HYDROCHLORIDE 25 MG/1
25 CAPSULE ORAL EVERY 6 HOURS
COMMUNITY
Start: 2022-05-24

## 2022-08-11 NOTE — PATIENT INSTRUCTIONS
Follow-up in 1 year  We kindly ask that your arrive 15 minutes before your scheduled appointment time with your provider to allow our staff to room you, get your vital signs and update your chart  Get lab work done  Please call the office if you need a script  It is recommended to check with your insurance BEFORE getting labs done to make sure they are covered by your policy  Call our office if you have any problems with abdominal pain especially associated with fever, chills, nausea, vomiting or any other concerns  All  Post-bariatric surgery patients should be aware that very small quantities of any alcohol can cause impairment and it is very possible not to feel the effect  The effect can be in the system for several hours  It is also a stomach irritant  It is advised to AVOID alcohol, Nonsteroidal antiinflammatory drugs (NSAIDS) and nicotine of all forms   Any of these can cause stomach irritation/pain  Discussed the effects of alcohol on a bariatric patient and the increased impairment risk  Keep up the good work! Your vitamin A level is  low, which can affect your night vision  Recommend that you take 10,000  IU of retinyl acetate or retinyl palmitate ( Vitamin A) per day for 3 months  It is important that you take an actual vitamin A supplement for repletion, and not a carotene based supplement  Vitamin A can cause birth defects if you are pregnant  If you are pregnant, consult with your OB for recommendations  Your OB may recommend carotene supplements  If you are not sure if you are pregnant, take a home pregnancy test to determine if you are pregnant  You are not to take vitamin A supplements if you are pregnant  After 3 months,  discontinue the vitamin A supplement and check your vitamin A level  I HAVE ORDERED TO RECHECK THIS LEVEL IN 3 MONTHS   Long term vitamin A supplementation can be toxic, so it is important to discontinue your vitamin A supplementation after 12 weeks  If you experience symptoms of toxicity such as :  Nausea, vomiting, blurred vision, severe headache, and vertigo, discontinue the vitamin A supplement immediately and call the office

## 2022-08-11 NOTE — PROGRESS NOTES
Assessment/Plan:     Patient ID: Anna Milan is a 23 y o  female  Bariatric Surgery Status    Status Debbie Garcia 07/20/2020  Phoebe Yanes Presents to the office today for follow up  At her visit in Feb had noted abdominal pain  She took PPI and Carfate for about 8 weeks, pain resolved  No pain since and she is off her PPI  · Continued/Maintain healthy weight loss with good nutrition intakes  · Adequate hydration with at least 64oz  fluid intake  · Follow diet as discussed  · Follow vitamin and mineral recommendations as reviewed with you  · Exercise as tolerated  · Colonoscopy referral made: not in age range    · Follow-up in 1 year  We kindly ask that your arrive 15 minutes before your scheduled appointment time with your provider to allow our staff to room you, get your vital signs and update your chart  · Get lab work done  Please call the office if you need a script  It is recommended to check with your insurance BEFORE getting labs done to make sure they are covered by your policy  · Call our office if you have any problems with abdominal pain especially associated with fever, chills, nausea, vomiting or any other concerns  · All  Post-bariatric surgery patients should be aware that very small quantities of any alcohol can cause impairment and it is very possible not to feel the effect  The effect can be in the system for several hours  It is also a stomach irritant  · It is advised to AVOID alcohol, Nonsteroidal antiinflammatory drugs (NSAIDS) and nicotine of all forms   Any of these can cause stomach irritation/pain  · Discussed the effects of alcohol on a bariatric patient and the increased impairment risk  · Keep up the good work!      Postsurgical Malabsorption   -At risk for malabsorption of vitamins/minerals secondary to malabsorption and restriction of intake from bariatric surgery  -Currently taking adequate postop bariatric surgery vitamin supplementation  -Last set of bariatric labs completed 4/2022 - note was left to call the patient but she states she did not receive it  Will start vit A as discussed for 3 months  She has not noticed any changes in vision  bilirubin elevated but has always been and her pcp is aware   -Patient received education about the importance of adhering to a lifelong supplementation regimen to avoid vitamin/mineral deficiencies      Diagnoses and all orders for this visit:    Encounter for surgical aftercare following surgery of digestive system    Bariatric surgery status    Postsurgical malabsorption    Overweight    Low serum vitamin A    Other orders  -     Banophen 25 MG capsule; Take 25 mg by mouth every 6 (six) hours (Patient not taking: Reported on 8/11/2022)         Subjective:      Patient ID: Marly Kidney is a 23 y o  female  At her visit in Feb had noted abdominal pain  She took PPI and Carfate for about 8 weeks, pain resolved  No pain since and she is off her PPI  Initial:260  Current: 205  EWL: (Weight loss is ahead of schedule at this post surgical period )  Roshan: 193  Current BMI is Body mass index is 29 41 kg/m²  · Tolerating a regular diet-yes  · Eating at least 60 grams of protein per day-yes  · Following 30/60 minute rule with liquids-yes  · Drinking at least 64 ounces of fluid per day-yes  · Drinking carbonated beverages-no  · Sufficient exercise-yes; gym daily   · Using NSAIDs regularly-no  · Using nicotine-no  · Using alcohol-no  · Supplements: shawna mvi + calcium    · EWL is 66%, which places the patient ahead of schedule for expected post surgical weight loss at this time  The following portions of the patient's history were reviewed and updated as appropriate: allergies, current medications, past family history, past medical history, past social history, past surgical history and problem list     Review of Systems   Constitutional: Negative      Eyes: Negative for visual disturbance  Respiratory: Negative  Cardiovascular: Negative  Gastrointestinal: Negative  Neurological: Negative  Psychiatric/Behavioral: Negative  Objective:    /68 (BP Location: Left arm, Patient Position: Sitting, Cuff Size: Standard)   Pulse 75   Temp (!) 96 8 °F (36 °C) (Tympanic)   Ht 5' 10" (1 778 m)   Wt 93 kg (205 lb)   BMI 29 41 kg/m²      Physical Exam  Vitals and nursing note reviewed  Constitutional:       Appearance: Normal appearance  She is obese  HENT:      Head: Normocephalic and atraumatic  Eyes:      Extraocular Movements: Extraocular movements intact  Pupils: Pupils are equal, round, and reactive to light  Cardiovascular:      Rate and Rhythm: Normal rate and regular rhythm  Pulmonary:      Effort: Pulmonary effort is normal       Breath sounds: Normal breath sounds  Abdominal:      General: Bowel sounds are normal       Tenderness: There is no abdominal tenderness  Musculoskeletal:         General: Normal range of motion  Cervical back: Normal range of motion  Skin:     General: Skin is warm and dry  Neurological:      General: No focal deficit present  Mental Status: She is alert and oriented to person, place, and time     Psychiatric:         Mood and Affect: Mood normal

## 2022-08-31 NOTE — ED PROVIDER NOTES
History  Chief Complaint   Patient presents with    Flank Pain     right flank pain since 0100 this morning; states pain has been going down and into right groin  Patient is a 71-year-old female with a history previously kidney stones who presents with a 1 day history of right-sided flank pain that radiates into lower groin  Ten 10 pain that is sharp and constant  Did not take anything in terms of pills but did put mental on the area which did not help  Cannot state whether it feels like previous kidney stones  Does endorse some nausea but no vomiting  Also endorse some urinary frequency urgency and hesitancy  Prior to Admission Medications   Prescriptions Last Dose Informant Patient Reported? Taking?    Magnesium Oxide -Mg Supplement 400 MG CAPS   No No   Sig: Take 1 capsule (400 mg total) by mouth daily   Multiple Vitamins-Minerals (MULTIVITAMIN ADULT PO)   Yes No   Sig: Take by mouth daily   SUMAtriptan (IMITREX) 25 mg tablet   No No   Sig: Take 1 tablet (25 mg total) by mouth once as needed for migraine for up to 1 dose   calcium carbonate (OS-AGBI) 1250 (500 Ca) MG tablet   Yes No   Sig: Take 1 tablet by mouth daily   ergocalciferol (VITAMIN D2) 50,000 units   No No   Sig: Take 1 capsule (50,000 Units total) by mouth 2 (two) times a week with meals   omeprazole (PriLOSEC) 20 mg delayed release capsule   No No   Sig: Take 1 capsule (20 mg total) by mouth daily   Patient taking differently: Take 20 mg by mouth daily Start PostOp   topiramate (TOPAMAX) 25 mg tablet   No No   Sig: Take 1 tablet (25 mg total) by mouth 2 (two) times a day      Facility-Administered Medications: None       Past Medical History:   Diagnosis Date    Diabetes mellitus (HCC)     NIDDM    History of transfusion     after  - had fever with transfusion    Obesity (BMI 35 0-39 9 without comorbidity)     Uses Japanese as primary spoken language        Past Surgical History:   Procedure Laterality Date     SECTION  08/05/2017    CHOLECYSTECTOMY      IA LAP, URI RESTRICT PROC, LONGITUDINAL GASTRECTOMY N/A 7/20/2020    Procedure: LAP SLEEVE GASTRECTOMY, INTRAOP EGD;  Surgeon: Aure Coronel MD;  Location: [facility];  Service: Bariatrics       Family History   Problem Relation Age of Onset    Heart disease Family         CARDIOVASCULAR DISEASE    Diabetes Mother     Other Mother         gastric bypass    Diabetes Father     Diabetes Sister     Sleep apnea Sister     Sleep apnea Brother         2 of the 3 borthers have EAGLE    Diabetes Maternal Grandmother     Diabetes Paternal Grandmother      I have reviewed and agree with the history as documented  E-Cigarette/Vaping    E-Cigarette Use Never User      E-Cigarette/Vaping Substances     Social History     Tobacco Use    Smoking status: Never Smoker    Smokeless tobacco: Never Used   Substance Use Topics    Alcohol use: No    Drug use: No       Review of Systems   Constitutional: Negative  HENT: Negative  Eyes: Negative  Respiratory: Negative  Cardiovascular: Negative  Gastrointestinal: Negative  Endocrine: Negative  Genitourinary: Positive for flank pain, frequency and urgency  Skin: Negative  Allergic/Immunologic: Negative  Neurological: Negative  Hematological: Negative  Psychiatric/Behavioral: Negative  All other systems reviewed and are negative  Physical Exam  Physical Exam  Vitals signs and nursing note reviewed  Constitutional:       Appearance: Normal appearance  She is normal weight  HENT:      Head: Normocephalic and atraumatic  Nose: Nose normal    Neck:      Musculoskeletal: Normal range of motion and neck supple  Cardiovascular:      Rate and Rhythm: Normal rate and regular rhythm  Pulses: Normal pulses  Heart sounds: Normal heart sounds  Pulmonary:      Effort: Pulmonary effort is normal       Breath sounds: Normal breath sounds     Abdominal:      General: There is no distension  Tenderness: There is no abdominal tenderness  There is right CVA tenderness  There is no left CVA tenderness, guarding or rebound  Skin:     General: Skin is warm and dry  Capillary Refill: Capillary refill takes less than 2 seconds  Neurological:      General: No focal deficit present  Mental Status: She is alert and oriented to person, place, and time     Psychiatric:         Mood and Affect: Mood normal          Behavior: Behavior normal          Vital Signs  ED Triage Vitals [05/05/21 1617]   Temperature Pulse Respirations Blood Pressure SpO2   98 3 °F (36 8 °C) 71 16 108/58 100 %      Temp Source Heart Rate Source Patient Position - Orthostatic VS BP Location FiO2 (%)   Tympanic Monitor Sitting Left arm --      Pain Score       Worst Possible Pain           Vitals:    05/05/21 1617   BP: 108/58   Pulse: 71   Patient Position - Orthostatic VS: Sitting         Visual Acuity      ED Medications  Medications   ketorolac (TORADOL) injection 30 mg (30 mg Intravenous Given 5/5/21 1637)   ondansetron (ZOFRAN) injection 4 mg (4 mg Intravenous Given 5/5/21 1637)       Diagnostic Studies  Results Reviewed     Procedure Component Value Units Date/Time    Urine Microscopic [[id number]]  (Abnormal) Collected: 05/05/21 1636    Lab Status: Final result Specimen: Urine, Clean Catch Updated: 05/05/21 1715     RBC, UA 20-30 /hpf      WBC, UA 1-2 /hpf      Epithelial Cells Moderate /hpf      Bacteria, UA Occasional /hpf      Ca Oxalate Belinda, UA Moderate /hpf      MUCUS THREADS Moderate    Manual Differential (Non Wam) [[id number]]  (Abnormal) Collected: 05/05/21 1636    Lab Status: Final result Specimen: Blood from Arm, Left Updated: 05/05/21 1708     Segmented % 70 %      Bands % 2 %      Lymphocytes % 22 %      Monocytes % 4 %      Eosinophils, % 1 %      Basophils % 1 %      Neutrophils Absolute 5 76 Thousand/uL      Lymphocytes Absolute 1 76 Thousand/uL      Monocytes Absolute 0 32 Thousand/uL Eosinophils Absolute 0 08 Thousand/uL      Basophils Absolute 0 08 Thousand/uL      Total Counted 100     RBC Morphology Normal     Platelet Estimate Adequate    Basic metabolic panel [[id number]]  (Abnormal) Collected: 05/05/21 1636    Lab Status: Final result Specimen: Blood from Arm, Left Updated: 05/05/21 1703     Sodium 138 mmol/L      Potassium 4 0 mmol/L      Chloride 106 mmol/L      CO2 27 mmol/L      ANION GAP 5 mmol/L      BUN 8 mg/dL      Creatinine 0 52 mg/dL      Glucose 90 mg/dL      Calcium 8 8 mg/dL      eGFR 134 ml/min/1 73sq m     Narrative:      Hemolysis  National Kidney Disease Foundation guidelines for Chronic Kidney Disease (CKD):     Stage 1 with normal or high GFR (GFR > 90 mL/min/1 73 square meters)    Stage 2 Mild CKD (GFR = 60-89 mL/min/1 73 square meters)    Stage 3A Moderate CKD (GFR = 45-59 mL/min/1 73 square meters)    Stage 3B Moderate CKD (GFR = 30-44 mL/min/1 73 square meters)    Stage 4 Severe CKD (GFR = 15-29 mL/min/1 73 square meters)    Stage 5 End Stage CKD (GFR <15 mL/min/1 73 square meters)  Note: GFR calculation is accurate only with a steady state creatinine    UA w Reflex to Microscopic w Reflex to Culture [[id number]]  (Abnormal) Collected: 05/05/21 1636    Lab Status: Final result Specimen: Urine, Clean Catch Updated: 05/05/21 1647     Color, UA Yellow     Clarity, UA Clear     Specific Gravity, UA 1 015     pH, UA 7 0     Leukocytes, UA Negative     Nitrite, UA Negative     Protein, UA Negative mg/dl      Glucose, UA Negative mg/dl      Ketones, UA Negative mg/dl      Bilirubin, UA Negative     Blood, UA 50 0     UROBILINOGEN UA 4 0 mg/dL     CBC and differential [[id number]]  (Abnormal) Collected: 05/05/21 1636    Lab Status: Final result Specimen: Blood from Arm, Left Updated: 05/05/21 1646     WBC 8 00 Thousand/uL      RBC 4 27 Million/uL      Hemoglobin 13 0 g/dL      Hematocrit 39 2 %      MCV 92 fL      MCH 30 4 pg      MCHC 33 2 g/dL      RDW 12 8 %      MPV 10 8 fL      Platelets 015 Thousands/uL     POCT pregnancy, urine [[id number]]  (Normal) Resulted: 05/05/21 1636    Lab Status: Final result Updated: 05/05/21 1636     EXT PREG TEST UR (Ref: Negative) negative     Control valid                 CT renal stone study abdomen pelvis wo contrast   Final Result by Hollie Fisher MD (05/05 1706)      Mild right-sided hydronephrosis secondary to a 4 mm calculus in the distal right ureter several centimeters above the right UVJ             Workstation performed: [id number]                    Procedures  Procedures         ED Course  ED Course as of May 05 1720   Wed May 05, 2021   1718 One over results with patient  Will discharge with Flomax pain medication follow-up with Urology  Return to the ER for any concerns  SBIRT 22yo+      Most Recent Value   SBIRT (22 yo +)   In order to provide better care to our patients, we are screening all of our patients for alcohol and drug use  Would it be okay to ask you these screening questions? Unable to answer at this time Filed at: 05/05/2021 1621                    MDM  Number of Diagnoses or Management Options  Kidney stone:      Amount and/or Complexity of Data Reviewed  Clinical lab tests: ordered and reviewed  Tests in the radiology section of CPT®: ordered and reviewed  Tests in the medicine section of CPT®: reviewed and ordered  Review and summarize past medical records: yes  Independent visualization of images, tracings, or specimens: yes        Disposition  Final diagnoses:   Kidney stone     Time reflects when diagnosis was documented in both MDM as applicable and the Disposition within this note     Time User Action Codes Description Comment    5/5/2021  5:18 PM Rose Vera Add [N20 0] Kidney stone       ED Disposition     ED Disposition Condition Date/Time Comment    Discharge Stable Wed May 5, 2021  5:18 PM Eliza Montez discharge to home/self care              Follow-up Information     Follow up With Specialties Details Why Contact Info Additional [address]   [address], [address] [address]  [address], [address], [address] [place], [place], [address]    [facility]   [place] [facility] [address]  [address] [phone]  [address]  [facility], [address], [place], [place], [address] [phone]          Patient's Medications   Discharge Prescriptions    OXYCODONE-ACETAMINOPHEN (PERCOCET) 5-325 MG PER TABLET    Take 1 tablet by mouth every 6 (six) hours as needed for moderate pain for up to 10 daysMax Daily Amount: 4 tablets       Start Date: 5/5/2021  End Date: 5/15/2021       Order Dose: 1 tablet       Quantity: 14 tablet    Refills: 0    TAMSULOSIN (FLOMAX) 0 4 MG    Take 1 capsule (0 4 mg total) by mouth daily with dinner       Start Date: 5/5/2021  End Date: --       Order Dose: 0 4 mg       Quantity: 7 capsule    Refills: 0     No discharge procedures on file      PDMP Review     None          ED Provider  Electronically Signed by           Alejandra Peguero MD  05/05/21 [id number]

## 2022-10-13 ENCOUNTER — HOSPITAL ENCOUNTER (EMERGENCY)
Facility: HOSPITAL | Age: 27
Discharge: HOME | End: 2022-10-13
Attending: EMERGENCY MEDICINE
Payer: COMMERCIAL

## 2022-10-13 VITALS
DIASTOLIC BLOOD PRESSURE: 78 MMHG | OXYGEN SATURATION: 100 % | TEMPERATURE: 98 F | WEIGHT: 199.52 LBS | HEART RATE: 72 BPM | RESPIRATION RATE: 18 BRPM | SYSTOLIC BLOOD PRESSURE: 135 MMHG | BODY MASS INDEX: 28.63 KG/M2

## 2022-10-13 DIAGNOSIS — N64.4 BREAST PAIN, RIGHT: Primary | ICD-10-CM

## 2022-10-13 DIAGNOSIS — N39.0 UTI (URINARY TRACT INFECTION): ICD-10-CM

## 2022-10-13 DIAGNOSIS — R51.9 HEADACHE: ICD-10-CM

## 2022-10-13 LAB
BACTERIA UR QL AUTO: ABNORMAL /HPF
BILIRUB UR QL STRIP: NEGATIVE
CLARITY UR: ABNORMAL
COLOR UR: YELLOW
EXT PREG TEST URINE: NEGATIVE
EXT. CONTROL ED NAV: NORMAL
GLUCOSE UR STRIP-MCNC: NEGATIVE MG/DL
HGB UR QL STRIP.AUTO: ABNORMAL
KETONES UR STRIP-MCNC: NEGATIVE MG/DL
LEUKOCYTE ESTERASE UR QL STRIP: NEGATIVE
NITRITE UR QL STRIP: NEGATIVE
NON-SQ EPI CELLS URNS QL MICRO: ABNORMAL /HPF
PH UR STRIP.AUTO: 7 [PH]
PROT UR STRIP-MCNC: NEGATIVE MG/DL
RBC #/AREA URNS AUTO: ABNORMAL /HPF
SP GR UR STRIP.AUTO: 1.02 (ref 1–1.03)
UROBILINOGEN UR QL STRIP.AUTO: 1 E.U./DL
WBC #/AREA URNS AUTO: ABNORMAL /HPF

## 2022-10-13 PROCEDURE — 81025 URINE PREGNANCY TEST: CPT | Performed by: PHYSICIAN ASSISTANT

## 2022-10-13 PROCEDURE — 96372 THER/PROPH/DIAG INJ SC/IM: CPT

## 2022-10-13 PROCEDURE — 81001 URINALYSIS AUTO W/SCOPE: CPT | Performed by: PHYSICIAN ASSISTANT

## 2022-10-13 PROCEDURE — 87086 URINE CULTURE/COLONY COUNT: CPT | Performed by: PHYSICIAN ASSISTANT

## 2022-10-13 PROCEDURE — 99284 EMERGENCY DEPT VISIT MOD MDM: CPT | Performed by: PHYSICIAN ASSISTANT

## 2022-10-13 PROCEDURE — 99283 EMERGENCY DEPT VISIT LOW MDM: CPT

## 2022-10-13 RX ORDER — CEPHALEXIN 500 MG/1
500 CAPSULE ORAL 4 TIMES DAILY
Qty: 20 CAPSULE | Refills: 0 | Status: SHIPPED | OUTPATIENT
Start: 2022-10-13 | End: 2022-10-18

## 2022-10-13 RX ORDER — KETOROLAC TROMETHAMINE 30 MG/ML
15 INJECTION, SOLUTION INTRAMUSCULAR; INTRAVENOUS ONCE
Status: COMPLETED | OUTPATIENT
Start: 2022-10-13 | End: 2022-10-13

## 2022-10-13 RX ORDER — ONDANSETRON 4 MG/1
4 TABLET, ORALLY DISINTEGRATING ORAL ONCE
Status: COMPLETED | OUTPATIENT
Start: 2022-10-13 | End: 2022-10-13

## 2022-10-13 RX ORDER — NAPROXEN 500 MG/1
500 TABLET ORAL 2 TIMES DAILY PRN
Qty: 10 TABLET | Refills: 0 | Status: SHIPPED | OUTPATIENT
Start: 2022-10-13

## 2022-10-13 RX ADMIN — ONDANSETRON 4 MG: 4 TABLET, ORALLY DISINTEGRATING ORAL at 11:25

## 2022-10-13 RX ADMIN — KETOROLAC TROMETHAMINE 15 MG: 30 INJECTION, SOLUTION INTRAMUSCULAR; INTRAVENOUS at 11:26

## 2022-10-13 NOTE — ED PROVIDER NOTES
History  Chief Complaint   Patient presents with   • Breast Pain     Pt c/o of right breast pain starting Tuesday denies feeling any masses or changes to the skin    • Headache     Pt c/o headache starting last night taking tylenol with no relief      This is a 25-year-old female patient presents to complaints  The 1st being right-sided breast pain  She started with this pain no proximally 3 days ago without trauma  Denies any redness warmth fullness, mass, lump, nipple discharge  She states she cannot be pregnant but also does have some intermittent nausea no vomiting  No fever chills blurred vision, double vision, photophobia  No cough congestion sore throat chest pain or shortness of breath no vomiting diarrhea abdominal pain no vaginal discharge or bleeding  Nothing makes it better or worse she has tried nothing over-the-counter for the pain  Differential diagnosis includes to early abscess, cellulitis, pregnancy, shingles that is not revealed itself, breast mass less likely, peau de orange    2nd complaint is a headache that she describes as frontal without blurred vision double vision photophobia took Tylenol last night without improvement  She does get headaches it is often nothing makes it better or worse it is not accompanied with dizziness or any  of neurologic abnormality  Prior to Admission Medications   Prescriptions Last Dose Informant Patient Reported? Taking?    Banophen 25 MG capsule   Yes No   Sig: Take 25 mg by mouth every 6 (six) hours   Patient not taking: Reported on 8/11/2022   Multiple Vitamins-Minerals (MULTIVITAMIN ADULT PO)  Self Yes No   Sig: Take by mouth daily   acetaminophen (TYLENOL) 500 mg tablet   No No   Sig: Take 1 tablet (500 mg total) by mouth every 6 (six) hours as needed for mild pain   Patient not taking: Reported on 8/11/2022   calcium carbonate (OS-GABI) 600 MG tablet   Yes No   Sig: Take 600 mg by mouth 2 (two) times a day with meals   Patient not taking: Reported on 2022   cetirizine (ZyrTEC) 10 mg tablet   No No   Sig: Take 1 tablet (10 mg total) by mouth daily for 7 days   diphenhydrAMINE (BENADRYL) 25 mg tablet   No No   Sig: Take 1 tablet (25 mg total) by mouth every 6 (six) hours   Patient not taking: Reported on 2022   levonorgestrel (MIRENA) 20 MCG/24HR IUD   Yes No   Si each by Intrauterine route once   Patient not taking: Reported on 2022   naproxen (Naprosyn) 500 mg tablet   No No   Sig: Take 1 tablet (500 mg total) by mouth in the morning and 1 tablet (500 mg total) in the evening  Take with meals     Patient not taking: Reported on 2022   omeprazole (PriLOSEC) 20 mg delayed release capsule   No No   Sig: Take 1 capsule (20 mg total) by mouth 2 (two) times a day   ondansetron (ZOFRAN) 4 mg tablet   No No   Sig: Take 1 tablet (4 mg total) by mouth every 8 (eight) hours as needed for nausea or vomiting   Patient not taking: Reported on 2022   triamcinolone (KENALOG) 0 1 % cream   No No   Sig: Apply topically 2 (two) times a day for 5 days      Facility-Administered Medications: None       Past Medical History:   Diagnosis Date   • Diabetes mellitus (HCC)     NIDDM   • History of transfusion     after  - had fever with transfusion   • Obesity (BMI 35 0-39 9 without comorbidity)    • S/P laparoscopic sleeve gastrectomy 2020   • Uses Romanian as primary spoken language        Past Surgical History:   Procedure Laterality Date   •  SECTION  2017   • CHOLECYSTECTOMY     • MI LAP, URI RESTRICT PROC, LONGITUDINAL GASTRECTOMY N/A 2020    Procedure: LAP SLEEVE GASTRECTOMY, INTRAOP EGD;  Surgeon: Ovidio Rocha MD;  Location: [facility];  Service: Bariatrics       Family History   Problem Relation Age of Onset   • Heart disease Family         CARDIOVASCULAR DISEASE   • Diabetes Mother    • Other Mother         gastric bypass   • Diabetes Father    • Diabetes Sister    • Sleep apnea Brother         2 of the 3 borthers have EAGLE   • Diabetes Maternal Grandmother    • Diabetes Paternal Grandmother    • No Known Problems Son    • No Known Problems Maternal Grandfather    • No Known Problems Paternal Grandfather    • Sleep apnea Sister    • No Known Problems Brother      I have reviewed and agree with the history as documented  E-Cigarette/Vaping   • E-Cigarette Use Never User      E-Cigarette/Vaping Substances   • Nicotine No    • THC No    • CBD No    • Flavoring No    • Other No    • Unknown No      Social History     Tobacco Use   • Smoking status: Never Smoker   • Smokeless tobacco: Never Used   Vaping Use   • Vaping Use: Never used   Substance Use Topics   • Alcohol use: No   • Drug use: No       Review of Systems   Constitutional: Negative for chills, diaphoresis, fatigue and fever  HENT: Negative for congestion, ear pain, nosebleeds and sore throat  Eyes: Negative for photophobia, pain, discharge and visual disturbance  Respiratory: Negative for cough, choking, chest tightness, shortness of breath and wheezing  Cardiovascular: Negative for chest pain and palpitations  Gastrointestinal: Negative for abdominal distention, abdominal pain, diarrhea and vomiting  Genitourinary: Negative for dysuria, flank pain, frequency and hematuria  Musculoskeletal: Negative for arthralgias, back pain, gait problem and joint swelling  Skin: Negative for color change and rash  Pain right breast   Neurological: Positive for headaches  Negative for dizziness, tremors, seizures, syncope, facial asymmetry, speech difficulty, weakness, light-headedness and numbness  Psychiatric/Behavioral: Negative for behavioral problems and confusion  The patient is not nervous/anxious  All other systems reviewed and are negative  Physical Exam  Physical Exam  Vitals and nursing note reviewed  Exam conducted with a chaperone present  Constitutional:       General: She is not in acute distress       Appearance: Normal appearance  She is not ill-appearing, toxic-appearing or diaphoretic  HENT:      Head: Normocephalic and atraumatic  Right Ear: Tympanic membrane, ear canal and external ear normal       Left Ear: Tympanic membrane, ear canal and external ear normal       Nose: Nose normal  No congestion or rhinorrhea  Mouth/Throat:      Mouth: Mucous membranes are dry  Pharynx: Oropharynx is clear  No oropharyngeal exudate or posterior oropharyngeal erythema  Eyes:      Extraocular Movements: Extraocular movements intact  Conjunctiva/sclera: Conjunctivae normal       Pupils: Pupils are equal, round, and reactive to light  Cardiovascular:      Rate and Rhythm: Normal rate and regular rhythm  Pulmonary:      Effort: Pulmonary effort is normal  No respiratory distress  Breath sounds: Normal breath sounds  Chest:       Abdominal:      General: Bowel sounds are normal       Palpations: Abdomen is soft  Tenderness: There is no abdominal tenderness  Musculoskeletal:         General: Normal range of motion  Cervical back: Normal range of motion and neck supple  No rigidity or tenderness  Right lower leg: No edema  Left lower leg: No edema  Lymphadenopathy:      Cervical: No cervical adenopathy  Skin:     General: Skin is warm and dry  Capillary Refill: Capillary refill takes less than 2 seconds  Findings: No rash  Neurological:      General: No focal deficit present  Mental Status: She is alert and oriented to person, place, and time  Mental status is at baseline  Cranial Nerves: No cranial nerve deficit  Sensory: No sensory deficit  Motor: No weakness        Coordination: Coordination normal       Gait: Gait normal       Deep Tendon Reflexes: Reflexes normal    Psychiatric:         Mood and Affect: Mood normal          Behavior: Behavior normal          Vital Signs  ED Triage Vitals   Temperature Pulse Respirations Blood Pressure SpO2 10/13/22 0953 10/13/22 0953 10/13/22 0953 10/13/22 0953 10/13/22 0953   98 °F (36 7 °C) 69 18 137/78 100 %      Temp src Heart Rate Source Patient Position - Orthostatic VS BP Location FiO2 (%)   -- -- 10/13/22 0953 10/13/22 0953 --     Sitting Right arm       Pain Score       10/13/22 1050       9           Vitals:    10/13/22 0953   BP: 137/78   Pulse: 69   Patient Position - Orthostatic VS: Sitting         Visual Acuity  Visual Acuity    Flowsheet Row Most Recent Value   L Pupil Size (mm) 3   R Pupil Size (mm) 3          ED Medications  Medications   ondansetron (ZOFRAN-ODT) dispersible tablet 4 mg (4 mg Oral Given 10/13/22 1125)   ketorolac (TORADOL) injection 15 mg (15 mg Intramuscular Given 10/13/22 1126)       Diagnostic Studies  Results Reviewed     Procedure Component Value Units Date/Time    Urine Microscopic [[id number]]  (Abnormal) Collected: 10/13/22 1103    Lab Status: Final result Specimen: Urine, Clean Catch Updated: 10/13/22 1126     RBC, UA None Seen /hpf      WBC, UA 10-20 /hpf      Epithelial Cells Moderate /hpf      Bacteria, UA Innumerable /hpf     Urine culture [[id number]] Collected: 10/13/22 1103    Lab Status:  In process Specimen: Urine, Clean Catch Updated: 10/13/22 1125    UA w Reflex to Microscopic w Reflex to Culture [[id number]]  (Abnormal) Collected: 10/13/22 1103    Lab Status: Final result Specimen: Urine, Clean Catch Updated: 10/13/22 1115     Color, UA Yellow     Clarity, UA Cloudy     Specific [place], UA 1 020     pH, UA 7 0     Leukocytes, UA Negative     Nitrite, UA Negative     Protein, UA Negative mg/dl      Glucose, UA Negative mg/dl      Ketones, UA Negative mg/dl      Urobilinogen, UA 1 0 E U /dl      Bilirubin, UA Negative     Occult Blood, UA Large    POCT pregnancy, urine [[id number]]  (Normal) Resulted: 10/13/22 1104    Lab Status: Final result Updated: 10/13/22 1104     EXT PREG TEST UR (Ref: Negative) negative     Control valid                 No orders to display Procedures  Procedures         ED Course                               SBIRT 22yo+    Flowsheet Row Most Recent Value   SBIRT (25 yo +)    In order to provide better care to our patients, we are screening all of our patients for alcohol and drug use  Would it be okay to ask you these screening questions? No Filed at: 10/13/2022 1043   Initial Alcohol Screen: US AUDIT-C     1  How often do you have a drink containing alcohol? 0 Filed at: 10/13/2022 1043   2  How many drinks containing alcohol do you have on a typical day you are drinking? 0 Filed at: 10/13/2022 1043   3a  Male UNDER 65: How often do you have five or more drinks on one occasion? 0 Filed at: 10/13/2022 1043   3b  FEMALE Any Age, or MALE 65+: How often do you have 4 or more drinks on one occassion? 0 Filed at: 10/13/2022 1043   Audit-C Score 0 Filed at: 10/13/2022 1043   CHERRI: How many times in the past year have you    Used an illegal drug or used a prescription medication for non-medical reasons? Never Filed at: 10/13/2022 1043                    MDM  Number of Diagnoses or Management Options  Breast pain, right: new and requires workup  Headache: new and requires workup  Diagnosis management comments: 43-year-old female patient presents with a frontal headache given Toradol and it resolved  She had no neurologic findings and has a headache like this in the past     Patient's right breast pain also improved after Toradol  Upon exam there is no rash fullness mass abscess or any abnormality  My recommendation is she follows up with her family doctor for possible mammogram of further evaluation  She verbalized understanding and agreement  The workup did reveal a urinary tract infection which are will treat with cephalexin      Risk of Complications, Morbidity, and/or Mortality  Presenting problems: low  Diagnostic procedures: low  Management options: low    Patient Progress  Patient progress: stable      Disposition  Final diagnoses: Breast pain, right   Headache   UTI (urinary tract infection)     Time reflects when diagnosis was documented in both MDM as applicable and the Disposition within this note     Time User Action Codes Description Comment    10/13/2022 11:43 AM [email or web address] Add [N64 4] Breast pain, right     10/13/2022 11:43 AM Dinapoli Félix Add [R51 9] Headache     10/13/2022 11:46 AM [email or web address] Add [N39 0] UTI (urinary tract infection)       ED Disposition     ED Disposition   Discharge    Condition   Stable    Date/Time   Thu Oct 13, 2022 11:43 AM    Comment   Onelia Green discharge to home/self care  Follow-up Information     Follow up With Specialties Details Why Contact Info    Felix Atkins DO Family Medicine Schedule an appointment as soon as possible for a visit   [address] [place] [address]  [phone]            Patient's Medications   Discharge Prescriptions    CEPHALEXIN (KEFLEX) 500 MG CAPSULE    Take 1 capsule (500 mg total) by mouth 4 (four) times a day for 5 days       Start Date: 10/13/2022End Date: 10/18/2022       Order Dose: 500 mg       Quantity: 20 capsule    Refills: 0    NAPROXEN (NAPROSYN) 500 MG TABLET    Take 1 tablet (500 mg total) by mouth 2 (two) times a day as needed for mild pain       Start Date: 10/13/2022End Date: --       Order Dose: 500 mg       Quantity: 10 tablet    Refills: 0       No discharge procedures on file      PDMP Review       Value Time User    PDMP Reviewed  Yes 7/14/2021  2:12 PM Monie Wise PA-C          ED Provider  Electronically Signed by           Bo Styles PA-C  10/13/22 7459

## 2022-10-13 NOTE — DISCHARGE INSTRUCTIONS
Breast pain:  You of pain here right breast there is no mass or sign of infection  At this time there are no obvious cause  May develop a rash which could be shingles    Regardless follow-up with her family doctor next week for recheck in further evaluation    Return with any worsening symptoms questions comments or concerns

## 2022-10-13 NOTE — Clinical Note
Eyad Jean was seen and treated in our emergency department on 10/13/2022  Diagnosis: Seen in emergency department    Syed    She may return on this date: 10/15/2022         If you have any questions or concerns, please don't hesitate to call        Azul Griffiths PA-C    ______________________________           _______________          _______________  Hospital Representative                              Date                                Time

## 2022-10-14 LAB — BACTERIA UR CULT: NORMAL

## 2022-10-26 ENCOUNTER — HOSPITAL ENCOUNTER (EMERGENCY)
Facility: HOSPITAL | Age: 27
Discharge: HOME | End: 2022-10-26
Attending: EMERGENCY MEDICINE
Payer: COMMERCIAL

## 2022-10-26 VITALS
BODY MASS INDEX: 28.25 KG/M2 | SYSTOLIC BLOOD PRESSURE: 127 MMHG | RESPIRATION RATE: 20 BRPM | TEMPERATURE: 98.6 F | OXYGEN SATURATION: 100 % | HEART RATE: 84 BPM | DIASTOLIC BLOOD PRESSURE: 64 MMHG | WEIGHT: 196.87 LBS

## 2022-10-26 DIAGNOSIS — J06.9 URI (UPPER RESPIRATORY INFECTION): Primary | ICD-10-CM

## 2022-10-26 LAB
EXT PREG TEST URINE: NEGATIVE
EXT. CONTROL ED NAV: NORMAL

## 2022-10-26 PROCEDURE — 81025 URINE PREGNANCY TEST: CPT | Performed by: EMERGENCY MEDICINE

## 2022-10-26 PROCEDURE — 87636 SARSCOV2 & INF A&B AMP PRB: CPT

## 2022-10-26 RX ORDER — ACETAMINOPHEN 325 MG/1
975 TABLET ORAL ONCE
Status: COMPLETED | OUTPATIENT
Start: 2022-10-26 | End: 2022-10-26

## 2022-10-26 RX ORDER — DEXAMETHASONE SODIUM PHOSPHATE 10 MG/ML
10 INJECTION, SOLUTION INTRAMUSCULAR; INTRAVENOUS ONCE
Status: COMPLETED | OUTPATIENT
Start: 2022-10-26 | End: 2022-10-26

## 2022-10-26 RX ORDER — KETOROLAC TROMETHAMINE 30 MG/ML
30 INJECTION, SOLUTION INTRAMUSCULAR; INTRAVENOUS ONCE
Status: COMPLETED | OUTPATIENT
Start: 2022-10-26 | End: 2022-10-26

## 2022-10-26 RX ADMIN — DEXAMETHASONE SODIUM PHOSPHATE 10 MG: 10 INJECTION, SOLUTION INTRAMUSCULAR; INTRAVENOUS at 19:24

## 2022-10-26 RX ADMIN — KETOROLAC TROMETHAMINE 30 MG: 30 INJECTION, SOLUTION INTRAMUSCULAR at 19:24

## 2022-10-26 RX ADMIN — ACETAMINOPHEN 975 MG: 325 TABLET, FILM COATED ORAL at 19:25

## 2022-10-26 NOTE — ED PROVIDER NOTES
History  Chief Complaint   Patient presents with   • Cough     With chest pain after coughing after 3 days  • Sore Throat     32 yo female with a history of DM and gastric bypass surgery presents to the ED complaining of URI symptoms x 3 days  The patient reports a nonproductive cough, rhinorrhea, nasal congestion, and sore throat  (+) Anterior chest "tightness" only with the cough  No shortness of breath or wheezing  (+) Subjective fevers  (+) Fatigue and generalized myalgias  No nausea, vomiting, or diarrhea  She denies earache and sinus pain/pressure  No neck pain or headache  She denies voice change and difficulty swallowing  (+) Good PO intact  No identifiable sick contacts  No other specific complaints  Prior to Admission Medications   Prescriptions Last Dose Informant Patient Reported? Taking? Banophen 25 MG capsule   Yes No   Sig: Take 25 mg by mouth every 6 (six) hours   Patient not taking: Reported on 2022   Multiple Vitamins-Minerals (MULTIVITAMIN ADULT PO)  Self Yes No   Sig: Take by mouth daily   acetaminophen (TYLENOL) 500 mg tablet   No No   Sig: Take 1 tablet (500 mg total) by mouth every 6 (six) hours as needed for mild pain   Patient not taking: Reported on 2022   calcium carbonate (OS-GABI) 600 MG tablet   Yes No   Sig: Take 600 mg by mouth 2 (two) times a day with meals   Patient not taking: Reported on 2022   cetirizine (ZyrTEC) 10 mg tablet   No No   Sig: Take 1 tablet (10 mg total) by mouth daily for 7 days   diphenhydrAMINE (BENADRYL) 25 mg tablet   No No   Sig: Take 1 tablet (25 mg total) by mouth every 6 (six) hours   Patient not taking: Reported on 2022   levonorgestrel (MIRENA) 20 MCG/24HR IUD   Yes No   Si each by Intrauterine route once   Patient not taking: Reported on 2022   naproxen (Naprosyn) 500 mg tablet   No No   Sig: Take 1 tablet (500 mg total) by mouth in the morning and 1 tablet (500 mg total) in the evening  Take with meals  Patient not taking: Reported on 2022   naproxen (Naprosyn) 500 mg tablet   No No   Sig: Take 1 tablet (500 mg total) by mouth 2 (two) times a day as needed for mild pain   omeprazole (PriLOSEC) 20 mg delayed release capsule   No No   Sig: Take 1 capsule (20 mg total) by mouth 2 (two) times a day   ondansetron (ZOFRAN) 4 mg tablet   No No   Sig: Take 1 tablet (4 mg total) by mouth every 8 (eight) hours as needed for nausea or vomiting   Patient not taking: Reported on 2022   triamcinolone (KENALOG) 0 1 % cream   No No   Sig: Apply topically 2 (two) times a day for 5 days      Facility-Administered Medications: None       Past Medical History:   Diagnosis Date   • Diabetes mellitus (HCC)     NIDDM   • History of transfusion     after  - had fever with transfusion   • Obesity (BMI 35 0-39 9 without comorbidity)    • S/P laparoscopic sleeve gastrectomy 2020   • Uses Indonesian as primary spoken language        Past Surgical History:   Procedure Laterality Date   •  SECTION  2017   • CHOLECYSTECTOMY     • NM LAP, URI RESTRICT PROC, LONGITUDINAL GASTRECTOMY N/A 2020    Procedure: LAP SLEEVE GASTRECTOMY, INTRAOP EGD;  Surgeon: Mindy Holloway MD;  Location: [facility];  Service: Bariatrics       Family History   Problem Relation Age of Onset   • Heart disease Family         CARDIOVASCULAR DISEASE   • Diabetes Mother    • Other Mother         gastric bypass   • Diabetes Father    • Diabetes Sister    • Sleep apnea Brother         2 of the 3 borthers have EAGLE   • Diabetes Maternal Grandmother    • Diabetes Paternal Grandmother    • No Known Problems Son    • No Known Problems Maternal Grandfather    • No Known Problems Paternal Grandfather    • Sleep apnea Sister    • No Known Problems Brother      I have reviewed and agree with the history as documented      E-Cigarette/Vaping   • E-Cigarette Use Never User      E-Cigarette/Vaping Substances   • Nicotine No    • THC No    • CBD No • Flavoring No    • Other No    • Unknown No      Social History     Tobacco Use   • Smoking status: Never Smoker   • Smokeless tobacco: Never Used   Vaping Use   • Vaping Use: Never used   Substance Use Topics   • Alcohol use: No   • Drug use: No       Review of Systems   Constitutional: Positive for fatigue and fever  Negative for chills  HENT: Positive for congestion, rhinorrhea and sore throat  Negative for ear pain, sinus pressure, sinus pain, trouble swallowing and voice change  Eyes: Negative for visual disturbance  Respiratory: Positive for cough  Negative for shortness of breath  Cardiovascular: Negative for chest pain  Gastrointestinal: Negative for abdominal pain, diarrhea and vomiting  Endocrine: Negative for cold intolerance and heat intolerance  Genitourinary: Negative for dysuria and frequency  Musculoskeletal: Positive for myalgias  Negative for back pain  Skin: Negative for rash  Allergic/Immunologic: Negative for immunocompromised state  Neurological: Negative for dizziness, weakness, light-headedness, numbness and headaches  Hematological: Negative for adenopathy  Psychiatric/Behavioral: Negative for self-injury  Physical Exam  Physical Exam  Constitutional:       General: She is not in acute distress  Appearance: She is well-developed  HENT:      Head: Normocephalic and atraumatic  Mouth/Throat:      Mouth: Mucous membranes are moist       Pharynx: Oropharynx is clear  Uvula midline  Posterior oropharyngeal erythema present  No pharyngeal swelling  Tonsils: No tonsillar exudate or tonsillar abscesses  Eyes:      Pupils: Pupils are equal, round, and reactive to light  Cardiovascular:      Rate and Rhythm: Normal rate and regular rhythm  Pulmonary:      Effort: Pulmonary effort is normal       Breath sounds: Normal breath sounds  Abdominal:      General: There is no distension  Palpations: Abdomen is soft  Tenderness:  There is no abdominal tenderness  Musculoskeletal:         General: Normal range of motion  Cervical back: Normal range of motion and neck supple  Lymphadenopathy:      Cervical: No cervical adenopathy  Skin:     General: Skin is warm and dry  Neurological:      Mental Status: She is alert and oriented to person, place, and time  Vital Signs  ED Triage Vitals   Temperature Pulse Respirations Blood Pressure SpO2   10/26/22 1825 10/26/22 1825 10/26/22 1825 10/26/22 1825 10/26/22 1825   98 6 °F (37 °C) 84 20 127/64 100 %      Temp Source Heart Rate Source Patient Position - Orthostatic VS BP Location FiO2 (%)   10/26/22 1825 10/26/22 1825 10/26/22 1825 10/26/22 1825 --   Oral Monitor Sitting Left arm       Pain Score       10/26/22 1924       9           Vitals:    10/26/22 1825   BP: 127/64   Pulse: 84   Patient Position - Orthostatic VS: Sitting         Visual Acuity      ED Medications  Medications   dexamethasone (PF) (DECADRON) injection 10 mg (10 mg Intramuscular Given 10/26/22 1924)   ketorolac (TORADOL) injection 30 mg (30 mg Intramuscular Given 10/26/22 1924)   acetaminophen (TYLENOL) tablet 975 mg (975 mg Oral Given 10/26/22 1925)       Diagnostic Studies  Results Reviewed     Procedure Component Value Units Date/Time    FLU/COVID - if FLU clinically relevant [[id number]] Collected: 10/26/22 1925    Lab Status: In process Specimen: Nares from Nose Updated: 10/26/22 1949    POCT pregnancy, urine [[id number]]  (Normal) Resulted: 10/26/22 1915    Lab Status: Final result Updated: 10/26/22 1925     EXT PREG TEST UR (Ref: Negative) negative     Control valid                 No orders to display              Procedures  Procedures         ED Course                               SBIRT 20yo+    Flowsheet Row Most Recent Value   SBIRT (25 yo +)    In order to provide better care to our patients, we are screening all of our patients for alcohol and drug use   Would it be okay to ask you these screening questions? No Filed at: 10/26/2022 1831                    [facility]  Number of Diagnoses or Management Options  URI (upper respiratory infection)  Diagnosis management comments: The patient is comfortable appearing with stable vital signs  Exam is only significant for some mild posterior oropharyngeal erythema  0/4 Centor Criteria  Symptoms are most consistent with a viral URI  Viral swab sent to the lab  Plan for supportive care with APAP, Toradol, and Decadron  The patient was instructed to follow up with her doctor later this week if her symptoms do not improve  She is agreeable to this plan  Strict return precautions provided  Amount and/or Complexity of Data Reviewed  Clinical lab tests: ordered    Patient Progress  Patient progress: stable      Disposition  Final diagnoses:   URI (upper respiratory infection)     Time reflects when diagnosis was documented in both MDM as applicable and the Disposition within this note     Time User Action Codes Description Comment    10/26/2022  6:56 PM Pacheco Carrillo Add [J06 9] URI (upper respiratory infection)       ED Disposition     ED Disposition   Discharge    Condition   Stable    Date/Time   Wed Oct 26, 2022  6:56 PM    Comment   Eloise Wynne discharge to home/self care                 Follow-up Information     Follow up With Specialties Details Why Contact Info Additional [facility] Schedule an appointment as soon as possible for a visit   [address], [address] [facility] [id number]  [address] [place] [address], [address], [address] [place], [place], [address] [place] [address]          Discharge Medication List as of 10/26/2022  6:57 PM      CONTINUE these medications which have NOT CHANGED    Details   acetaminophen (TYLENOL) 500 mg tablet Take 1 tablet (500 mg total) by mouth every 6 (six) hours as needed for mild pain, Starting Tue 5/24/2022, Normal      Banophen 25 MG capsule Take 25 mg by mouth every 6 (six) hours, Starting Tue 5/24/2022, Historical Med      calcium carbonate (OS-GABI) 600 MG tablet Take 600 mg by mouth 2 (two) times a day with meals, Historical Med      cetirizine (ZyrTEC) 10 mg tablet Take 1 tablet (10 mg total) by mouth daily for 7 days, Starting Wed 3/16/2022, Until Wed 3/23/2022, Normal      diphenhydrAMINE (BENADRYL) 25 mg tablet Take 1 tablet (25 mg total) by mouth every 6 (six) hours, Starting Tue 5/24/2022, Normal      levonorgestrel (MIRENA) 20 MCG/24HR IUD 1 each by Intrauterine route once, Historical Med      Multiple Vitamins-Minerals (MULTIVITAMIN ADULT PO) Take by mouth daily, Historical Med      !! naproxen (Naprosyn) 500 mg tablet Take 1 tablet (500 mg total) by mouth in the morning and 1 tablet (500 mg total) in the evening  Take with meals  , Starting Tue 5/24/2022, Normal      !! naproxen (Naprosyn) 500 mg tablet Take 1 tablet (500 mg total) by mouth 2 (two) times a day as needed for mild pain, Starting Thu 10/13/2022, Normal      omeprazole (PriLOSEC) 20 mg delayed release capsule Take 1 capsule (20 mg total) by mouth 2 (two) times a day, Starting Thu 4/7/2022, Until Sat 5/7/2022, Normal      ondansetron (ZOFRAN) 4 mg tablet Take 1 tablet (4 mg total) by mouth every 8 (eight) hours as needed for nausea or vomiting, Starting Tue 5/24/2022, Normal      triamcinolone (KENALOG) 0 1 % cream Apply topically 2 (two) times a day for 5 days, Starting Wed 3/16/2022, Until Mon 3/21/2022, Normal       !! - Potential duplicate medications found  Please discuss with provider  No discharge procedures on file      PDMP Review       Value Time User    PDMP Reviewed  Yes 7/14/2021  2:12 PM Moshe Sam PA-C          ED Provider  Electronically Signed by           Harjeet Fry MD  10/26/22 1138

## 2022-10-26 NOTE — Clinical Note
Mckinley Mason was seen and treated in our emergency department on 10/26/2022  Diagnosis:     Genia Tovar  may return to work on return date  She may return on this date: 10/31/2022         If you have any questions or concerns, please don't hesitate to call        Marshall Steele MD    ______________________________           _______________          _______________  Hospital Representative                              Date                                Time

## 2022-10-27 LAB
FLUAV RNA RESP QL NAA+PROBE: NEGATIVE
FLUBV RNA RESP QL NAA+PROBE: NEGATIVE
SARS-COV-2 RNA RESP QL NAA+PROBE: NEGATIVE

## 2022-11-12 ENCOUNTER — APPOINTMENT (EMERGENCY)
Dept: ULTRASOUND IMAGING | Facility: HOSPITAL | Age: 27
End: 2022-11-12

## 2022-11-12 ENCOUNTER — HOSPITAL ENCOUNTER (EMERGENCY)
Facility: HOSPITAL | Age: 27
Discharge: HOME | End: 2022-11-12
Attending: EMERGENCY MEDICINE

## 2022-11-12 VITALS
OXYGEN SATURATION: 97 % | SYSTOLIC BLOOD PRESSURE: 116 MMHG | RESPIRATION RATE: 16 BRPM | HEART RATE: 99 BPM | DIASTOLIC BLOOD PRESSURE: 63 MMHG | BODY MASS INDEX: 28.45 KG/M2 | WEIGHT: 198.3 LBS | TEMPERATURE: 99.3 F

## 2022-11-12 DIAGNOSIS — R11.2 NAUSEA AND VOMITING, UNSPECIFIED VOMITING TYPE: Primary | ICD-10-CM

## 2022-11-12 LAB
ALBUMIN SERPL BCP-MCNC: 4.3 G/DL (ref 3.5–5)
ALP SERPL-CCNC: 84 U/L (ref 43–122)
ALT SERPL W P-5'-P-CCNC: 84 U/L
ANION GAP SERPL CALCULATED.3IONS-SCNC: 8 MMOL/L (ref 5–14)
AST SERPL W P-5'-P-CCNC: 164 U/L (ref 14–36)
BASOPHILS # BLD MANUAL: 0 THOUSAND/UL (ref 0–0.1)
BASOPHILS NFR MAR MANUAL: 0 % (ref 0–1)
BILIRUB DIRECT SERPL-MCNC: 0.25 MG/DL (ref 0–0.2)
BILIRUB SERPL-MCNC: 2.43 MG/DL (ref 0.2–1)
BILIRUB UR QL STRIP: NEGATIVE
BUN SERPL-MCNC: 13 MG/DL (ref 5–25)
CALCIUM SERPL-MCNC: 8.9 MG/DL (ref 8.4–10.2)
CHLORIDE SERPL-SCNC: 103 MMOL/L (ref 96–108)
CLARITY UR: CLEAR
CO2 SERPL-SCNC: 26 MMOL/L (ref 21–32)
COLOR UR: ABNORMAL
CREAT SERPL-MCNC: 0.61 MG/DL (ref 0.6–1.2)
EOSINOPHIL # BLD MANUAL: 0.11 THOUSAND/UL (ref 0–0.4)
EOSINOPHIL NFR BLD MANUAL: 1 % (ref 0–6)
ERYTHROCYTE [DISTWIDTH] IN BLOOD BY AUTOMATED COUNT: 12.2 % (ref 11.6–15.1)
EXT PREG TEST URINE: NORMAL
EXT. CONTROL ED NAV: NORMAL
FLUAV RNA RESP QL NAA+PROBE: NEGATIVE
FLUBV RNA RESP QL NAA+PROBE: NEGATIVE
GFR SERPL CREATININE-BSD FRML MDRD: 125 ML/MIN/1.73SQ M
GLUCOSE SERPL-MCNC: 150 MG/DL (ref 70–99)
GLUCOSE UR STRIP-MCNC: NEGATIVE MG/DL
HCT VFR BLD AUTO: 40.7 % (ref 34.8–46.1)
HGB BLD-MCNC: 13.2 G/DL (ref 11.5–15.4)
HGB UR QL STRIP.AUTO: NEGATIVE
KETONES UR STRIP-MCNC: ABNORMAL MG/DL
LEUKOCYTE ESTERASE UR QL STRIP: NEGATIVE
LG PLATELETS BLD QL SMEAR: PRESENT
LIPASE SERPL-CCNC: 56 U/L (ref 23–300)
LYMPHOCYTES # BLD AUTO: 0.33 THOUSAND/UL (ref 0.6–4.47)
LYMPHOCYTES # BLD AUTO: 3 % (ref 14–44)
MCH RBC QN AUTO: 29.5 PG (ref 26.8–34.3)
MCHC RBC AUTO-ENTMCNC: 32.4 G/DL (ref 31.4–37.4)
MCV RBC AUTO: 91 FL (ref 82–98)
MONOCYTES # BLD AUTO: 0.66 THOUSAND/UL (ref 0–1.22)
MONOCYTES NFR BLD: 6 % (ref 4–12)
NEUTROPHILS # BLD MANUAL: 9.91 THOUSAND/UL (ref 1.85–7.62)
NEUTS BAND NFR BLD MANUAL: 1 % (ref 0–8)
NEUTS SEG NFR BLD AUTO: 89 % (ref 43–75)
NITRITE UR QL STRIP: NEGATIVE
PH UR STRIP.AUTO: 5 [PH]
PLATELET # BLD AUTO: 127 THOUSANDS/UL (ref 149–390)
PLATELET BLD QL SMEAR: ABNORMAL
PMV BLD AUTO: 11.9 FL (ref 8.9–12.7)
POTASSIUM SERPL-SCNC: 4 MMOL/L (ref 3.5–5.3)
PROT SERPL-MCNC: 7.8 G/DL (ref 6.4–8.4)
PROT UR STRIP-MCNC: NEGATIVE MG/DL
RBC # BLD AUTO: 4.48 MILLION/UL (ref 3.81–5.12)
RBC MORPH BLD: NORMAL
RSV RNA RESP QL NAA+PROBE: NEGATIVE
SARS-COV-2 RNA RESP QL NAA+PROBE: NEGATIVE
SODIUM SERPL-SCNC: 137 MMOL/L (ref 135–147)
SP GR UR STRIP.AUTO: 1.02 (ref 1–1.04)
UROBILINOGEN UA: 1 MG/DL
WBC # BLD AUTO: 11.01 THOUSAND/UL (ref 4.31–10.16)

## 2022-11-12 RX ORDER — ONDANSETRON 2 MG/ML
4 INJECTION INTRAMUSCULAR; INTRAVENOUS ONCE
Status: COMPLETED | OUTPATIENT
Start: 2022-11-12 | End: 2022-11-12

## 2022-11-12 RX ORDER — ACETAMINOPHEN 325 MG/1
975 TABLET ORAL ONCE
Status: COMPLETED | OUTPATIENT
Start: 2022-11-12 | End: 2022-11-12

## 2022-11-12 RX ORDER — ONDANSETRON 4 MG/1
4 TABLET, ORALLY DISINTEGRATING ORAL EVERY 6 HOURS PRN
Qty: 20 TABLET | Refills: 0 | Status: SHIPPED | OUTPATIENT
Start: 2022-11-12

## 2022-11-12 RX ADMIN — ACETAMINOPHEN 975 MG: 325 TABLET ORAL at 20:46

## 2022-11-12 RX ADMIN — ONDANSETRON 4 MG: 2 INJECTION INTRAMUSCULAR; INTRAVENOUS at 17:25

## 2022-11-12 RX ADMIN — SODIUM CHLORIDE 1000 ML: 0.9 INJECTION, SOLUTION INTRAVENOUS at 17:13

## 2022-11-12 NOTE — ED PROVIDER NOTES
History  No chief complaint on file  HPI  32 yo female with a history of DM and gastric bypass surgery presents to the ED with nausea vomiting diarrhea beginning today as well as epigastric abdominal pain and lower back pain  Patient reports that symptoms began this morning and have been progressively worsening with multiple episodes of emesis nonbloody nonbilious the course the day  Also reports diarrhea that she describes as loose color nonbloody  Reports pain around the epigastrium  No recent ETOH use, drug use  Patient is not a tobacco user  Reports has not eaten anything today and is unsure if there is an association with food  She denies any recent fevers chills, cough, congestion  She denies urinary symptoms of dysuria hematuria denies vaginal bleeding or vaginal discharge  Cannot display prior to admission medications because the patient has not been admitted in this contact         Past Medical History:   Diagnosis Date   • Diabetes mellitus (HCC)     NIDDM   • History of transfusion     after  - had fever with transfusion   • Obesity (BMI 35 0-39 9 without comorbidity)    • S/P laparoscopic sleeve gastrectomy 2020   • Uses Setswana as primary spoken language        Past Surgical History:   Procedure Laterality Date   •  SECTION  2017   • CHOLECYSTECTOMY     • NE LAP, URI RESTRICT PROC, LONGITUDINAL GASTRECTOMY N/A 2020    Procedure: LAP SLEEVE GASTRECTOMY, INTRAOP EGD;  Surgeon: Biju Conrad MD;  Location: [facility];  Service: Bariatrics       Family History   Problem Relation Age of Onset   • Heart disease Family         CARDIOVASCULAR DISEASE   • Diabetes Mother    • Other Mother         gastric bypass   • Diabetes Father    • Diabetes Sister    • Sleep apnea Brother         2 of the 3 borthers have EAGLE   • Diabetes Maternal Grandmother    • Diabetes Paternal Grandmother    • No Known Problems Son    • No Known Problems Maternal Grandfather    • No Known Problems Paternal Grandfather    • Sleep apnea Sister    • No Known Problems Brother      I have reviewed and agree with the history as documented  E-Cigarette/Vaping   • E-Cigarette Use Never User      E-Cigarette/Vaping Substances   • Nicotine No    • THC No    • CBD No    • Flavoring No    • Other No    • Unknown No      Social History     Tobacco Use   • Smoking status: Never Smoker   • Smokeless tobacco: Never Used   Vaping Use   • Vaping Use: Never used   Substance Use Topics   • Alcohol use: No   • Drug use: No       Review of Systems   Constitutional: Negative for chills and fever  HENT: Negative for congestion and sore throat  Eyes: Negative for redness and visual disturbance  Respiratory: Negative for cough and shortness of breath  Cardiovascular: Negative for chest pain and palpitations  Gastrointestinal: Positive for abdominal pain, diarrhea, nausea and vomiting  Negative for constipation  Genitourinary: Negative for dysuria, hematuria, vaginal bleeding and vaginal discharge  Musculoskeletal: Negative for myalgias  Skin: Negative for rash and wound  Allergic/Immunologic: Negative for immunocompromised state  Neurological: Negative for seizures and syncope  Psychiatric/Behavioral: Negative for confusion, self-injury and suicidal ideas  Physical Exam  Physical Exam  Vitals and nursing note reviewed  Constitutional:       General: She is not in acute distress  Appearance: Normal appearance  She is well-developed  She is not ill-appearing  HENT:      Head: Normocephalic and atraumatic  No raccoon eyes  Right Ear: External ear normal       Left Ear: External ear normal       Nose: Nose normal  No congestion  Mouth/Throat:      Lips: Pink  Mouth: Mucous membranes are moist    Eyes:      General: Lids are normal  No scleral icterus  Conjunctiva/sclera: Conjunctivae normal    Cardiovascular:      Rate and Rhythm: Normal rate and regular rhythm  Heart sounds: No murmur heard  No friction rub  Pulmonary:      Effort: Pulmonary effort is normal  No respiratory distress  Breath sounds: No wheezing or rales  Abdominal:      General: Abdomen is flat  Tenderness: There is abdominal tenderness in the epigastric area  There is no guarding or rebound  Negative signs include Curiel's sign  Hernia: No hernia is present  There is no hernia in the umbilical area  Musculoskeletal:         General: No swelling or signs of injury  Cervical back: Normal range of motion  No rigidity or tenderness  Skin:     General: Skin is warm and dry  Coloration: Skin is not jaundiced  Findings: No rash  Neurological:      Mental Status: She is alert and oriented to person, place, and time  Mental status is at baseline  Psychiatric:         Behavior: Behavior normal  Behavior is cooperative  Vital Signs  ED Triage Vitals   Temp Pulse Resp BP SpO2   -- -- -- -- --      Temp src Heart Rate Source Patient Position - Orthostatic VS BP Location FiO2 (%)   -- -- -- -- --      Pain Score       --           There were no vitals filed for this visit  Visual Acuity      ED Medications  Medications - No data to display    Diagnostic Studies  Results Reviewed     None                 No orders to display              Procedures  Procedures         ED Course  ED Course as of 11/12/22 2258   Sat Nov 12, 2022   1728 WBC(!): 11 01   1728 Hemoglobin: 13 2   1733 Sodium: 137   1733 Potassium: 4 0   1733 Chloride: 103   1733 Creatinine: 0 61   1733 TOTAL BILIRUBIN(!): 2 43   1733 AST(!): 164   1733 ALT(!): 84   1733 Lipase: 56   1840 SARS-COV-2: Negative   1840 INFLU A PCR: Negative   1840 INFLU B PCR: Negative   1840 RSV PCR: Negative   1840 PREGNANCY TEST URINE: neagtive   1840 Leukocytes, UA: Negative   1840 Nitrite, UA: Negative          U/S reassuring  No significant common bile duct dilation    Will treat with Zofran will discharge in stable condition with follow-up with PCP  Return precautions discussed  MDM  Patient on arrival is ambulatory to room is in no acute distress, vital signs stable, afebrile  On exam lungs clear auscultation, heart without murmurs rubs or gallops abdomen with TTP to epigastrium  Will obtain workup including lipase, BMP, hepatic function panel  Differential includes cholecystitis, hepatitis, viral gastroenteritis, peptic ulcer  Disposition  Final diagnoses:   None     ED Disposition     None      Follow-up Information    None         Patient's Medications   Discharge Prescriptions    No medications on file       No discharge procedures on file      PDMP Review       Value Time User    PDMP Reviewed  Yes 7/14/2021  2:12 PM Valencia Garcia PA-C          ED Provider  Electronically Signed by           Ailyn Albright MD  11/12/22 9800

## 2022-11-12 NOTE — ED NOTES
Tiger Text message sent to on call [address]  to request them to come to perform US as ordered        Chirag Bingham RN  11/12/22 8172

## 2022-12-02 ENCOUNTER — VBI (OUTPATIENT)
Dept: ADMINISTRATIVE | Facility: OTHER | Age: 27
End: 2022-12-02

## 2023-01-18 ENCOUNTER — HOSPITAL ENCOUNTER (EMERGENCY)
Facility: HOSPITAL | Age: 28
Discharge: HOME | End: 2023-01-18
Attending: EMERGENCY MEDICINE

## 2023-01-18 VITALS
TEMPERATURE: 98.3 F | HEART RATE: 80 BPM | RESPIRATION RATE: 16 BRPM | SYSTOLIC BLOOD PRESSURE: 114 MMHG | OXYGEN SATURATION: 99 % | DIASTOLIC BLOOD PRESSURE: 59 MMHG | BODY MASS INDEX: 29.17 KG/M2 | WEIGHT: 203.26 LBS

## 2023-01-18 DIAGNOSIS — R21 RASH: Primary | ICD-10-CM

## 2023-01-18 RX ORDER — FAMOTIDINE 20 MG/1
20 TABLET, FILM COATED ORAL ONCE
Status: COMPLETED | OUTPATIENT
Start: 2023-01-18 | End: 2023-01-18

## 2023-01-18 RX ORDER — HYDROXYZINE HYDROCHLORIDE 25 MG/1
25 TABLET, FILM COATED ORAL EVERY 6 HOURS
Qty: 40 TABLET | Refills: 0 | Status: SHIPPED | OUTPATIENT
Start: 2023-01-18 | End: 2023-01-28

## 2023-01-18 RX ORDER — PREDNISONE 20 MG/1
40 TABLET ORAL ONCE
Status: COMPLETED | OUTPATIENT
Start: 2023-01-18 | End: 2023-01-18

## 2023-01-18 RX ORDER — PREDNISONE 20 MG/1
40 TABLET ORAL DAILY
Qty: 8 TABLET | Refills: 0 | Status: SHIPPED | OUTPATIENT
Start: 2023-01-18 | End: 2023-01-22

## 2023-01-18 RX ADMIN — PREDNISONE 40 MG: 20 TABLET ORAL at 09:53

## 2023-01-18 RX ADMIN — FAMOTIDINE 20 MG: 20 TABLET, FILM COATED ORAL at 09:53

## 2023-01-18 NOTE — ED PROVIDER NOTES
History  Chief Complaint   Patient presents with   • Rash     Rash on abdomen, back and b/l thighs x2 weeks  Taking benadryl and using cream without relief  +itching     This is a 33year old female with history of diabetes mellitus and laparoscopic sleeve gastrectomy (2020) who presents today with rash on her abdomen that started 2 weeks ago and has now spread to her back and b/l thighs  Reports it is itchy  Denies any drainage from the rash  No fever, chills, SOB  No urinary symptoms  No changes in soaps, detergents, lotions, etc            Prior to Admission Medications   Prescriptions Last Dose Informant Patient Reported? Taking? Banophen 25 MG capsule   Yes No   Sig: Take 25 mg by mouth every 6 (six) hours   Patient not taking: Reported on 2022   Multiple Vitamins-Minerals (MULTIVITAMIN ADULT PO)   Yes No   Sig: Take by mouth daily   acetaminophen (TYLENOL) 500 mg tablet   No No   Sig: Take 1 tablet (500 mg total) by mouth every 6 (six) hours as needed for mild pain   Patient not taking: Reported on 2022   calcium carbonate (OS-GABI) 600 MG tablet   Yes No   Sig: Take 600 mg by mouth 2 (two) times a day with meals   Patient not taking: Reported on 2022   levonorgestrel (MIRENA) 20 MCG/24HR IUD   Yes No   Si each by Intrauterine route once   Patient not taking: Reported on 2022   naproxen (Naprosyn) 500 mg tablet   No No   Sig: Take 1 tablet (500 mg total) by mouth in the morning and 1 tablet (500 mg total) in the evening  Take with meals     Patient not taking: Reported on 2022   naproxen (Naprosyn) 500 mg tablet   No No   Sig: Take 1 tablet (500 mg total) by mouth 2 (two) times a day as needed for mild pain   omeprazole (PriLOSEC) 20 mg delayed release capsule   No No   Sig: Take 1 capsule (20 mg total) by mouth 2 (two) times a day   ondansetron (ZOFRAN) 4 mg tablet   No No   Sig: Take 1 tablet (4 mg total) by mouth every 8 (eight) hours as needed for nausea or vomiting Patient not taking: Reported on 2022   ondansetron (Zofran ODT) 4 mg disintegrating tablet   No No   Sig: Take 1 tablet (4 mg total) by mouth every 6 (six) hours as needed for nausea or vomiting   triamcinolone (KENALOG) 0 1 % cream   No No   Sig: Apply topically 2 (two) times a day for 5 days      Facility-Administered Medications: None       Past Medical History:   Diagnosis Date   • Diabetes mellitus (HCC)     NIDDM   • History of transfusion     after  - had fever with transfusion   • Obesity (BMI 35 0-39 9 without comorbidity)    • S/P laparoscopic sleeve gastrectomy 2020   • Uses [place] as primary spoken language        Past Surgical History:   Procedure Laterality Date   •  SECTION  2017   • CHOLECYSTECTOMY     • RI LAPS GSTRC RSTRICTIV PX LONGITUDINAL GASTRECTOMY N/A 2020    Procedure: LAP SLEEVE GASTRECTOMY, INTRAOP EGD;  Surgeon: Tricia Plaza MD;  Location: [facility];  Service: Bariatrics       Family History   Problem Relation Age of Onset   • Heart disease Family         CARDIOVASCULAR DISEASE   • Diabetes Mother    • Other Mother         gastric bypass   • Diabetes Father    • Diabetes Sister    • Sleep apnea Brother         2 of the 3 borthers have EAGLE   • Diabetes Maternal Grandmother    • Diabetes Paternal Grandmother    • No Known Problems Son    • No Known Problems Maternal Grandfather    • No Known Problems Paternal Grandfather    • Sleep apnea Sister    • No Known Problems Brother      I have reviewed and agree with the history as documented      E-Cigarette/Vaping   • E-Cigarette Use Never User      E-Cigarette/Vaping Substances   • Nicotine No    • THC No    • CBD No    • Flavoring No    • Other No    • Unknown No      Social History     Tobacco Use   • Smoking status: Never   • Smokeless tobacco: Never   Vaping Use   • Vaping Use: Never used   Substance Use Topics   • Alcohol use: No   • Drug use: No       Review of Systems   Skin: Positive for rash    All other systems reviewed and are negative  Physical Exam  Physical Exam  Vitals and nursing note reviewed  Constitutional:       General: She is not in acute distress  Appearance: Normal appearance  She is well-developed  She is not ill-appearing  HENT:      Head: Normocephalic and atraumatic  Eyes:      Conjunctiva/sclera: Conjunctivae normal    Cardiovascular:      Rate and Rhythm: Normal rate  Pulmonary:      Effort: Pulmonary effort is normal  No respiratory distress  Abdominal:      Palpations: Abdomen is soft  Musculoskeletal:         General: No swelling  Cervical back: Normal range of motion and neck supple  Skin:     General: Skin is warm and dry  Findings: Rash present  Comments: Papular pruritic rash on abdomen and back and b/l thighs  No drainage  No vesicles  Neurological:      Mental Status: She is alert     Psychiatric:         Mood and Affect: Mood normal          Vital Signs  ED Triage Vitals   Temperature Pulse Respirations Blood Pressure SpO2   01/18/23 0905 01/18/23 0906 01/18/23 0906 01/18/23 0906 01/18/23 0906   98 3 °F (36 8 °C) 80 16 114/59 99 %      Temp Source Heart Rate Source Patient Position - Orthostatic VS BP Location FiO2 (%)   01/18/23 0905 01/18/23 0906 01/18/23 0906 01/18/23 0906 --   Oral Monitor Sitting Right arm       Pain Score       --                  Vitals:    01/18/23 0906   BP: 114/59   Pulse: 80   Patient Position - Orthostatic VS: Sitting         Visual Acuity      ED Medications  Medications   famotidine (PEPCID) tablet 20 mg (20 mg Oral Given 1/18/23 8262)   predniSONE tablet 40 mg (40 mg Oral Given 1/18/23 1498)       Diagnostic Studies  Results Reviewed     None                 No orders to display              Procedures  Procedures         ED Course           Medical Decision Making  This is a 33year old female with history of diabetes mellitus and laparoscopic sleeve gastrectomy (07/20/2020) who presents today with rash on her abdomen that started 2 weeks ago and has now spread to her back and b/l thighs  Physical exam revealed papular pruritic rash on abdomen, back and b/l thighs  No drainage  No vesicles  No recent illnesses  Suspect contact dermatitis  Will treat with Prednisone and Atarax  Referral placed to dermatology  Discussed changing one thing at a time at home to see if improves rash  I have discussed the plan to discharge pt from ED  The patient was discharged in stable condition   Patient ambulated off the department   Extensive return to emergency department precautions were discussed   Follow up with appropriate providers including primary care physician was discussed   Patient and/or their  primary decision maker expressed understanding  [place] Curl remained stable during entire emergency department stay  Portions of the record may have been created with voice recognition software  Occasional wrong word or "sound a like" substitutions may have occurred due to the inherent limitations of voice recognition software  Read the chart carefully and recognize, using context, where substitutions have occurred  Rash: acute illness or injury  Amount and/or Complexity of Data Reviewed  External Data Reviewed: notes  Risk  Prescription drug management  Disposition  Final diagnoses:   Rash     Time reflects when diagnosis was documented in both MDM as applicable and the Disposition within this note     Time User Action Codes Description Comment    1/18/2023  9:43 AM Omaira Sheridan 21 Rash       ED Disposition     ED Disposition   Discharge    Condition   Stable    Date/Time   Wed Jan 18, 2023  9:46 AM    Comment   Carmelita De La Cruz discharge to home/self care                 Follow-up Information     Follow up With Specialties Details Why [address] [facility]   [address] [facility] [id number]  [facility] [address] , [address] [place], [place], [address] [place] [address]          Patient's Medications   Discharge Prescriptions    HYDROXYZINE HCL (ATARAX) 25 MG TABLET    Take 1 tablet (25 mg total) by mouth every 6 (six) hours for 10 days       Start Date: 1/18/2023 End Date: 1/28/2023       Order Dose: 25 mg       Quantity: 40 tablet    Refills: 0    PREDNISONE 20 MG TABLET    Take 2 tablets (40 mg total) by mouth daily for 4 days       Start Date: 1/18/2023 End Date: 1/22/2023       Order Dose: 40 mg       Quantity: 8 tablet    Refills: 0           PDMP Review       Value Time User    PDMP Reviewed  Yes 7/14/2021  2:12 PM Estanislado Alpers, PA-C          ED Provider  Electronically Signed by           Nelson Carrero PA-C  01/18/23 0464

## 2023-01-18 NOTE — DISCHARGE INSTRUCTIONS
-take prednisone for 4 days  Start taking it tomorrow    -take Atarax every 6 hours as needed  Do not take Benadryl with this     -follow up with dermatology

## 2023-01-19 ENCOUNTER — CONSULT (OUTPATIENT)
Dept: DERMATOLOGY | Facility: CLINIC | Age: 28
End: 2023-01-19

## 2023-01-19 VITALS — TEMPERATURE: 97.7 F | BODY MASS INDEX: 29.08 KG/M2 | WEIGHT: 202.7 LBS

## 2023-01-19 DIAGNOSIS — R21 RASH: ICD-10-CM

## 2023-01-19 DIAGNOSIS — L50.9 URTICARIA: Primary | ICD-10-CM

## 2023-01-19 RX ORDER — PREDNISONE 10 MG/1
TABLET ORAL
Qty: 18 TABLET | Refills: 0 | Status: SHIPPED | OUTPATIENT
Start: 2023-01-19 | End: 2023-02-03

## 2023-01-19 NOTE — PATIENT INSTRUCTIONS
SKIN PUNCH BIOPSY    Rationale for Procedure  A skin punch biopsy allows the dermatologist to further examine a lesion or rash under the microscope to potentially obtain a more specific diagnosis  It usually involves numbing the area with numbing medication and removing a small piece of skin  Usually, the area will be closed with sutures at the end of the procedure  Sutures usually need to be removed in 5 to 7 days of the biopsy was performed on the face or in 10 to 14 days if performed elsewhere on the body  Description of Procedure  We would like to perform a skin punch biopsy today  A local anesthetic, similar to the kind that a dentist uses when filling a cavity, will be injected with a very small needle into the skin area to be sampled  The injected skin and tissue underneath should “go to sleep” and become numbed so that no further pain should be felt  An instrument shaped like a tiny "cookie cutter" (i e , the punch biopsy instrument) will be used to cut a small round piece of skin and tissue from the area so that the sample may be taken and examined more closely under the microscope  A slight amount of bleeding will occur, but it is usually stopped with direct pressure, chemical cautery, and/or a pressure bandage; rarely, electrocautery and other means of intervention may be necessary to help stop the bleeding  A few sutures/stitches are usually applied to help aid in wound healing  Surgical “Vaseline-type” ointment will also applied after the procedure to help create a barrier between the wound and the outside world      Risks and Potential Complications  While the advantage of a skin punch biopsy is that it allows us to potentially examine the skin more closely under the microscope, there are some risks and potential complications that include but are not limited to the following:  Bleeding  Infection  Pain  Scar/keloid  Skin discoloration  Incomplete removal of the lesion or rash being sampled (in other words, this procedure is intended as a sampling and is not considered a definitive treatment)  Recurrence of the lesion or rash being sampled  Nerve Damage/Numbness/Loss of Function  Allergic Reaction to Anesthesia  Biopsies are diagnostic procedures and, based on findings, additional treatment or evaluation may be required (in other words, this procedure is intended as a sampling and is not considered a definitive treatment)  Loss or destruction of the sample specimen could result in no additional findings  The person at the microscope may not be able to provide additional information other than what we already know or suspect  What You Will Need to Do After the Procedure  Keep the area clean and dry  Try NOT to remove the bandage for the first 24 hours  Gently clean the area and apply Vaseline ointment (this is over the counter and not a prescription) to the biopsy site for up to 2 weeks  If any sutures were placed, return for suture removal as instructed (generally 1 week for the face, 2 weeks for the body)  Take Acetaminophen (Tylenol) for discomfort, if no contraindications  Do NOT take Ibuprofen or aspirin unless specifically told to do so by your Dermatologist because these medications can make bleeding worse  Call our office immediately for signs of infection: fever, chills, increased redness, warmth, tenderness, discomfort/pain, or pus or foul smell coming from the wound  If a small amount of bleeding is noticed, place a clean cloth over the area and apply firm pressure for ten minutes  Check the wound ONLY after 10 minutes of direct pressure; do not cheat and sneak a peak, as that does not count  If bleeding persists after 10 minutes of legitimate direct pressure, then try one more round of direct pressure for an additional 10 minutes to the area    Should the bleeding become heavier or not stop after the second attempt, call [facility] directly at [phone] (SKIN) or, if after hours, go to your local Emergency Room/Emergency Department  Assessment and Plan:  Based on a thorough discussion of this condition and the management approach to it (including a comprehensive discussion of the known risks, side effects and potential benefits of treatment), the patient (family) agrees to implement the following specific plan:  Recommend skin biopsy- consent obtained and signed  Recommend Zyrtec 10 mg once daily  Apply triamcinolone ointment topically twice daily for 14 days to rash  It is important to take at least 1 week break between 2 week uses  Do NOT use on face, armpits, or groin     Prednisone taper: complete prednisone previously prescribed from Emergency Department, then 20 mg once daily for 5 days, then 10 mg once daily for 5 days, and then 5 mg once daily for 5 days   Can continue with atarax at bedtime as needed for itching  Will order basic labs (CBC, CMP, TSH, Free T4)

## 2023-01-19 NOTE — PROGRESS NOTES
[facility] [address] Note     Patient Name: Ashley Clark  Encounter Date: 1/19/2023     Have you been cared for by a [facility] [address] Dermatologist in the last 3 years and, if so, which description applies to you? NO  I am considered a "new" patient and must complete all patient intake questions  I am FEMALE/of child-bearing potential     REVIEW OF SYSTEMS:  Have you recently had or currently have any of the following? · Recent fever or chills? No  · Any non-healing wound? No  · Are you pregnant or planning to become pregnant? No  · Are you currently or planning to be nursing or breast feeding? No   PAST MEDICAL HISTORY:  Have you personally ever had or currently have any of the following? If "YES," then please provide more detail  · Skin cancer (such as Melanoma, Basal Cell Carcinoma, Squamous Cell Carcinoma? No  · Tuberculosis, HIV/AIDS, Hepatitis B or C: No  · Systemic Immunosuppression such as Diabetes, Biologic or Immunotherapy, Chemotherapy, Organ Transplantation, Bone Marrow Transplantation No  · Radiation Treatment No   FAMILY HISTORY:  Any "first degree relatives" (parent, brother, sister, or child) with the following? • Skin Cancer, Pancreatic or Other Cancer? No   PATIENT EXPERIENCE:    • Do you want the Dermatologist to perform a COMPLETE skin exam today including a clinical examination under the "bra and underwear" areas? NO  • If necessary, do we have your permission to call and leave a detailed message on your Preferred Phone number that includes your specific medical information?   Yes      No Known Allergies   Current Outpatient Medications:   •  hydrOXYzine HCL (ATARAX) 25 mg tablet, Take 1 tablet (25 mg total) by mouth every 6 (six) hours for 10 days, Disp: 40 tablet, Rfl: 0  •  Multiple Vitamins-Minerals (MULTIVITAMIN ADULT PO), Take by mouth daily, Disp: , Rfl:   •  naproxen (Naprosyn) 500 mg tablet, Take 1 tablet (500 mg total) by mouth 2 (two) times a day as needed for mild pain, Disp: 10 tablet, Rfl: 0  •  ondansetron (Zofran ODT) 4 mg disintegrating tablet, Take 1 tablet (4 mg total) by mouth every 6 (six) hours as needed for nausea or vomiting, Disp: 20 tablet, Rfl: 0  •  predniSONE 20 mg tablet, Take 2 tablets (40 mg total) by mouth daily for 4 days, Disp: 8 tablet, Rfl: 0  •  acetaminophen (TYLENOL) 500 mg tablet, Take 1 tablet (500 mg total) by mouth every 6 (six) hours as needed for mild pain (Patient not taking: Reported on 8/11/2022), Disp: 30 tablet, Rfl: 0  •  Banophen 25 MG capsule, Take 25 mg by mouth every 6 (six) hours (Patient not taking: Reported on 8/11/2022), Disp: , Rfl:   •  calcium carbonate (OS-GABI) 600 MG tablet, Take 600 mg by mouth 2 (two) times a day with meals (Patient not taking: Reported on 8/11/2022), Disp: , Rfl:   •  levonorgestrel (MIRENA) 20 MCG/24HR IUD, 1 each by Intrauterine route once (Patient not taking: Reported on 8/11/2022), Disp: , Rfl:   •  naproxen (Naprosyn) 500 mg tablet, Take 1 tablet (500 mg total) by mouth in the morning and 1 tablet (500 mg total) in the evening  Take with meals   (Patient not taking: Reported on 8/11/2022), Disp: 30 tablet, Rfl: 0  •  omeprazole (PriLOSEC) 20 mg delayed release capsule, Take 1 capsule (20 mg total) by mouth 2 (two) times a day, Disp: 60 capsule, Rfl: 2  •  ondansetron (ZOFRAN) 4 mg tablet, Take 1 tablet (4 mg total) by mouth every 8 (eight) hours as needed for nausea or vomiting (Patient not taking: Reported on 8/11/2022), Disp: 20 tablet, Rfl: 0  •  triamcinolone (KENALOG) 0 1 % cream, Apply topically 2 (two) times a day for 5 days, Disp: 30 g, Rfl: 0          • Whom besides the patient is providing clinical information about today's encounter?   o NO ADDITIONAL HISTORIAN (patient alone provided history)    Physical Exam and Assessment/Plan by Diagnosis:      RASH  URTICARIA     Physical Exam:  • (Anatomic Location); (Size and Morphological Description); (Differential Diagnosis):  o Stomach and back; pink round scaly thin papules; 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 rosea versus other    Additional History of Present Condition:  Pt was seen in the ED on 1/18/2023 for an itchy rash  Patient was prescribed hydroxyzine and oral prednisone  Pt has been taking the medications as directed and does not feel they are very helpful  Rash present on her abdomen, back, arms, and cheeks  Rash present for about 2 weeks  Pt is also taking Benadryl  Patient recently ill with a cold about a month ago  Mild joint pain in the wrists that becomes painful/stiff throughout the day  Patient states the hives also started at the time of rash onset  Assessment and Plan:  Based on a thorough discussion of this condition and the management approach to it (including a comprehensive discussion of the known risks, side effects and potential benefits of treatment), the patient (family) agrees to implement the following specific plan:  • Recommend skin biopsy- consent obtained and signed  • Recommend Zyrtec 10 mg once daily  • Apply triamcinolone ointment topically twice daily for 14 days to rash  It is important to take at least 1 week break between 2 week uses  Do NOT use on face, armpits, or groin  • Prednisone taper: complete prednisone previously prescribed from Emergency Department, then 20 mg once daily for 5 days, then 10 mg once daily for 5 days, and then 5 mg once daily for 5 days   • Can continue with atarax at bedtime as needed for itching  • Will order basic labs (CBC, CMP, TSH, Free T4)    PROCEDURE NOTE:  PUNCH BIOPSY    Performing Physician: Deepti Velazquez    Anatomic Location; Clinical Description with size (cm); Pre-Op Diagnosis:   •  Stomach and back; pink round scaly papules; 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 rosea versus other               Anesthesia: 1% xylocaine with epi       Topical anesthesia: None       Indications: To indicate diagnosis and management plan      Procedure Details     Patient informed of the risks (including bleeding,scaring and infection) and benefits of the procedure explained  Verbal and written informed consent obtained  The area was prepped and draped in the usual fashion  Anesthesia was obtained with 1% lidocaine with epinephrine  The skin was then stretched perpendicular to the skin tension lines and a punch biopsy to an appropriate sampling depth was obtained with a 4 mm punch with a forceps and iris scissors  Hemostasis was obtained with 4-0 Prolene x 2 sutures  Complications:  None    Specimen has been sent for review by Dermatopathology  Plan:  1  Instructed to keep the wound dry and covered for 24-48h and clean thereafter  2  Warning signs of infection were reviewed  3  Recommended that the patient use acetaminophen as needed for pain  4  Sutures if any should be removed in 14 days    Standard post-procedure care has been explained and has been included in written form within the patient's copy of Informed Consent  Scribe Attestation    I,:  Mariusz Melton am acting as a scribe while in the presence of the attending physician :       I,:  Jet Lara MD personally performed the services described in this documentation    as scribed in my presence :         The patient was seen and discussed with Dr Jet Lara       RTC: 2 weeks for suture removal; will call with biopsy results and schedule next steps in treatment accordingly    Loco Yan  Dermatology PGY-4 Resident Physician

## 2023-01-20 ENCOUNTER — TELEPHONE (OUTPATIENT)
Dept: DERMATOLOGY | Facility: CLINIC | Age: 28
End: 2023-01-20

## 2023-01-27 ENCOUNTER — TELEPHONE (OUTPATIENT)
Dept: DERMATOLOGY | Age: 28
End: 2023-01-27

## 2023-01-27 NOTE — TELEPHONE ENCOUNTER
vm received from patient , requesting form to be faxed to her job     Left vm informing her , form has been faxed on 01 20 2023

## 2023-02-03 ENCOUNTER — OFFICE VISIT (OUTPATIENT)
Dept: DERMATOLOGY | Facility: CLINIC | Age: 28
End: 2023-02-03

## 2023-02-03 DIAGNOSIS — Z48.02 ENCOUNTER FOR REMOVAL OF SUTURES: Primary | ICD-10-CM

## 2023-02-03 NOTE — PROGRESS NOTES
Suture removal    Date/Time: 2/3/2023 1:45 PM  Performed by: Nenita Fajardo RN  Authorized by: Linnea Gutiérrez MD   Universal Protocol:  Consent: Verbal consent obtained  Written consent not obtained  Risks and benefits: risks, benefits and alternatives were discussed  Consent given by: patient  Time out: Immediately prior to procedure a "time out" was called to verify the correct patient, procedure, equipment, support staff and site/side marked as required  Timeout called at: 2/3/2023 1:46 PM   Patient understanding: patient states understanding of the procedure being performed  Patient consent: the patient's understanding of the procedure matches consent given  Procedure consent: procedure consent matches procedure scheduled  Relevant documents: relevant documents present and verified  Test results: test results available and properly labeled  Site marked: the operative site was marked  Radiology Images displayed and confirmed  If images not available, report reviewed: imaging studies not available  Patient identity confirmed: verbally with patient        Patient location:  Clinic  Location:     Laterality:  Left    Location:  Trunk    Trunk location:  Abdomen  Procedure details: Tools used:  Suture removal kit    Wound appearance:  No sign(s) of infection, good wound healing, nontender and pink    Number of sutures removed:  2  Post-procedure details:     Post-removal:  Band-Aid applied (Vaseline applied )    Patient tolerance of procedure:   Tolerated well, no immediate complications

## 2023-02-16 ENCOUNTER — VBI (OUTPATIENT)
Dept: ADMINISTRATIVE | Facility: OTHER | Age: 28
End: 2023-02-16

## 2023-03-13 ENCOUNTER — OFFICE VISIT (OUTPATIENT)
Dept: OBGYN CLINIC | Facility: MEDICAL CENTER | Age: 28
End: 2023-03-13

## 2023-03-13 VITALS
BODY MASS INDEX: 29.68 KG/M2 | DIASTOLIC BLOOD PRESSURE: 65 MMHG | SYSTOLIC BLOOD PRESSURE: 115 MMHG | WEIGHT: 207.3 LBS | HEIGHT: 70 IN

## 2023-03-13 DIAGNOSIS — B96.89 BACTERIAL VAGINOSIS: ICD-10-CM

## 2023-03-13 DIAGNOSIS — T83.32XA INTRAUTERINE CONTRACEPTIVE DEVICE THREADS LOST, INITIAL ENCOUNTER: ICD-10-CM

## 2023-03-13 DIAGNOSIS — N89.8 VAGINAL DISCHARGE: ICD-10-CM

## 2023-03-13 DIAGNOSIS — R10.2 PELVIC PAIN: Primary | ICD-10-CM

## 2023-03-13 DIAGNOSIS — N76.0 BACTERIAL VAGINOSIS: ICD-10-CM

## 2023-03-13 LAB
BV WHIFF TEST VAG QL: ABNORMAL
CLUE CELLS SPEC QL WET PREP: ABNORMAL
PH SMN: 5 [PH]
SL AMB  POCT GLUCOSE, UA: NEGATIVE
SL AMB LEUKOCYTE ESTERASE,UA: NEGATIVE
SL AMB POCT BILIRUBIN,UA: NEGATIVE
SL AMB POCT BLOOD,UA: 1
SL AMB POCT CLARITY,UA: CLEAR
SL AMB POCT COLOR,UA: NORMAL
SL AMB POCT KETONES,UA: NEGATIVE
SL AMB POCT NITRITE,UA: NEGATIVE
SL AMB POCT PH,UA: 6
SL AMB POCT SPECIFIC GRAVITY,UA: 1.01
SL AMB POCT URINE PROTEIN: NEGATIVE
SL AMB POCT UROBILINOGEN: NEGATIVE
SL AMB POCT WET MOUNT: ABNORMAL
T VAGINALIS VAG QL WET PREP: ABNORMAL
YEAST VAG QL WET PREP: ABNORMAL

## 2023-03-13 RX ORDER — METRONIDAZOLE 500 MG/1
500 TABLET ORAL EVERY 12 HOURS SCHEDULED
Qty: 14 TABLET | Refills: 0 | Status: SHIPPED | OUTPATIENT
Start: 2023-03-13 | End: 2023-03-20

## 2023-03-13 NOTE — PROGRESS NOTES
Patient is primarily Uzbek-speaking [place]  #[id number] used for translation during today's visit    Assessment/Plan:      Diagnoses and all orders for this visit:    Pelvic pain  -     POCT wet mount  -     POCT urine dip  -     US pelvis complete w transvaginal; Future    Bacterial vaginosis  -     metroNIDAZOLE (FLAGYL) 500 mg tablet; Take 1 tablet (500 mg total) by mouth every 12 (twelve) hours for 7 days    Vaginal discharge    Intrauterine contraceptive device threads lost, initial encounter  -     US pelvis complete w transvaginal; Future        - reviewed diagnosis of bacterial vaginosis and treatment with metronidazole  Patient aware to avoid alcohol use while taking medication  Written information provided  -Urine dip negative  -Reviewed recommendation for pelvic ultrasound-able to visualize IUD string  -Can take ibuprofen/Tylenol as needed for pain  -Signs and symptoms to report reviewed  -We will call/[employer] message with results    RTO annual exam     Subjective:     Patient ID: Gabriel Munguia is a 33 y o  female  HPI  here with complaints of pelvic cramping/colic for about 1 week  LMP 23  Pain is described as cramping, achy, colicky, sharp and shooting  Pain occurs intermittently throughout the day  Since onset pain has remained the same  Associated symptoms include vaginal discharge  Denies alleviating factors  Aggravating factors include intercourse  Discharge is described as thin, orange with a foul odor  Denies vaginal itching or irritation  Has not used any OTC remedies  Is sexually active using  Mirena IUD for contraception  Denies new partner, fever, chills, bowel or bladder concerns  Last pap smear 21 NILM  Completed Gardasil vaccine series       Review of Systems   Constitutional: Negative for chills, fatigue and fever  Respiratory: Negative  Cardiovascular: Negative      Genitourinary: Positive for pelvic pain, vaginal discharge and vaginal pain  Negative for decreased urine volume, difficulty urinating, dyspareunia, dysuria, enuresis, flank pain, frequency, genital sores, hematuria, menstrual problem, urgency and vaginal bleeding  Objective:/65   Ht 5' 10" (1 778 m)   Wt 94 kg (207 lb 4 8 oz)   LMP 02/20/2023   BMI 29 74 kg/m²        Physical Exam  Vitals reviewed  Exam conducted with a chaperone present  Constitutional:       Appearance: Normal appearance  Genitourinary:     General: Normal vulva  Labia:         Right: No rash, tenderness, lesion or injury  Left: No rash, tenderness, lesion or injury  Vagina: Vaginal discharge present  Cervix: Normal       Uterus: Normal        Adnexa: Right adnexa normal and left adnexa normal       Comments: Large amount of thin gray vaginal discharge noted on exam   IUD string not seen with speculum exam  Neurological:      Mental Status: She is alert and oriented to person, place, and time     Psychiatric:         Mood and Affect: Mood normal          Behavior: Behavior normal

## 2023-03-20 ENCOUNTER — HOSPITAL ENCOUNTER (EMERGENCY)
Facility: HOSPITAL | Age: 28
Discharge: HOME | End: 2023-03-20
Attending: EMERGENCY MEDICINE | Admitting: EMERGENCY MEDICINE

## 2023-03-20 VITALS
OXYGEN SATURATION: 100 % | HEIGHT: 70 IN | RESPIRATION RATE: 18 BRPM | HEART RATE: 95 BPM | SYSTOLIC BLOOD PRESSURE: 139 MMHG | TEMPERATURE: 97.8 F | DIASTOLIC BLOOD PRESSURE: 79 MMHG | BODY MASS INDEX: 29.35 KG/M2 | WEIGHT: 205.03 LBS

## 2023-03-20 DIAGNOSIS — M54.9 BACK PAIN: Primary | ICD-10-CM

## 2023-03-20 LAB
BILIRUB UR QL STRIP: NEGATIVE
C TRACH DNA SPEC QL NAA+PROBE: NEGATIVE
CLARITY UR: ABNORMAL
CLARITY, POC: NORMAL
COLOR UR: ABNORMAL
COLOR, POC: NORMAL
EXT PREGNANCY TEST URINE: NEGATIVE
EXT. CONTROL: NORMAL
GLUCOSE UR STRIP-MCNC: ABNORMAL MG/DL
HGB UR QL STRIP.AUTO: NEGATIVE
KETONES UR STRIP-MCNC: NEGATIVE MG/DL
LEUKOCYTE ESTERASE UR QL STRIP: NEGATIVE
N GONORRHOEA DNA SPEC QL NAA+PROBE: NEGATIVE
NITRITE UR QL STRIP: NEGATIVE
PH UR STRIP.AUTO: 7.5 [PH] (ref 4.5–8)
PROT UR STRIP-MCNC: NEGATIVE MG/DL
SP GR UR STRIP.AUTO: 1.02 (ref 1–1.03)
UROBILINOGEN UR QL STRIP.AUTO: 1 E.U./DL

## 2023-03-20 RX ORDER — METHOCARBAMOL 500 MG/1
500 TABLET, FILM COATED ORAL ONCE
Status: COMPLETED | OUTPATIENT
Start: 2023-03-20 | End: 2023-03-20

## 2023-03-20 RX ORDER — METHOCARBAMOL 500 MG/1
500 TABLET, FILM COATED ORAL 2 TIMES DAILY
Qty: 20 TABLET | Refills: 0 | Status: SHIPPED | OUTPATIENT
Start: 2023-03-20

## 2023-03-20 RX ORDER — KETOROLAC TROMETHAMINE 30 MG/ML
30 INJECTION, SOLUTION INTRAMUSCULAR; INTRAVENOUS ONCE
Status: COMPLETED | OUTPATIENT
Start: 2023-03-20 | End: 2023-03-20

## 2023-03-20 RX ADMIN — METHOCARBAMOL 500 MG: 500 TABLET ORAL at 09:35

## 2023-03-20 RX ADMIN — KETOROLAC TROMETHAMINE 30 MG: 30 INJECTION, SOLUTION INTRAMUSCULAR; INTRAVENOUS at 09:36

## 2023-03-20 NOTE — DISCHARGE INSTRUCTIONS
Please report to your scheduled ultrasound tomorrow  Please also make a follow up appointment with your GYN to assure resolution of your infection

## 2023-03-20 NOTE — ED PROVIDER NOTES
Pt Name: García Guillen  MRN: [id number]  [place] 1995  Age/Sex: 33 y o  female  Date of evaluation: 3/20/2023  PCP: Lea Dodd MD    [address] [place] [address]    Chief Complaint   Patient presents with   • Back Pain     Pt reports lower back pain since yesterday, reports seen at gyn last week and dx with bacterial vaginosis reports prescribed abx for same but reports not working  HPI    Ilan Hart presents to the Emergency Department complaining of low back pain  She has been taking tylenol and motrin for the pain and it has not been helping  She was recently diagnosed with BV and started on flagyl  She has two days left in treatment but feels like it has not helped with her symptoms  She has an outpatient pelvic US scheduled for tomorrow          HPI      Past Medical and Surgical History    Past Medical History:   Diagnosis Date   • Diabetes mellitus ([facility] [address] )     NIDDM   • History of transfusion     after  - had fever with transfusion   • Obesity (BMI 35 0-39 9 without comorbidity)    • S/P laparoscopic sleeve gastrectomy 2020   • Uses Malaysian as primary spoken language        Past Surgical History:   Procedure Laterality Date   •  SECTION  2017   • CHOLECYSTECTOMY     • VA LAPS GSTRC RSTRICTIV PX LONGITUDINAL GASTRECTOMY N/A 2020    Procedure: LAP SLEEVE GASTRECTOMY, INTRAOP EGD;  Surgeon: Christiano Martínez MD;  Location: [facility];  Service: Bariatrics       Family History   Problem Relation Age of Onset   • Diabetes Mother    • Other Mother         gastric bypass   • Diabetes Father    • Diabetes Sister    • Sleep apnea Sister    • Sleep apnea Brother         2 of the 3 borthers have EAGLE   • No Known Problems Brother    • Diabetes Maternal Grandmother    • Skin cancer Maternal Grandmother    • No Known Problems Maternal Grandfather    • Diabetes Paternal Grandmother    • No Known Problems Paternal Grandfather    • No Known Problems Son    • Heart disease Family         CARDIOVASCULAR DISEASE       Social History     Tobacco Use   • Smoking status: Never   • Smokeless tobacco: Never   Vaping Use   • Vaping Use: Never used   Substance Use Topics   • Alcohol use: No   • Drug use: No              Allergies    No Known Allergies    Home Medications    Prior to Admission medications    Medication Sig Start Date End Date Taking? Authorizing Provider   calcium carbonate (OS-GABI) 600 MG tablet Take 600 mg by mouth 2 (two) times a day with meals    Historical Provider, MD   hydrOXYzine HCL (ATARAX) 25 mg tablet Take 1 tablet (25 mg total) by mouth every 6 (six) hours for 10 days 1/18/23 1/28/23  Belkys Car PA-C   levonorgestrel (MIRENA) 20 MCG/24HR IUD 1 each by Intrauterine route once    Historical Provider, MD   metroNIDAZOLE (FLAGYL) 500 mg tablet Take 1 tablet (500 mg total) by mouth every 12 (twelve) hours for 7 days 3/13/23 3/20/23  LES Acuña   Multiple Vitamins-Minerals (MULTIVITAMIN ADULT PO) Take by mouth daily    Historical Provider, MD           Review of Systems    Review of Systems   Constitutional: Negative for chills and fever  HENT: Negative for ear pain and sore throat  Eyes: Negative for pain and visual disturbance  Respiratory: Negative for cough and shortness of breath  Cardiovascular: Negative for chest pain and palpitations  Gastrointestinal: Negative for abdominal pain and vomiting  Genitourinary: Positive for dysuria and vaginal discharge  Negative for hematuria  Musculoskeletal: Positive for back pain  Negative for arthralgias  Skin: Negative for color change and rash  Neurological: Negative for seizures and syncope  All other systems reviewed and are negative          Physical Exam      ED Triage Vitals [03/20/23 0857]   Temperature Pulse Respirations Blood Pressure SpO2   97 8 °F (36 6 °C) 95 18 139/79 100 %      Temp Source Heart Rate Source Patient Position - Orthostatic VS BP Location FiO2 (%)   Oral Monitor Sitting Right arm --      Pain Score       --               Physical Exam  Vitals and nursing note reviewed  Constitutional:       General: She is not in acute distress  Appearance: She is well-developed  HENT:      Head: Normocephalic and atraumatic  Eyes:      Conjunctiva/sclera: Conjunctivae normal    Cardiovascular:      Rate and Rhythm: Normal rate and regular rhythm  Heart sounds: No murmur heard  Pulmonary:      Effort: Pulmonary effort is normal  No respiratory distress  Breath sounds: Normal breath sounds  Abdominal:      Palpations: Abdomen is soft  Tenderness: There is no abdominal tenderness  Musculoskeletal:         General: No swelling  Cervical back: Neck supple  Skin:     General: Skin is warm and dry  Capillary Refill: Capillary refill takes less than 2 seconds  Neurological:      Mental Status: She is alert  Psychiatric:         Mood and Affect: Mood normal          Assessment and Plan    Jim Ordonez is a 33 y o  female who presents with back pain  Physical examination unremarkable  Plan will be to perform diagnostic testing and treat symptomatically  MDM  Number of Diagnoses or Management Options  Back pain  Diagnosis management comments: Patient with acute non-traumatic lumbar back pain without neurological deficit  No indications for emergency imaging at this time  Will treat conservatively and have patient follow up with primary care provided for re-evaluation  Patient has been instructed to return to the Emergency Department with any acute changes in condition  History obtained from patient  She was treated symptomatically  She is appropriate for outpatient follow up  Urine testing ordered and reviewed        Diagnostic Results      Labs:    Results for orders placed or performed during the hospital encounter of 03/20/23   POCT pregnancy, urine   Result Value Ref Range    EXT Preg Test, Ur Negative     Control Valid POCT urinalysis dipstick   Result Value Ref Range    Color, UA Shelbi     Clarity, UA Slightly Cloudy     EXT Leukocytes, UA      Nitrite, UA      Protein, UA  mg/dl    Glucose, UA      Ketones, UA  mg/dl    EXT Urobilinogen, UA       Bilirubin, UA      Blood, UA     Urine Macroscopic, POC   Result Value Ref Range    Color, UA Shelbi     Clarity, UA Slightly Cloudy     pH, UA 7 5 4 5 - 8 0    Leukocytes, UA Negative Negative    Nitrite, UA Negative Negative    Protein, UA Negative Negative mg/dl    Glucose,  (1/10%) (A) Negative mg/dl    Ketones, UA Negative Negative mg/dl    Urobilinogen, UA 1 0 0 2, 1 0 E U /dl E U /dl    Bilirubin, UA Negative Negative    Occult Blood, UA Negative Negative    Specific Gravity, UA 1 020 1 003 - 1 030       All labs reviewed and utilized in the medical decision making process    Radiology:    No orders to display       All radiology studies independently viewed by me and interpreted by the radiologist     Procedure    Procedures      ED Course of Care and Re-Assessments    Patient was feeling better after meds  I encouraged her to complete her course of antibiotics, go for her outpatient ultrasound and follow up with GYN to assure resolution  Medications   ketorolac (TORADOL) injection 30 mg (30 mg Intramuscular Given 3/20/23 0936)   methocarbamol (ROBAXIN) tablet 500 mg (500 mg Oral Given 3/20/23 0935)           FINAL IMPRESSION    Final diagnoses:   Back pain         DISPOSITION/PLAN    Time reflects when diagnosis was documented in both MDM as applicable and the Disposition within this note     Time User Action Codes Description Comment    3/20/2023 10:13 AM Chani Frazier [M54 9] Back pain       ED Disposition     ED Disposition   Discharge    Condition   Stable    Date/Time   Mon Mar 20, 2023 10:13 AM    Farrukh Casillas discharge to home/self care                 Follow-up Information     Follow up With Specialties Details Why [address] [facility] Emergency Department Emergency Medicine  As needed, If symptoms worsen [facility] [id number]  [address] [facility] [place] [facility], [address] [facility]  , [place], [place], [address] [place] [address] Avenue TO:    [facility] Emergency Department  [facility] [address] [phone]    As needed, If symptoms worsen      DISCHARGE MEDICATIONS:    Discharge Medication List as of 3/20/2023 10:15 AM      START taking these medications    Details   methocarbamol (ROBAXIN) 500 mg tablet Take 1 tablet (500 mg total) by mouth 2 (two) times a day, Starting Mon 3/20/2023, Normal         CONTINUE these medications which have NOT CHANGED    Details   calcium carbonate (OS-GABI) 600 MG tablet Take 600 mg by mouth 2 (two) times a day with meals, Historical Med      hydrOXYzine HCL (ATARAX) 25 mg tablet Take 1 tablet (25 mg total) by mouth every 6 (six) hours for 10 days, Starting Wed 1/18/2023, Until Sat 1/28/2023, Normal      levonorgestrel (MIRENA) 20 MCG/24HR IUD 1 each by Intrauterine route once, Historical Med      metroNIDAZOLE (FLAGYL) 500 mg tablet Take 1 tablet (500 mg total) by mouth every 12 (twelve) hours for 7 days, Starting Mon 3/13/2023, Until Mon 3/20/2023, Normal      Multiple Vitamins-Minerals (MULTIVITAMIN ADULT PO) Take by mouth daily, Historical Med             No discharge procedures on file           Moira Mosquera, [address] [place] [address], DO  03/20/23 7278

## 2023-03-20 NOTE — Clinical Note
Althea Casillas was seen and treated in our emergency department on 3/20/2023  Diagnosis:     Marcie Siddiqui  may return to work on return date  She may return on this date: 03/21/2023         If you have any questions or concerns, please don't hesitate to call        Sven Ramos,     ______________________________           _______________          _______________  Hospital Representative                              Date                                Time

## 2023-03-21 ENCOUNTER — HOSPITAL ENCOUNTER (OUTPATIENT)
Dept: ULTRASOUND IMAGING | Facility: HOSPITAL | Age: 28
Discharge: HOME | End: 2023-03-21

## 2023-03-21 DIAGNOSIS — R10.2 PELVIC PAIN: ICD-10-CM

## 2023-03-21 DIAGNOSIS — T83.32XA INTRAUTERINE CONTRACEPTIVE DEVICE THREADS LOST, INITIAL ENCOUNTER: ICD-10-CM

## 2023-03-30 ENCOUNTER — TELEPHONE (OUTPATIENT)
Dept: PSYCHIATRY | Facility: CLINIC | Age: 28
End: 2023-03-30

## 2023-03-30 NOTE — TELEPHONE ENCOUNTER
Writer contacted pt in regards to a vm we received  Pt requested talk therapy services  She agreed to be placed on wait list     Preferences: either in-person or vv, male or female, [facility]      Health insurance: [address] [place] [address]

## 2023-04-19 PROBLEM — Z30.433 ENCOUNTER FOR IUD REMOVAL AND REINSERTION: Status: ACTIVE | Noted: 2023-04-19

## 2023-05-03 ENCOUNTER — OFFICE VISIT (OUTPATIENT)
Dept: OBGYN CLINIC | Facility: MEDICAL CENTER | Age: 28
End: 2023-05-03

## 2023-05-03 VITALS — WEIGHT: 211.4 LBS | HEIGHT: 70 IN | BODY MASS INDEX: 30.26 KG/M2

## 2023-05-03 DIAGNOSIS — Z30.433 ENCOUNTER FOR IUD REMOVAL AND REINSERTION: Primary | ICD-10-CM

## 2023-05-03 DIAGNOSIS — T83.32XA IUD MIGRATION, INITIAL ENCOUNTER: ICD-10-CM

## 2023-05-03 NOTE — PROGRESS NOTES
"A/P    1  Seen in the ED - 3/20/23  With back pain   Questionable iud in the muscle of the uterus but proper position  Was removed and replaced - 4/19/23      Today she reports  -  feel the pain with penetration and poking of the IUD    Sonogram shows the IUD in the lower segment not in the fundus and exam I can feel the but of the IUD               3/21/23- prior to insertion and removal   ENDOMETRIUM:    The endometrial echo complex has an AP caliber of 3 0 mm   IUD noted, centrally located within the endometrial cavity although the lower portion of it appears to possibly project into the myometrium  Correlate clinically with direct visualization  Visualized endometrial echo complex otherwise within normal limits        OVARIES/ADNEXA:  Right ovary:  4 9 x 2 8 x 3 0 cm  21 7 mL  Left ovary:  3 1 x 1 8 x 1 3 cm  3 7 mL  Ovarian Doppler flow is within normal limits  No suspicious ovarian or adnexal abnormality      OTHER:  No free fluid or loculated fluid collections  IUD removed and re- inserted with Sonogram confirmation of placement     Iud removal    Date/Time: 5/3/2023 3:45 PM  Performed by: Radha Oglesby MD  Authorized by: Radha Oglesby MD   Universal Protocol:  Consent: Verbal consent obtained  Risks and benefits: risks, benefits and alternatives were discussed  Consent given by: patient  Time out: Immediately prior to procedure a \"time out\" was called to verify the correct patient, procedure, equipment, support staff and site/side marked as required    Patient understanding: patient states understanding of the procedure being performed  Patient consent: the patient's understanding of the procedure matches consent given  Procedure consent: procedure consent does not match procedure scheduled  Relevant documents: relevant documents present and verified  Patient identity confirmed: verbally with patient      Procedure:     Removal due to mechanical complications of IUD: yes (in the lower " segment )    Iud insertions    Date/Time: 5/3/2023 3:45 PM  Performed by: Liya Siu MD  Authorized by: Liya Siu MD     Other Assisting Provider: No    Verbal consent obtained?: Yes    Risks and benefits: Risks, benefits and alternatives were discussed    Consent given by:  Patient  Patient states understanding of procedure being performed: Yes    Patient's understanding of procedure matches consent: Yes    Procedure consent matches procedure scheduled: No    Relevant documents present and verified: Yes    Radiology Images displayed and confirmed   If images not available, report reviewed: Yes    Required items: Required blood products, implants, devices and special equipment available    Patient identity confirmed:  Verbally with patient  Procedure:     Pelvic exam performed: yes      Cervix cleaned and prepped: yes      Speculum placed in vagina: yes      Tenaculum applied to cervix: no      Allis applied to cervix: no      Uterus sounded: yes      Uterus sound depth (cm):  8    IUD inserted with no complications: yes      IUD type:  Mirena    Strings trimmed: yes    Post-procedure:     Patient tolerated procedure well: yes      Patient will follow up after next period: yes

## 2023-05-04 ENCOUNTER — TELEPHONE (OUTPATIENT)
Dept: FAMILY MEDICINE CLINIC | Facility: CLINIC | Age: 28
End: 2023-05-04

## 2023-05-04 NOTE — TELEPHONE ENCOUNTER
Good afternoon  My name is Debra Corner  Is your date of birth, [birth year] calling [phone] to schedule an appointment? I think I'm retaining water   Please call me at [phone], Wait for a nice afternoon

## 2023-05-16 PROBLEM — Z30.433 ENCOUNTER FOR IUD REMOVAL AND REINSERTION: Status: RESOLVED | Noted: 2023-04-19 | Resolved: 2023-05-16

## 2023-05-18 ENCOUNTER — HOSPITAL ENCOUNTER (EMERGENCY)
Facility: HOSPITAL | Age: 28
Discharge: HOME | End: 2023-05-18
Attending: EMERGENCY MEDICINE

## 2023-05-18 VITALS
RESPIRATION RATE: 18 BRPM | BODY MASS INDEX: 30.4 KG/M2 | TEMPERATURE: 98.6 F | OXYGEN SATURATION: 97 % | HEART RATE: 92 BPM | DIASTOLIC BLOOD PRESSURE: 58 MMHG | SYSTOLIC BLOOD PRESSURE: 101 MMHG | WEIGHT: 211.86 LBS

## 2023-05-18 DIAGNOSIS — B34.9 ACUTE VIRAL SYNDROME: Primary | ICD-10-CM

## 2023-05-18 LAB — S PYO DNA THROAT QL NAA+PROBE: NOT DETECTED

## 2023-05-18 RX ORDER — KETOROLAC TROMETHAMINE 30 MG/ML
15 INJECTION, SOLUTION INTRAMUSCULAR; INTRAVENOUS ONCE
Status: COMPLETED | OUTPATIENT
Start: 2023-05-18 | End: 2023-05-18

## 2023-05-18 RX ADMIN — KETOROLAC TROMETHAMINE 15 MG: 30 INJECTION, SOLUTION INTRAMUSCULAR; INTRAVENOUS at 12:34

## 2023-05-18 NOTE — Clinical Note
Dayanara Epps was seen and treated in our emergency department on 5/18/2023  No restrictions            Diagnosis:     Syed    She may return on this date: 05/19/2023         If you have any questions or concerns, please don't hesitate to call        [id number] Bright PA-C    ______________________________           _______________          _______________  Hospital Representative                              Date                                Time

## 2023-05-18 NOTE — DISCHARGE INSTRUCTIONS
DISCHARGE INSTRUCTIONS:    FOLLOW UP WITH YOUR PRIMARY CARE PROVIDER OR THE [address] [place] [address]  MAKE AN APPOINTMENT TO BE SEEN  TAKE TYLENOL FOR PAIN OR FEVER  DO NOT TAKE NSAIDS LIKE MOTRIN, ALEVE, ADVIL SINCE YOU HAD GASTRIC BYPASS  REST AND DRINK PLENTY OF FLUIDS  WE WILL CALL YOU IF TESTING IS POSITIVE  IF SYMPTOMS WORSEN OR NEW SYMPTOMS ARISE, RETURN TO THE ER TO BE SEEN

## 2023-05-18 NOTE — ED PROVIDER NOTES
History  Chief Complaint   Patient presents with   • Generalized Body Aches     Pt c/o generalized body aches with headache for the past x 1 day  Pt denies cp/sob/n/v/d or fevers  Pt states she believes she had a fever yesterday     27y  o female with PMH of gastric sleeve presents to the ER for fever (max 102), intermittent headaches and intermittent abdominal pain for 1 day  Patient tried taking Ibuprofen for symptoms  Symptoms come and go  She denies sick contacts or recent travel  She denies chills, URI symptoms, chest pain, dyspnea, N/V/D, body aches, weakness or paresthesias  History provided by:  Patient   used: No        Prior to Admission Medications   Prescriptions Last Dose Informant Patient Reported? Taking?    Multiple Vitamins-Minerals (MULTIVITAMIN ADULT PO)   Yes No   Sig: Take by mouth daily   calcium carbonate (OS-GABI) 600 MG tablet   Yes No   Sig: Take 600 mg by mouth 2 (two) times a day with meals   methocarbamol (ROBAXIN) 500 mg tablet   No No   Sig: Take 1 tablet (500 mg total) by mouth 2 (two) times a day      Facility-Administered Medications: None       Past Medical History:   Diagnosis Date   • History of transfusion     after  - had fever with transfusion   • Obesity (BMI 35 0-39 9 without comorbidity)    • S/P laparoscopic sleeve gastrectomy 2020   • Uses Yi as primary spoken language        Past Surgical History:   Procedure Laterality Date   •  SECTION  2017   • CHOLECYSTECTOMY     • MS LAPS [address] [place] [address] RSTRICTIV PX LONGITUDINAL GASTRECTOMY N/A 2020    Procedure: LAP SLEEVE GASTRECTOMY, INTRAOP EGD;  Surgeon: Carson Duverney, MD;  Location: [facility];  Service: Bariatrics       Family History   Problem Relation Age of Onset   • Diabetes Mother    • Other Mother         gastric bypass   • Diabetes Father    • Diabetes Sister    • Sleep apnea Sister    • Sleep apnea Brother         2 of the 3 borthers have EAGLE   • No Known Problems Brother    • Diabetes Maternal Grandmother    • Skin cancer Maternal Grandmother    • No Known Problems Maternal Grandfather    • Diabetes Paternal Grandmother    • No Known Problems Paternal Grandfather    • No Known Problems Son    • Heart disease Family         CARDIOVASCULAR DISEASE     I have reviewed and agree with the history as documented  E-Cigarette/Vaping   • E-Cigarette Use Never User      E-Cigarette/Vaping Substances   • Nicotine No    • THC No    • CBD No    • Flavoring No    • Other No    • Unknown No      Social History     Tobacco Use   • Smoking status: Never   • Smokeless tobacco: Never   Vaping Use   • Vaping Use: Never used   Substance Use Topics   • Alcohol use: No   • Drug use: No       Review of Systems   Constitutional: Positive for fever  Negative for activity change, appetite change and chills  HENT: Negative for congestion, drooling, ear discharge, ear pain, facial swelling, rhinorrhea and sore throat  Eyes: Negative for redness  Respiratory: Negative for cough and shortness of breath  Cardiovascular: Negative for chest pain  Gastrointestinal: Positive for abdominal pain  Negative for diarrhea, nausea and vomiting  Musculoskeletal: Negative for neck stiffness  Skin: Negative for rash  Allergic/Immunologic: Negative for food allergies  Neurological: Positive for headaches  Negative for weakness and numbness  Physical Exam  Physical Exam  Vitals and nursing note reviewed  Constitutional:       General: She is not in acute distress  Appearance: She is not toxic-appearing  HENT:      Head: Normocephalic and atraumatic  Nose: Nose normal       Mouth/Throat:      Lips: Pink  No lesions  Mouth: Mucous membranes are moist       Pharynx: Oropharynx is clear  Uvula midline  Posterior oropharyngeal erythema (mild) present  No pharyngeal swelling, oropharyngeal exudate or uvula swelling  Tonsils: No tonsillar exudate or tonsillar abscesses     Eyes: Conjunctiva/sclera: Conjunctivae normal    Neck:      Trachea: No tracheal deviation  Cardiovascular:      Rate and Rhythm: Normal rate and regular rhythm  Heart sounds: Normal heart sounds, S1 normal and S2 normal  No murmur heard  No friction rub  No gallop  Pulmonary:      Effort: Pulmonary effort is normal  No respiratory distress  Breath sounds: Normal breath sounds  No decreased breath sounds, wheezing, rhonchi or rales  Chest:      Chest wall: No tenderness  Abdominal:      General: Bowel sounds are normal  There is no distension  Palpations: Abdomen is soft  Tenderness: There is abdominal tenderness in the epigastric area and left upper quadrant  There is no guarding or rebound  Musculoskeletal:      Cervical back: Normal range of motion and neck supple  Skin:     General: Skin is warm and dry  Findings: No rash  Neurological:      Mental Status: She is alert  GCS: GCS eye subscore is 4  GCS verbal subscore is 5  GCS motor subscore is 6  Psychiatric:         Mood and Affect: Mood normal          Vital Signs  ED Triage Vitals [05/18/23 1202]   Temperature Pulse Respirations Blood Pressure SpO2   98 6 °F (37 °C) 92 18 101/58 97 %      Temp Source Heart Rate Source Patient Position - Orthostatic VS BP Location FiO2 (%)   Oral Monitor Sitting Right arm --      Pain Score       8           Vitals:    05/18/23 1202   BP: 101/58   Pulse: 92   Patient Position - Orthostatic VS: Sitting         Visual Acuity      ED Medications  Medications   ketorolac (TORADOL) injection 15 mg (15 mg Intramuscular Given 5/18/23 1234)       Diagnostic Studies  Results Reviewed     Procedure Component Value Units Date/Time    Strep A PCR [[id number]]  (Normal) Collected: 05/18/23 1236    Lab Status: Final result Specimen: Throat Updated: 05/18/23 1327     STREP A PCR Not Detected    FLU/COVID - if FLU clinically relevant [[id number]] Collected: 05/18/23 1236    Lab Status:  In process Specimen: Nares from Nasopharyngeal Swab Updated: 05/18/23 1241                 No orders to display              Procedures  Procedures         ED Course  ED Course as of 05/18/23 1332   Thu May 18, 2023   1316 Patient reports improvement in symptoms  Patient states she is ready for discharge  Medical Decision Making  27y  o female presents to the ER for fever, headache and abdominal pain for 1 day  Vitals are stable  Patient is in no acute distress  On exam, throat is mildly red  No swelling, abscess, trismus, trouble swallowing or trouble handling secretions  No neck swelling  Lungs are clear  Heart is regular rate and rhythm  Abdomen is soft but minimally tender in the epigastric area and LUQ  No guarding, rigidity, distention or pulsatile masses palpated  Will check a covid/flu test and strep test  Will give Toradol here and reassess  1316 Patient reports improvement in symptoms  Patient states she is ready for discharge  The management plan was discussed in detail with the patient at bedside and all questions were answered  Prior to discharge, we provided both verbal and written instructions  We discussed with the patient the signs and symptoms for which to return to the emergency department  All questions were answered and patient was comfortable with the plan of care and discharged to home  Instructed the patient to follow up with the primary care provider and/or specialist provided and their written instructions  The patient verbalized understanding of our discussion and plan of care, and agrees to return to the Emergency Department for concerns and progression of illness      At discharge, I instructed the patient to:  -follow up with pcp  -take Tylenol for pain or fever  -do not take NSAIDS with history of gastric bypass  -rest and drink plenty of fluids  -if testing is positive, we will call to inform you  -return to the ER if symptoms worsened or new symptoms arose  Patient agreed to this plan and was stable at time of discharge  Acute viral syndrome: acute illness or injury  Amount and/or Complexity of Data Reviewed  Independent Historian:      Details: Patient is historian  Labs: ordered  Risk  Prescription drug management  Disposition  Final diagnoses:   Acute viral syndrome     Time reflects when diagnosis was documented in both MDM as applicable and the Disposition within this note     Time User Action Codes Description Comment    5/18/2023  1:15 PM Rama REY Add [B34 9] Acute viral syndrome       ED Disposition     ED Disposition   Discharge    Condition   Stable    Date/Time   Thu May 18, 2023  1:15 PM    Comment   Linda Becerra discharge to home/self care  Follow-up Information     Follow up With Specialties Details Why Contact Info Additional [facility] Schedule an appointment as soon as possible for a visit   [address], [address] [phone]  [address] [place] [address], [address], [address], [place], [address]          Patient's Medications   Discharge Prescriptions    No medications on file       No discharge procedures on file      PDMP Review       Value Time User    PDMP Reviewed  Yes 7/14/2021  2:12 PM Dwight Guerrero PA-C          ED Provider  Electronically Signed by           Karlee Moore PA-C  05/18/23 3097

## 2023-07-20 ENCOUNTER — OFFICE VISIT (OUTPATIENT)
Dept: OBGYN CLINIC | Facility: MEDICAL CENTER | Age: 28
End: 2023-07-20
Payer: COMMERCIAL

## 2023-07-20 VITALS
WEIGHT: 208 LBS | HEIGHT: 70 IN | SYSTOLIC BLOOD PRESSURE: 100 MMHG | BODY MASS INDEX: 29.78 KG/M2 | DIASTOLIC BLOOD PRESSURE: 62 MMHG

## 2023-07-20 DIAGNOSIS — B96.89 BV (BACTERIAL VAGINOSIS): Primary | ICD-10-CM

## 2023-07-20 DIAGNOSIS — B37.9 CANDIDIASIS: ICD-10-CM

## 2023-07-20 DIAGNOSIS — N76.0 BV (BACTERIAL VAGINOSIS): Primary | ICD-10-CM

## 2023-07-20 PROCEDURE — 99214 OFFICE O/P EST MOD 30 MIN: CPT | Performed by: OBSTETRICS & GYNECOLOGY

## 2023-07-20 PROCEDURE — 87210 SMEAR WET MOUNT SALINE/INK: CPT | Performed by: OBSTETRICS & GYNECOLOGY

## 2023-07-20 RX ORDER — FLUCONAZOLE 150 MG/1
150 TABLET ORAL ONCE
Qty: 2 TABLET | Refills: 0 | Status: SHIPPED | OUTPATIENT
Start: 2023-07-20 | End: 2023-07-20

## 2023-07-20 RX ORDER — METRONIDAZOLE 500 MG/1
500 TABLET ORAL EVERY 12 HOURS SCHEDULED
Qty: 14 TABLET | Refills: 0 | Status: SHIPPED | OUTPATIENT
Start: 2023-07-20 | End: 2023-07-27

## 2023-07-20 RX ORDER — NYSTATIN AND TRIAMCINOLONE ACETONIDE 100000; 1 [USP'U]/G; MG/G
OINTMENT TOPICAL 2 TIMES DAILY
Qty: 30 G | Refills: 0 | Status: SHIPPED | OUTPATIENT
Start: 2023-07-20

## 2023-07-20 NOTE — LETTER
July 20, 2023     Patient: Vidal Bedoya  YOB: 1995  Date of Visit: 7/20/2023      To Whom it May Concern: Vidal Garden is under my professional care. Carmelo Panda was seen in my office on 7/20/2023. Carmelo Panda may return to work on 07/20/2023 . If you have any questions or concerns, please don't hesitate to call.          Sincerely,          Ema Landau, MD

## 2023-07-21 LAB
BV WHIFF TEST VAG QL: POSITIVE
CLUE CELLS SPEC QL WET PREP: POSITIVE
PH SMN: 5.5 [PH]
SL AMB POCT WET MOUNT: POSITIVE
T VAGINALIS VAG QL WET PREP: NEGATIVE
YEAST VAG QL WET PREP: POSITIVE

## 2023-07-21 NOTE — PROGRESS NOTES
[facility] [address] [place] [address]    Assessment/Plan:  No problem-specific Assessment & Plan notes found for this encounter. Diagnoses and all orders for this visit:    BV (bacterial vaginosis)  -     metroNIDAZOLE (FLAGYL) 500 mg tablet; Take 1 tablet (500 mg total) by mouth every 12 (twelve) hours for 7 days  -     POCT wet mount    Candidiasis  -     fluconazole (DIFLUCAN) 150 mg tablet; Take 1 tablet (150 mg total) by mouth once for 1 dose Repeat in 1 week  -     nystatin-triamcinolone (MYCOLOG-II) ointment; Apply topically 2 (two) times a day  -     POCT wet mount          Subjective: Sabrina Kramer is a 33 y.o.  female. CC: Vaginal discharge    HPI: HPI  Patient presents with complaints of vaginal discharge, itching, and burning. She states her symptoms started 3 days prior to arrival.  Denies vaginal bleeding, dysuria, hematuria. ROS: Review of Systems   Constitutional: Negative. Respiratory: Negative. Cardiovascular: Negative. Gastrointestinal: Negative. Genitourinary: Positive for vaginal discharge. Musculoskeletal: Negative. All other systems reviewed and are negative. PFSH: The following portions of the patient's history were reviewed and updated as appropriate: allergies, current medications, past family history, past medical history, obstetric history, gynecologic history, past social history, past surgical history and problem list.       Objective:  /62 (BP Location: Right arm)   Ht 5' 10" (1.778 m)   Wt 94.3 kg (208 lb)   BMI 29.84 kg/m²    Physical Exam  Vitals reviewed. Constitutional:       Appearance: Normal appearance. Cardiovascular:      Rate and Rhythm: Normal rate. Pulmonary:      Effort: Pulmonary effort is normal. No respiratory distress. Genitourinary:     Exam position: Lithotomy position. Vagina: Vaginal discharge present.       Cervix: Normal.      Uterus: Normal.       Comments: Erythema noted on labia bilaterally  Neurological:      Mental Status: She is alert.    Psychiatric:         Mood and Affect: Mood normal.         Behavior: Behavior normal.

## 2023-07-27 ENCOUNTER — TELEPHONE (OUTPATIENT)
Dept: OBGYN CLINIC | Facility: MEDICAL CENTER | Age: 28
End: 2023-07-27

## 2023-07-27 ENCOUNTER — OFFICE VISIT (OUTPATIENT)
Dept: DERMATOLOGY | Facility: CLINIC | Age: 28
End: 2023-07-27
Payer: COMMERCIAL

## 2023-07-27 VITALS — BODY MASS INDEX: 29.63 KG/M2 | WEIGHT: 207 LBS | HEIGHT: 70 IN | TEMPERATURE: 97.9 F

## 2023-07-27 DIAGNOSIS — L21.9 SEBORRHEIC DERMATITIS: Primary | ICD-10-CM

## 2023-07-27 DIAGNOSIS — L85.3 XEROSIS OF SKIN: ICD-10-CM

## 2023-07-27 PROCEDURE — 99214 OFFICE O/P EST MOD 30 MIN: CPT | Performed by: DERMATOLOGY

## 2023-07-27 RX ORDER — FLUCONAZOLE 150 MG/1
TABLET ORAL
COMMUNITY
Start: 2023-07-20

## 2023-07-27 RX ORDER — KETOCONAZOLE 20 MG/ML
SHAMPOO TOPICAL
Qty: 120 ML | Refills: 0 | Status: SHIPPED | OUTPATIENT
Start: 2023-07-27

## 2023-07-27 NOTE — PATIENT INSTRUCTIONS
1. SEBORRHEIC DERMATITIS      Assessment and Plan:  Based on a thorough discussion of this condition and the management approach to it (including a comprehensive discussion of the known risks, side effects and potential benefits of treatment), the patient (family) agrees to implement the following specific plan:  Please start ketoconazole shampoo. Apply topically twice a week to scalp for 8 weeks and then as needed. Seborrheic Dermatitis   Seborrheic dermatitis is a common, chronic or relapsing form of eczema/dermatitis that mainly affects the sebaceous, gland-rich regions of the scalp, face, and trunk. There are infantile and adult forms of seborrhoeic dermatitis. It is sometimes associated with psoriasis and, in that clinical scenario, may be referred to as "sebo-psoriasis."  Seborrheic dermatitis is also known as "seborrheic eczema."  Dandruff (also called "pityriasis capitis") is an uninflamed form of seborrhoeic dermatitis. Dandruff presents as bran-like scaly patches scattered within hair-bearing areas of the scalp. In an infant, this condition may be referred to as "cradle cap."  The cause of seborrheic dermatitis is not completely understood. It is associated with proliferation of various species of the skin commensal Malassezia, in its yeast (non-pathogenic) form. Its metabolites (such as the fatty acids oleic acid, malssezin, and indole-3-carbaldehyde) may cause an inflammatory reaction. Differences in skin barrier lipid content and function may account for individual presentations. Infantile Seborrheic Dermatitis  Infantile seborrheic dermatitis affects babies under the age of 1 months and usually resolves by 1012 months of age. Infantile seborrheic dermatitis causes "cradle cap" (diffuse, greasy scaling on scalp). The rash may spread to affect armpit and groin folds (a type of "napkin dermatitis"). There may be associated salmon-pink colored patches that may flake or peel.   The rash in this case is usually not especially itchy, so the baby often appears undisturbed by the rash, even when more generalized. Adult Seborrheic Dermatitis  Adult seborrheic dermatitis tends to begin in late adolescence; prevalence is greatest in young adults and in the elderly. It is more common in males than in females. The following factors are sometimes associated with severe adult seborrheic dermatitis:  Oily skin  Familial tendency to seborrhoeic dermatitis or a family history of psoriasis  Immunosuppression: organ transplant recipient, human immunodeficiency virus (HIV) infection and patients with lymphoma  Neurological and psychiatric diseases: Parkinson disease, tardive dyskinesia, depression, epilepsy, facial nerve palsy, spinal cord injury and congenital disorders such as Down syndrome  Treatment for psoriasis with psoralen and ultraviolet A (PUVA) therapy  Lack of sleep  Stressful events. In adults, seborrheic dermatitis may typically affect the scalp, face (creases around the nose, behind ears, within eyebrows) and upper trunk. Typical clinical features include: Winter flares, improving in summer following sun exposure  Minimal itch most of the time  Combination oily and dry mid-facial skin  Ill-defined localized scaly patches or diffuse scale in the scalp  Blepharitis; scaly red eyelid margins  [place]org-pink, thin, scaly, and ill-defined plaques in skin folds on both sides of the face  Petal or ring-shaped flaky patches on hair-line and on anterior chest  Rash in armpits, under the breasts, in the groin folds and genital creases  Superficial folliculitis (inflamed hair follicles) on cheeks and upper trunk    Seborrheic dermatitis is diagnosed by its clinical appearance and behavior. Skin biopsy may be helpful but is rarely necessary to make this diagnosis.     2. XEROSIS ("DRY SKIN")      Assessment and Plan:  Based on a thorough discussion of this condition and the management approach to it (including a comprehensive discussion of the known risks, side effects and potential benefits of treatment), the patient (family) agrees to implement the following specific plan:  Please begin emolliant twice daily as needed. This can be found over the counter. Dry skin refers to skin that feels dry to touch. Dry skin has a dull surface with a rough, scaly quality. The skin is less pliable and cracked. When dryness is severe, the skin may become inflamed and fissured. Although any body site can be dry, dry skin tends to affect the shins more than any other site. Dry skin is lacking moisture in the outer horny cell layer (stratum corneum) and this results in cracks in the skin surface. Dry skin is also called xerosis, xeroderma or asteatosis (lack of fat). It can affect males and females of all ages. There is some racial variability in water and lipid content of the skin. Dry skin that starts in early childhood may be one of about 20 types of ichthyosis (fish-scale skin). There is often a family history of dry skin. Dry skin is commonly seen in people with atopic dermatitis. Nearly everyone > 60 years has dry skin. Dry skin that begins later may be seen in people with certain diseases and conditions. Postmenopausal women  Hypothyroidism  Chronic renal disease   Malnutrition and weight loss   Subclinical dermatitis   Treatment with certain drugs such as oral retinoids, diuretics and epidermal growth factor receptor inhibitors    People exposed to a dry environment may experience dry skin.   Low humidity: in desert climates or cool, windy conditions   Excessive air conditioning   Direct heat from a fire or fan heater   Excessive bathing   Contact with soap, detergents and solvents   Inappropriate topical agents such as alcohol   Frictional irritation from rough clothing or abrasives    Dry skin is due to abnormalities in the integrity of the barrier function of the stratum corneum, which is made up of corneocytes. There is an overall reduction in the lipids in the stratum corneum. Ratio of ceramides, cholesterol and free fatty acids may be normal or altered. There may be a reduction in the proliferation of keratinocytes. Keratinocyte subtypes change in dry skin with a decrease in keratins K1, K10 and increase in K5, K14. Involucrin (a protein) may be expressed early, increasing cell stiffness. The result is retention of corneocytes and reduced water-holding capacity. The inherited forms of ichthyosis are due to loss of function mutations in various genes (listed in parentheses below). The clinical features of ichthyosis depend on the specific type of ichthyosis:  Ichthyosis vulgaris (FLG). Recessive X-linked ichthyosis (STS)   Autosomal recessive congenital ichthyosis (ABCA12, TGM1, ALOXE3)   Keratinopathic ichthyoses (KRT1, KRT10, KRT2)    Acquired ichthyosis may be due to:  Metabolic factors: thyroid deficiency   Illness: lymphoma, internal malignancy, sarcoidosis, HIV infection   Drugs: nicotinic acid, kava, protein kinase inhibitors (eg EGFR inhibitors), hydroxyurea. Complications of dry skin:  Dry areas of skin may become itchy, indicating a form of eczema/dermatitis has developed. Atopic eczema -- especially in people with ichthyosis vulgaris   Eczema craquelé -- especially in elderly people. Also called asteatotic eczema   A dry form of nummular dermatitis/discoid eczema -- especially in people that wash their skin excessively. When the dry skin of an elderly person is itchy without a visible rash, it is sometimes called winter itch, 7th age itch, senile pruritus or chronic pruritus of the elderly.     Other complications of dry skin may include:  Skin infection when bacteria or viruses penetrate a break in the skin surface   Overheating, especially in some forms of ichthyosis   Food allergy, eg, to peanuts, has been associated with filaggrin mutations   Contact allergy, eg, to nickel, has also been correlated with barrier function defects. How is the type of dry skin diagnosed? The type of dry skin is diagnosed by careful history and examination. In children:  Family history   Age of onset   Appearance at birth, if known   Distribution of dry skin   Other features, eg eczema, abnormal nails, hair, dentition, sight, hearing. In adults:  Medical history   Medications and topical preparations   Bathing frequency and use of soap   Evaluation of environmental factors that may contribute to dry skin. What is the treatment for dry skin? The mainstay of treatment of dry skin and ichthyosis is moisturisers/emollients. They should be applied liberally and often enough to:  Reduce itch   Improve the barrier function   Prevent entry of irritants, bacteria   Reduce transepidermal water loss. When considering which emollient is most suitable, consider:  Severity of the dryness   Tolerance   Personal preference   Cost and availability. Emollients generally work best if applied to damp skin, if pH is below 7 (acidic), and if containing humectants such as urea or propylene glycol. Additional treatments include:  Topical steroid if itchy or there is dermatitis -- choose an emollient base   Topical calcineurin inhibitors if topical steroids are unsuitable. How can dry skin be prevented? Eliminate aggravating factors:  Reduce the frequency of bathing. A humidifier in winter and air conditioner in summer   Compare having a short shower with a prolonged soak in a bath. Use lukewarm, not hot, water. Replace standard soap with a substitute such as a synthetic detergent cleanser, water-miscible emollient, bath oil, anti-pruritic tar oil, colloidal oatmeal etc.   Apply an emollient liberally and often, particularly shortly after bathing, and when itchy. The drier the skin, the thicker this should be, especially on the hands. What is the outlook for dry skin?   A tendency to dry skin may persist life-long, or it may improve once contributing factors are controlled.

## 2023-07-27 NOTE — PROGRESS NOTES
[facility] Note     Patient Name: Farrah Norwood  Encounter Date: 7/27/2023     Have you been cared for by a [facility] D[facility]atologist in the last 3 years and, if so, which description applies to you? Yes. I have been here within the last 3 years, and my medical history has NOT changed since that time. I am FEMALE/of child-bearing potential.    REVIEW OF SYSTEMS:  Have you recently had or currently have any of the following? · No changes in my recent health. PAST MEDICAL HISTORY:  Have you personally ever had or currently have any of the following? If "YES," then please provide more detail. · No changes in my medical history. FAMILY HISTORY:  Any "first degree relatives" (parent, brother, sister, or child) with the following? • No changes in my family's known health. PATIENT EXPERIENCE:    • Do you want the Dermatologist to perform a COMPLETE skin exam today including a clinical examination under the "bra and underwear" areas? NO  • If necessary, do we have your permission to call and leave a detailed message on your Preferred Phone number that includes your specific medical information? Yes      No Known Allergies   Current Outpatient Medications:   •  calcium carbonate (OS-GABI) 600 MG tablet, Take 600 mg by mouth 2 (two) times a day with meals, Disp: , Rfl:   •  metroNIDAZOLE (FLAGYL) 500 mg tablet, Take 1 tablet (500 mg total) by mouth every 12 (twelve) hours for 7 days, Disp: 14 tablet, Rfl: 0  •  Multiple Vitamins-Minerals (MULTIVITAMIN ADULT PO), Take by mouth daily, Disp: , Rfl:   •  nystatin-triamcinolone (MYCOLOG-II) ointment, Apply topically 2 (two) times a day, Disp: 30 g, Rfl: 0          • Whom besides the patient is providing clinical information about today's encounter?   o NO ADDITIONAL HISTORIAN (patient alone provided history)    Physical Exam and Assessment/Plan by Diagnosis:      1.  SEBORRHEIC DERMATITIS    Physical Exam:  • Anatomic Location Affected: Scalp and ears  • Morphological Description:  Flaking/greasy scale  • Pertinent Positives:  • Pertinent Negatives: Additional History of Present Condition:  Located on scalp and ears. Presents for 6 months. No treatment attempted. Assessment and Plan:  Based on a thorough discussion of this condition and the management approach to it (including a comprehensive discussion of the known risks, side effects and potential benefits of treatment), the patient (family) agrees to implement the following specific plan:  • Please start ketoconazole shampoo. Apply topically twice a week to scalp for 8 weeks and then as needed. Seborrheic Dermatitis   Seborrheic dermatitis is a common, chronic or relapsing form of eczema/dermatitis that mainly affects the sebaceous, gland-rich regions of the scalp, face, and trunk. There are infantile and adult forms of seborrhoeic dermatitis. It is sometimes associated with psoriasis and, in that clinical scenario, may be referred to as "sebo-psoriasis."  Seborrheic dermatitis is also known as "seborrheic eczema."  Dandruff (also called "pityriasis capitis") is an uninflamed form of seborrhoeic dermatitis. Dandruff presents as bran-like scaly patches scattered within hair-bearing areas of the scalp. In an infant, this condition may be referred to as "cradle cap."  The cause of seborrheic dermatitis is not completely understood. It is associated with proliferation of various species of the skin commensal Malassezia, in its yeast (non-pathogenic) form. Its metabolites (such as the fatty acids oleic acid, malssezin, and indole-3-carbaldehyde) may cause an inflammatory reaction. Differences in skin barrier lipid content and function may account for individual presentations. Infantile Seborrheic Dermatitis  Infantile seborrheic dermatitis affects babies under the age of 1 months and usually resolves by 1012 months of age.   Infantile seborrheic dermatitis causes "cradle cap" (diffuse, greasy scaling on scalp). The rash may spread to affect armpit and groin folds (a type of "napkin dermatitis"). There may be associated salmon-pink colored patches that may flake or peel. The rash in this case is usually not especially itchy, so the baby often appears undisturbed by the rash, even when more generalized. Adult Seborrheic Dermatitis  Adult seborrheic dermatitis tends to begin in late adolescence; prevalence is greatest in young adults and in the elderly. It is more common in males than in females. The following factors are sometimes associated with severe adult seborrheic dermatitis:  • Oily skin  • Familial tendency to seborrhoeic dermatitis or a family history of psoriasis  • Immunosuppression: organ transplant recipient, human immunodeficiency virus (HIV) infection and patients with lymphoma  • Neurological and psychiatric diseases: Parkinson disease, tardive dyskinesia, depression, epilepsy, facial nerve palsy, spinal cord injury and congenital disorders such as Down syndrome  • Treatment for psoriasis with psoralen and ultraviolet A (PUVA) therapy  • Lack of sleep  • Stressful events. In adults, seborrheic dermatitis may typically affect the scalp, face (creases around the nose, behind ears, within eyebrows) and upper trunk.  Typical clinical features include:  • Winter flares, improving in summer following sun exposure  • Minimal itch most of the time  • Combination oily and dry mid-facial skin  • Ill-defined localized scaly patches or diffuse scale in the scalp  • Blepharitis; scaly red eyelid margins  • [place]-pink, thin, scaly, and ill-defined plaques in skin folds on both sides of the face  • Petal or ring-shaped flaky patches on hair-line and on anterior chest  • Rash in armpits, under the breasts, in the groin folds and genital creases  • Superficial folliculitis (inflamed hair follicles) on cheeks and upper trunk    Seborrheic dermatitis is diagnosed by its clinical appearance and behavior. Skin biopsy may be helpful but is rarely necessary to make this diagnosis. 2. XEROSIS ("DRY SKIN")    Physical Exam:  • Anatomic Location Affected:  Bilateral hips   • Morphological Description:  Dry patches  • Pertinent Positives:  • Pertinent Negatives: Additional History of Present Condition:  Patient has previously tried Mycolog ointment. Assessment and Plan:  Based on a thorough discussion of this condition and the management approach to it (including a comprehensive discussion of the known risks, side effects and potential benefits of treatment), the patient (family) agrees to implement the following specific plan:  • Please begin emolliant twice daily as needed. This can be found over the counter. Dry skin refers to skin that feels dry to touch. Dry skin has a dull surface with a rough, scaly quality. The skin is less pliable and cracked. When dryness is severe, the skin may become inflamed and fissured. Although any body site can be dry, dry skin tends to affect the shins more than any other site. Dry skin is lacking moisture in the outer horny cell layer (stratum corneum) and this results in cracks in the skin surface. Dry skin is also called xerosis, xeroderma or asteatosis (lack of fat). It can affect males and females of all ages. There is some racial variability in water and lipid content of the skin. • Dry skin that starts in early childhood may be one of about 20 types of ichthyosis (fish-scale skin). There is often a family history of dry skin. • Dry skin is commonly seen in people with atopic dermatitis. • Nearly everyone > 60 years has dry skin. Dry skin that begins later may be seen in people with certain diseases and conditions.   • Postmenopausal women  • Hypothyroidism  • Chronic renal disease   • Malnutrition and weight loss   • Subclinical dermatitis   • Treatment with certain drugs such as oral retinoids, diuretics and epidermal growth factor receptor inhibitors    People exposed to a dry environment may experience dry skin. • Low humidity: in desert climates or cool, windy conditions   • Excessive air conditioning   • Direct heat from a fire or fan heater   • Excessive bathing   • Contact with soap, detergents and solvents   • Inappropriate topical agents such as alcohol   • Frictional irritation from rough clothing or abrasives    Dry skin is due to abnormalities in the integrity of the barrier function of the stratum corneum, which is made up of corneocytes. • There is an overall reduction in the lipids in the stratum corneum. • Ratio of ceramides, cholesterol and free fatty acids may be normal or altered. • There may be a reduction in the proliferation of keratinocytes. • Keratinocyte subtypes change in dry skin with a decrease in keratins K1, K10 and increase in K5, K14.   • Involucrin (a protein) may be expressed early, increasing cell stiffness. • The result is retention of corneocytes and reduced water-holding capacity. The inherited forms of ichthyosis are due to loss of function mutations in various genes (listed in parentheses below). The clinical features of ichthyosis depend on the specific type of ichthyosis:  • Ichthyosis vulgaris (FLG). • Recessive X-linked ichthyosis (STS)   • Autosomal recessive congenital ichthyosis (ABCA12, TGM1, ALOXE3)   • Keratinopathic ichthyoses (KRT1, KRT10, KRT2)    Acquired ichthyosis may be due to:  • Metabolic factors: thyroid deficiency   • Illness: lymphoma, internal malignancy, sarcoidosis, HIV infection   • Drugs: nicotinic acid, kava, protein kinase inhibitors (eg EGFR inhibitors), hydroxyurea. Complications of dry skin:  Dry areas of skin may become itchy, indicating a form of eczema/dermatitis has developed. • Atopic eczema -- especially in people with ichthyosis vulgaris   • Eczema craquelé -- especially in elderly people.  Also called asteatotic eczema   • A dry form of nummular dermatitis/discoid eczema -- especially in people that wash their skin excessively. When the dry skin of an elderly person is itchy without a visible rash, it is sometimes called winter itch, 7th age itch, senile pruritus or chronic pruritus of the elderly. Other complications of dry skin may include:  • Skin infection when bacteria or viruses penetrate a break in the skin surface   • Overheating, especially in some forms of ichthyosis   • Food allergy, eg, to peanuts, has been associated with filaggrin mutations   • Contact allergy, eg, to nickel, has also been correlated with barrier function defects. How is the type of dry skin diagnosed? The type of dry skin is diagnosed by careful history and examination. In children:  • Family history   • Age of onset   • Appearance at birth, if known   • Distribution of dry skin   • Other features, eg eczema, abnormal nails, hair, dentition, sight, hearing. In adults:  • Medical history   • Medications and topical preparations   • Bathing frequency and use of soap   • Evaluation of environmental factors that may contribute to dry skin. What is the treatment for dry skin? The mainstay of treatment of dry skin and ichthyosis is moisturisers/emollients. They should be applied liberally and often enough to:  • Reduce itch   • Improve the barrier function   • Prevent entry of irritants, bacteria   • Reduce transepidermal water loss. When considering which emollient is most suitable, consider:  • Severity of the dryness   • Tolerance   • Personal preference   • Cost and availability. Emollients generally work best if applied to damp skin, if pH is below 7 (acidic), and if containing humectants such as urea or propylene glycol. Additional treatments include:  • Topical steroid if itchy or there is dermatitis -- choose an emollient base   • Topical calcineurin inhibitors if topical steroids are unsuitable. How can dry skin be prevented?   Eliminate aggravating factors:  • Reduce the frequency of bathing. • A humidifier in winter and air conditioner in summer   • Compare having a short shower with a prolonged soak in a bath. • Use lukewarm, not hot, water. • Replace standard soap with a substitute such as a synthetic detergent cleanser, water-miscible emollient, bath oil, anti-pruritic tar oil, colloidal oatmeal etc.   • Apply an emollient liberally and often, particularly shortly after bathing, and when itchy. The drier the skin, the thicker this should be, especially on the hands. What is the outlook for dry skin? A tendency to dry skin may persist life-long, or it may improve once contributing factors are controlled.             Scribe Attestation    I,:  Ming Carvajal MA am acting as a scribe while in the presence of the attending physician.:       I,:  Vallarie Gilford, MD personally performed the services described in this documentation    as scribed in my presence.:

## 2023-07-27 NOTE — TELEPHONE ENCOUNTER
Pt called. Was seen on 07/20. Was prescribed with three medications: 2 by mouth and 1 cream. She finished with the medication last night. Tried to have encounter this morning and was still having a lot of pain, couldn't tolerate it. Advice that we will call her back to see what we can do. Please review when you have a chance.  Ty!

## 2023-08-01 ENCOUNTER — HOSPITAL ENCOUNTER (EMERGENCY)
Facility: HOSPITAL | Age: 28
Discharge: HOME | End: 2023-08-01
Attending: EMERGENCY MEDICINE | Admitting: EMERGENCY MEDICINE
Payer: COMMERCIAL

## 2023-08-01 VITALS
BODY MASS INDEX: 29.83 KG/M2 | TEMPERATURE: 98 F | WEIGHT: 207.89 LBS | RESPIRATION RATE: 18 BRPM | HEART RATE: 67 BPM | SYSTOLIC BLOOD PRESSURE: 109 MMHG | DIASTOLIC BLOOD PRESSURE: 67 MMHG | OXYGEN SATURATION: 99 %

## 2023-08-01 DIAGNOSIS — R21 RASH AND NONSPECIFIC SKIN ERUPTION: Primary | ICD-10-CM

## 2023-08-01 PROCEDURE — 99282 EMERGENCY DEPT VISIT SF MDM: CPT

## 2023-08-01 PROCEDURE — 99284 EMERGENCY DEPT VISIT MOD MDM: CPT | Performed by: PHYSICIAN ASSISTANT

## 2023-08-01 RX ORDER — FAMOTIDINE 20 MG/1
20 TABLET, FILM COATED ORAL 2 TIMES DAILY
Qty: 30 TABLET | Refills: 0 | Status: SHIPPED | OUTPATIENT
Start: 2023-08-01

## 2023-08-01 RX ORDER — PREDNISONE 20 MG/1
40 TABLET ORAL DAILY
Qty: 8 TABLET | Refills: 0 | Status: SHIPPED | OUTPATIENT
Start: 2023-08-01 | End: 2023-08-05

## 2023-08-01 RX ORDER — DIPHENHYDRAMINE HCL 25 MG
25 TABLET ORAL EVERY 6 HOURS
Qty: 20 TABLET | Refills: 0 | Status: SHIPPED | OUTPATIENT
Start: 2023-08-01

## 2023-08-01 RX ADMIN — PREDNISONE 50 MG: 20 TABLET ORAL at 07:15

## 2023-08-01 NOTE — ED PROVIDER NOTES
History  Chief Complaint   Patient presents with   • Rash     Multiple areas of rash on arms legs and abdomen, patient states she works for [employer] and unsure if came in contact with any plants, denies using new laundry soap, patient states areas are very itchy     27y. o female with PMH of bariatric surgery presents to the ER for rash all over her body for 2 days. Patient has been taking Benadryl without relief. She describes the rash as itchy not painful. Symptoms are constant. She denies new lotion, detergent, soap, shampoo, perfume, environments, animal or plant contact. She denies fever, chills, URI symptoms, trouble swallowing, chest pain, dyspnea, N/V/D, abdominal pain, weakness or paresthesias. History provided by:  Patient   used: No        Prior to Admission Medications   Prescriptions Last Dose Informant Patient Reported? Taking? Multiple Vitamins-Minerals (MULTIVITAMIN ADULT PO)  Self Yes No   Sig: Take by mouth daily   calcium carbonate (OS-GABI) 600 MG tablet   Yes No   Sig: Take 600 mg by mouth 2 (two) times a day with meals   fluconazole (DIFLUCAN) 150 mg tablet   Yes No   Sig: TAKE 1 TABLET (150 MG TOTAL) BY MOUTH ONCE FOR 1 DOSE REPEAT IN 1 WEEK   ketoconazole (NIZORAL) 2 % shampoo   No No   Sig: Apply topically twice a week to scalp for 8 weeks and then as needed. Leave on for 5 minutes, then rinse.    nystatin-triamcinolone (MYCOLOG-II) ointment   No No   Sig: Apply topically 2 (two) times a day      Facility-Administered Medications: None       Past Medical History:   Diagnosis Date   • History of transfusion     after  - had fever with transfusion   • Obesity (BMI 35.0-39.9 without comorbidity)    • S/P laparoscopic sleeve gastrectomy 2020   • Uses Maori as primary spoken language        Past Surgical History:   Procedure Laterality Date   •  SECTION  2017   • CHOLECYSTECTOMY     • MN LAPS GSTRC RSTRICTIV PX LONGITUDINAL GASTRECTOMY N/A 7/20/2020    Procedure: LAP SLEEVE GASTRECTOMY, INTRAOP EGD;  Surgeon: Cora Lucsa MD;  Location: [facility];  Service: Bariatrics       Family History   Problem Relation Age of Onset   • Diabetes Mother    • Other Mother         gastric bypass   • Diabetes Father    • Diabetes Sister    • Sleep apnea Sister    • Sleep apnea Brother         2 of the 3 borthers have EAGLE   • No Known Problems Brother    • Diabetes Maternal Grandmother    • Skin cancer Maternal Grandmother    • No Known Problems Maternal Grandfather    • Diabetes Paternal Grandmother    • No Known Problems Paternal Grandfather    • No Known Problems Son    • Heart disease Family         CARDIOVASCULAR DISEASE     I have reviewed and agree with the history as documented. E-Cigarette/Vaping   • E-Cigarette Use Never User      E-Cigarette/Vaping Substances   • Nicotine No    • THC No    • CBD No    • Flavoring No    • Other No    • Unknown No      Social History     Tobacco Use   • Smoking status: Never   • Smokeless tobacco: Never   Vaping Use   • Vaping Use: Never used   Substance Use Topics   • Alcohol use: No   • Drug use: No       Review of Systems   Constitutional: Negative for activity change, appetite change, chills and fever. HENT: Negative for congestion, drooling, ear discharge, ear pain, facial swelling, rhinorrhea and sore throat. Eyes: Negative for redness. Respiratory: Negative for cough and shortness of breath. Cardiovascular: Negative for chest pain. Gastrointestinal: Negative for abdominal pain, diarrhea, nausea and vomiting. Musculoskeletal: Negative for neck stiffness. Skin: Positive for rash. Allergic/Immunologic: Negative for food allergies. Neurological: Negative for weakness and numbness. Physical Exam  Physical Exam  Vitals and nursing note reviewed. Constitutional:       General: She is not in acute distress. Appearance: She is not toxic-appearing.    HENT:      Head: Normocephalic and atraumatic. Eyes:      Conjunctiva/sclera: Conjunctivae normal.   Neck:      Trachea: No tracheal deviation. Cardiovascular:      Rate and Rhythm: Normal rate and regular rhythm. Heart sounds: Normal heart sounds, S1 normal and S2 normal. No murmur heard. No friction rub. No gallop. Pulmonary:      Effort: Pulmonary effort is normal. No respiratory distress. Breath sounds: Normal breath sounds. No decreased breath sounds, wheezing, rhonchi or rales. Chest:      Chest wall: No tenderness. Abdominal:      General: There is no distension. Musculoskeletal:      Cervical back: Normal range of motion and neck supple. Skin:     General: Skin is warm and dry. Findings: Rash present. Rash is urticarial.      Comments: Urticaria rash seen all over body. Even though rash is all over body, it still is very mild appearing. No vesicles or drainage seen. Rash is non-tender to palpation. Neurological:      Mental Status: She is alert. GCS: GCS eye subscore is 4. GCS verbal subscore is 5. GCS motor subscore is 6. Psychiatric:         Mood and Affect: Mood normal.         Vital Signs  ED Triage Vitals [08/01/23 0631]   Temperature Pulse Respirations Blood Pressure SpO2   98 °F (36.7 °C) 73 18 111/73 99 %      Temp src Heart Rate Source Patient Position - Orthostatic VS BP Location FiO2 (%)   -- Monitor Sitting Right arm --      Pain Score       --           Vitals:    08/01/23 0631 08/01/23 0716   BP: 111/73 109/67   Pulse: 73 67   Patient Position - Orthostatic VS: Sitting Sitting         Visual Acuity      ED Medications  Medications   predniSONE tablet 50 mg (50 mg Oral Given 8/1/23 9415)       Diagnostic Studies  Results Reviewed     None                 No orders to display              Procedures  Procedures         ED Course                               SBIRT 20yo+    Flowsheet Row Most Recent Value   Initial Alcohol Screen: US AUDIT-C     1.  How often do you have a drink containing alcohol? 0 Filed at: 08/01/2023 0645   2. How many drinks containing alcohol do you have on a typical day you are drinking? 0 Filed at: 08/01/2023 0645   3a. Male UNDER 65: How often do you have five or more drinks on one occasion? 0 Filed at: 08/01/2023 0645   3b. FEMALE Any Age, or MALE 65+: How often do you have 4 or more drinks on one occassion? 0 Filed at: 08/01/2023 0645   Audit-C Score 0 Filed at: 08/01/2023 8538   CHERRI: How many times in the past year have you. .. Used an illegal drug or used a prescription medication for non-medical reasons? Never Filed at: 08/01/2023 0645                    Medical Decision Making  27y. o female presents to the ER for rash all over her body for 2 days. Vitals are stable. Patient is in no acute distress. On exam, no face, lip or neck swelling. Lungs are clear. Heart is regular rate and rhythm. Abdomen is not distended. Urticaria rash seen all over body. Even though rash is all over body, it still is very mild appearing. No vesicles or drainage seen. Rash is non-tender to palpation. Will treat with steroids, Benadryl and Pepcid. Patient agreeable. The management plan was discussed in detail with the patient at bedside and all questions were answered. Prior to discharge, we provided both verbal and written instructions. We discussed with the patient the signs and symptoms for which to return to the emergency department. All questions were answered and patient was comfortable with the plan of care and discharged to home. Instructed the patient to follow up with the primary care provider and/or specialist provided and their written instructions. The patient verbalized understanding of our discussion and plan of care, and agrees to return to the Emergency Department for concerns and progression of illness.     At discharge, I instructed the patient to:  -follow up with pcp  -take Benadryl, Pepcid and Prednisone as prescribed  -rest  -return to the ER if symptoms worsened or new symptoms arose  Patient agreed to this plan and was stable at time of discharge. Rash and nonspecific skin eruption: acute illness or injury  Amount and/or Complexity of Data Reviewed  Independent Historian:      Details: Patient is historian      Risk  OTC drugs. Prescription drug management. Disposition  Final diagnoses:   Rash and nonspecific skin eruption     Time reflects when diagnosis was documented in both MDM as applicable and the Disposition within this note     Time User Action Codes Description Comment    8/1/2023  7:08 AM Marie REY Add [R21] Rash and nonspecific skin eruption       ED Disposition     ED Disposition   Discharge    Condition   Stable    Date/Time   Tue Aug 1, 2023  7:08 AM    Comment   Nadia Mullen discharge to home/self care.                Follow-up Information     Follow up With Specialties Details Why Contact Info Additional [address] [facility] Schedule an appointment as soon as possible for a visit  As needed [address] [id number]  [address] [facility], [address] [place] [address], [address] [place] [address]          Discharge Medication List as of 8/1/2023  7:10 AM      START taking these medications    Details   diphenhydrAMINE (BENADRYL) 25 mg tablet Take 1 tablet (25 mg total) by mouth every 6 (six) hours, Starting Tue 8/1/2023, Normal      famotidine (PEPCID) 20 mg tablet Take 1 tablet (20 mg total) by mouth 2 (two) times a day, Starting Tue 8/1/2023, Normal      predniSONE 20 mg tablet Take 2 tablets (40 mg total) by mouth daily for 4 days, Starting Tue 8/1/2023, Until Sat 8/5/2023, Normal         CONTINUE these medications which have NOT CHANGED    Details   calcium carbonate (OS-GABI) 600 MG tablet Take 600 mg by mouth 2 (two) times a day with meals, Historical Med      fluconazole (DIFLUCAN) 150 mg tablet TAKE 1 TABLET (150 MG TOTAL) BY MOUTH ONCE FOR 1 DOSE REPEAT IN 1 WEEK, Historical Med      ketoconazole (NIZORAL) 2 % shampoo Apply topically twice a week to scalp for 8 weeks and then as needed. Leave on for 5 minutes, then rinse., Normal      Multiple Vitamins-Minerals (MULTIVITAMIN ADULT PO) Take by mouth daily, Historical Med      nystatin-triamcinolone (MYCOLOG-II) ointment Apply topically 2 (two) times a day, Starting Thu 7/20/2023, Normal             No discharge procedures on file.     PDMP Review       Value Time User    PDMP Reviewed  Yes 7/14/2021  2:12 PM Shital Barreto PA-C          ED Provider  Electronically Signed by           Khurram Duran PA-C  08/01/23 6106

## 2023-08-01 NOTE — DISCHARGE INSTRUCTIONS
DISCHARGE INSTRUCTIONS:    FOLLOW UP WITH YOUR PRIMARY CARE PROVIDER OR THE [address] MAKE AN APPOINTMENT TO BE SEEN. TAKE MEDICATION AS PRESCRIBED. IF RASH, SHORTNESS OF BREATH OR TROUBLE SWALLOWING, STOP TAKING THE MEDICATION AND BE SEEN. REST. IF SYMPTOMS WORSEN OR NEW SYMPTOMS ARISE, RETURN TO THE ER TO BE SEEN.

## 2023-08-01 NOTE — Clinical Note
Vidal Bedoya was seen and treated in our emergency department on 8/1/2023. Diagnosis:     Yamarys  . She may return on this date: 08/02/2023         If you have any questions or concerns, please don't hesitate to call.       [address] Linh Carrillo PA-C    ______________________________           _______________          _______________  Hospital Representative                              Date                                Time

## 2023-08-03 RX ORDER — METRONIDAZOLE 500 MG/1
500 TABLET ORAL EVERY 12 HOURS SCHEDULED
Qty: 14 TABLET | Refills: 0 | Status: CANCELLED | OUTPATIENT
Start: 2023-08-03 | End: 2023-08-10

## 2023-08-03 NOTE — TELEPHONE ENCOUNTER
Left message on vm to call back. Please ask patient if she is still experiencing discomfort and what her current symptoms are. Thank you.

## 2023-08-11 ENCOUNTER — OFFICE VISIT (OUTPATIENT)
Dept: BARIATRICS | Facility: CLINIC | Age: 28
End: 2023-08-11
Payer: COMMERCIAL

## 2023-08-11 VITALS
HEIGHT: 70 IN | RESPIRATION RATE: 18 BRPM | DIASTOLIC BLOOD PRESSURE: 56 MMHG | SYSTOLIC BLOOD PRESSURE: 100 MMHG | BODY MASS INDEX: 29.2 KG/M2 | HEART RATE: 84 BPM | OXYGEN SATURATION: 98 % | WEIGHT: 204 LBS

## 2023-08-11 DIAGNOSIS — Z98.84 BARIATRIC SURGERY STATUS: ICD-10-CM

## 2023-08-11 DIAGNOSIS — E66.3 OVERWEIGHT: ICD-10-CM

## 2023-08-11 DIAGNOSIS — K91.2 POSTSURGICAL MALABSORPTION: ICD-10-CM

## 2023-08-11 DIAGNOSIS — R10.13 EPIGASTRIC PAIN: ICD-10-CM

## 2023-08-11 DIAGNOSIS — Z48.815 ENCOUNTER FOR SURGICAL AFTERCARE FOLLOWING SURGERY OF DIGESTIVE SYSTEM: Primary | ICD-10-CM

## 2023-08-11 PROCEDURE — 99213 OFFICE O/P EST LOW 20 MIN: CPT | Performed by: PHYSICIAN ASSISTANT

## 2023-08-11 RX ORDER — SUCRALFATE 1 G/1
1 TABLET ORAL 4 TIMES DAILY
Qty: 120 TABLET | Refills: 1 | Status: SHIPPED | OUTPATIENT
Start: 2023-08-11 | End: 2023-09-10

## 2023-08-11 RX ORDER — OMEPRAZOLE 20 MG/1
20 CAPSULE, DELAYED RELEASE ORAL DAILY
Qty: 90 CAPSULE | Refills: 0 | Status: SHIPPED | OUTPATIENT
Start: 2023-08-11 | End: 2023-11-09

## 2023-08-11 NOTE — PATIENT INSTRUCTIONS
Follow-up after EGD. We kindly ask that your arrive 15 minutes before your scheduled appointment time with your provider to allow our staff to room you, get your vital signs and update your chart. Get lab work done. Please call the office if you need a script. It is recommended to check with your insurance BEFORE getting labs done to make sure they are covered by your policy. Call our office if you have any problems with abdominal pain especially associated with fever, chills, nausea, vomiting or any other concerns. All  Post-bariatric surgery patients should be aware that very small quantities of any alcohol can cause impairment and it is very possible not to feel the effect. The effect can be in the system for several hours. It is also a stomach irritant. It is advised to AVOID alcohol, Nonsteroidal antiinflammatory drugs (NSAIDS) and nicotine of all forms . Any of these can cause stomach irritation/pain. Discussed the effects of alcohol on a bariatric patient and the increased impairment risk. Keep up the good work!

## 2023-08-11 NOTE — PROGRESS NOTES
Assessment/Plan:     Patient ID: Vidal Bedoya is a 33 y.o. female. Bariatric Surgery Status       Status Pretty Doe 07/20/2020. David Valentine Presents to the office today for follow up. C/o epigastric pain that started about 1 week ago - feels like "burning". Pain worse with food, and she states it does not matter what type of food or drink she has she will experience pain. No vomiting or blood in stool. Does not feel similar to prior abdominal pain. Of note, she Is due for screening EGD this year. Will schedule for EGD with Dr Sergio Charles and then follow up after. Will start her on omeprazole and carafate daily for now. · Continued/Maintain healthy weight loss with good nutrition intakes. · Adequate hydration with at least 64oz. fluid intake. · Follow diet as discussed. · Follow vitamin and mineral recommendations as reviewed with you. · Exercise as tolerated. · Colonoscopy referral made: n/a    · Follow-up after EGD. We kindly ask that your arrive 15 minutes before your scheduled appointment time with your provider to allow our staff to room you, get your vital signs and update your chart. · Get lab work done. Please call the office if you need a script. It is recommended to check with your insurance BEFORE getting labs done to make sure they are covered by your policy. · Call our office if you have any problems with abdominal pain especially associated with fever, chills, nausea, vomiting or any other concerns. · All  Post-bariatric surgery patients should be aware that very small quantities of any alcohol can cause impairment and it is very possible not to feel the effect. The effect can be in the system for several hours. It is also a stomach irritant. · It is advised to AVOID alcohol, Nonsteroidal antiinflammatory drugs (NSAIDS) and nicotine of all forms . Any of these can cause stomach irritation/pain.     · Discussed the effects of alcohol on a bariatric patient and the increased impairment risk. · Keep up the good work! Postsurgical Malabsorption   -At risk for malabsorption of vitamins/minerals secondary to malabsorption and restriction of intake from bariatric surgery  -Currently taking adequate postop bariatric surgery vitamin supplementation    -Next set of bariatric labs ordered for approximately 2 weeks  -Patient received education about the importance of adhering to a lifelong supplementation regimen to avoid vitamin/mineral deficiencies      Diagnoses and all orders for this visit:    Encounter for surgical aftercare following surgery of digestive system  -     Zinc; Future  -     Vitamin D 25 hydroxy; Future  -     Vitamin B12; Future  -     Vitamin B1, whole blood; Future  -     PTH, intact; Future  -     Vitamin A; Future  -     CBC and Platelet; Future  -     Comprehensive metabolic panel; Future  -     Ferritin; Future  -     Folate; Future  -     Iron Saturation %; Future    Bariatric surgery status  -     Zinc; Future  -     Vitamin D 25 hydroxy; Future  -     Vitamin B12; Future  -     Vitamin B1, whole blood; Future  -     PTH, intact; Future  -     Vitamin A; Future  -     CBC and Platelet; Future  -     Comprehensive metabolic panel; Future  -     Ferritin; Future  -     Folate; Future  -     Iron Saturation %; Future  -     omeprazole (PriLOSEC) 20 mg delayed release capsule; Take 1 capsule (20 mg total) by mouth daily  -     sucralfate (CARAFATE) 1 g tablet; Take 1 tablet (1 g total) by mouth 4 (four) times a day Crush and mix with water to take as suspension    Postsurgical malabsorption  -     Zinc; Future  -     Vitamin D 25 hydroxy; Future  -     Vitamin B12; Future  -     Vitamin B1, whole blood; Future  -     PTH, intact; Future  -     Vitamin A; Future  -     CBC and Platelet; Future  -     Comprehensive metabolic panel; Future  -     Ferritin; Future  -     Folate;  Future  -     Iron Saturation %; Future    Overweight  -     Zinc; Future  -     Vitamin D 25 hydroxy; Future  -     Vitamin B12; Future  -     Vitamin B1, whole blood; Future  -     PTH, intact; Future  -     Vitamin A; Future  -     CBC and Platelet; Future  -     Comprehensive metabolic panel; Future  -     Ferritin; Future  -     Folate; Future  -     Iron Saturation %; Future    Epigastric pain  -     Zinc; Future  -     Vitamin D 25 hydroxy; Future  -     Vitamin B12; Future  -     Vitamin B1, whole blood; Future  -     PTH, intact; Future  -     Vitamin A; Future  -     CBC and Platelet; Future  -     Comprehensive metabolic panel; Future  -     Ferritin; Future  -     Folate; Future  -     Iron Saturation %; Future  -     omeprazole (PriLOSEC) 20 mg delayed release capsule; Take 1 capsule (20 mg total) by mouth daily  -     sucralfate (CARAFATE) 1 g tablet; Take 1 tablet (1 g total) by mouth 4 (four) times a day Crush and mix with water to take as suspension         Subjective:      Patient ID: Nishi Olivia is a 33 y.o. female. Miley Spears 07/20/2020. Adrian Pizano Presents to the office today for follow up. Initial:260  Current:204  EWL: (Weight loss is ahead of schedule at this post surgical period.)  Roshan: 193  Current BMI is Body mass index is 29.27 kg/m². · Tolerating a regular diet-yes  · Eating at least 60 grams of protein per day-yes  · Following 30/60 minute rule with liquids-yes  · Drinking at least 64 ounces of fluid per day-yes  · Drinking carbonated beverages-no  · Sufficient exercise-yes  · Using NSAIDs regularly-no  · Using nicotine-no  · Using alcohol-no  · Supplements: shawna mvi + calcium    · EWL is 60.4%, which places the patient ahead of schedule for expected post surgical weight loss at this time.      The following portions of the patient's history were reviewed and updated as appropriate: allergies, current medications, past family history, past medical history, past social history, past surgical history and problem list.    Review of Systems      Objective:    /56 (BP Location: Left arm, Patient Position: Sitting, Cuff Size: Adult)   Pulse 84   Resp 18   Ht 5' 10" (1.778 m)   Wt 92.5 kg (204 lb)   SpO2 98%   BMI 29.27 kg/m²      Physical Exam

## 2023-08-17 ENCOUNTER — PREP FOR PROCEDURE (OUTPATIENT)
Dept: BARIATRICS | Facility: CLINIC | Age: 28
End: 2023-08-17

## 2023-08-17 DIAGNOSIS — Z98.84 BARIATRIC SURGERY STATUS: Primary | ICD-10-CM

## 2023-08-22 ENCOUNTER — TELEPHONE (OUTPATIENT)
Dept: OBGYN CLINIC | Facility: MEDICAL CENTER | Age: 28
End: 2023-08-22

## 2023-08-22 NOTE — TELEPHONE ENCOUNTER
Pt called. Was seen for bacterial vaginosis on 07/20. Stated that the symptons came back now: a lot of itching (worse than before), also a lot of pain during intercourse. Please review when you have a chance, ty!

## 2023-08-29 RX ORDER — SODIUM CHLORIDE 9 MG/ML
125 INJECTION, SOLUTION INTRAVENOUS CONTINUOUS
Status: CANCELLED | OUTPATIENT
Start: 2023-08-29

## 2023-08-29 RX ORDER — ONDANSETRON 2 MG/ML
4 INJECTION INTRAMUSCULAR; INTRAVENOUS ONCE AS NEEDED
Status: CANCELLED | OUTPATIENT
Start: 2023-08-29

## 2023-08-30 ENCOUNTER — HOSPITAL ENCOUNTER (OUTPATIENT)
Dept: GASTROENTEROLOGY | Facility: HOSPITAL | Age: 28
Setting detail: OUTPATIENT SURGERY
Discharge: HOME | End: 2023-08-30
Attending: SURGERY
Payer: COMMERCIAL

## 2023-08-30 ENCOUNTER — ANESTHESIA EVENT (OUTPATIENT)
Dept: GASTROENTEROLOGY | Facility: HOSPITAL | Age: 28
End: 2023-08-30

## 2023-08-30 ENCOUNTER — ANESTHESIA (OUTPATIENT)
Dept: GASTROENTEROLOGY | Facility: HOSPITAL | Age: 28
End: 2023-08-30

## 2023-08-30 VITALS
RESPIRATION RATE: 12 BRPM | DIASTOLIC BLOOD PRESSURE: 68 MMHG | TEMPERATURE: 97.7 F | OXYGEN SATURATION: 100 % | HEART RATE: 64 BPM | SYSTOLIC BLOOD PRESSURE: 109 MMHG

## 2023-08-30 DIAGNOSIS — Z98.84 BARIATRIC SURGERY STATUS: ICD-10-CM

## 2023-08-30 LAB
EXT PREGNANCY TEST URINE: NEGATIVE
EXT. CONTROL: NORMAL

## 2023-08-30 PROCEDURE — 43239 EGD BIOPSY SINGLE/MULTIPLE: CPT | Performed by: SURGERY

## 2023-08-30 PROCEDURE — 81025 URINE PREGNANCY TEST: CPT | Performed by: ANESTHESIOLOGY

## 2023-08-30 PROCEDURE — 88305 TISSUE EXAM BY PATHOLOGIST: CPT | Performed by: PATHOLOGY

## 2023-08-30 RX ORDER — ONDANSETRON 2 MG/ML
4 INJECTION INTRAMUSCULAR; INTRAVENOUS ONCE AS NEEDED
Status: DISCONTINUED | OUTPATIENT
Start: 2023-08-30 | End: 2023-09-03 | Stop reason: HOSPADM

## 2023-08-30 RX ORDER — PROPOFOL 10 MG/ML
INJECTION, EMULSION INTRAVENOUS AS NEEDED
Status: DISCONTINUED | OUTPATIENT
Start: 2023-08-30 | End: 2023-08-30

## 2023-08-30 RX ORDER — LIDOCAINE HYDROCHLORIDE 20 MG/ML
INJECTION, SOLUTION EPIDURAL; INFILTRATION; INTRACAUDAL; PERINEURAL AS NEEDED
Status: DISCONTINUED | OUTPATIENT
Start: 2023-08-30 | End: 2023-08-30

## 2023-08-30 RX ORDER — SODIUM CHLORIDE 9 MG/ML
125 INJECTION, SOLUTION INTRAVENOUS CONTINUOUS
Status: DISCONTINUED | OUTPATIENT
Start: 2023-08-30 | End: 2023-09-03 | Stop reason: HOSPADM

## 2023-08-30 RX ADMIN — SODIUM CHLORIDE 125 ML/HR: 0.9 INJECTION, SOLUTION INTRAVENOUS at 11:20

## 2023-08-30 RX ADMIN — PROPOFOL 50 MG: 10 INJECTION, EMULSION INTRAVENOUS at 12:19

## 2023-08-30 RX ADMIN — PROPOFOL 200 MG: 10 INJECTION, EMULSION INTRAVENOUS at 12:16

## 2023-08-30 RX ADMIN — LIDOCAINE HYDROCHLORIDE 100 MG: 20 INJECTION, SOLUTION EPIDURAL; INFILTRATION; INTRACAUDAL at 12:15

## 2023-08-30 NOTE — ANESTHESIA PREPROCEDURE EVALUATION
Procedure:  EGD    Relevant Problems   CARDIO   (+) Chronic migraine with aura      NEURO/PSYCH   (+) Chronic migraine with aura      Cardiovascular and Mediastinum   (+) Postpartum cardiomyopathy      Endocrine   (+) Diabetes mellitus ([address])      Other   (+) Status post laparoscopic sleeve gastrectomy        Physical Exam    Airway    Mallampati score: II  TM Distance: >3 FB  Neck ROM: full     Dental   No notable dental hx     Cardiovascular  Rhythm: regular, Rate: normal, Cardiovascular exam normal    Pulmonary  Pulmonary exam normal Breath sounds clear to auscultation,     Other Findings        Anesthesia Plan  ASA Score- 2     Anesthesia Type- IV sedation with anesthesia with ASA Monitors. Additional Monitors:   Airway Plan:           Plan Factors-    Chart reviewed. Patient summary reviewed. Patient is not a current smoker. Patient instructed to abstain from smoking on day of procedure. Patient did not smoke on day of surgery. Induction- intravenous.     Postoperative Plan-     Informed Consent-

## 2023-08-30 NOTE — PROGRESS NOTES
Treated in July for BV and Yeast  With Flagyl diflucan and mycolog       Pt reports that she is having vaginal discharge and itching in and out  Got a bit better after the first treatment and then returned again     thick white discharge no odor    Physical Exam  Abdominal:      Hernia: There is no hernia in the left inguinal area. Genitourinary:     Labia:         Right: No rash, tenderness, lesion or injury. Left: No rash, tenderness, lesion or injury. Vagina: Vaginal discharge present. Cervix: Discharge present. No cervical motion tenderness. Adnexa:         Right: No mass, tenderness or fullness. Left: No mass, tenderness or fullness. Rectum: No external hemorrhoid. Lymphadenopathy:      Lower Body: No right inguinal adenopathy. No left inguinal adenopathy. Culture for STD done     terazol ordered.

## 2023-08-30 NOTE — ANESTHESIA POSTPROCEDURE EVALUATION
Post-Op Assessment Note    CV Status:  Stable    Pain management: adequate     Mental Status:  Alert and awake   Hydration Status:  Euvolemic   PONV Controlled:  Controlled   Airway Patency:  Patent      Post Op Vitals Reviewed: Yes      Staff: Anesthesiologist         No notable events documented.     BP      Temp      Pulse     Resp      SpO2      /68   Pulse 64   Temp 97.7 °F (36.5 °C) (Temporal)   Resp 12   SpO2 100%

## 2023-09-01 ENCOUNTER — APPOINTMENT (OUTPATIENT)
Dept: LAB | Facility: HOSPITAL | Age: 28
End: 2023-09-01
Payer: COMMERCIAL

## 2023-09-01 ENCOUNTER — OFFICE VISIT (OUTPATIENT)
Dept: OBGYN CLINIC | Facility: MEDICAL CENTER | Age: 28
End: 2023-09-01
Payer: COMMERCIAL

## 2023-09-01 VITALS — HEIGHT: 70 IN | BODY MASS INDEX: 30.21 KG/M2 | WEIGHT: 211 LBS

## 2023-09-01 DIAGNOSIS — B37.31 YEAST VAGINITIS: ICD-10-CM

## 2023-09-01 DIAGNOSIS — N89.8 VAGINAL DISCHARGE: Primary | ICD-10-CM

## 2023-09-01 PROCEDURE — 99213 OFFICE O/P EST LOW 20 MIN: CPT | Performed by: OBSTETRICS & GYNECOLOGY

## 2023-09-01 PROCEDURE — 87591 N.GONORRHOEAE DNA AMP PROB: CPT | Performed by: OBSTETRICS & GYNECOLOGY

## 2023-09-01 PROCEDURE — 87491 CHLMYD TRACH DNA AMP PROBE: CPT | Performed by: OBSTETRICS & GYNECOLOGY

## 2023-09-04 LAB
C TRACH DNA SPEC QL NAA+PROBE: NEGATIVE
N GONORRHOEA DNA SPEC QL NAA+PROBE: NEGATIVE

## 2023-09-05 PROCEDURE — 88305 TISSUE EXAM BY PATHOLOGIST: CPT | Performed by: PATHOLOGY

## 2023-09-06 ENCOUNTER — TELEPHONE (OUTPATIENT)
Dept: OBGYN CLINIC | Facility: MEDICAL CENTER | Age: 28
End: 2023-09-06

## 2023-09-06 NOTE — TELEPHONE ENCOUNTER
Pt called. Pharmacy stated they need prior authorization for her terconazole (TERAZOL 7) medication. Please review when you have a chance, ty!

## 2023-09-07 DIAGNOSIS — B37.31 YEAST VAGINITIS: Primary | ICD-10-CM

## 2023-09-07 NOTE — TELEPHONE ENCOUNTER
Pt contacted advised that Terconazole is non formulary. Per Dr Prasanna Perez pt can switch to Miconazole 3 (4%) Vaginal Cream. RX request sent to provider. Pt is aware.

## 2023-09-17 ENCOUNTER — HOSPITAL ENCOUNTER (EMERGENCY)
Facility: HOSPITAL | Age: 28
Discharge: HOME | End: 2023-09-17
Attending: EMERGENCY MEDICINE | Admitting: EMERGENCY MEDICINE
Payer: COMMERCIAL

## 2023-09-17 VITALS
HEART RATE: 103 BPM | RESPIRATION RATE: 17 BRPM | TEMPERATURE: 98.5 F | DIASTOLIC BLOOD PRESSURE: 63 MMHG | SYSTOLIC BLOOD PRESSURE: 114 MMHG | OXYGEN SATURATION: 99 %

## 2023-09-17 DIAGNOSIS — R68.89 FLU-LIKE SYMPTOMS: Primary | ICD-10-CM

## 2023-09-17 LAB
FLUAV RNA RESP QL NAA+PROBE: NEGATIVE
FLUBV RNA RESP QL NAA+PROBE: NEGATIVE
RSV RNA RESP QL NAA+PROBE: NEGATIVE
SARS-COV-2 RNA RESP QL NAA+PROBE: NEGATIVE

## 2023-09-17 PROCEDURE — 96372 THER/PROPH/DIAG INJ SC/IM: CPT

## 2023-09-17 PROCEDURE — 0241U HB NFCT DS VIR RESP RNA 4 TRGT: CPT

## 2023-09-17 PROCEDURE — 99283 EMERGENCY DEPT VISIT LOW MDM: CPT

## 2023-09-17 PROCEDURE — 99284 EMERGENCY DEPT VISIT MOD MDM: CPT

## 2023-09-17 RX ORDER — KETOROLAC TROMETHAMINE 30 MG/ML
15 INJECTION, SOLUTION INTRAMUSCULAR; INTRAVENOUS ONCE
Status: COMPLETED | OUTPATIENT
Start: 2023-09-17 | End: 2023-09-17

## 2023-09-17 RX ORDER — GUAIFENESIN 600 MG/1
600 TABLET, EXTENDED RELEASE ORAL ONCE
Status: COMPLETED | OUTPATIENT
Start: 2023-09-17 | End: 2023-09-17

## 2023-09-17 RX ORDER — FLUTICASONE PROPIONATE 50 MCG
1 SPRAY, SUSPENSION (ML) NASAL DAILY
Status: DISCONTINUED | OUTPATIENT
Start: 2023-09-17 | End: 2023-09-17 | Stop reason: HOSPADM

## 2023-09-17 RX ADMIN — KETOROLAC TROMETHAMINE 15 MG: 30 INJECTION, SOLUTION INTRAMUSCULAR; INTRAVENOUS at 19:48

## 2023-09-17 RX ADMIN — GUAIFENESIN 600 MG: 600 TABLET, EXTENDED RELEASE ORAL at 19:44

## 2023-09-17 RX ADMIN — FLUTICASONE PROPIONATE 1 SPRAY: 50 SPRAY, METERED NASAL at 19:46

## 2023-09-17 NOTE — ED PROVIDER NOTES
History  Chief Complaint   Patient presents with   • Flu Symptoms     HA, sore throat, cough, fever starting today, no meds PTA     21-year-old female with no pertinent PMH presents for evaluation of headache, sore throat, cough, fever, chills, congestion, ear discomfort, decreased appetite x1 day. No meds PTA. Just returned yesterday from a trip to [place].  No known sick contacts. Prior to Admission Medications   Prescriptions Last Dose Informant Patient Reported? Taking? Multiple Vitamins-Minerals (MULTIVITAMIN ADULT PO)  Self Yes No   Sig: Take by mouth daily   calcium carbonate (OS-GABI) 600 MG tablet   Yes No   Sig: Take 600 mg by mouth 2 (two) times a day with meals   diphenhydrAMINE (BENADRYL) 25 mg tablet   No No   Sig: Take 1 tablet (25 mg total) by mouth every 6 (six) hours   Patient not taking: Reported on 8/11/2023   famotidine (PEPCID) 20 mg tablet   No No   Sig: Take 1 tablet (20 mg total) by mouth 2 (two) times a day   Patient not taking: Reported on 8/11/2023   fluconazole (DIFLUCAN) 150 mg tablet   Yes No   Sig: TAKE 1 TABLET (150 MG TOTAL) BY MOUTH ONCE FOR 1 DOSE REPEAT IN 1 WEEK   Patient not taking: Reported on 8/11/2023   ketoconazole (NIZORAL) 2 % shampoo   No No   Sig: Apply topically twice a week to scalp for 8 weeks and then as needed. Leave on for 5 minutes, then rinse.    miconazole (MONISTAT) 200 mg vaginal suppository   No No   Sig: Insert 1 suppository (200 mg total) into the vagina daily at bedtime   nystatin-triamcinolone (MYCOLOG-II) ointment   No No   Sig: Apply topically 2 (two) times a day   Patient not taking: Reported on 8/11/2023   omeprazole (PriLOSEC) 20 mg delayed release capsule   No No   Sig: Take 1 capsule (20 mg total) by mouth daily   sucralfate (CARAFATE) 1 g tablet   No No   Sig: Take 1 tablet (1 g total) by mouth 4 (four) times a day Crush and mix with water to take as suspension   terconazole (TERAZOL 7) 0.4 % vaginal cream   No No   Sig: Insert 1 applicator into the vagina daily at bedtime      Facility-Administered Medications: None       Past Medical History:   Diagnosis Date   • History of transfusion     after  - had fever with transfusion   • Obesity (BMI 35.0-39.9 without comorbidity)    • S/P laparoscopic sleeve gastrectomy 2020   • Uses Macedonian as primary spoken language        Past Surgical History:   Procedure Laterality Date   •  SECTION  2017   • CHOLECYSTECTOMY     • FL LAPS GSTRC RSTRICTIV PX LONGITUDINAL GASTRECTOMY N/A 2020    Procedure: LAP SLEEVE GASTRECTOMY, INTRAOP EGD;  Surgeon: Enma Alarcon MD;  [facility]: [facility] OR;  Service: Bariatrics       Family History   Problem Relation Age of Onset   • Diabetes Mother    • Other Mother         gastric bypass   • Diabetes Father    • Diabetes Sister    • Sleep apnea Sister    • Sleep apnea Brother         2 of the 3 borthers have EAGLE   • No Known Problems Brother    • Diabetes Maternal Grandmother    • Skin cancer Maternal Grandmother    • No Known Problems Maternal Grandfather    • Diabetes Paternal Grandmother    • No Known Problems Paternal Grandfather    • No Known Problems Son    • Heart disease Family         CARDIOVASCULAR DISEASE     I have reviewed and agree with the history as documented. E-Cigarette/Vaping   • E-Cigarette Use Never User      E-Cigarette/Vaping Substances   • Nicotine No    • THC No    • CBD No    • Flavoring No    • Other No    • Unknown No      Social History     Tobacco Use   • Smoking status: Never   • Smokeless tobacco: Never   Vaping Use   • Vaping Use: Never used   Substance Use Topics   • Alcohol use: No   • Drug use: No       Review of Systems   Constitutional: Positive for appetite change, chills, fatigue and fever. HENT: Positive for congestion, ear pain and sore throat. Eyes: Negative for pain and visual disturbance. Respiratory: Positive for cough. Negative for shortness of breath.     Cardiovascular: Negative for chest pain and palpitations. Gastrointestinal: Negative for abdominal pain and vomiting. Genitourinary: Negative for dysuria and hematuria. Musculoskeletal: Negative for arthralgias and back pain. Skin: Negative for color change and rash. Neurological: Positive for headaches. Negative for seizures and syncope. All other systems reviewed and are negative. Physical Exam  Physical Exam  Vitals and nursing note reviewed. Constitutional:       Appearance: She is well-developed. She is obese. She is ill-appearing. HENT:      Head: Normocephalic and atraumatic. Right Ear: A middle ear effusion is present. Tympanic membrane is injected. Tympanic membrane is not retracted or bulging. Left Ear: A middle ear effusion is present. Tympanic membrane is injected. Tympanic membrane is not retracted or bulging. Ears:      Comments: Sterile-appearing mid ear effusions. Nose: Congestion present. Eyes:      Conjunctiva/sclera: Conjunctivae normal.   Cardiovascular:      Rate and Rhythm: Normal rate and regular rhythm. Heart sounds: No murmur heard. Pulmonary:      Effort: Pulmonary effort is normal. No respiratory distress. Breath sounds: Normal breath sounds. Abdominal:      Palpations: Abdomen is soft. Tenderness: There is no abdominal tenderness. Musculoskeletal:         General: No swelling. Cervical back: Neck supple. Skin:     General: Skin is warm and dry. Capillary Refill: Capillary refill takes less than 2 seconds. Neurological:      Mental Status: She is alert.    Psychiatric:         Mood and Affect: Mood normal.         Vital Signs  ED Triage Vitals [09/17/23 1854]   Temperature Pulse Respirations Blood Pressure SpO2   98.5 °F (36.9 °C) 103 17 114/63 99 %      Temp Source Heart Rate Source Patient Position - Orthostatic VS BP Location FiO2 (%)   Oral Monitor -- -- --      Pain Score       --           Vitals:    09/17/23 1854   BP: 114/63 Pulse: 103         Visual Acuity      ED Medications  Medications   guaiFENesin (MUCINEX) 12 hr tablet 600 mg (600 mg Oral Given 9/17/23 1944)   ketorolac (TORADOL) injection 15 mg (15 mg Intramuscular Given 9/17/23 1948)       Diagnostic Studies  Results Reviewed     Procedure Component Value Units Date/Time    FLU/RSV/COVID - if FLU/RSV clinically relevant [[id number]]  (Normal) Collected: 09/17/23 1949    Lab Status: Final result Specimen: Nares from Nose Updated: 09/17/23 2036     SARS-CoV-2 Negative     INFLUENZA A PCR Negative     INFLUENZA B PCR Negative     RSV PCR Negative    Narrative:      FOR PEDIATRIC PATIENTS - copy/paste COVID Guidelines URL to browser: [email or web address]    SARS-CoV-2 assay is a Nucleic Acid Amplification assay intended for the  qualitative detection of nucleic acid from SARS-CoV-2 in nasopharyngeal  swabs. Results are for the presumptive identification of SARS-CoV-2 RNA. Positive results are indicative of infection with SARS-CoV-2, the virus  causing COVID-19, but do not rule out bacterial infection or co-infection  with other viruses. Laboratories within the [facility] and its  territories are required to report all positive results to the appropriate  public health authorities. Negative results do not preclude SARS-CoV-2  infection and should not be used as the sole basis for treatment or other  patient management decisions. Negative results must be combined with  clinical observations, patient history, and epidemiological information. This test has not been FDA cleared or approved. This test has been authorized by FDA under an Emergency Use Authorization  (EUA).  This test is only authorized for the duration of time the  declaration that circumstances exist justifying the authorization of the  emergency use of an in vitro diagnostic tests for detection of SARS-CoV-2  virus and/or diagnosis of COVID-19 infection under section 564(b)(1) of  the Act, 21 U. S.C. 069XXZ-0(X)(4), unless the authorization is terminated  or revoked sooner. The test has been validated but independent review by FDA  and CLIA is pending. Test performed using [employer]: This RT-PCR assay targets N2,  a region unique to SARS-CoV-2. A conserved region in the E-gene was chosen  for pan-Sarbecovirus detection which includes SARS-CoV-2. According to CMS-2020-01-R, this platform meets the definition of high-throughput technology. No orders to display              Procedures  Procedures         ED Course                                             Medical Decision Making  60-year-old female presents for evaluation of URI symptoms x1 day. Recent travel to and from [place].  Exam: Patient appears fatigued, congestion, mildly productive cough throughout exam; AOx3, vitals WNL, afebrile; sterile-appearing mid ear effusions; lungs CTA B, heart RRR, abdomen soft nontender. Symptoms likely consistent with a viral syndrome. Work-up: COVID/flu/RSV. Management: Toradol, Mucinex, Flonase. Negative for COVID/flu/RSV. Symptoms likely attributable to another viral URI. Educated patient on likely etiology of her symptoms, thoroughly discussed supportive care, recommended follow-up with PCP if condition is not improving. Patient expresses understanding of the condition, treatment plan, follow-up instructions, and return precautions. Risk  OTC drugs. Prescription drug management.           Disposition  Final diagnoses:   Flu-like symptoms     Time reflects when diagnosis was documented in both MDM as applicable and the Disposition within this note     Time User Action Codes Description Comment    9/17/2023  8:46 PM Edouard Leroy Add [R68.89] Flu-like symptoms       ED Disposition     ED Disposition   Discharge    Condition   Stable    Date/Time   Sun Sep 17, 2023  8:46 PM    Comment   Chelsea Robles discharge to home/self care.               Follow-up Information    None         Discharge Medication List as of 9/17/2023  8:47 PM      CONTINUE these medications which have NOT CHANGED    Details   calcium carbonate (OS-GABI) 600 MG tablet Take 600 mg by mouth 2 (two) times a day with meals, Historical Med      diphenhydrAMINE (BENADRYL) 25 mg tablet Take 1 tablet (25 mg total) by mouth every 6 (six) hours, Starting Tue 8/1/2023, Normal      famotidine (PEPCID) 20 mg tablet Take 1 tablet (20 mg total) by mouth 2 (two) times a day, Starting Tue 8/1/2023, Normal      fluconazole (DIFLUCAN) 150 mg tablet TAKE 1 TABLET (150 MG TOTAL) BY MOUTH ONCE FOR 1 DOSE REPEAT IN 1 WEEK, Historical Med      ketoconazole (NIZORAL) 2 % shampoo Apply topically twice a week to scalp for 8 weeks and then as needed. Leave on for 5 minutes, then rinse., Normal      miconazole (MONISTAT) 200 mg vaginal suppository Insert 1 suppository (200 mg total) into the vagina daily at bedtime, Starting Thu 9/7/2023, Normal      Multiple Vitamins-Minerals (MULTIVITAMIN ADULT PO) Take by mouth daily, Historical Med      nystatin-triamcinolone (MYCOLOG-II) ointment Apply topically 2 (two) times a day, Starting Thu 7/20/2023, Normal      omeprazole (PriLOSEC) 20 mg delayed release capsule Take 1 capsule (20 mg total) by mouth daily, Starting Fri 8/11/2023, Until Thu 11/9/2023, Normal      sucralfate (CARAFATE) 1 g tablet Take 1 tablet (1 g total) by mouth 4 (four) times a day Crush and mix with water to take as suspension, Starting Fri 8/11/2023, Until Sun 9/10/2023, Normal      terconazole (TERAZOL 7) 0.4 % vaginal cream Insert 1 applicator into the vagina daily at bedtime, Starting Fri 9/1/2023, Normal             No discharge procedures on file.     PDMP Review       Value Time User    PDMP Reviewed  Yes 7/14/2021  2:12 PM Niall Garcia PA-C          ED Provider  Electronically Signed by           Hallie Fontaine PA-C  09/18/23 1961

## 2023-09-18 NOTE — DISCHARGE INSTRUCTIONS
You tested negative for COVID, flu, and RSV. Considering her symptoms, you likely have some other unspecified viral illness. This is likely self-limiting and will improve over the next week. You can continue taking OTC medications such as Tylenol, Mucinex, Flonase nasal spray as needed for relief. Follow-up with your PCP if condition is not improving.

## 2023-10-05 ENCOUNTER — VBI (OUTPATIENT)
Dept: ADMINISTRATIVE | Facility: OTHER | Age: 28
End: 2023-10-05

## 2023-10-06 ENCOUNTER — VBI (OUTPATIENT)
Dept: ADMINISTRATIVE | Facility: OTHER | Age: 28
End: 2023-10-06

## 2023-10-06 ENCOUNTER — APPOINTMENT (OUTPATIENT)
Dept: LAB | Facility: HOSPITAL | Age: 28
End: 2023-10-06
Payer: COMMERCIAL

## 2023-10-06 DIAGNOSIS — Z98.84 BARIATRIC SURGERY STATUS: ICD-10-CM

## 2023-10-06 DIAGNOSIS — E66.3 OVERWEIGHT: ICD-10-CM

## 2023-10-06 DIAGNOSIS — Z48.815 ENCOUNTER FOR SURGICAL AFTERCARE FOLLOWING SURGERY OF DIGESTIVE SYSTEM: ICD-10-CM

## 2023-10-06 DIAGNOSIS — K91.2 POSTSURGICAL MALABSORPTION: ICD-10-CM

## 2023-10-06 DIAGNOSIS — R10.13 EPIGASTRIC PAIN: ICD-10-CM

## 2023-10-06 DIAGNOSIS — R21 RASH: ICD-10-CM

## 2023-10-06 LAB
25(OH)D3 SERPL-MCNC: 22.6 NG/ML (ref 30–100)
ALBUMIN SERPL BCP-MCNC: 4.2 G/DL (ref 3.5–5)
ALP SERPL-CCNC: 64 U/L (ref 34–104)
ALT SERPL W P-5'-P-CCNC: 13 U/L (ref 7–52)
ANION GAP SERPL CALCULATED.3IONS-SCNC: 7 MMOL/L
AST SERPL W P-5'-P-CCNC: 13 U/L (ref 13–39)
BILIRUB SERPL-MCNC: 1.51 MG/DL (ref 0.2–1)
BUN SERPL-MCNC: 11 MG/DL (ref 5–25)
CALCIUM SERPL-MCNC: 9.2 MG/DL (ref 8.4–10.2)
CHLORIDE SERPL-SCNC: 105 MMOL/L (ref 96–108)
CO2 SERPL-SCNC: 28 MMOL/L (ref 21–32)
CREAT SERPL-MCNC: 0.7 MG/DL (ref 0.6–1.3)
ERYTHROCYTE [DISTWIDTH] IN BLOOD BY AUTOMATED COUNT: 12.2 % (ref 11.6–15.1)
FERRITIN SERPL-MCNC: 90 NG/ML (ref 11–307)
FOLATE SERPL-MCNC: 12.3 NG/ML
GFR SERPL CREATININE-BSD FRML MDRD: 119 ML/MIN/1.73SQ M
GLUCOSE P FAST SERPL-MCNC: 110 MG/DL (ref 65–99)
HCT VFR BLD AUTO: 37.3 % (ref 34.8–46.1)
HGB BLD-MCNC: 12.2 G/DL (ref 11.5–15.4)
IRON SATN MFR SERPL: 43 % (ref 15–50)
IRON SERPL-MCNC: 114 UG/DL (ref 50–212)
MCH RBC QN AUTO: 29.9 PG (ref 26.8–34.3)
MCHC RBC AUTO-ENTMCNC: 32.7 G/DL (ref 31.4–37.4)
MCV RBC AUTO: 91 FL (ref 82–98)
PLATELET # BLD AUTO: 170 THOUSANDS/UL (ref 149–390)
PMV BLD AUTO: 12.2 FL (ref 8.9–12.7)
POTASSIUM SERPL-SCNC: 3.9 MMOL/L (ref 3.5–5.3)
PROT SERPL-MCNC: 6.9 G/DL (ref 6.4–8.4)
PTH-INTACT SERPL-MCNC: 27.2 PG/ML (ref 12–88)
RBC # BLD AUTO: 4.08 MILLION/UL (ref 3.81–5.12)
SODIUM SERPL-SCNC: 140 MMOL/L (ref 135–147)
T4 FREE SERPL-MCNC: 0.88 NG/DL (ref 0.61–1.12)
TIBC SERPL-MCNC: 268 UG/DL (ref 250–450)
TSH SERPL DL<=0.05 MIU/L-ACNC: 1 UIU/ML (ref 0.45–4.5)
UIBC SERPL-MCNC: 154 UG/DL (ref 155–355)
VIT B12 SERPL-MCNC: 222 PG/ML (ref 180–914)
WBC # BLD AUTO: 6.09 THOUSAND/UL (ref 4.31–10.16)

## 2023-10-06 PROCEDURE — 84443 ASSAY THYROID STIM HORMONE: CPT

## 2023-10-06 PROCEDURE — 82607 VITAMIN B-12: CPT

## 2023-10-06 PROCEDURE — 84439 ASSAY OF FREE THYROXINE: CPT

## 2023-10-06 PROCEDURE — 36415 COLL VENOUS BLD VENIPUNCTURE: CPT

## 2023-10-06 PROCEDURE — 84590 ASSAY OF VITAMIN A: CPT

## 2023-10-06 PROCEDURE — 82306 VITAMIN D 25 HYDROXY: CPT

## 2023-10-06 PROCEDURE — 83970 ASSAY OF PARATHORMONE: CPT

## 2023-10-06 PROCEDURE — 82746 ASSAY OF FOLIC ACID SERUM: CPT

## 2023-10-06 PROCEDURE — 84630 ASSAY OF ZINC: CPT

## 2023-10-06 PROCEDURE — 80053 COMPREHEN METABOLIC PANEL: CPT

## 2023-10-06 PROCEDURE — 85027 COMPLETE CBC AUTOMATED: CPT

## 2023-10-06 PROCEDURE — 83540 ASSAY OF IRON: CPT

## 2023-10-06 PROCEDURE — 84425 ASSAY OF VITAMIN B-1: CPT

## 2023-10-06 PROCEDURE — 83550 IRON BINDING TEST: CPT

## 2023-10-06 PROCEDURE — 82728 ASSAY OF FERRITIN: CPT

## 2023-10-06 NOTE — TELEPHONE ENCOUNTER
10/06/23 11:56 AM     [facility] [employer] SmartForm used to document caregap status.     [facility]

## 2023-10-10 ENCOUNTER — OFFICE VISIT (OUTPATIENT)
Dept: BARIATRICS | Facility: CLINIC | Age: 28
End: 2023-10-10
Payer: COMMERCIAL

## 2023-10-10 VITALS
TEMPERATURE: 97.6 F | HEIGHT: 70 IN | HEART RATE: 78 BPM | BODY MASS INDEX: 31.07 KG/M2 | SYSTOLIC BLOOD PRESSURE: 108 MMHG | WEIGHT: 217 LBS | DIASTOLIC BLOOD PRESSURE: 70 MMHG

## 2023-10-10 DIAGNOSIS — K29.70 GASTRITIS: ICD-10-CM

## 2023-10-10 DIAGNOSIS — R10.13 EPIGASTRIC PAIN: ICD-10-CM

## 2023-10-10 DIAGNOSIS — Z98.84 BARIATRIC SURGERY STATUS: ICD-10-CM

## 2023-10-10 DIAGNOSIS — Z48.815 ENCOUNTER FOR SURGICAL AFTERCARE FOLLOWING SURGERY OF DIGESTIVE SYSTEM: Primary | ICD-10-CM

## 2023-10-10 PROCEDURE — 99213 OFFICE O/P EST LOW 20 MIN: CPT | Performed by: NURSE PRACTITIONER

## 2023-10-10 RX ORDER — OMEPRAZOLE 20 MG/1
20 CAPSULE, DELAYED RELEASE ORAL DAILY
Qty: 30 CAPSULE | Refills: 4 | Status: SHIPPED | OUTPATIENT
Start: 2023-10-10

## 2023-10-10 RX ORDER — FLUOXETINE 10 MG/1
10 CAPSULE ORAL DAILY
COMMUNITY
Start: 2023-09-18

## 2023-10-10 RX ORDER — OMEPRAZOLE 20 MG/1
20 CAPSULE, DELAYED RELEASE ORAL DAILY
Qty: 30 CAPSULE | Refills: 4 | Status: SHIPPED | OUTPATIENT
Start: 2023-10-10 | End: 2023-10-10

## 2023-10-10 RX ORDER — OMEPRAZOLE 20 MG/1
20 CAPSULE, DELAYED RELEASE ORAL DAILY
Qty: 30 CAPSULE | Refills: 4 | Status: SHIPPED | OUTPATIENT
Start: 2023-10-10 | End: 2023-10-10 | Stop reason: SDUPTHER

## 2023-10-10 NOTE — PROGRESS NOTES
[facility] # - [id number]    Assessment/Plan:    No problem-specific Assessment & Plan notes found for this encounter. Diagnoses and all orders for this visit:    Encounter for surgical aftercare following surgery of digestive system    Bariatric surgery status  -     Discontinue: omeprazole (PriLOSEC) 20 mg delayed release capsule; Take 1 capsule (20 mg total) by mouth daily  -     Discontinue: omeprazole (PriLOSEC) 20 mg delayed release capsule; Take 1 capsule (20 mg total) by mouth daily  -     omeprazole (PriLOSEC) 20 mg delayed release capsule; Take 1 capsule (20 mg total) by mouth daily    Gastritis    Epigastric pain  -     Discontinue: omeprazole (PriLOSEC) 20 mg delayed release capsule; Take 1 capsule (20 mg total) by mouth daily  -     Discontinue: omeprazole (PriLOSEC) 20 mg delayed release capsule; Take 1 capsule (20 mg total) by mouth daily  -     omeprazole (PriLOSEC) 20 mg delayed release capsule; Take 1 capsule (20 mg total) by mouth daily    Other orders  -     FLUoxetine (PROzac) 10 mg capsule; Take 10 mg by mouth daily     - continue with omeprazole for now for about 3 months then to taper off.   - EGD otherwise unremarkable. Routine screening in 3 years.   - Recommend seeing MWM to help further weight loss. - GERD precautions discussed. - follow up as scheduled for annual visit. Subjective:      Patient ID: Keon Kothari is a 33 y.o. female. HPI    Patient is s/p gastric sleeve with Dr. Pierre Berg on 07/20/2020 here for EGD review due to increase heartburn sensation. Since starting omeprazole she has noted improvement in her symptoms. Of note, she is concerned of weight gain. Interested in medications to help with weight loss and she would like to eventually have plastic surgery to remove excessive skin of her abdomen.         [facility]  [address] [place] [address] Road  [phone]        DATE OF SERVICE:  8/30/23     PHYSICIAN(S):  Attending:   Samara Baron MD      Fellow:   No Staff Documented         INDICATION:  Bariatric surgery status     POST-OP DIAGNOSIS:  See the impression below.     PREPROCEDURE:  Informed consent was obtained for the procedure, including sedation. Risks of perforation, hemorrhage, adverse drug reaction and aspiration were discussed. The patient was placed in the left lateral decubitus position.     Patient was explained about the risks and benefits of the procedure. Risks including but not limited to bleeding, infection, and perforation were explained in detail. Also explained about less than 100% sensitivity with the exam and other alternatives.     PROCEDURE: EGD     DETAILS OF PROCEDURE:   Patient was taken to the procedure room where a time out was performed to confirm correct patient and correct procedure. The patient underwent monitored anesthesia care, which was administered by an anesthesia professional. The patient's blood pressure, heart rate, level of consciousness, respirations and oxygen were monitored throughout the procedure. The scope was advanced to the second part of the duodenum. Prior to the procedure, the patient's H. Pylori status was negative. The patient experienced no blood loss. The procedure was not difficult. The patient tolerated the procedure well. There were no apparent adverse events. Tarsha Gonzalez 07/20/2020. Berto Rosenbaum Presents to the office today for follow up.     C/o epigastric pain that started about 1 week ago - feels like "burning". Pain worse with food, and she states it does not matter what type of food or drink she has she will experience pain. No vomiting or blood in stool. Does not feel similar to prior abdominal pain. Of note, she Is due for screening EGD this year.      Will schedule for EGD with Dr Collette Hidden and then follow up after. Will start her on omeprazole and carafate daily for now.    ANESTHESIA INFORMATION:  ASA: II  Anesthesia Type: IV Sedation with Anesthesia     MEDICATIONS:  sodium chloride 0.9 % infusion 400 mL*               *From user-documented volume   (Totals for administrations occurring from 1212 to 1221 on 08/30/23)         FINDINGS:  • Regular Z-line 38 cm from the incisors  • Mild, generalized erythematous mucosa in the stomach; performed 6 cold forceps biopsies to rule out H. 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 • No kinking, angulation or corkscrewing. Otherwise normal sleeve gastrectomy anatomy        RECOMMENDATION:  Continue with the bariatric program process and follow up in the office. Biopsy results pending.              Tiesha Britt MD          Tissue Exam -   Final Diagnosis   A. Stomach, r/o h pylori:  - Mild chronic inactive gastritis. - Negative for H. pylori by routine H&E.  - Negative for malignancy. The following portions of the patient's history were reviewed and updated as appropriate: allergies, current medications, past family history, past medical history, past social history, past surgical history and problem list.    Review of Systems   Constitutional: Negative. Respiratory: Negative. Cardiovascular: Negative. Objective:      /70   Pulse 78   Temp 97.6 °F (36.4 °C) (Tympanic)   Ht 5' 10" (1.778 m)   Wt 98.4 kg (217 lb)   BMI 31.14 kg/m²          Physical Exam  Vitals and nursing note reviewed. Constitutional:       Appearance: Normal appearance. Cardiovascular:      Rate and Rhythm: Normal rate and regular rhythm. Pulses: Normal pulses. Heart sounds: Normal heart sounds.    Pulmonary:      Effort: Pulmonary effort is normal.      Breath sounds: Normal breath sounds. Abdominal:      General: Bowel sounds are normal.      Palpations: Abdomen is soft. Tenderness: There is no abdominal tenderness. Musculoskeletal:         General: Normal range of motion. Skin:     General: Skin is warm and dry. Neurological:      General: No focal deficit present. Mental Status: She is alert and oriented to person, place, and time. Psychiatric:         Mood and Affect: Mood normal.         Behavior: Behavior normal.         Thought Content:  Thought content normal.         Judgment: Judgment normal.

## 2023-10-11 LAB — VIT B1 BLD-SCNC: 93.8 NMOL/L (ref 66.5–200)

## 2023-10-12 LAB — VIT A SERPL-MCNC: 21.9 UG/DL (ref 18.9–57.3)

## 2023-10-15 LAB — ZINC SERPL-MCNC: 69 UG/DL (ref 44–115)

## 2023-10-16 DIAGNOSIS — Z98.84 BARIATRIC SURGERY STATUS: Primary | ICD-10-CM

## 2023-10-16 DIAGNOSIS — K91.2 POSTSURGICAL MALABSORPTION: ICD-10-CM

## 2023-10-16 DIAGNOSIS — E53.8 LOW SERUM VITAMIN B12: ICD-10-CM

## 2023-11-06 ENCOUNTER — APPOINTMENT (EMERGENCY)
Dept: RADIOLOGY | Facility: HOSPITAL | Age: 28
End: 2023-11-06
Payer: COMMERCIAL

## 2023-11-06 ENCOUNTER — HOSPITAL ENCOUNTER (EMERGENCY)
Facility: HOSPITAL | Age: 28
Discharge: HOME | End: 2023-11-06
Attending: EMERGENCY MEDICINE | Admitting: EMERGENCY MEDICINE
Payer: COMMERCIAL

## 2023-11-06 VITALS
DIASTOLIC BLOOD PRESSURE: 72 MMHG | TEMPERATURE: 98.4 F | HEART RATE: 92 BPM | RESPIRATION RATE: 18 BRPM | OXYGEN SATURATION: 99 % | SYSTOLIC BLOOD PRESSURE: 132 MMHG

## 2023-11-06 DIAGNOSIS — S99.912A INJURY OF LEFT ANKLE, INITIAL ENCOUNTER: Primary | ICD-10-CM

## 2023-11-06 PROCEDURE — 73610 X-RAY EXAM OF ANKLE: CPT

## 2023-11-06 PROCEDURE — 99283 EMERGENCY DEPT VISIT LOW MDM: CPT

## 2023-11-06 PROCEDURE — 99284 EMERGENCY DEPT VISIT MOD MDM: CPT

## 2023-11-06 RX ORDER — SENNOSIDES 8.6 MG
650 CAPSULE ORAL EVERY 8 HOURS PRN
Qty: 30 TABLET | Refills: 0 | Status: SHIPPED | OUTPATIENT
Start: 2023-11-06

## 2023-11-06 NOTE — ED PROVIDER NOTES
History  Chief Complaint   Patient presents with    Ankle Injury     Patient fell on Thursday crossing the street in front of her job. Today patient fell in the kitchen at home. Patient has not taken any nsaids today for pain. 20-year-old female past medical history of sleeve gastrectomy presents emergency department complaining of left ankle pain. States she was walking outside of work when she rolled her ankle. She reports minimal pain and swelling following. She states she then did the same thing again today with worsening pain and swelling. Denies previous injury to the area. Denies fever, chills, erythema, warmth, decreased range of motion, numbness, tingling, weakness. Is still able to ambulate on it but with pain. History provided by:  Patient  Ankle Injury  Associated symptoms: no fever, no nausea, no rash and no vomiting        Prior to Admission Medications   Prescriptions Last Dose Informant Patient Reported? Taking? FLUoxetine (PROzac) 10 mg capsule   Yes No   Sig: Take 10 mg by mouth daily   Multiple Vitamins-Minerals (MULTIVITAMIN ADULT PO)  Self Yes No   Sig: Take by mouth daily   calcium carbonate (OS-GABI) 600 MG tablet   Yes No   Sig: Take 600 mg by mouth 2 (two) times a day with meals   ketoconazole (NIZORAL) 2 % shampoo   No No   Sig: Apply topically twice a week to scalp for 8 weeks and then as needed. Leave on for 5 minutes, then rinse.    omeprazole (PriLOSEC) 20 mg delayed release capsule   No No   Sig: Take 1 capsule (20 mg total) by mouth daily   sucralfate (CARAFATE) 1 g tablet   No No   Sig: Take 1 tablet (1 g total) by mouth 4 (four) times a day Crush and mix with water to take as suspension   terconazole (TERAZOL 7) 0.4 % vaginal cream   No No   Sig: Insert 1 applicator into the vagina daily at bedtime   Patient not taking: Reported on 10/10/2023      Facility-Administered Medications: None       Past Medical History:   Diagnosis Date    History of transfusion     after  - had fever with transfusion    Obesity (BMI 35.0-39.9 without comorbidity)     S/P laparoscopic sleeve gastrectomy 2020    Uses Turkmen as primary spoken language        Past Surgical History:   Procedure Laterality Date     SECTION  2017    CHOLECYSTECTOMY      CO LAPS [address] RSTRICTIV PX LONGITUDINAL GASTRECTOMY N/A 2020    Procedure: LAP SLEEVE GASTRECTOMY, INTRAOP EGD;  Surgeon: Anthony Bansal MD;  Location: [facility];  Service: Bariatrics       Family History   Problem Relation Age of Onset    Diabetes Mother     Other Mother         gastric bypass    Diabetes Father     Diabetes Sister     Sleep apnea Sister     Sleep apnea Brother         2 of the 3 borthers have EAGLE    No Known Problems Brother     Diabetes Maternal Grandmother     Skin cancer Maternal Grandmother     No Known Problems Maternal Grandfather     Diabetes Paternal Grandmother     No Known Problems Paternal Grandfather     No Known Problems Son     Heart disease Family         CARDIOVASCULAR DISEASE     I have reviewed and agree with the history as documented. E-Cigarette/Vaping    E-Cigarette Use Never User      E-Cigarette/Vaping Substances    Nicotine No     THC No     CBD No     Flavoring No     Other No     Unknown No      Social History     Tobacco Use    Smoking status: Never    Smokeless tobacco: Never   Vaping Use    Vaping Use: Never used   Substance Use Topics    Alcohol use: No    Drug use: No       Review of Systems   Constitutional:  Negative for chills and fever. Gastrointestinal:  Negative for nausea and vomiting. Musculoskeletal:  Positive for arthralgias (L ankle) and joint swelling (L ankle). Skin:  Negative for color change and rash. Neurological:  Negative for weakness and numbness. All other systems reviewed and are negative. Physical Exam  Physical Exam  Vitals and nursing note reviewed. Constitutional:       General: She is not in acute distress.      Appearance: Normal appearance. She is not ill-appearing. HENT:      Head: Normocephalic and atraumatic. Cardiovascular:      Rate and Rhythm: Normal rate and regular rhythm. Pulses:           Dorsalis pedis pulses are 2+ on the left side. Posterior tibial pulses are 2+ on the left side. Pulmonary:      Effort: Pulmonary effort is normal.   Musculoskeletal:      Cervical back: Neck supple. Left lower leg: Normal.      Left ankle: Swelling and ecchymosis present. Tenderness present over the lateral malleolus and AITF ligament. No base of 5th metatarsal tenderness. Normal range of motion. Normal pulse. Left Achilles Tendon: No tenderness. Byers's test negative. Left foot: Normal. Normal range of motion and normal capillary refill. No tenderness or bony tenderness. Normal pulse. Skin:     General: Skin is warm and dry. Capillary Refill: Capillary refill takes less than 2 seconds. Findings: No bruising, erythema or rash. Neurological:      Mental Status: She is alert. Gait: Gait normal.      Comments: LLE strength, sensation intact.     Psychiatric:         Mood and Affect: Mood normal.         Behavior: Behavior normal.         Vital Signs  ED Triage Vitals [11/06/23 1751]   Temperature Pulse Respirations Blood Pressure SpO2   98.4 °F (36.9 °C) 92 18 132/72 99 %      Temp Source Heart Rate Source Patient Position - Orthostatic VS BP Location FiO2 (%)   Tympanic Monitor Lying Left arm --      Pain Score       --           Vitals:    11/06/23 1751   BP: 132/72   Pulse: 92   Patient Position - Orthostatic VS: Lying         Visual Acuity      ED Medications  Medications - No data to display    Diagnostic Studies  Results Reviewed       None                   XR ankle 3+ views LEFT   ED Interpretation by Elie Newman PA-C (11/06 1850)   No acute fx                  Procedures  Procedures         ED Course  ED Course as of 11/06/23 2224   [facility] Nov 06, 2023   1850 Imaging review done by myself and preliminary results were discussed with the patient and family members. Discussion was had with patient and family members that this read is preliminary and therefore discrepancies between this read and the final read by the radiologist are possible. Patient and family members were informed that any discrepancies found by would be relayed by phone call. SBIRT 20yo+      Flowsheet Row Most Recent Value   Initial Alcohol Screen: US AUDIT-C     1. How often do you have a drink containing alcohol? 0 Filed at: 11/06/2023 1754   2. How many drinks containing alcohol do you have on a typical day you are drinking? 0 Filed at: 11/06/2023 1754   3a. Male UNDER 65: How often do you have five or more drinks on one occasion? 0 Filed at: 11/06/2023 1754   3b. FEMALE Any Age, or MALE 65+: How often do you have 4 or more drinks on one occassion? 0 Filed at: 11/06/2023 1754   Audit-C Score 0 Filed at: 11/06/2023 1754   CHERRI: How many times in the past year have you. .. Used an illegal drug or used a prescription medication for non-medical reasons? Never Filed at: 11/06/2023 1754                      Medical Decision Making  Ddx includes but is not limited to sprain, strain, fracture. Rolled ankle, L lateral swelling. Able to ambulate. Neurovascularly intact. X-ray ordered. No acute fracture on my wet read. Ace wrap, aircast applied. Crutches as needed for assistance with ambulation. Podiatry/ortho follow up given. PO nsaids not given 2/2 gastric sleeve surgery. Tylenol sent to pharmacy. All imaging and/or lab testing discussed with patient, strict return to ED precautions discussed. Patient recommended to follow up promptly with appropriate outpatient provider. Patient and/or family members verbalizes understanding and agrees with plan. Patient and/or family members were given opportunity to ask questions, all questions were answered at this time.  Patient is stable for discharge. Portions of the record may have been created with voice recognition software. Occasional wrong word or "sound a like" substitutions may have occurred due to the inherent limitations of voice recognition software. Read the chart carefully and recognize, using context, where substitutions have occurred. Amount and/or Complexity of Data Reviewed  Radiology: ordered and independent interpretation performed. Risk  OTC drugs. Disposition  Final diagnoses:   Injury of left ankle, initial encounter     Time reflects when diagnosis was documented in both MDM as applicable and the Disposition within this note       Time User Action Codes Description Comment    11/6/2023  6:51 PM Leo Salazarly Add [D53.846S] Injury of left ankle, initial encounter           ED Disposition       ED Disposition   Discharge    Condition   Stable    Date/Time   Mon Nov 6, 2023 9725 Camille Rodriguez discharge to home/self care.                    Follow-up Information       Follow up With Specialties Details Why Contact Info Additional Information    [address] [facility] [place] [facility]   [address]  [address] [place] [phone]  [facility] [place], [facility] [address], [place], [place], [phone]   [address] [place] [phone]  [address] [place] [address], [address] [facility], [address], [place], [place], [address] [place] [address]            Discharge Medication List as of 11/6/2023  6:58 PM        START taking these medications    Details   acetaminophen (TYLENOL) 650 mg CR tablet Take 1 tablet (650 mg total) by mouth every 8 (eight) hours as needed for mild pain, Starting Mon 11/6/2023, Normal           CONTINUE these medications which have NOT CHANGED    Details   calcium carbonate (OS-GABI) 600 MG tablet Take 600 mg by mouth 2 (two) times a day with meals, Historical Med      FLUoxetine (PROzac) 10 mg capsule Take 10 mg by mouth daily, Starting Mon 9/18/2023, Historical Med      ketoconazole (NIZORAL) 2 % shampoo Apply topically twice a week to scalp for 8 weeks and then as needed. Leave on for 5 minutes, then rinse., Normal      Multiple Vitamins-Minerals (MULTIVITAMIN ADULT PO) Take by mouth daily, Historical Med      omeprazole (PriLOSEC) 20 mg delayed release capsule Take 1 capsule (20 mg total) by mouth daily, Starting Tue 10/10/2023, Normal      sucralfate (CARAFATE) 1 g tablet Take 1 tablet (1 g total) by mouth 4 (four) times a day Crush and mix with water to take as suspension, Starting Fri 8/11/2023, Until Tue 10/10/2023, Normal      terconazole (TERAZOL 7) 0.4 % vaginal cream Insert 1 applicator into the vagina daily at bedtime, Starting Fri 9/1/2023, Normal             No discharge procedures on file.     PDMP Review         Value Time User    PDMP Reviewed  Yes 7/14/2021  2:12 PM Sherly Ng PA-C            ED Provider  Electronically Signed by             Trudie Gitelman, PA-C  11/06/23 0067

## 2023-11-06 NOTE — Clinical Note
Kimberli Higgins was seen and treated in our emergency department on 11/6/2023.            use crutches as needed until cleared by Orthopedics or PCP    Diagnosis: ankle sprain    Yues  . She may return on this date: 11/13/2023         If you have any questions or concerns, please don't hesitate to call.       Anderson Gan PA-C    ______________________________           _______________          _______________  Hospital Representative                              Date                                Time

## 2023-11-06 NOTE — DISCHARGE INSTRUCTIONS
Use crutches as needed, may bear weight as tolerated. Follow up with Orthopedics or Podiatry. Return to ED for new or worsening symptoms as discussed.

## 2023-11-07 NOTE — ED NOTES
Patient declined crutches at this time and reports she has some at home. Patient placed in a wheelchair and taken to her ride outside of ER.      Lalo Navas RN  11/06/23 4053

## 2023-11-08 ENCOUNTER — TELEPHONE (OUTPATIENT)
Age: 28
End: 2023-11-08

## 2023-11-08 NOTE — TELEPHONE ENCOUNTER
Caller: Located within [facility]    Doctor:     Reason for call: Patient called them to scheduled an appt and asked if we can reach out to the patient to schedule her for her ankle. Patient has already called and a message sent to get her scheduled.     Call back#: n/a

## 2023-11-08 NOTE — TELEPHONE ENCOUNTER
Hello,  Please advise if the following patient can be forced onto the schedule:    Patient: Danis Cnonolly ( is Serbian speaking)    : 1995    MRN: [id number]    Call back #: [phone]    Insurance: Blair Mckeon     Reason for appointment: Left ankle fracture     Requested doctor/location: Any /[facility]       Thank you.

## 2023-11-10 ENCOUNTER — OFFICE VISIT (OUTPATIENT)
Dept: PODIATRY | Facility: CLINIC | Age: 28
End: 2023-11-10
Payer: COMMERCIAL

## 2023-11-10 VITALS
SYSTOLIC BLOOD PRESSURE: 110 MMHG | RESPIRATION RATE: 18 BRPM | HEIGHT: 70 IN | DIASTOLIC BLOOD PRESSURE: 72 MMHG | BODY MASS INDEX: 31.14 KG/M2 | HEART RATE: 78 BPM

## 2023-11-10 DIAGNOSIS — S93.492A SPRAIN OF ANTERIOR TALOFIBULAR LIGAMENT OF LEFT ANKLE, INITIAL ENCOUNTER: Primary | ICD-10-CM

## 2023-11-10 DIAGNOSIS — S86.302A PERONEAL TENDON INJURY, LEFT, INITIAL ENCOUNTER: ICD-10-CM

## 2023-11-10 PROCEDURE — 99203 OFFICE O/P NEW LOW 30 MIN: CPT | Performed by: PODIATRIST

## 2023-11-10 RX ORDER — DICLOFENAC SODIUM 25 MG/1
25 TABLET, DELAYED RELEASE ORAL 3 TIMES DAILY
Qty: 30 TABLET | Refills: 0 | Status: SHIPPED | OUTPATIENT
Start: 2023-11-10 | End: 2023-11-20

## 2023-11-10 NOTE — PROGRESS NOTES
Ramon Polanco  1995       ASSESSMENT:     1. Sprain of anterior talofibular ligament of left ankle, initial encounter  Cam Boot    diclofenac sodium (VOLTAREN) 25 MG EC tablet      2. Peroneal tendon injury, left, initial encounter  Cam Boot    diclofenac sodium (VOLTAREN) 25 MG EC tablet                PLAN:  1. Reviewed medical records. Reviewed the note from ED. Patient was counseled and educated on the condition and the diagnosis. 2. X-ray was personally reviewed. The radiological findings were discussed with the patient. 3. The exam and symptoms are consistent with left ankle sprain and probable peroneal tendon strain. The diagnosis, treatment options and prognosis were discussed with the patient. 4. Will use CAM walker for better immobilization. 5. Rx: Diclofenac for pain. Instructed resting, icing, compression, and resting. 6. Will keep her out of work due to she cannot be on her feet a lot. 7. Patient will return in 3 weeks for re-evaluation. Imaging: I have personally reviewed pertinent films in PACS  Labs, pathology, and Other Studies: I have personally reviewed pertinent reports. Subjective:       HPI  The patient presents with chief complaint of left ankle injury. She fell and rolled her left ankle last Thursday and Monday. She went to ED and X-ray did not show fracture. She presents with aircast, but it does not seem to help her. Pain is still significant. She has difficulty walking. No associated numbness or paresthesia. No significant weakness or dysfunction. The following portions of the patient's history were reviewed and updated as appropriate: allergies, current medications, past family history, past medical history, past social history, past surgical history and problem list.  All pertinent labs and images were reviewed.       Past Medical History  Past Medical History:   Diagnosis Date   • History of transfusion     after  - had fever with transfusion   • Obesity (BMI 35.0-39.9 without comorbidity)    • S/P laparoscopic sleeve gastrectomy 2020   • Uses Setswana as primary spoken language        Past Surgical History  Past Surgical History:   Procedure Laterality Date   •  SECTION  2017   • CHOLECYSTECTOMY     • [place] LAPS 175 Carmenza Ortega RSTRICTIV PX LONGITUDINAL GASTRECTOMY N/A 2020    Procedure: LAP SLEEVE GASTRECTOMY, INTRAOP EGD;  Surgeon: Lova Kanner, MD;  Location: [facility];  Service: Bariatrics        Allergies:  Patient has no known allergies. Medications:  Current Outpatient Medications   Medication Sig Dispense Refill   • diclofenac sodium (VOLTAREN) 25 MG EC tablet Take 1 tablet (25 mg total) by mouth 3 (three) times a day for 10 days (Take it with meal) 30 tablet 0   • acetaminophen (TYLENOL) 650 mg CR tablet Take 1 tablet (650 mg total) by mouth every 8 (eight) hours as needed for mild pain 30 tablet 0   • calcium carbonate (OS-GABI) 600 MG tablet Take 600 mg by mouth 2 (two) times a day with meals     • FLUoxetine (PROzac) 10 mg capsule Take 10 mg by mouth daily     • ketoconazole (NIZORAL) 2 % shampoo Apply topically twice a week to scalp for 8 weeks and then as needed. Leave on for 5 minutes, then rinse. 120 mL 0   • Multiple Vitamins-Minerals (MULTIVITAMIN ADULT PO) Take by mouth daily     • omeprazole (PriLOSEC) 20 mg delayed release capsule Take 1 capsule (20 mg total) by mouth daily 30 capsule 4   • sucralfate (CARAFATE) 1 g tablet Take 1 tablet (1 g total) by mouth 4 (four) times a day Crush and mix with water to take as suspension 120 tablet 1   • terconazole (TERAZOL 7) 0.4 % vaginal cream Insert 1 applicator into the vagina daily at bedtime (Patient not taking: Reported on 10/10/2023) 45 g 0     No current facility-administered medications for this visit.        Social History:  Social History     Socioeconomic History   • Marital status: Single     Spouse name: None   • Number of children: None   • Years of education: None   • Highest education level: None   Occupational History   • None   Tobacco Use   • Smoking status: Never   • Smokeless tobacco: Never   Vaping Use   • Vaping Use: Never used   Substance and Sexual Activity   • Alcohol use: No   • Drug use: No   • Sexual activity: Yes     Partners: Male     Birth control/protection: I.U.D. Other Topics Concern   • None   Social History Narrative   • None     Social Determinants of Health     Financial Resource Strain: Not on file   Food Insecurity: No Food Insecurity (11/30/2021)    Hunger Vital Sign    • Worried About Running Out of Food in the Last Year: Never true    • Ran Out of Food in the Last Year: Never true   Transportation Needs: No Transportation Needs (11/30/2021)    PRAPARE - Transportation    • Lack of Transportation (Medical): No    • Lack of Transportation (Non-Medical): No   Physical Activity: Not on file   Stress: Not on file   Social Connections: Not on file   Intimate Partner Violence: Not on file   Housing Stability: Low Risk  (11/30/2021)    Housing Stability Vital Sign    • Unable to Pay for Housing in the Last Year: No    • Number of Places Lived in the Last Year: 1    • Unstable Housing in the Last Year: No          Review of Systems   Constitutional:  Negative for chills and fever. Respiratory:  Negative for cough and shortness of breath. Cardiovascular:  Negative for chest pain. Gastrointestinal:  Negative for nausea and vomiting. Musculoskeletal:  Positive for joint swelling. Skin:  Negative for wound. Allergic/Immunologic: Negative for immunocompromised state. Neurological:  Negative for weakness and numbness. Hematological: Negative. Psychiatric/Behavioral:  Negative for behavioral problems and confusion. Objective:      /72   Pulse 78   Resp 18   Ht 5' 10" (1.778 m)   BMI 31.14 kg/m²          Physical Exam  Vitals reviewed.    Constitutional: General: She is not in acute distress. Appearance: She is not toxic-appearing or diaphoretic. HENT:      Head: Normocephalic and atraumatic. Eyes:      Extraocular Movements: Extraocular movements intact. Cardiovascular:      Rate and Rhythm: Normal rate and regular rhythm. Pulses: Normal pulses. Dorsalis pedis pulses are 2+ on the right side and 2+ on the left side. Posterior tibial pulses are 2+ on the right side and 2+ on the left side. Pulmonary:      Effort: Pulmonary effort is normal. No respiratory distress. Musculoskeletal:         General: Swelling, tenderness and signs of injury present. Cervical back: Normal range of motion and neck supple. Right lower leg: No edema. Left lower leg: No edema. Right foot: No foot drop. Left foot: No foot drop. Comments: Mild swelling left lateral ankle. Pain presents around left ATFL, peroneal tendon, and deltoid ligament. No pain over left 5th metatarsal base. Ecchymosis noted left lateral foot and ankle. Function and ROM intact. Negative anterior drawer test left ankle. Skin:     General: Skin is warm. Capillary Refill: Capillary refill takes less than 2 seconds. Coloration: Skin is not cyanotic or mottled. Findings: No abscess. Nails: There is no clubbing. Neurological:      General: No focal deficit present. Mental Status: She is alert and oriented to person, place, and time. Cranial Nerves: No cranial nerve deficit. Sensory: No sensory deficit. Motor: No weakness. Coordination: Coordination normal.   Psychiatric:         Mood and Affect: Mood normal.         Behavior: Behavior normal.         Thought Content:  Thought content normal.         Judgment: Judgment normal.

## 2023-11-18 ENCOUNTER — HOSPITAL ENCOUNTER (EMERGENCY)
Facility: HOSPITAL | Age: 28
Discharge: HOME | End: 2023-11-18
Attending: EMERGENCY MEDICINE
Payer: COMMERCIAL

## 2023-11-18 ENCOUNTER — APPOINTMENT (EMERGENCY)
Dept: CT IMAGING | Facility: HOSPITAL | Age: 28
End: 2023-11-18
Payer: COMMERCIAL

## 2023-11-18 VITALS
TEMPERATURE: 98.5 F | RESPIRATION RATE: 20 BRPM | DIASTOLIC BLOOD PRESSURE: 54 MMHG | SYSTOLIC BLOOD PRESSURE: 110 MMHG | BODY MASS INDEX: 33.67 KG/M2 | HEART RATE: 78 BPM | WEIGHT: 227.29 LBS | HEIGHT: 69 IN | OXYGEN SATURATION: 99 %

## 2023-11-18 DIAGNOSIS — N10 ACUTE PYELONEPHRITIS: Primary | ICD-10-CM

## 2023-11-18 DIAGNOSIS — N89.8 VAGINAL DISCHARGE: ICD-10-CM

## 2023-11-18 LAB
BACTERIA UR QL AUTO: ABNORMAL /HPF
BILIRUB UR QL STRIP: NEGATIVE
CLARITY UR: ABNORMAL
COLOR UR: YELLOW
EXT PREGNANCY TEST URINE: NEGATIVE
EXT. CONTROL: NORMAL
GLUCOSE UR STRIP-MCNC: ABNORMAL MG/DL
HGB UR QL STRIP.AUTO: ABNORMAL
KETONES UR STRIP-MCNC: NEGATIVE MG/DL
LEUKOCYTE ESTERASE UR QL STRIP: ABNORMAL
NITRITE UR QL STRIP: NEGATIVE
NON-SQ EPI CELLS URNS QL MICRO: ABNORMAL /HPF
PH UR STRIP.AUTO: 6 [PH] (ref 4.5–8)
PROT UR STRIP-MCNC: NEGATIVE MG/DL
RBC #/AREA URNS AUTO: ABNORMAL /HPF
SP GR UR STRIP.AUTO: 1.02 (ref 1–1.03)
UROBILINOGEN UR QL STRIP.AUTO: 1 E.U./DL
WBC #/AREA URNS AUTO: ABNORMAL /HPF

## 2023-11-18 PROCEDURE — 87186 SC STD MICRODIL/AGAR DIL: CPT

## 2023-11-18 PROCEDURE — 87591 N.GONORRHOEAE DNA AMP PROB: CPT | Performed by: EMERGENCY MEDICINE

## 2023-11-18 PROCEDURE — 87510 GARDNER VAG DNA DIR PROBE: CPT | Performed by: EMERGENCY MEDICINE

## 2023-11-18 PROCEDURE — 87491 CHLMYD TRACH DNA AMP PROBE: CPT | Performed by: EMERGENCY MEDICINE

## 2023-11-18 PROCEDURE — G1004 CDSM NDSC: HCPCS

## 2023-11-18 PROCEDURE — 81001 URINALYSIS AUTO W/SCOPE: CPT

## 2023-11-18 PROCEDURE — 99284 EMERGENCY DEPT VISIT MOD MDM: CPT | Performed by: EMERGENCY MEDICINE

## 2023-11-18 PROCEDURE — 87563 M. GENITALIUM AMP PROBE: CPT | Performed by: EMERGENCY MEDICINE

## 2023-11-18 PROCEDURE — 74176 CT ABD & PELVIS W/O CONTRAST: CPT

## 2023-11-18 PROCEDURE — 87077 CULTURE AEROBIC IDENTIFY: CPT

## 2023-11-18 PROCEDURE — 87086 URINE CULTURE/COLONY COUNT: CPT

## 2023-11-18 PROCEDURE — 99284 EMERGENCY DEPT VISIT MOD MDM: CPT

## 2023-11-18 PROCEDURE — 87660 TRICHOMONAS VAGIN DIR PROBE: CPT | Performed by: EMERGENCY MEDICINE

## 2023-11-18 PROCEDURE — 87480 CANDIDA DNA DIR PROBE: CPT | Performed by: EMERGENCY MEDICINE

## 2023-11-18 PROCEDURE — 81025 URINE PREGNANCY TEST: CPT | Performed by: EMERGENCY MEDICINE

## 2023-11-18 PROCEDURE — 87661 TRICHOMONAS VAGINALIS AMPLIF: CPT | Performed by: EMERGENCY MEDICINE

## 2023-11-18 PROCEDURE — 96372 THER/PROPH/DIAG INJ SC/IM: CPT

## 2023-11-18 RX ORDER — KETOROLAC TROMETHAMINE 30 MG/ML
15 INJECTION, SOLUTION INTRAMUSCULAR; INTRAVENOUS ONCE
Status: COMPLETED | OUTPATIENT
Start: 2023-11-18 | End: 2023-11-18

## 2023-11-18 RX ORDER — NAPROXEN 250 MG/1
250 TABLET ORAL
Qty: 21 TABLET | Refills: 0 | Status: SHIPPED | OUTPATIENT
Start: 2023-11-18 | End: 2023-12-01 | Stop reason: ALTCHOICE

## 2023-11-18 RX ORDER — CEPHALEXIN 500 MG/1
500 CAPSULE ORAL EVERY 8 HOURS SCHEDULED
Qty: 30 CAPSULE | Refills: 0 | Status: SHIPPED | OUTPATIENT
Start: 2023-11-18 | End: 2023-11-28

## 2023-11-18 RX ORDER — PHENAZOPYRIDINE HYDROCHLORIDE 200 MG/1
200 TABLET, FILM COATED ORAL 3 TIMES DAILY
Qty: 6 TABLET | Refills: 0 | Status: SHIPPED | OUTPATIENT
Start: 2023-11-18 | End: 2023-11-20

## 2023-11-18 RX ADMIN — LIDOCAINE HYDROCHLORIDE 500 MG: 10 INJECTION, SOLUTION EPIDURAL; INFILTRATION; INTRACAUDAL; PERINEURAL at 17:35

## 2023-11-18 RX ADMIN — KETOROLAC TROMETHAMINE 15 MG: 30 INJECTION, SOLUTION INTRAMUSCULAR; INTRAVENOUS at 17:32

## 2023-11-18 NOTE — Clinical Note
Yocasta Gibson was seen and treated in our emergency department on 11/18/2023. No restrictions            Diagnosis:     Stef Fry  may return to work on return date. She may return on this date: 11/20/2023         If you have any questions or concerns, please don't hesitate to call.       Chris Arguello MD    ______________________________           _______________          _______________  Hospital Representative                              Date                                Time

## 2023-11-18 NOTE — ED PROVIDER NOTES
History  Chief Complaint   Patient presents with    Abdominal Pain     Patient reports b/l lower abdominal pain, flank pain and urinary frequency since yesterday. 81-year-old female presents for evaluation of several days of burning lower abdominal/pelvic pain which radiates into her bilateral flanks. Pain is constant moderate intensity, without modifying factors or associated with urinary frequency, urgency, dysuria, white vaginal discharge without bleeding. No nausea, vomiting fevers, chills, diarrhea, constipation. History provided by:  Patient  Abdominal Pain  Associated symptoms: dysuria and vaginal discharge    Associated symptoms: no chest pain, no constipation, no diarrhea, no fatigue, no fever, no hematuria, no nausea, no shortness of breath, no sore throat and no vomiting        Prior to Admission Medications   Prescriptions Last Dose Informant Patient Reported? Taking? FLUoxetine (PROzac) 10 mg capsule   Yes No   Sig: Take 10 mg by mouth daily   Multiple Vitamins-Minerals (MULTIVITAMIN ADULT PO)  Self Yes No   Sig: Take by mouth daily   acetaminophen (TYLENOL) 650 mg CR tablet   No No   Sig: Take 1 tablet (650 mg total) by mouth every 8 (eight) hours as needed for mild pain   calcium carbonate (OS-GABI) 600 MG tablet   Yes No   Sig: Take 600 mg by mouth 2 (two) times a day with meals   diclofenac sodium (VOLTAREN) 25 MG EC tablet   No No   Sig: Take 1 tablet (25 mg total) by mouth 3 (three) times a day for 10 days (Take it with meal)   ketoconazole (NIZORAL) 2 % shampoo   No No   Sig: Apply topically twice a week to scalp for 8 weeks and then as needed. Leave on for 5 minutes, then rinse.    omeprazole (PriLOSEC) 20 mg delayed release capsule   No No   Sig: Take 1 capsule (20 mg total) by mouth daily   sucralfate (CARAFATE) 1 g tablet   No No   Sig: Take 1 tablet (1 g total) by mouth 4 (four) times a day Crush and mix with water to take as suspension   terconazole (TERAZOL 7) 0.4 % vaginal cream   No No   Sig: Insert 1 applicator into the vagina daily at bedtime   Patient not taking: Reported on 10/10/2023      Facility-Administered Medications: None       Past Medical History:   Diagnosis Date    History of transfusion     after  - had fever with transfusion    Obesity (BMI 35.0-39.9 without comorbidity)     S/P laparoscopic sleeve gastrectomy 2020    Uses Czech as primary spoken language        Past Surgical History:   Procedure Laterality Date     SECTION  2017    CHOLECYSTECTOMY      [place] LAPS 175 [address]  RSTRICTIV PX LONGITUDINAL GASTRECTOMY N/A 2020    Procedure: LAP SLEEVE GASTRECTOMY, INTRAOP EGD;  Surgeon: Stephany Nolan MD;  Location: [facility] OR;  Service: Bariatrics       Family History   Problem Relation Age of Onset    Diabetes Mother     Other Mother         gastric bypass    Diabetes Father     Diabetes Sister     Sleep apnea Sister     Sleep apnea Brother         2 of the 3 borthers have EAGLE    No Known Problems Brother     Diabetes Maternal Grandmother     Skin cancer Maternal Grandmother     No Known Problems Maternal Grandfather     Diabetes Paternal Grandmother     No Known Problems Paternal Grandfather     No Known Problems Son     Heart disease Family         CARDIOVASCULAR DISEASE     I have reviewed and agree with the history as documented. E-Cigarette/Vaping    E-Cigarette Use Never User      E-Cigarette/Vaping Substances    Nicotine No     THC No     CBD No     Flavoring No     Other No     Unknown No      Social History     Tobacco Use    Smoking status: Never    Smokeless tobacco: Never   Vaping Use    Vaping Use: Never used   Substance Use Topics    Alcohol use: No    Drug use: No       Review of Systems   Constitutional:  Negative for activity change, appetite change, fatigue and fever. HENT:  Negative for congestion, dental problem, ear pain, rhinorrhea and sore throat. Eyes:  Negative for pain and redness.    Respiratory:  Negative for chest tightness, shortness of breath and wheezing. Cardiovascular:  Negative for chest pain and palpitations. Gastrointestinal:  Positive for abdominal pain. Negative for blood in stool, constipation, diarrhea, nausea and vomiting. Endocrine: Negative for cold intolerance and heat intolerance. Genitourinary:  Positive for dysuria, flank pain, frequency and vaginal discharge. Negative for hematuria. Musculoskeletal:  Negative for arthralgias and myalgias. Skin:  Negative for color change, pallor and rash. Neurological:  Negative for weakness and numbness. Hematological:  Does not bruise/bleed easily. Psychiatric/Behavioral:  Negative for agitation, hallucinations and suicidal ideas. Physical Exam  Physical Exam  Vitals and nursing note reviewed. Constitutional:       Appearance: She is well-developed. HENT:      Mouth/Throat:      Pharynx: No oropharyngeal exudate. Eyes:      Conjunctiva/sclera: Conjunctivae normal.   Neck:      Comments: No meningeal signs  Cardiovascular:      Rate and Rhythm: Normal rate and regular rhythm. Heart sounds: Normal heart sounds. Pulmonary:      Effort: Pulmonary effort is normal. No respiratory distress. Breath sounds: Normal breath sounds. No wheezing or rales. Chest:      Chest wall: No tenderness. Abdominal:      General: Bowel sounds are normal. There is no distension. Palpations: Abdomen is soft. There is no mass. Tenderness: There is no abdominal tenderness. There is no right CVA tenderness, left CVA tenderness, guarding or rebound. Hernia: No hernia is present. Comments: No cvat   Musculoskeletal:         General: Normal range of motion. Cervical back: Normal range of motion and neck supple. Lymphadenopathy:      Cervical: No cervical adenopathy. Skin:     Findings: No erythema or rash. Comments: No edema   Neurological:      Mental Status: She is alert and oriented to person, place, and time. Cranial Nerves: No cranial nerve deficit. Psychiatric:         Behavior: Behavior normal.         Vital Signs  ED Triage Vitals [11/18/23 1652]   Temperature Pulse Respirations Blood Pressure SpO2   98.5 °F (36.9 °C) 78 20 110/54 99 %      Temp Source Heart Rate Source Patient Position - Orthostatic VS BP Location FiO2 (%)   Oral Monitor Sitting Left arm --      Pain Score       10 - Worst Possible Pain           Vitals:    11/18/23 1652   BP: 110/54   Pulse: 78   Patient Position - Orthostatic VS: Sitting         Visual Acuity      ED Medications  Medications   ketorolac (TORADOL) injection 15 mg (15 mg Intramuscular Given 11/18/23 1732)   cefTRIAXone (ROCEPHIN) 500 mg in lidocaine (PF) (XYLOCAINE-MPF) 1 % IM only syringe (500 mg Intramuscular Given 11/18/23 1735)       Diagnostic Studies  Results Reviewed       Procedure Component Value Units Date/Time    Trichomonas vaginalis/Mycoplasma genitalium PCR [[id number]] Collected: 11/18/23 1739    Lab Status: In process Specimen: Urine, Clean Catch Updated: 11/18/23 1750    Vaginosis DNA Probe [[id number]] Collected: 11/18/23 1746    Lab Status: In process Specimen: Genital from Vaginal Updated: 11/18/23 1749    Narrative: The following orders were created for panel order Vaginosis DNA Probe. Procedure                               Abnormality         Status                     ---------                               -----------         ------                     VAGINOSIS DNA PROBE (AFF. [facility] Le[facility] [[id number]]                      In process                   Please view results for these tests on the individual orders. VAGINOSIS DNA PROBE (AFFIRM) [[id number]] Collected: 11/18/23 1746    Lab Status: In process Specimen: Genital from Vaginal Updated: 11/18/23 1749    Chlamydia/GC amplified DNA by PCR [[id number]] Collected: 11/18/23 1723    Lab Status:  In process Specimen: Urine, Other Updated: 11/18/23 1729    Urine Microscopic [[id number]]  (Abnormal) Collected: 11/18/23 1703    Lab Status: Final result Specimen: Urine, Clean Catch Updated: 11/18/23 1721     RBC, UA 4-10 /hpf      WBC, UA 30-50 /hpf      Epithelial Cells Occasional /hpf      Bacteria, UA None Seen /hpf     Urine culture [[id number]] Collected: 11/18/23 1703    Lab Status: In process Specimen: Urine, Clean Catch Updated: 11/18/23 1721    POCT pregnancy, urine [[id number]]  (Normal) Resulted: 11/18/23 1705    Lab Status: Final result Updated: 11/18/23 1705     EXT Preg Test, Ur Negative     Control Valid    Urine Macroscopic, POC [[id number]]  (Abnormal) Collected: 11/18/23 1703    Lab Status: Final result Specimen: Urine Updated: 11/18/23 1704     Color, UA Yellow     Clarity, UA Cloudy     pH, UA 6.0     Leukocytes, UA Trace     Nitrite, UA Negative     Protein, UA Negative mg/dl      Glucose, UA >=1000 (1%) mg/dl      Ketones, UA Negative mg/dl      Urobilinogen, UA 1.0 E.U./dl      Bilirubin, UA Negative     Occult Blood, UA Trace     Specific Gravity, UA 1.025    Narrative:      CLINITEK RESULT                   CT renal stone study abdomen pelvis without contrast   Final Result by Cedric Jaramillo MD (11/18 2000)         1. No evidence of nephrolithiasis or obstructive uropathy. 2. No findings to indicate bowel obstruction, colitis or diverticulitis. Workstation performed: [id number]                    Procedures  Procedures         ED Course  ED Course as of 11/18/23 2005   Sat Nov 18, 2023   1729 WBC, UA(!): 30-50   2002 Patient has findings for pyelonephritis on exam.  Will be treated for this. Patient instructed on return precautions. Patient will follow-up with OB/GYN for vaginal discharge and is aware of pending studies and need to practice safe sex. SBIRT 20yo+      Flowsheet Row Most Recent Value   Initial Alcohol Screen: US AUDIT-C     1. How often do you have a drink containing alcohol? 0 Filed at: 11/18/2023 1707   2.  How many drinks containing alcohol do you have on a typical day you are drinking? 0 Filed at: 11/18/2023 1707   3a. Male UNDER 65: How often do you have five or more drinks on one occasion? 0 Filed at: 11/18/2023 1707   3b. FEMALE Any Age, or MALE 65+: How often do you have 4 or more drinks on one occassion? 0 Filed at: 11/18/2023 1707   Audit-C Score 0 Filed at: 11/18/2023 1707   CHERRI: How many times in the past year have you. .. Used an illegal drug or used a prescription medication for non-medical reasons? Never Filed at: 11/18/2023 1707                      Medical Decision Making  Lower abdominal/pelvic pain rating to the flank with urinary complaints vaginal discharge-patient does not risk sepsis and lab work is not indicated. We will do urine pregnancy to rule out pregnancy complication, urine dip to rule out urinary tract infection, pyelonephritis, CT stone study to rule out kidney stone/pyelonephritis. Discussed vaginal discharge with patient concern for PID/cervicitis. Patient wishes to be treated empirically. We will do urine GC chlamydia, mycoplasma/trichomonas, vaginosis probe, OB/GYN follow-up. Amount and/or Complexity of Data Reviewed  Labs: ordered. Decision-making details documented in ED Course. Radiology: ordered. Risk  Prescription drug management. Disposition  Final diagnoses:   Acute pyelonephritis   Vaginal discharge     Time reflects when diagnosis was documented in both MDM as applicable and the Disposition within this note       Time User Action Codes Description Comment    11/18/2023  8:03 PM Shaunna Mckeon Add [N10] Acute pyelonephritis     11/18/2023  8:03 PM Shaunna Mckeon Add [N89.8] Vaginal discharge           ED Disposition       ED Disposition   Discharge    Condition   Stable    Date/Time   Sat Nov 18, 2023 2003    Comment   Kerrie Caballero discharge to home/self care.                    Follow-up Information       Follow up With Specialties Details Why Contact Info Additional [address] [facility] Schedule an appointment as soon as possible for a visit in 2 days  [address] [facility], [address] [facility] [address]  [address], [address] [facility], [address] [facility], [address] [facility] [place]    [address] [place] [address] Obstetrics and Gynecology Schedule an appointment as soon as possible for a visit in 1 week  [address] [facility] [address] [facility] [address]  [address] [facility], [address] [facility], [address] [id number] [place], [place], [id number] [phone]            Patient's Medications   Discharge Prescriptions    CEPHALEXIN (KEFLEX) 500 MG CAPSULE    Take 1 capsule (500 mg total) by mouth every 8 (eight) hours for 10 days       Start Date: 11/18/2023End Date: 11/28/2023       Order Dose: 500 mg       Quantity: 30 capsule    Refills: 0    NAPROXEN (NAPROSYN) 250 MG TABLET    Take 1 tablet (250 mg total) by mouth 3 (three) times a day with meals for 7 days       Start Date: 11/18/2023End Date: 11/25/2023       Order Dose: 250 mg       Quantity: 21 tablet    Refills: 0    PHENAZOPYRIDINE (PYRIDIUM) 200 MG TABLET    Take 1 tablet (200 mg total) by mouth 3 (three) times a day for 2 days       Start Date: 11/18/2023End Date: 11/20/2023       Order Dose: 200 mg       Quantity: 6 tablet    Refills: 0       No discharge procedures on file.     PDMP Review         Value Time User    PDMP Reviewed  Yes 7/14/2021  2:12 PM Niall Garcia PA-C            ED Provider  Electronically Signed by             Monica Moura MD  11/18/23 2005

## 2023-11-19 LAB
C TRACH DNA SPEC QL NAA+PROBE: NEGATIVE
CANDIDA RRNA VAG QL PROBE: DETECTED
G VAGINALIS RRNA GENITAL QL PROBE: DETECTED
N GONORRHOEA DNA SPEC QL NAA+PROBE: NEGATIVE
T VAGINALIS RRNA GENITAL QL PROBE: NOT DETECTED

## 2023-11-20 LAB
M GENITALIUM DNA SPEC QL NAA+PROBE: NEGATIVE
T VAGINALIS DNA SPEC QL NAA+PROBE: NEGATIVE

## 2023-11-21 ENCOUNTER — VBI (OUTPATIENT)
Dept: ADMINISTRATIVE | Facility: OTHER | Age: 28
End: 2023-11-21

## 2023-11-21 DIAGNOSIS — B96.89 BACTERIAL VAGINOSIS: ICD-10-CM

## 2023-11-21 DIAGNOSIS — B37.31 CANDIDAL VAGINITIS: Primary | ICD-10-CM

## 2023-11-21 DIAGNOSIS — N76.0 BACTERIAL VAGINOSIS: ICD-10-CM

## 2023-11-21 LAB — BACTERIA UR CULT: ABNORMAL

## 2023-11-21 RX ORDER — FLUCONAZOLE 150 MG/1
150 TABLET ORAL ONCE
Qty: 1 TABLET | Refills: 0 | Status: SHIPPED | OUTPATIENT
Start: 2023-11-21 | End: 2023-11-21

## 2023-11-21 RX ORDER — METRONIDAZOLE 500 MG/1
500 TABLET ORAL EVERY 12 HOURS SCHEDULED
Qty: 14 TABLET | Refills: 0 | Status: SHIPPED | OUTPATIENT
Start: 2023-11-21 | End: 2023-11-28

## 2023-12-01 ENCOUNTER — OFFICE VISIT (OUTPATIENT)
Dept: PODIATRY | Facility: CLINIC | Age: 28
End: 2023-12-01
Payer: COMMERCIAL

## 2023-12-01 VITALS
BODY MASS INDEX: 33.62 KG/M2 | WEIGHT: 227 LBS | HEART RATE: 84 BPM | DIASTOLIC BLOOD PRESSURE: 64 MMHG | SYSTOLIC BLOOD PRESSURE: 98 MMHG | HEIGHT: 69 IN

## 2023-12-01 DIAGNOSIS — S86.302A PERONEAL TENDON INJURY, LEFT, INITIAL ENCOUNTER: ICD-10-CM

## 2023-12-01 DIAGNOSIS — S93.492A SPRAIN OF ANTERIOR TALOFIBULAR LIGAMENT OF LEFT ANKLE, INITIAL ENCOUNTER: ICD-10-CM

## 2023-12-01 PROCEDURE — 99213 OFFICE O/P EST LOW 20 MIN: CPT | Performed by: PODIATRIST

## 2023-12-01 RX ORDER — DICLOFENAC SODIUM 25 MG/1
25 TABLET, DELAYED RELEASE ORAL 3 TIMES DAILY
Qty: 30 TABLET | Refills: 0 | Status: SHIPPED | OUTPATIENT
Start: 2023-12-01 | End: 2023-12-11

## 2023-12-01 NOTE — PROGRESS NOTES
[facility]       ASSESSMENT:     1. Sprain of anterior talofibular ligament of left ankle, initial encounter  diclofenac sodium (VOLTAREN) 25 MG EC tablet      2. Peroneal tendon injury, left, initial encounter  diclofenac sodium (VOLTAREN) 25 MG EC tablet                PLAN:  1. Reviewed medical records. Patient was counseled and educated on the condition and the diagnosis. 2. She is doing well. Instructed supportive care and home exercise. Diclofenac for pain as needed. Rx sent. 3. Pt to call the office if her condition does not resolve in the next few weeks. Imaging: I have personally reviewed pertinent films in PACS  Labs, pathology, and Other Studies: I have personally reviewed pertinent reports. Subjective:       HPI  The patient presents for follow-up on left ankle injury. She is much better now and off the boot. Tolerates sneakers. Her pain is 60% better. No associated numbness or paresthesia. No significant weakness or dysfunction. The following portions of the patient's history were reviewed and updated as appropriate: allergies, current medications, past family history, past medical history, past social history, past surgical history and problem list.  All pertinent labs and images were reviewed.       Past Medical History  Past Medical History:   Diagnosis Date    History of transfusion     after  - had fever with transfusion    Obesity (BMI 35.0-39.9 without comorbidity)     S/P laparoscopic sleeve gastrectomy 2020    Uses Turkmen as primary spoken language        Past Surgical History  Past Surgical History:   Procedure Laterality Date     SECTION  2017    CHOLECYSTECTOMY      WA LAPS [address] Ortega RSTRICTIV PX LONGITUDINAL GASTRECTOMY N/A 2020    Procedure: LAP SLEEVE GASTRECTOMY, INTRAOP EGD;  Surgeon: Lasha Silver MD;  Location: [facility] OR;  Service: [facility] Allergies:  Patient has no known allergies. Medications:  Current Outpatient Medications   Medication Sig Dispense Refill    acetaminophen (TYLENOL) 650 mg CR tablet Take 1 tablet (650 mg total) by mouth every 8 (eight) hours as needed for mild pain 30 tablet 0    calcium carbonate (OS-GABI) 600 MG tablet Take 600 mg by mouth 2 (two) times a day with meals      diclofenac sodium (VOLTAREN) 25 MG EC tablet Take 1 tablet (25 mg total) by mouth 3 (three) times a day for 10 days (Take it with meal) 30 tablet 0    FLUoxetine (PROzac) 10 mg capsule Take 10 mg by mouth daily      ketoconazole (NIZORAL) 2 % shampoo Apply topically twice a week to scalp for 8 weeks and then as needed. Leave on for 5 minutes, then rinse. 120 mL 0    Multiple Vitamins-Minerals (MULTIVITAMIN ADULT PO) Take by mouth daily      omeprazole (PriLOSEC) 20 mg delayed release capsule Take 1 capsule (20 mg total) by mouth daily 30 capsule 4    sucralfate (CARAFATE) 1 g tablet Take 1 tablet (1 g total) by mouth 4 (four) times a day Crush and mix with water to take as suspension 120 tablet 1    terconazole (TERAZOL 7) 0.4 % vaginal cream Insert 1 applicator into the vagina daily at bedtime (Patient not taking: Reported on 12/1/2023) 45 g 0     No current facility-administered medications for this visit. Social History:  Social History     Socioeconomic History    Marital status: Single     Spouse name: Not on file    Number of children: Not on file    Years of education: Not on file    Highest education level: Not on file   Occupational History    Not on file   Tobacco Use    Smoking status: Never    Smokeless tobacco: Never   Vaping Use    Vaping Use: Never used   Substance and Sexual Activity    Alcohol use: No    Drug use: No    Sexual activity: Yes     Partners: Male     Birth control/protection: I.U.D.    Other Topics Concern    Not on file   Social History Narrative    Not on file     Social Determinants of Health     Financial Resource Strain: Not on file   Food Insecurity: No Food Insecurity (11/30/2021)    Hunger Vital Sign     Worried About Running Out of Food in the Last Year: Never true     Ran Out of Food in the Last Year: Never true   Transportation Needs: No Transportation Needs (11/30/2021)    PRAPARE - Transportation     Lack of Transportation (Medical): No     Lack of Transportation (Non-Medical): No   Physical Activity: Not on file   Stress: Not on file   Social Connections: Not on file   Intimate Partner Violence: Not on file   Housing Stability: Low Risk  (11/30/2021)    Housing Stability Vital Sign     Unable to Pay for Housing in the Last Year: No     Number of Places Lived in the Last Year: 1     Unstable Housing in the Last Year: No          Review of Systems   Constitutional:  Negative for chills and fever. Respiratory:  Negative for cough and shortness of breath. Cardiovascular:  Negative for chest pain. Gastrointestinal:  Negative for nausea and vomiting. Musculoskeletal:  Negative for gait problem. Neurological:  Negative for weakness and numbness. Psychiatric/Behavioral:  Negative for confusion. Objective:      BP 98/64   Pulse 84   Ht 5' 9" (1.753 m)   Wt 103 kg (227 lb)   BMI 33.52 kg/m²          Physical Exam  Vitals reviewed. Constitutional:       General: She is not in acute distress. Appearance: She is not toxic-appearing or diaphoretic. Cardiovascular:      Rate and Rhythm: Normal rate and regular rhythm. Pulses: Normal pulses. Dorsalis pedis pulses are 2+ on the right side and 2+ on the left side. Posterior tibial pulses are 2+ on the right side and 2+ on the left side. Pulmonary:      Effort: Pulmonary effort is normal. No respiratory distress. Musculoskeletal:      Right lower leg: No edema. Left lower leg: No edema. Right foot: No foot drop. Left foot: No foot drop. Comments: Swelling resolved left lateral ankle.   Mild tenderness presents around left ATFL. No pain on peroneal tendon or deltoid ligament. No pain over left STJ or left 5th metatarsal base. Ecchymosis resolved. Function and ROM WNL. Skin:     General: Skin is warm. Capillary Refill: Capillary refill takes less than 2 seconds. Coloration: Skin is not cyanotic or mottled. Findings: No abscess. Nails: There is no clubbing. Neurological:      General: No focal deficit present. Mental Status: She is alert and oriented to person, place, and time. Cranial Nerves: No cranial nerve deficit. Sensory: No sensory deficit. Motor: No weakness. Coordination: Coordination normal.   Psychiatric:         Mood and Affect: Mood normal.         Behavior: Behavior normal.         Thought Content:  Thought content normal.         Judgment: Judgment normal.

## 2024-02-12 ENCOUNTER — APPOINTMENT (OUTPATIENT)
Dept: LAB | Facility: HOSPITAL | Age: 29
End: 2024-02-12
Payer: COMMERCIAL

## 2024-02-12 ENCOUNTER — OFFICE VISIT (OUTPATIENT)
Dept: FAMILY MEDICINE CLINIC | Facility: CLINIC | Age: 29
End: 2024-02-12

## 2024-02-12 VITALS
OXYGEN SATURATION: 99 % | WEIGHT: 233 LBS | RESPIRATION RATE: 16 BRPM | TEMPERATURE: 98.6 F | DIASTOLIC BLOOD PRESSURE: 80 MMHG | HEIGHT: 69 IN | BODY MASS INDEX: 34.51 KG/M2 | SYSTOLIC BLOOD PRESSURE: 110 MMHG | HEART RATE: 77 BPM

## 2024-02-12 DIAGNOSIS — R42 DIZZINESS: ICD-10-CM

## 2024-02-12 DIAGNOSIS — R51.9 NONINTRACTABLE HEADACHE, UNSPECIFIED CHRONICITY PATTERN, UNSPECIFIED HEADACHE TYPE: ICD-10-CM

## 2024-02-12 DIAGNOSIS — K91.2 POSTSURGICAL MALABSORPTION: ICD-10-CM

## 2024-02-12 DIAGNOSIS — E53.8 LOW SERUM VITAMIN B12: ICD-10-CM

## 2024-02-12 DIAGNOSIS — Z98.84 BARIATRIC SURGERY STATUS: ICD-10-CM

## 2024-02-12 DIAGNOSIS — R51.9 NONINTRACTABLE HEADACHE, UNSPECIFIED CHRONICITY PATTERN, UNSPECIFIED HEADACHE TYPE: Primary | ICD-10-CM

## 2024-02-12 LAB
ALBUMIN SERPL BCP-MCNC: 4.3 G/DL (ref 3.5–5)
ALP SERPL-CCNC: 74 U/L (ref 34–104)
ALT SERPL W P-5'-P-CCNC: 11 U/L (ref 7–52)
ANION GAP SERPL CALCULATED.3IONS-SCNC: 8 MMOL/L
AST SERPL W P-5'-P-CCNC: 12 U/L (ref 13–39)
BASOPHILS # BLD AUTO: 0.02 THOUSANDS/ÂΜL (ref 0–0.1)
BASOPHILS NFR BLD AUTO: 0 % (ref 0–1)
BILIRUB SERPL-MCNC: 1 MG/DL (ref 0.2–1)
BUN SERPL-MCNC: 9 MG/DL (ref 5–25)
CALCIUM SERPL-MCNC: 9 MG/DL (ref 8.4–10.2)
CHLORIDE SERPL-SCNC: 103 MMOL/L (ref 96–108)
CO2 SERPL-SCNC: 27 MMOL/L (ref 21–32)
CREAT SERPL-MCNC: 0.75 MG/DL (ref 0.6–1.3)
EOSINOPHIL # BLD AUTO: 0.05 THOUSAND/ÂΜL (ref 0–0.61)
EOSINOPHIL NFR BLD AUTO: 1 % (ref 0–6)
ERYTHROCYTE [DISTWIDTH] IN BLOOD BY AUTOMATED COUNT: 12.4 % (ref 11.6–15.1)
GFR SERPL CREATININE-BSD FRML MDRD: 109 ML/MIN/1.73SQ M
GLUCOSE SERPL-MCNC: 224 MG/DL (ref 65–140)
HCT VFR BLD AUTO: 38.1 % (ref 34.8–46.1)
HGB BLD-MCNC: 12.2 G/DL (ref 11.5–15.4)
IMM GRANULOCYTES # BLD AUTO: 0.02 THOUSAND/UL (ref 0–0.2)
IMM GRANULOCYTES NFR BLD AUTO: 0 % (ref 0–2)
LYMPHOCYTES # BLD AUTO: 1.46 THOUSANDS/ÂΜL (ref 0.6–4.47)
LYMPHOCYTES NFR BLD AUTO: 23 % (ref 14–44)
MCH RBC QN AUTO: 29.5 PG (ref 26.8–34.3)
MCHC RBC AUTO-ENTMCNC: 32 G/DL (ref 31.4–37.4)
MCV RBC AUTO: 92 FL (ref 82–98)
MONOCYTES # BLD AUTO: 0.41 THOUSAND/ÂΜL (ref 0.17–1.22)
MONOCYTES NFR BLD AUTO: 6 % (ref 4–12)
NEUTROPHILS # BLD AUTO: 4.49 THOUSANDS/ÂΜL (ref 1.85–7.62)
NEUTS SEG NFR BLD AUTO: 70 % (ref 43–75)
NRBC BLD AUTO-RTO: 0 /100 WBCS
PLATELET # BLD AUTO: 160 THOUSANDS/UL (ref 149–390)
PMV BLD AUTO: 12.2 FL (ref 8.9–12.7)
POTASSIUM SERPL-SCNC: 3.9 MMOL/L (ref 3.5–5.3)
PROT SERPL-MCNC: 7.3 G/DL (ref 6.4–8.4)
RBC # BLD AUTO: 4.14 MILLION/UL (ref 3.81–5.12)
SODIUM SERPL-SCNC: 138 MMOL/L (ref 135–147)
WBC # BLD AUTO: 6.45 THOUSAND/UL (ref 4.31–10.16)

## 2024-02-12 PROCEDURE — 99213 OFFICE O/P EST LOW 20 MIN: CPT | Performed by: FAMILY MEDICINE

## 2024-02-12 PROCEDURE — 83918 ORGANIC ACIDS TOTAL QUANT: CPT

## 2024-02-12 PROCEDURE — 80053 COMPREHEN METABOLIC PANEL: CPT

## 2024-02-12 PROCEDURE — 85025 COMPLETE CBC W/AUTO DIFF WBC: CPT

## 2024-02-12 PROCEDURE — 36415 COLL VENOUS BLD VENIPUNCTURE: CPT

## 2024-02-12 PROCEDURE — 86665 EPSTEIN-BARR CAPSID VCA: CPT

## 2024-02-12 PROCEDURE — 82607 VITAMIN B-12: CPT

## 2024-02-12 PROCEDURE — 86664 EPSTEIN-BARR NUCLEAR ANTIGEN: CPT

## 2024-02-12 PROCEDURE — 86663 EPSTEIN-BARR ANTIBODY: CPT

## 2024-02-12 RX ORDER — SENNOSIDES 8.6 MG
650 CAPSULE ORAL EVERY 8 HOURS PRN
Qty: 30 TABLET | Refills: 0 | Status: SHIPPED | OUTPATIENT
Start: 2024-02-12

## 2024-02-12 RX ORDER — IBUPROFEN 600 MG/1
600 TABLET ORAL EVERY 6 HOURS PRN
Qty: 30 TABLET | Refills: 0 | Status: SHIPPED | OUTPATIENT
Start: 2024-02-12

## 2024-02-12 RX ORDER — MECLIZINE HCL 12.5 MG/1
12.5 TABLET ORAL 3 TIMES DAILY PRN
Qty: 30 TABLET | Refills: 0 | Status: SHIPPED | OUTPATIENT
Start: 2024-02-12

## 2024-02-12 NOTE — ASSESSMENT & PLAN NOTE
Differentials include migraine, dehydration vs EBV   PLAN:  - Advised to keep hydrated and drink plenty of fluids, try Gatorade or Pedialyte   - Tylenol or Ibuprofen q 8hrs prn for fever, sore throat or body aches   - Return if worsening pain   - will do labs to test for anemia, liver function and EBV due to left upper quadrant pain and general malaise

## 2024-02-12 NOTE — PROGRESS NOTES
Name: Syed Taveras      : 1995      MRN: [id number]  Encounter Provider: Alexis Reeder MD  Encounter Date: 2024   Encounter department: [facility]    Assessment & Plan     1. Nonintractable headache, unspecified chronicity pattern, unspecified headache type  Assessment & Plan:  Differentials include migraine, dehydration vs EBV   PLAN:  - Advised to keep hydrated and drink plenty of fluids, try Gatorade or Pedialyte   - Tylenol or Ibuprofen q 8hrs prn for fever, sore throat or body aches   - Return if worsening pain   - will do labs to test for anemia, liver function and EBV due to left upper quadrant pain and general malaise       Orders:  -     CBC and differential; Future  -     Comprehensive metabolic panel; Future  -     acetaminophen (TYLENOL) 650 mg CR tablet; Take 1 tablet (650 mg total) by mouth every 8 (eight) hours as needed for mild pain  -     ibuprofen (MOTRIN) 600 mg tablet; Take 1 tablet (600 mg total) by mouth every 6 (six) hours as needed for mild pain  -     EBV acute panel; Future    2. Dizziness  -     CBC and differential; Future  -     Comprehensive metabolic panel; Future  -     meclizine (ANTIVERT) 12.5 MG tablet; Take 1 tablet (12.5 mg total) by mouth 3 (three) times a day as needed for dizziness or nausea           Subjective      Patient is Danish speaking only and voice  services was utilized to conduct office visit.      Syed Taveras is a 28 y.o. female with pmhx of Diabetes and migraine presenting today headaches, lightheadedness and dizziness.     Reports that she has not been feeling well for the last week and a half. Reports that she has been having headaches, lightheadedness and dizziness. She has also been unable to eat and drink much, reports she also feels yellow in the eyes    Dizziness  This is a new problem. The current episode started 1 to 4 weeks ago. The problem occurs constantly.  Associated symptoms include anorexia, fatigue and headaches. Pertinent negatives include no abdominal pain, arthralgias, chest pain, chills, congestion, coughing, fever, nausea, neck pain, rash, sore throat or vomiting. She has tried NSAIDs and acetaminophen for the symptoms. The treatment provided no relief.   Headache  Headache pattern:  Some headache always there, and the pain level varies  Duration:  Less than 2 weeks  Location:  Temples/sides  Pain severity:  10    Review of Systems   Constitutional:  Positive for fatigue. Negative for chills and fever.   HENT:  Negative for congestion, ear pain and sore throat.    Eyes:  Negative for pain and visual disturbance.   Respiratory:  Negative for cough and shortness of breath.    Cardiovascular:  Negative for chest pain and palpitations.   Gastrointestinal:  Positive for anorexia. Negative for abdominal pain, nausea and vomiting.        Watery stools    Genitourinary:  Negative for dysuria and hematuria.   Musculoskeletal:  Negative for arthralgias, back pain and neck pain.   Skin:  Negative for color change and rash.   Neurological:  Positive for dizziness, light-headedness and headaches. Negative for seizures and syncope.   All other systems reviewed and are negative.      Current Outpatient Medications on File Prior to Visit   Medication Sig   • calcium carbonate (OS-GABI) 600 MG tablet Take 600 mg by mouth 2 (two) times a day with meals   • diclofenac sodium (VOLTAREN) 25 MG EC tablet Take 1 tablet (25 mg total) by mouth 3 (three) times a day for 10 days (Take it with meal)   • FLUoxetine (PROzac) 10 mg capsule Take 10 mg by mouth daily   • ketoconazole (NIZORAL) 2 % shampoo Apply topically twice a week to scalp for 8 weeks and then as needed.  Leave on for 5 minutes, then rinse.   • Multiple Vitamins-Minerals (MULTIVITAMIN ADULT PO) Take by mouth daily   • omeprazole (PriLOSEC) 20 mg delayed release capsule Take 1 capsule (20 mg total) by mouth daily   •  "sucralfate (CARAFATE) 1 g tablet Take 1 tablet (1 g total) by mouth 4 (four) times a day Crush and mix with water to take as suspension   • terconazole (TERAZOL 7) 0.4 % vaginal cream Insert 1 applicator into the vagina daily at bedtime (Patient not taking: Reported on 12/1/2023)   • [DISCONTINUED] acetaminophen (TYLENOL) 650 mg CR tablet Take 1 tablet (650 mg total) by mouth every 8 (eight) hours as needed for mild pain       Objective     /80 (BP Location: Right arm, Patient Position: Sitting, Cuff Size: Large)   Pulse 77   Temp 98.6 °F (37 °C) (Temporal)   Resp 16   Ht 5' 9\" (1.753 m)   Wt 106 kg (233 lb)   SpO2 99%   BMI 34.41 kg/m²     Physical Exam  Constitutional:       Appearance: Normal appearance.   HENT:      Head: Normocephalic and atraumatic.   Eyes:      Extraocular Movements: Extraocular movements intact.      Pupils: Pupils are equal, round, and reactive to light.   Cardiovascular:      Rate and Rhythm: Normal rate and regular rhythm.      Pulses: Normal pulses.      Heart sounds: Normal heart sounds.   Pulmonary:      Effort: Pulmonary effort is normal. No respiratory distress.      Breath sounds: Normal breath sounds. No stridor. No wheezing, rhonchi or rales.   Abdominal:      General: Bowel sounds are normal. There is no distension.      Palpations: Abdomen is soft.      Tenderness: There is abdominal tenderness in the epigastric area and left upper quadrant. There is no guarding. Negative signs include Curiel's sign and McBurney's sign.   Skin:     General: Skin is warm.      Capillary Refill: Capillary refill takes less than 2 seconds.   Neurological:      General: No focal deficit present.      Mental Status: She is alert and oriented to person, place, and time.   Psychiatric:         Mood and Affect: Mood normal.         Behavior: Behavior normal.       Alexis Reeder MD    "

## 2024-02-13 LAB
EBV NA IGG SER IA-ACNC: 585 U/ML (ref 0–17.9)
EBV VCA IGG SER IA-ACNC: >600 U/ML (ref 0–17.9)
EBV VCA IGM SER IA-ACNC: <36 U/ML (ref 0–35.9)
INTERPRETATION: ABNORMAL

## 2024-02-14 ENCOUNTER — APPOINTMENT (OUTPATIENT)
Dept: LAB | Facility: MEDICAL CENTER | Age: 29
End: 2024-02-14
Payer: COMMERCIAL

## 2024-02-14 ENCOUNTER — OFFICE VISIT (OUTPATIENT)
Dept: BARIATRICS | Facility: CLINIC | Age: 29
End: 2024-02-14
Payer: COMMERCIAL

## 2024-02-14 VITALS
SYSTOLIC BLOOD PRESSURE: 114 MMHG | HEIGHT: 69 IN | RESPIRATION RATE: 17 BRPM | WEIGHT: 231.4 LBS | BODY MASS INDEX: 34.27 KG/M2 | DIASTOLIC BLOOD PRESSURE: 70 MMHG | HEART RATE: 80 BPM

## 2024-02-14 DIAGNOSIS — Z87.442 HISTORY OF NEPHROLITHIASIS: ICD-10-CM

## 2024-02-14 DIAGNOSIS — E66.9 OBESITY, CLASS I, BMI 30-34.9: Primary | ICD-10-CM

## 2024-02-14 DIAGNOSIS — G43.E09 CHRONIC MIGRAINE WITH AURA: ICD-10-CM

## 2024-02-14 DIAGNOSIS — E66.9 OBESITY, CLASS I, BMI 30-34.9: ICD-10-CM

## 2024-02-14 DIAGNOSIS — E11.9 TYPE 2 DIABETES MELLITUS WITHOUT COMPLICATION, WITHOUT LONG-TERM CURRENT USE OF INSULIN (HCC): ICD-10-CM

## 2024-02-14 DIAGNOSIS — N20.1 CALCULUS OF URETER: ICD-10-CM

## 2024-02-14 PROBLEM — E66.3 OVERWEIGHT WITH BODY MASS INDEX (BMI) OF 28 TO 28.9 IN ADULT: Status: RESOLVED | Noted: 2020-07-17 | Resolved: 2024-02-14

## 2024-02-14 LAB
EST. AVERAGE GLUCOSE BLD GHB EST-MCNC: 166 MG/DL
HBA1C MFR BLD: 7.4 %

## 2024-02-14 PROCEDURE — 99214 OFFICE O/P EST MOD 30 MIN: CPT | Performed by: INTERNAL MEDICINE

## 2024-02-14 PROCEDURE — 83036 HEMOGLOBIN GLYCOSYLATED A1C: CPT

## 2024-02-14 PROCEDURE — 36415 COLL VENOUS BLD VENIPUNCTURE: CPT

## 2024-02-14 NOTE — PATIENT INSTRUCTIONS
- Discussed options of HealthyCORE-Intensive Lifestyle Intervention Program, Very Low Calorie Diet-VLCD, and Conservative Program and the role of weight loss medications.  - Explained the importance of making lifestyle changes first before starting anti-obesity medications.  - Patient should demonstrate lifestyle changes first before anti-obesity medication initiated.   - Patient is interested in pursuing Conservative Program  - Initial weight loss goal of 5-10% weight loss for improved health as studies have shown this is where we see the greatest impact on improving health and decreasing risk of obesity related conditions.  - Weight loss can improve patient's co-morbid conditions and/or prevent weight-related complications.  - Labs reviewed: As below.      General Recommendations:  Nutrition:  Eat breakfast daily.  Do not skip meals.     Food log (ie.) www.Quad/Graphics.com, sparkpeople.com, loseit.com, calorieking.com, etc.    Practice mindful eating.  Be sure to set aside time to eat, eat slowly, and savor your food.    Hydration:    At least 64oz of water daily.  No sugar sweetened beverages.  No juice (eat the fruit instead).    Exercise:  Studies have shown that the ideal exercise goal is somewhere between 150 to 300 minutes of moderate intensity exercise a week.  Start with exercising 10 minutes every other day and gradually increase physical activity with a goal of at least 150 minutes of moderate intensity exercise a week, divided over at least 3 days a week.  An example of this would be exercising 30 minutes a day, 5 days a week.  Resistance training can increase muscle mass and increase our resting metabolic rate.   FULL BODY resistance training is recommended 2-3 times a week.  Do not do this on consecutive days to allow for muscle recovery.    Aim for a bare minimum 5000 steps, even on days you do not exercise.    Monitoring:   Weigh yourself daily.  If this causes undue stress, then just weigh yourself  once a week.  Weigh yourself the same time of the day with the same amount of clothing on.  Preferably this should be done after waking up, before you eat, and with no clothing or minimal clothing on.    Specific Goals:   Have HgA1C test done.  If your HgA1C is above 6.5% then I recommend that you start mounjaro or ozembic.  I will relay this to doctor Julio.  If your HgA1C in below 6.5% then I will try and get you approved for zepbound or wegovy (on shortage).  I recommend keeping a food log and meeting with a dietician.   Meet with  to help evaluate for behaviors or habits that may contribute to weight.   Follow-up up in 3 months.

## 2024-02-14 NOTE — PROGRESS NOTES
([facility] Interpretor Service: Flavio [id number]))    Assessment/Plan:  Syed was seen today for consult.    Diagnoses and all orders for this visit:    Obesity, Class I, BMI 30-34.9  -     Hemoglobin A1C; Future    Type 2 diabetes mellitus without complication, without long-term current use of insulin (HCC)  -     Hemoglobin A1C; Future    Postpartum cardiomyopathy    Chronic migraine with aura    Calculus of ureter    History of nephrolithiasis       History of nephrolithiasis  Topiramate and Qsymia are not good options as this may increase the risk of nephrolithiasis.   - Discussed options of HealthyCORE-Intensive Lifestyle Intervention Program, Very Low Calorie Diet-VLCD, and Conservative Program and the role of weight loss medications.  - Explained the importance of making lifestyle changes first before starting anti-obesity medications.  - Patient should demonstrate lifestyle changes first before anti-obesity medication initiated.   - Patient is interested in pursuing Conservative Program  - Initial weight loss goal of 5-10% weight loss for improved health as studies have shown this is where we see the greatest impact on improving health and decreasing risk of obesity related conditions.  - Weight loss can improve patient's co-morbid conditions and/or prevent weight-related complications.  - Labs reviewed: As below.      General Recommendations:  Nutrition:  Eat breakfast daily.  Do not skip meals.     Food log (ie.) www.[employer].com, sparkpeople.com, loseit.com, calorieking.com, etc.    Practice mindful eating.  Be sure to set aside time to eat, eat slowly, and savor your food.    Hydration:    At least 64oz of water daily.  No sugar sweetened beverages.  No juice (eat the fruit instead).    Exercise:  Studies have shown that the ideal exercise goal is somewhere between 150 to 300 minutes of moderate intensity exercise a week.  Start with exercising 10 minutes every other day and gradually increase  physical activity with a goal of at least 150 minutes of moderate intensity exercise a week, divided over at least 3 days a week.  An example of this would be exercising 30 minutes a day, 5 days a week.  Resistance training can increase muscle mass and increase our resting metabolic rate.   FULL BODY resistance training is recommended 2-3 times a week.  Do not do this on consecutive days to allow for muscle recovery.    Aim for a bare minimum 5000 steps, even on days you do not exercise.    Monitoring:   Weigh yourself daily.  If this causes undue stress, then just weigh yourself once a week.  Weigh yourself the same time of the day with the same amount of clothing on.  Preferably this should be done after waking up, before you eat, and with no clothing or minimal clothing on.    Specific Goals:   Have HgA1C test done.  If your HgA1C is above 6.5% then I recommend that you start mounjaro or ozembic.  I will relay this to doctor Julio.  If your HgA1C in below 6.5% then I will try and get you approved for zepbound or wegovy (on shortage).  I recommend keeping a food log and meeting with a dietician.   Meet with  to help evaluate for behaviors or habits that may contribute to weight.   Follow-up up in 3 months.          Total time spent reviewing chart, interviewing patient, examining patient, discussing plan, answering all questions, and documentin min.       ______________________________________________________________________        Subjective:   Chief Complaint   Patient presents with    Consult     MWM Consult; Gw-185lb       HPI: Syed Taveras  is a 28 y.o. female with history of s/p gastric sleeve with Dr. Rogers on 2020, 2 diabetes, postpartum cardiomyopathy, history of nephrolithiasis, and excess weight, here to pursue weight loss management.  Previous notes and records have been reviewed.    HPI  Wt Readings from Last 20 Encounters:   24 105 kg (231 lb 6.4 oz)    24 106 kg (233 lb)   23 103 kg (227 lb)   23 103 kg (227 lb 4.7 oz)   10/10/23 98.4 kg (217 lb)   23 95.7 kg (211 lb)   23 92.5 kg (204 lb)   23 94.3 kg (207 lb 14.3 oz)   23 93.9 kg (207 lb)   23 94.3 kg (208 lb)   23 96.1 kg (211 lb 13.8 oz)   23 95.9 kg (211 lb 6.4 oz)   23 95.7 kg (211 lb)   23 93 kg (205 lb 0.4 oz)   23 94 kg (207 lb 4.8 oz)   23 91.9 kg (202 lb 11.2 oz)   23 92.2 kg (203 lb 4.2 oz)   22 89.9 kg (198 lb 4.8 oz)   10/26/22 89.3 kg (196 lb 13.9 oz)   10/13/22 90.5 kg (199 lb 8.3 oz)     Excess Weight:  Highest weight: 275  Roshan 185-190  Current weight: 231  Goal: 180    Associated symptoms and effects: comorbid conditions (DM2)    Dining out:once a month.    Hydration:   Water: 5-6 bottle of water   Juice: OJ and Patcha juice 4oz twice a day    Alcohol:  no  Smoking: no  Exercise:  once a week (started a month ago)  Weight Monitoring:  never  Sleep: 7-8 hr  Occupation:  medicine packaging - sedentary.    Past Medical History:   Diagnosis Date    History of transfusion     after  - had fever with transfusion    Obesity (BMI 35.0-39.9 without comorbidity)     S/P laparoscopic sleeve gastrectomy 2020    Uses Kiswahili as primary spoken language      Patient denies personal and family history of pancreatitis, thyroid cancer, MEN-2 tumors.  Denies any hx of glaucoma, seizures, . + kidney stones + s/p cholecystectomy.  Denies Hx of CAD, PAD, palpitations, arrhythmia.   Denies uncontrolled anxiety or depression, suicidal behavior or thinking , insomnia or sleep disturbance.     Past Surgical History:   Procedure Laterality Date     SECTION  2017    CHOLECYSTECTOMY      WI LAPS GSTRC RSTRICTIV PX LONGITUDINAL GASTRECTOMY N/A 2020    Procedure: LAP SLEEVE GASTRECTOMY, INTRAOP EGD;  Surgeon: Da Rogers MD;  Location: [facility];  Service: Bariatrics     The following  "portions of the patient's history were reviewed and updated as appropriate: allergies, current medications, past family history, past medical history, past social history, past surgical history, and problem list.    Review Of Systems:  Review of Systems   Constitutional:  Negative for activity change, appetite change, fatigue and fever.   Respiratory:  Negative for cough and shortness of breath.    Cardiovascular:  Negative for chest pain, palpitations and leg swelling.   Gastrointestinal:  Negative for abdominal pain, constipation, diarrhea, nausea and vomiting.   Endocrine: Negative for cold intolerance and heat intolerance.   Genitourinary:  Negative for difficulty urinating and dysuria.   Musculoskeletal:  Negative for arthralgias, back pain and gait problem.   Skin:  Negative for pallor and rash.   Neurological:  Negative for headaches.   Psychiatric/Behavioral:  Negative for dysphoric mood, sleep disturbance and suicidal ideas (or HI). The patient is not nervous/anxious.      Objective:  /70   Pulse 80   Resp 17   Ht 5' 9\" (1.753 m)   Wt 105 kg (231 lb 6.4 oz)   BMI 34.17 kg/m²   Physical Exam  Vitals and nursing note reviewed.   Constitutional:       General: She is not in acute distress.     Appearance: Normal appearance. She is not ill-appearing or diaphoretic.   Eyes:      General: No scleral icterus.  Cardiovascular:      Rate and Rhythm: Normal rate and regular rhythm.      Pulses: Normal pulses.      Heart sounds: No murmur heard.  Pulmonary:      Effort: Pulmonary effort is normal. No respiratory distress.      Breath sounds: Normal breath sounds. No wheezing or rhonchi.   Abdominal:      General: Bowel sounds are normal. There is no distension.      Palpations: Abdomen is soft. There is no mass.      Tenderness: There is no abdominal tenderness.   Musculoskeletal:      Cervical back: Neck supple.      Right lower leg: No edema.      Left lower leg: No edema.   Lymphadenopathy:      " Cervical: No cervical adenopathy.   Skin:     Capillary Refill: Capillary refill takes less than 2 seconds.      Findings: No lesion or rash.   Neurological:      Mental Status: She is alert and oriented to person, place, and time.      Gait: Gait normal.   Psychiatric:         Mood and Affect: Mood normal.         Behavior: Behavior normal.       Labs and Imaging  Recent labs and imaging have been personally reviewed.  Lab Results   Component Value Date    WBC 6.45 02/12/2024    HGB 12.2 02/12/2024    HCT 38.1 02/12/2024    MCV 92 02/12/2024     02/12/2024     Lab Results   Component Value Date     05/03/2017    SODIUM 138 02/12/2024    K 3.9 02/12/2024     02/12/2024    CO2 27 02/12/2024    ANIONGAP 5 08/20/2015    AGAP 8 02/12/2024    BUN 9 02/12/2024    CREATININE 0.75 02/12/2024    GLUC 224 (H) 02/12/2024    GLUF 110 (H) 10/06/2023    CALCIUM 9.0 02/12/2024    AST 12 (L) 02/12/2024    ALT 11 02/12/2024    ALKPHOS 74 02/12/2024    PROT 5.5 (L) 05/03/2017    TP 7.3 02/12/2024    BILITOT 0.4 05/03/2017    TBILI 1.00 02/12/2024    EGFR 109 02/12/2024     Lab Results   Component Value Date    HGBA1C 5.8 11/30/2021     Lab Results   Component Value Date    GRJ3FYZHBLPQ 0.998 10/06/2023     Lab Results   Component Value Date    CHOLESTEROL 148 07/07/2020     Lab Results   Component Value Date    HDL 31 (L) 07/07/2020     Lab Results   Component Value Date    TRIG 146 07/07/2020     Lab Results   Component Value Date    LDLCALC 88 07/07/2020

## 2024-02-15 ENCOUNTER — TELEPHONE (OUTPATIENT)
Age: 29
End: 2024-02-15

## 2024-02-15 ENCOUNTER — TELEPHONE (OUTPATIENT)
Dept: BARIATRICS | Facility: CLINIC | Age: 29
End: 2024-02-15

## 2024-02-15 LAB — VIT B12 SERPL-MCNC: 253 PG/ML (ref 180–914)

## 2024-02-15 NOTE — TELEPHONE ENCOUNTER
----- Message from Dania Nowak sent at 2/15/2024 10:42 AM EST -----    ----- Message -----  From: Joey Zaragoza DO  Sent: 2/15/2024   9:13 AM EST  To: [facility]    Her hemoglogin A1C came back over 6.5%.  I recommend she follow-up with her PCP and consider starting ozempic or mounjaro to manage her blood sugar which should also reduce her appetite and weight.

## 2024-02-15 NOTE — TELEPHONE ENCOUNTER
Pt called in thinking she missed a phone call from us today with no voicemail. Could not find documentation of this call in the chart.   Pt states she has her A1C results of 7.4 and was told mounjaro or ozempic scipt would be sent to pharmacy based on these results, pt would like to move forward with this plan and is awaiting script to be sent.

## 2024-02-19 ENCOUNTER — TELEPHONE (OUTPATIENT)
Dept: BARIATRICS | Facility: CLINIC | Age: 29
End: 2024-02-19

## 2024-02-19 LAB — METHYLMALONATE SERPL-SCNC: 113 NMOL/L (ref 0–378)

## 2024-02-19 NOTE — TELEPHONE ENCOUNTER
----- Message from Malathi Hubbard PA-C sent at 2/19/2024  7:48 AM EST -----  Please call:    Your vitamin B12 level is low, which can affect your nerve health.  Recommend that you take 1000 mcg of vitamin B12 sublingually ( under the tongue) per day.

## 2024-02-19 NOTE — TELEPHONE ENCOUNTER
Reached out to Pt re: labs. Reviewed with Pt results and provider recommendations.  Pt verbalized understanding and was agreeable to plan.

## 2024-02-22 ENCOUNTER — TELEPHONE (OUTPATIENT)
Age: 29
End: 2024-02-22

## 2024-02-22 ENCOUNTER — OFFICE VISIT (OUTPATIENT)
Dept: FAMILY MEDICINE CLINIC | Facility: CLINIC | Age: 29
End: 2024-02-22

## 2024-02-22 ENCOUNTER — APPOINTMENT (OUTPATIENT)
Dept: LAB | Facility: HOSPITAL | Age: 29
End: 2024-02-22
Payer: COMMERCIAL

## 2024-02-22 VITALS
HEART RATE: 83 BPM | OXYGEN SATURATION: 99 % | RESPIRATION RATE: 18 BRPM | HEIGHT: 69 IN | TEMPERATURE: 97.6 F | SYSTOLIC BLOOD PRESSURE: 128 MMHG | BODY MASS INDEX: 34.55 KG/M2 | DIASTOLIC BLOOD PRESSURE: 64 MMHG | WEIGHT: 233.25 LBS

## 2024-02-22 DIAGNOSIS — Z11.4 ENCOUNTER FOR SCREENING FOR HIV: ICD-10-CM

## 2024-02-22 DIAGNOSIS — R63.5 WEIGHT GAIN: ICD-10-CM

## 2024-02-22 DIAGNOSIS — E11.9 TYPE 2 DIABETES MELLITUS WITHOUT COMPLICATION, WITHOUT LONG-TERM CURRENT USE OF INSULIN (HCC): Primary | ICD-10-CM

## 2024-02-22 DIAGNOSIS — Z23 ENCOUNTER FOR IMMUNIZATION: ICD-10-CM

## 2024-02-22 LAB
CREAT UR-MCNC: 241.3 MG/DL
MICROALBUMIN UR-MCNC: 15.7 MG/L
MICROALBUMIN/CREAT 24H UR: 7 MG/G CREATININE (ref 0–30)
TSH SERPL DL<=0.05 MIU/L-ACNC: 0.78 UIU/ML (ref 0.45–4.5)

## 2024-02-22 PROCEDURE — 90686 IIV4 VACC NO PRSV 0.5 ML IM: CPT | Performed by: FAMILY MEDICINE

## 2024-02-22 PROCEDURE — 36415 COLL VENOUS BLD VENIPUNCTURE: CPT

## 2024-02-22 PROCEDURE — 90472 IMMUNIZATION ADMIN EACH ADD: CPT | Performed by: FAMILY MEDICINE

## 2024-02-22 PROCEDURE — 84443 ASSAY THYROID STIM HORMONE: CPT

## 2024-02-22 PROCEDURE — 82043 UR ALBUMIN QUANTITATIVE: CPT

## 2024-02-22 PROCEDURE — 90677 PCV20 VACCINE IM: CPT | Performed by: FAMILY MEDICINE

## 2024-02-22 PROCEDURE — 99213 OFFICE O/P EST LOW 20 MIN: CPT | Performed by: FAMILY MEDICINE

## 2024-02-22 PROCEDURE — 90471 IMMUNIZATION ADMIN: CPT | Performed by: FAMILY MEDICINE

## 2024-02-22 PROCEDURE — 87389 HIV-1 AG W/HIV-1&-2 AB AG IA: CPT

## 2024-02-22 PROCEDURE — 82570 ASSAY OF URINE CREATININE: CPT

## 2024-02-22 NOTE — TELEPHONE ENCOUNTER
Patient of Malathi Hubbard.  Patient called in about recent lab work. PCP would like to know what the preferred range is for B12 for patients with a hx of bariatric surgery.   Appears to be WNL but is being recommended to take supplemental B12.    Please advise  CB#[phone]

## 2024-02-22 NOTE — TELEPHONE ENCOUNTER
Pt called in saying she was currently in the doctor's office and that the doctor stepped out... Difficult to understand patient due to muffled sounds. Checked with office staff at [facility] and she is not currently there. She says she will call back with clearer connection and ended the call abruptly.

## 2024-02-22 NOTE — PROGRESS NOTES
Name: Syed Taveras      : 1995      MRN: [id number]  Encounter Provider: Kevin Kim MD  Encounter Date: 2024   Encounter department: [facility]    Assessment & Plan     1. Type 2 diabetes mellitus without complication, without long-term current use of insulin (HCC)  Assessment & Plan:    Lab Results   Component Value Date    HGBA1C 7.4 (H) 2024     At goal per AAFP, 7-8. However given increasing weight and significant rise since last checks will likely require medication for further control. Will first r/o thyroid etiology. If TSH normal will begin Mounjaro     - DM screening   Eye: will bring in recent eye exam results   Foot: completed today 24; no abnormalities   MACR: ordered today 24  - Healthy diet and exercise reviewed    *Update 2024: TSH wnl. Will send prescription for Mounjaro.    Orders:  -     Diabetic foot exam; Future  -     Albumin / creatinine urine ratio; Future  -     Albumin / creatinine urine ratio  -     tirzepatide (Mounjaro) 2.5 MG/0.5ML; Inject 0.5 mL (2.5 mg total) under the skin every 7 days    2. Weight gain  Assessment & Plan:  Significant weight gain in the past 1-2 years. Recent lab work reviewed and will order TSH to r/o thyroid dysfunction. Further management per DM plan.    Orders:  -     TSH w/Reflex; Future  -     tirzepatide (Mounjaro) 2.5 MG/0.5ML; Inject 0.5 mL (2.5 mg total) under the skin every 7 days    3. Encounter for screening for HIV  -     HIV 1/2 AG/AB w Reflex [facility] for 2 yr old and above; Future    4. Encounter for immunization  -     FLUZONE: influenza vaccine, quadrivalent, 0.5 mL  -     Pneumococcal Conjugate Vaccine 20-valent (Pcv20)           Subjective      29yo female presenting to clinic for review of recent lab results. Pt currently following with Bariatric surgery and was found to have elevated A1c after initial control post Gastric sleeve. Recommendation for possible  ozempic or mounjaro made by Bariatric specialist. Pt has noted increased weight, cold intolerance, and hair loss. Remaining ROS as noted below.      Review of Systems   Constitutional:  Negative for fever.   Respiratory:  Negative for cough and shortness of breath.    Cardiovascular:  Negative for chest pain.   Gastrointestinal:  Negative for abdominal pain, blood in stool, constipation, diarrhea, nausea and vomiting.   Endocrine: Positive for polydipsia.   Genitourinary:  Negative for dysuria and hematuria.   Neurological:  Positive for headaches.       Current Outpatient Medications on File Prior to Visit   Medication Sig    acetaminophen (TYLENOL) 650 mg CR tablet Take 1 tablet (650 mg total) by mouth every 8 (eight) hours as needed for mild pain    calcium carbonate (OS-GABI) 600 MG tablet Take 600 mg by mouth 2 (two) times a day with meals    diclofenac sodium (VOLTAREN) 25 MG EC tablet Take 1 tablet (25 mg total) by mouth 3 (three) times a day for 10 days (Take it with meal)    FLUoxetine (PROzac) 10 mg capsule Take 10 mg by mouth daily    ibuprofen (MOTRIN) 600 mg tablet Take 1 tablet (600 mg total) by mouth every 6 (six) hours as needed for mild pain    ketoconazole (NIZORAL) 2 % shampoo Apply topically twice a week to scalp for 8 weeks and then as needed.  Leave on for 5 minutes, then rinse.    meclizine (ANTIVERT) 12.5 MG tablet Take 1 tablet (12.5 mg total) by mouth 3 (three) times a day as needed for dizziness or nausea    Multiple Vitamins-Minerals (MULTIVITAMIN ADULT PO) Take by mouth daily    omeprazole (PriLOSEC) 20 mg delayed release capsule Take 1 capsule (20 mg total) by mouth daily    sucralfate (CARAFATE) 1 g tablet Take 1 tablet (1 g total) by mouth 4 (four) times a day Crush and mix with water to take as suspension    terconazole (TERAZOL 7) 0.4 % vaginal cream Insert 1 applicator into the vagina daily at bedtime (Patient not taking: Reported on 12/1/2023)       Objective     /64 (BP  "Location: Left arm, Patient Position: Sitting, Cuff Size: Standard)   Pulse 83   Temp 97.6 °F (36.4 °C) (Temporal)   Resp 18   Ht 5' 9\" (1.753 m)   Wt 106 kg (233 lb 4 oz)   SpO2 99%   BMI 34.45 kg/m²     Physical Exam  Vitals reviewed.   Constitutional:       General: She is not in acute distress.     Appearance: Normal appearance. She is not ill-appearing, toxic-appearing or diaphoretic.   HENT:      Head: Normocephalic and atraumatic.   Eyes:      General: No scleral icterus.        Right eye: No discharge.         Left eye: No discharge.      Extraocular Movements: Extraocular movements intact.      Conjunctiva/sclera: Conjunctivae normal.   Cardiovascular:      Rate and Rhythm: Normal rate and regular rhythm.      Pulses: Normal pulses. no weak pulses.           Dorsalis pedis pulses are 2+ on the right side and 2+ on the left side.        Posterior tibial pulses are 2+ on the right side and 2+ on the left side.      Heart sounds: Normal heart sounds. No murmur heard.     No friction rub. No gallop.   Pulmonary:      Effort: Pulmonary effort is normal. No respiratory distress.      Breath sounds: Normal breath sounds. No stridor. No wheezing, rhonchi or rales.   Abdominal:      General: There is no distension.      Palpations: Abdomen is soft.      Tenderness: There is no abdominal tenderness.   Musculoskeletal:         General: Normal range of motion.      Cervical back: Normal range of motion.      Right lower leg: No edema.      Left lower leg: No edema.   Feet:      Right foot:      Skin integrity: No ulcer, skin breakdown, erythema, warmth, callus or dry skin.      Left foot:      Skin integrity: No ulcer, skin breakdown, erythema, warmth, callus or dry skin.   Skin:     General: Skin is warm and dry.      Capillary Refill: Capillary refill takes less than 2 seconds.      Coloration: Skin is not jaundiced.   Neurological:      General: No focal deficit present.      Mental Status: She is alert. "   Psychiatric:         Mood and Affect: Mood normal.         Behavior: Behavior normal.       Patient's shoes and socks removed.    Right Foot/Ankle   Right Foot Inspection  Skin Exam: skin normal and skin intact. No dry skin, no warmth, no callus, no erythema, no maceration, no abnormal color, no pre-ulcer, no ulcer and no callus.     Toe Exam: ROM and strength within normal limits.     Sensory   Vibration: intact  Proprioception: intact  Monofilament testing: intact    Vascular  Capillary refills: < 3 seconds  The right DP pulse is 2+. The right PT pulse is 2+.     Left Foot/Ankle  Left Foot Inspection  Skin Exam: skin normal and skin intact. No dry skin, no warmth, no erythema, no maceration, normal color, no pre-ulcer, no ulcer and no callus.     Toe Exam: ROM and strength within normal limits.     Sensory   Vibration: intact  Proprioception: intact  Monofilament testing: intact    Vascular  Capillary refills: < 3 seconds  The left DP pulse is 2+. The left PT pulse is 2+.     Assign Risk Category  No deformity present  No loss of protective sensation  No weak pulses  Risk: 0       Kevin Kim MD

## 2024-02-22 NOTE — ASSESSMENT & PLAN NOTE
Significant weight gain in the past 1-2 years. Recent lab work reviewed and will order TSH to r/o thyroid dysfunction. Further management per DM plan.

## 2024-02-22 NOTE — ASSESSMENT & PLAN NOTE
Lab Results   Component Value Date    HGBA1C 7.4 (H) 02/14/2024     At goal per AAFP, 7-8. However given increasing weight and significant rise since last checks will likely require medication for further control. Will first r/o thyroid etiology. If TSH normal will begin Mounjaro     - DM screening   Eye: will bring in recent eye exam results   Foot: completed today 2/22/24; no abnormalities   MACR: ordered today 2/22/24  - Healthy diet and exercise reviewed    *Update 2/23/2024: TSH wnl. Will send prescription for Mounjaro.

## 2024-02-23 ENCOUNTER — TELEPHONE (OUTPATIENT)
Dept: FAMILY MEDICINE CLINIC | Facility: CLINIC | Age: 29
End: 2024-02-23

## 2024-02-23 LAB
HIV 1+2 AB+HIV1 P24 AG SERPL QL IA: NORMAL
HIV 2 AB SERPL QL IA: NORMAL
HIV1 AB SERPL QL IA: NORMAL
HIV1 P24 AG SERPL QL IA: NORMAL

## 2024-02-26 DIAGNOSIS — E11.9 TYPE 2 DIABETES MELLITUS WITHOUT COMPLICATION, WITHOUT LONG-TERM CURRENT USE OF INSULIN (HCC): ICD-10-CM

## 2024-02-26 DIAGNOSIS — R63.5 WEIGHT GAIN: ICD-10-CM

## 2024-02-26 NOTE — TELEPHONE ENCOUNTER
02/26/24    PT CALLED OFFICE TODAY ASKING FOR ANY UPDATES ON HER MED SCRIPT BEING RESENT TO THE PHARMACY [address], [place] [address].    PT STATED THAT [facility] DID NOT HAVE THE MED.      MED:   tirzepatide (Mounjaro) 2.5 MG/0.5ML       ANY QUESTIONS, PLEASE CONTACT PT.

## 2024-02-29 ENCOUNTER — OFFICE VISIT (OUTPATIENT)
Dept: FAMILY MEDICINE CLINIC | Facility: CLINIC | Age: 29
End: 2024-02-29

## 2024-02-29 VITALS
TEMPERATURE: 98.3 F | SYSTOLIC BLOOD PRESSURE: 124 MMHG | WEIGHT: 231 LBS | OXYGEN SATURATION: 99 % | BODY MASS INDEX: 34.21 KG/M2 | DIASTOLIC BLOOD PRESSURE: 78 MMHG | HEIGHT: 69 IN | HEART RATE: 63 BPM

## 2024-02-29 DIAGNOSIS — N64.4 BREAST PAIN, RIGHT: Primary | ICD-10-CM

## 2024-02-29 PROCEDURE — 99213 OFFICE O/P EST LOW 20 MIN: CPT | Performed by: FAMILY MEDICINE

## 2024-02-29 NOTE — PROGRESS NOTES
Name: Syed Taveras      : 1995      MRN: [id number]  Encounter Provider: Alexis Reeder MD  Encounter Date: 2024   Encounter department: [facility]    Assessment & Plan     1. Breast pain, right  Assessment & Plan:  Consider tight bra vs malignancy     PLAN:   - advised to get a bra that is a size bigger to give some room, or one without wire   - diclofenac sodium gel for pain   - US R breast     Orders:  -     US breast right limited (diagnostic); Future; Expected date: 2024  -     Diclofenac Sodium (VOLTAREN) 1 %; Apply 2 g topically 4 (four) times a day as needed (back pain)         Subjective      Patient is Faroese speaking only and voice  services was utilized to conduct office visit.        Syed Taveras is a 28 y.o. female with pmhx of t2dm presenting today right sided breast pain.     Pain started 2 months, whole breast, waxing and waning, 9/10 when comes. Denies any nipple discharge.   Ibuprofen for pain but did not help much      Review of Systems   Constitutional:  Negative for chills and fever.   HENT:  Negative for ear pain and sore throat.    Eyes:  Negative for pain and visual disturbance.   Respiratory:  Negative for cough and shortness of breath.    Cardiovascular:  Negative for chest pain and palpitations.   Gastrointestinal:  Negative for abdominal pain and vomiting.   Genitourinary:  Negative for dysuria and hematuria.   Musculoskeletal:  Negative for arthralgias and back pain.        Breast pain   Skin:  Negative for color change and rash.   Neurological:  Negative for seizures and syncope.   All other systems reviewed and are negative.      Current Outpatient Medications on File Prior to Visit   Medication Sig   • acetaminophen (TYLENOL) 650 mg CR tablet Take 1 tablet (650 mg total) by mouth every 8 (eight) hours as needed for mild pain   • calcium carbonate (OS-GABI) 600 MG tablet Take 600 mg by mouth 2  "(two) times a day with meals   • diclofenac sodium (VOLTAREN) 25 MG EC tablet Take 1 tablet (25 mg total) by mouth 3 (three) times a day for 10 days (Take it with meal)   • FLUoxetine (PROzac) 10 mg capsule Take 10 mg by mouth daily   • ketoconazole (NIZORAL) 2 % shampoo Apply topically twice a week to scalp for 8 weeks and then as needed.  Leave on for 5 minutes, then rinse.   • meclizine (ANTIVERT) 12.5 MG tablet Take 1 tablet (12.5 mg total) by mouth 3 (three) times a day as needed for dizziness or nausea   • Multiple Vitamins-Minerals (MULTIVITAMIN ADULT PO) Take by mouth daily   • omeprazole (PriLOSEC) 20 mg delayed release capsule Take 1 capsule (20 mg total) by mouth daily   • sucralfate (CARAFATE) 1 g tablet Take 1 tablet (1 g total) by mouth 4 (four) times a day Crush and mix with water to take as suspension   • terconazole (TERAZOL 7) 0.4 % vaginal cream Insert 1 applicator into the vagina daily at bedtime (Patient not taking: Reported on 12/1/2023)   • tirzepatide (Mounjaro) 2.5 MG/0.5ML Inject 0.5 mL (2.5 mg total) under the skin every 7 days   • [DISCONTINUED] ibuprofen (MOTRIN) 600 mg tablet Take 1 tablet (600 mg total) by mouth every 6 (six) hours as needed for mild pain       Objective     /78 (BP Location: Left arm, Patient Position: Sitting, Cuff Size: Large)   Pulse 63   Temp 98.3 °F (36.8 °C) (Temporal)   Ht 5' 9\" (1.753 m)   Wt 105 kg (231 lb)   SpO2 99%   BMI 34.11 kg/m²     Physical Exam  Exam conducted with a chaperone present.   Constitutional:       Appearance: Normal appearance.   HENT:      Head: Normocephalic and atraumatic.   Eyes:      Extraocular Movements: Extraocular movements intact.      Pupils: Pupils are equal, round, and reactive to light.   Cardiovascular:      Rate and Rhythm: Normal rate and regular rhythm.      Pulses: Normal pulses.      Heart sounds: Normal heart sounds.   Pulmonary:      Effort: Pulmonary effort is normal. No respiratory distress.      Breath " sounds: Normal breath sounds. No stridor. No wheezing, rhonchi or rales.   Chest:   Breasts:     Breasts are symmetrical.      Right: Normal. No swelling, bleeding, inverted nipple, mass, nipple discharge, skin change or tenderness.      Left: Normal. No swelling, bleeding, inverted nipple, mass, nipple discharge, skin change or tenderness.   Abdominal:      General: Bowel sounds are normal. There is no distension.      Palpations: Abdomen is soft.      Tenderness: There is no abdominal tenderness.   Lymphadenopathy:      Upper Body:      Right upper body: No supraclavicular, axillary or pectoral adenopathy.      Left upper body: No supraclavicular, axillary or pectoral adenopathy.   Skin:     General: Skin is warm.      Capillary Refill: Capillary refill takes less than 2 seconds.   Neurological:      General: No focal deficit present.      Mental Status: She is alert and oriented to person, place, and time.   Psychiatric:         Mood and Affect: Mood normal.         Behavior: Behavior normal.       Alexis Reeder MD

## 2024-02-29 NOTE — PATIENT INSTRUCTIONS
Please call Central Scheduling to schedule your appointment for your breast ultrasound  Number:       Por Favor llame a programacion central para programar escamilla ann.    Ext 8

## 2024-02-29 NOTE — ASSESSMENT & PLAN NOTE
Consider tight bra vs malignancy     PLAN:   - advised to get a bra that is a size bigger to give some room, or one without wire   - diclofenac sodium gel for pain   - US R breast

## 2024-03-01 ENCOUNTER — TELEPHONE (OUTPATIENT)
Dept: FAMILY MEDICINE CLINIC | Facility: CLINIC | Age: 29
End: 2024-03-01

## 2024-03-01 NOTE — TELEPHONE ENCOUNTER
PCP SIGNATURE NEEDED FOR [facility] prior authorization FORM RECEIVED VIA FAX AND PLACED IN PCP FOLDER TO BE DELIVERED AT ASSIGNED TIMES.         Mounjaro 2.5/ 0.5

## 2024-03-06 ENCOUNTER — TELEPHONE (OUTPATIENT)
Dept: FAMILY MEDICINE CLINIC | Facility: CLINIC | Age: 29
End: 2024-03-06

## 2024-03-06 NOTE — TELEPHONE ENCOUNTER
FAXED ON 03/06/24 TO [facility] prior Athorization Request form  at [phone]. FAX CONFIRMATION RECEIVED.

## 2024-03-11 ENCOUNTER — TELEPHONE (OUTPATIENT)
Dept: FAMILY MEDICINE CLINIC | Facility: CLINIC | Age: 29
End: 2024-03-11

## 2024-03-11 ENCOUNTER — CLINICAL SUPPORT (OUTPATIENT)
Dept: BARIATRICS | Facility: CLINIC | Age: 29
End: 2024-03-11

## 2024-03-11 DIAGNOSIS — R63.5 ABNORMAL WEIGHT GAIN: Primary | ICD-10-CM

## 2024-03-11 PROCEDURE — RECHECK

## 2024-03-11 NOTE — TELEPHONE ENCOUNTER
Patient call stating that her prescription tirzepatide (Mounjaro) 2.5 MG/0.5ML  sn out stock every were she try, and she wants a different option      Please call patient for more information [phone]

## 2024-03-11 NOTE — PROGRESS NOTES
Behavioral Health Follow Up Note        Name: Yue Kong                Mental health and wellness -   Patient presents to the office today for a support visit. The pt reports that she continues to increase in weight and when she checked with her PCP she is diabetic. Reports she is a grazer when she is at home. Discussed activities to keep busy. Pt reported currently not working and unable to see dietician today. Discussed food logging until seen by Dietician and reasons why this why so important and increasing water intake prior to grazing can decrease mindless eating.     Eating behaviors - three meals and two snacks. Does not measure portions, not tracking discussed the importance of tracking until her next sylvia. Hydration-4-5 water bottles at water. Stacey and stopped all juices.     Activity -  exercises in the afternoon for one hour M-F       Reviewed and discussed  Patient educated and handouts provided.  Adequate hydration  Exercise  Meal planning and preparation  Lifestyle changes  Possible problems with poor eating habits  Practice mindful eating.    Setting aside time to eat slowly, and savor food    Goal: 1-food journal until her next appointment  Next appointment: 3/29/24 [place]

## 2024-03-15 ENCOUNTER — TELEPHONE (OUTPATIENT)
Dept: FAMILY MEDICINE CLINIC | Facility: CLINIC | Age: 29
End: 2024-03-15

## 2024-03-18 ENCOUNTER — OFFICE VISIT (OUTPATIENT)
Dept: FAMILY MEDICINE CLINIC | Facility: CLINIC | Age: 29
End: 2024-03-18

## 2024-03-18 ENCOUNTER — HOSPITAL ENCOUNTER (EMERGENCY)
Facility: HOSPITAL | Age: 29
Discharge: HOME | End: 2024-03-18
Attending: EMERGENCY MEDICINE | Admitting: EMERGENCY MEDICINE
Payer: COMMERCIAL

## 2024-03-18 VITALS
HEART RATE: 74 BPM | WEIGHT: 230.6 LBS | OXYGEN SATURATION: 98 % | BODY MASS INDEX: 34.16 KG/M2 | SYSTOLIC BLOOD PRESSURE: 120 MMHG | HEIGHT: 69 IN | DIASTOLIC BLOOD PRESSURE: 68 MMHG | TEMPERATURE: 98.4 F

## 2024-03-18 VITALS
RESPIRATION RATE: 18 BRPM | OXYGEN SATURATION: 100 % | SYSTOLIC BLOOD PRESSURE: 110 MMHG | DIASTOLIC BLOOD PRESSURE: 58 MMHG | TEMPERATURE: 97.9 F | HEART RATE: 74 BPM

## 2024-03-18 DIAGNOSIS — B34.9 ACUTE VIRAL SYNDROME: Primary | ICD-10-CM

## 2024-03-18 DIAGNOSIS — B34.9 ACUTE VIRAL SYNDROME: ICD-10-CM

## 2024-03-18 DIAGNOSIS — E11.9 TYPE 2 DIABETES MELLITUS WITHOUT COMPLICATION, WITHOUT LONG-TERM CURRENT USE OF INSULIN (HCC): Primary | ICD-10-CM

## 2024-03-18 LAB
EXT PREGNANCY TEST URINE: NEGATIVE
EXT. CONTROL: NORMAL

## 2024-03-18 PROCEDURE — 99213 OFFICE O/P EST LOW 20 MIN: CPT | Performed by: FAMILY MEDICINE

## 2024-03-18 PROCEDURE — 81025 URINE PREGNANCY TEST: CPT | Performed by: EMERGENCY MEDICINE

## 2024-03-18 PROCEDURE — 99284 EMERGENCY DEPT VISIT MOD MDM: CPT | Performed by: EMERGENCY MEDICINE

## 2024-03-18 PROCEDURE — 99284 EMERGENCY DEPT VISIT MOD MDM: CPT

## 2024-03-18 RX ORDER — NAPROXEN 375 MG/1
375 TABLET ORAL 2 TIMES DAILY WITH MEALS
Qty: 10 TABLET | Refills: 0 | Status: SHIPPED | OUTPATIENT
Start: 2024-03-18

## 2024-03-18 RX ORDER — OXYMETAZOLINE HYDROCHLORIDE 0.05 G/100ML
2 SPRAY NASAL ONCE
Status: COMPLETED | OUTPATIENT
Start: 2024-03-18 | End: 2024-03-18

## 2024-03-18 RX ORDER — NAPROXEN 250 MG/1
500 TABLET ORAL ONCE
Status: COMPLETED | OUTPATIENT
Start: 2024-03-18 | End: 2024-03-18

## 2024-03-18 RX ORDER — ACETAMINOPHEN 325 MG/1
975 TABLET ORAL ONCE
Status: COMPLETED | OUTPATIENT
Start: 2024-03-18 | End: 2024-03-18

## 2024-03-18 RX ORDER — METOCLOPRAMIDE 10 MG/1
10 TABLET ORAL EVERY 6 HOURS
Qty: 20 TABLET | Refills: 0 | Status: SHIPPED | OUTPATIENT
Start: 2024-03-18

## 2024-03-18 RX ORDER — METOCLOPRAMIDE 10 MG/1
10 TABLET ORAL ONCE
Status: COMPLETED | OUTPATIENT
Start: 2024-03-18 | End: 2024-03-18

## 2024-03-18 RX ADMIN — METOCLOPRAMIDE 10 MG: 10 TABLET ORAL at 09:27

## 2024-03-18 RX ADMIN — ACETAMINOPHEN 325MG 975 MG: 325 TABLET ORAL at 09:26

## 2024-03-18 RX ADMIN — OXYMETAZOLINE HYDROCHLORIDE 2 SPRAY: 0.05 SPRAY NASAL at 09:28

## 2024-03-18 RX ADMIN — NAPROXEN 500 MG: 250 TABLET ORAL at 09:27

## 2024-03-18 RX ADMIN — DEXAMETHASONE SODIUM PHOSPHATE 10 MG: 10 INJECTION, SOLUTION INTRAMUSCULAR; INTRAVENOUS at 09:27

## 2024-03-18 NOTE — ED PROVIDER NOTES
History  Chief Complaint   Patient presents with    Dizziness     Dizziness and vomiting x2 days. HA, bodyaches. Taking motrin and tylenol without relief. No sick contacts.      Syed is a pleasant 28-year-old female here for evaluation of multiple complaints.  For the past 2 days or so she has had sore throat, nasal congestion, generalized bodyaches, nausea, and 1 or 2 episodes of nonbloody, nonbilious vomiting.  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  Denies fevers.  No chest pain or shortness of breath.  Has had nausea and vomiting but no significant abdominal pain.  No urinary symptoms.  She has had a normal menstrual cycle in the last 30 days.      Dizziness  Associated symptoms: nausea and vomiting    Associated symptoms: no chest pain, no palpitations and no shortness of breath        Prior to Admission Medications   Prescriptions Last Dose Informant Patient Reported? Taking?   Diclofenac Sodium (VOLTAREN) 1 %   No No   Sig: Apply 2 g topically 4 (four) times a day as needed (back pain)   FLUoxetine (PROzac) 10 mg capsule   Yes No   Sig: Take 10 mg by mouth daily   Multiple Vitamins-Minerals (MULTIVITAMIN ADULT PO)  Self Yes No   Sig: Take by mouth daily   acetaminophen (TYLENOL) 650 mg CR tablet   No No   Sig: Take 1 tablet (650 mg total) by mouth every 8 (eight) hours as needed for mild pain   calcium carbonate (OS-GABI) 600 MG tablet   Yes No   Sig: Take 600 mg by mouth 2 (two) times a day with meals   diclofenac sodium (VOLTAREN) 25 MG EC tablet   No No   Sig: Take 1 tablet (25 mg total) by mouth 3 (three) times a day for 10 days (Take it with meal)   ketoconazole (NIZORAL) 2 % shampoo   No No   Sig: Apply topically twice a week to scalp for 8 weeks and then as needed.  Leave on for 5 minutes, then rinse.   meclizine (ANTIVERT) 12.5 MG tablet   No No   Sig: Take 1 tablet (12.5 mg total) by mouth 3 (three) times a day as needed for dizziness or nausea    omeprazole (PriLOSEC) 20 mg delayed release capsule   No No   Sig: Take 1 capsule (20 mg total) by mouth daily   sucralfate (CARAFATE) 1 g tablet   No No   Sig: Take 1 tablet (1 g total) by mouth 4 (four) times a day Crush and mix with water to take as suspension   terconazole (TERAZOL 7) 0.4 % vaginal cream   No No   Sig: Insert 1 applicator into the vagina daily at bedtime   Patient not taking: Reported on 2023      Facility-Administered Medications: None       Past Medical History:   Diagnosis Date    History of transfusion     after  - had fever with transfusion    Obesity (BMI 35.0-39.9 without comorbidity)     S/P laparoscopic sleeve gastrectomy 2020    Uses [place]ese as primary spoken language        Past Surgical History:   Procedure Laterality Date     SECTION  2017    CHOLECYSTECTOMY      SC LAPS GSTRC RSTRICTIV PX LONGITUDINAL GASTRECTOMY N/A 2020    Procedure: LAP SLEEVE GASTRECTOMY, INTRAOP EGD;  Surgeon: Da Rogers MD;  Location: [facility] OR;  Service: Bariatrics       Family History   Problem Relation Age of Onset    Diabetes Mother     Other Mother         gastric bypass    Diabetes Father     Diabetes Sister     Sleep apnea Sister     Sleep apnea Brother         2 of the 3 borthers have EAGLE    No Known Problems Brother     Diabetes Maternal Grandmother     Skin cancer Maternal Grandmother     No Known Problems Maternal Grandfather     Diabetes Paternal Grandmother     No Known Problems Paternal Grandfather     No Known Problems Son     Heart disease Family         CARDIOVASCULAR DISEASE     I have reviewed and agree with the history as documented.    E-Cigarette/Vaping    E-Cigarette Use Never User      E-Cigarette/Vaping Substances    Nicotine No     THC No     CBD No     Flavoring No     Other No     Unknown No      Social History     Tobacco Use    Smoking status: Never    Smokeless tobacco: Never   Vaping Use    Vaping status: Never Used   Substance  Use Topics    Alcohol use: No    Drug use: No       Review of Systems   Constitutional:  Negative for chills and fever.   HENT:  Positive for postnasal drip, rhinorrhea and sore throat. Negative for ear pain, trouble swallowing and voice change.    Eyes:  Negative for pain and visual disturbance.   Respiratory:  Positive for cough. Negative for shortness of breath.    Cardiovascular:  Negative for chest pain and palpitations.   Gastrointestinal:  Positive for nausea and vomiting. Negative for abdominal pain and anal bleeding.   Genitourinary:  Negative for dysuria, hematuria and urgency.   Musculoskeletal:  Negative for arthralgias and back pain.   Skin:  Negative for color change and rash.   Neurological:  Positive for dizziness. Negative for seizures and syncope.   All other systems reviewed and are negative.      Physical Exam  Physical Exam  Vitals and nursing note reviewed.   Constitutional:       General: She is not in acute distress.     Appearance: She is well-developed.   HENT:      Head: Normocephalic and atraumatic.      Mouth/Throat:      Comments: Mild pharyngeal erythema without exudates.  Uvula is midline.  No trismus.  Eyes:      Conjunctiva/sclera: Conjunctivae normal.   Cardiovascular:      Rate and Rhythm: Normal rate and regular rhythm.      Heart sounds: No murmur heard.  Pulmonary:      Effort: Pulmonary effort is normal. No respiratory distress.      Breath sounds: Normal breath sounds.      Comments: Clear to auscultation bilaterally.  No significant tachypnea, conversational dyspnea, accessory muscle use, or other signs of respiratory distress.  Abdominal:      Palpations: Abdomen is soft.      Tenderness: There is no abdominal tenderness.      Comments: Soft and nondistended, nontender without rebound or guarding.   Musculoskeletal:         General: No swelling.      Cervical back: Neck supple.   Skin:     General: Skin is warm and dry.      Capillary Refill: Capillary refill takes less  than 2 seconds.   Neurological:      General: No focal deficit present.      Mental Status: She is alert and oriented to person, place, and time.   Psychiatric:         Mood and Affect: Mood normal.         Vital Signs  ED Triage Vitals   Temperature Pulse Respirations Blood Pressure SpO2   03/18/24 0903 03/18/24 0903 03/18/24 0903 03/18/24 0904 03/18/24 0903   97.9 °F (36.6 °C) 74 18 110/58 100 %      Temp src Heart Rate Source Patient Position - Orthostatic VS BP Location FiO2 (%)   -- 03/18/24 0903 -- -- --    Monitor         Pain Score       03/18/24 0928       5           Vitals:    03/18/24 0903 03/18/24 0904   BP:  110/58   Pulse: 74          Visual Acuity      ED Medications  Medications   oxymetazoline (AFRIN) 0.05 % nasal spray 2 spray (2 sprays Each Nare Given 3/18/24 0928)   metoclopramide (REGLAN) tablet 10 mg (10 mg Oral Given 3/18/24 0927)   naproxen (NAPROSYN) tablet 500 mg (500 mg Oral Given 3/18/24 0927)   acetaminophen (TYLENOL) tablet 975 mg (975 mg Oral Given 3/18/24 0926)   dexamethasone oral liquid 10 mg 1 mL (10 mg Oral Given 3/18/24 0927)       Diagnostic Studies  Results Reviewed       Procedure Component Value Units Date/Time    POCT pregnancy, urine [[id number]]  (Normal) Resulted: 03/18/24 0937    Lab Status: Final result Updated: 03/18/24 0937     EXT Preg Test, Ur Negative     Control Valid                   No orders to display              Procedures  Procedures         ED Course                               SBIRT 22yo+      Flowsheet Row Most Recent Value   Initial Alcohol Screen: US AUDIT-C     1. How often do you have a drink containing alcohol? 0 Filed at: 03/18/2024 0909   2. How many drinks containing alcohol do you have on a typical day you are drinking?  0 Filed at: 03/18/2024 0909   3a. Male UNDER 65: How often do you have five or more drinks on one occasion? 0 Filed at: 03/18/2024 0909   3b. FEMALE Any Age, or MALE 65+: How often do you have 4 or more drinks on one  occassion? 0 Filed at: 03/18/2024 0909   Audit-C Score 0 Filed at: 03/18/2024 0909   CHERRI: How many times in the past year have you...    Used an illegal drug or used a prescription medication for non-medical reasons? Never Filed at: 03/18/2024 0909                      Medical Decision Making  28-year-old female here with symptoms of generalized viral illness for about 2 days.  Normal, reassuring vital signs and reassuring physical exam.  We discussed risks and benefits of testing for flu/COVID/RSV.  Given the patient is otherwise healthy 28-year-old testing unlikely to  and patient declined viral testing.  Will plan for symptomatic treatment, note for work, recommend typical supportive care for viral illness, outpatient follow-up with return precautions.    Amount and/or Complexity of Data Reviewed  Labs: ordered. Decision-making details documented in ED Course.    Risk  OTC drugs.  Prescription drug management.             Disposition  Final diagnoses:   Acute viral syndrome     Time reflects when diagnosis was documented in both MDM as applicable and the Disposition within this note       Time User Action Codes Description Comment    3/18/2024  9:25 AM Bandar Gonzalez Add [B34.9] Acute viral syndrome           ED Disposition       ED Disposition   Discharge    Condition   Stable    Date/Time   Mon Mar 18, 2024 0904    Comment   Syed Taveras discharge to home/self care.                   Follow-up Information       Follow up With Specialties Details Why Contact [facility] 2nd Floor Family Medicine   [address]ND FLOOR  [place] [address]  [phone]              Discharge Medication List as of 3/18/2024  9:28 AM        START taking these medications    Details   metoclopramide (Reglan) 10 mg tablet Take 1 tablet (10 mg total) by mouth every 6 (six) hours, Starting Mon 3/18/2024, Normal      naproxen (NAPROSYN) 375 mg tablet Take 1 tablet (375 mg total) by  mouth 2 (two) times a day with meals, Starting Mon 3/18/2024, Normal           CONTINUE these medications which have NOT CHANGED    Details   acetaminophen (TYLENOL) 650 mg CR tablet Take 1 tablet (650 mg total) by mouth every 8 (eight) hours as needed for mild pain, Starting Mon 2/12/2024, Normal      calcium carbonate (OS-GABI) 600 MG tablet Take 600 mg by mouth 2 (two) times a day with meals, Historical Med      Diclofenac Sodium (VOLTAREN) 1 % Apply 2 g topically 4 (four) times a day as needed (back pain), Starting Thu 2/29/2024, Normal      diclofenac sodium (VOLTAREN) 25 MG EC tablet Take 1 tablet (25 mg total) by mouth 3 (three) times a day for 10 days (Take it with meal), Starting Fri 12/1/2023, Until Wed 2/14/2024, Normal      FLUoxetine (PROzac) 10 mg capsule Take 10 mg by mouth daily, Starting Mon 9/18/2023, Historical Med      ketoconazole (NIZORAL) 2 % shampoo Apply topically twice a week to scalp for 8 weeks and then as needed.  Leave on for 5 minutes, then rinse., Normal      meclizine (ANTIVERT) 12.5 MG tablet Take 1 tablet (12.5 mg total) by mouth 3 (three) times a day as needed for dizziness or nausea, Starting Mon 2/12/2024, Normal      Multiple Vitamins-Minerals (MULTIVITAMIN ADULT PO) Take by mouth daily, Historical Med      omeprazole (PriLOSEC) 20 mg delayed release capsule Take 1 capsule (20 mg total) by mouth daily, Starting Tue 10/10/2023, Normal      sucralfate (CARAFATE) 1 g tablet Take 1 tablet (1 g total) by mouth 4 (four) times a day Crush and mix with water to take as suspension, Starting Fri 8/11/2023, Until Wed 2/14/2024, Normal      terconazole (TERAZOL 7) 0.4 % vaginal cream Insert 1 applicator into the vagina daily at bedtime, Starting Fri 9/1/2023, Normal      tirzepatide (Mounjaro) 2.5 MG/0.5ML Inject 0.5 mL (2.5 mg total) under the skin every 7 days, Starting Mon 2/26/2024, Normal             No discharge procedures on file.    PDMP Review         Value Time User    PDMP  Reviewed  Yes 7/14/2021  2:12 PM Delfina Evans PA-C            ED Provider  Electronically Signed by             Bandar Gonzalez MD  03/19/24 9689

## 2024-03-18 NOTE — Clinical Note
Syed Taveras was seen and treated in our emergency department on 3/18/2024.                Diagnosis:     Syed  may return to work on return date.    She may return on this date: 03/21/2024         If you have any questions or concerns, please don't hesitate to call.      Bandar Gonzalez MD    ______________________________           _______________          _______________  Hospital Representative                              Date                                Time

## 2024-03-18 NOTE — PROGRESS NOTES
Name: Syed Taveras      : 1995      MRN: [id number]  Encounter Provider: Kevin Kim MD  Encounter Date: 3/18/2024   Encounter department: [facility]    Assessment & Plan     1. Type 2 diabetes mellitus without complication, without long-term current use of insulin (HCC)  Assessment & Plan:    Lab Results   Component Value Date    HGBA1C 7.4 (H) 2024     Unable to find Mounjaro therefore will send order for Ozempic. Will begin 0.25mg weekly and increase every 4 weeks. DM follow up every 3 months.    Orders:  -     semaglutide, 0.25 or 0.5 mg/dose, (Ozempic, 0.25 or 0.5 MG/DOSE,) 2 mg/3 mL injection pen; 0.25 mg under the skin every 7 days for 4 doses (28 days), THEN 0.5 mg under the skin every 7 days    2. Acute viral syndrome  Assessment & Plan:  Improving since this morning's ED visit. Continue with supportive care. ED precautions reviewed.             Subjective      29yo female presenting to clinic for follow up after visiting the ED this morning. Pt endorsing weakness, nausea/vomiting, diarrhea that has been going on for 2 days. Son has sinusitis. As mentioned above she was evaluated this morning in the ED, given medication and advised to continue supportive care.    Pt would like change of Mounjaro prescription as she cannot find pharmacy that can fill it.      Review of Systems   Constitutional:  Positive for appetite change. Negative for chills and fever.   Respiratory:  Negative for cough and shortness of breath.    Cardiovascular:  Negative for chest pain.   Gastrointestinal:  Positive for diarrhea, nausea and vomiting. Negative for abdominal pain, blood in stool and constipation.   Genitourinary:  Negative for dysuria and hematuria.   Neurological:  Positive for dizziness and weakness.       Current Outpatient Medications on File Prior to Visit   Medication Sig    acetaminophen (TYLENOL) 650 mg CR tablet Take 1 tablet (650 mg total) by  "mouth every 8 (eight) hours as needed for mild pain    calcium carbonate (OS-GABI) 600 MG tablet Take 600 mg by mouth 2 (two) times a day with meals    Diclofenac Sodium (VOLTAREN) 1 % Apply 2 g topically 4 (four) times a day as needed (back pain)    diclofenac sodium (VOLTAREN) 25 MG EC tablet Take 1 tablet (25 mg total) by mouth 3 (three) times a day for 10 days (Take it with meal)    FLUoxetine (PROzac) 10 mg capsule Take 10 mg by mouth daily    ketoconazole (NIZORAL) 2 % shampoo Apply topically twice a week to scalp for 8 weeks and then as needed.  Leave on for 5 minutes, then rinse.    meclizine (ANTIVERT) 12.5 MG tablet Take 1 tablet (12.5 mg total) by mouth 3 (three) times a day as needed for dizziness or nausea    Multiple Vitamins-Minerals (MULTIVITAMIN ADULT PO) Take by mouth daily    omeprazole (PriLOSEC) 20 mg delayed release capsule Take 1 capsule (20 mg total) by mouth daily    sucralfate (CARAFATE) 1 g tablet Take 1 tablet (1 g total) by mouth 4 (four) times a day Crush and mix with water to take as suspension    terconazole (TERAZOL 7) 0.4 % vaginal cream Insert 1 applicator into the vagina daily at bedtime (Patient not taking: Reported on 12/1/2023)    [DISCONTINUED] tirzepatide (Mounjaro) 2.5 MG/0.5ML Inject 0.5 mL (2.5 mg total) under the skin every 7 days       Objective     /68 (BP Location: Right arm, Patient Position: Sitting, Cuff Size: Standard)   Pulse 74   Temp 98.4 °F (36.9 °C) (Temporal)   Ht 5' 9\" (1.753 m)   Wt 105 kg (230 lb 9.6 oz)   SpO2 98%   BMI 34.05 kg/m²     Physical Exam  Vitals reviewed.   Constitutional:       Appearance: Normal appearance.   Cardiovascular:      Rate and Rhythm: Normal rate and regular rhythm.      Pulses: Normal pulses.      Heart sounds: Normal heart sounds.   Pulmonary:      Effort: Pulmonary effort is normal.      Breath sounds: Normal breath sounds.   Abdominal:      General: There is no distension.      Palpations: Abdomen is soft.      " Tenderness: There is no abdominal tenderness. There is no guarding or rebound.   Musculoskeletal:         General: Normal range of motion.   Skin:     General: Skin is warm and dry.   Neurological:      General: No focal deficit present.      Mental Status: She is alert.   Psychiatric:         Mood and Affect: Mood normal.         Behavior: Behavior normal.       Kevin Kim MD

## 2024-03-18 NOTE — Clinical Note
Syed Taveras was seen and treated in our emergency department on 3/18/2024.                Diagnosis:     Syed  .    She may return on this date:          If you have any questions or concerns, please don't hesitate to call.      Bandar Gonzalez MD    ______________________________           _______________          _______________  Hospital Representative                              Date                                Time

## 2024-03-18 NOTE — ASSESSMENT & PLAN NOTE
Lab Results   Component Value Date    HGBA1C 7.4 (H) 02/14/2024     Unable to find Mounjaro therefore will send order for Ozempic. Will begin 0.25mg weekly and increase every 4 weeks. DM follow up every 3 months.

## 2024-03-22 ENCOUNTER — HOSPITAL ENCOUNTER (EMERGENCY)
Facility: HOSPITAL | Age: 29
Discharge: HOME | End: 2024-03-22
Attending: EMERGENCY MEDICINE
Payer: COMMERCIAL

## 2024-03-22 VITALS
SYSTOLIC BLOOD PRESSURE: 102 MMHG | BODY MASS INDEX: 33.99 KG/M2 | WEIGHT: 230.16 LBS | RESPIRATION RATE: 16 BRPM | OXYGEN SATURATION: 100 % | TEMPERATURE: 99.3 F | HEART RATE: 70 BPM | DIASTOLIC BLOOD PRESSURE: 66 MMHG

## 2024-03-22 DIAGNOSIS — J02.9 PHARYNGITIS, UNSPECIFIED ETIOLOGY: ICD-10-CM

## 2024-03-22 DIAGNOSIS — H65.193 ACUTE EFFUSION OF BOTH MIDDLE EARS: Primary | ICD-10-CM

## 2024-03-22 DIAGNOSIS — J06.9 VIRAL URI: ICD-10-CM

## 2024-03-22 DIAGNOSIS — H60.313 ACUTE DIFFUSE OTITIS EXTERNA OF BOTH EARS: ICD-10-CM

## 2024-03-22 LAB
FLUAV RNA RESP QL NAA+PROBE: NEGATIVE
FLUBV RNA RESP QL NAA+PROBE: NEGATIVE
RSV RNA RESP QL NAA+PROBE: NEGATIVE
S PYO DNA THROAT QL NAA+PROBE: NOT DETECTED
SARS-COV-2 RNA RESP QL NAA+PROBE: NEGATIVE

## 2024-03-22 PROCEDURE — 87651 STREP A DNA AMP PROBE: CPT

## 2024-03-22 PROCEDURE — 99283 EMERGENCY DEPT VISIT LOW MDM: CPT

## 2024-03-22 PROCEDURE — 99284 EMERGENCY DEPT VISIT MOD MDM: CPT

## 2024-03-22 PROCEDURE — 0241U HB NFCT DS VIR RESP RNA 4 TRGT: CPT

## 2024-03-22 RX ORDER — ACETAMINOPHEN 325 MG/1
650 TABLET ORAL ONCE
Status: COMPLETED | OUTPATIENT
Start: 2024-03-22 | End: 2024-03-22

## 2024-03-22 RX ORDER — CIPROFLOXACIN AND DEXAMETHASONE 3; 1 MG/ML; MG/ML
3 SUSPENSION/ DROPS AURICULAR (OTIC) 2 TIMES DAILY
Status: DISCONTINUED | OUTPATIENT
Start: 2024-03-22 | End: 2024-03-22 | Stop reason: HOSPADM

## 2024-03-22 RX ORDER — FLUTICASONE PROPIONATE 50 MCG
1 SPRAY, SUSPENSION (ML) NASAL DAILY
Status: DISCONTINUED | OUTPATIENT
Start: 2024-03-22 | End: 2024-03-22 | Stop reason: HOSPADM

## 2024-03-22 RX ADMIN — ACETAMINOPHEN 325MG 650 MG: 325 TABLET ORAL at 10:08

## 2024-03-22 RX ADMIN — FLUTICASONE PROPIONATE 1 SPRAY: 50 SPRAY, METERED NASAL at 10:08

## 2024-03-22 RX ADMIN — CIPROFLOXACIN AND DEXAMETHASONE 3 DROP: 3; 1 SUSPENSION/ DROPS AURICULAR (OTIC) at 10:11

## 2024-03-22 NOTE — DISCHARGE INSTRUCTIONS
Apply 3 ear drops twice daily to both ears for 5 days.  Give one spray of Flonase in each nostril.  Use Tylenol/Motrin as needed for fever, pain relief.    Encourage hydration and rest.  Practice good hand hygiene.   Monitor for worsening symptoms.  Follow-up with primary care.

## 2024-03-22 NOTE — Clinical Note
Syed SmithMartinzhane was seen and treated in our emergency department on 3/22/2024.                Diagnosis:     Syed  .    She may return on this date: 03/25/2024         If you have any questions or concerns, please don't hesitate to call.      Nayely Sr PA-C    ______________________________           _______________          _______________  Hospital Representative                              Date                                Time

## 2024-03-22 NOTE — ED PROVIDER NOTES
History  Chief Complaint   Patient presents with    Cold Like Symptoms     Patient started with nasal congestion Monday, reports on Wednesday started with sore throat and bilateral ear pain.      Is a 28-year-old female with history of diabetes presenting to the urgency department with flulike symptoms.  She reports congestion started on Monday, Wednesday she began having sore throat and ear pain. Reports subjective fevers, afebrile in ED without OTC medications.  Denies sick contacts at home.  States she has been able to continue to eat and drink.          Prior to Admission Medications   Prescriptions Last Dose Informant Patient Reported? Taking?   Diclofenac Sodium (VOLTAREN) 1 %   No No   Sig: Apply 2 g topically 4 (four) times a day as needed (back pain)   FLUoxetine (PROzac) 10 mg capsule   Yes No   Sig: Take 10 mg by mouth daily   Multiple Vitamins-Minerals (MULTIVITAMIN ADULT PO)  Self Yes No   Sig: Take by mouth daily   acetaminophen (TYLENOL) 650 mg CR tablet   No No   Sig: Take 1 tablet (650 mg total) by mouth every 8 (eight) hours as needed for mild pain   calcium carbonate (OS-GABI) 600 MG tablet   Yes No   Sig: Take 600 mg by mouth 2 (two) times a day with meals   diclofenac sodium (VOLTAREN) 25 MG EC tablet   No No   Sig: Take 1 tablet (25 mg total) by mouth 3 (three) times a day for 10 days (Take it with meal)   ketoconazole (NIZORAL) 2 % shampoo   No No   Sig: Apply topically twice a week to scalp for 8 weeks and then as needed.  Leave on for 5 minutes, then rinse.   meclizine (ANTIVERT) 12.5 MG tablet   No No   Sig: Take 1 tablet (12.5 mg total) by mouth 3 (three) times a day as needed for dizziness or nausea   metoclopramide (Reglan) 10 mg tablet   No No   Sig: Take 1 tablet (10 mg total) by mouth every 6 (six) hours   naproxen (NAPROSYN) 375 mg tablet   No No   Sig: Take 1 tablet (375 mg total) by mouth 2 (two) times a day with meals   omeprazole (PriLOSEC) 20 mg delayed release capsule   No No    Sig: Take 1 capsule (20 mg total) by mouth daily   semaglutide, 0.25 or 0.5 mg/dose, (Ozempic, 0.25 or 0.5 MG/DOSE,) 2 mg/3 mL injection pen   No No   Si.25 mg under the skin every 7 days for 4 doses (28 days), THEN 0.5 mg under the skin every 7 days   sucralfate (CARAFATE) 1 g tablet   No No   Sig: Take 1 tablet (1 g total) by mouth 4 (four) times a day Crush and mix with water to take as suspension   terconazole (TERAZOL 7) 0.4 % vaginal cream   No No   Sig: Insert 1 applicator into the vagina daily at bedtime   Patient not taking: Reported on 2023      Facility-Administered Medications: None       Past Medical History:   Diagnosis Date    Diabetes mellitus (HCC)     History of transfusion     after  - had fever with transfusion    Obesity (BMI 35.0-39.9 without comorbidity)     S/P laparoscopic sleeve gastrectomy 2020    Uses Bulgarian as primary spoken language        Past Surgical History:   Procedure Laterality Date     SECTION  2017    CHOLECYSTECTOMY      WA LAPS GSTRC RSTRICTIV PX LONGITUDINAL GASTRECTOMY N/A 2020    Procedure: LAP SLEEVE GASTRECTOMY, INTRAOP EGD;  Surgeon: Da Rogers MD;  Location: [facility];  Service: Bariatrics       Family History   Problem Relation Age of Onset    Diabetes Mother     Other Mother         gastric bypass    Diabetes Father     Diabetes Sister     Sleep apnea Sister     Sleep apnea Brother         2 of the 3 borthers have EAGLE    No Known Problems Brother     Diabetes Maternal Grandmother     Skin cancer Maternal Grandmother     No Known Problems Maternal Grandfather     Diabetes Paternal Grandmother     No Known Problems Paternal Grandfather     No Known Problems Son     Heart disease Family         CARDIOVASCULAR DISEASE     I have reviewed and agree with the history as documented.    E-Cigarette/Vaping    E-Cigarette Use Never User      E-Cigarette/Vaping Substances    Nicotine No     THC No     CBD No     Flavoring No      Other No     Unknown No      Social History     Tobacco Use    Smoking status: Never    Smokeless tobacco: Never   Vaping Use    Vaping status: Never Used   Substance Use Topics    Alcohol use: No    Drug use: No       Review of Systems   Constitutional:  Negative for chills and fever.   HENT:  Positive for congestion, ear pain and sore throat. Negative for ear discharge, facial swelling, nosebleeds, postnasal drip, rhinorrhea and sinus pain.    Eyes:  Negative for pain and visual disturbance.   Respiratory:  Negative for cough and shortness of breath.    Cardiovascular:  Negative for chest pain and palpitations.   Gastrointestinal:  Negative for abdominal pain and vomiting.   Genitourinary:  Negative for dysuria and hematuria.   Musculoskeletal:  Negative for arthralgias, back pain and myalgias.   Skin:  Negative for color change and rash.   Neurological:  Negative for seizures, syncope and headaches.   All other systems reviewed and are negative.      Physical Exam  Physical Exam  Vitals and nursing note reviewed.   Constitutional:       General: She is not in acute distress.     Appearance: She is well-developed.   HENT:      Head: Normocephalic and atraumatic.      Right Ear: Tenderness present. A middle ear effusion is present. Tympanic membrane is not injected, perforated, erythematous or bulging.      Left Ear: Tenderness present. A middle ear effusion is present. Tympanic membrane is not injected, perforated, erythematous or bulging.      Ears:      Comments: Bilateral middle ear effusion with mild external auditory canal erythema.  Tenderness with palpation.  No discharge or surrounding erythema or bruising.     Nose: Congestion present.      Mouth/Throat:      Pharynx: Uvula midline. Posterior oropharyngeal erythema present. No pharyngeal swelling, oropharyngeal exudate or uvula swelling.      Tonsils: No tonsillar exudate or tonsillar abscesses. 2+ on the right. 2+ on the left.   Eyes:       Conjunctiva/sclera: Conjunctivae normal.   Cardiovascular:      Rate and Rhythm: Normal rate and regular rhythm.   Pulmonary:      Effort: Pulmonary effort is normal. No respiratory distress.      Breath sounds: Normal breath sounds.   Abdominal:      Palpations: Abdomen is soft.      Tenderness: There is no abdominal tenderness.   Musculoskeletal:         General: No swelling.      Cervical back: Neck supple.   Skin:     General: Skin is warm and dry.      Capillary Refill: Capillary refill takes less than 2 seconds.   Neurological:      Mental Status: She is alert.   Psychiatric:         Mood and Affect: Mood normal.         Vital Signs  ED Triage Vitals [03/22/24 0904]   Temperature Pulse Respirations Blood Pressure SpO2   99.3 °F (37.4 °C) 70 16 102/66 100 %      Temp Source Heart Rate Source Patient Position - Orthostatic VS BP Location FiO2 (%)   Oral Monitor Sitting Right arm --      Pain Score       10 - Worst Possible Pain           Vitals:    03/22/24 0904   BP: 102/66   Pulse: 70   Patient Position - Orthostatic VS: Sitting         Visual Acuity      ED Medications  Medications   acetaminophen (TYLENOL) tablet 650 mg (650 mg Oral Given 3/22/24 1008)       Diagnostic Studies  Results Reviewed       Procedure Component Value Units Date/Time    FLU/RSV/COVID - if FLU/RSV clinically relevant [[id number]]  (Normal) Collected: 03/22/24 1007    Lab Status: Final result Specimen: Nares from Nose Updated: 03/22/24 1122     SARS-CoV-2 Negative     INFLUENZA A PCR Negative     INFLUENZA B PCR Negative     RSV PCR Negative    Narrative:      FOR PEDIATRIC PATIENTS - copy/paste COVID Guidelines URL to browser: [email or web address]    SARS-CoV-2 assay is a Nucleic Acid Amplification assay intended for the  qualitative detection of nucleic acid from SARS-CoV-2 in nasopharyngeal  swabs. Results are for the presumptive identification of SARS-CoV-2 RNA.    Positive  results are indicative of infection with SARS-CoV-2, the virus  causing COVID-19, but do not rule out bacterial infection or co-infection  with other viruses. Laboratories within the [place] and its  territories are required to report all positive results to the appropriate  public health authorities. Negative results do not preclude SARS-CoV-2  infection and should not be used as the sole basis for treatment or other  patient management decisions. Negative results must be combined with  clinical observations, patient history, and epidemiological information.  This test has not been FDA cleared or approved.    This test has been authorized by FDA under an Emergency Use Authorization  (EUA). This test is only authorized for the duration of time the  declaration that circumstances exist justifying the authorization of the  emergency use of an in vitro diagnostic tests for detection of SARS-CoV-2  virus and/or diagnosis of COVID-19 infection under section 564(b)(1) of  the Act, 21 U.S.C. 360bbb-3(b)(1), unless the authorization is terminated  or revoked sooner. The test has been validated but independent review by FDA  and CLIA is pending.    Test performed using Cepheid GeneXpert: This RT-PCR assay targets N2,  a region unique to SARS-CoV-2. A conserved region in the E-gene was chosen  for pan-Sarbecovirus detection which includes SARS-CoV-2.    According to CMS-2020-01-R, this platform meets the definition of high-throughput technology.    Strep A PCR [[id number]]  (Normal) Collected: 03/22/24 1007    Lab Status: Final result Specimen: Throat Updated: 03/22/24 1110     STREP A PCR Not Detected                   No orders to display              Procedures  Procedures         ED Course                                             Medical Decision Making  Patient presents with congestion, ear pain and sore throat.  DDx includes viral syndrome, strep, otitis media, otitis externa, postnasal drip unlikely  mastoiditis, PTA, retropharyngeal abscess.  On physical exam uvula is midline, pharynx is clear with mild erythema.  Bilateral ears have acute effusion with mildly erythematous canals, will cover with drops.  Discussed use of Flonase for effusion and postnasal drip with the patient.  Swabbed for strep, viral panel.  Both negative. Discussed findings from the visit with the patient.  We had a conversation regarding supportive care and indications for return.  Recommended appropriate follow-up.  Patient and/or family understand and agree with plan.      Amount and/or Complexity of Data Reviewed  Labs: ordered.    Risk  OTC drugs.  Prescription drug management.             Disposition  Final diagnoses:   Acute effusion of both middle ears   Acute diffuse otitis externa of both ears   Viral URI   Pharyngitis, unspecified etiology     Time reflects when diagnosis was documented in both MDM as applicable and the Disposition within this note       Time User Action Codes Description Comment    3/22/2024  9:27 AM Nayely Sr [H65.193] Acute effusion of both middle ears     3/22/2024  9:27 AM Nayely Sr [H60.313] Acute diffuse otitis externa of both ears     3/22/2024  9:27 AM Nayely Sr Add [J06.9] Viral URI     3/22/2024  9:28 AM Nyaely Sr Add [J02.9] Pharyngitis, unspecified etiology           ED Disposition       ED Disposition   Discharge    Condition   Stable    Date/Time   Fri Mar 22, 2024  9:27 AM    Comment   Syed Taveras discharge to home/self care.                   Follow-up Information       Follow up With Specialties Details Why Contact Info Additional Information    [facility]   [address] [place] [address], [address] [address] [phone] [facility], [address] [place] [address], [place] [address], [place], [place], [address] [phone]            Discharge Medication List as of  3/22/2024  9:30 AM        CONTINUE these medications which have NOT CHANGED    Details   acetaminophen (TYLENOL) 650 mg CR tablet Take 1 tablet (650 mg total) by mouth every 8 (eight) hours as needed for mild pain, Starting Mon 2/12/2024, Normal      calcium carbonate (OS-GABI) 600 MG tablet Take 600 mg by mouth 2 (two) times a day with meals, Historical Med      Diclofenac Sodium (VOLTAREN) 1 % Apply 2 g topically 4 (four) times a day as needed (back pain), Starting Thu 2/29/2024, Normal      diclofenac sodium (VOLTAREN) 25 MG EC tablet Take 1 tablet (25 mg total) by mouth 3 (three) times a day for 10 days (Take it with meal), Starting Fri 12/1/2023, Until Wed 2/14/2024, Normal      FLUoxetine (PROzac) 10 mg capsule Take 10 mg by mouth daily, Starting Mon 9/18/2023, Historical Med      ketoconazole (NIZORAL) 2 % shampoo Apply topically twice a week to scalp for 8 weeks and then as needed.  Leave on for 5 minutes, then rinse., Normal      meclizine (ANTIVERT) 12.5 MG tablet Take 1 tablet (12.5 mg total) by mouth 3 (three) times a day as needed for dizziness or nausea, Starting Mon 2/12/2024, Normal      metoclopramide (Reglan) 10 mg tablet Take 1 tablet (10 mg total) by mouth every 6 (six) hours, Starting Mon 3/18/2024, Normal      Multiple Vitamins-Minerals (MULTIVITAMIN ADULT PO) Take by mouth daily, Historical Med      naproxen (NAPROSYN) 375 mg tablet Take 1 tablet (375 mg total) by mouth 2 (two) times a day with meals, Starting Mon 3/18/2024, Normal      omeprazole (PriLOSEC) 20 mg delayed release capsule Take 1 capsule (20 mg total) by mouth daily, Starting Tue 10/10/2023, Normal      semaglutide, 0.25 or 0.5 mg/dose, (Ozempic, 0.25 or 0.5 MG/DOSE,) 2 mg/3 mL injection pen 0.25 mg under the skin every 7 days for 4 doses (28 days), THEN 0.5 mg under the skin every 7 days, Normal      sucralfate (CARAFATE) 1 g tablet Take 1 tablet (1 g total) by mouth 4 (four) times a day Crush and mix with water to take as  suspension, Starting Fri 8/11/2023, Until Wed 2/14/2024, Normal      terconazole (TERAZOL 7) 0.4 % vaginal cream Insert 1 applicator into the vagina daily at bedtime, Starting Fri 9/1/2023, Normal             No discharge procedures on file.    PDMP Review         Value Time User    PDMP Reviewed  Yes 7/14/2021  2:12 PM Delfina Evans PA-C            ED Provider  Electronically Signed by             Nayely Sr PA-C  03/22/24 8651

## 2024-04-09 NOTE — TELEPHONE ENCOUNTER
04/09/24    FORM: [employer] [facility] [employer] [facility] [employer]     FORM FAXED AND CONFIRMED THAT IT WAS RECEIVED ON 04/09/24    FAX # [phone]      FORM SCANNED INTO PT CHART

## 2024-04-11 ENCOUNTER — HOSPITAL ENCOUNTER (EMERGENCY)
Facility: HOSPITAL | Age: 29
Discharge: HOME | End: 2024-04-11
Attending: EMERGENCY MEDICINE
Payer: COMMERCIAL

## 2024-04-11 ENCOUNTER — HOSPITAL ENCOUNTER (OUTPATIENT)
Dept: MAMMOGRAPHY | Facility: CLINIC | Age: 29
End: 2024-04-11
Payer: COMMERCIAL

## 2024-04-11 VITALS
TEMPERATURE: 98.9 F | SYSTOLIC BLOOD PRESSURE: 128 MMHG | DIASTOLIC BLOOD PRESSURE: 66 MMHG | HEART RATE: 100 BPM | BODY MASS INDEX: 32.49 KG/M2 | RESPIRATION RATE: 16 BRPM | WEIGHT: 220 LBS | OXYGEN SATURATION: 99 %

## 2024-04-11 VITALS — HEIGHT: 69 IN | BODY MASS INDEX: 34.07 KG/M2 | WEIGHT: 230 LBS

## 2024-04-11 DIAGNOSIS — N64.4 BREAST PAIN, RIGHT: ICD-10-CM

## 2024-04-11 DIAGNOSIS — M79.10 MYALGIA: ICD-10-CM

## 2024-04-11 DIAGNOSIS — J02.0 STREP PHARYNGITIS: ICD-10-CM

## 2024-04-11 DIAGNOSIS — J02.9 SORE THROAT: ICD-10-CM

## 2024-04-11 DIAGNOSIS — R50.9 FEVER: Primary | ICD-10-CM

## 2024-04-11 DIAGNOSIS — N39.0 UTI (URINARY TRACT INFECTION): ICD-10-CM

## 2024-04-11 LAB
BACTERIA UR QL AUTO: ABNORMAL /HPF
BILIRUB UR QL STRIP: NEGATIVE
CLARITY UR: CLEAR
COLOR UR: ABNORMAL
FLUAV RNA RESP QL NAA+PROBE: NEGATIVE
FLUBV RNA RESP QL NAA+PROBE: NEGATIVE
GLUCOSE UR STRIP-MCNC: NEGATIVE MG/DL
HGB UR QL STRIP.AUTO: NEGATIVE
KETONES UR STRIP-MCNC: NEGATIVE MG/DL
LEUKOCYTE ESTERASE UR QL STRIP: ABNORMAL
MUCOUS THREADS UR QL AUTO: ABNORMAL
NITRITE UR QL STRIP: NEGATIVE
NON-SQ EPI CELLS URNS QL MICRO: ABNORMAL /HPF
PH UR STRIP.AUTO: 6.5 [PH]
PROT UR STRIP-MCNC: NEGATIVE MG/DL
RBC #/AREA URNS AUTO: ABNORMAL /HPF
RSV RNA RESP QL NAA+PROBE: NEGATIVE
S PYO DNA THROAT QL NAA+PROBE: DETECTED
SARS-COV-2 RNA RESP QL NAA+PROBE: NEGATIVE
SP GR UR STRIP.AUTO: 1.02 (ref 1–1.03)
UROBILINOGEN UR STRIP-ACNC: 2 MG/DL
WBC #/AREA URNS AUTO: ABNORMAL /HPF

## 2024-04-11 PROCEDURE — 87651 STREP A DNA AMP PROBE: CPT

## 2024-04-11 PROCEDURE — 99284 EMERGENCY DEPT VISIT MOD MDM: CPT

## 2024-04-11 PROCEDURE — 0241U HB NFCT DS VIR RESP RNA 4 TRGT: CPT

## 2024-04-11 PROCEDURE — 76642 ULTRASOUND BREAST LIMITED: CPT

## 2024-04-11 PROCEDURE — 81001 URINALYSIS AUTO W/SCOPE: CPT

## 2024-04-11 RX ORDER — IBUPROFEN 600 MG/1
600 TABLET ORAL EVERY 6 HOURS PRN
Qty: 30 TABLET | Refills: 0 | Status: SHIPPED | OUTPATIENT
Start: 2024-04-11

## 2024-04-11 RX ORDER — METHOCARBAMOL 500 MG/1
500 TABLET, FILM COATED ORAL ONCE
Status: COMPLETED | OUTPATIENT
Start: 2024-04-11 | End: 2024-04-11

## 2024-04-11 RX ORDER — ACETAMINOPHEN 500 MG
1000 TABLET ORAL EVERY 8 HOURS
Qty: 40 TABLET | Refills: 0 | Status: SHIPPED | OUTPATIENT
Start: 2024-04-11

## 2024-04-11 RX ORDER — ACETAMINOPHEN 325 MG/1
650 TABLET ORAL ONCE
Status: COMPLETED | OUTPATIENT
Start: 2024-04-11 | End: 2024-04-11

## 2024-04-11 RX ORDER — IBUPROFEN 600 MG/1
600 TABLET ORAL ONCE
Status: COMPLETED | OUTPATIENT
Start: 2024-04-11 | End: 2024-04-11

## 2024-04-11 RX ORDER — METHOCARBAMOL 500 MG/1
500 TABLET, FILM COATED ORAL 2 TIMES DAILY
Qty: 20 TABLET | Refills: 0 | Status: SHIPPED | OUTPATIENT
Start: 2024-04-11

## 2024-04-11 RX ORDER — CEPHALEXIN 500 MG/1
500 CAPSULE ORAL EVERY 12 HOURS SCHEDULED
Qty: 20 CAPSULE | Refills: 0 | Status: SHIPPED | OUTPATIENT
Start: 2024-04-11 | End: 2024-04-21

## 2024-04-11 RX ORDER — CEPHALEXIN 250 MG/1
500 CAPSULE ORAL ONCE
Status: COMPLETED | OUTPATIENT
Start: 2024-04-11 | End: 2024-04-11

## 2024-04-11 RX ADMIN — IBUPROFEN 600 MG: 600 TABLET, FILM COATED ORAL at 20:02

## 2024-04-11 RX ADMIN — CEPHALEXIN 500 MG: 250 CAPSULE ORAL at 22:13

## 2024-04-11 RX ADMIN — METHOCARBAMOL 500 MG: 500 TABLET ORAL at 22:13

## 2024-04-11 RX ADMIN — ACETAMINOPHEN 650 MG: 325 TABLET, FILM COATED ORAL at 19:05

## 2024-04-11 NOTE — Clinical Note
Syed Taveras was seen and treated in our emergency department on 4/11/2024.                Diagnosis:     Syed  .    She may return on this date:     Syed was seen in the ED for evaluation of illness.  Please excuse her from work on Friday, April 12, 2024.  Thank you.     If you have any questions or concerns, please don't hesitate to call.      Johnathan James PA-C    ______________________________           _______________          _______________  Hospital Representative                              Date                                Time

## 2024-04-12 NOTE — DISCHARGE INSTRUCTIONS
Call [employer] at  [phone] [facility] [phone]) to obtain a primary care physician.  They will be able to schedule you with a physician who sees patients with your insurance and physicians who see patients without insurance.     Today I provided prescription for Tylenol, ibuprofen, Keflex, Robaxin.  The Tylenol and ibuprofen can be used for fever control, myalgia.  The Robaxin to be used for myalgia as well.  The Keflex is for treatment of both strep pharyngitis, urinary tract infection.  Follow-up with primary care provider as needed.  Return to ED for new or worsening symptoms or

## 2024-04-12 NOTE — ED PROVIDER NOTES
History  Chief Complaint   Patient presents with    Fever     Patient reports fever, sore throat, lower abdominal pain and back pain.     28-year-old female with no pertinent medical history presents to ED for evaluation of fever, sore throat, myalgia.  Symptoms started this morning.  No Tylenol or ibuprofen prior to arrival.  Was given Tylenol in ED during triage as she had a fever of 102.7 °F.  No known sick contacts.  Denies cough, ear pain, dysuria, increased urinary frequency, hematuria.  No history of UTI.  Does endorse back pain.          Prior to Admission Medications   Prescriptions Last Dose Informant Patient Reported? Taking?   Diclofenac Sodium (VOLTAREN) 1 %   No No   Sig: Apply 2 g topically 4 (four) times a day as needed (back pain)   FLUoxetine (PROzac) 10 mg capsule   Yes No   Sig: Take 10 mg by mouth daily   Multiple Vitamins-Minerals (MULTIVITAMIN ADULT PO)  Self Yes No   Sig: Take by mouth daily   acetaminophen (TYLENOL) 650 mg CR tablet   No No   Sig: Take 1 tablet (650 mg total) by mouth every 8 (eight) hours as needed for mild pain   calcium carbonate (OS-GABI) 600 MG tablet   Yes No   Sig: Take 600 mg by mouth 2 (two) times a day with meals   diclofenac sodium (VOLTAREN) 25 MG EC tablet   No No   Sig: Take 1 tablet (25 mg total) by mouth 3 (three) times a day for 10 days (Take it with meal)   ketoconazole (NIZORAL) 2 % shampoo   No No   Sig: Apply topically twice a week to scalp for 8 weeks and then as needed.  Leave on for 5 minutes, then rinse.   meclizine (ANTIVERT) 12.5 MG tablet   No No   Sig: Take 1 tablet (12.5 mg total) by mouth 3 (three) times a day as needed for dizziness or nausea   metoclopramide (Reglan) 10 mg tablet   No No   Sig: Take 1 tablet (10 mg total) by mouth every 6 (six) hours   naproxen (NAPROSYN) 375 mg tablet   No No   Sig: Take 1 tablet (375 mg total) by mouth 2 (two) times a day with meals   omeprazole (PriLOSEC) 20 mg delayed release capsule   No No   Sig: Take 1  capsule (20 mg total) by mouth daily   semaglutide, 0.25 or 0.5 mg/dose, (Ozempic, 0.25 or 0.5 MG/DOSE,) 2 mg/3 mL injection pen   No No   Si.25 mg under the skin every 7 days for 4 doses (28 days), THEN 0.5 mg under the skin every 7 days   sucralfate (CARAFATE) 1 g tablet   No No   Sig: Take 1 tablet (1 g total) by mouth 4 (four) times a day Crush and mix with water to take as suspension   terconazole (TERAZOL 7) 0.4 % vaginal cream   No No   Sig: Insert 1 applicator into the vagina daily at bedtime   Patient not taking: Reported on 2023      Facility-Administered Medications: None       Past Medical History:   Diagnosis Date    Diabetes mellitus (HCC)     History of transfusion     after  - had fever with transfusion    Obesity (BMI 35.0-39.9 without comorbidity)     S/P laparoscopic sleeve gastrectomy 2020    Uses Greenlandic as primary spoken language        Past Surgical History:   Procedure Laterality Date     SECTION  2017    CHOLECYSTECTOMY      IL LAPS GSTRC RSTRICTIV PX LONGITUDINAL GASTRECTOMY N/A 2020    Procedure: LAP SLEEVE GASTRECTOMY, INTRAOP EGD;  Surgeon: Da Rogers MD;  Location: [facility];  Service: Bariatrics       Family History   Problem Relation Age of Onset    Diabetes Mother     Other Mother         gastric bypass    Diabetes Father     Diabetes Sister     Sleep apnea Sister     Diabetes Maternal Grandmother     Skin cancer Maternal Grandmother     No Known Problems Maternal Grandfather     Diabetes Paternal Grandmother     No Known Problems Paternal Grandfather     Sleep apnea Brother         2 of the 3 borthers have EAGLE    No Known Problems Brother     No Known Problems Son     Heart disease Family         CARDIOVASCULAR DISEASE    No Known Problems Maternal Aunt     No Known Problems Maternal Aunt     No Known Problems Paternal Aunt     No Known Problems Paternal Aunt      I have reviewed and agree with the history as  documented.    E-Cigarette/Vaping    E-Cigarette Use Never User      E-Cigarette/Vaping Substances    Nicotine No     THC No     CBD No     Flavoring No     Other No     Unknown No      Social History     Tobacco Use    Smoking status: Never    Smokeless tobacco: Never   Vaping Use    Vaping status: Never Used   Substance Use Topics    Alcohol use: No    Drug use: No       Review of Systems   Constitutional:  Positive for fever. Negative for chills.   HENT:  Positive for congestion and sore throat. Negative for drooling, ear pain, trouble swallowing and voice change.    Eyes:  Negative for pain and visual disturbance.   Respiratory:  Negative for cough, shortness of breath, wheezing and stridor.    Cardiovascular:  Negative for chest pain and palpitations.   Gastrointestinal:  Positive for abdominal pain. Negative for blood in stool, constipation, diarrhea, nausea, rectal pain and vomiting.   Genitourinary:  Negative for dysuria, genital sores, hematuria, pelvic pain, urgency and vaginal discharge.   Musculoskeletal:  Positive for back pain and myalgias. Negative for arthralgias.   Skin:  Negative for color change and rash.   Neurological:  Negative for seizures and syncope.   All other systems reviewed and are negative.      Physical Exam  Physical Exam  Vitals and nursing note reviewed.   Constitutional:       General: She is not in acute distress.     Appearance: She is well-developed. She is ill-appearing. She is not toxic-appearing or diaphoretic.   HENT:      Head: Normocephalic and atraumatic.      Right Ear: Tympanic membrane, ear canal and external ear normal. There is no impacted cerumen.      Left Ear: Tympanic membrane, ear canal and external ear normal. There is no impacted cerumen.      Nose: Congestion present.      Mouth/Throat:      Pharynx: Oropharyngeal exudate and posterior oropharyngeal erythema present.   Eyes:      General: No scleral icterus.        Right eye: No discharge.         Left eye:  No discharge.      Extraocular Movements: Extraocular movements intact.      Conjunctiva/sclera: Conjunctivae normal.      Pupils: Pupils are equal, round, and reactive to light.   Cardiovascular:      Rate and Rhythm: Normal rate and regular rhythm.      Pulses: Normal pulses.      Heart sounds: Normal heart sounds. No murmur heard.     No friction rub. No gallop.   Pulmonary:      Effort: Pulmonary effort is normal. No respiratory distress.      Breath sounds: Normal breath sounds. No stridor. No wheezing, rhonchi or rales.   Abdominal:      General: There is no distension.      Palpations: Abdomen is soft.      Tenderness: There is no abdominal tenderness. There is no guarding.      Hernia: No hernia is present.   Musculoskeletal:         General: No swelling.      Cervical back: Normal and neck supple.      Thoracic back: Normal.      Lumbar back: Normal.   Skin:     General: Skin is warm and dry.      Capillary Refill: Capillary refill takes less than 2 seconds.      Coloration: Skin is not jaundiced.   Neurological:      Mental Status: She is alert.   Psychiatric:         Mood and Affect: Mood normal.         Vital Signs  ED Triage Vitals   Temperature Pulse Respirations Blood Pressure SpO2   04/11/24 1855 04/11/24 1855 04/11/24 1855 04/11/24 1855 04/11/24 1855   (!) 102.7 °F (39.3 °C) (!) 122 20 116/55 100 %      Temp Source Heart Rate Source Patient Position - Orthostatic VS BP Location FiO2 (%)   04/11/24 1855 04/11/24 1855 04/11/24 2112 04/11/24 2112 --   Oral Monitor Lying Right arm       Pain Score       04/11/24 1855       9           Vitals:    04/11/24 1855 04/11/24 2112   BP: 116/55 128/66   Pulse: (!) 122 100   Patient Position - Orthostatic VS:  Lying         Visual Acuity      ED Medications  Medications   acetaminophen (TYLENOL) tablet 650 mg (650 mg Oral Given 4/11/24 1905)   ibuprofen (MOTRIN) tablet 600 mg (600 mg Oral Given 4/11/24 2002)   cephalexin (KEFLEX) capsule 500 mg (500 mg Oral Given  4/11/24 2213)   methocarbamol (ROBAXIN) tablet 500 mg (500 mg Oral Given 4/11/24 2213)       Diagnostic Studies  Results Reviewed       Procedure Component Value Units Date/Time    Urine Microscopic [[id number]]  (Abnormal) Collected: 04/11/24 2134    Lab Status: Final result Specimen: Urine, Clean Catch Updated: 04/11/24 2158     RBC, UA 1-2 /hpf      WBC, UA 2-4 /hpf      Epithelial Cells Occasional /hpf      Bacteria, UA Occasional /hpf      MUCUS THREADS Moderate    UA w Reflex to Microscopic w Reflex to Culture [[id number]]  (Abnormal) Collected: 04/11/24 2134    Lab Status: Final result Specimen: Urine, Clean Catch Updated: 04/11/24 2149     Color, UA Light Yellow     Clarity, UA Clear     Specific Gravity, UA 1.017     pH, UA 6.5     Leukocytes, UA Small     Nitrite, UA Negative     Protein, UA Negative mg/dl      Glucose, UA Negative mg/dl      Ketones, UA Negative mg/dl      Urobilinogen, UA 2.0 mg/dl      Bilirubin, UA Negative     Occult Blood, UA Negative    FLU/RSV/COVID - if FLU/RSV clinically relevant [[id number]]  (Normal) Collected: 04/11/24 2004    Lab Status: Final result Specimen: Nares from Nose Updated: 04/11/24 2118     SARS-CoV-2 Negative     INFLUENZA A PCR Negative     INFLUENZA B PCR Negative     RSV PCR Negative    Narrative:      FOR PEDIATRIC PATIENTS - copy/paste COVID Guidelines URL to browser: [email or web address]    SARS-CoV-2 assay is a Nucleic Acid Amplification assay intended for the  qualitative detection of nucleic acid from SARS-CoV-2 in nasopharyngeal  swabs. Results are for the presumptive identification of SARS-CoV-2 RNA.    Positive results are indicative of infection with SARS-CoV-2, the virus  causing COVID-19, but do not rule out bacterial infection or co-infection  with other viruses. Laboratories within the [place] and its  territories are required to report all positive results to the appropriate  public health  authorities. Negative results do not preclude SARS-CoV-2  infection and should not be used as the sole basis for treatment or other  patient management decisions. Negative results must be combined with  clinical observations, patient history, and epidemiological information.  This test has not been FDA cleared or approved.    This test has been authorized by FDA under an Emergency Use Authorization  (EUA). This test is only authorized for the duration of time the  declaration that circumstances exist justifying the authorization of the  emergency use of an in vitro diagnostic tests for detection of SARS-CoV-2  virus and/or diagnosis of COVID-19 infection under section 564(b)(1) of  the Act, 21 U.S.C. 360bbb-3(b)(1), unless the authorization is terminated  or revoked sooner. The test has been validated but independent review by FDA  and CLIA is pending.    Test performed using [employer]: This RT-PCR assay targets N2,  a region unique to SARS-CoV-2. A conserved region in the E-gene was chosen  for pan-Sarbecovirus detection which includes SARS-CoV-2.    According to CMS-2020-01-R, this platform meets the definition of high-throughput technology.    Strep A PCR [[id number]]  (Abnormal) Collected: 04/11/24 2004    Lab Status: Final result Specimen: Throat Updated: 04/11/24 2103     STREP A PCR Detected                   No orders to display              Procedures  Procedures         ED Course                                             Medical Decision Making  28-year-old female presents to ED for evaluation of fever, sore throat, myalgia, back pain as above.  Symptoms sinus morning.  On physical examination patient did have a temperature of 102.7 °F and was given Tylenol.  Mildly tachycardic at 120 bpm.  Normotensive.  No respiratory distress.  Normal breath sounds.  No murmur.  Tonsils appear erythematous, with exudate.  Ears clear.  Nontoxic-appearing.  Alert and responding to questions appropriately.   Abdomen soft, nontender.  Lumbar paraspinal musculature tender to palpation.  Given patient's fever, back pain will obtain UA.  No urinary symptoms specifically however the location of her back pain is concerning for  pyelonephritis.  Obtaining workup consisting of UA, flu/RSV/COVID, strep swab.  Providing ibuprofen for pain control and fever reduction.   Differential diagnosis includes but not limited to strep pharyngitis, flu, RSV, COVID, pyelonephritis, UTI    Strep swab came back positive.  Negative flu/RSV/COVID swab.  UA returned with small leukocytes.  2-4 WBC.  Occasional epithelial cell, occasional bacteria.    Had conversation with patient about results of workup.  Explained that strep swab came back positive.  UA came back inconclusive.  Did have white blood cells, leukocytes.  Given location of patient's back pain and positive strep swab, provided antibiotic which will provide coverage for both possible pyelonephritis and strep pharyngitis.  Keflex selected.  First dose given in ED.  Prescription for Keflex  sent to patient's pharmacy.  Also provided patient with dose of Robaxin for back pain.  Patient's vital signs improved during stay in the ED.  No longer febrile.  Significantly less tachycardic.  Advised patient to return to ED for new or worsening symptoms.  Follow-up with primary care provider as needed.  Obtain oral hydration, nutrients in the next couple days.  Patient in agreement with plan.  Patient discharged.    Prior to discharge, discharge instructions were discussed with patient at bedside. Patient was provided both verbal and written instructions. Patient is understanding of the discharge instructions and is agreeable to plan of care. Return precautions were discussed with patient bedside, patient verbalized understanding of signs and symptoms that would necessitate return to the ED. All questions were answered. Patient was comfortable with the plan of care and discharged to home.      Amount and/or Complexity of Data Reviewed  Labs: ordered.    Risk  OTC drugs.  Prescription drug management.             Disposition  Final diagnoses:   Fever   Sore throat   Strep pharyngitis   UTI (urinary tract infection)   Myalgia     Time reflects when diagnosis was documented in both MDM as applicable and the Disposition within this note       Time User Action Codes Description Comment    4/11/2024 10:06 PM Johnathan James Add [R50.9] Fever     4/11/2024 10:06 PM Johnathan James Add [J02.9] Sore throat     4/11/2024 10:06 PM JamesLonnieJohnathan Add [J02.0] Strep pharyngitis     4/11/2024 10:06 PM Jam[place], [place] Add [N39.0] UTI (urinary tract infection)     4/11/2024 10:06 PM Johnathan James Add [M79.10] Myalgia           ED Disposition       ED Disposition   Discharge    Condition   Stable    Date/Time   Thu Apr 11, 2024 10:06 PM    Comment   Syed Taveras discharge to home/self care.                   Follow-up Information       Follow up With Specialties Details Why Contact Info    [facility]    [phone]              Discharge Medication List as of 4/11/2024 10:10 PM        START taking these medications    Details   acetaminophen (TYLENOL) 500 mg tablet Take 2 tablets (1,000 mg total) by mouth every 8 (eight) hours, Starting u 4/11/2024, Normal      cephalexin (KEFLEX) 500 mg capsule Take 1 capsule (500 mg total) by mouth every 12 (twelve) hours for 10 days, Starting Thu 4/11/2024, Until Sun 4/21/2024, Normal      ibuprofen (MOTRIN) 600 mg tablet Take 1 tablet (600 mg total) by mouth every 6 (six) hours as needed for mild pain, Starting u 4/11/2024, Normal      methocarbamol (ROBAXIN) 500 mg tablet Take 1 tablet (500 mg total) by mouth 2 (two) times a day, Starting Thu 4/11/2024, Normal           CONTINUE these medications which have NOT CHANGED    Details   acetaminophen (TYLENOL) 650 mg CR tablet Take 1 tablet (650 mg total) by mouth every 8 (eight) hours as needed for mild pain, Starting Mon  2/12/2024, Normal      calcium carbonate (OS-GABI) 600 MG tablet Take 600 mg by mouth 2 (two) times a day with meals, Historical Med      Diclofenac Sodium (VOLTAREN) 1 % Apply 2 g topically 4 (four) times a day as needed (back pain), Starting Thu 2/29/2024, Normal      diclofenac sodium (VOLTAREN) 25 MG EC tablet Take 1 tablet (25 mg total) by mouth 3 (three) times a day for 10 days (Take it with meal), Starting Fri 12/1/2023, Until Wed 2/14/2024, Normal      FLUoxetine (PROzac) 10 mg capsule Take 10 mg by mouth daily, Starting Mon 9/18/2023, Historical Med      ketoconazole (NIZORAL) 2 % shampoo Apply topically twice a week to scalp for 8 weeks and then as needed.  Leave on for 5 minutes, then rinse., Normal      meclizine (ANTIVERT) 12.5 MG tablet Take 1 tablet (12.5 mg total) by mouth 3 (three) times a day as needed for dizziness or nausea, Starting Mon 2/12/2024, Normal      metoclopramide (Reglan) 10 mg tablet Take 1 tablet (10 mg total) by mouth every 6 (six) hours, Starting Mon 3/18/2024, Normal      Multiple Vitamins-Minerals (MULTIVITAMIN ADULT PO) Take by mouth daily, Historical Med      naproxen (NAPROSYN) 375 mg tablet Take 1 tablet (375 mg total) by mouth 2 (two) times a day with meals, Starting Mon 3/18/2024, Normal      omeprazole (PriLOSEC) 20 mg delayed release capsule Take 1 capsule (20 mg total) by mouth daily, Starting Tue 10/10/2023, Normal      semaglutide, 0.25 or 0.5 mg/dose, (Ozempic, 0.25 or 0.5 MG/DOSE,) 2 mg/3 mL injection pen 0.25 mg under the skin every 7 days for 4 doses (28 days), THEN 0.5 mg under the skin every 7 days, Normal      sucralfate (CARAFATE) 1 g tablet Take 1 tablet (1 g total) by mouth 4 (four) times a day Crush and mix with water to take as suspension, Starting Fri 8/11/2023, Until Wed 2/14/2024, Normal      terconazole (TERAZOL 7) 0.4 % vaginal cream Insert 1 applicator into the vagina daily at bedtime, Starting Fri 9/1/2023, Normal             No discharge procedures  on file.    PDMP Review         Value Time User    PDMP Reviewed  Yes 7/14/2021  2:12 PM Delfina Evans PA-C            ED Provider  Electronically Signed by             Johnathan James PA-C  04/12/24 0454

## 2024-04-12 NOTE — ED NOTES
Pt unable to void at this time, provided with PO fluids     Emmy Morales, ROSALINO  04/11/24 3790

## 2024-04-23 ENCOUNTER — NURSE TRIAGE (OUTPATIENT)
Age: 29
End: 2024-04-23

## 2024-04-23 NOTE — TELEPHONE ENCOUNTER
Call connected with [facility], Romanian speaking interpretor # [phone].    C/O thick white vaginal discharge with fishy type odor and vaginal irritation x one week.      Also c/o burning/hematuria at end of urinary stream.  Advised to f/u with PCP for recent UA testing/low back pain/ED eval on 4/11/2024    Appt provided for evaluation of vaginal concerns.

## 2024-04-23 NOTE — TELEPHONE ENCOUNTER
"Reason for Disposition  • Bad smelling vaginal discharge     Offered sooner appointment, patient declined due to work schedule    Answer Assessment - Initial Assessment Questions  1. DISCHARGE: \"Describe the discharge.\" (e.g., white, yellow, green, gray, foamy, cottage cheese-like)      Thick white vaginal discharge  2. ODOR: \"Is there a bad odor?\"      Yes-fishy odor  3. ONSET: \"When did the discharge begin?\"      One week  4. RASH: \"Is there a rash in that area?\" If Yes, ask: \"Describe it.\" (e.g., redness, blisters, sores, bumps)      Yes, itching/irritation  5. ABDOMINAL PAIN: \"Are you having any abdominal pain?\" If Yes, ask: \"What does it feel like? \" (e.g., crampy, dull, intermittent, constant)       Pelvic pain, low ack pain  6. ABDOMINAL PAIN SEVERITY: If present, ask: \"How bad is it?\"  (e.g., mild, moderate, severe)   - MILD - doesn't interfere with normal activities    - MODERATE - interferes with normal activities or awakens from sleep    - SEVERE - patient doesn't want to move (R/O peritonitis)       Mild pain  7. CAUSE: \"What do you think is causing the discharge?\" \"Have you had the same problem before? What happened then?\"      Unsure, has had BV in past and also pending ua results from PCP  8. OTHER SYMPTOMS: \"Do you have any other symptoms?\" (e.g., fever, itching, vaginal bleeding, pain with urination, injury to genital area, vaginal foreign body)      Bleeding at end of urination.   9. PREGNANCY: \"Is there any chance you are pregnant?\" \"When was your last menstrual period?\"      IUD- does not have period with IUD    Protocols used: Vaginal Discharge-ADULT-[place]    "

## 2024-04-29 ENCOUNTER — OFFICE VISIT (OUTPATIENT)
Dept: OBGYN CLINIC | Facility: MEDICAL CENTER | Age: 29
End: 2024-04-29
Payer: COMMERCIAL

## 2024-04-29 VITALS
SYSTOLIC BLOOD PRESSURE: 118 MMHG | DIASTOLIC BLOOD PRESSURE: 65 MMHG | WEIGHT: 225.5 LBS | HEIGHT: 69 IN | BODY MASS INDEX: 33.4 KG/M2

## 2024-04-29 DIAGNOSIS — B96.89 BV (BACTERIAL VAGINOSIS): Primary | ICD-10-CM

## 2024-04-29 DIAGNOSIS — N76.0 BV (BACTERIAL VAGINOSIS): Primary | ICD-10-CM

## 2024-04-29 PROCEDURE — 99212 OFFICE O/P EST SF 10 MIN: CPT | Performed by: OBSTETRICS & GYNECOLOGY

## 2024-04-29 RX ORDER — METRONIDAZOLE 7.5 MG/G
1 GEL VAGINAL DAILY
Qty: 5 G | Refills: 0 | Status: SHIPPED | OUTPATIENT
Start: 2024-04-29 | End: 2024-05-04

## 2024-05-06 ENCOUNTER — NURSE TRIAGE (OUTPATIENT)
Age: 29
End: 2024-05-06

## 2024-05-06 NOTE — TELEPHONE ENCOUNTER
"Pt calling to follow up on BV treatment. Seen by provider on 4/29/24 and prescribed metrogel. Provider advised if gel does not clear symptoms up, to call and request pill. Per patient, continues to have itching as well as vaginal discharge with odor. RN confirmed pharmacy on file. RN will send message to provider. Pt agreeable to plan. No further questions.     Reason for Disposition  • All other vaginal symptoms (Exception: feels like prior yeast infection, minor abrasion, mild rash < 24 hour duration, mild itching)    Answer Assessment - Initial Assessment Questions  1. SYMPTOM: \"What's the main symptom you're concerned about?\" (e.g., pain, itching, dryness)      Itching, vaginal discharge with odor  2. LOCATION: \"Where is the  s/s located?\" (e.g., inside/outside, left/right)      Inside  3. ONSET: \"When did the  s/s  start?\"      Since last seen provider last month  4. PAIN: \"Is there any pain?\" If Yes, ask: \"How bad is it?\" (Scale: 1-10; mild, moderate, severe)      Denies  5. ITCHING: \"Is there any itching?\" If Yes, ask: \"How bad is it?\" (Scale: 1-10; mild, moderate, severe)      3/10  6. CAUSE: \"What do you think is causing the discharge?\" \"Have you had the same problem before? What happened then?\"      BV  7. OTHER SYMPTOMS: \"Do you have any other symptoms?\" (e.g., fever, itching, vaginal bleeding, pain with urination, injury to genital area, vaginal foreign body)      Denies  8. PREGNANCY: \"Is there any chance you are pregnant?\" \"When was your last menstrual period?\"      Denies    Protocols used: Vaginal Symptoms-ADULT-[place]    "

## 2024-05-08 DIAGNOSIS — B96.89 BV (BACTERIAL VAGINOSIS): Primary | ICD-10-CM

## 2024-05-08 DIAGNOSIS — N76.0 BV (BACTERIAL VAGINOSIS): Primary | ICD-10-CM

## 2024-05-08 RX ORDER — METRONIDAZOLE 500 MG/1
500 TABLET ORAL EVERY 8 HOURS SCHEDULED
Qty: 15 TABLET | Refills: 0 | Status: SHIPPED | OUTPATIENT
Start: 2024-05-08 | End: 2024-05-13

## 2024-05-13 ENCOUNTER — TELEPHONE (OUTPATIENT)
Age: 29
End: 2024-05-13

## 2024-05-14 ENCOUNTER — PROCEDURE VISIT (OUTPATIENT)
Dept: OBGYN CLINIC | Facility: MEDICAL CENTER | Age: 29
End: 2024-05-14
Payer: COMMERCIAL

## 2024-05-14 VITALS
HEIGHT: 69 IN | WEIGHT: 226.9 LBS | BODY MASS INDEX: 33.61 KG/M2 | DIASTOLIC BLOOD PRESSURE: 72 MMHG | SYSTOLIC BLOOD PRESSURE: 110 MMHG

## 2024-05-14 DIAGNOSIS — Z30.432 ENCOUNTER FOR IUD REMOVAL: Primary | ICD-10-CM

## 2024-05-14 PROCEDURE — 58301 REMOVE INTRAUTERINE DEVICE: CPT | Performed by: OBSTETRICS & GYNECOLOGY

## 2024-05-14 NOTE — PROGRESS NOTES
Iud removal    Date/Time: 5/14/2024 10:30 AM    Performed by: Laverne Davis MD  Authorized by: Laverne Davis MD  Universal Protocol:  Consent: Verbal consent obtained. Written consent obtained.  Risks and benefits: risks, benefits and alternatives were discussed  Consent given by: patient  Patient understanding: patient states understanding of the procedure being performed  Patient consent: the patient's understanding of the procedure matches consent given  Procedure consent: procedure consent matches procedure scheduled  Patient identity confirmed: verbally with patient    Procedure:     Removed with no complications: yes      Other reason for removal:  Desires to conceive

## 2024-05-17 ENCOUNTER — HOSPITAL ENCOUNTER (EMERGENCY)
Facility: HOSPITAL | Age: 29
Discharge: HOME | End: 2024-05-17
Attending: EMERGENCY MEDICINE
Payer: COMMERCIAL

## 2024-05-17 VITALS
DIASTOLIC BLOOD PRESSURE: 70 MMHG | RESPIRATION RATE: 18 BRPM | OXYGEN SATURATION: 99 % | BODY MASS INDEX: 33.99 KG/M2 | HEART RATE: 102 BPM | SYSTOLIC BLOOD PRESSURE: 140 MMHG | WEIGHT: 230.16 LBS | TEMPERATURE: 98.1 F

## 2024-05-17 DIAGNOSIS — R21 RASH AND NONSPECIFIC SKIN ERUPTION: Primary | ICD-10-CM

## 2024-05-17 PROCEDURE — 99283 EMERGENCY DEPT VISIT LOW MDM: CPT | Performed by: EMERGENCY MEDICINE

## 2024-05-17 PROCEDURE — 99282 EMERGENCY DEPT VISIT SF MDM: CPT

## 2024-05-17 RX ORDER — BENZOCAINE/MENTHOL 6 MG-10 MG
LOZENGE MUCOUS MEMBRANE
Qty: 15 G | Refills: 0 | Status: SHIPPED | OUTPATIENT
Start: 2024-05-17

## 2024-05-17 NOTE — ED PROVIDER NOTES
History  Chief Complaint   Patient presents with    Rash     Rash to b/l arms and abdomen. Taking benadryl without relief        History provided by:  Patient  Rash  Location: bilateral forearms.  Quality: itchiness and redness    Severity:  Mild  Onset quality:  Gradual  Duration:  1 day  Timing:  Constant  Progression:  Unchanged  Context: not chemical exposure, not exposure to similar rash, not new detergent/soap, not plant contact and not sick contacts    Relieved by:  Nothing  Worsened by:  Nothing  Ineffective treatments:  OTC analgesics  Associated symptoms: no abdominal pain, no diarrhea, no fatigue, no fever, no hoarse voice, no joint pain, no myalgias, no nausea, no periorbital edema, no shortness of breath, no sore throat, no throat swelling, no tongue swelling, not vomiting and not wheezing        Prior to Admission Medications   Prescriptions Last Dose Informant Patient Reported? Taking?   Diclofenac Sodium (VOLTAREN) 1 %  Self No No   Sig: Apply 2 g topically 4 (four) times a day as needed (back pain)   Patient not taking: Reported on 4/29/2024   FLUoxetine (PROzac) 10 mg capsule  Self Yes No   Sig: Take 10 mg by mouth daily   Patient not taking: Reported on 4/29/2024   Multiple Vitamins-Minerals (MULTIVITAMIN ADULT PO)  Self Yes No   Sig: Take by mouth daily   acetaminophen (TYLENOL) 500 mg tablet  Self No No   Sig: Take 2 tablets (1,000 mg total) by mouth every 8 (eight) hours   Patient not taking: Reported on 4/29/2024   acetaminophen (TYLENOL) 650 mg CR tablet  Self No No   Sig: Take 1 tablet (650 mg total) by mouth every 8 (eight) hours as needed for mild pain   Patient not taking: Reported on 4/29/2024   calcium carbonate (OS-GABI) 600 MG tablet  Self Yes No   Sig: Take 600 mg by mouth 2 (two) times a day with meals   diclofenac sodium (VOLTAREN) 25 MG EC tablet   No No   Sig: Take 1 tablet (25 mg total) by mouth 3 (three) times a day for 10 days (Take it with meal)   ibuprofen (MOTRIN) 600 mg  tablet  Self No No   Sig: Take 1 tablet (600 mg total) by mouth every 6 (six) hours as needed for mild pain   Patient not taking: Reported on 2024   ketoconazole (NIZORAL) 2 % shampoo  Self No No   Sig: Apply topically twice a week to scalp for 8 weeks and then as needed.  Leave on for 5 minutes, then rinse.   Patient not taking: Reported on 2024   meclizine (ANTIVERT) 12.5 MG tablet  Self No No   Sig: Take 1 tablet (12.5 mg total) by mouth 3 (three) times a day as needed for dizziness or nausea   Patient not taking: Reported on 2024   methocarbamol (ROBAXIN) 500 mg tablet  Self No No   Sig: Take 1 tablet (500 mg total) by mouth 2 (two) times a day   Patient not taking: Reported on 2024   metoclopramide (Reglan) 10 mg tablet  Self No No   Sig: Take 1 tablet (10 mg total) by mouth every 6 (six) hours   Patient not taking: Reported on 2024   naproxen (NAPROSYN) 375 mg tablet  Self No No   Sig: Take 1 tablet (375 mg total) by mouth 2 (two) times a day with meals   Patient not taking: Reported on 2024   omeprazole (PriLOSEC) 20 mg delayed release capsule  Self No No   Sig: Take 1 capsule (20 mg total) by mouth daily   semaglutide, 0.25 or 0.5 mg/dose, (Ozempic, 0.25 or 0.5 MG/DOSE,) 2 mg/3 mL injection pen  Self No No   Si.25 mg under the skin every 7 days for 4 doses (28 days), THEN 0.5 mg under the skin every 7 days   sucralfate (CARAFATE) 1 g tablet   No No   Sig: Take 1 tablet (1 g total) by mouth 4 (four) times a day Crush and mix with water to take as suspension   terconazole (TERAZOL 7) 0.4 % vaginal cream  Self No No   Sig: Insert 1 applicator into the vagina daily at bedtime   Patient not taking: Reported on 2023      Facility-Administered Medications: None       Past Medical History:   Diagnosis Date    Diabetes mellitus (HCC)     History of transfusion     after  - had fever with transfusion    Obesity (BMI 35.0-39.9 without comorbidity)     S/P laparoscopic  sleeve gastrectomy 2020    Uses Moldovan as primary spoken language        Past Surgical History:   Procedure Laterality Date     SECTION  2017    CHOLECYSTECTOMY      ME LAPS GSTRC RSTRICTIV PX LONGITUDINAL GASTRECTOMY N/A 2020    Procedure: LAP SLEEVE GASTRECTOMY, INTRAOP EGD;  Surgeon: Da Rogers MD;  Location: [facility] OR;  Service: Bariatrics       Family History   Problem Relation Age of Onset    Diabetes Mother     Other Mother         gastric bypass    Diabetes Father     Diabetes Sister     Sleep apnea Sister     Diabetes Maternal Grandmother     Skin cancer Maternal Grandmother     No Known Problems Maternal Grandfather     Diabetes Paternal Grandmother     No Known Problems Paternal Grandfather     Sleep apnea Brother         2 of the 3 borthers have EAGLE    No Known Problems Brother     No Known Problems Son     Heart disease Family         CARDIOVASCULAR DISEASE    No Known Problems Maternal Aunt     No Known Problems Maternal Aunt     No Known Problems Paternal Aunt     No Known Problems Paternal Aunt      I have reviewed and agree with the history as documented.    E-Cigarette/Vaping    E-Cigarette Use Never User      E-Cigarette/Vaping Substances    Nicotine No     THC No     CBD No     Flavoring No     Other No     Unknown No      Social History     Tobacco Use    Smoking status: Never    Smokeless tobacco: Never   Vaping Use    Vaping status: Never Used   Substance Use Topics    Alcohol use: No    Drug use: No       Review of Systems   Constitutional:  Negative for activity change, appetite change, fatigue and fever.   HENT:  Negative for congestion, dental problem, ear pain, hoarse voice, rhinorrhea and sore throat.    Eyes:  Negative for pain and redness.   Respiratory:  Negative for chest tightness, shortness of breath and wheezing.    Cardiovascular:  Negative for chest pain and palpitations.   Gastrointestinal:  Negative for abdominal pain, blood in stool,  constipation, diarrhea, nausea and vomiting.   Endocrine: Negative for cold intolerance and heat intolerance.   Genitourinary:  Negative for dysuria, frequency and hematuria.   Musculoskeletal:  Negative for arthralgias and myalgias.   Skin:  Positive for rash. Negative for color change and pallor.   Neurological:  Negative for weakness and numbness.   Hematological:  Does not bruise/bleed easily.   Psychiatric/Behavioral:  Negative for agitation, hallucinations and suicidal ideas.        Physical Exam  Physical Exam  Constitutional:       Appearance: She is well-developed.   HENT:      Head: Normocephalic and atraumatic.   Eyes:      Extraocular Movements: EOM normal.      Pupils: Pupils are equal, round, and reactive to light.   Neck:      Vascular: No JVD.      Trachea: No tracheal deviation.   Cardiovascular:      Rate and Rhythm: Normal rate and regular rhythm.   Pulmonary:      Effort: No tachypnea, accessory muscle usage or respiratory distress.   Abdominal:      General: There is no distension.   Musculoskeletal:      Right lower leg: Normal.      Left lower leg: Normal.   Skin:     General: Skin is warm.      Capillary Refill: Capillary refill takes less than 2 seconds.      Comments: Nonspecific papular rash bilateral forearms with out eviden of superinfection.   Neurological:      Mental Status: She is alert and oriented to person, place, and time.   Psychiatric:         Mood and Affect: Mood and affect normal.         Behavior: Behavior normal.         Vital Signs  ED Triage Vitals [05/17/24 1714]   Temperature Pulse Respirations Blood Pressure SpO2   98.1 °F (36.7 °C) 102 18 140/70 99 %      Temp src Heart Rate Source Patient Position - Orthostatic VS BP Location FiO2 (%)   -- -- -- -- --      Pain Score       --           Vitals:    05/17/24 1714   BP: 140/70   Pulse: 102         Visual Acuity      ED Medications  Medications - No data to display    Diagnostic Studies  Results Reviewed       None                    No orders to display              Procedures  Procedures         ED Course                               SBIRT 22yo+      Flowsheet Row Most Recent Value   Initial Alcohol Screen: US AUDIT-C     1. How often do you have a drink containing alcohol? 0 Filed at: 05/17/2024 1726   2. How many drinks containing alcohol do you have on a typical day you are drinking?  0 Filed at: 05/17/2024 1726   3b. FEMALE Any Age, or MALE 65+: How often do you have 4 or more drinks on one occassion? 0 Filed at: 05/17/2024 1726   Audit-C Score 0 Filed at: 05/17/2024 1726   CHERRI: How many times in the past year have you...    Used an illegal drug or used a prescription medication for non-medical reasons? Never Filed at: 05/17/2024 1726                      Medical Decision Making  Nonspecific papular rash on bilateral arms-does not appear to be superinfected and does not require antibiotics.  Will treat with topical hydrocortisone cream,.  Benadryl.             Disposition  Final diagnoses:   Rash and nonspecific skin eruption     Time reflects when diagnosis was documented in both MDM as applicable and the Disposition within this note       Time User Action Codes Description Comment    5/17/2024  5:33 PM Nas Talbot Add [R21] Rash and nonspecific skin eruption           ED Disposition       ED Disposition   Discharge    Condition   Stable    Date/Time   Fri May 17, 2024 1733    Comment   Syed Taveras discharge to home/self care.                   Follow-up Information       Follow up With Specialties Details Why Contact Info Additional Information    [facility] Schedule an appointment as soon as possible for a visit in 2 days  [address] [place] [id number] [phone] [facility], [address] [place] [address], [address] [phone]            Discharge Medication List as of  5/17/2024  5:34 PM        START taking these medications    Details   hydrocortisone 1 % cream Apply to affected area 2 times daily, Normal           CONTINUE these medications which have NOT CHANGED    Details   acetaminophen (TYLENOL) 500 mg tablet Take 2 tablets (1,000 mg total) by mouth every 8 (eight) hours, Starting Thu 4/11/2024, Normal      acetaminophen (TYLENOL) 650 mg CR tablet Take 1 tablet (650 mg total) by mouth every 8 (eight) hours as needed for mild pain, Starting Mon 2/12/2024, Normal      calcium carbonate (OS-GABI) 600 MG tablet Take 600 mg by mouth 2 (two) times a day with meals, Historical Med      Diclofenac Sodium (VOLTAREN) 1 % Apply 2 g topically 4 (four) times a day as needed (back pain), Starting Thu 2/29/2024, Normal      diclofenac sodium (VOLTAREN) 25 MG EC tablet Take 1 tablet (25 mg total) by mouth 3 (three) times a day for 10 days (Take it with meal), Starting Fri 12/1/2023, Until Wed 2/14/2024, Normal      FLUoxetine (PROzac) 10 mg capsule Take 10 mg by mouth daily, Starting Mon 9/18/2023, Historical Med      ibuprofen (MOTRIN) 600 mg tablet Take 1 tablet (600 mg total) by mouth every 6 (six) hours as needed for mild pain, Starting Thu 4/11/2024, Normal      ketoconazole (NIZORAL) 2 % shampoo Apply topically twice a week to scalp for 8 weeks and then as needed.  Leave on for 5 minutes, then rinse., Normal      meclizine (ANTIVERT) 12.5 MG tablet Take 1 tablet (12.5 mg total) by mouth 3 (three) times a day as needed for dizziness or nausea, Starting Mon 2/12/2024, Normal      methocarbamol (ROBAXIN) 500 mg tablet Take 1 tablet (500 mg total) by mouth 2 (two) times a day, Starting Thu 4/11/2024, Normal      metoclopramide (Reglan) 10 mg tablet Take 1 tablet (10 mg total) by mouth every 6 (six) hours, Starting Mon 3/18/2024, Normal      Multiple Vitamins-Minerals (MULTIVITAMIN ADULT PO) Take by mouth daily, Historical Med      naproxen (NAPROSYN) 375 mg tablet Take 1 tablet (375 mg  total) by mouth 2 (two) times a day with meals, Starting Mon 3/18/2024, Normal      omeprazole (PriLOSEC) 20 mg delayed release capsule Take 1 capsule (20 mg total) by mouth daily, Starting Tue 10/10/2023, Normal      semaglutide, 0.25 or 0.5 mg/dose, (Ozempic, 0.25 or 0.5 MG/DOSE,) 2 mg/3 mL injection pen 0.25 mg under the skin every 7 days for 4 doses (28 days), THEN 0.5 mg under the skin every 7 days, Normal      sucralfate (CARAFATE) 1 g tablet Take 1 tablet (1 g total) by mouth 4 (four) times a day Crush and mix with water to take as suspension, Starting Fri 8/11/2023, Until Wed 2/14/2024, Normal      terconazole (TERAZOL 7) 0.4 % vaginal cream Insert 1 applicator into the vagina daily at bedtime, Starting Fri 9/1/2023, Normal             No discharge procedures on file.    PDMP Review         Value Time User    PDMP Reviewed  Yes 7/14/2021  2:12 PM Delfina Evans PA-C            ED Provider  Electronically Signed by             Nas Talbot MD  05/17/24 0587

## 2024-05-18 NOTE — PROGRESS NOTES
A/P     BV  Pt reports that she has smelly vaginal discharge  On exam there is noted to have thick white discharge and odor    Will take metrogel

## 2024-05-20 ENCOUNTER — TELEPHONE (OUTPATIENT)
Age: 29
End: 2024-05-20

## 2024-05-20 NOTE — TELEPHONE ENCOUNTER
Rec'd call from patient.     Seen at  [facility] on 5/17/2024.    Seen at [facility] on 5/19/2024.    No notes are able to be accessed from [facility].    Patient states that she just started the medication prescribed by TIMOTHY Culver yesterday. Rash is no better.    Explained to patient that she needs to give the medication time to work. Patient was given 5 day supply.    Patient will return call to office - end of day - Thursday for status check.    If rash still has not changed, will schedule patient for appointment.

## 2024-06-03 ENCOUNTER — TELEPHONE (OUTPATIENT)
Dept: FAMILY MEDICINE CLINIC | Facility: CLINIC | Age: 29
End: 2024-06-03

## 2024-06-05 ENCOUNTER — TELEPHONE (OUTPATIENT)
Dept: BARIATRICS | Facility: CLINIC | Age: 29
End: 2024-06-05

## 2024-06-10 DIAGNOSIS — E11.9 TYPE 2 DIABETES MELLITUS WITHOUT COMPLICATION, WITHOUT LONG-TERM CURRENT USE OF INSULIN (HCC): ICD-10-CM

## 2024-06-10 RX ORDER — SEMAGLUTIDE 0.68 MG/ML
INJECTION, SOLUTION SUBCUTANEOUS
Refills: 2 | OUTPATIENT
Start: 2024-06-10

## 2024-06-10 NOTE — PROGRESS NOTES
Contacted by patient for request of Ozempic refill.  Patient has completed 0.5 mg dosage and now will increase to 1 mg every 7 days for 28 days.  Order placed the patient is desired pharmacy.  EMR updated.

## 2024-07-16 ENCOUNTER — OFFICE VISIT (OUTPATIENT)
Dept: FAMILY MEDICINE CLINIC | Facility: CLINIC | Age: 29
End: 2024-07-16

## 2024-07-16 VITALS
HEART RATE: 92 BPM | TEMPERATURE: 97.6 F | RESPIRATION RATE: 16 BRPM | DIASTOLIC BLOOD PRESSURE: 64 MMHG | SYSTOLIC BLOOD PRESSURE: 98 MMHG | HEIGHT: 69 IN | BODY MASS INDEX: 32.73 KG/M2 | OXYGEN SATURATION: 97 % | WEIGHT: 221 LBS

## 2024-07-16 DIAGNOSIS — Z31.9 PATIENT DESIRES PREGNANCY: ICD-10-CM

## 2024-07-16 DIAGNOSIS — Z98.84 STATUS POST LAPAROSCOPIC SLEEVE GASTRECTOMY: ICD-10-CM

## 2024-07-16 DIAGNOSIS — E11.9 TYPE 2 DIABETES MELLITUS WITHOUT COMPLICATION, WITHOUT LONG-TERM CURRENT USE OF INSULIN (HCC): Primary | ICD-10-CM

## 2024-07-16 PROCEDURE — 99214 OFFICE O/P EST MOD 30 MIN: CPT | Performed by: NURSE PRACTITIONER

## 2024-07-16 RX ORDER — BETA CAROTENE, CHOLECALCIFEROL, DL-ALPHA TOCOPHERYL ACETATE, ASCORBIC ACID, FOLIC ACID, THIAMIN MONONITRATE, RIBOFLAVIN, NIACINAMIDE, PYRIDOXINE HYDROCHLORIDE, CYANOCOBALAMIN, CALCIUM CARBONATE, POTAS 120; 4000; 200; 400; 8; 29; 1; 20; 150; 3; 3; 3; 15 MG/1; [IU]/1; MG/1; [IU]/1; UG/1; MG/1; MG/1; MG/1; UG/1; MG/1; MG/1; MG/1; MG/1
1 TABLET, FILM COATED ORAL DAILY
Qty: 90 TABLET | Refills: 1 | Status: SHIPPED | OUTPATIENT
Start: 2024-07-16

## 2024-07-16 NOTE — PATIENT INSTRUCTIONS
Breast Cancer Screening    Breast cancer is the most common cancer in females in the [place] and the second most common cause of cancer death in women    The risk of breast cancer from birth to death is 12.9 percent (1 in 8 women)    Approximately 43,000 women will die in the [place] annually from breast cancer    Mammography    A mammogram is an x-ray of your breasts to screen for breast cancer    It uses the least amount of radiation necessary to provide the highest quality image able to detect early signs of breast cancer.    For most women, we recommend getting your first mammogram at the age of 40 and repeating it yearly         Signs of Breast Cancer    Lumps  Thickening or swelling of parts of the breast  Irritation or dimpling of breast skin  Red or flaky skin in the nipple area  Pain in the nipple area  Pulling in of the nipple  Change in size or shape of the breast  Pain in any area of the breast     Did you know by getting a mammogram, doctors can find breast cancer 3 YEARS before a lump is even felt!    Early detection is BEST!  Schedule your mammogram TODAY!     To schedule this appointment with [facility], please contact Central Scheduling at [phone]

## 2024-07-16 NOTE — PROGRESS NOTES
Ambulatory Visit  Name: Syed Taveras      : 1995      MRN: [id number]  Encounter Provider: LES Barber  Encounter Date: 2024   Encounter department: [facility]    Assessment & Plan   1. Type 2 diabetes mellitus without complication, without long-term current use of insulin (HCC)  Assessment & Plan:    Lab Results   Component Value Date    HGBA1C 7.4 (H) 2024     Currently taking semaglutide 1 mg weekly, continue   Repeat A1c and adjust regimen as indicated   Patient is trying to conceive, she was advised that she will need to stop semaglutide if she becomes pregnant   Orders:  -     Hemoglobin A1C; Future  -     Lipid panel; Future  -     Albumin / creatinine urine ratio; Future  -     semaglutide, 1 mg/dose, (Ozempic) 4 mg/3 mL injection pen; Inject 0.75 mL (1 mg total) under the skin once a week  -     CBC and differential; Future  -     Comprehensive metabolic panel; Future  2. Status post laparoscopic sleeve gastrectomy  -     Vitamin B12; Future  -     Vitamin D 25 hydroxy; Future  3. Patient desires pregnancy  -     Prenatal Vit-Fe Fumarate-FA (Prenatabs FA) 29-1 MG TABS; Take 1 tablet by mouth in the morning         History of Present Illness     Patient is a 29 YO female who  has a past medical history of Diabetes mellitus (HCC), History of transfusion, Obesity (BMI 35.0-39.9 without comorbidity), S/P laparoscopic sleeve gastrectomy (2020), and Uses Hebrew as primary spoken language who presents for follow up. She is doing well with semaglutide, no side effects. Down 9 pounds since last visit.     The following portions of the patient's history were reviewed and updated as appropriate: allergies, current medications, past family history, past medical history, past social history, past surgical history and problem list.    Review of Systems   Constitutional:  Negative for activity change, appetite change, chills, fatigue,  fever and unexpected weight change.   HENT:  Negative for hearing loss, nosebleeds, sinus pain, sneezing, sore throat and trouble swallowing.    Eyes:  Negative for photophobia and visual disturbance.   Respiratory:  Negative for cough, chest tightness, shortness of breath and wheezing.    Cardiovascular:  Negative for chest pain, palpitations and leg swelling.   Gastrointestinal:  Negative for abdominal pain, constipation, nausea and vomiting.   Genitourinary:  Negative for decreased urine volume, difficulty urinating, dysuria, flank pain, genital sores, hematuria and urgency.   Musculoskeletal:  Negative for back pain and gait problem.   Skin:  Negative for pallor, rash and wound.   Neurological:  Negative for dizziness, seizures, syncope, weakness, numbness and headaches.   Hematological:  Negative for adenopathy. Does not bruise/bleed easily.   Psychiatric/Behavioral:  Negative for confusion, hallucinations, self-injury, sleep disturbance and suicidal ideas. The patient is not nervous/anxious.      Past Medical History:   Diagnosis Date   • Diabetes mellitus (HCC)    • History of transfusion     after  - had fever with transfusion   • Obesity (BMI 35.0-39.9 without comorbidity)    • S/P laparoscopic sleeve gastrectomy 2020   • Uses Yoruba as primary spoken language      Past Surgical History:   Procedure Laterality Date   •  SECTION  2017   • CHOLECYSTECTOMY     • AK LAPS GSTRC RSTRICTIV PX LONGITUDINAL GASTRECTOMY N/A 2020    Procedure: LAP SLEEVE GASTRECTOMY, INTRAOP EGD;  Surgeon: Da Rogers MD;  Location: [facility];  Service: Bariatrics     Family History   Problem Relation Age of Onset   • Diabetes Mother    • Other Mother         gastric bypass   • Diabetes Father    • Diabetes Sister    • Sleep apnea Sister    • Diabetes Maternal Grandmother    • Skin cancer Maternal Grandmother    • No Known Problems Maternal Grandfather    • Diabetes Paternal Grandmother    • No  Known Problems Paternal Grandfather    • Sleep apnea Brother         2 of the 3 borthers have EAGLE   • No Known Problems Brother    • No Known Problems Son    • Heart disease Family         CARDIOVASCULAR DISEASE   • No Known Problems Maternal Aunt    • No Known Problems Maternal Aunt    • No Known Problems Paternal Aunt    • No Known Problems Paternal Aunt      Social History     Tobacco Use   • Smoking status: Never   • Smokeless tobacco: Never   Vaping Use   • Vaping status: Never Used   Substance and Sexual Activity   • Alcohol use: No   • Drug use: No   • Sexual activity: Yes     Partners: Male     Birth control/protection: I.U.D.     Current Outpatient Medications on File Prior to Visit   Medication Sig   • omeprazole (PriLOSEC) 20 mg delayed release capsule Take 1 capsule (20 mg total) by mouth daily   • [DISCONTINUED] acetaminophen (TYLENOL) 500 mg tablet Take 2 tablets (1,000 mg total) by mouth every 8 (eight) hours (Patient not taking: Reported on 4/29/2024)   • [DISCONTINUED] acetaminophen (TYLENOL) 650 mg CR tablet Take 1 tablet (650 mg total) by mouth every 8 (eight) hours as needed for mild pain (Patient not taking: Reported on 4/29/2024)   • [DISCONTINUED] calcium carbonate (OS-GABI) 600 MG tablet Take 600 mg by mouth 2 (two) times a day with meals   • [DISCONTINUED] Diclofenac Sodium (VOLTAREN) 1 % Apply 2 g topically 4 (four) times a day as needed (back pain) (Patient not taking: Reported on 4/29/2024)   • [DISCONTINUED] diclofenac sodium (VOLTAREN) 25 MG EC tablet Take 1 tablet (25 mg total) by mouth 3 (three) times a day for 10 days (Take it with meal)   • [DISCONTINUED] FLUoxetine (PROzac) 10 mg capsule Take 10 mg by mouth daily (Patient not taking: Reported on 4/29/2024)   • [DISCONTINUED] hydrocortisone 1 % cream Apply to affected area 2 times daily   • [DISCONTINUED] ibuprofen (MOTRIN) 600 mg tablet Take 1 tablet (600 mg total) by mouth every 6 (six) hours as needed for mild pain (Patient not  taking: Reported on 4/29/2024)   • [DISCONTINUED] ketoconazole (NIZORAL) 2 % shampoo Apply topically twice a week to scalp for 8 weeks and then as needed.  Leave on for 5 minutes, then rinse. (Patient not taking: Reported on 4/29/2024)   • [DISCONTINUED] meclizine (ANTIVERT) 12.5 MG tablet Take 1 tablet (12.5 mg total) by mouth 3 (three) times a day as needed for dizziness or nausea (Patient not taking: Reported on 5/14/2024)   • [DISCONTINUED] methocarbamol (ROBAXIN) 500 mg tablet Take 1 tablet (500 mg total) by mouth 2 (two) times a day (Patient not taking: Reported on 5/14/2024)   • [DISCONTINUED] metoclopramide (Reglan) 10 mg tablet Take 1 tablet (10 mg total) by mouth every 6 (six) hours (Patient not taking: Reported on 5/14/2024)   • [DISCONTINUED] Multiple Vitamins-Minerals (MULTIVITAMIN ADULT PO) Take by mouth daily   • [DISCONTINUED] naproxen (NAPROSYN) 375 mg tablet Take 1 tablet (375 mg total) by mouth 2 (two) times a day with meals (Patient not taking: Reported on 5/14/2024)   • [DISCONTINUED] sucralfate (CARAFATE) 1 g tablet Take 1 tablet (1 g total) by mouth 4 (four) times a day Crush and mix with water to take as suspension   • [DISCONTINUED] terconazole (TERAZOL 7) 0.4 % vaginal cream Insert 1 applicator into the vagina daily at bedtime (Patient not taking: Reported on 12/1/2023)     No Known Allergies  Immunization History   Administered Date(s) Administered   • COVID-19 MODERNA VACC 0.5 ML IM 05/07/2021, 06/02/2021   • DTP 1995, 02/24/1997, 05/08/1997, 04/23/1998   • DTaP 1995, 02/24/1997, 05/08/1997, 04/23/1998   • HPV Quadrivalent 09/19/2008, 12/01/2008, 07/02/2009   • Hep A, ped/adol, 2 dose 02/18/2014, 03/09/2015   • Hep B, Adolescent or Pediatric 1995, 02/24/1997, 05/08/1998   • Hepatitis A 02/18/2014, 03/09/2015   • HiB 1995, 02/24/1997, 05/08/1997, 04/23/1998   • Hib (PRP-T) 1995, 02/24/1997, 05/08/1997, 04/23/1998   • INFLUENZA 03/09/2012, 10/19/2013,  "03/09/2015, 03/09/2015, 12/08/2016, 12/08/2016, 02/09/2018, 02/09/2018   • IPV 1995, 02/24/1997, 05/08/1997, 06/26/2001   • Influenza, injectable, quadrivalent, preservative free 0.5 mL 10/24/2019, 11/30/2021, 02/22/2024   • Influenza, recombinant, quadrivalent,injectable, preservative free 10/21/2019, 11/12/2020   • MMR 11/20/1997, 06/26/2001   • Meningococcal MCV4P 09/19/2008, 01/10/2014   • Pneumococcal Conjugate Vaccine 20-valent (Pcv20), Polysace 02/22/2024   • Pneumococcal Polysaccharide PPV23 11/12/2020   • Td (adult), Unspecified 09/19/2008   • Td (adult), adsorbed 09/19/2008   • Tdap 09/19/2008, 11/12/2020     Objective     BP 98/64 (BP Location: Left arm, Patient Position: Sitting, Cuff Size: Large)   Pulse 92   Temp 97.6 °F (36.4 °C) (Temporal)   Resp 16   Ht 5' 9\" (1.753 m)   Wt 100 kg (221 lb)   LMP 07/04/2024   SpO2 97%   BMI 32.64 kg/m²     Physical Exam  Vitals and nursing note reviewed.   Constitutional:       General: She is not in acute distress.     Appearance: Normal appearance. She is not diaphoretic.   HENT:      Head: Normocephalic.      Right Ear: Tympanic membrane and external ear normal.      Left Ear: Tympanic membrane and external ear normal.      Nose: Nose normal.      Mouth/Throat:      Mouth: Mucous membranes are moist.   Eyes:      General:         Right eye: No discharge.         Left eye: No discharge.      Extraocular Movements: Extraocular movements intact.      Conjunctiva/sclera: Conjunctivae normal.      Pupils: Pupils are equal, round, and reactive to light.   Cardiovascular:      Rate and Rhythm: Normal rate and regular rhythm.      Pulses: Normal pulses.      Heart sounds: Normal heart sounds.   Pulmonary:      Effort: Pulmonary effort is normal. No respiratory distress.      Breath sounds: Normal breath sounds.   Abdominal:      General: Bowel sounds are normal. There is no distension.      Palpations: Abdomen is soft.   Musculoskeletal:         General: " Normal range of motion.      Cervical back: Normal range of motion and neck supple. No rigidity.      Right lower leg: No edema.      Left lower leg: No edema.   Lymphadenopathy:      Cervical: No cervical adenopathy.   Skin:     General: Skin is warm and dry.      Capillary Refill: Capillary refill takes less than 2 seconds.      Findings: No rash.   Neurological:      General: No focal deficit present.      Mental Status: She is alert and oriented to person, place, and time.   Psychiatric:         Mood and Affect: Mood normal.         Behavior: Behavior normal.

## 2024-07-16 NOTE — ASSESSMENT & PLAN NOTE
Lab Results   Component Value Date    HGBA1C 7.4 (H) 02/14/2024     Currently taking semaglutide 1 mg weekly, continue   Repeat A1c and adjust regimen as indicated   Patient is trying to conceive, she was advised that she will need to stop semaglutide if she becomes pregnant

## 2024-07-23 ENCOUNTER — APPOINTMENT (OUTPATIENT)
Dept: LAB | Facility: HOSPITAL | Age: 29
End: 2024-07-23
Payer: COMMERCIAL

## 2024-07-23 DIAGNOSIS — E11.9 TYPE 2 DIABETES MELLITUS WITHOUT COMPLICATION, WITHOUT LONG-TERM CURRENT USE OF INSULIN (HCC): ICD-10-CM

## 2024-07-23 DIAGNOSIS — Z98.84 STATUS POST LAPAROSCOPIC SLEEVE GASTRECTOMY: ICD-10-CM

## 2024-07-23 LAB
25(OH)D3 SERPL-MCNC: 28 NG/ML (ref 30–100)
ALBUMIN SERPL BCG-MCNC: 4 G/DL (ref 3.5–5)
ALP SERPL-CCNC: 64 U/L (ref 34–104)
ALT SERPL W P-5'-P-CCNC: 13 U/L (ref 7–52)
ANION GAP SERPL CALCULATED.3IONS-SCNC: 9 MMOL/L (ref 4–13)
AST SERPL W P-5'-P-CCNC: 12 U/L (ref 13–39)
BASOPHILS # BLD AUTO: 0.01 THOUSANDS/ÂΜL (ref 0–0.1)
BASOPHILS NFR BLD AUTO: 0 % (ref 0–1)
BILIRUB SERPL-MCNC: 1.21 MG/DL (ref 0.2–1)
BUN SERPL-MCNC: 8 MG/DL (ref 5–25)
CALCIUM SERPL-MCNC: 9.1 MG/DL (ref 8.4–10.2)
CHLORIDE SERPL-SCNC: 105 MMOL/L (ref 96–108)
CHOLEST SERPL-MCNC: 122 MG/DL
CO2 SERPL-SCNC: 25 MMOL/L (ref 21–32)
CREAT SERPL-MCNC: 0.64 MG/DL (ref 0.6–1.3)
EOSINOPHIL # BLD AUTO: 0.13 THOUSAND/ÂΜL (ref 0–0.61)
EOSINOPHIL NFR BLD AUTO: 2 % (ref 0–6)
ERYTHROCYTE [DISTWIDTH] IN BLOOD BY AUTOMATED COUNT: 12.1 % (ref 11.6–15.1)
EST. AVERAGE GLUCOSE BLD GHB EST-MCNC: 120 MG/DL
GFR SERPL CREATININE-BSD FRML MDRD: 122 ML/MIN/1.73SQ M
GLUCOSE P FAST SERPL-MCNC: 102 MG/DL (ref 65–99)
HBA1C MFR BLD: 5.8 %
HCT VFR BLD AUTO: 38.2 % (ref 34.8–46.1)
HDLC SERPL-MCNC: 48 MG/DL
HGB BLD-MCNC: 12.6 G/DL (ref 11.5–15.4)
IMM GRANULOCYTES # BLD AUTO: 0.02 THOUSAND/UL (ref 0–0.2)
IMM GRANULOCYTES NFR BLD AUTO: 0 % (ref 0–2)
LDLC SERPL CALC-MCNC: 56 MG/DL (ref 0–100)
LYMPHOCYTES # BLD AUTO: 1.42 THOUSANDS/ÂΜL (ref 0.6–4.47)
LYMPHOCYTES NFR BLD AUTO: 24 % (ref 14–44)
MCH RBC QN AUTO: 30.7 PG (ref 26.8–34.3)
MCHC RBC AUTO-ENTMCNC: 33 G/DL (ref 31.4–37.4)
MCV RBC AUTO: 93 FL (ref 82–98)
MONOCYTES # BLD AUTO: 0.45 THOUSAND/ÂΜL (ref 0.17–1.22)
MONOCYTES NFR BLD AUTO: 8 % (ref 4–12)
NEUTROPHILS # BLD AUTO: 3.99 THOUSANDS/ÂΜL (ref 1.85–7.62)
NEUTS SEG NFR BLD AUTO: 66 % (ref 43–75)
NONHDLC SERPL-MCNC: 74 MG/DL
NRBC BLD AUTO-RTO: 0 /100 WBCS
PLATELET # BLD AUTO: 153 THOUSANDS/UL (ref 149–390)
PMV BLD AUTO: 12.4 FL (ref 8.9–12.7)
POTASSIUM SERPL-SCNC: 3.8 MMOL/L (ref 3.5–5.3)
PROT SERPL-MCNC: 6.7 G/DL (ref 6.4–8.4)
RBC # BLD AUTO: 4.11 MILLION/UL (ref 3.81–5.12)
SODIUM SERPL-SCNC: 139 MMOL/L (ref 135–147)
TRIGL SERPL-MCNC: 89 MG/DL
VIT B12 SERPL-MCNC: 204 PG/ML (ref 180–914)
WBC # BLD AUTO: 6.02 THOUSAND/UL (ref 4.31–10.16)

## 2024-07-23 PROCEDURE — 85025 COMPLETE CBC W/AUTO DIFF WBC: CPT

## 2024-07-23 PROCEDURE — 36415 COLL VENOUS BLD VENIPUNCTURE: CPT

## 2024-07-23 PROCEDURE — 82607 VITAMIN B-12: CPT

## 2024-07-23 PROCEDURE — 83036 HEMOGLOBIN GLYCOSYLATED A1C: CPT

## 2024-07-23 PROCEDURE — 80053 COMPREHEN METABOLIC PANEL: CPT

## 2024-07-23 PROCEDURE — 82306 VITAMIN D 25 HYDROXY: CPT

## 2024-07-23 PROCEDURE — 80061 LIPID PANEL: CPT

## 2024-07-24 ENCOUNTER — APPOINTMENT (OUTPATIENT)
Dept: LAB | Facility: HOSPITAL | Age: 29
End: 2024-07-24
Payer: COMMERCIAL

## 2024-07-24 LAB
CREAT UR-MCNC: 103.2 MG/DL
MICROALBUMIN UR-MCNC: <7 MG/L

## 2024-07-24 PROCEDURE — 82043 UR ALBUMIN QUANTITATIVE: CPT

## 2024-07-24 PROCEDURE — 82570 ASSAY OF URINE CREATININE: CPT

## 2024-07-29 ENCOUNTER — HOSPITAL ENCOUNTER (EMERGENCY)
Facility: HOSPITAL | Age: 29
Discharge: HOME | End: 2024-07-29
Attending: EMERGENCY MEDICINE | Admitting: EMERGENCY MEDICINE
Payer: COMMERCIAL

## 2024-07-29 VITALS
SYSTOLIC BLOOD PRESSURE: 121 MMHG | OXYGEN SATURATION: 100 % | DIASTOLIC BLOOD PRESSURE: 72 MMHG | TEMPERATURE: 98.2 F | HEART RATE: 82 BPM | BODY MASS INDEX: 31.68 KG/M2 | WEIGHT: 214.5 LBS | RESPIRATION RATE: 16 BRPM

## 2024-07-29 DIAGNOSIS — R25.2 LEG CRAMPING: Primary | ICD-10-CM

## 2024-07-29 LAB
ANION GAP SERPL CALCULATED.3IONS-SCNC: 6 MMOL/L (ref 4–13)
BASOPHILS # BLD AUTO: 0.03 THOUSANDS/ÂΜL (ref 0–0.1)
BASOPHILS NFR BLD AUTO: 0 % (ref 0–1)
BUN SERPL-MCNC: 11 MG/DL (ref 5–25)
CALCIUM SERPL-MCNC: 8.9 MG/DL (ref 8.4–10.2)
CHLORIDE SERPL-SCNC: 107 MMOL/L (ref 96–108)
CO2 SERPL-SCNC: 26 MMOL/L (ref 21–32)
CREAT SERPL-MCNC: 0.81 MG/DL (ref 0.6–1.3)
EOSINOPHIL # BLD AUTO: 0.14 THOUSAND/ÂΜL (ref 0–0.61)
EOSINOPHIL NFR BLD AUTO: 2 % (ref 0–6)
ERYTHROCYTE [DISTWIDTH] IN BLOOD BY AUTOMATED COUNT: 12.3 % (ref 11.6–15.1)
EXT PREGNANCY TEST URINE: NEGATIVE
EXT. CONTROL: NORMAL
GFR SERPL CREATININE-BSD FRML MDRD: 99 ML/MIN/1.73SQ M
GLUCOSE SERPL-MCNC: 131 MG/DL (ref 65–140)
HCT VFR BLD AUTO: 38.3 % (ref 34.8–46.1)
HGB BLD-MCNC: 12.6 G/DL (ref 11.5–15.4)
IMM GRANULOCYTES # BLD AUTO: 0.02 THOUSAND/UL (ref 0–0.2)
IMM GRANULOCYTES NFR BLD AUTO: 0 % (ref 0–2)
LYMPHOCYTES # BLD AUTO: 1.42 THOUSANDS/ÂΜL (ref 0.6–4.47)
LYMPHOCYTES NFR BLD AUTO: 18 % (ref 14–44)
MCH RBC QN AUTO: 30.4 PG (ref 26.8–34.3)
MCHC RBC AUTO-ENTMCNC: 32.9 G/DL (ref 31.4–37.4)
MCV RBC AUTO: 92 FL (ref 82–98)
MONOCYTES # BLD AUTO: 0.54 THOUSAND/ÂΜL (ref 0.17–1.22)
MONOCYTES NFR BLD AUTO: 7 % (ref 4–12)
NEUTROPHILS # BLD AUTO: 5.62 THOUSANDS/ÂΜL (ref 1.85–7.62)
NEUTS SEG NFR BLD AUTO: 73 % (ref 43–75)
NRBC BLD AUTO-RTO: 0 /100 WBCS
PLATELET # BLD AUTO: 158 THOUSANDS/UL (ref 149–390)
PMV BLD AUTO: 12 FL (ref 8.9–12.7)
POTASSIUM SERPL-SCNC: 4 MMOL/L (ref 3.5–5.3)
RBC # BLD AUTO: 4.15 MILLION/UL (ref 3.81–5.12)
SODIUM SERPL-SCNC: 139 MMOL/L (ref 135–147)
WBC # BLD AUTO: 7.77 THOUSAND/UL (ref 4.31–10.16)

## 2024-07-29 PROCEDURE — 36415 COLL VENOUS BLD VENIPUNCTURE: CPT | Performed by: EMERGENCY MEDICINE

## 2024-07-29 PROCEDURE — 80048 BASIC METABOLIC PNL TOTAL CA: CPT | Performed by: EMERGENCY MEDICINE

## 2024-07-29 PROCEDURE — 85025 COMPLETE CBC W/AUTO DIFF WBC: CPT | Performed by: EMERGENCY MEDICINE

## 2024-07-29 PROCEDURE — 96374 THER/PROPH/DIAG INJ IV PUSH: CPT

## 2024-07-29 PROCEDURE — 99284 EMERGENCY DEPT VISIT MOD MDM: CPT | Performed by: EMERGENCY MEDICINE

## 2024-07-29 PROCEDURE — 96361 HYDRATE IV INFUSION ADD-ON: CPT

## 2024-07-29 PROCEDURE — 81025 URINE PREGNANCY TEST: CPT | Performed by: EMERGENCY MEDICINE

## 2024-07-29 PROCEDURE — 99283 EMERGENCY DEPT VISIT LOW MDM: CPT

## 2024-07-29 RX ORDER — KETOROLAC TROMETHAMINE 30 MG/ML
15 INJECTION, SOLUTION INTRAMUSCULAR; INTRAVENOUS ONCE
Status: COMPLETED | OUTPATIENT
Start: 2024-07-29 | End: 2024-07-29

## 2024-07-29 RX ORDER — IBUPROFEN 800 MG/1
800 TABLET ORAL 3 TIMES DAILY
Qty: 21 TABLET | Refills: 0 | Status: SHIPPED | OUTPATIENT
Start: 2024-07-29

## 2024-07-29 RX ADMIN — SODIUM CHLORIDE 1000 ML: 0.9 INJECTION, SOLUTION INTRAVENOUS at 09:05

## 2024-07-29 RX ADMIN — KETOROLAC TROMETHAMINE 15 MG: 30 INJECTION, SOLUTION INTRAMUSCULAR; INTRAVENOUS at 09:51

## 2024-07-29 NOTE — Clinical Note
Syed DesirLoobzhane was seen and treated in our emergency department on 7/29/2024.                Diagnosis:     Syed  .    She may return on this date: 07/31/2024         If you have any questions or concerns, please don't hesitate to call.      Anthony Stokes MD    ______________________________           _______________          _______________  Hospital Representative                              Date                                Time

## 2024-07-29 NOTE — ED PROVIDER NOTES
History  Chief Complaint   Patient presents with    Leg Swelling     And frequent cramping for 1 week       Patient is a diabetic on Ozempic complaining of bilateral calf cramps going on for a week she had 5 episodes last night in her left leg denies any swelling fever weakness or numbness extremities no chest pain shortness of breath abdominal pain no vomiting or diarrhea patient did not excessively workout no trauma          Prior to Admission Medications   Prescriptions Last Dose Informant Patient Reported? Taking?   Prenatal Vit-Fe Fumarate-FA (Prenatabs FA) 29-1 MG TABS   No No   Sig: Take 1 tablet by mouth in the morning   omeprazole (PriLOSEC) 20 mg delayed release capsule  Self No No   Sig: Take 1 capsule (20 mg total) by mouth daily   semaglutide, 1 mg/dose, (Ozempic) 4 mg/3 mL injection pen   No No   Sig: Inject 0.75 mL (1 mg total) under the skin once a week      Facility-Administered Medications: None       Past Medical History:   Diagnosis Date    Diabetes mellitus (HCC)     History of transfusion     after  - had fever with transfusion    Obesity (BMI 35.0-39.9 without comorbidity)     S/P laparoscopic sleeve gastrectomy 2020    Uses Lithuanian as primary spoken language        Past Surgical History:   Procedure Laterality Date     SECTION  2017    CHOLECYSTECTOMY      OH LAPS GSTRC RSTRICTIV PX LONGITUDINAL GASTRECTOMY N/A 2020    Procedure: LAP SLEEVE GASTRECTOMY, INTRAOP EGD;  Surgeon: Da Rogers MD;  Location: [facility];  Service: Bariatrics       Family History   Problem Relation Age of Onset    Diabetes Mother     Other Mother         gastric bypass    Diabetes Father     Diabetes Sister     Sleep apnea Sister     Diabetes Maternal Grandmother     Skin cancer Maternal Grandmother     No Known Problems Maternal Grandfather     Diabetes Paternal Grandmother     No Known Problems Paternal Grandfather     Sleep apnea Brother         2 of the 3 borthers have EAGLE     No Known Problems Brother     No Known Problems Son     Heart disease Family         CARDIOVASCULAR DISEASE    No Known Problems Maternal Aunt     No Known Problems Maternal Aunt     No Known Problems Paternal Aunt     No Known Problems Paternal Aunt      I have reviewed and agree with the history as documented.    E-Cigarette/Vaping    E-Cigarette Use Never User      E-Cigarette/Vaping Substances    Nicotine No     THC No     CBD No     Flavoring No     Other No     Unknown No      Social History     Tobacco Use    Smoking status: Never    Smokeless tobacco: Never   Vaping Use    Vaping status: Never Used   Substance Use Topics    Alcohol use: No    Drug use: No       Review of Systems   Constitutional:  Negative for chills and fever.   Respiratory:  Negative for shortness of breath.    Cardiovascular:  Negative for chest pain and palpitations.   Gastrointestinal:  Negative for abdominal pain, diarrhea and vomiting.   Genitourinary:  Negative for dysuria.   Musculoskeletal:  Negative for arthralgias, back pain, gait problem and joint swelling.   Skin:  Negative for color change and rash.   Neurological:  Negative for seizures, syncope, weakness and numbness.   All other systems reviewed and are negative.      Physical Exam  Physical Exam  Vitals and nursing note reviewed.   Constitutional:       General: She is not in acute distress.     Appearance: She is well-developed.   HENT:      Head: Normocephalic and atraumatic.      Mouth/Throat:      Mouth: Mucous membranes are moist.   Eyes:      Conjunctiva/sclera: Conjunctivae normal.   Cardiovascular:      Rate and Rhythm: Normal rate and regular rhythm.      Heart sounds: No murmur heard.  Pulmonary:      Effort: Pulmonary effort is normal. No respiratory distress.      Breath sounds: Normal breath sounds.   Abdominal:      Palpations: Abdomen is soft.      Tenderness: There is no abdominal tenderness.   Musculoskeletal:         General: No swelling, tenderness or  deformity.      Cervical back: Neck supple.      Right lower leg: No edema.      Left lower leg: No edema.   Skin:     General: Skin is warm and dry.      Capillary Refill: Capillary refill takes less than 2 seconds.      Findings: No erythema.   Neurological:      General: No focal deficit present.      Mental Status: She is alert.      Sensory: No sensory deficit.      Motor: No weakness.   Psychiatric:         Mood and Affect: Mood normal.         Vital Signs  ED Triage Vitals   Temperature Pulse Respirations Blood Pressure SpO2   07/29/24 0852 07/29/24 0852 07/29/24 0852 07/29/24 0852 07/29/24 0852   98.2 °F (36.8 °C) 82 16 121/72 100 %      Temp Source Heart Rate Source Patient Position - Orthostatic VS BP Location FiO2 (%)   07/29/24 0852 07/29/24 0852 07/29/24 0852 07/29/24 0852 --   Oral Monitor Sitting Left arm       Pain Score       07/29/24 0951       8           Vitals:    07/29/24 0852   BP: 121/72   Pulse: 82   Patient Position - Orthostatic VS: Sitting         Visual Acuity      ED Medications  Medications   sodium chloride 0.9 % bolus 1,000 mL (0 mL Intravenous Stopped 7/29/24 1006)   ketorolac (TORADOL) injection 15 mg (15 mg Intravenous Given 7/29/24 0951)       Diagnostic Studies  Results Reviewed       Procedure Component Value Units Date/Time    POCT pregnancy, urine [[id number]]  (Normal) Resulted: 07/29/24 0947    Lab Status: Final result Updated: 07/29/24 0947     EXT Preg Test, Ur Negative     Control Valid    Basic metabolic panel [[id number]] Collected: 07/29/24 0905    Lab Status: Final result Specimen: Blood from Arm, Right Updated: 07/29/24 0933     Sodium 139 mmol/L      Potassium 4.0 mmol/L      Chloride 107 mmol/L      CO2 26 mmol/L      ANION GAP 6 mmol/L      BUN 11 mg/dL      Creatinine 0.81 mg/dL      Glucose 131 mg/dL      Calcium 8.9 mg/dL      eGFR 99 ml/min/1.73sq m     Narrative:      National Kidney Disease Foundation guidelines for Chronic Kidney Disease (CKD):     Stage 1  with normal or high GFR (GFR > 90 mL/min/1.73 square meters)    Stage 2 Mild CKD (GFR = 60-89 mL/min/1.73 square meters)    Stage 3A Moderate CKD (GFR = 45-59 mL/min/1.73 square meters)    Stage 3B Moderate CKD (GFR = 30-44 mL/min/1.73 square meters)    Stage 4 Severe CKD (GFR = 15-29 mL/min/1.73 square meters)    Stage 5 End Stage CKD (GFR <15 mL/min/1.73 square meters)  Note: GFR calculation is accurate only with a steady state creatinine    CBC and differential [[id number]] Collected: 07/29/24 0905    Lab Status: Final result Specimen: Blood from Arm, Right Updated: 07/29/24 0924     WBC 7.77 Thousand/uL      RBC 4.15 Million/uL      Hemoglobin 12.6 g/dL      Hematocrit 38.3 %      MCV 92 fL      MCH 30.4 pg      MCHC 32.9 g/dL      RDW 12.3 %      MPV 12.0 fL      Platelets 158 Thousands/uL      nRBC 0 /100 WBCs      Segmented % 73 %      Immature Grans % 0 %      Lymphocytes % 18 %      Monocytes % 7 %      Eosinophils Relative 2 %      Basophils Relative 0 %      Absolute Neutrophils 5.62 Thousands/µL      Absolute Immature Grans 0.02 Thousand/uL      Absolute Lymphocytes 1.42 Thousands/µL      Absolute Monocytes 0.54 Thousand/µL      Eosinophils Absolute 0.14 Thousand/µL      Basophils Absolute 0.03 Thousands/µL                    No orders to display              Procedures  Procedures         ED Course                                 SBIRT 22yo+      Flowsheet Row Most Recent Value   Initial Alcohol Screen: US AUDIT-C     1. How often do you have a drink containing alcohol? 0 Filed at: 07/29/2024 0857   2. How many drinks containing alcohol do you have on a typical day you are drinking?  0 Filed at: 07/29/2024 0857   3a. Male UNDER 65: How often do you have five or more drinks on one occasion? 0 Filed at: 07/29/2024 0857   3b. FEMALE Any Age, or MALE 65+: How often do you have 4 or more drinks on one occassion? 0 Filed at: 07/29/2024 0857   Audit-C Score 0 Filed at: 07/29/2024 0857   CHERRI: How many times in  the past year have you...    Used an illegal drug or used a prescription medication for non-medical reasons? Never Filed at: 07/29/2024 0857                      Medical Decision Making  Again when questioned denies leg swelling she is here for leg cramping for a week off and on worse last night her CBC is unremarkable for anemia or leukocytosis her electrolytes are unremarkable for low sodium low potassium no gap acidosis patient received Toradol doing better will be sent home on NSAIDs patient is not pregnant no electrolyte abnormalities possible for mild dehydration clinically do not feel this is rhabdo based on her history I do not feel ck is needed    Amount and/or Complexity of Data Reviewed  Labs: ordered.    Risk  Prescription drug management.                 Disposition  Final diagnoses:   Leg cramping     Time reflects when diagnosis was documented in both MDM as applicable and the Disposition within this note       Time User Action Codes Description Comment    7/29/2024  9:49 AM Anthony Stokes Add [R25.2] Leg cramping           ED Disposition       ED Disposition   Discharge    Condition   Stable    Date/Time   Mon Jul 29, 2024 0949    Comment   Syed Taveras discharge to home/self care.                   Follow-up Information       Follow up With Specialties Details Why Contact Info    [facility], Nurse Practitioner In 3 days  [address] [place] [address]  [phone]              Discharge Medication List as of 7/29/2024  9:50 AM        START taking these medications    Details   ibuprofen (MOTRIN) 800 mg tablet Take 1 tablet (800 mg total) by mouth 3 (three) times a day, Starting Mon 7/29/2024, Normal           CONTINUE these medications which have NOT CHANGED    Details   omeprazole (PriLOSEC) 20 mg delayed release capsule Take 1 capsule (20 mg total) by mouth daily, Starting Tue 10/10/2023, Normal      Prenatal Vit-Fe Fumarate-FA (Prenatabs  FA) 29-1 MG TABS Take 1 tablet by mouth in the morning, Starting Tue 7/16/2024, Normal      semaglutide, 1 mg/dose, (Ozempic) 4 mg/3 mL injection pen Inject 0.75 mL (1 mg total) under the skin once a week, Starting Tue 7/16/2024, Normal             No discharge procedures on file.    PDMP Review         Value Time User    PDMP Reviewed  Yes 7/14/2021  2:12 PM Delfina Hernandez PA-C            ED Provider  Electronically Signed by             Anthony Stokes MD  07/29/24 101

## 2024-08-05 DIAGNOSIS — E80.6 HYPERBILIRUBINEMIA: Primary | ICD-10-CM

## 2024-08-14 ENCOUNTER — CLINICAL SUPPORT (OUTPATIENT)
Dept: FAMILY MEDICINE CLINIC | Facility: CLINIC | Age: 29
End: 2024-08-14

## 2024-08-14 DIAGNOSIS — E53.8 B12 DEFICIENCY: Primary | ICD-10-CM

## 2024-08-14 PROCEDURE — 96372 THER/PROPH/DIAG INJ SC/IM: CPT

## 2024-08-14 RX ORDER — CYANOCOBALAMIN 1000 UG/ML
1000 INJECTION, SOLUTION INTRAMUSCULAR; SUBCUTANEOUS
Status: SHIPPED | OUTPATIENT
Start: 2024-08-14

## 2024-08-14 RX ADMIN — CYANOCOBALAMIN 1000 MCG: 1000 INJECTION, SOLUTION INTRAMUSCULAR; SUBCUTANEOUS at 15:47

## 2024-08-31 ENCOUNTER — APPOINTMENT (OUTPATIENT)
Dept: LAB | Facility: HOSPITAL | Age: 29
End: 2024-08-31
Payer: COMMERCIAL

## 2024-08-31 ENCOUNTER — HOSPITAL ENCOUNTER (OUTPATIENT)
Dept: ULTRASOUND IMAGING | Facility: HOSPITAL | Age: 29
Discharge: HOME | End: 2024-08-31
Payer: COMMERCIAL

## 2024-08-31 DIAGNOSIS — E80.6 HYPERBILIRUBINEMIA: ICD-10-CM

## 2024-08-31 LAB — BILIRUB DIRECT SERPL-MCNC: 0.21 MG/DL (ref 0–0.2)

## 2024-08-31 PROCEDURE — 36415 COLL VENOUS BLD VENIPUNCTURE: CPT

## 2024-08-31 PROCEDURE — 76705 ECHO EXAM OF ABDOMEN: CPT

## 2024-08-31 PROCEDURE — 80074 ACUTE HEPATITIS PANEL: CPT

## 2024-08-31 PROCEDURE — 82248 BILIRUBIN DIRECT: CPT

## 2024-09-04 ENCOUNTER — HOSPITAL ENCOUNTER (EMERGENCY)
Facility: HOSPITAL | Age: 29
Discharge: HOME | End: 2024-09-04
Attending: EMERGENCY MEDICINE
Payer: COMMERCIAL

## 2024-09-04 VITALS
HEART RATE: 73 BPM | WEIGHT: 219.14 LBS | OXYGEN SATURATION: 99 % | BODY MASS INDEX: 32.36 KG/M2 | SYSTOLIC BLOOD PRESSURE: 124 MMHG | RESPIRATION RATE: 16 BRPM | TEMPERATURE: 98.5 F | DIASTOLIC BLOOD PRESSURE: 70 MMHG

## 2024-09-04 DIAGNOSIS — N93.9 VAGINAL BLEEDING: Primary | ICD-10-CM

## 2024-09-04 LAB
B-HCG SERPL-ACNC: <0.6 MIU/ML (ref 0–5)
BACTERIA UR QL AUTO: ABNORMAL /HPF
BASOPHILS # BLD AUTO: 0.02 THOUSANDS/ÂΜL (ref 0–0.1)
BASOPHILS NFR BLD AUTO: 0 % (ref 0–1)
BILIRUB UR QL STRIP: NEGATIVE
CLARITY UR: CLEAR
COLOR UR: YELLOW
EOSINOPHIL # BLD AUTO: 0.12 THOUSAND/ÂΜL (ref 0–0.61)
EOSINOPHIL NFR BLD AUTO: 2 % (ref 0–6)
ERYTHROCYTE [DISTWIDTH] IN BLOOD BY AUTOMATED COUNT: 12.3 % (ref 11.6–15.1)
EXT PREGNANCY TEST URINE: NEGATIVE
EXT. CONTROL: NORMAL
GLUCOSE UR STRIP-MCNC: NEGATIVE MG/DL
HCT VFR BLD AUTO: 38.1 % (ref 34.8–46.1)
HGB BLD-MCNC: 12.4 G/DL (ref 11.5–15.4)
HGB UR QL STRIP.AUTO: ABNORMAL
IMM GRANULOCYTES # BLD AUTO: 0.04 THOUSAND/UL (ref 0–0.2)
IMM GRANULOCYTES NFR BLD AUTO: 1 % (ref 0–2)
KETONES UR STRIP-MCNC: NEGATIVE MG/DL
LEUKOCYTE ESTERASE UR QL STRIP: NEGATIVE
LYMPHOCYTES # BLD AUTO: 1.78 THOUSANDS/ÂΜL (ref 0.6–4.47)
LYMPHOCYTES NFR BLD AUTO: 26 % (ref 14–44)
MCH RBC QN AUTO: 29.2 PG (ref 26.8–34.3)
MCHC RBC AUTO-ENTMCNC: 32.5 G/DL (ref 31.4–37.4)
MCV RBC AUTO: 90 FL (ref 82–98)
MONOCYTES # BLD AUTO: 0.56 THOUSAND/ÂΜL (ref 0.17–1.22)
MONOCYTES NFR BLD AUTO: 8 % (ref 4–12)
MUCOUS THREADS UR QL AUTO: ABNORMAL
NEUTROPHILS # BLD AUTO: 4.32 THOUSANDS/ÂΜL (ref 1.85–7.62)
NEUTS SEG NFR BLD AUTO: 63 % (ref 43–75)
NITRITE UR QL STRIP: NEGATIVE
NON-SQ EPI CELLS URNS QL MICRO: ABNORMAL /HPF
NRBC BLD AUTO-RTO: 0 /100 WBCS
PH UR STRIP.AUTO: 6.5 [PH] (ref 4.5–8)
PLATELET # BLD AUTO: 156 THOUSANDS/UL (ref 149–390)
PMV BLD AUTO: 11.9 FL (ref 8.9–12.7)
PROT UR STRIP-MCNC: ABNORMAL MG/DL
RBC # BLD AUTO: 4.25 MILLION/UL (ref 3.81–5.12)
RBC #/AREA URNS AUTO: ABNORMAL /HPF
SP GR UR STRIP.AUTO: >=1.03 (ref 1–1.03)
UROBILINOGEN UR QL STRIP.AUTO: 0.2 E.U./DL
WBC # BLD AUTO: 6.84 THOUSAND/UL (ref 4.31–10.16)
WBC #/AREA URNS AUTO: ABNORMAL /HPF

## 2024-09-04 PROCEDURE — 96360 HYDRATION IV INFUSION INIT: CPT

## 2024-09-04 PROCEDURE — 85025 COMPLETE CBC W/AUTO DIFF WBC: CPT

## 2024-09-04 PROCEDURE — 84702 CHORIONIC GONADOTROPIN TEST: CPT

## 2024-09-04 PROCEDURE — 99284 EMERGENCY DEPT VISIT MOD MDM: CPT

## 2024-09-04 PROCEDURE — 81001 URINALYSIS AUTO W/SCOPE: CPT

## 2024-09-04 PROCEDURE — 81025 URINE PREGNANCY TEST: CPT

## 2024-09-04 PROCEDURE — 36415 COLL VENOUS BLD VENIPUNCTURE: CPT

## 2024-09-04 RX ADMIN — SODIUM CHLORIDE 1000 ML: 0.9 INJECTION, SOLUTION INTRAVENOUS at 16:55

## 2024-09-04 NOTE — DISCHARGE INSTRUCTIONS
Deidra pruebas de embarazo dieron negativo en el departamento de emergencias. Continúe haciendo un seguimiento con escamilla ginecólogo.    Your pregnancy tests were negative in the emergency department. Please continue following up with your gynecologist.

## 2024-09-04 NOTE — ED PROVIDER NOTES
History  Chief Complaint   Patient presents with    Vaginal Bleeding     Pt with positive pregnancy test.   Pt reports vaginal bleeding started this morning.       Patient is a 28-year-old female with a history of diabetes presenting for evaluation of vaginal bleeding.  States today she felt lightheaded and nauseous so she took a pregnancy test.  There was a very faint positive line so she was unsure if this was positive or negative.  She then started with vaginal bleeding similar to her menstrual period, has changed her pad about twice today and has not been saturating through.  No lower abdominal pain, urinary symptoms, fevers, chills.  Last menstrual period 24.      History provided by:  Patient   used: Yes ([employer]        Prior to Admission Medications   Prescriptions Last Dose Informant Patient Reported? Taking?   Prenatal Vit-Fe Fumarate-FA (Prenatabs FA) 29-1 MG TABS   No Yes   Sig: Take 1 tablet by mouth in the morning   ibuprofen (MOTRIN) 800 mg tablet   No Yes   Sig: Take 1 tablet (800 mg total) by mouth 3 (three) times a day   omeprazole (PriLOSEC) 20 mg delayed release capsule  Self No Yes   Sig: Take 1 capsule (20 mg total) by mouth daily   semaglutide, 1 mg/dose, (Ozempic) 4 mg/3 mL injection pen   No Yes   Sig: Inject 0.75 mL (1 mg total) under the skin once a week      Facility-Administered Medications Last Administration Doses Remaining   cyanocobalamin injection 1,000 mcg 2024  3:47 PM           Past Medical History:   Diagnosis Date    Diabetes mellitus (HCC)     History of transfusion     after  - had fever with transfusion    Obesity (BMI 35.0-39.9 without comorbidity)     S/P laparoscopic sleeve gastrectomy 2020    Uses Lithuanian as primary spoken language        Past Surgical History:   Procedure Laterality Date     SECTION  2017    CHOLECYSTECTOMY      IL LAPS GSTRC RSTRICTIV PX LONGITUDINAL GASTRECTOMY N/A 2020    Procedure: LAP  SLEEVE GASTRECTOMY, INTRAOP EGD;  Surgeon: Da Rogers MD;  Location: [facility] OR;  Service: Bariatrics       Family History   Problem Relation Age of Onset    Diabetes Mother     Other Mother         gastric bypass    Diabetes Father     Diabetes Sister     Sleep apnea Sister     Diabetes Maternal Grandmother     Skin cancer Maternal Grandmother     No Known Problems Maternal Grandfather     Diabetes Paternal Grandmother     No Known Problems Paternal Grandfather     Sleep apnea Brother         2 of the 3 borthers have EAGLE    No Known Problems Brother     No Known Problems Son     Heart disease Family         CARDIOVASCULAR DISEASE    No Known Problems Maternal Aunt     No Known Problems Maternal Aunt     No Known Problems Paternal Aunt     No Known Problems Paternal Aunt      I have reviewed and agree with the history as documented.    E-Cigarette/Vaping    E-Cigarette Use Never User      E-Cigarette/Vaping Substances    Nicotine No     THC No     CBD No     Flavoring No     Other No     Unknown No      Social History     Tobacco Use    Smoking status: Never    Smokeless tobacco: Never   Vaping Use    Vaping status: Never Used   Substance Use Topics    Alcohol use: No    Drug use: No       Review of Systems   Constitutional:  Negative for chills and fever.   HENT:  Negative for ear pain and sore throat.    Eyes:  Negative for pain and visual disturbance.   Respiratory:  Negative for cough and shortness of breath.    Cardiovascular:  Negative for chest pain and palpitations.   Gastrointestinal:  Negative for abdominal pain and vomiting.   Genitourinary:  Positive for vaginal bleeding. Negative for dysuria and hematuria.   Musculoskeletal:  Negative for arthralgias and back pain.   Skin:  Negative for color change and rash.   Neurological:  Negative for seizures and syncope.   All other systems reviewed and are negative.      Physical Exam  Physical Exam  Vitals and nursing note reviewed.   Constitutional:        General: She is not in acute distress.     Appearance: Normal appearance.   HENT:      Head: Normocephalic and atraumatic.      Right Ear: External ear normal.      Left Ear: External ear normal.      Nose: Nose normal.      Mouth/Throat:      Mouth: Mucous membranes are moist.   Eyes:      General: No scleral icterus.        Right eye: No discharge.         Left eye: No discharge.   Cardiovascular:      Rate and Rhythm: Normal rate and regular rhythm.      Pulses: Normal pulses.      Heart sounds: Normal heart sounds.   Pulmonary:      Effort: Pulmonary effort is normal. No respiratory distress.      Breath sounds: Normal breath sounds.   Abdominal:      Palpations: Abdomen is soft.      Tenderness: There is no abdominal tenderness.   Musculoskeletal:         General: No tenderness, deformity or signs of injury.      Cervical back: Normal range of motion and neck supple.   Skin:     General: Skin is dry.      Coloration: Skin is not jaundiced.      Findings: No erythema or rash.   Neurological:      General: No focal deficit present.      Mental Status: She is alert and oriented to person, place, and time. Mental status is at baseline.      Motor: No weakness.      Gait: Gait normal.   Psychiatric:         Mood and Affect: Mood normal.         Behavior: Behavior normal.         Thought Content: Thought content normal.         Vital Signs  ED Triage Vitals   Temperature Pulse Respirations Blood Pressure SpO2   09/04/24 1608 09/04/24 1606 09/04/24 1606 09/04/24 1606 09/04/24 1606   98.5 °F (36.9 °C) 73 16 124/70 99 %      Temp Source Heart Rate Source Patient Position - Orthostatic VS BP Location FiO2 (%)   09/04/24 1608 09/04/24 1606 09/04/24 1606 09/04/24 1606 --   Oral Monitor Lying Right arm       Pain Score       --                  Vitals:    09/04/24 1606   BP: 124/70   Pulse: 73   Patient Position - Orthostatic VS: Lying         Visual Acuity      ED Medications  Medications   sodium chloride 0.9 % bolus  1,000 mL (0 mL Intravenous Stopped 9/4/24 1805)       Diagnostic Studies  Results Reviewed       Procedure Component Value Units Date/Time    Urine Microscopic [[id number]]  (Abnormal) Collected: 09/04/24 1718    Lab Status: Final result Specimen: Urine, Clean Catch Updated: 09/04/24 1749     RBC, UA 2-4 /hpf      WBC, UA 2-4 /hpf      Epithelial Cells Occasional /hpf      Bacteria, UA Occasional /hpf      MUCUS THREADS Moderate    Quantitative hCG [[id number]]  (Normal) Collected: 09/04/24 1652    Lab Status: Final result Specimen: Blood from Arm, Left Updated: 09/04/24 1727     HCG, Quant <0.6 mIU/mL     Narrative:       Expected Ranges:    HCG results between 5.0 and 25.0 mIU/mL may be indicative of early pregnancy but should be interpreted in light of the total clinical presentation.    HCG can rise to detectable levels in magali and post menopausal women (0-11.6 mIU/mL).     Approximate               Approximate HCG  Gestation age          Concentration ( mIU/mL)  _____________          ______________________   Weeks                      HCG values  0.2-1                       5-50  1-2                           2-3                         100-5000  3-4                         500-23311  4-5                         1000-68954  5-6                         [id number]  6-8                         55488-359196  8-12                        [id number]      POCT pregnancy, urine [[id number]]  (Normal) Resulted: 09/04/24 1722    Lab Status: Final result Updated: 09/04/24 1722     EXT Preg Test, Ur Negative     Control Valid    Urine Macroscopic, POC [[id number]]  (Abnormal) Collected: 09/04/24 1718    Lab Status: Final result Specimen: Urine Updated: 09/04/24 1719     Color, UA Yellow     Clarity, UA Clear     pH, UA 6.5     Leukocytes, UA Negative     Nitrite, UA Negative     Protein,  (2+) mg/dl      Glucose, UA Negative mg/dl      Ketones, UA Negative mg/dl      Urobilinogen, UA 0.2 E.U./dl      Bilirubin,  UA Negative     Occult Blood, UA Large     Specific Gravity, UA >=1.030    Narrative:      CLINITEK RESULT    CBC and differential [[id number]] Collected: 09/04/24 1652    Lab Status: Final result Specimen: Blood from Arm, Left Updated: 09/04/24 1701     WBC 6.84 Thousand/uL      RBC 4.25 Million/uL      Hemoglobin 12.4 g/dL      Hematocrit 38.1 %      MCV 90 fL      MCH 29.2 pg      MCHC 32.5 g/dL      RDW 12.3 %      MPV 11.9 fL      Platelets 156 Thousands/uL      nRBC 0 /100 WBCs      Segmented % 63 %      Immature Grans % 1 %      Lymphocytes % 26 %      Monocytes % 8 %      Eosinophils Relative 2 %      Basophils Relative 0 %      Absolute Neutrophils 4.32 Thousands/µL      Absolute Immature Grans 0.04 Thousand/uL      Absolute Lymphocytes 1.78 Thousands/µL      Absolute Monocytes 0.56 Thousand/µL      Eosinophils Absolute 0.12 Thousand/µL      Basophils Absolute 0.02 Thousands/µL                    No orders to display              Procedures  Procedures         ED Course  ED Course as of 09/04/24 2228   Wed Sep 04, 2024   1704 Hemoglobin: 12.4  Stable.                                 SBIRT 20yo+      Flowsheet Row Most Recent Value   Initial Alcohol Screen: US AUDIT-C     1. How often do you have a drink containing alcohol? 0 Filed at: 09/04/2024 1629   2. How many drinks containing alcohol do you have on a typical day you are drinking?  0 Filed at: 09/04/2024 1629   3a. Male UNDER 65: How often do you have five or more drinks on one occasion? 0 Filed at: 09/04/2024 1629   3b. FEMALE Any Age, or MALE 65+: How often do you have 4 or more drinks on one occassion? 0 Filed at: 09/04/2024 1629   Audit-C Score 0 Filed at: 09/04/2024 1629   CHERRI: How many times in the past year have you...    Used an illegal drug or used a prescription medication for non-medical reasons? Never Filed at: 09/04/2024 1629                      Medical Decision Making  Patient is a 28-year-old female presenting with vaginal bleeding  after a possible positive pregnancy test at home today.  Vitals within normal limits and she is in no acute distress.  DDx: Menses, threatened miscarriage, ectopic, UTI.  Plan: CBC, UA, urine/quant hcg.     UA with large blood which is likely vaginal, no other evidence of infection.  Hemoglobin is stable at 12.4.  Blood urine and quantitative hCG are negative so there is no indication for ultrasound or further workup at this time.  Discussed with patient that it is possible she had false negative at home.  Advised to continue follow-up with OB/GYN as needed.    I have discussed findings and plan for discharge with the patient/caregiver. Follow up with the appropriate providers including primary care physician was discussed. Return precautions discussed with patient/caregiver as outlined in AVS. Patient/caregiver verbally expressed understanding. Patient stable at time of discharge and ambulated out of the emergency department.       Amount and/or Complexity of Data Reviewed  Labs: ordered. Decision-making details documented in ED Course.                 Disposition  Final diagnoses:   Vaginal bleeding     Time reflects when diagnosis was documented in both MDM as applicable and the Disposition within this note       Time User Action Codes Description Comment    9/4/2024  5:48 PM Rachel Walker Add [N93.9] Vaginal bleeding           ED Disposition       ED Disposition   Discharge    Condition   Stable    Date/Time   Wed Sep 4, 2024 1748    Comment   Syed Taveras discharge to home/self care.                   Follow-up Information       Follow up With Specialties Details Why Contact Info Additional Information    [facility] Schedule an appointment as soon as possible for a visit   [address] [place] [address] [phone] [facility], [address] [place] [address], [address], [place], [place],  [address]   [phone]            Discharge Medication List as of 9/4/2024  5:51 PM        CONTINUE these medications which have NOT CHANGED    Details   ibuprofen (MOTRIN) 800 mg tablet Take 1 tablet (800 mg total) by mouth 3 (three) times a day, Starting Mon 7/29/2024, Normal      omeprazole (PriLOSEC) 20 mg delayed release capsule Take 1 capsule (20 mg total) by mouth daily, Starting Tue 10/10/2023, Normal      Prenatal Vit-Fe Fumarate-FA (Prenatabs FA) 29-1 MG TABS Take 1 tablet by mouth in the morning, Starting Tue 7/16/2024, Normal      semaglutide, 1 mg/dose, (Ozempic) 4 mg/3 mL injection pen Inject 0.75 mL (1 mg total) under the skin once a week, Starting Tue 7/16/2024, Normal             No discharge procedures on file.    PDMP Review         Value Time User    PDMP Reviewed  Yes 7/14/2021  2:12 PM Delfina Hernandez PA-C            ED Provider  Electronically Signed by             Rachel Walker PA-C  09/04/24 4085

## 2024-09-04 NOTE — Clinical Note
Syed Taveras was seen and treated in our emergency department on 9/4/2024.                Diagnosis:     Syed  may return to work on return date.    She may return on this date: 09/05/2024         If you have any questions or concerns, please don't hesitate to call.      Rachel Walker PA-C    ______________________________           _______________          _______________  Hospital Representative                              Date                                Time

## 2024-09-05 ENCOUNTER — TELEPHONE (OUTPATIENT)
Age: 29
End: 2024-09-05

## 2024-09-05 NOTE — TELEPHONE ENCOUNTER
Contacted patient using interpretor services (Sophie, [phone]) off of NON-REFERRAL wait list to verify needs of services in attempts to update list with patient preferences. spoke with patient whom stated they are no longer interested in services at this time.

## 2024-09-16 ENCOUNTER — OFFICE VISIT (OUTPATIENT)
Dept: FAMILY MEDICINE CLINIC | Facility: CLINIC | Age: 29
End: 2024-09-16

## 2024-09-16 VITALS
BODY MASS INDEX: 31.4 KG/M2 | HEART RATE: 98 BPM | SYSTOLIC BLOOD PRESSURE: 116 MMHG | DIASTOLIC BLOOD PRESSURE: 70 MMHG | RESPIRATION RATE: 18 BRPM | TEMPERATURE: 98.7 F | HEIGHT: 69 IN | OXYGEN SATURATION: 97 % | WEIGHT: 212 LBS

## 2024-09-16 DIAGNOSIS — Z00.00 ANNUAL PHYSICAL EXAM: Primary | ICD-10-CM

## 2024-09-16 DIAGNOSIS — E66.09 CLASS 1 OBESITY DUE TO EXCESS CALORIES WITH SERIOUS COMORBIDITY AND BODY MASS INDEX (BMI) OF 31.0 TO 31.9 IN ADULT: ICD-10-CM

## 2024-09-16 DIAGNOSIS — Z12.4 SCREENING FOR CERVICAL CANCER: ICD-10-CM

## 2024-09-16 DIAGNOSIS — G43.909 MIGRAINE WITHOUT STATUS MIGRAINOSUS, NOT INTRACTABLE, UNSPECIFIED MIGRAINE TYPE: ICD-10-CM

## 2024-09-16 PROCEDURE — 99395 PREV VISIT EST AGE 18-39: CPT

## 2024-09-16 PROCEDURE — 99213 OFFICE O/P EST LOW 20 MIN: CPT

## 2024-09-16 RX ORDER — AMITRIPTYLINE HYDROCHLORIDE 10 MG/1
10 TABLET ORAL
Qty: 90 TABLET | Refills: 0 | Status: SHIPPED | OUTPATIENT
Start: 2024-09-16

## 2024-09-16 RX ORDER — NAPROXEN 500 MG/1
500 TABLET ORAL 2 TIMES DAILY PRN
Qty: 30 TABLET | Refills: 0 | Status: SHIPPED | OUTPATIENT
Start: 2024-09-16

## 2024-09-16 RX ORDER — SUMATRIPTAN 25 MG/1
25 TABLET, FILM COATED ORAL ONCE AS NEEDED
Qty: 30 TABLET | Refills: 0 | Status: SHIPPED | OUTPATIENT
Start: 2024-09-16 | End: 2024-10-16

## 2024-09-16 NOTE — ASSESSMENT & PLAN NOTE
- Neuro assessment WNL  - Take naproxen or tylenol prn  - Take Sumatriptan once PRN for moderate attack  - Due to migranes with repeated attacks daily will start patient on Amitriptyline 10 mg at HS   - Lifestyle management counseling to avoid aggravating factors as well as having good sleep hygiene, increase fluid intake and regular exercise  -  about ED precautions if symptoms worsens or persist for  further evaluation and  treatment        Orders:    amitriptyline (ELAVIL) 10 mg tablet; Take 1 tablet (10 mg total) by mouth daily at bedtime    SUMAtriptan (Imitrex) 25 mg tablet; Take 1 tablet (25 mg total) by mouth once as needed for migraine    naproxen (Naprosyn) 500 mg tablet; Take 1 tablet (500 mg total) by mouth 2 (two) times a day as needed for mild pain

## 2024-09-16 NOTE — PROGRESS NOTES
Adult Annual Physical  Name: Syed Taveras      : 1995      MRN: [id number]  Encounter Provider: LES Hunter  Encounter Date: 2024   Encounter department: [facility]    Assessment & Plan  Annual physical exam         Migraine without status migrainosus, not intractable, unspecified migraine type  - Neuro assessment WNL  - Take naproxen or tylenol prn  - Take Sumatriptan once PRN for moderate attack  - Due to migranes with repeated attacks daily will start patient on Amitriptyline 10 mg at HS   - Lifestyle management counseling to avoid aggravating factors as well as having good sleep hygiene, increase fluid intake and regular exercise  -  about ED precautions if symptoms worsens or persist for  further evaluation and  treatment        Orders:    amitriptyline (ELAVIL) 10 mg tablet; Take 1 tablet (10 mg total) by mouth daily at bedtime    SUMAtriptan (Imitrex) 25 mg tablet; Take 1 tablet (25 mg total) by mouth once as needed for migraine    naproxen (Naprosyn) 500 mg tablet; Take 1 tablet (500 mg total) by mouth 2 (two) times a day as needed for mild pain    Screening for cervical cancer    Orders:    Ambulatory referral to Obstetrics / Gynecology; Future    Class 1 obesity due to excess calories with serious comorbidity and body mass index (BMI) of 31.0 to 31.9 in adult  Body mass index is 31.31 kg/m².  The BMI is above normal. Nutrition recommendations include reducing portion sizes, decreasing overall calorie intake, 3-5 servings of fruits/vegetables daily, reducing fast food intake, consuming healthier snacks, decreasing soda and/or juice intake, moderation in carbohydrate intake, increasing intake of lean protein, reducing intake of saturated fat and trans fat and reducing intake of cholesterol. Exercise recommendations include moderate aerobic physical activity for 150 minutes/week.         Immunizations and preventive care screenings were  discussed with patient today. Appropriate education was printed on patient's after visit summary.    Counseling:  Alcohol/drug use: discussed moderation in alcohol intake, the recommendations for healthy alcohol use, and avoidance of illicit drug use.  Dental Health: discussed importance of regular tooth brushing, flossing, and dental visits.  Injury prevention: discussed safety/seat belts, safety helmets, smoke detectors, carbon dioxide detectors, and smoking near bedding or upholstery.  Sexual health: discussed sexually transmitted diseases, partner selection, use of condoms, avoidance of unintended pregnancy, and contraceptive alternatives.  Exercise: the importance of regular exercise/physical activity was discussed. Recommend exercise 3-5 times per week for at least 30 minutes.          History of Present Illness     Adult Annual Physical:  Patient presents for annual physical. Syed Taveras is a 28 years old female with past medical history of Diabetes mellitus (HCC), History of transfusion, Obesity (BMI 35.0-39.9 without comorbidity), S/P laparoscopic sleeve gastrectomy, and Uses Azeri as primary spoken language.     Azeri language interpretation services were utilized for this visit.     Patient presents for evaluation of a headache that began a month ago. She has a known history of migraines, with the current pain localized to the frontal region. The headache is associated with photophobia, phonophobia, nausea, and dizziness. She reports headache has gradually worsened, describing it as a sharp, pulsating sensation that lasts several hours and has been daily. The patient has been taking ibuprofen 800 mg as needed, which provides mild relief. She notes that staying in a dark room helps alleviate the symptoms, and that the headache is particularly aggravated by seeing yellow blinking lights. She also finds relaxation to be helpful in reducing the pain. The patient denies fever, chills, cough,  "chest pain, decreased physical activity, depression, loss of balance, muscle weakness, extremity numbness, speech difficulties, and early morning vomiting..     Diet and Physical Activity:  - Diet/Nutrition: well balanced diet.  - Exercise: no formal exercise.    General Health:  - Sleep: 7-8 hours of sleep on average.  - Hearing: normal hearing right ear.  - Vision: no vision problems.  - Dental: brushes teeth twice daily.    /GYN Health:  - Follows with GYN: no.   - Last menstrual cycle: 9/4/2024.   - History of STDs: no    Review of Systems   Constitutional: Negative.  Negative for chills, fatigue and fever.   HENT: Negative.  Negative for ear pain and sore throat.    Eyes:  Positive for photophobia. Negative for pain and visual disturbance.   Respiratory: Negative.  Negative for cough and shortness of breath.    Cardiovascular: Negative.  Negative for chest pain and palpitations.   Gastrointestinal: Negative.  Negative for abdominal pain and vomiting.   Endocrine: Negative.    Genitourinary: Negative.  Negative for dysuria and hematuria.   Musculoskeletal: Negative.  Negative for arthralgias and back pain.   Skin: Negative.  Negative for color change and rash.   Allergic/Immunologic: Negative.    Neurological:  Positive for dizziness and headaches. Negative for seizures and syncope.   Hematological: Negative.    Psychiatric/Behavioral: Negative.     All other systems reviewed and are negative.        Objective     /70 (BP Location: Right arm, Patient Position: Sitting, Cuff Size: Large)   Pulse 98   Temp 98.7 °F (37.1 °C) (Temporal)   Resp 18   Ht 5' 9\" (1.753 m)   Wt 96.2 kg (212 lb)   LMP 09/04/2024   SpO2 97%   Breastfeeding No   BMI 31.31 kg/m²     Physical Exam  Vitals and nursing note reviewed.   Constitutional:       General: She is not in acute distress.     Appearance: Normal appearance. She is well-developed and normal weight.   HENT:      Head: Normocephalic and atraumatic.      Right " Ear: Tympanic membrane normal.      Left Ear: Tympanic membrane normal.      Nose: Nose normal.      Mouth/Throat:      Mouth: Mucous membranes are moist.   Eyes:      Conjunctiva/sclera: Conjunctivae normal.   Cardiovascular:      Rate and Rhythm: Normal rate and regular rhythm.      Pulses: Normal pulses.      Heart sounds: Normal heart sounds. No murmur heard.  Pulmonary:      Effort: Pulmonary effort is normal. No respiratory distress.      Breath sounds: Normal breath sounds.   Abdominal:      General: Abdomen is flat. Bowel sounds are normal.      Palpations: Abdomen is soft.      Tenderness: There is no abdominal tenderness.   Musculoskeletal:         General: No swelling. Normal range of motion.      Cervical back: Normal range of motion and neck supple.   Skin:     General: Skin is warm and dry.      Capillary Refill: Capillary refill takes less than 2 seconds.   Neurological:      General: No focal deficit present.      Mental Status: She is alert and oriented to person, place, and time. Mental status is at baseline.      Sensory: Sensation is intact.      Motor: Motor function is intact.      Coordination: Coordination is intact.      Gait: Gait is intact.   Psychiatric:         Mood and Affect: Mood normal.         Behavior: Behavior normal.         Thought Content: Thought content normal.         Judgment: Judgment normal.

## 2024-09-16 NOTE — LETTER
September 16, 2024     Patient: Syed Taveras  YOB: 1995  Date of Visit: 9/16/2024      To Whom it May Concern:    Syed Taveras is under my professional care. Syed was seen in my office on 9/16/2024.    Patient is currently being treated with Migraine.She have sensitivity with light and noise. Her migraine is aggravated when seeing yellow blinking lights.       If you have any questions or concerns, please don't hesitate to call.         Sincerely,          LES Hunter        CC: No Recipients

## 2024-09-16 NOTE — PATIENT INSTRUCTIONS
"Patient Education     Routine physical for adults   The Basics   Written by the doctors and editors at [facility]   What is a physical? -- A physical is a routine visit, or \"check-up,\" with your doctor. You might also hear it called a \"wellness visit\" or \"preventive visit.\"  During each visit, the doctor will:   Ask about your physical and mental health   Ask about your habits, behaviors, and lifestyle   Do an exam   Give you vaccines if needed   Talk to you about any medicines you take   Give advice about your health   Answer your questions  Getting regular check-ups is an important part of taking care of your health. It can help your doctor find and treat any problems you have. But it's also important for preventing health problems.  A routine physical is different from a \"sick visit.\" A sick visit is when you see a doctor because of a health concern or problem. Since physicals are scheduled ahead of time, you can think about what you want to ask the doctor.  How often should I get a physical? -- It depends on your age and health. In general, for people age 21 years and older:   If you are younger than 50 years, you might be able to get a physical every 3 years.   If you are 50 years or older, your doctor might recommend a physical every year.  If you have an ongoing health condition, like diabetes or high blood pressure, your doctor will probably want to see you more often.  What happens during a physical? -- In general, each visit will include:   Physical exam - The doctor or nurse will check your height, weight, heart rate, and blood pressure. They will also look at your eyes and ears. They will ask about how you are feeling and whether you have any symptoms that bother you.   Medicines - It's a good idea to bring a list of all the medicines you take to each doctor visit. Your doctor will talk to you about your medicines and answer any questions. Tell them if you are having any side effects that bother you. You " "should also tell them if you are having trouble paying for any of your medicines.   Habits and behaviors - This includes:   Your diet   Your exercise habits   Whether you smoke, drink alcohol, or use drugs   Whether you are sexually active   Whether you feel safe at home  Your doctor will talk to you about things you can do to improve your health and lower your risk of health problems. They will also offer help and support. For example, if you want to quit smoking, they can give you advice and might prescribe medicines. If you want to improve your diet or get more physical activity, they can help you with this, too.   Lab tests, if needed - The tests you get will depend on your age and situation. For example, your doctor might want to check your:   Cholesterol   Blood sugar   Iron level   Vaccines - The recommended vaccines will depend on your age, health, and what vaccines you already had. Vaccines are very important because they can prevent certain serious or deadly infections.   Discussion of screening - \"Screening\" means checking for diseases or other health problems before they cause symptoms. Your doctor can recommend screening based on your age, risk, and preferences. This might include tests to check for:   Cancer, such as breast, prostate, cervical, ovarian, colorectal, prostate, lung, or skin cancer   Sexually transmitted infections, such as chlamydia and gonorrhea   Mental health conditions like depression and anxiety  Your doctor will talk to you about the different types of screening tests. They can help you decide which screenings to have. They can also explain what the results might mean.   Answering questions - The physical is a good time to ask the doctor or nurse questions about your health. If needed, they can refer you to other doctors or specialists, too.  Adults older than 65 years often need other care, too. As you get older, your doctor will talk to you about:   How to prevent falling at " home   Hearing or vision tests   Memory testing   How to take your medicines safely   Making sure that you have the help and support you need at home  All topics are updated as new evidence becomes available and our peer review process is complete.  This topic retrieved from [employer] on: May 02, 2024.  Topic 180440 Version 1.0  Release: 32.4.3 - C32.122  © 2024 UpToDate, Inc. and/or its affiliates. All rights reserved.  Consumer Information Use and Disclaimer   Disclaimer: This generalized information is a limited summary of diagnosis, treatment, and/or medication information. It is not meant to be comprehensive and should be used as a tool to help the user understand and/or assess potential diagnostic and treatment options. It does NOT include all information about conditions, treatments, medications, side effects, or risks that may apply to a specific patient. It is not intended to be medical advice or a substitute for the medical advice, diagnosis, or treatment of a health care provider based on the health care provider's examination and assessment of a patient's specific and unique circumstances. Patients must speak with a health care provider for complete information about their health, medical questions, and treatment options, including any risks or benefits regarding use of medications. This information does not endorse any treatments or medications as safe, effective, or approved for treating a specific patient. UpToDate, Inc. and its affiliates disclaim any warranty or liability relating to this information or the use thereof.The use of this information is governed by the Terms of Use, available at https://www.woltersRadialpointuwer.com/en/know/clinical-effectiveness-terms. 2024© UpToDate, Inc. and its affiliates and/or licensors. All rights reserved.  Copyright   © 2024 UpToDate, Inc. and/or its affiliates. All rights reserved.

## 2024-09-17 ENCOUNTER — CLINICAL SUPPORT (OUTPATIENT)
Dept: FAMILY MEDICINE CLINIC | Facility: CLINIC | Age: 29
End: 2024-09-17

## 2024-09-17 DIAGNOSIS — E53.8 B12 DEFICIENCY: Primary | ICD-10-CM

## 2024-09-17 PROCEDURE — 96372 THER/PROPH/DIAG INJ SC/IM: CPT

## 2024-09-17 RX ADMIN — CYANOCOBALAMIN 1000 MCG: 1000 INJECTION, SOLUTION INTRAMUSCULAR; SUBCUTANEOUS at 15:31

## 2024-09-18 ENCOUNTER — NURSE TRIAGE (OUTPATIENT)
Age: 29
End: 2024-09-18

## 2024-09-18 NOTE — TELEPHONE ENCOUNTER
"Spoke with patient who reports she has had yellow/white vaginal discharge and odor since Monday.  She denies any itching or other symptoms.  Unable to make appointment until October.  Will discuss with provider and call pt back.  Allergies and pharmacy reviewed.     Reason for Disposition   All other vaginal symptoms (Exception: feels like prior yeast infection, minor abrasion, mild rash < 24 hour duration, mild itching)    Answer Assessment - Initial Assessment Questions  1. SYMPTOM: \"What's the main symptom you're concerned about?\" (e.g., pain, itching, dryness)      Vaginal discharge, yellow/white and odor.  3. ONSET: \"When did this  start?\"      Monday  4. PAIN: \"Is there any pain?\" If Yes, ask: \"How bad is it?\" (Scale: 1-10; mild, moderate, severe)      denies  5. ITCHING: \"Is there any itching?\" If Yes, ask: \"How bad is it?\" (Scale: 1-10; mild, moderate, severe)      denies  6. CAUSE: \"What do you think is causing the discharge?\" \"Have you had the same problem before? What happened then?\"      unsure  7. OTHER SYMPTOMS: \"Do you have any other symptoms?\" (e.g., fever, itching, vaginal bleeding, pain with urination, injury to genital area, vaginal foreign body)      denies  8. PREGNANCY: \"Is there any chance you are pregnant?\" \"When was your last menstrual period?\"      9/4/24    Protocols used: Vaginal Symptoms-ADULT-[place]    "

## 2024-09-20 ENCOUNTER — OFFICE VISIT (OUTPATIENT)
Dept: BARIATRICS | Facility: CLINIC | Age: 29
End: 2024-09-20
Payer: COMMERCIAL

## 2024-09-20 VITALS
BODY MASS INDEX: 31.55 KG/M2 | HEART RATE: 79 BPM | SYSTOLIC BLOOD PRESSURE: 116 MMHG | WEIGHT: 213 LBS | HEIGHT: 69 IN | DIASTOLIC BLOOD PRESSURE: 70 MMHG | TEMPERATURE: 97.6 F

## 2024-09-20 DIAGNOSIS — R73.03 PREDIABETES: ICD-10-CM

## 2024-09-20 DIAGNOSIS — Z98.84 BARIATRIC SURGERY STATUS: ICD-10-CM

## 2024-09-20 DIAGNOSIS — E66.9 OBESITY, CLASS I, BMI 30-34.9: ICD-10-CM

## 2024-09-20 DIAGNOSIS — G43.E09 CHRONIC MIGRAINE WITH AURA: ICD-10-CM

## 2024-09-20 DIAGNOSIS — K91.2 POSTSURGICAL MALABSORPTION: ICD-10-CM

## 2024-09-20 DIAGNOSIS — Z48.815 ENCOUNTER FOR SURGICAL AFTERCARE FOLLOWING SURGERY OF DIGESTIVE SYSTEM: Primary | ICD-10-CM

## 2024-09-20 PROCEDURE — 99214 OFFICE O/P EST MOD 30 MIN: CPT | Performed by: PHYSICIAN ASSISTANT

## 2024-09-20 RX ORDER — CALCIUM CARBONATE 500 MG/1
1 TABLET, CHEWABLE ORAL DAILY
COMMUNITY

## 2024-09-20 NOTE — PROGRESS NOTES
Date of surgery:  7/20/2020  Procedure: Sleeve  Performing surgeon:     Initial Weight -  264.4  Current Weight -213  Roshan Weight -  193  Total Body Weight Loss (EWL)- 51.4%  EWL% - 57 %  TWB % -19%

## 2024-09-20 NOTE — PROGRESS NOTES
Assessment/Plan:     Patient ID: Syed Taveras is a 28 y.o. female.     Bariatric Surgery Status    Status post Vertical Sleeve Gastrectomy with Dr. Rogers on 07/20/2020.. Presents to the office today for follow up.     Had EGD last year 8/2023- due for next EGD in 2026  She currently has no c/o reflux     She started ozempic per her pcp and doing with weight loss     Continued/Maintain healthy weight loss with good nutrition intakes.  Adequate hydration with at least 64oz. fluid intake.  Follow diet as discussed.  Follow vitamin and mineral recommendations as reviewed with you.  Exercise as tolerated.    Colonoscopy referral made: n/a    Follow-up in 1 year. We kindly ask that your arrive 15 minutes before your scheduled appointment time with your provider to allow our staff to room you, get your vital signs and update your chart.    Get lab work done . Please call the office if you need a script.  It is recommended to check with your insurance BEFORE getting labs done to make sure they are covered by your policy.      Call our office if you have any problems with abdominal pain especially associated with fever, chills, nausea, vomiting or any other concerns.    All  Post-bariatric surgery patients should be aware that very small quantities of any alcohol can cause impairment and it is very possible not to feel the effect. The effect can be in the system for several hours.  It is also a stomach irritant.     It is advised to AVOID alcohol, Nonsteroidal antiinflammatory drugs (NSAIDS) and nicotine of all forms . Any of these can cause stomach irritation/pain.    Discussed the effects of alcohol on a bariatric patient and the increased impairment risk.     Keep up the good work!     Postsurgical Malabsorption   -At risk for malabsorption of vitamins/minerals secondary to malabsorption and restriction of intake from bariatric surgery  -Currently taking adequate postop bariatric surgery vitamin  supplementatio  -Next set of bariatric labs ordered for approximately 3 months  -Patient received education about the importance of adhering to a lifelong supplementation regimen to avoid vitamin/mineral deficiencies      Diagnoses and all orders for this visit:    Encounter for surgical aftercare following surgery of digestive system  -     Zinc; Future  -     Vitamin D 25 hydroxy; Future  -     Vitamin B12; Future  -     Vitamin B1, whole blood; Future  -     Vitamin A; Future  -     PTH, intact; Future  -     CBC and Platelet; Future  -     Comprehensive metabolic panel; Future  -     Ferritin; Future  -     Folate; Future  -     TIBC Panel (incl. Iron, TIBC, % Iron Saturation); Future    Bariatric surgery status  -     Zinc; Future  -     Vitamin D 25 hydroxy; Future  -     Vitamin B12; Future  -     Vitamin B1, whole blood; Future  -     Vitamin A; Future  -     PTH, intact; Future  -     CBC and Platelet; Future  -     Comprehensive metabolic panel; Future  -     Ferritin; Future  -     Folate; Future  -     TIBC Panel (incl. Iron, TIBC, % Iron Saturation); Future    Postsurgical malabsorption  -     Zinc; Future  -     Vitamin D 25 hydroxy; Future  -     Vitamin B12; Future  -     Vitamin B1, whole blood; Future  -     Vitamin A; Future  -     PTH, intact; Future  -     CBC and Platelet; Future  -     Comprehensive metabolic panel; Future  -     Ferritin; Future  -     Folate; Future  -     TIBC Panel (incl. Iron, TIBC, % Iron Saturation); Future    Obesity, Class I, BMI 30-34.9  -     Zinc; Future  -     Vitamin D 25 hydroxy; Future  -     Vitamin B12; Future  -     Vitamin B1, whole blood; Future  -     Vitamin A; Future  -     PTH, intact; Future  -     CBC and Platelet; Future  -     Comprehensive metabolic panel; Future  -     Ferritin; Future  -     Folate; Future  -     TIBC Panel (incl. Iron, TIBC, % Iron Saturation); Future    Chronic migraine with aura  -     Zinc; Future  -     Vitamin D 25  hydroxy; Future  -     Vitamin B12; Future  -     Vitamin B1, whole blood; Future  -     Vitamin A; Future  -     PTH, intact; Future  -     CBC and Platelet; Future  -     Comprehensive metabolic panel; Future  -     Ferritin; Future  -     Folate; Future  -     TIBC Panel (incl. Iron, TIBC, % Iron Saturation); Future    Prediabetes  -     Zinc; Future  -     Vitamin D 25 hydroxy; Future  -     Vitamin B12; Future  -     Vitamin B1, whole blood; Future  -     Vitamin A; Future  -     PTH, intact; Future  -     CBC and Platelet; Future  -     Comprehensive metabolic panel; Future  -     Ferritin; Future  -     Folate; Future  -     TIBC Panel (incl. Iron, TIBC, % Iron Saturation); Future    Other orders  -     calcium carbonate (TUMS) 500 mg chewable tablet; Chew 1 tablet daily         Subjective:      Patient ID: Syed Taveras is a 28 y.o. female.  Status post Vertical Sleeve Gastrectomy with Dr. Rogers on 07/20/2020.. Presents to the office today for follow up.     Had EGD last year 8/2023- due for next EGD in 2025    Initial: 260  Current: 213  EWL: (Weight loss is ahead of schedule at this post surgical period.)  Roshan: 193  Current BMI is Body mass index is 31.45 kg/m².    Tolerating a regular diet-yes  Eating at least 60 grams of protein per day-yes  Drinking at least 64 ounces of fluid per day-yes  Sufficient exercise-yes  Using NSAIDs regularly-no  Using nicotine-no  Using alcohol-no  Supplements:  shawna mvi + calcium  + vit d + b12 shots through pcp         The following portions of the patient's history were reviewed and updated as appropriate: allergies, current medications, past family history, past medical history, past social history, past surgical history and problem list.    Review of Systems   Constitutional: Negative.    Respiratory: Negative.     Cardiovascular: Negative.    Gastrointestinal: Negative.    Neurological: Negative.    Psychiatric/Behavioral: Negative.        "    Objective:    /70   Pulse 79   Temp 97.6 °F (36.4 °C) (Tympanic)   Ht 5' 9\" (1.753 m)   Wt 96.6 kg (213 lb)   LMP 09/04/2024   BMI 31.45 kg/m²      Physical Exam  Vitals and nursing note reviewed.   Constitutional:       Appearance: Normal appearance. She is obese.   HENT:      Head: Normocephalic and atraumatic.   Eyes:      Extraocular Movements: Extraocular movements intact.   Cardiovascular:      Rate and Rhythm: Normal rate.   Pulmonary:      Effort: Pulmonary effort is normal.   Musculoskeletal:         General: Normal range of motion.      Cervical back: Normal range of motion.   Skin:     General: Skin is warm and dry.   Neurological:      General: No focal deficit present.      Mental Status: She is alert and oriented to person, place, and time.   Psychiatric:         Mood and Affect: Mood normal.             "

## 2024-09-20 NOTE — TELEPHONE ENCOUNTER
Patient calling in stating that she is calling in about her triage on 9/18/24. Patient is offered an appt on 9/26/24, patient is notified that a message will be sent to the clinical staff to see if a sooner appt is available, Pt verbalized understanding, no further questions at this time       used for this call #[phone]

## 2024-09-23 PROBLEM — B34.9 ACUTE VIRAL SYNDROME: Status: RESOLVED | Noted: 2024-03-18 | Resolved: 2024-09-23

## 2024-09-23 PROBLEM — Z30.431 IUD CHECK UP: Status: RESOLVED | Noted: 2021-07-26 | Resolved: 2024-09-23

## 2024-09-23 PROBLEM — Z11.52 ENCOUNTER FOR SCREENING FOR COVID-19: Status: RESOLVED | Noted: 2021-10-07 | Resolved: 2024-09-23

## 2024-09-23 NOTE — PROGRESS NOTES
"Assessment/Plan:      Diagnoses and all orders for this visit:    BV (bacterial vaginosis)  -     metroNIDAZOLE (FLAGYL) 500 mg tablet; Take 1 tablet (500 mg total) by mouth every 12 (twelve) hours for 7 days    Vaginal discharge  -     POCT wet mount    Other orders  -     triamcinolone (KENALOG) 0.1 % cream; Apply topically 2 (two) times a day      -Reviewed diagnosis of bacterial vaginosis and treatment with metronidazole  -Hygiene reviewed including avoid scented soaps, lotions and lubricants; avoid tight/restrictive clothing; avoid douching; partner should avoid heavily scented or antibacterial soaps on genitals  -Signs and symptoms to report reviewed    RTO for annual exam    Subjective:     Patient ID: Syed Taveras is a 28 y.o. female.    HPI  here with complaints of vaginal discharge for a week.  LMP 24. Discharge is described as thick white/yellow with a fishy odor.  Denies associated symptoms including itching or irritation.  Denies alleviating or aggravating factors.  Has not tried any OTC remedies.  Has had similar episodes treated last year for both yeast and BV.  Is sexually active actively trying to conceive.  Denies new partner, fever, chills, bowel or bladder concerns.    Last Pap smear 21 NILM  Completed Gardasil vaccine series    Review of Systems   Constitutional:  Negative for chills, fatigue and fever.   Respiratory: Negative.     Cardiovascular: Negative.    Genitourinary:  Positive for vaginal discharge. Negative for decreased urine volume, difficulty urinating, dyspareunia, dysuria, enuresis, flank pain, frequency, genital sores, hematuria, menstrual problem, pelvic pain, urgency, vaginal bleeding and vaginal pain.         Objective:  /70   Ht 5' 9\" (1.753 m)   Wt 96.1 kg (211 lb 12.8 oz)   LMP 2024   BMI 31.28 kg/m²      Physical Exam  Vitals reviewed. Exam conducted with a chaperone present.   Constitutional:       Appearance: Normal appearance. "   Abdominal:      Hernia: There is no hernia in the left inguinal area or right inguinal area.   Genitourinary:     General: Normal vulva.      Exam position: Lithotomy position.      Labia:         Right: No rash, tenderness, lesion or injury.         Left: No rash, tenderness, lesion or injury.       Vagina: Vaginal discharge present.      Cervix: Normal.      Comments: Moderate to large amount of thick gray vaginal discharge noted on exam  Lymphadenopathy:      Lower Body: No right inguinal adenopathy. No left inguinal adenopathy.   Neurological:      Mental Status: She is alert and oriented to person, place, and time.   Psychiatric:         Mood and Affect: Mood normal.         Behavior: Behavior normal.

## 2024-09-26 ENCOUNTER — OFFICE VISIT (OUTPATIENT)
Dept: OBGYN CLINIC | Facility: MEDICAL CENTER | Age: 29
End: 2024-09-26
Payer: COMMERCIAL

## 2024-09-26 VITALS
HEIGHT: 69 IN | WEIGHT: 211.8 LBS | BODY MASS INDEX: 31.37 KG/M2 | SYSTOLIC BLOOD PRESSURE: 110 MMHG | DIASTOLIC BLOOD PRESSURE: 70 MMHG

## 2024-09-26 DIAGNOSIS — N76.0 BV (BACTERIAL VAGINOSIS): Primary | ICD-10-CM

## 2024-09-26 DIAGNOSIS — B96.89 BV (BACTERIAL VAGINOSIS): Primary | ICD-10-CM

## 2024-09-26 DIAGNOSIS — N89.8 VAGINAL DISCHARGE: ICD-10-CM

## 2024-09-26 PROBLEM — N90.7 VULVAR CYST: Status: RESOLVED | Noted: 2021-04-22 | Resolved: 2024-09-26

## 2024-09-26 LAB
BV WHIFF TEST VAG QL: ABNORMAL
CLUE CELLS SPEC QL WET PREP: PRESENT
PH SMN: 5.5 [PH]
SL AMB POCT WET MOUNT: ABNORMAL
T VAGINALIS VAG QL WET PREP: ABNORMAL
YEAST VAG QL WET PREP: ABNORMAL

## 2024-09-26 PROCEDURE — 87210 SMEAR WET MOUNT SALINE/INK: CPT | Performed by: NURSE PRACTITIONER

## 2024-09-26 PROCEDURE — 99213 OFFICE O/P EST LOW 20 MIN: CPT | Performed by: NURSE PRACTITIONER

## 2024-09-26 RX ORDER — TRIAMCINOLONE ACETONIDE 1 MG/G
CREAM TOPICAL 2 TIMES DAILY
COMMUNITY
Start: 2024-05-19

## 2024-09-26 RX ORDER — METRONIDAZOLE 500 MG/1
500 TABLET ORAL EVERY 12 HOURS SCHEDULED
Qty: 14 TABLET | Refills: 0 | Status: SHIPPED | OUTPATIENT
Start: 2024-09-26 | End: 2024-10-03

## 2024-10-07 ENCOUNTER — HOSPITAL ENCOUNTER (EMERGENCY)
Facility: HOSPITAL | Age: 29
Discharge: HOME | End: 2024-10-07
Attending: EMERGENCY MEDICINE
Payer: COMMERCIAL

## 2024-10-07 VITALS
TEMPERATURE: 98.1 F | SYSTOLIC BLOOD PRESSURE: 93 MMHG | OXYGEN SATURATION: 100 % | RESPIRATION RATE: 16 BRPM | DIASTOLIC BLOOD PRESSURE: 58 MMHG | HEART RATE: 75 BPM

## 2024-10-07 DIAGNOSIS — G43.909 MIGRAINE: Primary | ICD-10-CM

## 2024-10-07 LAB
ALBUMIN SERPL BCG-MCNC: 4 G/DL (ref 3.5–5)
ALP SERPL-CCNC: 57 U/L (ref 34–104)
ALT SERPL W P-5'-P-CCNC: 10 U/L (ref 7–52)
ANION GAP SERPL CALCULATED.3IONS-SCNC: 4 MMOL/L (ref 4–13)
AST SERPL W P-5'-P-CCNC: 10 U/L (ref 13–39)
BASOPHILS # BLD AUTO: 0.02 THOUSANDS/ΜL (ref 0–0.1)
BASOPHILS NFR BLD AUTO: 0 % (ref 0–1)
BILIRUB SERPL-MCNC: 0.88 MG/DL (ref 0.2–1)
BUN SERPL-MCNC: 8 MG/DL (ref 5–25)
CALCIUM SERPL-MCNC: 8.6 MG/DL (ref 8.4–10.2)
CHLORIDE SERPL-SCNC: 106 MMOL/L (ref 96–108)
CO2 SERPL-SCNC: 29 MMOL/L (ref 21–32)
CREAT SERPL-MCNC: 0.7 MG/DL (ref 0.6–1.3)
EOSINOPHIL # BLD AUTO: 0.06 THOUSAND/ΜL (ref 0–0.61)
EOSINOPHIL NFR BLD AUTO: 1 % (ref 0–6)
ERYTHROCYTE [DISTWIDTH] IN BLOOD BY AUTOMATED COUNT: 12.2 % (ref 11.6–15.1)
EXT PREGNANCY TEST URINE: NEGATIVE
EXT. CONTROL: NORMAL
FLUAV AG UPPER RESP QL IA.RAPID: NEGATIVE
FLUBV AG UPPER RESP QL IA.RAPID: NEGATIVE
GFR SERPL CREATININE-BSD FRML MDRD: 119 ML/MIN/1.73SQ M
GLUCOSE SERPL-MCNC: 159 MG/DL (ref 65–140)
HCT VFR BLD AUTO: 35.6 % (ref 34.8–46.1)
HGB BLD-MCNC: 11.7 G/DL (ref 11.5–15.4)
IMM GRANULOCYTES # BLD AUTO: 0.01 THOUSAND/UL (ref 0–0.2)
IMM GRANULOCYTES NFR BLD AUTO: 0 % (ref 0–2)
LYMPHOCYTES # BLD AUTO: 1.15 THOUSANDS/ΜL (ref 0.6–4.47)
LYMPHOCYTES NFR BLD AUTO: 20 % (ref 14–44)
MCH RBC QN AUTO: 29.3 PG (ref 26.8–34.3)
MCHC RBC AUTO-ENTMCNC: 32.9 G/DL (ref 31.4–37.4)
MCV RBC AUTO: 89 FL (ref 82–98)
MONOCYTES # BLD AUTO: 0.37 THOUSAND/ΜL (ref 0.17–1.22)
MONOCYTES NFR BLD AUTO: 7 % (ref 4–12)
NEUTROPHILS # BLD AUTO: 4.05 THOUSANDS/ΜL (ref 1.85–7.62)
NEUTS SEG NFR BLD AUTO: 72 % (ref 43–75)
NRBC BLD AUTO-RTO: 0 /100 WBCS
PLATELET # BLD AUTO: 147 THOUSANDS/UL (ref 149–390)
PMV BLD AUTO: 11.6 FL (ref 8.9–12.7)
POTASSIUM SERPL-SCNC: 3.8 MMOL/L (ref 3.5–5.3)
PROT SERPL-MCNC: 6.7 G/DL (ref 6.4–8.4)
RBC # BLD AUTO: 3.99 MILLION/UL (ref 3.81–5.12)
SARS-COV+SARS-COV-2 AG RESP QL IA.RAPID: NEGATIVE
SODIUM SERPL-SCNC: 139 MMOL/L (ref 135–147)
TSH SERPL DL<=0.05 MIU/L-ACNC: 0.55 UIU/ML (ref 0.45–4.5)
WBC # BLD AUTO: 5.66 THOUSAND/UL (ref 4.31–10.16)

## 2024-10-07 PROCEDURE — 84443 ASSAY THYROID STIM HORMONE: CPT

## 2024-10-07 PROCEDURE — 99283 EMERGENCY DEPT VISIT LOW MDM: CPT

## 2024-10-07 PROCEDURE — 81025 URINE PREGNANCY TEST: CPT

## 2024-10-07 PROCEDURE — 99284 EMERGENCY DEPT VISIT MOD MDM: CPT

## 2024-10-07 PROCEDURE — 36415 COLL VENOUS BLD VENIPUNCTURE: CPT

## 2024-10-07 PROCEDURE — 96375 TX/PRO/DX INJ NEW DRUG ADDON: CPT

## 2024-10-07 PROCEDURE — 87811 SARS-COV-2 COVID19 W/OPTIC: CPT

## 2024-10-07 PROCEDURE — 80053 COMPREHEN METABOLIC PANEL: CPT

## 2024-10-07 PROCEDURE — 87804 INFLUENZA ASSAY W/OPTIC: CPT

## 2024-10-07 PROCEDURE — 96365 THER/PROPH/DIAG IV INF INIT: CPT

## 2024-10-07 PROCEDURE — 85025 COMPLETE CBC W/AUTO DIFF WBC: CPT

## 2024-10-07 RX ORDER — NAPROXEN 500 MG/1
500 TABLET ORAL 2 TIMES DAILY WITH MEALS
Qty: 30 TABLET | Refills: 0 | Status: SHIPPED | OUTPATIENT
Start: 2024-10-07 | End: 2024-10-17

## 2024-10-07 RX ORDER — MAGNESIUM SULFATE HEPTAHYDRATE 40 MG/ML
2 INJECTION, SOLUTION INTRAVENOUS ONCE
Status: COMPLETED | OUTPATIENT
Start: 2024-10-07 | End: 2024-10-07

## 2024-10-07 RX ORDER — METOCLOPRAMIDE HYDROCHLORIDE 5 MG/ML
10 INJECTION INTRAMUSCULAR; INTRAVENOUS ONCE
Status: COMPLETED | OUTPATIENT
Start: 2024-10-07 | End: 2024-10-07

## 2024-10-07 RX ORDER — DIPHENHYDRAMINE HYDROCHLORIDE 50 MG/ML
25 INJECTION INTRAMUSCULAR; INTRAVENOUS ONCE
Status: COMPLETED | OUTPATIENT
Start: 2024-10-07 | End: 2024-10-07

## 2024-10-07 RX ORDER — KETOROLAC TROMETHAMINE 30 MG/ML
15 INJECTION, SOLUTION INTRAMUSCULAR; INTRAVENOUS ONCE
Status: COMPLETED | OUTPATIENT
Start: 2024-10-07 | End: 2024-10-07

## 2024-10-07 RX ADMIN — KETOROLAC TROMETHAMINE 15 MG: 30 INJECTION, SOLUTION INTRAMUSCULAR; INTRAVENOUS at 13:29

## 2024-10-07 RX ADMIN — MAGNESIUM SULFATE HEPTAHYDRATE 2 G: 40 INJECTION, SOLUTION INTRAVENOUS at 13:32

## 2024-10-07 RX ADMIN — DIPHENHYDRAMINE HYDROCHLORIDE 25 MG: 50 INJECTION INTRAMUSCULAR; INTRAVENOUS at 13:27

## 2024-10-07 RX ADMIN — METOCLOPRAMIDE 10 MG: 5 INJECTION, SOLUTION INTRAMUSCULAR; INTRAVENOUS at 13:30

## 2024-10-07 RX ADMIN — SODIUM CHLORIDE 1000 ML: 0.9 INJECTION, SOLUTION INTRAVENOUS at 13:19

## 2024-10-07 NOTE — ED PROVIDER NOTES
Final diagnoses:   Migraine     ED Disposition       ED Disposition   Discharge    Condition   Stable    Date/Time   Mon Oct 7, 2024  2:42 PM    Comment   Syed Taveras discharge to home/self care.                   Assessment & Plan       Medical Decision Making  28-year-old female presenting ED with a chief complaint of migraine headache.  Cardiopulmonary exam is benign.  Patient having no abdominal pain abdomen soft and nontender.  Cranial nerves are all intact.  No focal deficit or focal weakness noted on exam.  Patient endorsing similar to her normal migraine headaches.  She denies any visual disturbances stated that she does have some nausea.  Migraine cocktail given in ED with fluids and IV mag.  Baseline labs show no acute abnormalities.  Patient is a diabetic and has a slight increase in glucose.  Viral panel is negative TSH is normal.  After a about an hour of treatment patient having complete relief of migraine headache.  Due to patient having trouble with medications I did advise patient to follow-up with neurology and ambulatory referral was placed.  Patient given strict return to ED protocol with any worsening symptoms or explained on discharge.  Disposition was explained with follow-ups.Patient understood diagnosis and treatment plan and had no further questions.  Patient was discharged in stable condition.  Patient was advised to follow-up with her PCP in 1 to 2 days.  Patient was advised to return to the ED with any worsening symptoms that were explained on discharge including but not limited to chest pain, shortness of breath, irretractable vomiting or diarrhea, vision loss, loss of function, loss of sensation, syncope, hemoptysis, hematochezia, hematemesis, melena, decreased oral intake, feeling ill.     Ddx-migraine, tension headache, cluster headache, anemia, electrolyte abnormality, thyroid abnormality    Portions of the record may have been created with voice recognition software.  "Occasional wrong word or \"sound a like\" substitutions may have occurred due to the inherent limitations of voice recognition software. Read the chart carefully and recognize, using context, where substitutions have occurred.      Amount and/or Complexity of Data Reviewed  Labs: ordered.     Details: See MDM    Risk  OTC drugs.  Prescription drug management.  Risk Details: Risk of worsening symptoms along with signs and symptoms worsening symptoms were thoroughly explained on discharge.  Risk of incomplete follow-up was discussed.  Patient had full understanding of all risks had no further questions and was discharged in stable condition.              Medications   sodium chloride 0.9 % bolus 1,000 mL (0 mL Intravenous Stopped 10/7/24 1457)   diphenhydrAMINE (BENADRYL) injection 25 mg (25 mg Intravenous Given 10/7/24 1327)   ketorolac (TORADOL) injection 15 mg (15 mg Intravenous Given 10/7/24 1329)   metoclopramide (REGLAN) injection 10 mg (10 mg Intravenous Given 10/7/24 1330)   magnesium sulfate 2 g/50 mL IVPB (premix) 2 g (0 g Intravenous Stopped 10/7/24 1432)       ED Risk Strat Scores                           SBIRT 22yo+      Flowsheet Row Most Recent Value   Initial Alcohol Screen: US AUDIT-C     1. How often do you have a drink containing alcohol? 0 Filed at: 10/07/2024 1312   2. How many drinks containing alcohol do you have on a typical day you are drinking?  0 Filed at: 10/07/2024 1312   3a. Male UNDER 65: How often do you have five or more drinks on one occasion? 0 Filed at: 10/07/2024 1312   3b. FEMALE Any Age, or MALE 65+: How often do you have 4 or more drinks on one occassion? 0 Filed at: 10/07/2024 1312   Audit-C Score 0 Filed at: 10/07/2024 1312   CHERRI: How many times in the past year have you...    Used an illegal drug or used a prescription medication for non-medical reasons? Never Filed at: 10/07/2024 1312                            History of Present Illness       Chief Complaint   Patient " presents with    Migraine     Pt reports migraine since yesterday. Hx of migraine. Took meds at home w/o relief.        Past Medical History:   Diagnosis Date    Diabetes mellitus (HCC)     History of transfusion     after  - had fever with transfusion    Obesity (BMI 35.0-39.9 without comorbidity)     S/P laparoscopic sleeve gastrectomy 2020    Uses Romansh as primary spoken language       Past Surgical History:   Procedure Laterality Date     SECTION  2017    CHOLECYSTECTOMY      ND LAPS GSTRC RSTRICTIV PX LONGITUDINAL GASTRECTOMY N/A 2020    Procedure: LAP SLEEVE GASTRECTOMY, INTRAOP EGD;  Surgeon: Da Rogers MD;  Location: [facility];  Service: Bariatrics      Family History   Problem Relation Age of Onset    Diabetes Mother     Other Mother         gastric bypass    Diabetes Father     Diabetes Sister     Sleep apnea Sister     Diabetes Maternal Grandmother     Skin cancer Maternal Grandmother     No Known Problems Maternal Grandfather     Diabetes Paternal Grandmother     No Known Problems Paternal Grandfather     Sleep apnea Brother         2 of the 3 borthers have EAGLE    No Known Problems Brother     No Known Problems Son     Heart disease Family         CARDIOVASCULAR DISEASE    No Known Problems Maternal Aunt     No Known Problems Maternal Aunt     No Known Problems Paternal Aunt     No Known Problems Paternal Aunt       Social History     Tobacco Use    Smoking status: Never     Passive exposure: Never    Smokeless tobacco: Never   Vaping Use    Vaping status: Never Used   Substance Use Topics    Alcohol use: No    Drug use: No      E-Cigarette/Vaping    E-Cigarette Use Never User       E-Cigarette/Vaping Substances    Nicotine No     THC No     CBD No     Flavoring No     Other No     Unknown No       I have reviewed and agree with the history as documented.     28-year-old female presented ED with a chief complaint of migraine headache.  Patient has a significant  past medical history of migraines.  Stated started yesterday she has been using sumatriptan without any relief.  Patient denies any confusion, focal deficits or focal weaknesses.  She denies any vomiting or diarrhea.  She does state that she feels nauseous.  Denies any recent illnesses or recent sick contacts.  She stated that it is similar to her normal migraines that she usually gets.Patient denies any chest pain, shortness of breath, vomiting, diarrhea, chills, diaphoresis, fevers, loss of consciousness, syncope, urinary and bowel changes, abdominal pain, visual symptoms, vision loss, loss of function, loss of sensation, decreased oral intake, hemoptysis, hematochezia, hematemesis, melena, confusion.         Review of Systems   Constitutional:  Negative for activity change, appetite change, chills, diaphoresis, fatigue and fever.   HENT:  Negative for congestion, ear discharge, ear pain, postnasal drip, rhinorrhea, sinus pressure, sinus pain and sore throat.    Eyes:  Negative for photophobia and visual disturbance.   Respiratory:  Negative for cough, chest tightness and shortness of breath.    Cardiovascular:  Negative for chest pain and palpitations.   Gastrointestinal:  Negative for abdominal distention, abdominal pain, constipation, diarrhea, nausea and vomiting.   Genitourinary:  Negative for decreased urine volume, difficulty urinating, dysuria, flank pain, frequency, hematuria, menstrual problem, pelvic pain, urgency, vaginal bleeding, vaginal discharge and vaginal pain.   Musculoskeletal:  Negative for arthralgias, back pain, joint swelling, myalgias, neck pain and neck stiffness.   Skin:  Negative for rash and wound.   Neurological:  Positive for headaches. Negative for dizziness, tremors, syncope, facial asymmetry, weakness, light-headedness and numbness.           Objective       ED Triage Vitals   Temperature Pulse Blood Pressure Respirations SpO2 Patient Position - Orthostatic VS   10/07/24 1207  10/07/24 1207 10/07/24 1207 10/07/24 1207 10/07/24 1207 10/07/24 1458   98.1 °F (36.7 °C) 73 121/55 18 99 % Lying      Temp Source Heart Rate Source BP Location FiO2 (%) Pain Score    10/07/24 1207 10/07/24 1207 10/07/24 1207 -- 10/07/24 1312    Oral Monitor Right arm  8      Vitals      Date and Time Temp Pulse SpO2 Resp BP Pain Score FACES Pain Rating User   10/07/24 1458 -- 75 100 % 16 93/58 -- -- BA   10/07/24 1329 -- -- -- -- -- 10 - Worst Possible Pain -- BA   10/07/24 1312 -- -- -- -- -- 8 -- EP   10/07/24 1207 98.1 °F (36.7 °C) 73 99 % 18 121/55 -- -- HMR            Physical Exam  Constitutional:       General: She is not in acute distress.     Appearance: Normal appearance. She is not ill-appearing, toxic-appearing or diaphoretic.   HENT:      Head: Normocephalic.      Right Ear: Tympanic membrane, ear canal and external ear normal.      Left Ear: Tympanic membrane, ear canal and external ear normal.      Nose: No congestion or rhinorrhea.      Mouth/Throat:      Mouth: Mucous membranes are moist.      Pharynx: Oropharynx is clear. No oropharyngeal exudate or posterior oropharyngeal erythema.   Eyes:      General:         Right eye: No discharge.         Left eye: No discharge.      Extraocular Movements: Extraocular movements intact.      Conjunctiva/sclera: Conjunctivae normal.      Pupils: Pupils are equal, round, and reactive to light.   Cardiovascular:      Rate and Rhythm: Normal rate and regular rhythm.      Pulses: Normal pulses.   Pulmonary:      Effort: Pulmonary effort is normal. No respiratory distress.      Breath sounds: Normal breath sounds. No stridor. No wheezing, rhonchi or rales.   Chest:      Chest wall: No tenderness.   Abdominal:      General: Bowel sounds are normal. There is no distension.      Palpations: Abdomen is soft.      Tenderness: There is no abdominal tenderness. There is no right CVA tenderness, left CVA tenderness, guarding or rebound.   Musculoskeletal:         General:  No swelling or tenderness. Normal range of motion.      Cervical back: Neck supple. No rigidity or tenderness.   Lymphadenopathy:      Cervical: No cervical adenopathy.   Skin:     General: Skin is warm and dry.      Capillary Refill: Capillary refill takes less than 2 seconds.   Neurological:      Mental Status: She is alert and oriented to person, place, and time.      GCS: GCS eye subscore is 4. GCS verbal subscore is 5. GCS motor subscore is 6.      Cranial Nerves: Cranial nerves 2-12 are intact.      Sensory: Sensation is intact.      Motor: Motor function is intact.      Coordination: Coordination is intact.      Gait: Gait is intact.   Psychiatric:         Mood and Affect: Mood normal.         Results Reviewed       Procedure Component Value Units Date/Time    FLU/COVID Rapid Antigen (30 min. TAT) - Preferred screening test in ED [[id number]]  (Normal) Collected: 10/07/24 1318    Lab Status: Final result Specimen: Nares from Nose Updated: 10/07/24 1963     SARS COV Rapid Antigen Negative     Influenza A Rapid Antigen Negative     Influenza B Rapid Antigen Negative    Narrative:      This test has been performed using the Quidel Kiara 2 FLU+SARS Antigen test under the Emergency Use Authorization (EUA). This test has been validated by the  and verified by the performing laboratory. The Kiara uses lateral flow immunofluorescent sandwich assay to detect SARS-COV, Influenza A and Influenza B Antigen.     The Quidel Kiara 2 SARS Antigen test does not differentiate between SARS-CoV and SARS-CoV-2.     Negative results are presumptive and may be confirmed with a molecular assay, if necessary, for patient management. Negative results do not rule out SARS-CoV-2 or influenza infection and should not be used as the sole basis for treatment or patient management decisions. A negative test result may occur if the level of antigen in a sample is below the limit of detection of this test.     Positive results are  indicative of the presence of viral antigens, but do not rule out bacterial infection or co-infection with other viruses.     All test results should be used as an adjunct to clinical observations and other information available to the provider.    FOR PEDIATRIC PATIENTS - copy/paste COVID Guidelines URL to browser: [email or web address]    TSH, 3rd generation with Free T4 reflex [[id number]]  (Normal) Collected: 10/07/24 1318    Lab Status: Final result Specimen: Blood from Arm, Right Updated: 10/07/24 1359     TSH 3RD GENERATON 0.550 uIU/mL     Comprehensive metabolic panel [[id number]]  (Abnormal) Collected: 10/07/24 1318    Lab Status: Final result Specimen: Blood from Arm, Right Updated: 10/07/24 1347     Sodium 139 mmol/L      Potassium 3.8 mmol/L      Chloride 106 mmol/L      CO2 29 mmol/L      ANION GAP 4 mmol/L      BUN 8 mg/dL      Creatinine 0.70 mg/dL      Glucose 159 mg/dL      Calcium 8.6 mg/dL      AST 10 U/L      ALT 10 U/L      Alkaline Phosphatase 57 U/L      Total Protein 6.7 g/dL      Albumin 4.0 g/dL      Total Bilirubin 0.88 mg/dL      eGFR 119 ml/min/1.73sq m     Narrative:      National Kidney Disease Foundation guidelines for Chronic Kidney Disease (CKD):     Stage 1 with normal or high GFR (GFR > 90 mL/min/1.73 square meters)    Stage 2 Mild CKD (GFR = 60-89 mL/min/1.73 square meters)    Stage 3A Moderate CKD (GFR = 45-59 mL/min/1.73 square meters)    Stage 3B Moderate CKD (GFR = 30-44 mL/min/1.73 square meters)    Stage 4 Severe CKD (GFR = 15-29 mL/min/1.73 square meters)    Stage 5 End Stage CKD (GFR <15 mL/min/1.73 square meters)  Note: GFR calculation is accurate only with a steady state creatinine    POCT pregnancy, urine [[id number]]  (Normal) Resulted: 10/07/24 1325    Lab Status: Final result Updated: 10/07/24 1329     EXT Preg Test, Ur Negative     Control Valid    CBC and differential [[id number]]  (Abnormal) Collected: 10/07/24  1318    Lab Status: Final result Specimen: Blood from Arm, Right Updated: 10/07/24 1325     WBC 5.66 Thousand/uL      RBC 3.99 Million/uL      Hemoglobin 11.7 g/dL      Hematocrit 35.6 %      MCV 89 fL      MCH 29.3 pg      MCHC 32.9 g/dL      RDW 12.2 %      MPV 11.6 fL      Platelets 147 Thousands/uL      nRBC 0 /100 WBCs      Segmented % 72 %      Immature Grans % 0 %      Lymphocytes % 20 %      Monocytes % 7 %      Eosinophils Relative 1 %      Basophils Relative 0 %      Absolute Neutrophils 4.05 Thousands/µL      Absolute Immature Grans 0.01 Thousand/uL      Absolute Lymphocytes 1.15 Thousands/µL      Absolute Monocytes 0.37 Thousand/µL      Eosinophils Absolute 0.06 Thousand/µL      Basophils Absolute 0.02 Thousands/µL             No orders to display       Procedures    ED Medication and Procedure Management   Prior to Admission Medications   Prescriptions Last Dose Informant Patient Reported? Taking?   Prenatal Vit-Fe Fumarate-FA (Prenatabs FA) 29-1 MG TABS   No No   Sig: Take 1 tablet by mouth in the morning   SUMAtriptan (Imitrex) 25 mg tablet   No No   Sig: Take 1 tablet (25 mg total) by mouth once as needed for migraine   amitriptyline (ELAVIL) 10 mg tablet   No No   Sig: Take 1 tablet (10 mg total) by mouth daily at bedtime   calcium carbonate (TUMS) 500 mg chewable tablet   Yes No   Sig: Chew 1 tablet daily   naproxen (Naprosyn) 500 mg tablet   No No   Sig: Take 1 tablet (500 mg total) by mouth 2 (two) times a day as needed for mild pain   omeprazole (PriLOSEC) 20 mg delayed release capsule  Self No No   Sig: Take 1 capsule (20 mg total) by mouth daily   semaglutide, 1 mg/dose, (Ozempic) 4 mg/3 mL injection pen   No No   Sig: Inject 0.75 mL (1 mg total) under the skin once a week   triamcinolone (KENALOG) 0.1 % cream   Yes No   Sig: Apply topically 2 (two) times a day      Facility-Administered Medications Last Administration Doses Remaining   cyanocobalamin injection 1,000 mcg 9/17/2024  3:31 PM          Discharge Medication List as of 10/7/2024  2:44 PM        START taking these medications    Details   !! naproxen (Naprosyn) 500 mg tablet Take 1 tablet (500 mg total) by mouth 2 (two) times a day with meals, Starting Mon 10/7/2024, Normal       !! - Potential duplicate medications found. Please discuss with provider.        CONTINUE these medications which have NOT CHANGED    Details   amitriptyline (ELAVIL) 10 mg tablet Take 1 tablet (10 mg total) by mouth daily at bedtime, Starting Mon 9/16/2024, Normal      calcium carbonate (TUMS) 500 mg chewable tablet Chew 1 tablet daily, Historical Med      !! naproxen (Naprosyn) 500 mg tablet Take 1 tablet (500 mg total) by mouth 2 (two) times a day as needed for mild pain, Starting Mon 9/16/2024, Normal      omeprazole (PriLOSEC) 20 mg delayed release capsule Take 1 capsule (20 mg total) by mouth daily, Starting Tue 10/10/2023, Normal      Prenatal Vit-Fe Fumarate-FA (Prenatabs FA) 29-1 MG TABS Take 1 tablet by mouth in the morning, Starting Tue 7/16/2024, Normal      semaglutide, 1 mg/dose, (Ozempic) 4 mg/3 mL injection pen Inject 0.75 mL (1 mg total) under the skin once a week, Starting Tue 7/16/2024, Normal      SUMAtriptan (Imitrex) 25 mg tablet Take 1 tablet (25 mg total) by mouth once as needed for migraine, Starting Mon 9/16/2024, Until Wed 10/16/2024 at 2359, Normal      triamcinolone (KENALOG) 0.1 % cream Apply topically 2 (two) times a day, Starting Sun 5/19/2024, Historical Med       !! - Potential duplicate medications found. Please discuss with provider.          ED SEPSIS DOCUMENTATION   Time reflects when diagnosis was documented in both MDM as applicable and the Disposition within this note       Time User Action Codes Description Comment    10/7/2024  2:42 PM aCm Fang Add [G43.909] Migraine                  Cma Fang PA-C  10/07/24 1624

## 2024-10-07 NOTE — Clinical Note
Syed Taveras was seen and treated in our emergency department on 10/7/2024.                Diagnosis: Migraine    Syed  .    She may return on this date: 10/08/2024         If you have any questions or concerns, please don't hesitate to call.      Cam Fang PA-C    ______________________________           _______________          _______________  Hospital Representative                              Date                                Time

## 2024-10-07 NOTE — DISCHARGE INSTRUCTIONS
Patient advised to return to ED with any worsening symptoms explained on discharge.  Ambulatory referral to neurology made.  Patient advised to follow-up PCP for today's ED visit.     Patient was advised to return to the ED with any worsening symptoms that were explained on discharge including but not limited to chest pain, shortness of breath, irretractable vomiting or diarrhea, vision loss, loss of function, loss of sensation, syncope, hemoptysis, hematochezia, hematemesis, melena, decreased oral intake, feeling ill.

## 2024-10-14 ENCOUNTER — OFFICE VISIT (OUTPATIENT)
Dept: NEUROLOGY | Facility: CLINIC | Age: 29
End: 2024-10-14
Payer: COMMERCIAL

## 2024-10-14 VITALS
SYSTOLIC BLOOD PRESSURE: 100 MMHG | OXYGEN SATURATION: 99 % | WEIGHT: 213.9 LBS | HEART RATE: 88 BPM | BODY MASS INDEX: 31.59 KG/M2 | TEMPERATURE: 97.9 F | DIASTOLIC BLOOD PRESSURE: 68 MMHG

## 2024-10-14 DIAGNOSIS — G43.709 CHRONIC MIGRAINE WITHOUT AURA WITHOUT STATUS MIGRAINOSUS, NOT INTRACTABLE: Primary | ICD-10-CM

## 2024-10-14 DIAGNOSIS — G44.52 NEW PERSISTENT DAILY HEADACHE: ICD-10-CM

## 2024-10-14 PROCEDURE — 99205 OFFICE O/P NEW HI 60 MIN: CPT

## 2024-10-14 RX ORDER — RIZATRIPTAN BENZOATE 10 MG/1
10 TABLET ORAL AS NEEDED
Qty: 10 TABLET | Refills: 3 | Status: SHIPPED | OUTPATIENT
Start: 2024-10-14

## 2024-10-14 NOTE — ASSESSMENT & PLAN NOTE
I had the pleasure of seeing Syed today in the office at [facility] in [place].  She is presenting today as an initial new patient consultation in regard to her headaches that started occurring about 4-5 months prior at this point in time.  The patient states that these headaches are occurring almost every single day.  She notes that about half the days out of the month or 15/30 days in the month for severe debilitating headaches.  She notes that the headaches mostly occur in the afternoon.  They can last a few days in a row when they do occur.  She notes that she had been recently given amitriptyline to take on daily basis.  She had also been given sumatriptan to take but did not seem to be working for abortive therapy.  She does not have any particular aura with the headaches.  She does note symptoms of nausea, photophobia, phonophobia, osmophobia, lightheadedness/dizziness, numbness and tingling of the hands and feet, and lacrimation associated with the headaches.  She does note that bending over can make the headaches worse.  Going from standing up to lying down can make the headache worse but never triggering it.  No specific triggering events for the headache she states.  She had never seen a neurologist in the past.  She had never had any significant neuroimaging for the headaches either.    Based on my evaluation of the patient, it seems likely that the patient is having migraine headaches without aura.  It is unclear as to what may have caused the patient's headaches at this point in time.  However, it is noted that the patient is a young female who has already had 1 pregnancy and is looking into becoming pregnant soon.  Certainly could be related to any shifts in the patient's hormones.  Although, not significantly uncommon for her to start developing migraines at her age either.  We should rule out other secondary causes, therefore the patient will have an MRI brain without  contrast for further evaluation.  We had talked about the patient's preventative medication and stated that we could certainly can continue to take amitriptyline daily.  However if she does become pregnant she would let us know in regards to discontinuing the medication.  However for the time being we will have the patient increase from amitriptyline 10 mg daily to 25 mg daily.  She will also be switched from sumatriptan to rizatriptan instead for abortive therapy.  Again patient will need to let us know if she does become pregnant as well in regards to the abortive medications as we will need to change to this as well.  However, I encouraged the patient to follow-up in about 4 months time for further evaluation.    - MRI brain without contrast ordered to rule out underlying intracranial etiology.  Patient describes not ever having headaches before in the past and these headaches started just 4 to 5 months ago.  No specific triggering event the patient notes.  - Start amitriptyline 25 mg, take 1 tablet by mouth daily at bedtime.  Patient is to discontinue the amitriptyline 10 mg dosage at this time.  Patient is to let me know in about a 6 to 8 weeks weeks how she is doing with the recent increase the medication for headache preventative therapy  - Start rizatriptan 10 mg, take 1 tablet at the onset of migraine headache.  May repeat this dosage 2 hours later if needed.  Discontinue sumatriptan 25 mg as needed for abortive therapy as well.  - Encouraged patient to continue to work on lifestyle risk factors that contribute to headaches.  Encouraged patient to continue to try to get adequate amount of sleep at night, at least sleeping 7 to 8 hours.  Drinking about 64 to 72 ounces water a day.  Working on staying physically active and limiting caffeine intake.  Also, working on trying to reduce stress/anxiety as well.      Orders:    Ambulatory Referral to Neurology    MRI brain without contrast; Future    amitriptyline  (ELAVIL) 25 mg tablet; Take 1 tablet (25 mg total) by mouth daily at bedtime    rizatriptan (Maxalt) 10 mg tablet; Take 1 tablet (10 mg total) by mouth as needed for migraine Take at the onset of migraine; if symptoms continue or return, may take another dose at least 2 hours after first dose. Take no more than 2 doses in a day.

## 2024-10-14 NOTE — PATIENT INSTRUCTIONS
Additional Testing:   Neurodiagnostic workup: MRI brain without contrast ordered    Headache Calendar  Please maintain a headache calendar  Consider using phone applications such as Migraine Buddy or Migraine Diary    Headache/migraine treatment:     Rescue medications (for immediate treatment of a headache):   It is ok to take ibuprofen, acetaminophen or naproxen (Advil, Tylenol,  Aleve, Excedrin) if they help your headaches you should limit these to No more than 3 times a week to avoid medication overuse/rebound headaches.     For your more moderate to severe migraines take this medication early   - Start Maxalt (rizatriptan) 10mg tabs - take one at the onset of headache. May repeat one time after 2 hours if pain has not resolved.   (Max 2 a day and 10 a month)     - Discontinue sumatriptan 25 mg as needed for migraine abortive therapy    Prescription preventive medications for headaches/migraines   (to take every day to help prevent headaches - not to take at the time of headache):  - Discontinue amitriptyline 10 mg, would like for the patient to take amitriptyline 25 mg, take 1 tablet by mouth once daily at bedtime for migraine preventative therapy.  Would advise that the patient continue on this medication for about 6 to 8 weeks to see how this is working for preventing her migraines.    *Typically these types of medications take time until you see the benefit, although some may see improvement in days, often it may take weeks, especially if the medication is being titrated up to a beneficial level. Please contact us if there are any concerns or questions regarding the medication.     Over the counter preventive supplements for headaches/migraines (if you try, try for 3 months straight)  (to take every day to help prevent headaches - not to take at the time of headache):  There are combo pills online of these - none of which regulated by FDA and double check dosing - take appropriate dose only once a day-  prevent a migraine, migravent, mind ease, migrelief   [] Magnesium 400mg daily (If any diarrhea or upset stomach, decrease dose  as tolerated)  [] Riboflavin (Vitamin B2) 400mg daily (may make your urine bright/neon yellow)  - All supplements can be purchased online    Lifestyle Recommendations:  [x] SLEEP - Maintain a regular sleep schedule: Adults need at least 7-8 hours of uninterrupted a night. Maintain good sleep hygiene:  Going to bed and waking up at consistent times, avoiding excessive daytime naps, avoiding caffeinated beverages in the evening, avoid excessive stimulation in the evening and generally using bed primarily for sleeping.  One hour before bedtime would recommend turning lights down lower, decreasing your activity (may read quietly, listen to music at a low volume). When you get into bed, should eliminate all technology (no texting, emailing, playing with your phone, iPad or tablet in bed).  [x] HYDRATION - Maintain good hydration.  Drink  2L of fluid a day (4 typical small water bottles)  [x] DIET - Maintain good nutrition. In particular don't skip meals and try and eat healthy balanced meals regularly.  [x] TRIGGERS - Look for other triggers and avoid them: Limit caffeine to 1-2 cups a day or less. Avoid dietary triggers that you have noticed bring on your headaches (this could include aged cheese, peanuts, MSG, aspartame and nitrates).  [x] EXERCISE - physical exercise as we all know is good for you in many ways, and not only is good for your heart, but also is beneficial for your mental health, cognitive health and  chronic pain/headaches. I would encourage at the least 5 days of physical exercise weekly for at least 30 minutes.     Education and Follow-up  [x] Please call with any questions or concerns. Of course if any new concerning symptoms go to the emergency department.  [x] Follow up in 4 months with Anshul BRAUN

## 2024-10-14 NOTE — PROGRESS NOTES
Ambulatory Visit  Name: Syed Taveras      : 1995      MRN: [id number]  Encounter Provider: Maicol Trammell PA-C  Encounter Date: 10/14/2024   Encounter department: [facility]    Assessment & Plan  Chronic migraine without aura without status migrainosus, not intractable  I had the pleasure of seeing Syed today in the office at [facility] in [place].  She is presenting today as an initial new patient consultation in regard to her headaches that started occurring about 4-5 months prior at this point in time.  The patient states that these headaches are occurring almost every single day.  She notes that about half the days out of the month or 15/30 days in the month for severe debilitating headaches.  She notes that the headaches mostly occur in the afternoon.  They can last a few days in a row when they do occur.  She notes that she had been recently given amitriptyline to take on daily basis.  She had also been given sumatriptan to take but did not seem to be working for abortive therapy.  She does not have any particular aura with the headaches.  She does note symptoms of nausea, photophobia, phonophobia, osmophobia, lightheadedness/dizziness, numbness and tingling of the hands and feet, and lacrimation associated with the headaches.  She does note that bending over can make the headaches worse.  Going from standing up to lying down can make the headache worse but never triggering it.  No specific triggering events for the headache she states.  She had never seen a neurologist in the past.  She had never had any significant neuroimaging for the headaches either.    Based on my evaluation of the patient, it seems likely that the patient is having migraine headaches without aura.  It is unclear as to what may have caused the patient's headaches at this point in time.  However, it is noted that the patient is a young female who has  already had 1 pregnancy and is looking into becoming pregnant soon.  Certainly could be related to any shifts in the patient's hormones.  Although, not significantly uncommon for her to start developing migraines at her age either.  We should rule out other secondary causes, therefore the patient will have an MRI brain without contrast for further evaluation.  We had talked about the patient's preventative medication and stated that we could certainly can continue to take amitriptyline daily.  However if she does become pregnant she would let us know in regards to discontinuing the medication.  However for the time being we will have the patient increase from amitriptyline 10 mg daily to 25 mg daily.  She will also be switched from sumatriptan to rizatriptan instead for abortive therapy.  Again patient will need to let us know if she does become pregnant as well in regards to the abortive medications as we will need to change to this as well.  However, I encouraged the patient to follow-up in about 4 months time for further evaluation.    - MRI brain without contrast ordered to rule out underlying intracranial etiology.  Patient describes not ever having headaches before in the past and these headaches started just 4 to 5 months ago.  No specific triggering event the patient notes.  - Start amitriptyline 25 mg, take 1 tablet by mouth daily at bedtime.  Patient is to discontinue the amitriptyline 10 mg dosage at this time.  Patient is to let me know in about a 6 to 8 weeks weeks how she is doing with the recent increase the medication for headache preventative therapy  - Start rizatriptan 10 mg, take 1 tablet at the onset of migraine headache.  May repeat this dosage 2 hours later if needed.  Discontinue sumatriptan 25 mg as needed for abortive therapy as well.  - Encouraged patient to continue to work on lifestyle risk factors that contribute to headaches.  Encouraged patient to continue to try to get adequate  amount of sleep at night, at least sleeping 7 to 8 hours.  Drinking about 64 to 72 ounces water a day.  Working on staying physically active and limiting caffeine intake.  Also, working on trying to reduce stress/anxiety as well.      Orders:    Ambulatory Referral to Neurology    MRI brain without contrast; Future    amitriptyline (ELAVIL) 25 mg tablet; Take 1 tablet (25 mg total) by mouth daily at bedtime    rizatriptan (Maxalt) 10 mg tablet; Take 1 tablet (10 mg total) by mouth as needed for migraine Take at the onset of migraine; if symptoms continue or return, may take another dose at least 2 hours after first dose. Take no more than 2 doses in a day.    New persistent daily headache  - Persistent daily headache, started 4 to 5 months prior.  Never had headaches prior to this  - Has a headache occurring of some kind 30/30 days in the month, 15/30 days are severe debilitating headache days with  - MRI brain without contrast to rule out underlying etiology with new onset headaches over the last few months  Orders:    MRI brain without contrast; Future      Patient Instructions   Additional Testing:   Neurodiagnostic workup: MRI brain without contrast ordered    Headache Calendar  Please maintain a headache calendar  Consider using phone applications such as Migraine Buddy or Migraine Diary    Headache/migraine treatment:     Rescue medications (for immediate treatment of a headache):   It is ok to take ibuprofen, acetaminophen or naproxen (Advil, Tylenol,  Aleve, Excedrin) if they help your headaches you should limit these to No more than 3 times a week to avoid medication overuse/rebound headaches.     For your more moderate to severe migraines take this medication early   - Start Maxalt (rizatriptan) 10mg tabs - take one at the onset of headache. May repeat one time after 2 hours if pain has not resolved.   (Max 2 a day and 10 a month)     - Discontinue sumatriptan 25 mg as needed for migraine abortive  therapy    Prescription preventive medications for headaches/migraines   (to take every day to help prevent headaches - not to take at the time of headache):  - Discontinue amitriptyline 10 mg, would like for the patient to take amitriptyline 25 mg, take 1 tablet by mouth once daily at bedtime for migraine preventative therapy.  Would advise that the patient continue on this medication for about 6 to 8 weeks to see how this is working for preventing her migraines.    *Typically these types of medications take time until you see the benefit, although some may see improvement in days, often it may take weeks, especially if the medication is being titrated up to a beneficial level. Please contact us if there are any concerns or questions regarding the medication.     Over the counter preventive supplements for headaches/migraines (if you try, try for 3 months straight)  (to take every day to help prevent headaches - not to take at the time of headache):  There are combo pills online of these - none of which regulated by FDA and double check dosing - take appropriate dose only once a day- prevent a migraine, migravent, mind ease, migrelief   [] Magnesium 400mg daily (If any diarrhea or upset stomach, decrease dose  as tolerated)  [] Riboflavin (Vitamin B2) 400mg daily (may make your urine bright/neon yellow)  - All supplements can be purchased online    Lifestyle Recommendations:  [x] SLEEP - Maintain a regular sleep schedule: Adults need at least 7-8 hours of uninterrupted a night. Maintain good sleep hygiene:  Going to bed and waking up at consistent times, avoiding excessive daytime naps, avoiding caffeinated beverages in the evening, avoid excessive stimulation in the evening and generally using bed primarily for sleeping.  One hour before bedtime would recommend turning lights down lower, decreasing your activity (may read quietly, listen to music at a low volume). When you get into bed, should eliminate all  technology (no texting, emailing, playing with your phone, iPad or tablet in bed).  [x] HYDRATION - Maintain good hydration.  Drink  2L of fluid a day (4 typical small water bottles)  [x] DIET - Maintain good nutrition. In particular don't skip meals and try and eat healthy balanced meals regularly.  [x] TRIGGERS - Look for other triggers and avoid them: Limit caffeine to 1-2 cups a day or less. Avoid dietary triggers that you have noticed bring on your headaches (this could include aged cheese, peanuts, MSG, aspartame and nitrates).  [x] EXERCISE - physical exercise as we all know is good for you in many ways, and not only is good for your heart, but also is beneficial for your mental health, cognitive health and  chronic pain/headaches. I would encourage at the least 5 days of physical exercise weekly for at least 30 minutes.     Education and Follow-up  [x] Please call with any questions or concerns. Of course if any new concerning symptoms go to the emergency department.  [x] Follow up in 4 months with Anshul BRAUN    History of Present Illness   HPI    Current medical illnesses  or past medical history include postpartum cardiomyopathy, chronic migraine, gallbladder disease, diabetes mellitus, status post laparoscopic sleeve gastrectomy, history of nephrolithiasis      Interval History:     ([id number]) was used for the entirety of this visit for translation purposes.    Headaches started at what age? 4-5 months the headache started  How often do the headaches occur?   - as of 10/14/2024: 30/30 days in the month with headaches, 15/30 days in the month with severe or debilitating headache  What time of the day do the headaches start?  Mostly in the afternoon   How long do the headaches last? Can last a few days in a row when she does have a headache. When going to [facility] states she has naproxen or acetaminophen for the headaches. She does have amitriptyline she takes daily. Just started this a  few days ago. Does take sumatriptan when she has a bad headache, does make her dizzy and does not seem to work.   Are you ever headache free? Yes    Aura? without aura     Where is your headache located and pain quality? Starts at the front of the head on both sides and goes towards the back of her head on both. Tension or pressure in the head type of pain.  What is the intensity of pain? Worst 10/10, Average: 6/10  Associated symptoms:   [x] Nausea    [x] Stiff or sore neck   [x] Problems with concentration  [x] Photophobia     [x]Phonophobia      [x] Osmophobia  [x] Prefer quiet, dark room  [x] Light-headed or dizzy     [x] Hands or feet tingle or feel numb/paresthesias    [x] Lacrimation     Things that make the headache worse? Bending over can make the headaches worse    Any positional change headaches? Going from standing up to laying down is making the headaches worse but never triggering them to occur     Headache triggers:  no specific triggers    Have you seen someone else for headaches or pain? No  Have you had trigger point injection performed and how often? No  Have you had Botox injection performed and how often? No   Have you had epidural injections or transforaminal injections performed? Yes, epidural injection during  section   Are you current pregnant or planning on getting pregnant? Not currently pregnant, but actively trying to become pregnant   Have you ever had any Brain imaging? Yes, CT head wo contrast in     Last eye exam: about a year and a half ago. Does not have any current problems with her vision    What medications do you take or have you taken for your headaches?   ABORTIVE:    OTC medications: Naproxen, Tylenol   Prescription: Sumatriptan (dizziness from the medication)    Past/ failed/contraindicated:  OTC medications: None  Prescription: None    PREVENTIVE:   Amitriptyline 10 mg once daily     Past/ failed/contraindicated:  Topamax (trying for pregnancy and history of  kidney stones)      LIFESTYLE  Sleep   - averages: 7-8 hours of sleep   Problems falling asleep?:   No  Problems staying asleep?:  No    - No issues with trouble breathing or trouble with snoring when she is sleeping     Physical activity: She does walk outside everyday for an hour    Water: 48-56 ounces of water per day  Caffeine: 1 cup of coffee in the morning and soda at lunch per day    Mood: Denies history of anxiety or depression or other diagnosed mood disorder    The following portions of the patient's history were reviewed and updated as appropriate: allergies, current medications, past family history, past medical history, past social history, past surgical history and problem list.    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   HENT: Negative.  Negative for hearing loss, tinnitus, trouble swallowing and voice change.    Eyes:  Positive for photophobia and pain. Negative for visual disturbance.   Respiratory: Negative.  Negative for shortness of breath.    Cardiovascular: Negative.  Negative for palpitations.   Gastrointestinal: Negative.  Negative for nausea and vomiting.   Endocrine: Negative.  Negative for cold intolerance.   Genitourinary: Negative.  Negative for dysuria, frequency and urgency.   Musculoskeletal:  Positive for neck pain. Negative for back pain, gait problem, myalgias and neck stiffness.   Skin: Negative.  Negative for rash.   Allergic/Immunologic: Negative.    Neurological:  Positive for light-headedness and headaches. Negative for dizziness, tremors, seizures, syncope, facial asymmetry, speech difficulty, weakness  and numbness.   Hematological: Negative.  Does not bruise/bleed easily.   Psychiatric/Behavioral: Negative.  Negative for confusion, hallucinations and sleep disturbance.    All other systems reviewed and are negative.    I have personally reviewed the MA's review of systems and made changes as necessary.    Medical History Reviewed by provider this encounter:  Tobacco  Allergies  Meds  Problems  Med Hx  Surg Hx  Fam Hx       Past Medical History   Past Medical History:   Diagnosis Date    Diabetes mellitus (HCC)     History of transfusion     after  - had fever with transfusion    Obesity (BMI 35.0-39.9 without comorbidity)     S/P laparoscopic sleeve gastrectomy 2020    Uses Serbian as primary spoken language      Past Surgical History:   Procedure Laterality Date     SECTION  2017    CHOLECYSTECTOMY      KY LAPS GSTRC RSTRICTIV PX LONGITUDINAL GASTRECTOMY N/A 2020    Procedure: LAP SLEEVE GASTRECTOMY, INTRAOP EGD;  Surgeon: Da Rogers MD;  Location: [facility] OR;  Service: Bariatrics     Family History   Problem Relation Age of Onset    Diabetes Mother     Other Mother         gastric bypass    Diabetes Father     Diabetes Sister     Sleep apnea Sister     Diabetes Maternal Grandmother     Skin cancer Maternal Grandmother     No Known Problems Maternal Grandfather     Diabetes Paternal Grandmother     No Known Problems Paternal Grandfather     Sleep apnea Brother         2 of the 3 borthers have EAGLE    No Known Problems Brother     No Known Problems Son     Heart disease Family         CARDIOVASCULAR DISEASE    No Known Problems Maternal Aunt     No Known Problems Maternal Aunt     No Known Problems Paternal Aunt     No Known Problems Paternal Aunt      Current Outpatient Medications on File Prior to Visit   Medication Sig Dispense Refill    calcium carbonate (TUMS) 500 mg chewable tablet Chew 1 tablet daily      naproxen (Naprosyn) 500 mg tablet Take 1 tablet (500 mg  total) by mouth 2 (two) times a day as needed for mild pain 30 tablet 0    naproxen (Naprosyn) 500 mg tablet Take 1 tablet (500 mg total) by mouth 2 (two) times a day with meals 30 tablet 0    omeprazole (PriLOSEC) 20 mg delayed release capsule Take 1 capsule (20 mg total) by mouth daily 30 capsule 4    Prenatal Vit-Fe Fumarate-FA (Prenatabs FA) 29-1 MG TABS Take 1 tablet by mouth in the morning 90 tablet 1    semaglutide, 1 mg/dose, (Ozempic) 4 mg/3 mL injection pen Inject 0.75 mL (1 mg total) under the skin once a week 9 mL 1    triamcinolone (KENALOG) 0.1 % cream Apply topically 2 (two) times a day      [DISCONTINUED] amitriptyline (ELAVIL) 10 mg tablet Take 1 tablet (10 mg total) by mouth daily at bedtime 90 tablet 0    [DISCONTINUED] SUMAtriptan (Imitrex) 25 mg tablet Take 1 tablet (25 mg total) by mouth once as needed for migraine 30 tablet 0     Current Facility-Administered Medications on File Prior to Visit   Medication Dose Route Frequency Provider Last Rate Last Admin    cyanocobalamin injection 1,000 mcg  1,000 mcg Intramuscular Q30 Days LES Barber   1,000 mcg at 09/17/24 1531   No Known Allergies   Current Outpatient Medications on File Prior to Visit   Medication Sig Dispense Refill    calcium carbonate (TUMS) 500 mg chewable tablet Chew 1 tablet daily      naproxen (Naprosyn) 500 mg tablet Take 1 tablet (500 mg total) by mouth 2 (two) times a day as needed for mild pain 30 tablet 0    naproxen (Naprosyn) 500 mg tablet Take 1 tablet (500 mg total) by mouth 2 (two) times a day with meals 30 tablet 0    omeprazole (PriLOSEC) 20 mg delayed release capsule Take 1 capsule (20 mg total) by mouth daily 30 capsule 4    Prenatal Vit-Fe Fumarate-FA (Prenatabs FA) 29-1 MG TABS Take 1 tablet by mouth in the morning 90 tablet 1    semaglutide, 1 mg/dose, (Ozempic) 4 mg/3 mL injection pen Inject 0.75 mL (1 mg total) under the skin once a week 9 mL 1    triamcinolone (KENALOG) 0.1 % cream Apply  topically 2 (two) times a day      [DISCONTINUED] amitriptyline (ELAVIL) 10 mg tablet Take 1 tablet (10 mg total) by mouth daily at bedtime 90 tablet 0    [DISCONTINUED] SUMAtriptan (Imitrex) 25 mg tablet Take 1 tablet (25 mg total) by mouth once as needed for migraine 30 tablet 0     Current Facility-Administered Medications on File Prior to Visit   Medication Dose Route Frequency Provider Last Rate Last Admin    cyanocobalamin injection 1,000 mcg  1,000 mcg Intramuscular Q30 Days LES Barber   1,000 mcg at 09/17/24 1531      Social History     Tobacco Use    Smoking status: Never     Passive exposure: Never    Smokeless tobacco: Never   Vaping Use    Vaping status: Never Used   Substance and Sexual Activity    Alcohol use: No    Drug use: No    Sexual activity: Yes     Partners: Male     Birth control/protection: I.U.D.     Objective     /68 (BP Location: Left arm, Patient Position: Sitting, Cuff Size: Adult)   Pulse 88   Temp 97.9 °F (36.6 °C) (Temporal)   Wt 97 kg (213 lb 14.4 oz)   LMP 09/04/2024   SpO2 99%   BMI 31.59 kg/m²     Physical Exam  Neurologic Exam    Physical Exam:                                                                 Vitals:            Constitutional:    /68 (BP Location: Left arm, Patient Position: Sitting, Cuff Size: Adult)   Pulse 88   Temp 97.9 °F (36.6 °C) (Temporal)   Wt 97 kg (213 lb 14.4 oz)   LMP 09/04/2024   SpO2 99%   BMI 31.59 kg/m²   BP Readings from Last 3 Encounters:   10/14/24 100/68   10/07/24 93/58   09/26/24 110/70     Pulse Readings from Last 3 Encounters:   10/14/24 88   10/07/24 75   09/20/24 79         Well developed, well nourished, well groomed. No dysmorphic features.       Psychiatric:  Normal behavior and appropriate affect        Neurological Examination:     Mental status/cognitive function:   Orientated to time, place and person. Recent and remote memory intact. Attention span and concentration as well as fund of  knowledge are appropriate for age. Normal language and spontaneous speech.    Cranial Nerves:  II-visual fields full.   III, IV, VI-Pupils were equal, round, and reactive to light and accomodation. Extraocular movements were full and conjugate without nystagmus. Conjugate gaze, normal smooth pursuits, normal saccades   V-facial sensation symmetric.    VII-facial expression symmetric, intact forehead wrinkle, strong eye closure, symmetric smile    VIII-hearing grossly intact bilaterally   IX, X-palate elevation symmetric, no dysarthria.   XI-shoulder shrug strength intact    XII-tongue protrusion midline.    Motor Exam: symmetric bulk and tone throughout, no pronator drift. Power/strength 5/5 bilateral upper and lower extremities, no atrophy, fasciculations or abnormal movements noted.   Sensory: grossly intact light touch in all extremities.   Reflexes: brachioradialis 2+, biceps 2+, knee 2+ bilaterally  Coordination: Finger nose finger intact bilaterally, no apparent dysmetria, ataxia or tremor noted  Gait: steady casual and tandem gait.      Administrative Statements   I have spent a total time of 60 minutes in caring for this patient on the day of the visit/encounter including Risks and benefits of tx options, Instructions for management, Patient and family education, Importance of tx compliance, Risk factor reductions, Impressions, Counseling / Coordination of care, Documenting in the medical record, Reviewing / ordering tests, medicine, procedures  , and Obtaining or reviewing history  .

## 2024-10-17 ENCOUNTER — OFFICE VISIT (OUTPATIENT)
Dept: FAMILY MEDICINE CLINIC | Facility: CLINIC | Age: 29
End: 2024-10-17

## 2024-10-17 VITALS
WEIGHT: 214.2 LBS | DIASTOLIC BLOOD PRESSURE: 60 MMHG | TEMPERATURE: 97.3 F | OXYGEN SATURATION: 100 % | HEART RATE: 81 BPM | RESPIRATION RATE: 18 BRPM | BODY MASS INDEX: 31.73 KG/M2 | HEIGHT: 69 IN | SYSTOLIC BLOOD PRESSURE: 100 MMHG

## 2024-10-17 DIAGNOSIS — E11.9 TYPE 2 DIABETES MELLITUS WITHOUT COMPLICATION, WITHOUT LONG-TERM CURRENT USE OF INSULIN (HCC): Primary | ICD-10-CM

## 2024-10-17 DIAGNOSIS — E53.8 B12 DEFICIENCY: ICD-10-CM

## 2024-10-17 PROBLEM — R63.5 WEIGHT GAIN: Status: RESOLVED | Noted: 2024-02-22 | Resolved: 2024-10-17

## 2024-10-17 PROBLEM — S86.302A PERONEAL TENDON INJURY, LEFT, INITIAL ENCOUNTER: Status: RESOLVED | Noted: 2023-11-10 | Resolved: 2024-10-17

## 2024-10-17 PROBLEM — N64.4 BREAST PAIN, RIGHT: Status: RESOLVED | Noted: 2024-02-29 | Resolved: 2024-10-17

## 2024-10-17 PROBLEM — R51.9 NONINTRACTABLE HEADACHE: Status: RESOLVED | Noted: 2024-02-12 | Resolved: 2024-10-17

## 2024-10-17 PROBLEM — G43.709 CHRONIC MIGRAINE WITHOUT AURA WITHOUT STATUS MIGRAINOSUS, NOT INTRACTABLE: Status: ACTIVE | Noted: 2021-04-02

## 2024-10-17 PROBLEM — S93.492A SPRAIN OF ANTERIOR TALOFIBULAR LIGAMENT OF LEFT ANKLE, INITIAL ENCOUNTER: Status: RESOLVED | Noted: 2023-11-10 | Resolved: 2024-10-17

## 2024-10-17 LAB — SL AMB POCT HEMOGLOBIN AIC: 5.8 (ref ?–6.5)

## 2024-10-17 PROCEDURE — 99214 OFFICE O/P EST MOD 30 MIN: CPT | Performed by: NURSE PRACTITIONER

## 2024-10-17 PROCEDURE — 83036 HEMOGLOBIN GLYCOSYLATED A1C: CPT | Performed by: NURSE PRACTITIONER

## 2024-10-17 PROCEDURE — 96372 THER/PROPH/DIAG INJ SC/IM: CPT | Performed by: NURSE PRACTITIONER

## 2024-10-17 RX ORDER — CYANOCOBALAMIN 1000 UG/ML
1000 INJECTION, SOLUTION INTRAMUSCULAR; SUBCUTANEOUS
Status: DISCONTINUED | OUTPATIENT
Start: 2024-10-17 | End: 2024-10-17

## 2024-10-17 RX ADMIN — CYANOCOBALAMIN 1000 MCG: 1000 INJECTION, SOLUTION INTRAMUSCULAR; SUBCUTANEOUS at 16:03

## 2024-10-17 NOTE — ASSESSMENT & PLAN NOTE
A1C at goal. Continue current regimen of semaglutide 1mg SQ weekly. Refill provided.   Encouraged to continue diabetic/bariatric dietary regimen and exercise goals.     Lab Results   Component Value Date    HGBA1C 5.8 10/17/2024         Orders:    POCT hemoglobin A1c    semaglutide, 1 mg/dose, (Ozempic) 4 mg/3 mL injection pen; Inject 0.75 mL (1 mg total) under the skin once a week

## 2024-10-17 NOTE — PROGRESS NOTES
Ambulatory Visit  Name: Syed Taveras      : 1995      MRN: [id number]  Encounter Provider: LES Barber  Encounter Date: 10/17/2024   Encounter department: [facility]    Assessment & Plan  Type 2 diabetes mellitus without complication, without long-term current use of insulin ([facility])    A1C at goal. Continue current regimen of semaglutide 1mg SQ weekly. Refill provided.   Encouraged to continue diabetic/bariatric dietary regimen and exercise goals.     Lab Results   Component Value Date    HGBA1C 5.8 10/17/2024         Orders:    POCT hemoglobin A1c    semaglutide, 1 mg/dose, (Ozempic) 4 mg/3 mL injection pen; Inject 0.75 mL (1 mg total) under the skin once a week    B12 deficiency    Orders:    cyanocobalamin injection 1,000 mcg       History of Present Illness     Patient is a 27 YO female who  has a past medical history of Diabetes mellitus (HCC), History of transfusion, Obesity (BMI 35.0-39.9 without comorbidity), S/P laparoscopic sleeve gastrectomy (2020), and Uses Samoan as primary spoken language who presents for follow up. She is doing well with semaglutide, no side effects. She feels well and has no complaints at this time.      The following portions of the patient's history were reviewed and updated as appropriate: allergies, current medications, past family history, past medical history, past social history, past surgical history and problem list.          History obtained from : patient  Review of Systems   Constitutional:  Negative for chills and fever.   HENT:  Negative for ear pain and sore throat.    Eyes:  Negative for pain and visual disturbance.   Respiratory:  Negative for cough and shortness of breath.    Cardiovascular:  Negative for chest pain and palpitations.   Gastrointestinal:  Negative for abdominal pain and vomiting.   Genitourinary:  Negative for dysuria and hematuria.   Musculoskeletal:  Negative for arthralgias  and back pain.   Skin:  Negative for color change and rash.   Neurological:  Negative for seizures and syncope.   All other systems reviewed and are negative.    Pertinent Medical History   Past Medical History:   Diagnosis Date    Diabetes mellitus (HCC)     History of transfusion     after  - had fever with transfusion    Nonintractable headache 2024    Obesity (BMI 35.0-39.9 without comorbidity)     Peroneal tendon injury, left, initial encounter 11/10/2023    S/P laparoscopic sleeve gastrectomy 2020    Sprain of anterior talofibular ligament of left ankle, initial encounter 11/10/2023    Uses Greenlandic as primary spoken language              Medical History Reviewed by provider this encounter:  Tobacco  Allergies  Meds  Problems  Med Hx  Surg Hx  Fam Hx       Current Outpatient Medications on File Prior to Visit   Medication Sig Dispense Refill    amitriptyline (ELAVIL) 25 mg tablet Take 1 tablet (25 mg total) by mouth daily at bedtime 30 tablet 3    Prenatal Vit-Fe Fumarate-FA (Prenatabs FA) 29-1 MG TABS Take 1 tablet by mouth in the morning 90 tablet 1    rizatriptan (Maxalt) 10 mg tablet Take 1 tablet (10 mg total) by mouth as needed for migraine Take at the onset of migraine; if symptoms continue or return, may take another dose at least 2 hours after first dose. Take no more than 2 doses in a day. 10 tablet 3    [DISCONTINUED] semaglutide, 1 mg/dose, (Ozempic) 4 mg/3 mL injection pen Inject 0.75 mL (1 mg total) under the skin once a week 9 mL 1    [DISCONTINUED] calcium carbonate (TUMS) 500 mg chewable tablet Chew 1 tablet daily      [DISCONTINUED] naproxen (Naprosyn) 500 mg tablet Take 1 tablet (500 mg total) by mouth 2 (two) times a day as needed for mild pain 30 tablet 0    [DISCONTINUED] naproxen (Naprosyn) 500 mg tablet Take 1 tablet (500 mg total) by mouth 2 (two) times a day with meals 30 tablet 0    [DISCONTINUED] omeprazole (PriLOSEC) 20 mg delayed release capsule Take 1  "capsule (20 mg total) by mouth daily 30 capsule 4    [DISCONTINUED] triamcinolone (KENALOG) 0.1 % cream Apply topically 2 (two) times a day       Current Facility-Administered Medications on File Prior to Visit   Medication Dose Route Frequency Provider Last Rate Last Admin    cyanocobalamin injection 1,000 mcg  1,000 mcg Intramuscular Q30 Days Nancy Senior CRNP   1,000 mcg at 10/17/24 1603      Social History     Tobacco Use    Smoking status: Never     Passive exposure: Never    Smokeless tobacco: Never   Vaping Use    Vaping status: Never Used   Substance and Sexual Activity    Alcohol use: No    Drug use: No    Sexual activity: Yes     Partners: Male     Birth control/protection: Abstinence         Objective     /60 (BP Location: Right arm, Patient Position: Sitting, Cuff Size: Standard)   Pulse 81   Temp (!) 97.3 °F (36.3 °C) (Temporal)   Resp 18   Ht 5' 9\" (1.753 m)   Wt 97.2 kg (214 lb 3.2 oz)   SpO2 100%   BMI 31.63 kg/m²     Physical Exam  Vitals and nursing note reviewed.   Constitutional:       General: She is not in acute distress.     Appearance: She is well-developed.   HENT:      Head: Normocephalic and atraumatic.   Eyes:      Conjunctiva/sclera: Conjunctivae normal.   Cardiovascular:      Rate and Rhythm: Normal rate and regular rhythm.      Heart sounds: No murmur heard.  Pulmonary:      Effort: Pulmonary effort is normal. No respiratory distress.      Breath sounds: Normal breath sounds.   Abdominal:      Palpations: Abdomen is soft.      Tenderness: There is no abdominal tenderness.   Musculoskeletal:         General: No swelling.      Cervical back: Neck supple.   Skin:     General: Skin is warm and dry.      Capillary Refill: Capillary refill takes less than 2 seconds.   Neurological:      Mental Status: She is alert.   Psychiatric:         Mood and Affect: Mood normal.         "

## 2024-10-22 ENCOUNTER — HOSPITAL ENCOUNTER (OUTPATIENT)
Dept: MRI IMAGING | Facility: HOSPITAL | Age: 29
Discharge: HOME | End: 2024-10-22
Payer: COMMERCIAL

## 2024-10-22 DIAGNOSIS — G43.709 CHRONIC MIGRAINE WITHOUT AURA WITHOUT STATUS MIGRAINOSUS, NOT INTRACTABLE: ICD-10-CM

## 2024-10-22 DIAGNOSIS — G44.52 NEW PERSISTENT DAILY HEADACHE: ICD-10-CM

## 2024-10-22 PROCEDURE — 70551 MRI BRAIN STEM W/O DYE: CPT

## 2024-10-28 ENCOUNTER — VBI (OUTPATIENT)
Dept: ADMINISTRATIVE | Facility: OTHER | Age: 29
End: 2024-10-28

## 2024-10-28 NOTE — TELEPHONE ENCOUNTER
10/28/24 9:37 AM     Chart reviewed for Diabetic Eye Exam ; nothing is submitted to the patient's insurance at this time.     Dania Kinney MA   [facility]

## 2024-11-18 ENCOUNTER — CLINICAL SUPPORT (OUTPATIENT)
Dept: FAMILY MEDICINE CLINIC | Facility: CLINIC | Age: 29
End: 2024-11-18

## 2024-11-18 DIAGNOSIS — E53.8 B12 DEFICIENCY: Primary | ICD-10-CM

## 2024-11-18 PROCEDURE — 96372 THER/PROPH/DIAG INJ SC/IM: CPT

## 2024-11-18 RX ADMIN — CYANOCOBALAMIN 1000 MCG: 1000 INJECTION, SOLUTION INTRAMUSCULAR; SUBCUTANEOUS at 15:37

## 2024-11-22 ENCOUNTER — RESULTS FOLLOW-UP (OUTPATIENT)
Age: 29
End: 2024-11-22

## 2024-12-07 ENCOUNTER — VBI (OUTPATIENT)
Dept: ADMINISTRATIVE | Facility: OTHER | Age: 29
End: 2024-12-07

## 2024-12-07 NOTE — TELEPHONE ENCOUNTER
12/07/24 5:51 PM     Chart reviewed for Hemoglobin A1c was/were submitted to the patient's insurance.     Dania Kinney MA   [facility]

## 2024-12-18 ENCOUNTER — CLINICAL SUPPORT (OUTPATIENT)
Dept: FAMILY MEDICINE CLINIC | Facility: CLINIC | Age: 29
End: 2024-12-18

## 2024-12-18 ENCOUNTER — TELEPHONE (OUTPATIENT)
Dept: FAMILY MEDICINE CLINIC | Facility: CLINIC | Age: 29
End: 2024-12-18

## 2024-12-18 DIAGNOSIS — E53.8 B12 DEFICIENCY: Primary | ICD-10-CM

## 2024-12-18 PROCEDURE — 96372 THER/PROPH/DIAG INJ SC/IM: CPT | Performed by: NURSE PRACTITIONER

## 2024-12-18 RX ADMIN — CYANOCOBALAMIN 1000 MCG: 1000 INJECTION, SOLUTION INTRAMUSCULAR; SUBCUTANEOUS at 15:42

## 2024-12-18 NOTE — TELEPHONE ENCOUNTER
Pt would like to know if today was her last dosage of b12. Also pt stated was informed by you blood work order will be ordered in January to verify levels of b12. Please advice.

## 2025-01-17 ENCOUNTER — HOSPITAL ENCOUNTER (EMERGENCY)
Facility: HOSPITAL | Age: 30
Discharge: HOME | End: 2025-01-17
Attending: EMERGENCY MEDICINE
Payer: COMMERCIAL

## 2025-01-17 VITALS
DIASTOLIC BLOOD PRESSURE: 60 MMHG | TEMPERATURE: 98.5 F | RESPIRATION RATE: 17 BRPM | OXYGEN SATURATION: 99 % | HEART RATE: 82 BPM | SYSTOLIC BLOOD PRESSURE: 110 MMHG

## 2025-01-17 DIAGNOSIS — R19.7 DIARRHEA: ICD-10-CM

## 2025-01-17 DIAGNOSIS — G43.909 MIGRAINE: Primary | ICD-10-CM

## 2025-01-17 LAB
ALBUMIN SERPL BCG-MCNC: 4.1 G/DL (ref 3.5–5)
ALP SERPL-CCNC: 67 U/L (ref 34–104)
ALT SERPL W P-5'-P-CCNC: 9 U/L (ref 7–52)
ANION GAP SERPL CALCULATED.3IONS-SCNC: 5 MMOL/L (ref 4–13)
AST SERPL W P-5'-P-CCNC: 12 U/L (ref 13–39)
BASOPHILS # BLD AUTO: 0.02 THOUSANDS/ΜL (ref 0–0.1)
BASOPHILS NFR BLD AUTO: 0 % (ref 0–1)
BILIRUB SERPL-MCNC: 1.34 MG/DL (ref 0.2–1)
BUN SERPL-MCNC: 10 MG/DL (ref 5–25)
CALCIUM SERPL-MCNC: 8.4 MG/DL (ref 8.4–10.2)
CHLORIDE SERPL-SCNC: 107 MMOL/L (ref 96–108)
CO2 SERPL-SCNC: 26 MMOL/L (ref 21–32)
CREAT SERPL-MCNC: 0.77 MG/DL (ref 0.6–1.3)
EOSINOPHIL # BLD AUTO: 0.03 THOUSAND/ΜL (ref 0–0.61)
EOSINOPHIL NFR BLD AUTO: 0 % (ref 0–6)
ERYTHROCYTE [DISTWIDTH] IN BLOOD BY AUTOMATED COUNT: 12 % (ref 11.6–15.1)
EXT PREGNANCY TEST URINE: NEGATIVE
EXT. CONTROL: NORMAL
GFR SERPL CREATININE-BSD FRML MDRD: 105 ML/MIN/1.73SQ M
GLUCOSE SERPL-MCNC: 70 MG/DL (ref 65–140)
GLUCOSE SERPL-MCNC: 80 MG/DL (ref 65–140)
HCT VFR BLD AUTO: 37.4 % (ref 34.8–46.1)
HGB BLD-MCNC: 12.2 G/DL (ref 11.5–15.4)
IMM GRANULOCYTES # BLD AUTO: 0.03 THOUSAND/UL (ref 0–0.2)
IMM GRANULOCYTES NFR BLD AUTO: 0 % (ref 0–2)
LYMPHOCYTES # BLD AUTO: 1.58 THOUSANDS/ΜL (ref 0.6–4.47)
LYMPHOCYTES NFR BLD AUTO: 21 % (ref 14–44)
MCH RBC QN AUTO: 29.2 PG (ref 26.8–34.3)
MCHC RBC AUTO-ENTMCNC: 32.6 G/DL (ref 31.4–37.4)
MCV RBC AUTO: 90 FL (ref 82–98)
MONOCYTES # BLD AUTO: 0.64 THOUSAND/ΜL (ref 0.17–1.22)
MONOCYTES NFR BLD AUTO: 9 % (ref 4–12)
NEUTROPHILS # BLD AUTO: 5.16 THOUSANDS/ΜL (ref 1.85–7.62)
NEUTS SEG NFR BLD AUTO: 70 % (ref 43–75)
NRBC BLD AUTO-RTO: 0 /100 WBCS
PLATELET # BLD AUTO: 142 THOUSANDS/UL (ref 149–390)
PMV BLD AUTO: 12.3 FL (ref 8.9–12.7)
POTASSIUM SERPL-SCNC: 3.6 MMOL/L (ref 3.5–5.3)
PROT SERPL-MCNC: 6.9 G/DL (ref 6.4–8.4)
RBC # BLD AUTO: 4.18 MILLION/UL (ref 3.81–5.12)
SODIUM SERPL-SCNC: 138 MMOL/L (ref 135–147)
WBC # BLD AUTO: 7.46 THOUSAND/UL (ref 4.31–10.16)

## 2025-01-17 PROCEDURE — 99284 EMERGENCY DEPT VISIT MOD MDM: CPT

## 2025-01-17 PROCEDURE — 96367 TX/PROPH/DG ADDL SEQ IV INF: CPT

## 2025-01-17 PROCEDURE — 80053 COMPREHEN METABOLIC PANEL: CPT

## 2025-01-17 PROCEDURE — 96375 TX/PRO/DX INJ NEW DRUG ADDON: CPT

## 2025-01-17 PROCEDURE — 85025 COMPLETE CBC W/AUTO DIFF WBC: CPT

## 2025-01-17 PROCEDURE — 36415 COLL VENOUS BLD VENIPUNCTURE: CPT

## 2025-01-17 PROCEDURE — 81025 URINE PREGNANCY TEST: CPT

## 2025-01-17 PROCEDURE — 96368 THER/DIAG CONCURRENT INF: CPT

## 2025-01-17 PROCEDURE — 96365 THER/PROPH/DIAG IV INF INIT: CPT

## 2025-01-17 PROCEDURE — 82948 REAGENT STRIP/BLOOD GLUCOSE: CPT

## 2025-01-17 RX ORDER — ACETAMINOPHEN 10 MG/ML
1000 INJECTION, SOLUTION INTRAVENOUS ONCE
Status: COMPLETED | OUTPATIENT
Start: 2025-01-17 | End: 2025-01-17

## 2025-01-17 RX ORDER — DIPHENHYDRAMINE HYDROCHLORIDE 50 MG/ML
25 INJECTION INTRAMUSCULAR; INTRAVENOUS ONCE
Status: COMPLETED | OUTPATIENT
Start: 2025-01-17 | End: 2025-01-17

## 2025-01-17 RX ORDER — METOCLOPRAMIDE HYDROCHLORIDE 5 MG/ML
10 INJECTION INTRAMUSCULAR; INTRAVENOUS ONCE
Status: COMPLETED | OUTPATIENT
Start: 2025-01-17 | End: 2025-01-17

## 2025-01-17 RX ORDER — MAGNESIUM SULFATE HEPTAHYDRATE 40 MG/ML
2 INJECTION, SOLUTION INTRAVENOUS ONCE
Status: COMPLETED | OUTPATIENT
Start: 2025-01-17 | End: 2025-01-17

## 2025-01-17 RX ORDER — SODIUM CHLORIDE, SODIUM GLUCONATE, SODIUM ACETATE, POTASSIUM CHLORIDE, MAGNESIUM CHLORIDE, SODIUM PHOSPHATE, DIBASIC, AND POTASSIUM PHOSPHATE .53; .5; .37; .037; .03; .012; .00082 G/100ML; G/100ML; G/100ML; G/100ML; G/100ML; G/100ML; G/100ML
1000 INJECTION, SOLUTION INTRAVENOUS ONCE
Status: DISCONTINUED | OUTPATIENT
Start: 2025-01-17 | End: 2025-01-17

## 2025-01-17 RX ADMIN — SODIUM CHLORIDE, SODIUM LACTATE, POTASSIUM CHLORIDE, AND CALCIUM CHLORIDE 1000 ML: .6; .31; .03; .02 INJECTION, SOLUTION INTRAVENOUS at 12:53

## 2025-01-17 RX ADMIN — METOCLOPRAMIDE 10 MG: 5 INJECTION, SOLUTION INTRAMUSCULAR; INTRAVENOUS at 12:58

## 2025-01-17 RX ADMIN — ACETAMINOPHEN 1000 MG: 10 INJECTION INTRAVENOUS at 12:53

## 2025-01-17 RX ADMIN — MAGNESIUM SULFATE HEPTAHYDRATE 2 G: 40 INJECTION, SOLUTION INTRAVENOUS at 13:48

## 2025-01-17 RX ADMIN — DIPHENHYDRAMINE HYDROCHLORIDE 25 MG: 50 INJECTION, SOLUTION INTRAMUSCULAR; INTRAVENOUS at 12:57

## 2025-01-17 NOTE — ED PROVIDER NOTES
Time reflects when diagnosis was documented in both MDM as applicable and the Disposition within this note       Time User Action Codes Description Comment    1/17/2025  3:10 PM Lindsey Sheridan [G43.909] Migraine     1/17/2025  3:10 PM Lindsey Sheridan [R19.7] Diarrhea           ED Disposition       ED Disposition   Discharge    Condition   Stable    Date/Time   Fri Jan 17, 2025  3:10 PM    Comment   Syed Taveras discharge to home/self care.                   Assessment & Plan       Medical Decision Making  Pt given migraine cocktail- complete resolution of symptoms. Recently had MRI so felt no further imaging was needed given her neuro exam normal today. In regards to diarrhea with reported blood in it- no further episodes here. Hgb stable. No abd pain. Victoria stable to d/c.     I have discussed the plan to discharge pt from ED. The patient was discharged in stable condition.  Patient ambulated off the department.  Extensive return to emergency department precautions were discussed.  Follow up with appropriate providers including primary care physician was discussed.  Patient and/or their  primary decision maker expressed understanding.  Patient remained stable during entire emergency department stay.      Amount and/or Complexity of Data Reviewed  Labs: ordered.    Risk  Prescription drug management.        ED Course as of 01/17/25 1535   Fri Jan 17, 2025   1311 Per RN pt reported that she is now having diarrhea and it has blood in it. Will add on basic labs and stool studies   1510 Headache gone. No further episodes of diarrhea.        Medications   metoclopramide (REGLAN) injection 10 mg (10 mg Intravenous Given 1/17/25 1258)   diphenhydrAMINE (BENADRYL) injection 25 mg (25 mg Intravenous Given 1/17/25 1257)   acetaminophen (Ofirmev) injection 1,000 mg (0 mg Intravenous Stopped 1/17/25 1348)   magnesium sulfate 2 g/50 mL IVPB (premix) 2 g (0 g Intravenous Stopped 1/17/25 1412)   lactated ringers bolus  1,000 mL (0 mL Intravenous Stopped 25 1412)       ED Risk Strat Scores                          SBIRT 20yo+      Flowsheet Row Most Recent Value   Initial Alcohol Screen: US AUDIT-C     1. How often do you have a drink containing alcohol? 0 Filed at: 2025 1238   2. How many drinks containing alcohol do you have on a typical day you are drinking?  0 Filed at: 2025 1238   3b. FEMALE Any Age, or MALE 65+: How often do you have 4 or more drinks on one occassion? 0 Filed at: 2025 1238   Audit-C Score 0 Filed at: 2025 1238   CHERRI: How many times in the past year have you...    Used an illegal drug or used a prescription medication for non-medical reasons? Never Filed at: 2025 1238                            History of Present Illness       Chief Complaint   Patient presents with    Migraine     Pt reports migraine since yesterday. Hx of migraines per pt.        Past Medical History:   Diagnosis Date    Diabetes mellitus (HCC)     History of transfusion     after  - had fever with transfusion    Nonintractable headache 2024    Obesity (BMI 35.0-39.9 without comorbidity)     Peroneal tendon injury, left, initial encounter 11/10/2023    S/P laparoscopic sleeve gastrectomy 2020    Sprain of anterior talofibular ligament of left ankle, initial encounter 11/10/2023    Uses Chinese as primary spoken language       Past Surgical History:   Procedure Laterality Date     SECTION  2017    CHOLECYSTECTOMY      WY LAPS GSTRC RSTRICTIV PX LONGITUDINAL GASTRECTOMY N/A 2020    Procedure: LAP SLEEVE GASTRECTOMY, INTRAOP EGD;  Surgeon: Da Rogers MD;  Location: [facility];  Service: Bariatrics      Family History   Problem Relation Age of Onset    Diabetes Mother     Other Mother         gastric bypass    Diabetes Father     Diabetes Sister     Sleep apnea Sister     Diabetes Maternal Grandmother     Skin cancer Maternal Grandmother     No Known Problems  Maternal Grandfather     Diabetes Paternal Grandmother     No Known Problems Paternal Grandfather     Sleep apnea Brother         2 of the 3 borthers have EAGLE    No Known Problems Brother     No Known Problems Son     Heart disease Family         CARDIOVASCULAR DISEASE    No Known Problems Maternal Aunt     No Known Problems Maternal Aunt     No Known Problems Paternal Aunt     No Known Problems Paternal Aunt       Social History     Tobacco Use    Smoking status: Never     Passive exposure: Never    Smokeless tobacco: Never   Vaping Use    Vaping status: Never Used   Substance Use Topics    Alcohol use: No    Drug use: No      E-Cigarette/Vaping    E-Cigarette Use Never User       E-Cigarette/Vaping Substances    Nicotine No     THC No     CBD No     Flavoring No     Other No     Unknown No       I have reviewed and agree with the history as documented.     29 YOF with PMH migraines presents today with a headache since yesterday around noon.  Describes it as a hammer hitting her head.  Took her migraine medication last night but still having her headache today.  No medication taken today.  Reports associated nausea and dizziness.  States that she had blurry vision around 3 PM yesterday but that is since resolved.  States she did follow-up with neurology last year and had an MRI at that time which was normal.  Is on amitriptyline 25 mg at night and rizatriptan as abortive therapy. Reports she is currently trying to get pregnant.         Review of Systems   Eyes:  Negative for photophobia and visual disturbance.   Gastrointestinal:  Positive for nausea. Negative for vomiting.   Neurological:  Positive for dizziness and headaches.           Objective       ED Triage Vitals   Temperature Pulse Blood Pressure Respirations SpO2 Patient Position - Orthostatic VS   01/17/25 1124 01/17/25 1124 01/17/25 1124 01/17/25 1124 01/17/25 1124 01/17/25 1515   98.5 °F (36.9 °C) (!) 116 114/64 18 99 % Lying      Temp Source Heart Rate  Source BP Location FiO2 (%) Pain Score    01/17/25 1124 01/17/25 1124 01/17/25 1124 -- 01/17/25 1220    Oral Monitor Right arm  10 - Worst Possible Pain      Vitals      Date and Time Temp Pulse SpO2 Resp BP Pain Score FACES Pain Rating User   01/17/25 1515 -- 82 99 % 17 110/60 -- -- AS   01/17/25 1220 -- -- -- -- -- 10 - Worst Possible Pain -- [facility]   01/17/25 1124 98.5 °F (36.9 °C) 116 99 % 18 114/64 -- -- HMR            Physical Exam  Vitals and nursing note reviewed.   Constitutional:       General: She is not in acute distress.     Appearance: Normal appearance. She is well-developed.   HENT:      Head: Normocephalic and atraumatic.   Eyes:      Conjunctiva/sclera: Conjunctivae normal.   Cardiovascular:      Rate and Rhythm: Normal rate and regular rhythm.      Heart sounds: No murmur heard.  Pulmonary:      Effort: Pulmonary effort is normal. No respiratory distress.      Breath sounds: Normal breath sounds.   Abdominal:      Palpations: Abdomen is soft.      Tenderness: There is no abdominal tenderness.   Musculoskeletal:         General: No swelling.      Cervical back: Normal range of motion and neck supple.   Skin:     General: Skin is warm and dry.      Capillary Refill: Capillary refill takes less than 2 seconds.   Neurological:      General: No focal deficit present.      Mental Status: She is alert and oriented to person, place, and time.   Psychiatric:         Mood and Affect: Mood normal.         Behavior: Behavior normal.         Results Reviewed       Procedure Component Value Units Date/Time    POCT pregnancy, urine [[id number]]  (Normal) Collected: 01/17/25 1508    Lab Status: Final result Updated: 01/17/25 1508     EXT Preg Test, Ur Negative     Control Valid    Comprehensive metabolic panel [[id number]]  (Abnormal) Collected: 01/17/25 1314    Lab Status: Final result Specimen: Blood from Arm, Right Updated: 01/17/25 1343     Sodium 138 mmol/L      Potassium 3.6 mmol/L      Chloride 107 mmol/L       CO2 26 mmol/L      ANION GAP 5 mmol/L      BUN 10 mg/dL      Creatinine 0.77 mg/dL      Glucose 80 mg/dL      Calcium 8.4 mg/dL      AST 12 U/L      ALT 9 U/L      Alkaline Phosphatase 67 U/L      Total Protein 6.9 g/dL      Albumin 4.1 g/dL      Total Bilirubin 1.34 mg/dL      eGFR 105 ml/min/1.73sq m     Narrative:      National Kidney Disease Foundation guidelines for Chronic Kidney Disease (CKD):     Stage 1 with normal or high GFR (GFR > 90 mL/min/1.73 square meters)    Stage 2 Mild CKD (GFR = 60-89 mL/min/1.73 square meters)    Stage 3A Moderate CKD (GFR = 45-59 mL/min/1.73 square meters)    Stage 3B Moderate CKD (GFR = 30-44 mL/min/1.73 square meters)    Stage 4 Severe CKD (GFR = 15-29 mL/min/1.73 square meters)    Stage 5 End Stage CKD (GFR <15 mL/min/1.73 square meters)  Note: GFR calculation is accurate only with a steady state creatinine    CBC and differential [[id number]]  (Abnormal) Collected: 01/17/25 1314    Lab Status: Final result Specimen: Blood from Arm, Right Updated: 01/17/25 1322     WBC 7.46 Thousand/uL      RBC 4.18 Million/uL      Hemoglobin 12.2 g/dL      Hematocrit 37.4 %      MCV 90 fL      MCH 29.2 pg      MCHC 32.6 g/dL      RDW 12.0 %      MPV 12.3 fL      Platelets 142 Thousands/uL      nRBC 0 /100 WBCs      Segmented % 70 %      Immature Grans % 0 %      Lymphocytes % 21 %      Monocytes % 9 %      Eosinophils Relative 0 %      Basophils Relative 0 %      Absolute Neutrophils 5.16 Thousands/µL      Absolute Immature Grans 0.03 Thousand/uL      Absolute Lymphocytes 1.58 Thousands/µL      Absolute Monocytes 0.64 Thousand/µL      Eosinophils Absolute 0.03 Thousand/µL      Basophils Absolute 0.02 Thousands/µL     Stool Enteric Bacterial Panel by PCR [[id number]]     Lab Status: No result Specimen: Stool     Fingerstick Glucose (POCT) [[id number]]  (Normal) Collected: 01/17/25 1251    Lab Status: Final result Specimen: Blood Updated: 01/17/25 1251     POC Glucose 70 mg/dl             No  orders to display       Procedures    ED Medication and Procedure Management   Prior to Admission Medications   Prescriptions Last Dose Informant Patient Reported? Taking?   Prenatal Vit-Fe Fumarate-FA (Prenatabs FA) 29-1 MG TABS  Self No No   Sig: Take 1 tablet by mouth in the morning   amitriptyline (ELAVIL) 25 mg tablet   No No   Sig: Take 1 tablet (25 mg total) by mouth daily at bedtime   rizatriptan (Maxalt) 10 mg tablet   No No   Sig: Take 1 tablet (10 mg total) by mouth as needed for migraine Take at the onset of migraine; if symptoms continue or return, may take another dose at least 2 hours after first dose. Take no more than 2 doses in a day.   semaglutide, 1 mg/dose, (Ozempic) 4 mg/3 mL injection pen   No No   Sig: Inject 0.75 mL (1 mg total) under the skin once a week      Facility-Administered Medications Last Administration Doses Remaining   cyanocobalamin injection 1,000 mcg 12/18/2024  3:42 PM         Patient's Medications   Discharge Prescriptions    No medications on file     No discharge procedures on file.  ED SEPSIS DOCUMENTATION   Time reflects when diagnosis was documented in both MDM as applicable and the Disposition within this note       Time User Action Codes Description Comment    1/17/2025  3:10 PM Lindsey Sheridan [G43.909] Migraine     1/17/2025  3:10 PM Lindsey Sheridan [R19.7] Diarrhea                  Lindsey Sheridan PA-C  01/17/25 7048

## 2025-01-20 ENCOUNTER — CLINICAL SUPPORT (OUTPATIENT)
Dept: FAMILY MEDICINE CLINIC | Facility: CLINIC | Age: 30
End: 2025-01-20

## 2025-01-20 DIAGNOSIS — E53.8 B12 DEFICIENCY: Primary | ICD-10-CM

## 2025-01-20 PROCEDURE — 96372 THER/PROPH/DIAG INJ SC/IM: CPT | Performed by: NURSE PRACTITIONER

## 2025-01-20 RX ADMIN — CYANOCOBALAMIN 1000 MCG: 1000 INJECTION, SOLUTION INTRAMUSCULAR; SUBCUTANEOUS at 15:44

## 2025-01-31 ENCOUNTER — APPOINTMENT (OUTPATIENT)
Dept: LAB | Facility: MEDICAL CENTER | Age: 30
End: 2025-01-31
Payer: COMMERCIAL

## 2025-01-31 ENCOUNTER — TELEPHONE (OUTPATIENT)
Age: 30
End: 2025-01-31

## 2025-01-31 ENCOUNTER — RESULTS FOLLOW-UP (OUTPATIENT)
Dept: OBGYN CLINIC | Facility: MEDICAL CENTER | Age: 30
End: 2025-01-31

## 2025-01-31 ENCOUNTER — HOSPITAL ENCOUNTER (OUTPATIENT)
Dept: ULTRASOUND IMAGING | Facility: HOSPITAL | Age: 30
End: 2025-01-31
Attending: STUDENT IN AN ORGANIZED HEALTH CARE EDUCATION/TRAINING PROGRAM
Payer: COMMERCIAL

## 2025-01-31 ENCOUNTER — TELEPHONE (OUTPATIENT)
Dept: OBGYN CLINIC | Facility: MEDICAL CENTER | Age: 30
End: 2025-01-31

## 2025-01-31 DIAGNOSIS — O46.90 VAGINAL BLEEDING DURING PREGNANCY: Primary | ICD-10-CM

## 2025-01-31 DIAGNOSIS — O46.90 VAGINAL BLEEDING DURING PREGNANCY: ICD-10-CM

## 2025-01-31 LAB
ABO GROUP BLD: NORMAL
B-HCG SERPL-ACNC: ABNORMAL MIU/ML (ref 0–5)
BLD GP AB SCN SERPL QL: NEGATIVE
PROGEST SERPL-MCNC: 20.21 NG/ML
RH BLD: POSITIVE
SPECIMEN EXPIRATION DATE: NORMAL

## 2025-01-31 PROCEDURE — 84702 CHORIONIC GONADOTROPIN TEST: CPT

## 2025-01-31 PROCEDURE — 86900 BLOOD TYPING SEROLOGIC ABO: CPT

## 2025-01-31 PROCEDURE — 86850 RBC ANTIBODY SCREEN: CPT

## 2025-01-31 PROCEDURE — 36415 COLL VENOUS BLD VENIPUNCTURE: CPT

## 2025-01-31 PROCEDURE — 86901 BLOOD TYPING SEROLOGIC RH(D): CPT

## 2025-01-31 PROCEDURE — 84144 ASSAY OF PROGESTERONE: CPT

## 2025-01-31 PROCEDURE — 76815 OB US LIMITED FETUS(S): CPT

## 2025-01-31 NOTE — TELEPHONE ENCOUNTER
pt came to the office stated she started bleeding yesterday and has pelvic pain,  recommend US and labs, I   reviewed the recommendations with pt understanding . Thanks

## 2025-01-31 NOTE — TELEPHONE ENCOUNTER
Dr. Ugarte recommending a repeat ultrasound in 11 days in the office if we can find a D&V spot.  Patient has a very early pregnancy about 5 weeks.    Contacted patient with [facility]  [phone] and informed of provider recommendations. Patient verbalized understanding. Unable to find D&V in appropriate timeframe. Attempted to contact clerical, however no response. Routing to clerical and  as high priority for assistance in scheduling.

## 2025-01-31 NOTE — TELEPHONE ENCOUNTER
Received call from [facility] in radiology to report significant findings of pelvic ultrasound. Routing to ordering provider and clinical pool per CTS guidelines. ESC also sent to ordering provider as notification.

## 2025-02-03 ENCOUNTER — TELEPHONE (OUTPATIENT)
Dept: OBGYN CLINIC | Facility: CLINIC | Age: 30
End: 2025-02-03

## 2025-02-03 NOTE — TELEPHONE ENCOUNTER
RN Called patient and reviewed recommendations from provider. Patient okay with keeping 02/26/25 appointment. RN reviewed bleeding precautions with patient.

## 2025-02-10 ENCOUNTER — HOSPITAL ENCOUNTER (EMERGENCY)
Facility: HOSPITAL | Age: 30
Discharge: HOME | End: 2025-02-10
Attending: EMERGENCY MEDICINE
Payer: COMMERCIAL

## 2025-02-10 ENCOUNTER — APPOINTMENT (EMERGENCY)
Dept: ULTRASOUND IMAGING | Facility: HOSPITAL | Age: 30
End: 2025-02-10
Payer: COMMERCIAL

## 2025-02-10 VITALS
OXYGEN SATURATION: 100 % | WEIGHT: 213.85 LBS | BODY MASS INDEX: 31.58 KG/M2 | SYSTOLIC BLOOD PRESSURE: 113 MMHG | RESPIRATION RATE: 18 BRPM | DIASTOLIC BLOOD PRESSURE: 67 MMHG | HEART RATE: 72 BPM | TEMPERATURE: 98.8 F

## 2025-02-10 DIAGNOSIS — Z34.91 FIRST TRIMESTER PREGNANCY: Primary | ICD-10-CM

## 2025-02-10 DIAGNOSIS — O30.001 TWIN GESTATION IN FIRST TRIMESTER: ICD-10-CM

## 2025-02-10 DIAGNOSIS — O21.9 NAUSEA AND VOMITING IN PREGNANCY: ICD-10-CM

## 2025-02-10 LAB
ABO GROUP BLD: NORMAL
ALBUMIN SERPL BCG-MCNC: 4.4 G/DL (ref 3.5–5)
ALP SERPL-CCNC: 65 U/L (ref 34–104)
ALT SERPL W P-5'-P-CCNC: 8 U/L (ref 7–52)
ANION GAP SERPL CALCULATED.3IONS-SCNC: 5 MMOL/L (ref 4–13)
AST SERPL W P-5'-P-CCNC: 8 U/L (ref 13–39)
B-HCG SERPL-ACNC: ABNORMAL MIU/ML (ref 0–5)
BACTERIA UR QL AUTO: ABNORMAL /HPF
BASOPHILS # BLD AUTO: 0.01 THOUSANDS/ΜL (ref 0–0.1)
BASOPHILS NFR BLD AUTO: 0 % (ref 0–1)
BILIRUB SERPL-MCNC: 0.72 MG/DL (ref 0.2–1)
BILIRUB UR QL STRIP: NEGATIVE
BLD GP AB SCN SERPL QL: NEGATIVE
BUN SERPL-MCNC: 10 MG/DL (ref 5–25)
CALCIUM SERPL-MCNC: 9.2 MG/DL (ref 8.4–10.2)
CAOX CRY URNS QL MICRO: ABNORMAL /HPF
CHLORIDE SERPL-SCNC: 104 MMOL/L (ref 96–108)
CLARITY UR: CLEAR
CO2 SERPL-SCNC: 26 MMOL/L (ref 21–32)
COLOR UR: YELLOW
CREAT SERPL-MCNC: 0.55 MG/DL (ref 0.6–1.3)
EOSINOPHIL # BLD AUTO: 0.03 THOUSAND/ΜL (ref 0–0.61)
EOSINOPHIL NFR BLD AUTO: 0 % (ref 0–6)
ERYTHROCYTE [DISTWIDTH] IN BLOOD BY AUTOMATED COUNT: 12.3 % (ref 11.6–15.1)
EXT PREGNANCY TEST URINE: POSITIVE
EXT. CONTROL: ABNORMAL
GFR SERPL CREATININE-BSD FRML MDRD: 128 ML/MIN/1.73SQ M
GLUCOSE SERPL-MCNC: 135 MG/DL (ref 65–140)
GLUCOSE UR STRIP-MCNC: NEGATIVE MG/DL
HCT VFR BLD AUTO: 34.7 % (ref 34.8–46.1)
HGB BLD-MCNC: 11.5 G/DL (ref 11.5–15.4)
HGB UR QL STRIP.AUTO: ABNORMAL
IMM GRANULOCYTES # BLD AUTO: 0.03 THOUSAND/UL (ref 0–0.2)
IMM GRANULOCYTES NFR BLD AUTO: 0 % (ref 0–2)
KETONES UR STRIP-MCNC: NEGATIVE MG/DL
LEUKOCYTE ESTERASE UR QL STRIP: NEGATIVE
LYMPHOCYTES # BLD AUTO: 1.27 THOUSANDS/ΜL (ref 0.6–4.47)
LYMPHOCYTES NFR BLD AUTO: 12 % (ref 14–44)
MCH RBC QN AUTO: 29.5 PG (ref 26.8–34.3)
MCHC RBC AUTO-ENTMCNC: 33.1 G/DL (ref 31.4–37.4)
MCV RBC AUTO: 89 FL (ref 82–98)
MONOCYTES # BLD AUTO: 0.66 THOUSAND/ΜL (ref 0.17–1.22)
MONOCYTES NFR BLD AUTO: 6 % (ref 4–12)
MUCOUS THREADS UR QL AUTO: ABNORMAL
NEUTROPHILS # BLD AUTO: 8.36 THOUSANDS/ΜL (ref 1.85–7.62)
NEUTS SEG NFR BLD AUTO: 81 % (ref 43–75)
NITRITE UR QL STRIP: NEGATIVE
NON-SQ EPI CELLS URNS QL MICRO: ABNORMAL /HPF
PH UR STRIP.AUTO: 5.5 [PH] (ref 4.5–8)
PLATELET # BLD AUTO: 135 THOUSANDS/UL (ref 149–390)
PMV BLD AUTO: 12.1 FL (ref 8.9–12.7)
POTASSIUM SERPL-SCNC: 3.7 MMOL/L (ref 3.5–5.3)
PROT SERPL-MCNC: 7.3 G/DL (ref 6.4–8.4)
PROT UR STRIP-MCNC: NEGATIVE MG/DL
RBC # BLD AUTO: 3.9 MILLION/UL (ref 3.81–5.12)
RBC #/AREA URNS AUTO: ABNORMAL /HPF
RH BLD: POSITIVE
SODIUM SERPL-SCNC: 135 MMOL/L (ref 135–147)
SP GR UR STRIP.AUTO: >=1.03 (ref 1–1.03)
SPECIMEN EXPIRATION DATE: NORMAL
UROBILINOGEN UR QL STRIP.AUTO: 0.2 E.U./DL
WBC # BLD AUTO: 10.36 THOUSAND/UL (ref 4.31–10.16)
WBC #/AREA URNS AUTO: ABNORMAL /HPF

## 2025-02-10 PROCEDURE — 36415 COLL VENOUS BLD VENIPUNCTURE: CPT | Performed by: EMERGENCY MEDICINE

## 2025-02-10 PROCEDURE — 76801 OB US < 14 WKS SINGLE FETUS: CPT

## 2025-02-10 PROCEDURE — 84702 CHORIONIC GONADOTROPIN TEST: CPT | Performed by: EMERGENCY MEDICINE

## 2025-02-10 PROCEDURE — 86900 BLOOD TYPING SEROLOGIC ABO: CPT | Performed by: EMERGENCY MEDICINE

## 2025-02-10 PROCEDURE — 96361 HYDRATE IV INFUSION ADD-ON: CPT

## 2025-02-10 PROCEDURE — 81001 URINALYSIS AUTO W/SCOPE: CPT

## 2025-02-10 PROCEDURE — 80053 COMPREHEN METABOLIC PANEL: CPT | Performed by: EMERGENCY MEDICINE

## 2025-02-10 PROCEDURE — 99284 EMERGENCY DEPT VISIT MOD MDM: CPT

## 2025-02-10 PROCEDURE — 76802 OB US < 14 WKS ADDL FETUS: CPT

## 2025-02-10 PROCEDURE — 96374 THER/PROPH/DIAG INJ IV PUSH: CPT

## 2025-02-10 PROCEDURE — 86901 BLOOD TYPING SEROLOGIC RH(D): CPT | Performed by: EMERGENCY MEDICINE

## 2025-02-10 PROCEDURE — 96375 TX/PRO/DX INJ NEW DRUG ADDON: CPT

## 2025-02-10 PROCEDURE — 86850 RBC ANTIBODY SCREEN: CPT | Performed by: EMERGENCY MEDICINE

## 2025-02-10 PROCEDURE — 85025 COMPLETE CBC W/AUTO DIFF WBC: CPT | Performed by: EMERGENCY MEDICINE

## 2025-02-10 PROCEDURE — 81025 URINE PREGNANCY TEST: CPT | Performed by: EMERGENCY MEDICINE

## 2025-02-10 RX ORDER — DIPHENHYDRAMINE HYDROCHLORIDE 50 MG/ML
25 INJECTION INTRAMUSCULAR; INTRAVENOUS ONCE
Status: COMPLETED | OUTPATIENT
Start: 2025-02-10 | End: 2025-02-10

## 2025-02-10 RX ORDER — METOCLOPRAMIDE HYDROCHLORIDE 5 MG/ML
10 INJECTION INTRAMUSCULAR; INTRAVENOUS ONCE
Status: COMPLETED | OUTPATIENT
Start: 2025-02-10 | End: 2025-02-10

## 2025-02-10 RX ORDER — METOCLOPRAMIDE 10 MG/1
10 TABLET ORAL EVERY 6 HOURS
Qty: 30 TABLET | Refills: 0 | Status: SHIPPED | OUTPATIENT
Start: 2025-02-10

## 2025-02-10 RX ADMIN — METOCLOPRAMIDE 10 MG: 5 INJECTION, SOLUTION INTRAMUSCULAR; INTRAVENOUS at 18:31

## 2025-02-10 RX ADMIN — SODIUM CHLORIDE 1000 ML: 0.9 INJECTION, SOLUTION INTRAVENOUS at 18:30

## 2025-02-10 RX ADMIN — DIPHENHYDRAMINE HYDROCHLORIDE 25 MG: 50 INJECTION INTRAMUSCULAR; INTRAVENOUS at 18:31

## 2025-02-10 NOTE — ED PROVIDER NOTES
Pt Name: Syed Taveras  MRN: [id number]  Birthdate 1995  Age/Sex: 29 y.o. female  Date of evaluation: 2/10/2025  PCP: LES Barber        FINAL IMPRESSION    Final diagnoses:   First trimester pregnancy   Twin gestation in first trimester   Nausea and vomiting in pregnancy         DISPOSITION/PLAN    Time reflects when diagnosis was documented in both MDM as applicable and the Disposition within this note       Time User Action Codes Description Comment    2/10/2025  8:46 PM Rosana-Silver Gertrude L Add [Z36.89,  O30.009] First trimester screening for twin pregnancy     2/10/2025  8:46 PM Rosana-Silver Gertrude L Remove [Z36.89,  O30.009] First trimester screening for twin pregnancy     2/10/2025  8:46 PM Rosana-Silver Gertrude L Add [Z34.91] First trimester pregnancy     2/10/2025  8:46 PM Rosana-Silver Gertrude L Add [O30.001] Twin gestation in first trimester     2/10/2025  8:47 PM Rosana-Silver Gertrude L Add [O21.9] Nausea and vomiting in pregnancy           ED Disposition       ED Disposition   Discharge    Condition   Stable    Date/Time   Mon Feb 10, 2025  8:46 PM    Comment   Syed Taveras discharge to home/self care.                   Follow-up Information       Follow up With Specialties Details Why Contact Info Additional Information    [facility], Nurse Practitioner   [address] [place] [address]  [phone]       [facility] Emergency Department Emergency Medicine  As needed, If symptoms worsen [address] [place] [id number] [phone] [facility] Emergency Department, [address] [place] [address], [phone]    Laverne Davis MD Obstetrics and Gynecology, Obstetrics, Gynecology Go to   [address] [place] [address]  [address] [place] [address]  [phone]                 PATIENT REFERRED TO:    LES Barber  [address] [place] [address]  [address] [place] [address]  [place] [address]  [phone]          [facility] Emergency Department  [address] [facility] [address] [phone]    As needed, If symptoms worsen    Laverne Davis MD  [address] [place] [address]  [address]  [facility] [address]  [phone]    Go to         DISCHARGE MEDICATIONS:    Discharge Medication List as of 2/10/2025  9:08 PM        START taking these medications    Details   metoclopramide (Reglan) 10 mg tablet Take 1 tablet (10 mg total) by mouth every 6 (six) hours, Starting Mon 2/10/2025, Normal      Prenatal MV & Min w/FA-DHA (Prenatal Gummies) 0.18-25 MG CHEW Chew 1 gum in the morning, Starting Mon 2/10/2025, Normal           CONTINUE these medications which have NOT CHANGED    Details   amitriptyline (ELAVIL) 25 mg tablet Take 1 tablet (25 mg total) by mouth daily at bedtime, Starting Mon 10/14/2024, Normal      Prenatal Vit-Fe Fumarate-FA (Prenatabs FA) 29-1 MG TABS Take 1 tablet by mouth in the morning, Starting Tue 7/16/2024, Normal      rizatriptan (Maxalt) 10 mg tablet Take 1 tablet (10 mg total) by mouth as needed for migraine Take at the onset of migraine; if symptoms continue or return, may take another dose at least 2 hours after first dose. Take no more than 2 doses in a day., Starting Mon 10/14/2024, Normal      semaglutide, 1 mg/dose, (Ozempic) 4 mg/3 mL injection pen Inject 0.75 mL (1 mg total) under the skin once a week, Starting Thu 10/17/2024, Normal             No discharge procedures on file.          CHIEF COMPLAINT    Chief Complaint   Patient presents with    Abdominal Pain     Pt c/o upper quad abd pain and vomiting for 3 days. Pt reports she is 6 weeks pregnant. Denies pelvic pain/bleeding.          HPI    Syed presents to the Emergency Department complaining of nausea and vomiting.  She is 6 weeks pregnant by dates.  She has not taken anything for her symptoms and she has not started prenatal vitamins.  She has an upcoming appointment with OBGYN on  .              Past Medical and Surgical History    Past Medical History:   Diagnosis Date    Diabetes mellitus (HCC)     History of transfusion     after  - had fever with transfusion    Nonintractable headache 2024    Obesity (BMI 35.0-39.9 without comorbidity)     Peroneal tendon injury, left, initial encounter 11/10/2023    S/P laparoscopic sleeve gastrectomy 2020    Sprain of anterior talofibular ligament of left ankle, initial encounter 11/10/2023    Uses Albanian as primary spoken language        Past Surgical History:   Procedure Laterality Date     SECTION  2017    CHOLECYSTECTOMY      CA LAPS GSTRC RSTRICTIV PX LONGITUDINAL GASTRECTOMY N/A 2020    Procedure: LAP SLEEVE GASTRECTOMY, INTRAOP EGD;  Surgeon: Da Rogers MD;  Location: [facility];  Service: Bariatrics       Family History   Problem Relation Age of Onset    Diabetes Mother     Other Mother         gastric bypass    Diabetes Father     Diabetes Sister     Sleep apnea Sister     Diabetes Maternal Grandmother     Skin cancer Maternal Grandmother     No Known Problems Maternal Grandfather     Diabetes Paternal Grandmother     No Known Problems Paternal Grandfather     Sleep apnea Brother         2 of the 3 borthers have EAGLE    No Known Problems Brother     No Known Problems Son     Heart disease Family         CARDIOVASCULAR DISEASE    No Known Problems Maternal Aunt     No Known Problems Maternal Aunt     No Known Problems Paternal Aunt     No Known Problems Paternal Aunt        Social History     Tobacco Use    Smoking status: Never     Passive exposure: Never    Smokeless tobacco: Never   Vaping Use    Vaping status: Never Used   Substance Use Topics    Alcohol use: No    Drug use: No         .    Allergies    No Known Allergies    Home Medications    Prior to Admission medications    Medication Sig Start Date End Date Taking? Authorizing Provider   amitriptyline (ELAVIL) 25 mg tablet Take 1  tablet (25 mg total) by mouth daily at bedtime 10/14/24   Maicol Trammell PA-C   Prenatal Vit-Fe Fumarate-FA (Prenatabs FA) 29-1 MG TABS Take 1 tablet by mouth in the morning 7/16/24   LES Barber   rizatriptan (Maxalt) 10 mg tablet Take 1 tablet (10 mg total) by mouth as needed for migraine Take at the onset of migraine; if symptoms continue or return, may take another dose at least 2 hours after first dose. Take no more than 2 doses in a day. 10/14/24   Maicol Trammell PA-C   semaglutide, 1 mg/dose, (Ozempic) 4 mg/3 mL injection pen Inject 0.75 mL (1 mg total) under the skin once a week 10/17/24   LES Fierro           Review of Systems    Review of Systems   Constitutional:  Negative for chills and fever.   HENT:  Negative for ear pain and sore throat.    Eyes:  Negative for pain and visual disturbance.   Respiratory:  Negative for cough and shortness of breath.    Cardiovascular:  Negative for chest pain and palpitations.   Gastrointestinal:  Positive for nausea and vomiting. Negative for abdominal pain.   Genitourinary:  Negative for dysuria and hematuria.   Musculoskeletal:  Negative for arthralgias and back pain.   Skin:  Negative for color change and rash.   Neurological:  Negative for seizures and syncope.   All other systems reviewed and are negative.    Physical Exam      ED Triage Vitals   Temperature Pulse Respirations Blood Pressure SpO2   02/10/25 1730 02/10/25 1730 02/10/25 1730 02/10/25 1730 02/10/25 1730   98.8 °F (37.1 °C) 78 18 129/68 100 %      Temp Source Heart Rate Source Patient Position - Orthostatic VS BP Location FiO2 (%)   02/10/25 1730 02/10/25 1730 02/10/25 1833 02/10/25 1730 --   Oral Monitor Sitting Right arm       Pain Score       02/10/25 1833       8               Physical Exam  Vitals and nursing note reviewed.   Constitutional:       General: She is not in acute distress.     Appearance: She is well-developed.   HENT:      Head:  Normocephalic and atraumatic.   Eyes:      Conjunctiva/sclera: Conjunctivae normal.   Cardiovascular:      Rate and Rhythm: Normal rate and regular rhythm.      Heart sounds: No murmur heard.  Pulmonary:      Effort: Pulmonary effort is normal. No respiratory distress.      Breath sounds: Normal breath sounds.   Abdominal:      Palpations: Abdomen is soft.      Tenderness: There is no abdominal tenderness.   Musculoskeletal:         General: No swelling.      Cervical back: Neck supple.   Skin:     General: Skin is warm and dry.      Capillary Refill: Capillary refill takes less than 2 seconds.   Neurological:      Mental Status: She is alert.   Psychiatric:         Mood and Affect: Mood normal.       Assessment and Plan    Syed Taveras is a 29 y.o. female who presents with intractable vomiting. Physical examination remarkable for clinical dehydration. Plan will be to perform diagnostic testing and treat symptomatically.      MDM      Diagnostic Results      Labs:    Results for orders placed or performed during the hospital encounter of 02/10/25   Urine Microscopic    Collection Time: 02/10/25  6:16 PM   Result Value Ref Range    RBC, UA 1-2 None Seen, 1-2 /hpf    WBC, UA 4-10 (A) None Seen, 1-2 /hpf    Epithelial Cells Moderate (A) None Seen, Occasional /hpf    Bacteria, UA Occasional None Seen, Occasional /hpf    MUCUS THREADS Moderate (A) None Seen    Ca Oxalate Belinda, UA Moderate (A) None Seen /hpf   Urine Macroscopic, POC    Collection Time: 02/10/25  6:16 PM   Result Value Ref Range    Color, UA Yellow     Clarity, UA Clear     pH, UA 5.5 4.5 - 8.0    Leukocytes, UA Negative Negative    Nitrite, UA Negative Negative    Protein, UA Negative Negative mg/dl    Glucose, UA Negative Negative mg/dl    Ketones, UA Negative Negative mg/dl    Urobilinogen, UA 0.2 0.2, 1.0 E.U./dl E.U./dl    Bilirubin, UA Negative Negative    Occult Blood, UA Trace (A) Negative    Specific Gravity, UA >=1.030 1.003 - 1.030    POCT pregnancy, urine    Collection Time: 02/10/25  6:19 PM   Result Value Ref Range    EXT Preg Test, Ur Positive (A)     Control Valid    CBC and differential    Collection Time: 02/10/25  6:28 PM   Result Value Ref Range    WBC 10.36 (H) 4.31 - 10.16 Thousand/uL    RBC 3.90 3.81 - 5.12 Million/uL    Hemoglobin 11.5 11.5 - 15.4 g/dL    Hematocrit 34.7 (L) 34.8 - 46.1 %    MCV 89 82 - 98 fL    MCH 29.5 26.8 - 34.3 pg    MCHC 33.1 31.4 - 37.4 g/dL    RDW 12.3 11.6 - 15.1 %    MPV 12.1 8.9 - 12.7 fL    Platelets 135 (L) 149 - 390 Thousands/uL    Segmented % 81 (H) 43 - 75 %    Immature Grans % 0 0 - 2 %    Lymphocytes % 12 (L) 14 - 44 %    Monocytes % 6 4 - 12 %    Eosinophils Relative 0 0 - 6 %    Basophils Relative 0 0 - 1 %    Absolute Neutrophils 8.36 (H) 1.85 - 7.62 Thousands/µL    Absolute Immature Grans 0.03 0.00 - 0.20 Thousand/uL    Absolute Lymphocytes 1.27 0.60 - 4.47 Thousands/µL    Absolute Monocytes 0.66 0.17 - 1.22 Thousand/µL    Eosinophils Absolute 0.03 0.00 - 0.61 Thousand/µL    Basophils Absolute 0.01 0.00 - 0.10 Thousands/µL   Comprehensive metabolic panel    Collection Time: 02/10/25  6:28 PM   Result Value Ref Range    Sodium 135 135 - 147 mmol/L    Potassium 3.7 3.5 - 5.3 mmol/L    Chloride 104 96 - 108 mmol/L    CO2 26 21 - 32 mmol/L    ANION GAP 5 4 - 13 mmol/L    BUN 10 5 - 25 mg/dL    Creatinine 0.55 (L) 0.60 - 1.30 mg/dL    Glucose 135 65 - 140 mg/dL    Calcium 9.2 8.4 - 10.2 mg/dL    AST 8 (L) 13 - 39 U/L    ALT 8 7 - 52 U/L    Alkaline Phosphatase 65 34 - 104 U/L    Total Protein 7.3 6.4 - 8.4 g/dL    Albumin 4.4 3.5 - 5.0 g/dL    Total Bilirubin 0.72 0.20 - 1.00 mg/dL    eGFR 128 ml/min/1.73sq m   hCG, quantitative    Collection Time: 02/10/25  6:28 PM   Result Value Ref Range    HCG, Quant >248,400.0 (H) 0 - 5 mIU/mL   Type and screen    Collection Time: 02/10/25  6:28 PM   Result Value Ref Range    ABO Grouping O     Rh Factor Positive     Antibody Screen Negative     Specimen  Expiration Date 20250213        All labs reviewed and utilized in the medical decision making process    Radiology:    US OB < 14 weeks with transvaginal   Final Result      Single intrauterine gestational sac containing live twin fetuses at 6 weeks and 4-5 days (based on crown-rump-lengths). Presence of 2 yolk sacs suggest a monochorionic diamniotic gestation.      Corpus luteum in the right ovary. Otherwise unremarkable sonographic appearance of the ovaries.      Workstation performed: [id number]           I personally reviewed US images    All radiology studies independently viewed by me and interpreted by the radiologist.    Procedure    Procedures      ED Course of Care and Re-Assessments    Patient notified of twin pregnancy.   Will provide antiemetics and prenatal vitamins.     Medications   sodium chloride 0.9 % bolus 1,000 mL (0 mL Intravenous Stopped 2/10/25 1913)   metoclopramide (REGLAN) injection 10 mg (10 mg Intravenous Given 2/10/25 1831)   diphenhydrAMINE (BENADRYL) injection 25 mg (25 mg Intravenous Given 2/10/25 1831)                  Gertrude Gutiérrez, DO Gertrude Gutiérrez DO  02/11/25 0141

## 2025-02-10 NOTE — Clinical Note
Syed Taveras was seen and treated in our emergency department on 2/10/2025.    No restrictions            Diagnosis:     Syed  may return to work on return date.    She may return on this date: 02/13/2025         If you have any questions or concerns, please don't hesitate to call.      Charles Ferraro RN    ______________________________           _______________          _______________  Hospital Representative                              Date                                Time

## 2025-02-12 NOTE — TELEPHONE ENCOUNTER
Patient would like a letter excusing her from work due to still feeling bad vomit. Her job is wanting this letter.

## 2025-02-22 ENCOUNTER — HOSPITAL ENCOUNTER (EMERGENCY)
Facility: HOSPITAL | Age: 30
Discharge: HOME | End: 2025-02-22
Attending: EMERGENCY MEDICINE
Payer: COMMERCIAL

## 2025-02-22 VITALS
SYSTOLIC BLOOD PRESSURE: 105 MMHG | TEMPERATURE: 98.2 F | RESPIRATION RATE: 16 BRPM | HEART RATE: 79 BPM | OXYGEN SATURATION: 98 % | DIASTOLIC BLOOD PRESSURE: 68 MMHG

## 2025-02-22 DIAGNOSIS — R07.9 CHEST PAIN, UNSPECIFIED: Primary | ICD-10-CM

## 2025-02-22 LAB
ALBUMIN SERPL BCG-MCNC: 4.3 G/DL (ref 3.5–5)
ALP SERPL-CCNC: 50 U/L (ref 34–104)
ALT SERPL W P-5'-P-CCNC: 11 U/L (ref 7–52)
ANION GAP SERPL CALCULATED.3IONS-SCNC: 9 MMOL/L (ref 4–13)
AST SERPL W P-5'-P-CCNC: 17 U/L (ref 13–39)
B-HCG SERPL-ACNC: ABNORMAL MIU/ML (ref 0–5)
BACTERIA UR QL AUTO: ABNORMAL /HPF
BASOPHILS # BLD AUTO: 0.02 THOUSANDS/ΜL (ref 0–0.1)
BASOPHILS NFR BLD AUTO: 0 % (ref 0–1)
BILIRUB DIRECT SERPL-MCNC: 0.16 MG/DL (ref 0–0.2)
BILIRUB SERPL-MCNC: 1.16 MG/DL (ref 0.2–1)
BILIRUB UR QL STRIP: NEGATIVE
BUN SERPL-MCNC: 8 MG/DL (ref 5–25)
CALCIUM SERPL-MCNC: 9.2 MG/DL (ref 8.4–10.2)
CAOX CRY URNS QL MICRO: ABNORMAL /HPF
CARDIAC TROPONIN I PNL SERPL HS: <2 NG/L (ref ?–50)
CHLORIDE SERPL-SCNC: 104 MMOL/L (ref 96–108)
CLARITY UR: ABNORMAL
CO2 SERPL-SCNC: 22 MMOL/L (ref 21–32)
COLOR UR: YELLOW
CREAT SERPL-MCNC: 0.58 MG/DL (ref 0.6–1.3)
D DIMER PPP FEU-MCNC: 0.44 UG/ML FEU
EOSINOPHIL # BLD AUTO: 0.02 THOUSAND/ΜL (ref 0–0.61)
EOSINOPHIL NFR BLD AUTO: 0 % (ref 0–6)
ERYTHROCYTE [DISTWIDTH] IN BLOOD BY AUTOMATED COUNT: 11.9 % (ref 11.6–15.1)
EXT PREGNANCY TEST URINE: POSITIVE
EXT. CONTROL: ABNORMAL
FLUAV AG UPPER RESP QL IA.RAPID: NEGATIVE
FLUBV AG UPPER RESP QL IA.RAPID: NEGATIVE
GFR SERPL CREATININE-BSD FRML MDRD: 125 ML/MIN/1.73SQ M
GLUCOSE SERPL-MCNC: 135 MG/DL (ref 65–140)
GLUCOSE UR STRIP-MCNC: NEGATIVE MG/DL
HCT VFR BLD AUTO: 35.2 % (ref 34.8–46.1)
HGB BLD-MCNC: 11.9 G/DL (ref 11.5–15.4)
HGB UR QL STRIP.AUTO: NEGATIVE
HYALINE CASTS #/AREA URNS LPF: ABNORMAL /LPF
IMM GRANULOCYTES # BLD AUTO: 0.02 THOUSAND/UL (ref 0–0.2)
IMM GRANULOCYTES NFR BLD AUTO: 0 % (ref 0–2)
KETONES UR STRIP-MCNC: NEGATIVE MG/DL
LEUKOCYTE ESTERASE UR QL STRIP: NEGATIVE
LIPASE SERPL-CCNC: 29 U/L (ref 11–82)
LYMPHOCYTES # BLD AUTO: 1.09 THOUSANDS/ΜL (ref 0.6–4.47)
LYMPHOCYTES NFR BLD AUTO: 14 % (ref 14–44)
MCH RBC QN AUTO: 29.2 PG (ref 26.8–34.3)
MCHC RBC AUTO-ENTMCNC: 33.8 G/DL (ref 31.4–37.4)
MCV RBC AUTO: 86 FL (ref 82–98)
MONOCYTES # BLD AUTO: 0.51 THOUSAND/ΜL (ref 0.17–1.22)
MONOCYTES NFR BLD AUTO: 7 % (ref 4–12)
MUCOUS THREADS UR QL AUTO: ABNORMAL
NEUTROPHILS # BLD AUTO: 6.06 THOUSANDS/ΜL (ref 1.85–7.62)
NEUTS SEG NFR BLD AUTO: 79 % (ref 43–75)
NITRITE UR QL STRIP: NEGATIVE
NON-SQ EPI CELLS URNS QL MICRO: ABNORMAL /HPF
NRBC BLD AUTO-RTO: 0 /100 WBCS
PH UR STRIP.AUTO: 7.5 [PH] (ref 4.5–8)
PLATELET # BLD AUTO: 138 THOUSANDS/UL (ref 149–390)
PMV BLD AUTO: 12.8 FL (ref 8.9–12.7)
POTASSIUM SERPL-SCNC: 4 MMOL/L (ref 3.5–5.3)
PROT SERPL-MCNC: 7.4 G/DL (ref 6.4–8.4)
PROT UR STRIP-MCNC: ABNORMAL MG/DL
RBC # BLD AUTO: 4.08 MILLION/UL (ref 3.81–5.12)
RBC #/AREA URNS AUTO: ABNORMAL /HPF
SARS-COV+SARS-COV-2 AG RESP QL IA.RAPID: NEGATIVE
SODIUM SERPL-SCNC: 135 MMOL/L (ref 135–147)
SP GR UR STRIP.AUTO: 1.02 (ref 1–1.03)
UROBILINOGEN UR QL STRIP.AUTO: 1 E.U./DL
WBC # BLD AUTO: 7.72 THOUSAND/UL (ref 4.31–10.16)
WBC #/AREA URNS AUTO: ABNORMAL /HPF

## 2025-02-22 PROCEDURE — 36415 COLL VENOUS BLD VENIPUNCTURE: CPT | Performed by: EMERGENCY MEDICINE

## 2025-02-22 PROCEDURE — 85379 FIBRIN DEGRADATION QUANT: CPT | Performed by: EMERGENCY MEDICINE

## 2025-02-22 PROCEDURE — 81001 URINALYSIS AUTO W/SCOPE: CPT

## 2025-02-22 PROCEDURE — 87804 INFLUENZA ASSAY W/OPTIC: CPT | Performed by: EMERGENCY MEDICINE

## 2025-02-22 PROCEDURE — 87811 SARS-COV-2 COVID19 W/OPTIC: CPT | Performed by: EMERGENCY MEDICINE

## 2025-02-22 PROCEDURE — 87086 URINE CULTURE/COLONY COUNT: CPT

## 2025-02-22 PROCEDURE — 84484 ASSAY OF TROPONIN QUANT: CPT | Performed by: EMERGENCY MEDICINE

## 2025-02-22 PROCEDURE — 80076 HEPATIC FUNCTION PANEL: CPT | Performed by: EMERGENCY MEDICINE

## 2025-02-22 PROCEDURE — 99285 EMERGENCY DEPT VISIT HI MDM: CPT

## 2025-02-22 PROCEDURE — 87077 CULTURE AEROBIC IDENTIFY: CPT

## 2025-02-22 PROCEDURE — 81025 URINE PREGNANCY TEST: CPT | Performed by: EMERGENCY MEDICINE

## 2025-02-22 PROCEDURE — 93005 ELECTROCARDIOGRAM TRACING: CPT

## 2025-02-22 PROCEDURE — 85025 COMPLETE CBC W/AUTO DIFF WBC: CPT | Performed by: EMERGENCY MEDICINE

## 2025-02-22 PROCEDURE — 87186 SC STD MICRODIL/AGAR DIL: CPT

## 2025-02-22 PROCEDURE — 80048 BASIC METABOLIC PNL TOTAL CA: CPT | Performed by: EMERGENCY MEDICINE

## 2025-02-22 PROCEDURE — 84702 CHORIONIC GONADOTROPIN TEST: CPT | Performed by: EMERGENCY MEDICINE

## 2025-02-22 PROCEDURE — 87147 CULTURE TYPE IMMUNOLOGIC: CPT

## 2025-02-22 PROCEDURE — 99285 EMERGENCY DEPT VISIT HI MDM: CPT | Performed by: EMERGENCY MEDICINE

## 2025-02-22 PROCEDURE — 83690 ASSAY OF LIPASE: CPT | Performed by: EMERGENCY MEDICINE

## 2025-02-22 RX ORDER — FAMOTIDINE 20 MG/1
20 TABLET, FILM COATED ORAL ONCE
Status: COMPLETED | OUTPATIENT
Start: 2025-02-22 | End: 2025-02-22

## 2025-02-22 RX ORDER — ACETAMINOPHEN 325 MG/1
975 TABLET ORAL ONCE
Status: COMPLETED | OUTPATIENT
Start: 2025-02-22 | End: 2025-02-22

## 2025-02-22 RX ORDER — FAMOTIDINE 20 MG/1
20 TABLET, FILM COATED ORAL 2 TIMES DAILY
Qty: 30 TABLET | Refills: 0 | Status: SHIPPED | OUTPATIENT
Start: 2025-02-22

## 2025-02-22 RX ORDER — MAGNESIUM HYDROXIDE/ALUMINUM HYDROXICE/SIMETHICONE 120; 1200; 1200 MG/30ML; MG/30ML; MG/30ML
30 SUSPENSION ORAL ONCE
Status: COMPLETED | OUTPATIENT
Start: 2025-02-22 | End: 2025-02-22

## 2025-02-22 RX ADMIN — FAMOTIDINE 20 MG: 20 TABLET, FILM COATED ORAL at 18:40

## 2025-02-22 RX ADMIN — ACETAMINOPHEN 975 MG: 325 TABLET, FILM COATED ORAL at 17:31

## 2025-02-22 RX ADMIN — ALUMINUM HYDROXIDE, MAGNESIUM HYDROXIDE, AND SIMETHICONE 30 ML: 200; 200; 20 SUSPENSION ORAL at 18:40

## 2025-02-22 NOTE — Clinical Note
Syed Taveras was seen and treated in our emergency department on 2/22/2025.                Diagnosis:     Syed  .    She may return on this date:          If you have any questions or concerns, please don't hesitate to call.      Bandar Gonzalez MD    ______________________________           _______________          _______________  Hospital Representative                              Date                                Time

## 2025-02-22 NOTE — ED PROVIDER NOTES
Time reflects when diagnosis was documented in both MDM as applicable and the Disposition within this note       Time User Action Codes Description Comment    2/22/2025  6:16 PM Bandar Gonzalez Add [R07.9] Chest pain, unspecified           ED Disposition       ED Disposition   Discharge    Condition   Stable    Date/Time   Sat Feb 22, 2025  6:16 PM    Comment   Syed Taveras discharge to home/self care.                   Assessment & Plan       Medical Decision Making  29-year-old female here for chief complaint of epigastric discomfort and central chest pain.  Reassuring vital signs and physical exam.  Had recent GI illness with nausea, vomiting, diarrhea that is since resolved but started with chest pain several hours prior to evaluation today.  EKG and troponin were performed to rule out cardiac ischemia.  EKG without ischemic changes and troponin level undetectable.  Given symptoms constant greater than 3 hours, no second level indicated.  Other laboratory studies to rule out pancreatitis or bili obstructive disease, significant anemia, electrolyte abnormalities were reassuring.  Bedside ultrasound demonstrated intrauterine pregnancy with detectable fetal heart tones.  A D-dimer was performed to rule out pulmonary embolism and was within normal limits.  Given recent GI illness, pregnancy suspect possible element of GERD, will trial Pepcid but otherwise we will plan for discharge with close outpatient follow-up and return precautions.    Amount and/or Complexity of Data Reviewed  Labs: ordered. Decision-making details documented in ED Course.  ECG/medicine tests: ordered and independent interpretation performed. Decision-making details documented in ED Course.     Details: EKG independently interpreted by me: Normal sinus rhythm at rate of 94, normal axis, normal intervals, no significant ST elevations or depressions to suggest acute cardiac ischemia.  Nonspecific ST changes mostly due to  artifact/baseline wander.    Risk  OTC drugs.             Medications   acetaminophen (TYLENOL) tablet 975 mg (975 mg Oral Given 25 1731)   aluminum-magnesium hydroxide-simethicone (MAALOX) oral suspension 30 mL (30 mL Oral Given 25 1840)   famotidine (PEPCID) tablet 20 mg (20 mg Oral Given 25 1840)       ED Risk Strat Scores      HEART Risk Score      Flowsheet Row Most Recent Value   Heart Score Risk Calculator    History 0 Filed at: 2025   ECG 0 Filed at: 2025   Age 0 Filed at: 2025   Risk Factors 0 Filed at: 2025   Troponin 0 Filed at: 2025   HEART Score 0 Filed at: 2025                                                  History of Present Illness       Chief Complaint   Patient presents with    Chest Pain     Arrives via [facility] from home c/o CP and SOB started today while sitting on couch, reports being 8weeks pregnant with twins denies vaginal bleeding and contractions.       Past Medical History:   Diagnosis Date    Diabetes mellitus (HCC)     History of transfusion     after  - had fever with transfusion    Nonintractable headache 2024    Obesity (BMI 35.0-39.9 without comorbidity)     Peroneal tendon injury, left, initial encounter 11/10/2023    S/P laparoscopic sleeve gastrectomy 2020    Sprain of anterior talofibular ligament of left ankle, initial encounter 11/10/2023    Uses Lebanese as primary spoken language       Past Surgical History:   Procedure Laterality Date     SECTION  2017    CHOLECYSTECTOMY      ID LAPS GSTRC RSTRICTIV PX LONGITUDINAL GASTRECTOMY N/A 2020    Procedure: LAP SLEEVE GASTRECTOMY, INTRAOP EGD;  Surgeon: Da Rogers MD;  Location: [facility];  Service: Bariatrics      Family History   Problem Relation Age of Onset    Diabetes Mother     Other Mother         gastric bypass    Diabetes Father     Diabetes Sister     Sleep apnea Sister     Diabetes Maternal  "Grandmother     Skin cancer Maternal Grandmother     No Known Problems Maternal Grandfather     Diabetes Paternal Grandmother     No Known Problems Paternal Grandfather     Sleep apnea Brother         2 of the 3 borthers have EAGLE    No Known Problems Brother     No Known Problems Son     Heart disease Family         CARDIOVASCULAR DISEASE    No Known Problems Maternal Aunt     No Known Problems Maternal Aunt     No Known Problems Paternal Aunt     No Known Problems Paternal Aunt       Social History     Tobacco Use    Smoking status: Never     Passive exposure: Never    Smokeless tobacco: Never   Vaping Use    Vaping status: Never Used   Substance Use Topics    Alcohol use: No    Drug use: No      E-Cigarette/Vaping    E-Cigarette Use Never User       E-Cigarette/Vaping Substances    Nicotine No     THC No     CBD No     Flavoring No     Other No     Unknown No       I have reviewed and agree with the history as documented.     Syed is a very pleasant 29-year-old female currently pregnant at estimated 8 weeks gestation (recent ultrasound showed intrauterine pregnancy with twins) here for evaluation of chest and epigastric pain that started around 11 AM or about 4 hours prior to presentation.  States that she was ill with some sort of flulike illness for the past week or so characterized by generalized bodyaches, nausea, vomiting, and diarrhea.  Those symptoms have since resolved but today she developed some chest pain while sitting on the couch.  No clear exacerbating or remitting factors.  Does seem to be somewhat worse with direct palpation to the epigastrium or occasionally with deep inspiration.  Denies overt shortness of breath but states her breathing feels \"different.\"  No fevers.  No rashes.  Denies any current lower abdominal pain, nausea, vomiting, vaginal bleeding, or abnormal discharge.  No urinary symptoms.      Chest Pain  Associated symptoms: no back pain, no cough, no fever, no nausea, no " palpitations, no shortness of breath and not vomiting        Review of Systems   Constitutional:  Negative for chills and fever.   HENT:  Negative for sore throat.    Eyes:  Negative for visual disturbance.   Respiratory:  Negative for cough and shortness of breath.    Cardiovascular:  Positive for chest pain. Negative for palpitations.   Gastrointestinal:  Negative for nausea and vomiting.   Genitourinary:  Negative for dysuria and hematuria.   Musculoskeletal:  Negative for arthralgias and back pain.   Skin:  Negative for color change and rash.   Neurological:  Negative for syncope.   All other systems reviewed and are negative.          Objective       ED Triage Vitals   Temperature Pulse Blood Pressure Respirations SpO2 Patient Position - Orthostatic VS   02/22/25 1515 02/22/25 1515 02/22/25 1515 02/22/25 1515 02/22/25 1515 02/22/25 1515   98.2 °F (36.8 °C) 99 116/58 16 97 % Lying      Temp src Heart Rate Source BP Location FiO2 (%) Pain Score    -- 02/22/25 1515 02/22/25 1515 -- 02/22/25 1731     Monitor Right arm  8      Vitals      Date and Time Temp Pulse SpO2 Resp BP Pain Score FACES Pain Rating User   02/22/25 1830 -- 79 98 % 16 105/68 8 -- OG   02/22/25 1731 -- -- -- -- -- 8 -- SG   02/22/25 1515 98.2 °F (36.8 °C) 99 97 % 16 116/58 -- -- DIC            Physical Exam  Vitals and nursing note reviewed.   Constitutional:       General: She is not in acute distress.     Appearance: She is well-developed.   HENT:      Head: Normocephalic and atraumatic.   Eyes:      Conjunctiva/sclera: Conjunctivae normal.   Cardiovascular:      Rate and Rhythm: Normal rate and regular rhythm.   Pulmonary:      Effort: Pulmonary effort is normal. No respiratory distress.      Breath sounds: Normal breath sounds. No wheezing, rhonchi or rales.   Abdominal:      Palpations: Abdomen is soft.      Tenderness: There is no abdominal tenderness.   Musculoskeletal:         General: No swelling.      Cervical back: Neck supple.       Right lower leg: No edema.      Left lower leg: No edema.   Skin:     General: Skin is warm and dry.      Capillary Refill: Capillary refill takes less than 2 seconds.   Neurological:      Mental Status: She is alert.   Psychiatric:         Mood and Affect: Mood normal.         Results Reviewed       Procedure Component Value Units Date/Time    Urine Microscopic [[id number]] Collected: 02/22/25 1825    Lab Status: In process Specimen: Urine, Clean Catch Updated: 02/22/25 1833    Urine culture [[id number]] Collected: 02/22/25 1825    Lab Status: In process Specimen: Urine, Clean Catch Updated: 02/22/25 1833    POCT pregnancy, urine [[id number]]  (Abnormal) Collected: 02/22/25 1828    Lab Status: Final result Updated: 02/22/25 1828     EXT Preg Test, Ur Positive     Control Valid    Urine Macroscopic, POC [[id number]]  (Abnormal) Collected: 02/22/25 1825    Lab Status: Final result Specimen: Urine Updated: 02/22/25 1826     Color, UA Yellow     Clarity, UA Cloudy     pH, UA 7.5     Leukocytes, UA Negative     Nitrite, UA Negative     Protein, UA 30 (1+) mg/dl      Glucose, UA Negative mg/dl      Ketones, UA Negative mg/dl      Urobilinogen, UA 1.0 E.U./dl      Bilirubin, UA Negative     Occult Blood, UA Negative     Specific Gravity, UA 1.025    Narrative:      CLINITEK RESULT    D-dimer, quantitative [[id number]]  (Normal) Collected: 02/22/25 1730    Lab Status: Final result Specimen: Blood from Arm, Left Updated: 02/22/25 1755     D-Dimer, Quant 0.44 ug/ml FEU     Basic metabolic panel [[id number]]  (Abnormal) Collected: 02/22/25 1554    Lab Status: Final result Specimen: Blood from Arm, Left Updated: 02/22/25 1713     Sodium 135 mmol/L      Potassium 4.0 mmol/L      Chloride 104 mmol/L      CO2 22 mmol/L      ANION GAP 9 mmol/L      BUN 8 mg/dL      Creatinine 0.58 mg/dL      Glucose 135 mg/dL      Calcium 9.2 mg/dL      eGFR 125 ml/min/1.73sq m     Narrative:      National Kidney Disease Foundation guidelines for  Chronic Kidney Disease (CKD):     Stage 1 with normal or high GFR (GFR > 90 mL/min/1.73 square meters)    Stage 2 Mild CKD (GFR = 60-89 mL/min/1.73 square meters)    Stage 3A Moderate CKD (GFR = 45-59 mL/min/1.73 square meters)    Stage 3B Moderate CKD (GFR = 30-44 mL/min/1.73 square meters)    Stage 4 Severe CKD (GFR = 15-29 mL/min/1.73 square meters)    Stage 5 End Stage CKD (GFR <15 mL/min/1.73 square meters)  Note: GFR calculation is accurate only with a steady state creatinine    Hepatic function panel [[id number]]  (Abnormal) Collected: 02/22/25 1554    Lab Status: Final result Specimen: Blood from Arm, Left Updated: 02/22/25 1713     Total Bilirubin 1.16 mg/dL      Bilirubin, Direct 0.16 mg/dL      Alkaline Phosphatase 50 U/L      AST 17 U/L      ALT 11 U/L      Total Protein 7.4 g/dL      Albumin 4.3 g/dL     Lipase [[id number]]  (Normal) Collected: 02/22/25 1554    Lab Status: Final result Specimen: Blood from Arm, Left Updated: 02/22/25 1713     Lipase 29 u/L     Quantitative hCG [[id number]]  (Abnormal) Collected: 02/22/25 1554    Lab Status: Final result Specimen: Blood from Arm, Left Updated: 02/22/25 1713     HCG, Quant >248,400.0 mIU/mL     Narrative:       Expected Ranges:    HCG results between 5.0 and 25.0 mIU/mL may be indicative of early pregnancy but should be interpreted in light of the total clinical presentation.    HCG can rise to detectable levels in magali and post menopausal women (0-11.6 mIU/mL).     Approximate               Approximate HCG  Gestation age          Concentration ( mIU/mL)  _____________          ______________________   Weeks                      HCG values  0.2-1                       5-50  1-2                           2-3                         100-5000  3-4                         500-79589  4-5                         1000-94910  5-6                         [id number]  6-8                         [id number]  8-12                        [id number]       FLU/COVID Rapid Antigen (30 min. TAT) - Preferred screening test in ED [[id number]]  (Normal) Collected: 02/22/25 1555    Lab Status: Final result Specimen: Nares from Nose Updated: 02/22/25 1706     SARS COV Rapid Antigen Negative     Influenza A Rapid Antigen Negative     Influenza B Rapid Antigen Negative    Narrative:      This test has been performed using the Quidel Kiara 2 FLU+SARS Antigen test under the Emergency Use Authorization (EUA). This test has been validated by the  and verified by the performing laboratory. The Kiara uses lateral flow immunofluorescent sandwich assay to detect SARS-COV, Influenza A and Influenza B Antigen.     The Quidel Kiara 2 SARS Antigen test does not differentiate between SARS-CoV and SARS-CoV-2.     Negative results are presumptive and may be confirmed with a molecular assay, if necessary, for patient management. Negative results do not rule out SARS-CoV-2 or influenza infection and should not be used as the sole basis for treatment or patient management decisions. A negative test result may occur if the level of antigen in a sample is below the limit of detection of this test.     Positive results are indicative of the presence of viral antigens, but do not rule out bacterial infection or co-infection with other viruses.     All test results should be used as an adjunct to clinical observations and other information available to the provider.    FOR PEDIATRIC PATIENTS - copy/paste COVID Guidelines URL to browser: [email or web address]    HS Troponin 0hr (reflex protocol) [[id number]]  (Normal) Collected: 02/22/25 1554    Lab Status: Final result Specimen: Blood from Arm, Left Updated: 02/22/25 1624     hs TnI 0hr <2 ng/L     CBC and differential [[id number]]  (Abnormal) Collected: 02/22/25 1554    Lab Status: Final result Specimen: Blood from Arm, Left Updated: 02/22/25 1602     WBC 7.72 Thousand/uL      RBC 4.08  Million/uL      Hemoglobin 11.9 g/dL      Hematocrit 35.2 %      MCV 86 fL      MCH 29.2 pg      MCHC 33.8 g/dL      RDW 11.9 %      MPV 12.8 fL      Platelets 138 Thousands/uL      nRBC 0 /100 WBCs      Segmented % 79 %      Immature Grans % 0 %      Lymphocytes % 14 %      Monocytes % 7 %      Eosinophils Relative 0 %      Basophils Relative 0 %      Absolute Neutrophils 6.06 Thousands/µL      Absolute Immature Grans 0.02 Thousand/uL      Absolute Lymphocytes 1.09 Thousands/µL      Absolute Monocytes 0.51 Thousand/µL      Eosinophils Absolute 0.02 Thousand/µL      Basophils Absolute 0.02 Thousands/µL             No orders to display       Procedures    ED Medication and Procedure Management   Prior to Admission Medications   Prescriptions Last Dose Informant Patient Reported? Taking?   Prenatal MV & Min w/FA-DHA (Prenatal Gummies) 0.18-25 MG CHEW   No No   Sig: Chew 1 gum in the morning   Prenatal Vit-Fe Fumarate-FA (Prenatabs FA) 29-1 MG TABS  Self No No   Sig: Take 1 tablet by mouth in the morning   Patient not taking: Reported on 2/10/2025   amitriptyline (ELAVIL) 25 mg tablet   No No   Sig: Take 1 tablet (25 mg total) by mouth daily at bedtime   Patient not taking: Reported on 2/10/2025   metoclopramide (Reglan) 10 mg tablet   No No   Sig: Take 1 tablet (10 mg total) by mouth every 6 (six) hours   rizatriptan (Maxalt) 10 mg tablet   No No   Sig: Take 1 tablet (10 mg total) by mouth as needed for migraine Take at the onset of migraine; if symptoms continue or return, may take another dose at least 2 hours after first dose. Take no more than 2 doses in a day.   Patient not taking: Reported on 2/10/2025   semaglutide, 1 mg/dose, (Ozempic) 4 mg/3 mL injection pen   No No   Sig: Inject 0.75 mL (1 mg total) under the skin once a week   Patient not taking: Reported on 2/10/2025      Facility-Administered Medications Last Administration Doses Remaining   cyanocobalamin injection 1,000 mcg 1/20/2025  3:44 PM          Discharge Medication List as of 2/22/2025  6:39 PM        START taking these medications    Details   famotidine (PEPCID) 20 mg tablet Take 1 tablet (20 mg total) by mouth 2 (two) times a day, Starting Sat 2/22/2025, Normal           CONTINUE these medications which have NOT CHANGED    Details   amitriptyline (ELAVIL) 25 mg tablet Take 1 tablet (25 mg total) by mouth daily at bedtime, Starting Mon 10/14/2024, Normal      metoclopramide (Reglan) 10 mg tablet Take 1 tablet (10 mg total) by mouth every 6 (six) hours, Starting Mon 2/10/2025, Normal      Prenatal MV & Min w/FA-DHA (Prenatal Gummies) 0.18-25 MG CHEW Chew 1 gum in the morning, Starting Mon 2/10/2025, Normal      Prenatal Vit-Fe Fumarate-FA (Prenatabs FA) 29-1 MG TABS Take 1 tablet by mouth in the morning, Starting Tue 7/16/2024, Normal      rizatriptan (Maxalt) 10 mg tablet Take 1 tablet (10 mg total) by mouth as needed for migraine Take at the onset of migraine; if symptoms continue or return, may take another dose at least 2 hours after first dose. Take no more than 2 doses in a day., Starting Mon 10/14/2024, Normal      semaglutide, 1 mg/dose, (Ozempic) 4 mg/3 mL injection pen Inject 0.75 mL (1 mg total) under the skin once a week, Starting u 10/17/2024, Normal           No discharge procedures on file.  ED SEPSIS DOCUMENTATION   Time reflects when diagnosis was documented in both MDM as applicable and the Disposition within this note       Time User Action Codes Description Comment    2/22/2025  6:16 PM Bandar Gonzalez Add [R07.9] Chest pain, unspecified                  Bandar Gonzalez MD  02/22/25 6061

## 2025-02-23 LAB
ATRIAL RATE: 94 BPM
P AXIS: 73 DEGREES
PR INTERVAL: 160 MS
QRS AXIS: 51 DEGREES
QRSD INTERVAL: 86 MS
QT INTERVAL: 334 MS
QTC INTERVAL: 417 MS
T WAVE AXIS: 13 DEGREES
VENTRICULAR RATE: 94 BPM

## 2025-02-23 PROCEDURE — 93010 ELECTROCARDIOGRAM REPORT: CPT | Performed by: INTERNAL MEDICINE

## 2025-02-25 ENCOUNTER — RESULTS FOLLOW-UP (OUTPATIENT)
Dept: EMERGENCY DEPT | Facility: HOSPITAL | Age: 30
End: 2025-02-25

## 2025-02-25 LAB — BACTERIA UR CULT: ABNORMAL

## 2025-02-26 ENCOUNTER — ULTRASOUND (OUTPATIENT)
Dept: OBGYN CLINIC | Facility: MEDICAL CENTER | Age: 30
End: 2025-02-26
Payer: COMMERCIAL

## 2025-02-26 VITALS
SYSTOLIC BLOOD PRESSURE: 120 MMHG | BODY MASS INDEX: 30.36 KG/M2 | HEIGHT: 69 IN | WEIGHT: 205 LBS | DIASTOLIC BLOOD PRESSURE: 70 MMHG

## 2025-02-26 DIAGNOSIS — O30.001 TWIN GESTATION IN FIRST TRIMESTER, UNSPECIFIED MULTIPLE GESTATION TYPE: Primary | ICD-10-CM

## 2025-02-26 DIAGNOSIS — O21.9 NAUSEA AND VOMITING IN PREGNANCY: ICD-10-CM

## 2025-02-26 DIAGNOSIS — B95.2 UTI (URINARY TRACT INFECTION) DUE TO ENTEROCOCCUS: ICD-10-CM

## 2025-02-26 DIAGNOSIS — Z34.91 FIRST TRIMESTER PREGNANCY: ICD-10-CM

## 2025-02-26 DIAGNOSIS — N39.0 UTI (URINARY TRACT INFECTION) DUE TO ENTEROCOCCUS: ICD-10-CM

## 2025-02-26 DIAGNOSIS — N92.6 MISSED MENSES: ICD-10-CM

## 2025-02-26 PROCEDURE — 76817 TRANSVAGINAL US OBSTETRIC: CPT | Performed by: OBSTETRICS & GYNECOLOGY

## 2025-02-26 PROCEDURE — 99215 OFFICE O/P EST HI 40 MIN: CPT | Performed by: OBSTETRICS & GYNECOLOGY

## 2025-02-26 RX ORDER — NITROFURANTOIN 25; 75 MG/1; MG/1
100 CAPSULE ORAL 2 TIMES DAILY
Qty: 14 CAPSULE | Refills: 0 | Status: SHIPPED | OUTPATIENT
Start: 2025-02-26 | End: 2025-03-05

## 2025-02-26 RX ORDER — PROMETHAZINE HYDROCHLORIDE 12.5 MG/1
12.5 SUPPOSITORY RECTAL EVERY 6 HOURS PRN
Qty: 10 SUPPOSITORY | Refills: 1 | Status: SHIPPED | OUTPATIENT
Start: 2025-02-26

## 2025-02-26 NOTE — PROGRESS NOTES
"Name: Syed Taveras      : 1995      MRN: [id number]  Encounter Provider: Laverne Davis MD  Encounter Date: 2025   Encounter department: [facility]  :  Assessment & Plan  First trimester pregnancy    Orders:    Ambulatory Referral to Maternal Fetal Medicine; Future    UTI (urinary tract infection) due to Enterococcus    Orders:    nitrofurantoin (MACROBID) 100 mg capsule; Take 1 capsule (100 mg total) by mouth 2 (two) times a day for 7 days    Missed menses    Orders:    AMB US OB < 14 weeks single or first gestation level 1    Postpartum cardiomyopathy       this was not addressed today but  should be  addressed during pregnancy    Will need ECHO  Twin gestation in first trimester, unspecified multiple gestation type       suspect  Mono -Di  pregnancy  from  US  - referral to [facility] for  confirmation  See US for  details re  Due date     Nausea and vomiting in pregnancy    Orders:    promethazine (PHENERGAN) 12.5 mg suppository; Insert 1 suppository (12.5 mg total) into the rectum every 6 (six) hours as needed for nausea or vomiting  we discussed if not tolerating  PO will need  ER eval        History of Present Illness   HPI  Syed Taveras is a 29 y.o. female who presents for dating US  . States she was diagnosed with  twin gestation in  ER . Also states she was called from  ER stating UTI but was not prescribed medication  Having  nausea and  vomiting   Denies pain  or bleeding         Review of Systems   All other systems reviewed and are negative.         Objective   /70   Ht 5' 9\" (1.753 m)   Wt 93 kg (205 lb)   LMP 2024 (Exact Date)   BMI 30.27 kg/m²      Physical Exam  Constitutional:       Appearance: Normal appearance.   HENT:      Head: Normocephalic and atraumatic.      Nose: Nose normal.   Eyes:      Conjunctiva/sclera: Conjunctivae normal.   Pulmonary:      Effort: Pulmonary effort is normal.   Genitourinary:     " General: Normal vulva.   Neurological:      Mental Status: She is alert.

## 2025-02-28 ENCOUNTER — HOSPITAL ENCOUNTER (EMERGENCY)
Facility: HOSPITAL | Age: 30
Discharge: HOME | End: 2025-02-28
Attending: EMERGENCY MEDICINE
Payer: COMMERCIAL

## 2025-02-28 VITALS
OXYGEN SATURATION: 100 % | TEMPERATURE: 98.5 F | SYSTOLIC BLOOD PRESSURE: 144 MMHG | RESPIRATION RATE: 16 BRPM | HEART RATE: 94 BPM | DIASTOLIC BLOOD PRESSURE: 57 MMHG

## 2025-02-28 DIAGNOSIS — R11.2 NAUSEA AND VOMITING: ICD-10-CM

## 2025-02-28 DIAGNOSIS — R19.7 DIARRHEA: ICD-10-CM

## 2025-02-28 DIAGNOSIS — K52.9 GASTROENTERITIS: Primary | ICD-10-CM

## 2025-02-28 DIAGNOSIS — Z34.90 CURRENTLY PREGNANT: ICD-10-CM

## 2025-02-28 DIAGNOSIS — E83.42 HYPOMAGNESEMIA: ICD-10-CM

## 2025-02-28 LAB
ANION GAP SERPL CALCULATED.3IONS-SCNC: 5 MMOL/L (ref 4–13)
BUN SERPL-MCNC: 6 MG/DL (ref 5–25)
CALCIUM SERPL-MCNC: 8.5 MG/DL (ref 8.4–10.2)
CHLORIDE SERPL-SCNC: 105 MMOL/L (ref 96–108)
CO2 SERPL-SCNC: 24 MMOL/L (ref 21–32)
CREAT SERPL-MCNC: 0.47 MG/DL (ref 0.6–1.3)
FLUAV AG UPPER RESP QL IA.RAPID: NEGATIVE
FLUBV AG UPPER RESP QL IA.RAPID: NEGATIVE
GFR SERPL CREATININE-BSD FRML MDRD: 134 ML/MIN/1.73SQ M
GLUCOSE SERPL-MCNC: 185 MG/DL (ref 65–140)
MAGNESIUM SERPL-MCNC: 1.7 MG/DL (ref 1.9–2.7)
POTASSIUM SERPL-SCNC: 3.7 MMOL/L (ref 3.5–5.3)
SARS-COV+SARS-COV-2 AG RESP QL IA.RAPID: NEGATIVE
SODIUM SERPL-SCNC: 134 MMOL/L (ref 135–147)

## 2025-02-28 PROCEDURE — 99284 EMERGENCY DEPT VISIT MOD MDM: CPT | Performed by: EMERGENCY MEDICINE

## 2025-02-28 PROCEDURE — 96365 THER/PROPH/DIAG IV INF INIT: CPT

## 2025-02-28 PROCEDURE — 96361 HYDRATE IV INFUSION ADD-ON: CPT

## 2025-02-28 PROCEDURE — 36415 COLL VENOUS BLD VENIPUNCTURE: CPT | Performed by: EMERGENCY MEDICINE

## 2025-02-28 PROCEDURE — 83735 ASSAY OF MAGNESIUM: CPT | Performed by: EMERGENCY MEDICINE

## 2025-02-28 PROCEDURE — 87811 SARS-COV-2 COVID19 W/OPTIC: CPT | Performed by: EMERGENCY MEDICINE

## 2025-02-28 PROCEDURE — 96375 TX/PRO/DX INJ NEW DRUG ADDON: CPT

## 2025-02-28 PROCEDURE — 99283 EMERGENCY DEPT VISIT LOW MDM: CPT

## 2025-02-28 PROCEDURE — 80048 BASIC METABOLIC PNL TOTAL CA: CPT | Performed by: EMERGENCY MEDICINE

## 2025-02-28 PROCEDURE — 87804 INFLUENZA ASSAY W/OPTIC: CPT | Performed by: EMERGENCY MEDICINE

## 2025-02-28 RX ORDER — MAGNESIUM SULFATE HEPTAHYDRATE 40 MG/ML
2 INJECTION, SOLUTION INTRAVENOUS ONCE
Status: COMPLETED | OUTPATIENT
Start: 2025-02-28 | End: 2025-02-28

## 2025-02-28 RX ORDER — ACETAMINOPHEN 325 MG/1
650 TABLET ORAL ONCE
Status: COMPLETED | OUTPATIENT
Start: 2025-02-28 | End: 2025-02-28

## 2025-02-28 RX ORDER — ONDANSETRON 2 MG/ML
4 INJECTION INTRAMUSCULAR; INTRAVENOUS ONCE
Status: COMPLETED | OUTPATIENT
Start: 2025-02-28 | End: 2025-02-28

## 2025-02-28 RX ORDER — ONDANSETRON 4 MG/1
4 TABLET, ORALLY DISINTEGRATING ORAL EVERY 6 HOURS PRN
Qty: 20 TABLET | Refills: 0 | Status: SHIPPED | OUTPATIENT
Start: 2025-02-28

## 2025-02-28 RX ADMIN — MAGNESIUM SULFATE HEPTAHYDRATE 2 G: 40 INJECTION, SOLUTION INTRAVENOUS at 13:51

## 2025-02-28 RX ADMIN — SODIUM CHLORIDE 1000 ML: 0.9 INJECTION, SOLUTION INTRAVENOUS at 13:01

## 2025-02-28 RX ADMIN — ACETAMINOPHEN 650 MG: 325 TABLET, FILM COATED ORAL at 13:50

## 2025-02-28 RX ADMIN — ONDANSETRON 4 MG: 2 INJECTION INTRAMUSCULAR; INTRAVENOUS at 12:59

## 2025-03-06 ENCOUNTER — INITIAL PRENATAL (OUTPATIENT)
Dept: OBGYN CLINIC | Facility: MEDICAL CENTER | Age: 30
End: 2025-03-06
Payer: COMMERCIAL

## 2025-03-06 VITALS
SYSTOLIC BLOOD PRESSURE: 100 MMHG | WEIGHT: 208.6 LBS | BODY MASS INDEX: 30.9 KG/M2 | DIASTOLIC BLOOD PRESSURE: 58 MMHG | HEIGHT: 69 IN

## 2025-03-06 DIAGNOSIS — Z31.430 ENCOUNTER OF FEMALE FOR TESTING FOR GENETIC DISEASE CARRIER STATUS FOR PROCREATIVE MANAGEMENT: ICD-10-CM

## 2025-03-06 DIAGNOSIS — O30.001 TWIN GESTATION IN FIRST TRIMESTER, UNSPECIFIED MULTIPLE GESTATION TYPE: Primary | ICD-10-CM

## 2025-03-06 DIAGNOSIS — Z90.3 H/O GASTRIC SLEEVE: ICD-10-CM

## 2025-03-06 DIAGNOSIS — O09.299 HISTORY OF MACROSOMIA IN INFANT IN PRIOR PREGNANCY, CURRENTLY PREGNANT: ICD-10-CM

## 2025-03-06 DIAGNOSIS — Z76.89 ENCOUNTER FOR SOCIAL WORK INTERVENTION: ICD-10-CM

## 2025-03-06 DIAGNOSIS — O09.291 HISTORY OF PRE-ECLAMPSIA IN PRIOR PREGNANCY, CURRENTLY PREGNANT IN FIRST TRIMESTER: ICD-10-CM

## 2025-03-06 DIAGNOSIS — Z86.39 HISTORY OF TYPE 2 DIABETES MELLITUS: ICD-10-CM

## 2025-03-06 PROCEDURE — 99211 OFF/OP EST MAY X REQ PHY/QHP: CPT

## 2025-03-06 NOTE — PATIENT INSTRUCTIONS
¡¡Felicidades!! Revise nuestra Guía esencial para el embarazo en el sitio web de nuestra red.    [facility] Pregnancy Essentials Guide  [facility] ([email or web address])

## 2025-03-06 NOTE — PROGRESS NOTES
OB INTAKE INTERVIEW 2025    Patient is 29 y.o. who presents for OB intake at 10w1d.  She is not accompanied by anyone during this encounter.  The father of her baby (Tej) is involved in the pregnancy.      Patient's last menstrual period was 2024 (exact date).  Ultrasound: Measured 9 weeks 0 days on 2025 - Baby A and 8 weeks  5 days on 25 - Baby B  Estimated Date of Delivery: 10/01/25 confirmed by dating ultrasound.     Signs/Symptoms of Pregnancy  Current pregnancy symptoms: breast tenderness, fatigue, nausea, and vomiting  Constipation no  Headaches no  Cramping/spotting no  PICA cravings no    Diabetes  Body mass index is 30.8 kg/m².  If patient has 1 or more, please order early 1 hour GTT  History of GDM yes  BMI >35 no  History of PCOS or current metformin use no  History of LGA/macrosomic infant (4000g/9lbs) yes - 10lb baby in 2017    If patient has 2 or more, please order early 1 hour GTT  BMI>30 yes  AMA no  First degree relative with type 2 diabetes yes  History of chronic HTN, hyperlipidemia, elevated A1C yes- Patient with h/o T2DM  High risk race (, , ,  or ) yes    Hypertension  History of of chronic HTN no  History of gestational HTN no  History of preeclampsia, eclampsia, or HELLP syndrome yes - postpartum  History of diabetes yes  History of lupus, sjogrens syndrome, kidney disease no    Thyroid  History of thyroid disease no    Bleeding Disorder or Hx of DVT (Factor V, antithrombin III, prothrombin gene mutation, protein C and S Ag, lupus anticoagulant, anticardiolipin, beta-2 glycoprotein): no    OB/GYN:  History of abnormal pap smear no       Date of last pap smear 2021  History of HPV no  History of Herpes/HSV no  History of other STI (gonorrhea, chlamydia, trich) no  History of prior  no  History of prior  yes  History of  delivery prior to 36 weeks 6 days no  History of blood  transfusion yes  Ok for blood transfusion - yes    Substance screening-   History of tobacco use no  Currently using tobacco no  Substance Use Screen Level - no risk    MRSA Screening-   Does the pt have a hx of MRSA? no    Immunizations:  History of Varicella or Vaccination - unsure  Influenza vaccine given this season NO   Discussed Tdap vaccine YES   COVID Vaccine x2    Genetic/[facility]-  Do you or your partner have a history of any of the following in yourselves or first degree relatives?  Cystic fibrosis no  Spinal muscular atrophy no  Hemoglobinopathy/Sickle Cell/Thalassemia no  Fragile X Intellectual Disability no    Patient does desire testing for Cystic Fibrosis and Spinal Muscular Atrophy.  Orders placed. ACOG patient education provided.    Appointment for Nuchal Translucency Ultrasound at [facility] is scheduled for 3/20.    Interview education  [facility] Pregnancy Essentials Book reviewed, discussed and attached to their AVS YES     Nurse/Family Partnership-patient may qualify YES; referral placed NO. Patient declined at this time.    Prenatal lab work scripts YES    Extra labs ordered: Cystic Fibrosis gene test, Spinal muscular atrophy DNA, Hgb Fractionation Cascade, Varicella zoster antibody IgG, CMP, Protein/creatinine ratio urine, Uric Acid, TSH, and T4 free - h/o gastricc sleeve in 2020. Additional labs ordered.    Aspirin/Preeclampsia Screen    Risk Level Risk Factor Recommendation   LOW Prior Uncomplicated full-term delivery yes No Aspirin recommendation        MODERATE Nulliparity no Recommend low-dose aspirin if     BMI>30 yes 2 or more moderate risk factors    Family History Preeclampsia (mother/sister) no     35yr old or greater no     Black Race, Concern for SDOH/Low Socioeconomic yes     IVF Pregnancy  no     Personal History Risks (low birth weight, prior adverse preg outcome, >10yr preg interval) no         HIGH History of Preeclampsia yes Recommend low-dose aspirin if     Multifetal gestation yes 1  or more high risk factors    Chronic HTN no     Type 1 or 2 Diabetes yes     Renal Disease no     Autoimmune Disease  no      Contraindications to ASA therapy:  NSAID/ ASA allergy: no  Nasal polyps: no  Asthma with history of ASA induced bronchospasm: no  Relative contraindications:  History of GI bleed: no  Active peptic ulcer disease: no  Severe hepatic dysfunction: no    Patient does meet recommendation to take ASA 162mg during this pregnancy from 12-36wks to lower her risk of preeclampsia.  Instructions given and patient verbalized understanding.    The patient has a history now or in prior pregnancy notable for: pre-clampsia, gestational DM, and fetal macrosomia. Baby born at 10lb 10oz    Details that I feel the provider should be aware of: Syed came in for her OB intake at 10w1d. Twin pregnancy. Twins run in her family. Patient c/o breast tenderness, fatigue and N/V. Currently taking Zofran as needed. Vit B6 and Unisom reviewed. ASA therapy reviewed as well. Safe medications to take during pregnancy and warning signs reviewed. Patient with h/o 1LTCS in 2017, GDM, pre-eclampsia, T2DM. Baby born LGA. H/o blood transfusion in postpartum period. Patient desires repeat  section and tubal. Prenatal panel and additional labs ordered due to h/o gastric sleeve. MFM referral placed to meet with dietitians. Patient has NT scheduled with MFM on 3/20. Patient had a positive EPDS in office today. Resources discussed. Patient reports is ok for now but will reach out if she changes her mind. Positive SDOH. Social work referral placed. Declined NFP. Proof of pregnancy and dental letter provided per patient's request.    PN1 visit scheduled. The patient was oriented to our practice, the navigator role, reviewed delivering physicians and [facility] for delivery. All questions were answered.    Interviewed by: Tim Cadet RN

## 2025-03-07 ENCOUNTER — TELEPHONE (OUTPATIENT)
Age: 30
End: 2025-03-07

## 2025-03-07 ENCOUNTER — PATIENT OUTREACH (OUTPATIENT)
Dept: OBGYN CLINIC | Facility: MEDICAL CENTER | Age: 30
End: 2025-03-07

## 2025-03-07 NOTE — PROGRESS NOTES
SW referred for SDOH (food) and EPDS (10). Patient is , [id number] with an REED of 10/1/25. SW called patient with [employer]  #[phone] to complete assessment - patient answered but denies interest in  services or resources at this time. Encouraged her to reach out if she changes her mind later on.     SW closing referral at this time. Please re-refer as needed.

## 2025-03-09 DIAGNOSIS — G43.709 CHRONIC MIGRAINE WITHOUT AURA WITHOUT STATUS MIGRAINOSUS, NOT INTRACTABLE: ICD-10-CM

## 2025-03-10 DIAGNOSIS — G43.709 CHRONIC MIGRAINE WITHOUT AURA WITHOUT STATUS MIGRAINOSUS, NOT INTRACTABLE: ICD-10-CM

## 2025-03-11 ENCOUNTER — APPOINTMENT (OUTPATIENT)
Dept: LAB | Facility: HOSPITAL | Age: 30
End: 2025-03-11
Payer: COMMERCIAL

## 2025-03-11 ENCOUNTER — APPOINTMENT (OUTPATIENT)
Dept: LAB | Facility: HOSPITAL | Age: 30
End: 2025-03-11
Attending: OBSTETRICS & GYNECOLOGY
Payer: COMMERCIAL

## 2025-03-11 DIAGNOSIS — O30.001 TWIN GESTATION IN FIRST TRIMESTER, UNSPECIFIED MULTIPLE GESTATION TYPE: ICD-10-CM

## 2025-03-11 DIAGNOSIS — Z31.430 ENCOUNTER OF FEMALE FOR TESTING FOR GENETIC DISEASE CARRIER STATUS FOR PROCREATIVE MANAGEMENT: ICD-10-CM

## 2025-03-11 DIAGNOSIS — O09.291 HISTORY OF PRE-ECLAMPSIA IN PRIOR PREGNANCY, CURRENTLY PREGNANT IN FIRST TRIMESTER: ICD-10-CM

## 2025-03-11 DIAGNOSIS — Z90.3 H/O GASTRIC SLEEVE: ICD-10-CM

## 2025-03-11 LAB
25(OH)D3 SERPL-MCNC: 12.5 NG/ML (ref 30–100)
ABO GROUP BLD: NORMAL
ALBUMIN SERPL BCG-MCNC: 3.8 G/DL (ref 3.5–5)
ALP SERPL-CCNC: 40 U/L (ref 34–104)
ALT SERPL W P-5'-P-CCNC: 6 U/L (ref 7–52)
ANION GAP SERPL CALCULATED.3IONS-SCNC: 8 MMOL/L (ref 4–13)
AST SERPL W P-5'-P-CCNC: 10 U/L (ref 13–39)
BACTERIA UR QL AUTO: ABNORMAL /HPF
BASOPHILS # BLD AUTO: 0.01 THOUSANDS/ÂΜL (ref 0–0.1)
BASOPHILS NFR BLD AUTO: 0 % (ref 0–1)
BILIRUB SERPL-MCNC: 0.97 MG/DL (ref 0.2–1)
BILIRUB UR QL STRIP: NEGATIVE
BLD GP AB SCN SERPL QL: NEGATIVE
BUN SERPL-MCNC: 6 MG/DL (ref 5–25)
CALCIUM SERPL-MCNC: 8.7 MG/DL (ref 8.4–10.2)
CHLORIDE SERPL-SCNC: 104 MMOL/L (ref 96–108)
CLARITY UR: ABNORMAL
CO2 SERPL-SCNC: 23 MMOL/L (ref 21–32)
COLOR UR: YELLOW
CREAT SERPL-MCNC: 0.56 MG/DL (ref 0.6–1.3)
CREAT UR-MCNC: 163.3 MG/DL
EOSINOPHIL # BLD AUTO: 0.04 THOUSAND/ÂΜL (ref 0–0.61)
EOSINOPHIL NFR BLD AUTO: 1 % (ref 0–6)
ERYTHROCYTE [DISTWIDTH] IN BLOOD BY AUTOMATED COUNT: 12.5 % (ref 11.6–15.1)
EST. AVERAGE GLUCOSE BLD GHB EST-MCNC: 123 MG/DL
FERRITIN SERPL-MCNC: 98 NG/ML (ref 11–307)
GFR SERPL CREATININE-BSD FRML MDRD: 127 ML/MIN/1.73SQ M
GLUCOSE P FAST SERPL-MCNC: 135 MG/DL (ref 65–99)
GLUCOSE UR STRIP-MCNC: ABNORMAL MG/DL
HBA1C MFR BLD: 5.9 %
HCT VFR BLD AUTO: 32.5 % (ref 34.8–46.1)
HGB BLD-MCNC: 10.6 G/DL (ref 11.5–15.4)
HGB UR QL STRIP.AUTO: NEGATIVE
IMM GRANULOCYTES # BLD AUTO: 0.03 THOUSAND/UL (ref 0–0.2)
IMM GRANULOCYTES NFR BLD AUTO: 0 % (ref 0–2)
IRON SATN MFR SERPL: 32 % (ref 15–50)
IRON SERPL-MCNC: 98 UG/DL (ref 50–212)
KETONES UR STRIP-MCNC: NEGATIVE MG/DL
LEUKOCYTE ESTERASE UR QL STRIP: NEGATIVE
LYMPHOCYTES # BLD AUTO: 0.99 THOUSANDS/ÂΜL (ref 0.6–4.47)
LYMPHOCYTES NFR BLD AUTO: 13 % (ref 14–44)
MCH RBC QN AUTO: 29.9 PG (ref 26.8–34.3)
MCHC RBC AUTO-ENTMCNC: 32.6 G/DL (ref 31.4–37.4)
MCV RBC AUTO: 92 FL (ref 82–98)
MONOCYTES # BLD AUTO: 0.36 THOUSAND/ÂΜL (ref 0.17–1.22)
MONOCYTES NFR BLD AUTO: 5 % (ref 4–12)
MUCOUS THREADS UR QL AUTO: ABNORMAL
NEUTROPHILS # BLD AUTO: 6.05 THOUSANDS/ÂΜL (ref 1.85–7.62)
NEUTS SEG NFR BLD AUTO: 81 % (ref 43–75)
NITRITE UR QL STRIP: NEGATIVE
NON-SQ EPI CELLS URNS QL MICRO: ABNORMAL /HPF
NRBC BLD AUTO-RTO: 0 /100 WBCS
PH UR STRIP.AUTO: 6.5 [PH]
PLATELET # BLD AUTO: 140 THOUSANDS/UL (ref 149–390)
PMV BLD AUTO: 12.2 FL (ref 8.9–12.7)
POTASSIUM SERPL-SCNC: 3.7 MMOL/L (ref 3.5–5.3)
PROT SERPL-MCNC: 6.4 G/DL (ref 6.4–8.4)
PROT UR STRIP-MCNC: NEGATIVE MG/DL
PROT UR-MCNC: 11 MG/DL
PROT/CREAT UR: 0.1 MG/G{CREAT} (ref 0–0.1)
RBC # BLD AUTO: 3.55 MILLION/UL (ref 3.81–5.12)
RBC #/AREA URNS AUTO: ABNORMAL /HPF
RH BLD: POSITIVE
RUBV IGG SERPL IA-ACNC: 26.3 IU/ML
SODIUM SERPL-SCNC: 135 MMOL/L (ref 135–147)
SP GR UR STRIP.AUTO: 1.02 (ref 1–1.03)
SPECIMEN EXPIRATION DATE: NORMAL
T4 FREE SERPL-MCNC: 0.79 NG/DL (ref 0.61–1.12)
TIBC SERPL-MCNC: 302.4 UG/DL (ref 250–450)
TRANSFERRIN SERPL-MCNC: 216 MG/DL (ref 203–362)
TSH SERPL DL<=0.05 MIU/L-ACNC: 0.05 UIU/ML (ref 0.45–4.5)
UIBC SERPL-MCNC: 204 UG/DL (ref 155–355)
URATE SERPL-MCNC: 3.5 MG/DL (ref 2–7.5)
UROBILINOGEN UR STRIP-ACNC: <2 MG/DL
VIT B12 SERPL-MCNC: 159 PG/ML (ref 180–914)
WBC # BLD AUTO: 7.48 THOUSAND/UL (ref 4.31–10.16)
WBC #/AREA URNS AUTO: ABNORMAL /HPF

## 2025-03-11 PROCEDURE — 83540 ASSAY OF IRON: CPT

## 2025-03-11 PROCEDURE — 80053 COMPREHEN METABOLIC PANEL: CPT

## 2025-03-11 PROCEDURE — 84443 ASSAY THYROID STIM HORMONE: CPT

## 2025-03-11 PROCEDURE — 86803 HEPATITIS C AB TEST: CPT

## 2025-03-11 PROCEDURE — 86901 BLOOD TYPING SEROLOGIC RH(D): CPT

## 2025-03-11 PROCEDURE — 87389 HIV-1 AG W/HIV-1&-2 AB AG IA: CPT

## 2025-03-11 PROCEDURE — 36415 COLL VENOUS BLD VENIPUNCTURE: CPT

## 2025-03-11 PROCEDURE — 86787 VARICELLA-ZOSTER ANTIBODY: CPT

## 2025-03-11 PROCEDURE — 81001 URINALYSIS AUTO W/SCOPE: CPT

## 2025-03-11 PROCEDURE — 82570 ASSAY OF URINE CREATININE: CPT

## 2025-03-11 PROCEDURE — 83036 HEMOGLOBIN GLYCOSYLATED A1C: CPT

## 2025-03-11 PROCEDURE — 85025 COMPLETE CBC W/AUTO DIFF WBC: CPT

## 2025-03-11 PROCEDURE — 86850 RBC ANTIBODY SCREEN: CPT

## 2025-03-11 PROCEDURE — 84156 ASSAY OF PROTEIN URINE: CPT

## 2025-03-11 PROCEDURE — 83020 HEMOGLOBIN ELECTROPHORESIS: CPT

## 2025-03-11 PROCEDURE — 87086 URINE CULTURE/COLONY COUNT: CPT

## 2025-03-11 PROCEDURE — 87340 HEPATITIS B SURFACE AG IA: CPT

## 2025-03-11 PROCEDURE — 82306 VITAMIN D 25 HYDROXY: CPT

## 2025-03-11 PROCEDURE — 86900 BLOOD TYPING SEROLOGIC ABO: CPT

## 2025-03-11 PROCEDURE — 84630 ASSAY OF ZINC: CPT

## 2025-03-11 PROCEDURE — 3044F HG A1C LEVEL LT 7.0%: CPT | Performed by: OBSTETRICS & GYNECOLOGY

## 2025-03-11 PROCEDURE — 82607 VITAMIN B-12: CPT

## 2025-03-11 PROCEDURE — 82728 ASSAY OF FERRITIN: CPT

## 2025-03-11 PROCEDURE — 86762 RUBELLA ANTIBODY: CPT

## 2025-03-11 PROCEDURE — 87186 SC STD MICRODIL/AGAR DIL: CPT

## 2025-03-11 PROCEDURE — 87147 CULTURE TYPE IMMUNOLOGIC: CPT

## 2025-03-11 PROCEDURE — 86780 TREPONEMA PALLIDUM: CPT

## 2025-03-11 PROCEDURE — 84439 ASSAY OF FREE THYROXINE: CPT

## 2025-03-11 PROCEDURE — 83550 IRON BINDING TEST: CPT

## 2025-03-11 PROCEDURE — 84425 ASSAY OF VITAMIN B-1: CPT

## 2025-03-11 PROCEDURE — 87077 CULTURE AEROBIC IDENTIFY: CPT

## 2025-03-11 PROCEDURE — 86706 HEP B SURFACE ANTIBODY: CPT

## 2025-03-11 PROCEDURE — 84590 ASSAY OF VITAMIN A: CPT

## 2025-03-11 PROCEDURE — 84550 ASSAY OF BLOOD/URIC ACID: CPT

## 2025-03-11 RX ORDER — RIZATRIPTAN BENZOATE 10 MG/1
TABLET ORAL
Qty: 10 TABLET | Refills: 3 | Status: SHIPPED | OUTPATIENT
Start: 2025-03-11

## 2025-03-11 NOTE — TELEPHONE ENCOUNTER
Refill was granted but after further evaluation saw that patient was currently pregnant. Patient needs to follow with neurology and OBGYN before filling the medication. Ideally should not be taking amitriptyline while pregnant.     Maicol Trammell PA-C  03/11/2025

## 2025-03-12 LAB
HBV SURFACE AB SER-ACNC: <3 MIU/ML
HBV SURFACE AG SER QL: NORMAL
HCV AB SER QL: NORMAL
HIV 1+2 AB+HIV1 P24 AG SERPL QL IA: NORMAL
TREPONEMA PALLIDUM IGG+IGM AB [PRESENCE] IN SERUM OR PLASMA BY IMMUNOASSAY: NORMAL
VZV IGG SER QL IA: 5.42 S/CO
VZV IGG SER QL IA: POSITIVE

## 2025-03-13 LAB
BACTERIA UR CULT: ABNORMAL
HGB A MFR BLD: 2.7 % (ref 1.8–3.2)
HGB A MFR BLD: 97.3 % (ref 96.4–98.8)
HGB F MFR BLD: 0 % (ref 0–2)
HGB FRACT BLD-IMP: NORMAL
HGB S MFR BLD: 0 %
ZINC SERPL-MCNC: 56 UG/DL (ref 44–115)

## 2025-03-14 LAB — VIT B1 BLD-SCNC: 68.1 NMOL/L (ref 66.5–200)

## 2025-03-15 LAB — VIT A SERPL-MCNC: 44.3 UG/DL (ref 18.9–57.3)

## 2025-03-17 LAB
CITATION REF LAB TEST: NORMAL
CLINICAL INFO: NORMAL
ETHNIC BACKGROUND STATED: NORMAL
GENE DIS ANL CARRIER INTERP-IMP: NORMAL
GENE MUT TESTED BLD/T: NORMAL
LAB DIRECTOR NAME PROVIDER: NORMAL
REASON FOR REFERRAL (NARRATIVE): NORMAL
RECOMMENDATION PATIENT DOC-IMP: NORMAL
REF LAB TEST METHOD: NORMAL
SERVICE CMNT-IMP: NORMAL
SMN1 GENE MUT ANL BLD/T: NORMAL
SPECIMEN SOURCE: NORMAL

## 2025-03-18 ENCOUNTER — HOSPITAL ENCOUNTER (EMERGENCY)
Facility: HOSPITAL | Age: 30
Discharge: HOME | End: 2025-03-18
Attending: EMERGENCY MEDICINE | Admitting: EMERGENCY MEDICINE
Payer: COMMERCIAL

## 2025-03-18 VITALS
HEART RATE: 87 BPM | TEMPERATURE: 98.3 F | BODY MASS INDEX: 31.16 KG/M2 | DIASTOLIC BLOOD PRESSURE: 50 MMHG | WEIGHT: 210.98 LBS | SYSTOLIC BLOOD PRESSURE: 105 MMHG | OXYGEN SATURATION: 100 % | RESPIRATION RATE: 16 BRPM

## 2025-03-18 DIAGNOSIS — R10.9 ABDOMINAL PAIN: Primary | ICD-10-CM

## 2025-03-18 DIAGNOSIS — R82.71 BACTERIURIA IN PREGNANCY: ICD-10-CM

## 2025-03-18 DIAGNOSIS — R11.2 NAUSEA AND VOMITING: ICD-10-CM

## 2025-03-18 DIAGNOSIS — O99.891 BACTERIURIA IN PREGNANCY: ICD-10-CM

## 2025-03-18 DIAGNOSIS — E83.42 HYPOMAGNESEMIA: ICD-10-CM

## 2025-03-18 LAB
ALBUMIN SERPL BCG-MCNC: 3.6 G/DL (ref 3.5–5)
ALP SERPL-CCNC: 50 U/L (ref 34–104)
ALT SERPL W P-5'-P-CCNC: 13 U/L (ref 7–52)
ANION GAP SERPL CALCULATED.3IONS-SCNC: 6 MMOL/L (ref 4–13)
AST SERPL W P-5'-P-CCNC: 13 U/L (ref 13–39)
BASOPHILS # BLD AUTO: 0.01 THOUSANDS/ÂΜL (ref 0–0.1)
BASOPHILS NFR BLD AUTO: 0 % (ref 0–1)
BILIRUB DIRECT SERPL-MCNC: 0.12 MG/DL (ref 0–0.2)
BILIRUB SERPL-MCNC: 0.68 MG/DL (ref 0.2–1)
BILIRUB UR QL STRIP: NEGATIVE
BUN SERPL-MCNC: 6 MG/DL (ref 5–25)
CALCIUM SERPL-MCNC: 8.6 MG/DL (ref 8.4–10.2)
CHLORIDE SERPL-SCNC: 103 MMOL/L (ref 96–108)
CLARITY UR: CLEAR
CO2 SERPL-SCNC: 24 MMOL/L (ref 21–32)
COLOR UR: YELLOW
CREAT SERPL-MCNC: 0.48 MG/DL (ref 0.6–1.3)
EOSINOPHIL # BLD AUTO: 0.06 THOUSAND/ÂΜL (ref 0–0.61)
EOSINOPHIL NFR BLD AUTO: 1 % (ref 0–6)
ERYTHROCYTE [DISTWIDTH] IN BLOOD BY AUTOMATED COUNT: 12.8 % (ref 11.6–15.1)
EXT PREGNANCY TEST URINE: POSITIVE
EXT. CONTROL: ABNORMAL
GFR SERPL CREATININE-BSD FRML MDRD: 133 ML/MIN/1.73SQ M
GLUCOSE SERPL-MCNC: 133 MG/DL (ref 65–140)
GLUCOSE UR STRIP-MCNC: NEGATIVE MG/DL
HCT VFR BLD AUTO: 33.4 % (ref 34.8–46.1)
HGB BLD-MCNC: 11 G/DL (ref 11.5–15.4)
HGB UR QL STRIP.AUTO: NEGATIVE
IMM GRANULOCYTES # BLD AUTO: 0.03 THOUSAND/UL (ref 0–0.2)
IMM GRANULOCYTES NFR BLD AUTO: 1 % (ref 0–2)
KETONES UR STRIP-MCNC: ABNORMAL MG/DL
LEUKOCYTE ESTERASE UR QL STRIP: NEGATIVE
LIPASE SERPL-CCNC: 12 U/L (ref 11–82)
LYMPHOCYTES # BLD AUTO: 0.75 THOUSANDS/ÂΜL (ref 0.6–4.47)
LYMPHOCYTES NFR BLD AUTO: 12 % (ref 14–44)
MAGNESIUM SERPL-MCNC: 1.6 MG/DL (ref 1.9–2.7)
MCH RBC QN AUTO: 29.5 PG (ref 26.8–34.3)
MCHC RBC AUTO-ENTMCNC: 32.9 G/DL (ref 31.4–37.4)
MCV RBC AUTO: 90 FL (ref 82–98)
MONOCYTES # BLD AUTO: 0.56 THOUSAND/ÂΜL (ref 0.17–1.22)
MONOCYTES NFR BLD AUTO: 9 % (ref 4–12)
NEUTROPHILS # BLD AUTO: 4.9 THOUSANDS/ÂΜL (ref 1.85–7.62)
NEUTS SEG NFR BLD AUTO: 77 % (ref 43–75)
NITRITE UR QL STRIP: NEGATIVE
NRBC BLD AUTO-RTO: 0 /100 WBCS
PH UR STRIP.AUTO: 6 [PH] (ref 4.5–8)
PLATELET # BLD AUTO: 143 THOUSANDS/UL (ref 149–390)
PMV BLD AUTO: 11.7 FL (ref 8.9–12.7)
POTASSIUM SERPL-SCNC: 3.5 MMOL/L (ref 3.5–5.3)
PROT SERPL-MCNC: 6.6 G/DL (ref 6.4–8.4)
PROT UR STRIP-MCNC: NEGATIVE MG/DL
RBC # BLD AUTO: 3.73 MILLION/UL (ref 3.81–5.12)
SODIUM SERPL-SCNC: 133 MMOL/L (ref 135–147)
SP GR UR STRIP.AUTO: >=1.03 (ref 1–1.03)
UROBILINOGEN UR QL STRIP.AUTO: 0.2 E.U./DL
WBC # BLD AUTO: 6.31 THOUSAND/UL (ref 4.31–10.16)

## 2025-03-18 PROCEDURE — 83735 ASSAY OF MAGNESIUM: CPT | Performed by: EMERGENCY MEDICINE

## 2025-03-18 PROCEDURE — 87086 URINE CULTURE/COLONY COUNT: CPT

## 2025-03-18 PROCEDURE — 87147 CULTURE TYPE IMMUNOLOGIC: CPT

## 2025-03-18 PROCEDURE — 80048 BASIC METABOLIC PNL TOTAL CA: CPT | Performed by: EMERGENCY MEDICINE

## 2025-03-18 PROCEDURE — 87077 CULTURE AEROBIC IDENTIFY: CPT

## 2025-03-18 PROCEDURE — 99284 EMERGENCY DEPT VISIT MOD MDM: CPT

## 2025-03-18 PROCEDURE — 83690 ASSAY OF LIPASE: CPT | Performed by: EMERGENCY MEDICINE

## 2025-03-18 PROCEDURE — 96365 THER/PROPH/DIAG IV INF INIT: CPT

## 2025-03-18 PROCEDURE — 80076 HEPATIC FUNCTION PANEL: CPT | Performed by: EMERGENCY MEDICINE

## 2025-03-18 PROCEDURE — 99284 EMERGENCY DEPT VISIT MOD MDM: CPT | Performed by: EMERGENCY MEDICINE

## 2025-03-18 PROCEDURE — 87186 SC STD MICRODIL/AGAR DIL: CPT

## 2025-03-18 PROCEDURE — 96375 TX/PRO/DX INJ NEW DRUG ADDON: CPT

## 2025-03-18 PROCEDURE — 81025 URINE PREGNANCY TEST: CPT | Performed by: EMERGENCY MEDICINE

## 2025-03-18 PROCEDURE — 81003 URINALYSIS AUTO W/O SCOPE: CPT

## 2025-03-18 PROCEDURE — 85025 COMPLETE CBC W/AUTO DIFF WBC: CPT | Performed by: EMERGENCY MEDICINE

## 2025-03-18 PROCEDURE — 36415 COLL VENOUS BLD VENIPUNCTURE: CPT | Performed by: EMERGENCY MEDICINE

## 2025-03-18 RX ORDER — FAMOTIDINE 10 MG/ML
20 INJECTION, SOLUTION INTRAVENOUS ONCE
Status: COMPLETED | OUTPATIENT
Start: 2025-03-18 | End: 2025-03-18

## 2025-03-18 RX ORDER — FAMOTIDINE 20 MG/1
20 TABLET, FILM COATED ORAL 2 TIMES DAILY
Qty: 30 TABLET | Refills: 0 | Status: SHIPPED | OUTPATIENT
Start: 2025-03-18

## 2025-03-18 RX ORDER — MAGNESIUM HYDROXIDE/ALUMINUM HYDROXICE/SIMETHICONE 120; 1200; 1200 MG/30ML; MG/30ML; MG/30ML
30 SUSPENSION ORAL ONCE
Status: COMPLETED | OUTPATIENT
Start: 2025-03-18 | End: 2025-03-18

## 2025-03-18 RX ORDER — LIDOCAINE HYDROCHLORIDE 20 MG/ML
15 SOLUTION OROPHARYNGEAL ONCE
Status: COMPLETED | OUTPATIENT
Start: 2025-03-18 | End: 2025-03-18

## 2025-03-18 RX ORDER — DIPHENHYDRAMINE HYDROCHLORIDE 50 MG/ML
25 INJECTION, SOLUTION INTRAMUSCULAR; INTRAVENOUS ONCE
Status: COMPLETED | OUTPATIENT
Start: 2025-03-18 | End: 2025-03-18

## 2025-03-18 RX ORDER — SUCRALFATE 1 G/1
1 TABLET ORAL 4 TIMES DAILY
Qty: 40 TABLET | Refills: 0 | Status: SHIPPED | OUTPATIENT
Start: 2025-03-18

## 2025-03-18 RX ORDER — ONDANSETRON 4 MG/1
4 TABLET, ORALLY DISINTEGRATING ORAL EVERY 6 HOURS PRN
Qty: 20 TABLET | Refills: 0 | Status: SHIPPED | OUTPATIENT
Start: 2025-03-18

## 2025-03-18 RX ORDER — METOCLOPRAMIDE HYDROCHLORIDE 5 MG/ML
10 INJECTION INTRAMUSCULAR; INTRAVENOUS ONCE
Status: COMPLETED | OUTPATIENT
Start: 2025-03-18 | End: 2025-03-18

## 2025-03-18 RX ADMIN — MAGNESIUM 64 MG (MAGNESIUM CHLORIDE) TABLET,DELAYED RELEASE 64 MG: at 19:03

## 2025-03-18 RX ADMIN — DIPHENHYDRAMINE HYDROCHLORIDE 25 MG: 50 INJECTION, SOLUTION INTRAMUSCULAR; INTRAVENOUS at 17:15

## 2025-03-18 RX ADMIN — LIDOCAINE HYDROCHLORIDE 15 ML: 20 SOLUTION ORAL at 18:10

## 2025-03-18 RX ADMIN — FAMOTIDINE 20 MG: 10 INJECTION, SOLUTION INTRAVENOUS at 17:14

## 2025-03-18 RX ADMIN — METOCLOPRAMIDE 10 MG: 5 INJECTION, SOLUTION INTRAMUSCULAR; INTRAVENOUS at 17:17

## 2025-03-18 RX ADMIN — ALUMINUM HYDROXIDE, MAGNESIUM HYDROXIDE, AND SIMETHICONE 30 ML: 200; 200; 20 SUSPENSION ORAL at 18:10

## 2025-03-18 RX ADMIN — DEXTROSE AND SODIUM CHLORIDE 1000 ML: 5; .9 INJECTION, SOLUTION INTRAVENOUS at 17:18

## 2025-03-18 NOTE — ED PROVIDER NOTES
Time reflects when diagnosis was documented in both MDM as applicable and the Disposition within this note       Time User Action Codes Description Comment    3/18/2025  6:56 PM Willie Hansen Add [R10.9] Abdominal pain     3/18/2025  6:56 PM Willie Hansen Add [R11.2] Nausea and vomiting     3/18/2025  6:56 PM Willie Hansen Add [E83.42] Hypomagnesemia           ED Disposition       ED Disposition   Discharge    Condition   Stable    Date/Time   Tue Mar 18, 2025  6:56 PM    Comment   Syed Taveras discharge to home/self care.                   Assessment & Plan       Medical Decision Making  1. Abdominal pain - Upper abdominal pain. Will check CBC for leukocytosis, metabolic panel for electrolyte abnormalities and dehydration,  LFT's to assess GB dysfunction, lipase for pancreatitis, urine for infection. Will give IV fluids containing dextrose, Reglan for nausea. Will give IV pepcid, GI cocktail. Bedside ultrasound.    Problems Addressed:  Abdominal pain: acute illness or injury  Hypomagnesemia: acute illness or injury  Nausea and vomiting: acute illness or injury    Amount and/or Complexity of Data Reviewed  Labs: ordered. Decision-making details documented in ED Course.    Risk  OTC drugs.  Prescription drug management.        ED Course as of 03/19/25 0948   Tue Mar 18, 2025   1854 Patient feeling improved after GI cocktail.    1855 MAGNESIUM(!): 1.6  Will give oral magnesium       Medications   metoclopramide (REGLAN) injection 10 mg (10 mg Intravenous Given 3/18/25 1717)   diphenhydrAMINE (BENADRYL) injection 25 mg (25 mg Intravenous Given 3/18/25 1715)   dextrose 5 % and sodium chloride 0.9 % bolus 1,000 mL (0 mL Intravenous Stopped 3/18/25 1811)   Famotidine (PF) (PEPCID) injection 20 mg (20 mg Intravenous Given 3/18/25 1714)   aluminum-magnesium hydroxide-simethicone (MAALOX) oral suspension 30 mL (30 mL Oral Given 3/18/25 1810)   Lidocaine Viscous HCl (XYLOCAINE) 2 % mucosal solution 15 mL (15 mL  Swish & Spit Given 3/18/25 1810)   magnesium chloride (MAG64) EC tablet TBEC 64 mg (64 mg Oral Given 3/18/25 1903)       ED Risk Strat Scores                            SBIRT 22yo+      Flowsheet Row Most Recent Value   Initial Alcohol Screen: US AUDIT-C     1. How often do you have a drink containing alcohol? 0 Filed at: 2025   2. How many drinks containing alcohol do you have on a typical day you are drinking?  0 Filed at: 2025   3b. FEMALE Any Age, or MALE 65+: How often do you have 4 or more drinks on one occassion? 0 Filed at: 2025   Audit-C Score 0 Filed at: 2025   CHERRI: How many times in the past year have you...    Used an illegal drug or used a prescription medication for non-medical reasons? Never Filed at: 2025                            History of Present Illness       Chief Complaint   Patient presents with    Diarrhea    Abdominal Pain     Pt reports abdominal pain(burning feeling) started yesterday, diarrhea and vomiting since this morning, loss of appetite, pt reports 12 weeks pregnant.       Past Medical History:   Diagnosis Date    Diabetes mellitus (HCC)     History of delivery of macrosomal infant 2017    History of gestational diabetes 2017    History of pre-eclampsia 2017    History of transfusion     after  - had fever with transfusion    Nonintractable headache 2024    Obesity (BMI 35.0-39.9 without comorbidity)     Peroneal tendon injury, left, initial encounter 11/10/2023    S/P laparoscopic sleeve gastrectomy 2020    Sprain of anterior talofibular ligament of left ankle, initial encounter 11/10/2023    Uses Latvian as primary spoken language       Past Surgical History:   Procedure Laterality Date     SECTION  2017    CHOLECYSTECTOMY      MN LAPS GSTRC RSTRICTIV PX LONGITUDINAL GASTRECTOMY N/A 2020    Procedure: LAP SLEEVE GASTRECTOMY, INTRAOP EGD;  Surgeon: Da Rogers MD;  Location: [facility]  Main OR;  Service: Bariatrics      Family History   Problem Relation Age of Onset    Diabetes Mother     Diabetes Father     Diabetes Sister     Sleep apnea Sister     Sleep apnea Brother         2 of the 3 borthers have EAGLE    No Known Problems Brother     No Known Problems Son     Diabetes Maternal Grandmother     Skin cancer Maternal Grandmother     No Known Problems Maternal Grandfather     Diabetes Paternal Grandmother     No Known Problems Paternal Grandfather     No Known Problems Maternal Aunt     No Known Problems Maternal Aunt     No Known Problems Paternal Aunt     No Known Problems Paternal Aunt     Heart disease Family         CARDIOVASCULAR DISEASE    Breast cancer Neg Hx     Colon cancer Neg Hx     Ovarian cancer Neg Hx       Social History     Tobacco Use    Smoking status: Never     Passive exposure: Never    Smokeless tobacco: Never   Vaping Use    Vaping status: Never Used   Substance Use Topics    Alcohol use: No    Drug use: No      E-Cigarette/Vaping    E-Cigarette Use Never User       E-Cigarette/Vaping Substances    Nicotine No     THC No     CBD No     Flavoring No     Other No     Unknown No       I have reviewed and agree with the history as documented.     28 YO female, ~12 weeks pregnant, presents with nausea, vomiting, diarrhea and abdominal pain that began yesterday. States she has not been tolerating PO. She states abdominal pain is primarily in the upper abdomen, constant, sharp, non-radiating. She denies known sick contacts or suspicious food intake. She denies vaginal bleeding or lower abdominal discomfort, no concern regarding the pregnancy. Pt denies CP/SOB/F/C, no dysuria, burning on urination or blood in urine.       History provided by:  Patient   used: No        Review of Systems   Constitutional:  Negative for chills, fatigue and fever.   HENT:  Negative for dental problem.    Eyes:  Negative for visual disturbance.   Respiratory:  Negative for shortness  of breath.    Cardiovascular:  Negative for chest pain.   Gastrointestinal:  Positive for abdominal pain, diarrhea, nausea and vomiting.   Genitourinary:  Negative for dysuria and frequency.   Musculoskeletal:  Negative for arthralgias.   Skin:  Negative for rash.   Neurological:  Negative for dizziness, weakness and light-headedness.   Psychiatric/Behavioral:  Negative for agitation, behavioral problems and confusion.    All other systems reviewed and are negative.          Objective       ED Triage Vitals   Temperature Pulse Blood Pressure Respirations SpO2 Patient Position - Orthostatic VS   03/18/25 1609 03/18/25 1609 03/18/25 1611 03/18/25 1609 03/18/25 1609 03/18/25 1609   98.3 °F (36.8 °C) 94 117/60 18 98 % Sitting      Temp Source Heart Rate Source BP Location FiO2 (%) Pain Score    03/18/25 1609 03/18/25 1906 03/18/25 1609 -- 03/18/25 1906    Oral Monitor Right arm  No Pain      Vitals      Date and Time Temp Pulse SpO2 Resp BP Pain Score FACES Pain Rating User   03/18/25 1906 -- 87 100 % 16 105/50 No Pain -- AB   03/18/25 1611 -- -- -- -- 117/60 -- -- [place]   03/18/25 1609 98.3 °F (36.8 °C) 94 98 % 18 -- -- -- [place]            Physical Exam  Vitals and nursing note reviewed.   Constitutional:       Appearance: Normal appearance.      Comments: Appears uncomfortable.   HENT:      Head: Normocephalic and atraumatic.   Eyes:      Extraocular Movements: Extraocular movements intact.      Conjunctiva/sclera: Conjunctivae normal.   Cardiovascular:      Rate and Rhythm: Normal rate.   Pulmonary:      Effort: Pulmonary effort is normal.   Abdominal:      General: There is no distension.      Tenderness: There is abdominal tenderness in the epigastric area. There is no guarding or rebound.   Musculoskeletal:         General: Normal range of motion.      Cervical back: Normal range of motion.   Skin:     Findings: No rash.   Neurological:      General: No focal deficit present.      Mental Status: She is alert.       Cranial Nerves: No cranial nerve deficit.   Psychiatric:         Mood and Affect: Mood normal.         Results Reviewed       Procedure Component Value Units Date/Time    Basic metabolic panel [[id number]]  (Abnormal) Collected: 03/18/25 1714    Lab Status: Final result Specimen: Blood from Arm, Right Updated: 03/18/25 1743     Sodium 133 mmol/L      Potassium 3.5 mmol/L      Chloride 103 mmol/L      CO2 24 mmol/L      ANION GAP 6 mmol/L      BUN 6 mg/dL      Creatinine 0.48 mg/dL      Glucose 133 mg/dL      Calcium 8.6 mg/dL      eGFR 133 ml/min/1.73sq m     Narrative:      National Kidney Disease Foundation guidelines for Chronic Kidney Disease (CKD):     Stage 1 with normal or high GFR (GFR > 90 mL/min/1.73 square meters)    Stage 2 Mild CKD (GFR = 60-89 mL/min/1.73 square meters)    Stage 3A Moderate CKD (GFR = 45-59 mL/min/1.73 square meters)    Stage 3B Moderate CKD (GFR = 30-44 mL/min/1.73 square meters)    Stage 4 Severe CKD (GFR = 15-29 mL/min/1.73 square meters)    Stage 5 End Stage CKD (GFR <15 mL/min/1.73 square meters)  Note: GFR calculation is accurate only with a steady state creatinine    Hepatic function panel [[id number]]  (Normal) Collected: 03/18/25 1714    Lab Status: Final result Specimen: Blood from Arm, Right Updated: 03/18/25 1743     Total Bilirubin 0.68 mg/dL      Bilirubin, Direct 0.12 mg/dL      Alkaline Phosphatase 50 U/L      AST 13 U/L      ALT 13 U/L      Total Protein 6.6 g/dL      Albumin 3.6 g/dL     Magnesium [[id number]]  (Abnormal) Collected: 03/18/25 1714    Lab Status: Final result Specimen: Blood from Arm, Right Updated: 03/18/25 1743     Magnesium 1.6 mg/dL     Lipase [[id number]]  (Normal) Collected: 03/18/25 1714    Lab Status: Final result Specimen: Blood from Arm, Right Updated: 03/18/25 1743     Lipase 12 u/L     Urine culture [[id number]] Collected: 03/18/25 1723    Lab Status: In process Specimen: Urine, Other Updated: 03/18/25 1728    CBC and differential [[id number]]   (Abnormal) Collected: 03/18/25 1714    Lab Status: Final result Specimen: Blood from Arm, Right Updated: 03/18/25 1726     WBC 6.31 Thousand/uL      RBC 3.73 Million/uL      Hemoglobin 11.0 g/dL      Hematocrit 33.4 %      MCV 90 fL      MCH 29.5 pg      MCHC 32.9 g/dL      RDW 12.8 %      MPV 11.7 fL      Platelets 143 Thousands/uL      nRBC 0 /100 WBCs      Segmented % 77 %      Immature Grans % 1 %      Lymphocytes % 12 %      Monocytes % 9 %      Eosinophils Relative 1 %      Basophils Relative 0 %      Absolute Neutrophils 4.90 Thousands/µL      Absolute Immature Grans 0.03 Thousand/uL      Absolute Lymphocytes 0.75 Thousands/µL      Absolute Monocytes 0.56 Thousand/µL      Eosinophils Absolute 0.06 Thousand/µL      Basophils Absolute 0.01 Thousands/µL     Urine Macroscopic, POC [[id number]]  (Abnormal) Collected: 03/18/25 1723    Lab Status: Final result Specimen: Urine Updated: 03/18/25 1725     Color, UA Yellow     Clarity, UA Clear     pH, UA 6.0     Leukocytes, UA Negative     Nitrite, UA Negative     Protein, UA Negative mg/dl      Glucose, UA Negative mg/dl      Ketones, UA Trace mg/dl      Urobilinogen, UA 0.2 E.U./dl      Bilirubin, UA Negative     Occult Blood, UA Negative     Specific Gravity, UA >=1.030    Narrative:      CLINITEK RESULT    POCT pregnancy, urine [[id number]]  (Abnormal) Collected: 03/18/25 1711    Lab Status: Final result Updated: 03/18/25 1711     EXT Preg Test, Ur Positive     Control Valid            No orders to display       Procedures    ED Medication and Procedure Management   Prior to Admission Medications   Prescriptions Last Dose Informant Patient Reported? Taking?   Prenatal MV & Min w/FA-DHA (Prenatal Gummies) 0.18-25 MG CHEW   No No   Sig: Chew 1 gum in the morning   Prenatal Vit-Fe Fumarate-FA (Prenatabs FA) 29-1 MG TABS  Self No No   Sig: Take 1 tablet by mouth in the morning   Patient not taking: Reported on 2/10/2025   famotidine (PEPCID) 20 mg tablet   No No   Sig:  Take 1 tablet (20 mg total) by mouth 2 (two) times a day   ondansetron (ZOFRAN-ODT) 4 mg disintegrating tablet   No No   Sig: Take 1 tablet (4 mg total) by mouth every 6 (six) hours as needed for nausea or vomiting   promethazine (PHENERGAN) 12.5 mg suppository   No No   Sig: Insert 1 suppository (12.5 mg total) into the rectum every 6 (six) hours as needed for nausea or vomiting   Patient not taking: Reported on 3/6/2025   rizatriptan (MAXALT) 10 mg tablet   No No   Sig: TAKE 1 TABLET BY MOUTH AS NEEDED FOR MIGRAINE TAKE AT THE ONSET OF MIGRAINE IF SYMPTOMS CONTINUE OR RETURN, MAY TAKE ANOTHER DOSE AT LEAST 2 HOURS AFTER FIRST DOSE. TAKE NO MORE THAN 2 DOSES IN A DAY.   semaglutide, 1 mg/dose, (Ozempic) 4 mg/3 mL injection pen   No No   Sig: Inject 0.75 mL (1 mg total) under the skin once a week   Patient not taking: Reported on 2/10/2025      Facility-Administered Medications Last Administration Doses Remaining   cyanocobalamin injection 1,000 mcg 1/20/2025  3:44 PM         Discharge Medication List as of 3/18/2025  6:58 PM        START taking these medications    Details   !! famotidine (PEPCID) 20 mg tablet Take 1 tablet (20 mg total) by mouth 2 (two) times a day, Starting Tue 3/18/2025, Normal      magnesium gluconate (MAGONATE) 500 mg tablet Take 1 tablet (500 mg total) by mouth 2 (two) times a day, Starting Tue 3/18/2025, Normal      !! ondansetron (ZOFRAN-ODT) 4 mg disintegrating tablet Take 1 tablet (4 mg total) by mouth every 6 (six) hours as needed for nausea, Starting Tue 3/18/2025, Normal      sucralfate (CARAFATE) 1 g tablet Take 1 tablet (1 g total) by mouth 4 (four) times a day, Starting Tue 3/18/2025, Normal       !! - Potential duplicate medications found. Please discuss with provider.        CONTINUE these medications which have NOT CHANGED    Details   !! famotidine (PEPCID) 20 mg tablet Take 1 tablet (20 mg total) by mouth 2 (two) times a day, Starting Sat 2/22/2025, Normal      !! ondansetron  (ZOFRAN-ODT) 4 mg disintegrating tablet Take 1 tablet (4 mg total) by mouth every 6 (six) hours as needed for nausea or vomiting, Starting Fri 2/28/2025, Normal      Prenatal MV & Min w/FA-DHA (Prenatal Gummies) 0.18-25 MG CHEW Chew 1 gum in the morning, Starting Mon 2/10/2025, Normal      Prenatal Vit-Fe Fumarate-FA (Prenatabs FA) 29-1 MG TABS Take 1 tablet by mouth in the morning, Starting Tue 7/16/2024, Normal      promethazine (PHENERGAN) 12.5 mg suppository Insert 1 suppository (12.5 mg total) into the rectum every 6 (six) hours as needed for nausea or vomiting, Starting Wed 2/26/2025, Normal      rizatriptan (MAXALT) 10 mg tablet TAKE 1 TABLET BY MOUTH AS NEEDED FOR MIGRAINE TAKE AT THE ONSET OF MIGRAINE IF SYMPTOMS CONTINUE OR RETURN, MAY TAKE ANOTHER DOSE AT LEAST 2 HOURS AFTER FIRST DOSE. TAKE NO MORE THAN 2 DOSES IN A DAY., Normal      semaglutide, 1 mg/dose, (Ozempic) 4 mg/3 mL injection pen Inject 0.75 mL (1 mg total) under the skin once a week, Starting Thu 10/17/2024, Normal       !! - Potential duplicate medications found. Please discuss with provider.        No discharge procedures on file.  ED SEPSIS DOCUMENTATION   Time reflects when diagnosis was documented in both MDM as applicable and the Disposition within this note       Time User Action Codes Description Comment    3/18/2025  6:56 PM Willie Hansen Add [R10.9] Abdominal pain     3/18/2025  6:56 PM Willie Hansen Add [R11.2] Nausea and vomiting     3/18/2025  6:56 PM Willie Hansen Add [E83.42] Hypomagnesemia                  Willie Hansen MD  03/19/25 2165

## 2025-03-18 NOTE — DISCHARGE INSTRUCTIONS
Take the pepcid regularly, twice daily.    Use the carafate 15 minutes before meals, this is a coating agent to help protect your stomach. This may work better if crushed up and added to a small amount of water.    Take the zofran as needed for nausea, this can be placed under the tongue to dissolve if you are vomiting.    Take the Magnesium twice daily for the next 10 days.    Follow up with your OB for further care throughout your pregnancy.    [place] Pepcid regularmente, dos veces al día.     Use el carafate 15 minutos antes de las comidas; anand es un agente de recubrimiento que ayuda a proteger el estómago.     Puede ser más efectivo si se tritura y se agrega a un poco de agua.     [place] Zofran según sea necesario para las náuseas; puede colocarlo debajo de la lengua para que se disuelva si vomita. [place] el magnesio dos veces al día phyllis los próximos 10 días.     Consulte con escamilla ginecólogo para recibir atención adicional phyllis el embarazo.

## 2025-03-19 ENCOUNTER — RESULTS FOLLOW-UP (OUTPATIENT)
Dept: EMERGENCY DEPT | Facility: HOSPITAL | Age: 30
End: 2025-03-19

## 2025-03-20 ENCOUNTER — ROUTINE PRENATAL (OUTPATIENT)
Dept: PERINATAL CARE | Facility: CLINIC | Age: 30
End: 2025-03-20
Payer: COMMERCIAL

## 2025-03-20 VITALS
HEART RATE: 87 BPM | HEIGHT: 69 IN | SYSTOLIC BLOOD PRESSURE: 100 MMHG | WEIGHT: 209.8 LBS | DIASTOLIC BLOOD PRESSURE: 58 MMHG | BODY MASS INDEX: 31.07 KG/M2

## 2025-03-20 DIAGNOSIS — O24.119 TYPE 2 DIABETES MELLITUS AFFECTING PREGNANCY, ANTEPARTUM: ICD-10-CM

## 2025-03-20 DIAGNOSIS — Z36.0 ENCOUNTER FOR ANTENATAL SCREENING FOR CHROMOSOMAL ANOMALIES: ICD-10-CM

## 2025-03-20 DIAGNOSIS — Z3A.12 12 WEEKS GESTATION OF PREGNANCY: Primary | ICD-10-CM

## 2025-03-20 DIAGNOSIS — Z91.89 AT RISK FOR HYPERTENSION: ICD-10-CM

## 2025-03-20 DIAGNOSIS — Z34.91 FIRST TRIMESTER PREGNANCY: ICD-10-CM

## 2025-03-20 DIAGNOSIS — O30.031 MONOCHORIONIC DIAMNIOTIC TWIN GESTATION IN FIRST TRIMESTER: ICD-10-CM

## 2025-03-20 DIAGNOSIS — Z36.82 ENCOUNTER FOR NUCHAL TRANSLUCENCY TESTING: ICD-10-CM

## 2025-03-20 LAB — BACTERIA UR CULT: ABNORMAL

## 2025-03-20 PROCEDURE — 76813 OB US NUCHAL MEAS 1 GEST: CPT | Performed by: OBSTETRICS & GYNECOLOGY

## 2025-03-20 PROCEDURE — 76802 OB US < 14 WKS ADDL FETUS: CPT | Performed by: OBSTETRICS & GYNECOLOGY

## 2025-03-20 PROCEDURE — 99244 OFF/OP CNSLTJ NEW/EST MOD 40: CPT | Performed by: OBSTETRICS & GYNECOLOGY

## 2025-03-20 PROCEDURE — 76801 OB US < 14 WKS SINGLE FETUS: CPT | Performed by: OBSTETRICS & GYNECOLOGY

## 2025-03-20 PROCEDURE — 76814 OB US NUCHAL MEAS ADD-ON: CPT | Performed by: OBSTETRICS & GYNECOLOGY

## 2025-03-20 PROCEDURE — 36415 COLL VENOUS BLD VENIPUNCTURE: CPT | Performed by: OBSTETRICS & GYNECOLOGY

## 2025-03-20 RX ORDER — ASPIRIN 81 MG/1
162 TABLET ORAL DAILY
Qty: 90 TABLET | Refills: 2 | Status: SHIPPED | OUTPATIENT
Start: 2025-03-20

## 2025-03-20 RX ORDER — NITROFURANTOIN 25; 75 MG/1; MG/1
100 CAPSULE ORAL 2 TIMES DAILY
Qty: 10 CAPSULE | Refills: 0 | Status: SHIPPED | OUTPATIENT
Start: 2025-03-20

## 2025-03-20 NOTE — PROGRESS NOTES
Please note, I communicated the patient utilizing  [id number].    Syed presents today for a twin genetic screening ultrasound.  This is her second pregnancy.  She delivered via  section a significantly macrosomic male infant who weighed 10 pounds 10 ounces in 2017.  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  Her only echocardiogram in our system that I can see is from 2019 which demonstrated normal cardiac function and an ejection fraction of 60%; therefore, I question the accuracy of the diagnosis of postpartum cardiomyopathy.  Irregardless, I recommend a repeat echocardiogram given that it has been 5 years since that evaluation.  The patient also had A2 gestational diabetes with her first pregnancy.  She had significant obesity and underwent a gastric sleeve procedure in .  Her diabetes diagnosis improved; however, her A1c increased to 7.4% in .  She was placed on Ozempic and had improvement in both weight loss and A1c most recently 5.9%.    This was a spontaneously conceived monochorionic twin pregnancy.    There appeared to be initially separate placentas however there is a thin dividing membrane and first trimester ultrasound is consistent with a monochorionic twin pregnancy.  There appears to be a posterior accessory lobe with possible velamentous insertion as the PCI for twin #1 appears to cross the cervix.  Is very difficult to evaluate at this gestational age and this will be followed up closely at subsequent ultrasounds.  The majority of the placenta is anterior and the PCI for twin #2 emanates from the main anterior placenta.  We reviewed that this may be a significant risk factor for pregnancy outcomes and follow-up ultrasounds will be performed to help elucidate the concern for possible vasa previa.    We discussed the options for genetic screening, including but not limited to first trimester screening, second trimester screening,  combined first and second trimester screening, noninvasive prenatal screening (NIPS) for patients at high risk and diagnostic screening through the use of CVS and amniocentesis. We discussed the risks and benefits of each approach including the sensitivities and false positive rates as well as the difference between a screening test and a diagnostic test. At the conclusion of our discussion the patient elected noninvasive prenatal screening utilizing the MaterniT 21 plus test. The patient had this blood work drawn in the office.   The results should be available in approximately 7-10 days.    We discussed the increased maternal and fetal risks with multiple gestations.  We reviewed specifically the increased risks associated with monochorionic twins which include, but are not limited to, increased risk for congenital birth defects and heart defects requiring fetal echocardiogram.  We reviewed the increased risks of twin-twin transfusion and twin anemia polycythemia sequence which occurs approximately 10 to 15% of all monochorionic twins.  We reviewed typical monitoring plan for monochorionic twin diamniotic twins which is twin-twin transfusion monitoring every 2 weeks starting at 16 weeks and fetal growth every 4 weeks starting at 16 weeks.  An anatomy ultrasound is recommended at 20 weeks and a fetal echocardiogram is recommended at around 22 weeks.  We recommend twice weekly antepartum surveillance starting at 30-32 weeks gestation. We reviewed the increased medical maternal complications of pregnancy including hypertensive disorders, gestational diabetes, peripartum cardiomyopathy, postpartum hemorrhage, malpresentation, and  delivery. We discussed increased fetal risks in pregnancy including discordant anomalies,  birth, and fetal growth restriction. We discussed that the mean gestational age of delivery of twins is 35.3 weeks, with 58.8% delivering less than 37 weeks gestation. I recommend  "delivery between 36-37 6/7 weeks in appropriately grown uncomplicated monochorionic twin pregnancies given the increased  morbidity and mortality outside of this range. Regarding micronutrient supplementation, I advise supplemental folic acid (1mg) as well as ferrous sulfate (extra 60-80mg iron) and calcium (1500mg). For twin pregnancy, the IOM recommends a gestational weight gain of 37-54 lb for women of normal weight (BMI 18.5-24.9), 31-50 lb for overweight women (BMI 25-29.9), and 25-42 lb for obese women (BMI >30). Glucola screening is traditionally recommended between 24 and 28 weeks gestation. There is an increased risk for ischemic placental disease and preeclampsia in twin pregnancies; therefore, recommend initiating low-dose aspirin therapy (162mg) in the 1st trimester which has been demonstrated to mitigate the risk for preeclampsia, fetal growth restriction, and in some cases,  birh.     I recommend evaluation by her diabetes and pregnancy program given her history of diabetes.    I recommend follow-up every 2 weeks as outlined above.    Thank you very much for allowing us to participate in the care of this very nice patient. Should you have any questions, please do not hesitate to contact me.    Portions of the record may have been created with voice recognition software. Occasional wrong word or \"sound a like\" substitutions may have occurred due to the inherent limitations of voice recognition software. Read the chart carefully and recognize, using context, where substitutions have occurred.  "

## 2025-03-20 NOTE — PROGRESS NOTES
Patient chose to have [facility] [id number] Non-Invasive Prenatal Screen [id number] PLUS, NO SCA, WITH fetal sex .  Patient choose to be billed through insurance.     Patient given brochure and is aware [facility] will contact patient's insurance and coordinate coverage.  Provided [facility] contact information. General inquiries [phone], Cost estimates [phone] and [facility] Billing [phone]. Website [email or web address].     Blood collection tubes labeled with patient identifiers (name, medical record number, and date of birth).     Filled out [facility] order form. Patient chose to have blood drawn in our office at time of visit. NIPS was drawn from right arm with a butterfly needle by Zahraa PIZANO .      If patient chose to have blood work drawn at a [facility] lab we requested patient notify MFM (via phone call or [employer] message) when blood collected so office can follow up on results.       [facility]aternal Fetal [facility] will have results in approximately 5-7 business days and will call patient or notify via [employer].  Patient aware viewing lab result online will reveal fetal sex if ordered.    Patient verbalized understanding of all instructions and no questions at this time.

## 2025-03-24 ENCOUNTER — RESULTS FOLLOW-UP (OUTPATIENT)
Facility: HOSPITAL | Age: 30
End: 2025-03-24

## 2025-03-24 ENCOUNTER — TELEPHONE (OUTPATIENT)
Facility: HOSPITAL | Age: 30
End: 2025-03-24

## 2025-03-24 LAB
CFDNA.FET/CFDNA.TOTAL SFR FETUS: NORMAL %
CFDNA.FET/CFDNA.TOTAL SFR FETUS: NORMAL %
CFTR FULL MUT ANL BLD/T SEQ: NORMAL
CITATION REF LAB TEST: NORMAL
CITATION REF LAB TEST: NORMAL
CLINICAL INFO: NORMAL
ETHNIC BACKGROUND STATED: NORMAL
FET 13+18+21+X+Y ANEUP PLAS.CFDNA: NEGATIVE
FET CHR 21 TS PLAS.CFDNA QL: NEGATIVE
FET CHR 21 TS PLAS.CFDNA QL: NEGATIVE
FET SEX PLAS.CFDNA DOSAGE CFDNA: NORMAL
FET TS 13 RISK PLAS.CFDNA QL: NEGATIVE
GA EST FROM CONCEPTION DATE: NORMAL D
GENE DIS ANL CARRIER INTERP-IMP: NORMAL
GESTATIONAL AGE > 9:: YES
INDICATION: NORMAL
LAB DIRECTOR NAME PROVIDER: NORMAL
LAB DIRECTOR NAME PROVIDER: NORMAL
LABORATORY COMMENT REPORT: NORMAL
LIMITATIONS OF THE TEST: NORMAL
NEGATIVE PREDICTIVE VALUE: NORMAL
PERFORMANCE CHARACTERISTICS: NORMAL
POSITIVE PREDICTIVE VALUE: NORMAL
RECOMMENDATION PATIENT DOC-IMP: NORMAL
REF LAB TEST METHOD: NORMAL
REF LAB TEST METHOD: NORMAL
SERVICE CMNT-IMP: NORMAL
SL AMB NOTE:: NORMAL
SPECIMEN SOURCE: NORMAL
TEST PERFORMANCE INFO SPEC: NORMAL

## 2025-03-24 NOTE — RESULT ENCOUNTER NOTE
I have reviewed the results of the NIPS which are low risk.  Please call patient and notify her of these reassuring results if she has not viewed on MyChart. Please ensure she is notified of recommendation of MSAFP to be ordered and followed up through her primary Obstetrician's office.      Thank you, Sabino Infante MD

## 2025-03-24 NOTE — TELEPHONE ENCOUNTER
Message left in Nepalese regarding today's appointment and to call the office to reschedule. Made aware that it is important to maintain tight glucose control during pregnancy for well-being of her babies. Pending return phone call.

## 2025-03-26 ENCOUNTER — TELEPHONE (OUTPATIENT)
Age: 30
End: 2025-03-26

## 2025-03-27 NOTE — TELEPHONE ENCOUNTER
Called patient back. Patient wanted to discuss NIPT results. Patient aware of results. No further questions at this time.

## 2025-03-28 ENCOUNTER — VBI (OUTPATIENT)
Dept: ADMINISTRATIVE | Facility: OTHER | Age: 30
End: 2025-03-28

## 2025-03-28 NOTE — TELEPHONE ENCOUNTER
03/28/25 1:33 PM     Chart reviewed for Pap Smear (HPV) aka Cervical Cancer Screening ; nothing is submitted to the patient's insurance at this time.     Vidal Strong MA   [facility]

## 2025-03-30 ENCOUNTER — RESULTS FOLLOW-UP (OUTPATIENT)
Age: 30
End: 2025-03-30

## 2025-03-30 NOTE — RESULT ENCOUNTER NOTE
Please inform patient  labs reviewed  . Her US grew enterococcus faecalis but I see she has already gotten Macrobid . If she did please make sure shehas finished it  and will need  repeat  urine  culture sent at time of next visit . Recommend adding vitamin D and  Vitamin B12  supplements  on  a daily basis   I see she has an upcoming visit at which timewe can  discuss labs

## 2025-04-01 NOTE — RESULT ENCOUNTER NOTE
Patient aware. Finished Macrobid.  Not having any urinary sx.  Patient stated she is taking Vit b12 and is going to start Vit D

## 2025-04-02 PROBLEM — Z3A.14 14 WEEKS GESTATION OF PREGNANCY: Status: ACTIVE | Noted: 2025-03-20

## 2025-04-02 NOTE — ASSESSMENT & PLAN NOTE
Referral placed for diabetes in pregnancy  Lab Results   Component Value Date    HGBA1C 5.9 (H) 03/11/2025

## 2025-04-02 NOTE — ASSESSMENT & PLAN NOTE
MFM visit 3/20/25    Primarily anterior placenta with posterior accessory lobe  A PCI may be velementous from posterior accessory lobe and may be a vasa previa  B PCI normal from anterior aspect    - followed by MFM- q 2 week scans          controlled CONTROLLED

## 2025-04-02 NOTE — ASSESSMENT & PLAN NOTE
- Prenatal labs reviewed and normal.  Blood type: O positive  - Aneuploidy screening discussed.  Patient - NIPS- low risk.  - Routine cervical cancer screening: Pap today.  - Routine STI Screening: GC/Chlamydia sent today.  HIV/Hep B/Syphilis ordered in prenatal panel.  - Patient Education: Patient was counseled regarding diet, exercise, weight gain, foods to avoid, vaccines in pregnancy, aneuploidy screening, travel precautions to include seat belt use and VTE risk reduction.  She has been provided our pregnancy packet which includes how and when to contact providers, medication recommendations, dietary suggestions, breastfeeding information as well as websites for additional information, hospital and delivery concerns.    - AFP ordered  - next visit 4 weeks

## 2025-04-03 ENCOUNTER — INITIAL PRENATAL (OUTPATIENT)
Dept: OBGYN CLINIC | Facility: MEDICAL CENTER | Age: 30
End: 2025-04-03
Payer: COMMERCIAL

## 2025-04-03 VITALS — DIASTOLIC BLOOD PRESSURE: 56 MMHG | SYSTOLIC BLOOD PRESSURE: 106 MMHG | BODY MASS INDEX: 32.31 KG/M2 | WEIGHT: 218.8 LBS

## 2025-04-03 DIAGNOSIS — O24.119 TYPE 2 DIABETES MELLITUS AFFECTING PREGNANCY, ANTEPARTUM: ICD-10-CM

## 2025-04-03 DIAGNOSIS — Z3A.14 14 WEEKS GESTATION OF PREGNANCY: ICD-10-CM

## 2025-04-03 DIAGNOSIS — Z90.3 H/O GASTRIC SLEEVE: ICD-10-CM

## 2025-04-03 DIAGNOSIS — Z91.89 AT RISK FOR HYPERTENSION: ICD-10-CM

## 2025-04-03 DIAGNOSIS — O30.031 MONOCHORIONIC DIAMNIOTIC TWIN GESTATION IN FIRST TRIMESTER: Primary | ICD-10-CM

## 2025-04-03 DIAGNOSIS — E55.9 VITAMIN D DEFICIENCY: ICD-10-CM

## 2025-04-03 DIAGNOSIS — O34.219 HISTORY OF CESAREAN DELIVERY, CURRENTLY PREGNANT: ICD-10-CM

## 2025-04-03 DIAGNOSIS — O09.291 HISTORY OF PRE-ECLAMPSIA IN PRIOR PREGNANCY, CURRENTLY PREGNANT IN FIRST TRIMESTER: ICD-10-CM

## 2025-04-03 DIAGNOSIS — O09.299 HISTORY OF MACROSOMIA IN INFANT IN PRIOR PREGNANCY, CURRENTLY PREGNANT: ICD-10-CM

## 2025-04-03 PROCEDURE — 87591 N.GONORRHOEAE DNA AMP PROB: CPT | Performed by: ADVANCED PRACTICE MIDWIFE

## 2025-04-03 PROCEDURE — 99213 OFFICE O/P EST LOW 20 MIN: CPT | Performed by: ADVANCED PRACTICE MIDWIFE

## 2025-04-03 PROCEDURE — 87491 CHLMYD TRACH DNA AMP PROBE: CPT | Performed by: ADVANCED PRACTICE MIDWIFE

## 2025-04-03 PROCEDURE — G0145 SCR C/V CYTO,THINLAYER,RESCR: HCPCS | Performed by: ADVANCED PRACTICE MIDWIFE

## 2025-04-03 NOTE — PROGRESS NOTES
Name: Syed Taveras      : 1995      MRN: [id number]  Encounter Provider: Maggy Beckett CNM  Encounter Date: 4/3/2025   Encounter department: [facility]  :  Assessment & Plan  Monochorionic diamniotic twin gestation in first trimester  [facility] visit 3/20/25    Primarily anterior placenta with posterior accessory lobe  A PCI may be velementous from posterior accessory lobe and may be a vasa previa  B PCI normal from anterior aspect    - followed by [facility]- q 2 week scans         Type 2 diabetes mellitus affecting pregnancy, antepartum  Referral placed for diabetes in pregnancy  Lab Results   Component Value Date    HGBA1C 5.9 (H) 2025            History of  delivery, currently pregnant  Planning repeat        At risk for hypertension  162 mg ASA daily until 36 weeks           History of macrosomia in infant in prior pregnancy, currently pregnant         History of pre-eclampsia in prior pregnancy, currently pregnant in first trimester         H/O gastric sleeve         Postpartum cardiomyopathy  Has Echo ordered needs to schedule           14 weeks gestation of pregnancy  - Prenatal labs reviewed and normal.  Blood type: O positive  - Aneuploidy screening discussed.  Patient - NIPS- low risk.  - Routine cervical cancer screening: Pap today.  - Routine STI Screening: GC/Chlamydia sent today.  HIV/Hep B/Syphilis ordered in prenatal panel.  - Patient Education: Patient was counseled regarding diet, exercise, weight gain, foods to avoid, vaccines in pregnancy, aneuploidy screening, travel precautions to include seat belt use and VTE risk reduction.  She has been provided our pregnancy packet which includes how and when to contact providers, medication recommendations, dietary suggestions, breastfeeding information as well as websites for additional information, hospital and delivery concerns.    - AFP ordered  - next visit 4 weeks             History of  Present Illness   Syed Taveras is a 29 y.o.  female who presents for routine prenatal care at 14w1d.  Pregnancy ROS: no leakage of fluid, pelvic pain, or vaginal bleeding.  No fetal movement noted.   Nausea and vomiting resolved.     Objective:  /56   Wt 99.2 kg (218 lb 12.8 oz)   LMP 2024 (Exact Date)   BMI 32.31 kg/m²   Pregravid Weight/BMI: 95.7 kg (211 lb) (BMI 31.15)  Current Weight: 99.2 kg (218 lb 12.8 oz)   Total Weight Gain: 3.538 kg (7 lb 12.8 oz)   Pre- Vitals    Flowsheet Row Most Recent Value   Prenatal Assessment    Fetal Heart Rate 151/160   Prenatal Vitals    Blood Pressure 106/56   Weight - Scale 99.2 kg (218 lb 12.8 oz)   Urine Albumin/Glucose    Dilation/Effacement/Station    Vaginal Drainage    Edema    LLE Edema None   RLE Edema None             Syed Taveras is a 29 y.o. female who presents for prenatal visit  History obtained from: patient    Review of Systems   Constitutional:  Negative for chills and fever.   Respiratory:  Negative for cough and shortness of breath.    Cardiovascular:  Negative for chest pain and palpitations.   Genitourinary:  Negative for difficulty urinating and vaginal bleeding.   Psychiatric/Behavioral:  The patient is not nervous/anxious.      Medical History Reviewed by provider this encounter:     .  Current Outpatient Medications on File Prior to Visit   Medication Sig Dispense Refill    aspirin (ECOTRIN LOW STRENGTH) 81 mg EC tablet Take 2 tablets (162 mg total) by mouth daily 90 tablet 2    magnesium gluconate (MAGONATE) 500 mg tablet Take 1 tablet (500 mg total) by mouth 2 (two) times a day 20 tablet 0    ondansetron (ZOFRAN-ODT) 4 mg disintegrating tablet Take 1 tablet (4 mg total) by mouth every 6 (six) hours as needed for nausea 20 tablet 0    Prenatal MV & Min w/FA-DHA (Prenatal Gummies) 0.18-25 MG CHEW Chew 1 gum in the morning 30 tablet 0    sucralfate (CARAFATE) 1 g tablet Take 1 tablet (1 g total) by mouth 4  (four) times a day 40 tablet 0    famotidine (PEPCID) 20 mg tablet Take 1 tablet (20 mg total) by mouth 2 (two) times a day 30 tablet 0     Current Facility-Administered Medications on File Prior to Visit   Medication Dose Route Frequency Provider Last Rate Last Admin    cyanocobalamin injection 1,000 mcg  1,000 mcg Intramuscular Q30 Days Nancy Perez LES Senior   1,000 mcg at 01/20/25 1544         Objective   LMP 12/25/2024 (Exact Date)      Physical Exam  Vitals reviewed.   Constitutional:       Appearance: Normal appearance.   Pulmonary:      Effort: Pulmonary effort is normal.   Abdominal:      Comments: Gravid uterus   Neurological:      Mental Status: She is alert and oriented to person, place, and time.   Psychiatric:         Mood and Affect: Mood normal.         Behavior: Behavior normal.

## 2025-04-05 LAB
C TRACH DNA SPEC QL NAA+PROBE: NEGATIVE
N GONORRHOEA DNA SPEC QL NAA+PROBE: NEGATIVE

## 2025-04-07 ENCOUNTER — ULTRASOUND (OUTPATIENT)
Dept: PERINATAL CARE | Facility: OTHER | Age: 30
End: 2025-04-07
Payer: COMMERCIAL

## 2025-04-07 ENCOUNTER — TELEPHONE (OUTPATIENT)
Age: 30
End: 2025-04-07

## 2025-04-07 ENCOUNTER — RESULTS FOLLOW-UP (OUTPATIENT)
Dept: OBGYN CLINIC | Facility: CLINIC | Age: 30
End: 2025-04-07

## 2025-04-07 VITALS
BODY MASS INDEX: 32.23 KG/M2 | WEIGHT: 217.6 LBS | HEART RATE: 100 BPM | SYSTOLIC BLOOD PRESSURE: 94 MMHG | DIASTOLIC BLOOD PRESSURE: 58 MMHG | HEIGHT: 69 IN

## 2025-04-07 DIAGNOSIS — Z86.39 HISTORY OF TYPE 2 DIABETES MELLITUS: ICD-10-CM

## 2025-04-07 DIAGNOSIS — O09.299 HISTORY OF MACROSOMIA IN INFANT IN PRIOR PREGNANCY, CURRENTLY PREGNANT: ICD-10-CM

## 2025-04-07 DIAGNOSIS — O30.001 TWIN GESTATION IN FIRST TRIMESTER, UNSPECIFIED MULTIPLE GESTATION TYPE: ICD-10-CM

## 2025-04-07 DIAGNOSIS — O30.031 MONOCHORIONIC DIAMNIOTIC TWIN GESTATION IN FIRST TRIMESTER: ICD-10-CM

## 2025-04-07 DIAGNOSIS — Z3A.14 14 WEEKS GESTATION OF PREGNANCY: Primary | ICD-10-CM

## 2025-04-07 DIAGNOSIS — O24.119 TYPE 2 DIABETES MELLITUS AFFECTING PREGNANCY, ANTEPARTUM: ICD-10-CM

## 2025-04-07 DIAGNOSIS — Z90.3 H/O GASTRIC SLEEVE: ICD-10-CM

## 2025-04-07 PROCEDURE — 76815 OB US LIMITED FETUS(S): CPT | Performed by: STUDENT IN AN ORGANIZED HEALTH CARE EDUCATION/TRAINING PROGRAM

## 2025-04-07 PROCEDURE — 99213 OFFICE O/P EST LOW 20 MIN: CPT | Performed by: STUDENT IN AN ORGANIZED HEALTH CARE EDUCATION/TRAINING PROGRAM

## 2025-04-07 NOTE — PROGRESS NOTES
This patient received  care under my supervision on 25 at 14w5d gestational age at [facility].  The note is contained in the ultrasound report located under OB Procedures tab in Epic.  Please call our office at [phone] with questions.  -Danni Smith MD

## 2025-04-08 LAB
LAB AP GYN PRIMARY INTERPRETATION: NORMAL
LAB AP LMP: NORMAL
Lab: NORMAL

## 2025-04-09 ENCOUNTER — TELEPHONE (OUTPATIENT)
Age: 30
End: 2025-04-09

## 2025-04-09 NOTE — TELEPHONE ENCOUNTER
Pt called with questions related to recent US completed at [facility] on 4/7. Pt is requesting to speak directly with OB navigator. Please provide a c/b at your earliest convenience. Pt will require . Thank you.

## 2025-04-10 ENCOUNTER — OFFICE VISIT (OUTPATIENT)
Facility: HOSPITAL | Age: 30
End: 2025-04-10
Payer: COMMERCIAL

## 2025-04-10 DIAGNOSIS — E55.9 VITAMIN D DEFICIENCY DISEASE: ICD-10-CM

## 2025-04-10 DIAGNOSIS — E66.09 CLASS 1 OBESITY DUE TO EXCESS CALORIES WITH SERIOUS COMORBIDITY AND BODY MASS INDEX (BMI) OF 32.0 TO 32.9 IN ADULT: ICD-10-CM

## 2025-04-10 DIAGNOSIS — O24.119 TYPE 2 DIABETES MELLITUS AFFECTING PREGNANCY, ANTEPARTUM: Primary | ICD-10-CM

## 2025-04-10 DIAGNOSIS — Z3A.15 15 WEEKS GESTATION OF PREGNANCY: ICD-10-CM

## 2025-04-10 DIAGNOSIS — E66.811 CLASS 1 OBESITY DUE TO EXCESS CALORIES WITH SERIOUS COMORBIDITY AND BODY MASS INDEX (BMI) OF 32.0 TO 32.9 IN ADULT: ICD-10-CM

## 2025-04-10 PROBLEM — O30.032 MONOCHORIONIC DIAMNIOTIC TWIN GESTATION IN SECOND TRIMESTER: Status: ACTIVE | Noted: 2025-03-20

## 2025-04-10 PROCEDURE — 99214 OFFICE O/P EST MOD 30 MIN: CPT | Performed by: NURSE PRACTITIONER

## 2025-04-10 RX ORDER — BLOOD-GLUCOSE METER
EACH MISCELLANEOUS
Qty: 1 KIT | Refills: 0 | Status: SHIPPED | OUTPATIENT
Start: 2025-04-10

## 2025-04-10 RX ORDER — BLOOD SUGAR DIAGNOSTIC
1 STRIP MISCELLANEOUS 4 TIMES DAILY
Qty: 100 EACH | Refills: 5 | Status: SHIPPED | OUTPATIENT
Start: 2025-04-10

## 2025-04-10 RX ORDER — LANCETS 33 GAUGE
EACH MISCELLANEOUS
Qty: 100 EACH | Refills: 5 | Status: SHIPPED | OUTPATIENT
Start: 2025-04-10

## 2025-04-10 NOTE — ASSESSMENT & PLAN NOTE
-Pre-pregnancy BMI 31. Weight 211 lbs.  -Current weight 217 lbs.  -TWG 6 lbs.  -Recommended weight gain 16-25 lbs.   -Follow up with dietitian.   Orders:    OneTouch Verio test strip; Use 1 each 4 (four) times a day Test 4 times a day and as instructed. T2DM and pregnancy.    Lancets (OneTouch Delica Plus [id number]) MISC; Use 4 a day. T2DM and pregnancy.    Blood Glucose Monitoring Suppl (OneTouch Verio) w/Device KIT; T2DM and pregnancy    [facility] glucose flowsheet

## 2025-04-10 NOTE — ASSESSMENT & PLAN NOTE
-Low D level 12.5.  -On prenatal vitamins and Vitamin D3 2000 iu daily.   Orders:    OneTouch Verio test strip; Use 1 each 4 (four) times a day Test 4 times a day and as instructed. T2DM and pregnancy.    Lancets (OneTouch Delica Plus [id number]) MISC; Use 4 a day. T2DM and pregnancy.    Blood Glucose Monitoring Suppl (OneTouch Verio) w/Device KIT; T2DM and pregnancy    [facility] glucose flowsheet

## 2025-04-10 NOTE — ASSESSMENT & PLAN NOTE
Orders:    OneTouch Verio test strip; Use 1 each 4 (four) times a day Test 4 times a day and as instructed. T2DM and pregnancy.    Lancets (OneTouch Delica Plus [id number]) MISC; Use 4 a day. T2DM and pregnancy.    Blood Glucose Monitoring Suppl (OneTouch Verio) w/Device KIT; T2DM and pregnancy    [facility] glucose flowsheet

## 2025-04-10 NOTE — PROGRESS NOTES
Name: Syed Taveras      : 1995      MRN: [id number]  Encounter Provider: LES Francisco  Encounter Date: 4/10/2025   Encounter department: [facility] DON  :  Assessment & Plan  Type 2 diabetes mellitus affecting pregnancy, antepartum  Verbally provided with recommendations in Yakut.   -A1c 5.9% pre-diabetes range.   -A1c goal is 5.6% or less.  -Follow up with dietitian.  -Keep 3 day food log to review with dietitian.  -Start self monitoring blood glucose (SMBG) fasting; 2 hours after start of each meal and with hypoglycemia.   -Glucose goals: fasting 60-90 mg/dL, 140 mg/dL or less 1 hour post meals, and 120 mg/dL or less 2 hours post meal.   -Report glucose readings weekly via [employer] every Thursday.   -Start GDM meal plan with 3 meals and 3 snacks including recommended combination of carb, protein and fat per meal/snack.  -Please eat meal or snack every 2-3.5 hours while awake.  -No more than 8 to 10 hours of fasting overnight.  -Refer to [employer] MyPlate online as a reference.  -2nd/3rd trimester minimum total daily carbohydrates 175 grams paired with half grams in protein.   -Stay active if no restriction from your OB, walk up to 30 minutes a day.  -Always have glucose available to treat hypoglycemia. Use 15:15 rule.   -Refer to hypoglycemia patient education sheet. SMBG when experiencing signs and symptoms of hypoglycemia and prior to driving.   -Serial fetal growth ultrasounds.  -20 weeks detailed fetal growth ultrasound.  -22-24 weeks fetal echo.  -If diabetes related medications are started, at 32 weeks gestation; NST twice a week and ROBERTA weekly.   -Continue prenatal vitamin, vitamin D3 and Vitamin B12 as recommended.  -At 36 weeks gestation, stop baby aspirin.   -Continue follow-up with your OB and MFM as recommended.  -Stay in close contact with diabetes education team.  -Insulin requirements during pregnancy; basal/bolus concept and Metformin  discussed.  -Very important to maintain tight glucose control during pregnancy to decrease risk factors including fetal macrosomia; birth injury; risk of ; polyhydramnios; pre-term labor; pre-eclampsia;  hypoglycemia; jaundice and stillbirth.   -[place] Diabetes and pregnancy booklet; meal plan and hypoglycemia patient education.         Lab Results   Component Value Date    HGBA1C 5.9 (H) 2025       Orders:    OneTouch Verio test strip; Use 1 each 4 (four) times a day Test 4 times a day and as instructed. T2DM and pregnancy.    Lancets (OneTouch Delica Plus [id number]) MISC; Use 4 a day. T2DM and pregnancy.    Blood Glucose Monitoring Suppl (OneTouch Verio) w/Device KIT; T2DM and pregnancy    [facility] glucose flowsheet    15 weeks gestation of pregnancy    Orders:    OneTouch Verio test strip; Use 1 each 4 (four) times a day Test 4 times a day and as instructed. T2DM and pregnancy.    Lancets (OneTouch Delica Plus [id number]) MISC; Use 4 a day. T2DM and pregnancy.    Blood Glucose Monitoring Suppl (OneTouch Verio) w/Device KIT; T2DM and pregnancy    [facility] glucose flowsheet    Class 1 obesity due to excess calories with serious comorbidity and body mass index (BMI) of 32.0 to 32.9 in adult  -Pre-pregnancy BMI 31. Weight 211 lbs.  -Current weight 217 lbs.  -TWG 6 lbs.  -Recommended weight gain 16-25 lbs.   -Follow up with dietitian.   Orders:    OneTouch Verio test strip; Use 1 each 4 (four) times a day Test 4 times a day and as instructed. T2DM and pregnancy.    Lancets (OneTouch Delica Plus [id number]) MISC; Use 4 a day. T2DM and pregnancy.    Blood Glucose Monitoring Suppl (OneTouch Verio) w/Device KIT; T2DM and pregnancy    [facility] glucose flowsheet    Vitamin D deficiency disease  -Low D level 12.5.  -On prenatal vitamins and Vitamin D3 2000 iu daily.   Orders:    OneTouch Verio test strip; Use 1 each 4 (four) times a day Test 4 times a day and as instructed. T2DM and pregnancy.    Lancets  (OneTouch Delica Plus [id number]) MISC; Use 4 a day. T2DM and pregnancy.    Blood Glucose Monitoring Suppl (OneTouch Verio) w/Device KIT; T2DM and pregnancy    [facility] flow[facility]        History of Present Illness   HPI  Syed Taveras is a 29 y.o. female who presents with T2DM ,dx in 2017, had gastric sleeve in 2021, last A1c 5.9% in pre-diabetes range. History of being on Ozempic for 6 months. Vitamin D deficiency- on prenatal vitamin and Vitamin D3 2000 iu daily, encouraged to take with dietary calcium. Vitamin B12 deficiency-taking Vitamin B12 1000 mcg daily.   Breakfast at 9 AM, lunch at 12 PM, 3 PM snack, 6 PM dinner and bedtime 9 PM.   History obtained from: patient    Review of Systems   All other systems reviewed and are negative.    Medical History Reviewed by provider this encounter:  Allergies  Meds  Problems     .  Current Outpatient Medications on File Prior to Visit   Medication Sig Dispense Refill    aspirin (ECOTRIN LOW STRENGTH) 81 mg EC tablet Take 2 tablets (162 mg total) by mouth daily 90 tablet 2    Cholecalciferol (VITAMIN D3) 1,000 units tablet Take 2 tablets (2,000 Units total) by mouth daily 30 tablet 2    famotidine (PEPCID) 20 mg tablet Take 1 tablet (20 mg total) by mouth 2 (two) times a day (Patient not taking: Reported on 4/21/2025) 30 tablet 0    magnesium gluconate (MAGONATE) 500 mg tablet Take 1 tablet (500 mg total) by mouth 2 (two) times a day (Patient not taking: Reported on 4/7/2025) 20 tablet 0    ondansetron (ZOFRAN-ODT) 4 mg disintegrating tablet Take 1 tablet (4 mg total) by mouth every 6 (six) hours as needed for nausea (Patient not taking: Reported on 4/21/2025) 20 tablet 0    Prenatal MV & Min w/FA-DHA (Prenatal Gummies) 0.18-25 MG CHEW Chew 1 gum in the morning 30 tablet 0     Current Facility-Administered Medications on File Prior to Visit   Medication Dose Route Frequency Provider Last Rate Last Admin    cyanocobalamin injection 1,000 mcg  1,000 mcg  Intramuscular Q30 Days Nancy FernandezLES Gary   1,000 mcg at 01/20/25 1544      Social History     Tobacco Use    Smoking status: Never     Passive exposure: Never    Smokeless tobacco: Never   Vaping Use    Vaping status: Never Used   Substance and Sexual Activity    Alcohol use: No    Drug use: No    Sexual activity: Yes     Partners: Male     Birth control/protection: None        Objective   LMP 12/25/2024 (Exact Date)      Physical Exam  HENT:      Head: Normocephalic.      Nose: Nose normal.   Eyes:      Conjunctiva/sclera: Conjunctivae normal.   Pulmonary:      Effort: Pulmonary effort is normal.   Neurological:      Mental Status: She is alert and oriented to person, place, and time.

## 2025-04-10 NOTE — ASSESSMENT & PLAN NOTE
Verbally provided with recommendations in [place].   -A1c 5.9% pre-diabetes range.   -A1c goal is 5.6% or less.  -Follow up with dietitian.  -Keep 3 day food log to review with dietitian.  -Start self monitoring blood glucose (SMBG) fasting; 2 hours after start of each meal and with hypoglycemia.   -Glucose goals: fasting 60-90 mg/dL, 140 mg/dL or less 1 hour post meals, and 120 mg/dL or less 2 hours post meal.   -Report glucose readings weekly via [employer] every Thursday.   -Start GDM meal plan with 3 meals and 3 snacks including recommended combination of carb, protein and fat per meal/snack.  -Please eat meal or snack every 2-3.5 hours while awake.  -No more than 8 to 10 hours of fasting overnight.  -Refer to [employer] MyPlate online as a reference.  -2nd/3rd trimester minimum total daily carbohydrates 175 grams paired with half grams in protein.   -Stay active if no restriction from your OB, walk up to 30 minutes a day.  -Always have glucose available to treat hypoglycemia. Use 15:15 rule.   -Refer to hypoglycemia patient education sheet. SMBG when experiencing signs and symptoms of hypoglycemia and prior to driving.   -Serial fetal growth ultrasounds.  -20 weeks detailed fetal growth ultrasound.  -22-24 weeks fetal echo.  -If diabetes related medications are started, at 32 weeks gestation; NST twice a week and ROBERTA weekly.   -Continue prenatal vitamin, vitamin D3 and Vitamin B12 as recommended.  -At 36 weeks gestation, stop baby aspirin.   -Continue follow-up with your OB and MFM as recommended.  -Stay in close contact with diabetes education team.  -Insulin requirements during pregnancy; basal/bolus concept and Metformin discussed.  -Very important to maintain tight glucose control during pregnancy to decrease risk factors including fetal macrosomia; birth injury; risk of ; polyhydramnios; pre-term labor; pre-eclampsia;  hypoglycemia; jaundice and stillbirth.   -[place] Diabetes and  pregnancy booklet; meal plan and hypoglycemia patient education.         Lab Results   Component Value Date    HGBA1C 5.9 (H) 03/11/2025       Orders:    OneTouch Verio test strip; Use 1 each 4 (four) times a day Test 4 times a day and as instructed. T2DM and pregnancy.    Lancets (OneTouch Delica Plus [id number]) MISC; Use 4 a day. T2DM and pregnancy.    Blood Glucose Monitoring Suppl (OneTouch Verio) w/Device KIT; T2DM and pregnancy    [facility] glucose flowsheet

## 2025-04-15 ENCOUNTER — TELEPHONE (OUTPATIENT)
Age: 30
End: 2025-04-15

## 2025-04-15 NOTE — TELEPHONE ENCOUNTER
Pt called stating she had spoken to office staff before about a letter her job needs from the provider explaining why the provider believes she should stay off from work for a while so she can apply for disability as she is a single mother and the only income and has not been to work since the provider informed her she should not be working. She said she was told someone would reach back out to her about it but she has not heard from anyone and needs the letter before 4/29/25. Please call pt back with an update so she can go in person to pick the letter up.

## 2025-04-21 ENCOUNTER — ULTRASOUND (OUTPATIENT)
Dept: PERINATAL CARE | Facility: CLINIC | Age: 30
End: 2025-04-21
Payer: COMMERCIAL

## 2025-04-21 VITALS
HEART RATE: 85 BPM | WEIGHT: 226.4 LBS | BODY MASS INDEX: 33.53 KG/M2 | OXYGEN SATURATION: 98 % | HEIGHT: 69 IN | SYSTOLIC BLOOD PRESSURE: 102 MMHG | DIASTOLIC BLOOD PRESSURE: 58 MMHG

## 2025-04-21 DIAGNOSIS — Z3A.16 16 WEEKS GESTATION OF PREGNANCY: Primary | ICD-10-CM

## 2025-04-21 DIAGNOSIS — O30.032 MONOCHORIONIC DIAMNIOTIC TWIN GESTATION IN SECOND TRIMESTER: ICD-10-CM

## 2025-04-21 DIAGNOSIS — Z98.84 STATUS POST LAPAROSCOPIC SLEEVE GASTRECTOMY: ICD-10-CM

## 2025-04-21 DIAGNOSIS — Z36.89 ENCOUNTER FOR ULTRASOUND TO CHECK FETAL GROWTH: ICD-10-CM

## 2025-04-21 DIAGNOSIS — O24.119 TYPE 2 DIABETES MELLITUS AFFECTING PREGNANCY, ANTEPARTUM: ICD-10-CM

## 2025-04-21 PROCEDURE — 76821 MIDDLE CEREBRAL ARTERY ECHO: CPT | Performed by: STUDENT IN AN ORGANIZED HEALTH CARE EDUCATION/TRAINING PROGRAM

## 2025-04-21 PROCEDURE — 99214 OFFICE O/P EST MOD 30 MIN: CPT | Performed by: STUDENT IN AN ORGANIZED HEALTH CARE EDUCATION/TRAINING PROGRAM

## 2025-04-21 PROCEDURE — 76816 OB US FOLLOW-UP PER FETUS: CPT | Performed by: STUDENT IN AN ORGANIZED HEALTH CARE EDUCATION/TRAINING PROGRAM

## 2025-04-21 NOTE — TELEPHONE ENCOUNTER
Patient calling to receive an update for the letter for work. Patient needs to send in letter before 4/29/25 to work. Please call patient with an update on letter for employment.     [place] ID [id number]

## 2025-04-21 NOTE — PROGRESS NOTES
"[facility]: Ms. Taveras was seen today for fetal growth assessment ultrasound.  See ultrasound report under \"OB Procedures\" tab.      MDM:   I. Diagnoses/Problems addressed:  Low  II.  Data: Limited  III.  Risk of morbidity: Low    Please don't hesitate to contact our office with any concerns or questions.  -Danni Smith MD  "

## 2025-04-22 ENCOUNTER — TELEPHONE (OUTPATIENT)
Dept: PERINATAL CARE | Facility: CLINIC | Age: 30
End: 2025-04-22

## 2025-04-22 NOTE — TELEPHONE ENCOUNTER
spoke with patient with  Rachel [id number]. patient was originally scheduled for 30 mins on 10:30am. patient had a family emergency. was rescheduled for 4/25 at 10am with arian for 60-90 mins.

## 2025-04-23 NOTE — TELEPHONE ENCOUNTER
Patient following up on the letter she requested.  She needs to have by 4/29/25.  Please call patient with update.   [phone]

## 2025-04-24 ENCOUNTER — APPOINTMENT (OUTPATIENT)
Dept: LAB | Facility: MEDICAL CENTER | Age: 30
End: 2025-04-24
Payer: COMMERCIAL

## 2025-04-24 DIAGNOSIS — K91.2 POSTSURGICAL MALABSORPTION: ICD-10-CM

## 2025-04-24 DIAGNOSIS — R73.03 PREDIABETES: ICD-10-CM

## 2025-04-24 DIAGNOSIS — O30.031 MONOCHORIONIC DIAMNIOTIC TWIN GESTATION IN FIRST TRIMESTER: ICD-10-CM

## 2025-04-24 DIAGNOSIS — Z3A.14 14 WEEKS GESTATION OF PREGNANCY: ICD-10-CM

## 2025-04-24 DIAGNOSIS — Z48.815 ENCOUNTER FOR SURGICAL AFTERCARE FOLLOWING SURGERY OF DIGESTIVE SYSTEM: ICD-10-CM

## 2025-04-24 DIAGNOSIS — G43.E09 CHRONIC MIGRAINE WITH AURA: ICD-10-CM

## 2025-04-24 DIAGNOSIS — Z98.84 BARIATRIC SURGERY STATUS: ICD-10-CM

## 2025-04-24 DIAGNOSIS — E66.811 OBESITY, CLASS I, BMI 30-34.9: ICD-10-CM

## 2025-04-24 PROCEDURE — 82105 ALPHA-FETOPROTEIN SERUM: CPT

## 2025-04-24 PROCEDURE — 36415 COLL VENOUS BLD VENIPUNCTURE: CPT

## 2025-04-24 NOTE — PROGRESS NOTES
CLASS 2 - Individual  (in-person office visit )    Korean  #[id number], utilized for this visit.     Thank you for referring your patient to [facility] M[facility]etal Medicine Diabetes and Pregnancy Program.     Syed Taveras is a  29 y.o. female who presents today unaccompanied for Diabetes Type 2 and Patient Education  Patient is at 17w2d gestation (twin gestation), Estimated Date of Delivery: 10/1/25.     Visit Diagnosis:  Encounter Diagnosis     ICD-10-CM    1. Type 2 diabetes mellitus affecting pregnancy, antepartum  O24.119       2. 17 weeks gestation of pregnancy  Z3A.17            Reviewed and updated the following from patients medical record: PMH, Problem List, Allergies, and Current Medications.    Labs  GDM LABS: See Class 1 Note    A1C:  Lab Results   Component Value Date/Time    HGBA1C 5.9 (H) 03/11/2025 09:49 AM    HGBA1C 5.8 10/17/2024 03:51 PM    HGBA1C 5.8 (H) 07/23/2024 06:24 AM    HGBA1C 7.4 (H) 02/14/2024 02:37 PM    HGBA1C 5.4 05/03/2017 08:35 AM        Labs Ordered This Visit: None    Current Medications:    Current Outpatient Medications:     aspirin (ECOTRIN LOW STRENGTH) 81 mg EC tablet, Take 2 tablets (162 mg total) by mouth daily, Disp: 90 tablet, Rfl: 2    Blood Glucose Monitoring Suppl (OneTouch Verio) w/Device KIT, T2DM and pregnancy, Disp: 1 kit, Rfl: 0    Cholecalciferol (VITAMIN D3) 1,000 units tablet, Take 2 tablets (2,000 Units total) by mouth daily, Disp: 30 tablet, Rfl: 2    famotidine (PEPCID) 20 mg tablet, Take 1 tablet (20 mg total) by mouth 2 (two) times a day (Patient not taking: Reported on 4/21/2025), Disp: 30 tablet, Rfl: 0    Lancets (OneTouch Delica Plus [id number]) MISC, Use 4 a day. T2DM and pregnancy., Disp: 100 each, Rfl: 5    magnesium gluconate (MAGONATE) 500 mg tablet, Take 1 tablet (500 mg total) by mouth 2 (two) times a day (Patient not taking: Reported on 4/7/2025), Disp: 20 tablet, Rfl: 0    ondansetron (ZOFRAN-ODT) 4 mg disintegrating  "tablet, Take 1 tablet (4 mg total) by mouth every 6 (six) hours as needed for nausea (Patient not taking: Reported on 2025), Disp: 20 tablet, Rfl: 0    OneTouch Verio test strip, Use 1 each 4 (four) times a day Test 4 times a day and as instructed. T2DM and pregnancy., Disp: 100 each, Rfl: 5    Prenatal MV & Min w/FA-DHA (Prenatal Gummies) 0.18-25 MG CHEW, Chew 1 gum in the morning, Disp: 30 tablet, Rfl: 0    Current Facility-Administered Medications:     cyanocobalamin injection 1,000 mcg, 1,000 mcg, Intramuscular, Q30 Days, LES Barber, 1,000 mcg at 25 1544     Anthropometrics:  Ht Readings from Last 1 Encounters:   25 5' 9\" (1.753 m)      Wt Readings from Last 3 Encounters:   25 101 kg (223 lb 9.6 oz)   25 103 kg (226 lb 6.4 oz)   25 98.7 kg (217 lb 9.6 oz)        Pre-Gravid Wt Pre-Gravid BMI TWG   95.7 kg (211 lb) 31.15 5.715 kg (12 lb 9.6 oz)     Total Pregnancy Weight Gain Recommendations: 11 kg (24 lb)-19 kg (41 lb)   Current Wt Status Compared to Recommendations: Exceeding -- Recommended to maintain wt for remainder of pregnancy    Most Recent Ultrasound Results:  Findings:     Twin A  Normal growth/ROBERTA    Twin B  Normal growth/ROBERTA    BLOOD GLUCOSE MONITORING:   Timing/Frequency of SMB x per day (Fasting, 2 hour after start of each meal)  Goals: (Fasting) 60-95mg/dL // (2hr PP) <120mg/dL  Method of Reporting Blood Sugars: mSpot Glucose Flowsheet    BG LOG:     Patient is wearing Dexcom G7 CGM. She has not taken finger stick glucose readings since that time, but previously was testing 4x/day. Instructed her to resume testing 4x per day and reporting blood sugars to diabetes team, weekly.       Review of Blood Glucose Log:   FBG = Not well controlled  Post-Prandial BG = Not well controlled    MEAL PLAN   2000 calorie (CHO:45-15-60-15-60-30) (PRO: 2/3-1-3/4-1-3/4-2)    Review of Patient's Current Diet: refer to class 1 note for additional " details    Breakfast: 1/2 cup oatmeal made with milk and cinnamon OR 2 eggs with cheese or 1-2 slices whole wheat bread   AM Snack: Chobani yogurt and adds fruit sometimes  Lunch: salad: lettuce with spinach, cabbage, avocado, and meat OR nachos with beef and cheese  PM Snack: Satline crackers with PB or an apple with PB   Dinner: white rice, beans, meat, and salad OR mashed plantains with cheese  Bedtime Snack: 2 slices ham and 1 slice and cheese     Beverages: water    Meal Plan Recommendations Compliant? Comments:    Consistent CHO Intake No     3 Meals and 3 Snacks Yes     Protein w/ Every Meal and Snack Mostly Not always at breakfast   Eating every 2-3.5hrs while awake  Yes     8-10hrs Fasting (from time of bedtime snack until first meal of the day) Not always  Reinforced fasting 8-10 hours overnight     Overall Impression: Pt has a fair compliance and understanding of diet recommendations at this time.    Reinforced Diet Instructions:  Individualized meal plan.   Importance of consistent carbohydrate intake via 3 meals and 3 snacks per day   Importance of protein as it relates to blood glucose control.   Encouraged  patient to eat every 2.0-3.5 hours while awake  Encouraged patient to go no longer than 8-10 hours fasting overnight until first meal of the day.  Provided suggested meal/snack options to increase nutrition and maintain consistent meal and snack intakes.    Physical Activity:  Currently physically active? walking 30-60 minutes, 3-4x week     Reviewed w/ Pt:   Benefits of physical activity to optimize blood glucose control, encouraged activity at patient is physically able.   Instructed pt to always consult a physician prior to starting an exercise program.   Recommend 20-30 minutes daily.    Additional Topics Reviewed:    Medications: (reviewed options available with pt)  Discussed if blood sugars are not within normal range with meal planning and exercise  Reviewed medication such as metformin  "and/or basal/bolus insulin may be needed for better glucose control  Maternal-Fetal Testing:   Ultrasounds: growth scans every 4 weeks.  If anti-diabetic medication started, pt should complete NST x2 weekly and ROBERTA x 1 weekly starting at 32nd week GA unless otherwise instructed per [facility] physician.  Sick day Guidelines:   Advised that sickness will raise blood sugar   If blood sugar is > 160 mg/dL twice in one day call doctor  If on diabetes medications, continue as instructed   If unable to consume normal meal plan, instructed to remain well hydrated   Hypoglycemia & Treatment Guidelines:  Reviewed what hypoglycemia is, signs and symptoms, and how to treat via the 15:15 rule.  Post-Partum Guidelines:  Completion of 75 gm CHO 2 hr gtt at 6 weeks post-partum to check for Type 2 DM diagnosis  Breastfeeding Guidelines:  Continue GDM meal plan plus additional 350-500 calories daily  Examples of protein and carbohydrate snacks provided.  Stay hydrated by drinking 8-10 (8 oz.) fluids daily.  Dining Out & Travel Guidelines:  Patient advised to be prepared with extra diabetes supplies, medications, and snacks, as well as sticking to the same time schedule and portions eaten at home for meals and snacks.    Patient Stated Goal: \"I will report my blood sugar values to the diabetes educators each week\"  Goal Assessment: Not on track    Diabetes Self Management Support Plan outside of ongoing care: Spouse/Family    Barriers to Learning/Change: Language  Expected Compliance: good    Date to report blood sugars: Weekly   Follow up:  No follow-ups on file.     Begin Time: 10:00  End Time: 11:15    It was a pleasure working with them today. Please feel free to call ([phone]) with any questions or concerns.    An Gutierres   Diabetes Educator  [facility]  Diabetes and Pregnancy Program  [address], [address]  [place] [address]  "

## 2025-04-25 ENCOUNTER — OFFICE VISIT (OUTPATIENT)
Dept: PERINATAL CARE | Facility: CLINIC | Age: 30
End: 2025-04-25
Payer: COMMERCIAL

## 2025-04-25 VITALS
HEART RATE: 94 BPM | BODY MASS INDEX: 33.12 KG/M2 | DIASTOLIC BLOOD PRESSURE: 58 MMHG | HEIGHT: 69 IN | WEIGHT: 223.6 LBS | SYSTOLIC BLOOD PRESSURE: 114 MMHG | OXYGEN SATURATION: 99 %

## 2025-04-25 DIAGNOSIS — O24.119 TYPE 2 DIABETES MELLITUS AFFECTING PREGNANCY, ANTEPARTUM: Primary | ICD-10-CM

## 2025-04-25 DIAGNOSIS — Z3A.17 17 WEEKS GESTATION OF PREGNANCY: ICD-10-CM

## 2025-04-25 PROCEDURE — G0108 DIAB MANAGE TRN  PER INDIV: HCPCS

## 2025-04-27 LAB
2ND TRIMESTER 4 SCREEN SERPL-IMP: NORMAL
AFP ADJ MOM SERPL: 1.75
AFP INTERP AMN-IMP: NORMAL
AFP INTERP SERPL-IMP: NORMAL
AFP INTERP SERPL-IMP: NORMAL
AFP SERPL-MCNC: 54.6 NG/ML
AGE AT DELIVERY: 28.9 YR
GA METHOD: NORMAL
GA: 17.1 WEEKS
IDDM PATIENT QL: NO
MULTIPLE PREGNANCY: NORMAL
NEURAL TUBE DEFECT RISK FETUS: 2877 %

## 2025-04-30 ENCOUNTER — TELEPHONE (OUTPATIENT)
Facility: HOSPITAL | Age: 30
End: 2025-04-30

## 2025-04-30 DIAGNOSIS — O30.032 MONOCHORIONIC DIAMNIOTIC TWIN GESTATION IN SECOND TRIMESTER: ICD-10-CM

## 2025-04-30 DIAGNOSIS — O24.119 TYPE 2 DIABETES MELLITUS AFFECTING PREGNANCY, ANTEPARTUM: Primary | ICD-10-CM

## 2025-04-30 PROBLEM — Z3A.18 18 WEEKS GESTATION OF PREGNANCY: Status: ACTIVE | Noted: 2025-03-20

## 2025-04-30 RX ORDER — INSULIN GLARGINE 100 [IU]/ML
10 INJECTION, SOLUTION SUBCUTANEOUS
Qty: 15 ML | Refills: 0 | Status: SHIPPED | OUTPATIENT
Start: 2025-04-30

## 2025-04-30 NOTE — TELEPHONE ENCOUNTER
Spoke with Syed regarding elevated glucose readings and need for basal insulin. She is agreeable to starting insulin glargine. Testing fasting and 2 hours post start of each meal and will start reporting via glucose flowsheet including units of insulin.

## 2025-04-30 NOTE — PROGRESS NOTES
Name: Syed Taveras      : 1995      MRN: [id number]  Encounter Provider: Maggy Beckett CNM  Encounter Date: 2025   Encounter department: [facility]  :  Assessment & Plan  Monochorionic diamniotic twin gestation in second trimester  MFM visit 3/20/25    Primarily anterior placenta with posterior accessory lobe  A PCI may be velementous from posterior accessory lobe and may be a vasa previa  B PCI normal from anterior aspect    - followed by MFM- q 2 week scans           History of  delivery, currently pregnant  Planning repeat          Type 2 diabetes mellitus affecting pregnancy, antepartum  Followed by diabetes in pregnancy program  Started on insulin 10 mg nightly  Lab Results   Component Value Date    HGBA1C 5.9 (H) 2025            Second trimester pregnancy         18 weeks gestation of pregnancy  Reviewed second trimester precautions  NIPS/AFP-low risk  Anatomy scan 2025  Followed by MFM  Next visit 4 weeks             Postpartum cardiomyopathy  Has Echo ordered needs to schedule             Screening for cervical cancer    Orders:    Ambulatory referral to Obstetrics / Gynecology        History of Present Illness   Syed Taveras is a 29 y.o.  female who presents for routine prenatal care at 18w1d.  Pregnancy ROS: no leakage of fluid, pelvic pain, or vaginal bleeding.  Has not felt fetal movement.    Objective:  /70   Wt 101 kg (223 lb)   LMP 2024 (Exact Date)   BMI 32.93 kg/m²   Pregravid Weight/BMI: 95.7 kg (211 lb) (BMI 31.15)  Current Weight: 101 kg (223 lb)   Total Weight Gain: 5.443 kg (12 lb)   Pre- Vitals    Flowsheet Row Most Recent Value   Prenatal Assessment    Fetal Heart Rate --  [baby A 140 baby b  158]   Prenatal Vitals    Blood Pressure 108/70   Weight - Scale 101 kg (223 lb)   Urine Albumin/Glucose    Dilation/Effacement/Station    Vaginal Drainage    Draining Fluid No   Edema     LLE Edema None   RLE Edema None   Facial Edema None          Syed Taveras is a 29 y.o. female who presents for prenatal visit  History obtained from: patient    Review of Systems   Constitutional:  Negative for chills and fever.   Respiratory:  Negative for cough and shortness of breath.    Cardiovascular:  Negative for chest pain and palpitations.   Genitourinary:  Negative for difficulty urinating and vaginal bleeding.   Psychiatric/Behavioral:  The patient is not nervous/anxious.      Medical History Reviewed by provider this encounter:     .  Current Outpatient Medications on File Prior to Visit   Medication Sig Dispense Refill    aspirin (ECOTRIN LOW STRENGTH) 81 mg EC tablet Take 2 tablets (162 mg total) by mouth daily 90 tablet 2    Blood Glucose Monitoring Suppl (OneTouch Verio) w/Device KIT T2DM and pregnancy 1 kit 0    Cholecalciferol (VITAMIN D3) 1,000 units tablet Take 2 tablets (2,000 Units total) by mouth daily 30 tablet 2    Insulin Glargine Solostar (Lantus SoloStar) 100 UNIT/ML SOPN Inject 0.1 mL (10 Units total) under the skin daily at bedtime At 9 PM daily. To be titrated. T2DM and pregnancy. 15 mL 0    Insulin Pen Needle 31G X 5 MM MISC Inject under the skin daily at bedtime Use one pen needle per injection. 100 each 3    Lancets (OneTouch Delica Plus [id number]) MISC Use 4 a day. T2DM and pregnancy. 100 each 5    OneTouch Verio test strip Use 1 each 4 (four) times a day Test 4 times a day and as instructed. T2DM and pregnancy. 100 each 5    Prenatal MV & Min w/FA-DHA (Prenatal Gummies) 0.18-25 MG CHEW Chew 1 gum in the morning 30 tablet 0     Current Facility-Administered Medications on File Prior to Visit   Medication Dose Route Frequency Provider Last Rate Last Admin    cyanocobalamin injection 1,000 mcg  1,000 mcg Intramuscular Q30 Days LES Barber   1,000 mcg at 01/20/25 1544         Objective   /70   Wt 101 kg (223 lb)   LMP 12/25/2024 (Exact Date)   BMI  32.93 kg/m²      Physical Exam  Vitals reviewed.   Constitutional:       Appearance: Normal appearance.   Pulmonary:      Effort: Pulmonary effort is normal.   Abdominal:      Comments: Gravid uterus   Neurological:      Mental Status: She is alert and oriented to person, place, and time.   Psychiatric:         Mood and Affect: Mood normal.         Behavior: Behavior normal.

## 2025-04-30 NOTE — ASSESSMENT & PLAN NOTE
Followed by diabetes in pregnancy program  Started on insulin 10 mg nightly  Lab Results   Component Value Date    HGBA1C 5.9 (H) 03/11/2025

## 2025-04-30 NOTE — ASSESSMENT & PLAN NOTE
MFM visit 3/20/25    Primarily anterior placenta with posterior accessory lobe  A PCI may be velementous from posterior accessory lobe and may be a vasa previa  B PCI normal from anterior aspect    - followed by MFM- q 2 week scans

## 2025-04-30 NOTE — ASSESSMENT & PLAN NOTE
Reviewed second trimester precautions  NIPS/AFP-low risk  Anatomy scan 5/5/2025  Followed by MFM  Next visit 4 weeks

## 2025-05-01 ENCOUNTER — ROUTINE PRENATAL (OUTPATIENT)
Dept: OBGYN CLINIC | Facility: MEDICAL CENTER | Age: 30
End: 2025-05-01
Payer: COMMERCIAL

## 2025-05-01 VITALS — DIASTOLIC BLOOD PRESSURE: 70 MMHG | BODY MASS INDEX: 32.93 KG/M2 | SYSTOLIC BLOOD PRESSURE: 108 MMHG | WEIGHT: 223 LBS

## 2025-05-01 DIAGNOSIS — O24.119 TYPE 2 DIABETES MELLITUS AFFECTING PREGNANCY, ANTEPARTUM: ICD-10-CM

## 2025-05-01 DIAGNOSIS — O30.032 MONOCHORIONIC DIAMNIOTIC TWIN GESTATION IN SECOND TRIMESTER: ICD-10-CM

## 2025-05-01 DIAGNOSIS — O34.219 HISTORY OF CESAREAN DELIVERY, CURRENTLY PREGNANT: ICD-10-CM

## 2025-05-01 DIAGNOSIS — Z34.92 SECOND TRIMESTER PREGNANCY: ICD-10-CM

## 2025-05-01 DIAGNOSIS — Z12.4 SCREENING FOR CERVICAL CANCER: ICD-10-CM

## 2025-05-01 DIAGNOSIS — Z3A.18 18 WEEKS GESTATION OF PREGNANCY: ICD-10-CM

## 2025-05-01 PROCEDURE — 99213 OFFICE O/P EST LOW 20 MIN: CPT | Performed by: ADVANCED PRACTICE MIDWIFE

## 2025-05-02 ENCOUNTER — TELEPHONE (OUTPATIENT)
Age: 30
End: 2025-05-02

## 2025-05-02 NOTE — TELEPHONE ENCOUNTER
#[id number]    Pt is 18w2d, , pregnant with twins, calling to ask for recommendations on safe medications to take in pregnancy to help with symptoms of congestion and cold, and to help with shortness of breath associated with symptoms. RN provided list of safe medications that are available over the counter. Advised if she is unable to catch breath or is short of breath at rest, feels chest pain or pressure, and/or experiencing temperature of >103 without relief with medications and Tylenol - go to ER for further evaluation. Pt agreeable to plan. No further questions.

## 2025-05-03 ENCOUNTER — HOSPITAL ENCOUNTER (OUTPATIENT)
Facility: HOSPITAL | Age: 30
Discharge: HOME | End: 2025-05-03
Attending: OBSTETRICS & GYNECOLOGY | Admitting: OBSTETRICS & GYNECOLOGY
Payer: COMMERCIAL

## 2025-05-03 VITALS
TEMPERATURE: 98.9 F | RESPIRATION RATE: 18 BRPM | HEART RATE: 104 BPM | SYSTOLIC BLOOD PRESSURE: 114 MMHG | DIASTOLIC BLOOD PRESSURE: 61 MMHG

## 2025-05-03 DIAGNOSIS — R09.81 CONGESTION OF NASAL SINUS: Primary | ICD-10-CM

## 2025-05-03 DIAGNOSIS — R21 RASH: ICD-10-CM

## 2025-05-03 PROCEDURE — 76817 TRANSVAGINAL US OBSTETRIC: CPT | Performed by: OBSTETRICS & GYNECOLOGY

## 2025-05-03 PROCEDURE — 76817 TRANSVAGINAL US OBSTETRIC: CPT

## 2025-05-03 PROCEDURE — 99203 OFFICE O/P NEW LOW 30 MIN: CPT

## 2025-05-03 PROCEDURE — 0241U HB NFCT DS VIR RESP RNA 4 TRGT: CPT

## 2025-05-03 PROCEDURE — NC001 PR NO CHARGE: Performed by: OBSTETRICS & GYNECOLOGY

## 2025-05-03 RX ORDER — ACETAMINOPHEN 325 MG/1
975 TABLET ORAL ONCE
Status: COMPLETED | OUTPATIENT
Start: 2025-05-03 | End: 2025-05-03

## 2025-05-03 RX ORDER — ACETAMINOPHEN 500 MG
500 TABLET ORAL EVERY 6 HOURS PRN
Qty: 30 TABLET | Refills: 0 | Status: SHIPPED | OUTPATIENT
Start: 2025-05-03

## 2025-05-03 RX ORDER — HYDROCORTISONE 25 MG/G
CREAM TOPICAL 2 TIMES DAILY
Qty: 20 G | Refills: 0 | Status: SHIPPED | OUTPATIENT
Start: 2025-05-03

## 2025-05-03 RX ORDER — GUAIFENESIN 600 MG/1
1200 TABLET, EXTENDED RELEASE ORAL EVERY 12 HOURS SCHEDULED
Qty: 10 TABLET | Refills: 0 | Status: SHIPPED | OUTPATIENT
Start: 2025-05-03

## 2025-05-03 RX ORDER — ECHINACEA PURPUREA EXTRACT 125 MG
1 TABLET ORAL
Status: DISCONTINUED | OUTPATIENT
Start: 2025-05-03 | End: 2025-05-03 | Stop reason: HOSPADM

## 2025-05-03 RX ORDER — GUAIFENESIN 600 MG/1
600 TABLET, EXTENDED RELEASE ORAL ONCE
Status: COMPLETED | OUTPATIENT
Start: 2025-05-03 | End: 2025-05-03

## 2025-05-03 RX ADMIN — ACETAMINOPHEN 975 MG: 325 TABLET, FILM COATED ORAL at 12:24

## 2025-05-03 RX ADMIN — GUAIFENESIN 600 MG: 600 TABLET, EXTENDED RELEASE ORAL at 12:24

## 2025-05-03 RX ADMIN — SALINE NASAL SPRAY 1 SPRAY: 1.5 SOLUTION NASAL at 12:38

## 2025-05-03 NOTE — PROGRESS NOTES
L&D Triage Note - OB/GYN  Syed Taveras 29 y.o. female MRN: [id number]  Unit/Bed#:  TRIAGE  Encounter: [id number]      ASSESSMENT:    Syed Taveras is a 29 y.o.  at 18w3d who was evaluated today in triage due to cramping and a cold. Microscopy wnl, cervical length wnl, SVE closed. Reassuring work up, the patient does not appear to be in  labor and it is safe to discharge home.     PLAN:    1) Cramping  - Speculum exam: no abnormal discharge, no pooling, no bleeding  - Microscopy wnl  - Cervical length via TVUS: 2.8  - SVE: 0/0/-4    2) Nasal congestion  - Flu/COVID/RSV negative  Supportive care measures Tylenol, Mucinex, nasal spray recommended  Monitor for fever    3) 18 weeks gestation of pregnancy twin gestation  - Continue routine prenatal care  - Discharge from OB triage with  labor precautions    - Reviewed rupture of membranes, false vs true labor, decreased fetal movement, and vaginal bleeding   - Pt to call provider with any concerns and follow up at her next scheduled prenatal appointment    - Case discussed with Dr. Brewster    SUBJECTIVE:    Syed Taveras 29 y.o.  at 18w3d with an Estimated Date of Delivery: 10/1/25 presenting with vaginal pain.  She reports she is feeling stretching in her vaginal area that makes it feel like her baby is trying to come out.  However her biggest complaint is that for 3 days she has been having nasal congestion and a sore throat.  Has attempted supportive care measures briefly.  No one else is sick in the home and she reports she does not have allergies.  She has no fevers or chills.  She has no gastrointestinal symptoms.     Her obstetrical history is significant for twin gestation, history of postpartum cardiomyopathy, history of gastric bypass, history of , type 2 diabetes.     Contractions: no  Leakage of fluid: Denies  Vaginal Bleeding: Denies  Fetal movement: 18 weeks    OBJECTIVE:    Vitals:     05/03/25 1139   BP: 114/61   Pulse: 104   Resp: 18   Temp: 98.9 °F (37.2 °C)       ROS:  Constitutional: Negative  Respiratory: Negative  Cardiovascular: Negative    Gastrointestinal: Negative    General Physical Exam:  General: Well appearing, no distress  Respiratory: Unlabored breathing  Cardiovascular: Regular rate.  Abdomen: Soft, gravid, nontender  Fundal Height: Appropriate for gestational age.  Extremities: Warm and well perfused.  Non tender.      Fetal monitoring:  Fetal heart rate: Baby A 146 Baby B138 via TAUS  [place]: No CTX      Cervical Exam  Speculum: Cervical os is closed. No abnormal discharge, no bleeding, no lesions, no pooling    KOH/WTMT:     Infection:   - no clue cells    - no hyphae   - no trichomonads present    Membrane status   - no ferning   - negative nitrazene   - no pooling       SVE: 0/0/-4    Imaging:       TVUS   - Cervical length    - 2.76 cm    - 3.2 cm    - 3.5 cm   - Presentation: erich Mckeon DO  5/3/2025  2:35 PM

## 2025-05-03 NOTE — PROCEDURES
Syed Taveras, a  at 18w3d with an REED of 10/1/2025, by Last Menstrual Period, was seen at [facility] for the following procedure(s): $Procedure Type: US - Transvaginal]                         Ultrasound Other  Cervical Length: 2.76         Ultrasound Probe Disinfection    A transvaginal ultrasound was performed.   Prior to use, disinfection was performed with High Level Disinfection Process ([employer]on)      Haley Mckeon DO  25  12:50 PM

## 2025-05-05 ENCOUNTER — TELEPHONE (OUTPATIENT)
Age: 30
End: 2025-05-05

## 2025-05-05 ENCOUNTER — ULTRASOUND (OUTPATIENT)
Dept: PERINATAL CARE | Facility: CLINIC | Age: 30
End: 2025-05-05
Payer: COMMERCIAL

## 2025-05-05 VITALS
SYSTOLIC BLOOD PRESSURE: 114 MMHG | HEIGHT: 69 IN | WEIGHT: 227.2 LBS | DIASTOLIC BLOOD PRESSURE: 62 MMHG | BODY MASS INDEX: 33.65 KG/M2 | HEART RATE: 94 BPM | OXYGEN SATURATION: 98 %

## 2025-05-05 DIAGNOSIS — O30.032 MONOCHORIONIC DIAMNIOTIC TWIN GESTATION IN SECOND TRIMESTER: Primary | ICD-10-CM

## 2025-05-05 DIAGNOSIS — O24.119 TYPE 2 DIABETES MELLITUS AFFECTING PREGNANCY, ANTEPARTUM: ICD-10-CM

## 2025-05-05 DIAGNOSIS — Z3A.18 18 WEEKS GESTATION OF PREGNANCY: ICD-10-CM

## 2025-05-05 DIAGNOSIS — O34.219 HISTORY OF CESAREAN SECTION COMPLICATING PREGNANCY: ICD-10-CM

## 2025-05-05 DIAGNOSIS — Z98.84 STATUS POST LAPAROSCOPIC SLEEVE GASTRECTOMY: ICD-10-CM

## 2025-05-05 PROCEDURE — 76821 MIDDLE CEREBRAL ARTERY ECHO: CPT | Performed by: STUDENT IN AN ORGANIZED HEALTH CARE EDUCATION/TRAINING PROGRAM

## 2025-05-05 PROCEDURE — 99214 OFFICE O/P EST MOD 30 MIN: CPT | Performed by: STUDENT IN AN ORGANIZED HEALTH CARE EDUCATION/TRAINING PROGRAM

## 2025-05-05 PROCEDURE — 76815 OB US LIMITED FETUS(S): CPT | Performed by: STUDENT IN AN ORGANIZED HEALTH CARE EDUCATION/TRAINING PROGRAM

## 2025-05-05 NOTE — TELEPHONE ENCOUNTER
PA for Insulin Glargine Solostar 100UNIT/ML pen-injectors  DENIED    Reason:(Screenshot if applicable)        Message sent to office clinical pool Yes    Denial letter scanned into Media Yes    We can gladly do an appeal but the process can take about 30-60 days to provide determination. Please Schedule a Peer to Peer at phone [phone]. If an appeal is truly warranted please have Provider send clinical documentation to the PA department to support the appeal.     **Please follow up with your patient regarding denial and next steps**

## 2025-05-05 NOTE — TELEPHONE ENCOUNTER
PA for Insulin Glargine Solostar 100UNIT/ML pen-injectors SUBMITTED to [employer]    via    [x]Replaced by [facility]-KEY: [id number]  []Surescripts-Case ID #   []Availity-Auth ID # NDC #   []Faxed to plan   []Other website   []Phone call Case ID #     []PA sent as URGENT    All office notes, labs and other pertaining documents and studies sent. Clinical questions answered. Awaiting determination from insurance company.     Turnaround time for your insurance to make a decision on your Prior Authorization can take 7-21 business days.

## 2025-05-06 ENCOUNTER — TELEPHONE (OUTPATIENT)
Facility: HOSPITAL | Age: 30
End: 2025-05-06

## 2025-05-06 NOTE — TELEPHONE ENCOUNTER
Spoke with Syed regarding insulin glargine and insurance did cover insulin. She started on 10 units daily. Due to hyperglycemia, recommended increasing insulin glargine to 20 units daily at 9 PM. Always have a bedtime snack and avoid fasting greater than 8-10 hours overnight. Reviewed SMBG fasting and 2 hours from post start of each meal for 4 readings a day. Encouraged to communicate with diabetes team twice a week.

## 2025-05-19 ENCOUNTER — ULTRASOUND (OUTPATIENT)
Dept: PERINATAL CARE | Facility: OTHER | Age: 30
End: 2025-05-19
Payer: COMMERCIAL

## 2025-05-19 VITALS
HEIGHT: 69 IN | DIASTOLIC BLOOD PRESSURE: 62 MMHG | SYSTOLIC BLOOD PRESSURE: 124 MMHG | HEART RATE: 110 BPM | BODY MASS INDEX: 33.92 KG/M2 | WEIGHT: 229 LBS

## 2025-05-19 DIAGNOSIS — O34.219 HISTORY OF CESAREAN DELIVERY, CURRENTLY PREGNANT: ICD-10-CM

## 2025-05-19 DIAGNOSIS — Z36.86 ENCOUNTER FOR ANTENATAL SCREENING FOR CERVICAL LENGTH: ICD-10-CM

## 2025-05-19 DIAGNOSIS — E66.09 CLASS 1 OBESITY DUE TO EXCESS CALORIES WITH SERIOUS COMORBIDITY AND BODY MASS INDEX (BMI) OF 32.0 TO 32.9 IN ADULT: ICD-10-CM

## 2025-05-19 DIAGNOSIS — O24.119 TYPE 2 DIABETES MELLITUS AFFECTING PREGNANCY, ANTEPARTUM: ICD-10-CM

## 2025-05-19 DIAGNOSIS — O99.842 PREVIOUS GASTRIC BYPASS AFFECTING PREGNANCY IN SECOND TRIMESTER, ANTEPARTUM: ICD-10-CM

## 2025-05-19 DIAGNOSIS — O30.032 MONOCHORIONIC DIAMNIOTIC TWIN GESTATION IN SECOND TRIMESTER: Primary | ICD-10-CM

## 2025-05-19 DIAGNOSIS — Z3A.20 20 WEEKS GESTATION OF PREGNANCY: ICD-10-CM

## 2025-05-19 DIAGNOSIS — O43.192 MARGINAL INSERTION OF UMBILICAL CORD AFFECTING MANAGEMENT OF MOTHER IN SECOND TRIMESTER: ICD-10-CM

## 2025-05-19 DIAGNOSIS — E66.811 CLASS 1 OBESITY DUE TO EXCESS CALORIES WITH SERIOUS COMORBIDITY AND BODY MASS INDEX (BMI) OF 32.0 TO 32.9 IN ADULT: ICD-10-CM

## 2025-05-19 PROCEDURE — 76812 OB US DETAILED ADDL FETUS: CPT | Performed by: OBSTETRICS & GYNECOLOGY

## 2025-05-19 PROCEDURE — 76811 OB US DETAILED SNGL FETUS: CPT | Performed by: OBSTETRICS & GYNECOLOGY

## 2025-05-19 PROCEDURE — 76817 TRANSVAGINAL US OBSTETRIC: CPT | Performed by: OBSTETRICS & GYNECOLOGY

## 2025-05-19 NOTE — PROGRESS NOTES
Ultrasound Probe Disinfection    A transvaginal ultrasound was performed.   Prior to use, disinfection was performed with High Level Disinfection Process ([employer]).  Probe serial number [id number] was used.     Lucy SINCLAIR, ESTEBAN  05/19/25  9:10 AM

## 2025-05-19 NOTE — Clinical Note
Patient also needs a fetal echo in 4 weeks from mono Di twins.  Please ask the techs to see if they can do it on the same day as her next growth scan since all the anatomy has been completed.  Gege

## 2025-05-19 NOTE — LETTER
May 20, 2025     Robson Lira MD  [address] [place] [address]  [address] [place] [address]    Patient: Syed Taveras   YOB: 1995   Date of Visit: 2025       Dear Dr. Robson Lira MD:    Thank you for referring Syed Taveras to me for evaluation. Below are my notes for this consultation.    If you have questions, please do not hesitate to call me. I look forward to following your patient along with you.         Sincerely,        Gege Guillermo MD        CC: No Recipients    Gege Guillermo MD  2025 12:58 AM  Sign when Signing Visit    Syed Taveras  has no complaints today at 20w6d. She reports fetal movements and does not report any vaginal bleeding or signs of labor.  Her recently completed fetal testing revealed a normal NIPT and MSAP. She is here today for an ultrasound for fetal anatomy.    Problem list:  1.  Monochorionic diamniotic twins  2.  History of type 2 diabetes improved to prediabetes with a hemoglobin A1c of 5.9 after her gastric sleeve but has returned during this pregnancy and she is now on 24 units of Lantus since 5/15/2025.  Her blood sugars are not reported since 25 but she reports they are still elevated which sounds like she should go up to 28 units but will ask her to report her blood sugars in the system first.   3.  History of a prior   4.  Class I obesity  5.  Postpartum  cardiomyopathy that resolved.   6.  History of a laparoscopic sleeve with low vitamin B12, low vitamin D and anemia with a hemoglobin of 10.6 with a ferritin of 98 on vitamin D and vitamin B12 injections    Ultrasound findings:  The ultrasound today shows normal interval fetal growth and fluid, normal cervical length, and no malformations were detected on either baby.  Both babies are male.  There is no evidence for twin-twin transfusion or fetal anemia on either baby.     There is a posterior succenturiate placenta that is difficult to see if it belongs  to baby A or baby B.    There is a thin membrane.     The baby B has a marginal cord insertion    Baby A has a velamentous cord insertion that looks like it is within 2 cm of the internal os which would be termed a vasa previa    Pregnancy ultrasound has limitations and is unable to detect all forms of fetal congenital abnormalities.      Follow up recommended:   Recommend a follow-up ultrasound for TTTS in 2 weeks.  Recommend growth scan in 4 weeks.  Recommend a fetal echo in 4 weeks.     Pre visit time reviewing her records   15 minutes  Face to face time 10 minutes  Post visit time on documentation of note, updating her problem list, adding orders and prescriptions 15 minutes.  Procedures that were completed today were charged separately.   The level of decision making was moderate complexity.    Gege Guillermo MD

## 2025-05-19 NOTE — PROGRESS NOTES
Syed Taveras  has no complaints today at 20w6d. She reports fetal movements and does not report any vaginal bleeding or signs of labor.  Her recently completed fetal testing revealed a normal NIPT and MSAP. She is here today for an ultrasound for fetal anatomy.    Problem list:  1.  Monochorionic diamniotic twins  2.  History of type 2 diabetes improved to prediabetes with a hemoglobin A1c of 5.9 after her gastric sleeve but has returned during this pregnancy and she is now on 24 units of Lantus since 5/15/2025.  Her blood sugars are not reported since 25 but she reports they are still elevated which sounds like she should go up to 28 units but will ask her to report her blood sugars in the system first.   3.  History of a prior   4.  Class I obesity  5.  Postpartum  cardiomyopathy that resolved.   6.  History of a laparoscopic sleeve with low vitamin B12, low vitamin D and anemia with a hemoglobin of 10.6 with a ferritin of 98 on vitamin D and vitamin B12 injections    Ultrasound findings:  The ultrasound today shows normal interval fetal growth and fluid, normal cervical length, and no malformations were detected on either baby.  Both babies are male.  There is no evidence for twin-twin transfusion or fetal anemia on either baby.     There is a posterior succenturiate placenta that is difficult to see if it belongs to baby A or baby B.    There is a thin membrane.     The baby B has a marginal cord insertion    Baby A has a velamentous cord insertion that looks like it is within 2 cm of the internal os which would be termed a vasa previa    Pregnancy ultrasound has limitations and is unable to detect all forms of fetal congenital abnormalities.      Follow up recommended:   Recommend a follow-up ultrasound for TTTS in 2 weeks.  Recommend growth scan in 4 weeks.  Recommend a fetal echo in 4 weeks.     Pre visit time reviewing her records   15 minutes  Face to face time 10 minutes  Post  visit time on documentation of note, updating her problem list, adding orders and prescriptions 15 minutes.  Procedures that were completed today were charged separately.   The level of decision making was moderate complexity.    Gege Guillermo MD

## 2025-05-20 PROBLEM — O43.192 MARGINAL INSERTION OF UMBILICAL CORD AFFECTING MANAGEMENT OF MOTHER IN SECOND TRIMESTER: Status: ACTIVE | Noted: 2025-05-20

## 2025-05-22 ENCOUNTER — TELEPHONE (OUTPATIENT)
Dept: OBGYN CLINIC | Facility: MEDICAL CENTER | Age: 30
End: 2025-05-22

## 2025-05-22 NOTE — TELEPHONE ENCOUNTER
2ND TRIMESTER CHECK-IN     Overall how are you doing? Patient reports to be doing well. No current concerns. Following up with [facility]IP due to T2DM. Currently on 20 units of insulin daily.      Compliant with routine OB care appointments? Yes.     Have you completed your 1st trimester labs? Yes.     If you had NIPS with MFM, do you have a order for [facility]? Completed. Screen negative.     Have you seen MFM and do you have your detailed US scheduled? Yes. Patient had detailed twin US 5/19. Has fetal echo 6/3 and growth 6/19.      Pregnancy Education: Have you had a chance to review the classes/hospital tour offered and registered? Patient interested in hospital tour. Registration information sent via [employer].

## 2025-05-23 PROBLEM — Z3A.22 22 WEEKS GESTATION OF PREGNANCY: Status: ACTIVE | Noted: 2025-03-20

## 2025-05-23 PROBLEM — O99.842 PREGNANCY AFFECTED BY PREVIOUS BARIATRIC SURGERY, CURRENTLY IN SECOND TRIMESTER: Status: ACTIVE | Noted: 2025-05-23

## 2025-05-24 NOTE — ASSESSMENT & PLAN NOTE
Cont checking the vitamin level q trimester    Last checked  3/11-low Vit b getting injection monthly

## 2025-05-24 NOTE — ASSESSMENT & PLAN NOTE
Started on insulin in may   Cont the follow up with MFM         Lab Results   Component Value Date    HGBA1C 5.9 (H) 03/11/2025

## 2025-05-24 NOTE — ASSESSMENT & PLAN NOTE
Primarily anterior placenta with posterior accessory lobe  A PCI may be velementous from posterior accessory lobe and may be a vasa previa  B PCI normal from anterior aspect      Scanned Q 2 weeks

## 2025-05-24 NOTE — PROGRESS NOTES
Routine Prenatal Visit  [facility] [address], [place], [place]      OB/GYN Prenatal Visit    ASSESSMENT / PLAN:  1. 22 weeks gestation of pregnancy  Assessment & Plan:  Genetic are normal   Close following with mfm     Next scan is 6/3  2. History of  delivery, currently pregnant  Assessment & Plan:  Will need a repeat c section   3. Type 2 diabetes mellitus affecting pregnancy, antepartum  Assessment & Plan:  Started on insulin in may   Cont the follow up with MFM         Lab Results   Component Value Date    HGBA1C 5.9 (H) 2025     4. Monochorionic diamniotic twin gestation in second trimester  Assessment & Plan:  Primarily anterior placenta with posterior accessory lobe  A PCI may be velementous from posterior accessory lobe and may be a vasa previa  B PCI normal from anterior aspect      Scanned Q 2 weeks    5. Marginal insertion of umbilical cord affecting management of mother in second trimester  6. Postpartum cardiomyopathy  Assessment & Plan:  Had normal echo RF in     She needs one this pregnancy   Recommend to schedule     7. Pregnancy affected by previous bariatric surgery, currently in second trimester  Assessment & Plan:  Cont checking the vitamin level q trimester    Last checked  3/11-low Vit b getting injection monthly   8. Vasa previa, fetus 1 of multiple gestation  Assessment & Plan:  Baby a  Cont mfm follow up         SUBJECTIVE:  Syed Taveras is a 29 y.o.  at 22 here for prenatal visit.  She has no obstetric complaints and denies pelvic pain, cramping/contractions, vaginal bleeding, loss of fluid, or decreased fetal movement.       The following portions of the patient's history were reviewed and updated as appropriate: allergies, current medications, past family history, past medical history, obstetric history, gynecologic history, past social history, past surgical history and problem list.      Objective:  /60   Wt  106 kg (233 lb)   LMP 2024 (Exact Date)   BMI 34.41 kg/m²   Pregravid Weight/BMI: 95.7 kg (211 lb) (BMI 31.15)  Current Weight: 106 kg (233 lb)   Total Weight Gain: 9.979 kg (22 lb)   Pre- Vitals      Flowsheet Row Most Recent Value   Prenatal Assessment    Fetal Heart Rate --  [A-146 [id number]]   Movement Present   Prenatal Vitals    Blood Pressure 108/60   Weight - Scale 106 kg (233 lb)   Urine Albumin/Glucose    Dilation/Effacement/Station    Vaginal Drainage    Draining Fluid No   Edema    LLE Edema None   RLE Edema None   Facial Edema None               Physical Exam:    General: Well appearing, no distress  Respiratory: Unlabored breathing  Cardiovascular: Regular rate.  Abdomen: Soft, gravid, nontender  Fundal Height: Appropriate for gestational age.  Extremities: Warm and well perfused.  Non tender.      Robson Lira MD

## 2025-05-28 ENCOUNTER — ROUTINE PRENATAL (OUTPATIENT)
Dept: OBGYN CLINIC | Facility: MEDICAL CENTER | Age: 30
End: 2025-05-28
Payer: COMMERCIAL

## 2025-05-28 VITALS — BODY MASS INDEX: 34.41 KG/M2 | SYSTOLIC BLOOD PRESSURE: 108 MMHG | WEIGHT: 233 LBS | DIASTOLIC BLOOD PRESSURE: 60 MMHG

## 2025-05-28 DIAGNOSIS — O43.192 MARGINAL INSERTION OF UMBILICAL CORD AFFECTING MANAGEMENT OF MOTHER IN SECOND TRIMESTER: ICD-10-CM

## 2025-05-28 DIAGNOSIS — O30.032 MONOCHORIONIC DIAMNIOTIC TWIN GESTATION IN SECOND TRIMESTER: ICD-10-CM

## 2025-05-28 DIAGNOSIS — O99.842 PREGNANCY AFFECTED BY PREVIOUS BARIATRIC SURGERY, CURRENTLY IN SECOND TRIMESTER: ICD-10-CM

## 2025-05-28 DIAGNOSIS — Z3A.22 22 WEEKS GESTATION OF PREGNANCY: Primary | ICD-10-CM

## 2025-05-28 DIAGNOSIS — O24.119 TYPE 2 DIABETES MELLITUS AFFECTING PREGNANCY, ANTEPARTUM: ICD-10-CM

## 2025-05-28 DIAGNOSIS — O34.219 HISTORY OF CESAREAN DELIVERY, CURRENTLY PREGNANT: ICD-10-CM

## 2025-05-28 PROCEDURE — 99213 OFFICE O/P EST LOW 20 MIN: CPT | Performed by: OBSTETRICS & GYNECOLOGY

## 2025-05-29 ENCOUNTER — TELEPHONE (OUTPATIENT)
Dept: OBGYN CLINIC | Facility: MEDICAL CENTER | Age: 30
End: 2025-05-29

## 2025-05-29 NOTE — TELEPHONE ENCOUNTER
Called patient to assist with scheduling echo scheduled. Patient unavailable and was not able to leave message. Patient needs to call central scheduling at [phone] to get it scheduled. [employer] message sent yesterday with information as well. Number provided.     ----- Message -----  From: Robson Lira MD  Sent: 5/28/2025  10:03 AM EDT  To: [facility]    Pt needs and echo she had an order placed not scheduled. Please assist. She has a history of cardio myopathy.

## 2025-05-29 NOTE — TELEPHONE ENCOUNTER
Patient was returning our call. I did let the patient know to call central scheduling to schedule appt for Echo. I did supply the the number for the patient

## 2025-06-03 ENCOUNTER — ANCILLARY PROCEDURE (OUTPATIENT)
Dept: PERINATAL CARE | Facility: OTHER | Age: 30
End: 2025-06-03
Attending: OBSTETRICS & GYNECOLOGY
Payer: COMMERCIAL

## 2025-06-03 ENCOUNTER — ROUTINE PRENATAL (OUTPATIENT)
Dept: PERINATAL CARE | Facility: OTHER | Age: 30
End: 2025-06-03
Payer: COMMERCIAL

## 2025-06-03 ENCOUNTER — HOSPITAL ENCOUNTER (OUTPATIENT)
Dept: NON INVASIVE DIAGNOSTICS | Facility: HOSPITAL | Age: 30
Discharge: HOME | End: 2025-06-03
Attending: OBSTETRICS & GYNECOLOGY

## 2025-06-03 VITALS
HEART RATE: 100 BPM | SYSTOLIC BLOOD PRESSURE: 124 MMHG | DIASTOLIC BLOOD PRESSURE: 62 MMHG | HEIGHT: 69 IN | BODY MASS INDEX: 34.41 KG/M2

## 2025-06-03 VITALS
HEART RATE: 85 BPM | BODY MASS INDEX: 34.51 KG/M2 | DIASTOLIC BLOOD PRESSURE: 62 MMHG | WEIGHT: 233 LBS | SYSTOLIC BLOOD PRESSURE: 124 MMHG | HEIGHT: 69 IN

## 2025-06-03 DIAGNOSIS — Z33.1 NORMAL PREGNANCY, INCIDENTAL: ICD-10-CM

## 2025-06-03 DIAGNOSIS — Z3A.22 22 WEEKS GESTATION OF PREGNANCY: Primary | ICD-10-CM

## 2025-06-03 LAB
AORTIC ROOT: 3.1 CM
AV LVOT MEAN GRADIENT: 3 MMHG
AV LVOT PEAK GRADIENT: 4 MMHG
BSA FOR ECHO PROCEDURE: 2.2 M2
DOP CALC LVOT AREA: 3.46 CM2
DOP CALC LVOT CARDIAC INDEX: 2.58 L/MIN/M2
DOP CALC LVOT CARDIAC OUTPUT: 5.68 L/MIN
DOP CALC LVOT DIAMETER: 2.1 CM
DOP CALC LVOT PEAK VEL VTI: 20.21 CM
DOP CALC LVOT PEAK VEL: 1.04 M/S
DOP CALC LVOT STROKE INDEX: 32.3 ML/M2
DOP CALC LVOT STROKE VOLUME: 69.96
E WAVE DECELERATION TIME: 149 MS
E/A RATIO: 0.98
FRACTIONAL SHORTENING: 36 (ref 28–44)
INTERVENTRICULAR SEPTUM IN DIASTOLE (PARASTERNAL SHORT AXIS VIEW): 1 CM
INTERVENTRICULAR SEPTUM: 1 CM (ref 0.6–1.1)
LAAS-AP2: 17.9 CM2
LAAS-AP4: 19.6 CM2
LEFT ATRIUM AREA SYSTOLE SINGLE PLANE A4C: 18.3 CM2
LEFT ATRIUM SIZE: 3.8 CM
LEFT ATRIUM VOLUME (MOD BIPLANE): 56 ML
LEFT ATRIUM VOLUME INDEX (MOD BIPLANE): 25.5 ML/M2
LEFT INTERNAL DIMENSION IN SYSTOLE: 3.6 CM (ref 2.1–4)
LEFT VENTRICULAR INTERNAL DIMENSION IN DIASTOLE: 5.6 CM (ref 3.5–6)
LEFT VENTRICULAR POSTERIOR WALL IN END DIASTOLE: 1 CM
LEFT VENTRICULAR STROKE VOLUME: 100 ML
LV EF US.2D.A4C+ESTIMATED: 55 %
LVSV (TEICH): 100 ML
MV E'TISSUE VEL-LAT: 18 CM/S
MV E'TISSUE VEL-SEP: 12 CM/S
MV PEAK A VEL: 0.6 M/S
MV PEAK E VEL: 59 CM/S
MV STENOSIS PRESSURE HALF TIME: 43 MS
MV VALVE AREA P 1/2 METHOD: 5.12
RIGHT ATRIUM AREA SYSTOLE A4C: 16.3 CM2
RIGHT VENTRICLE ID DIMENSION: 4.6 CM
SL CV LEFT ATRIUM LENGTH A2C: 4.7 CM
SL CV LV EF: 55
SL CV PED ECHO LEFT VENTRICLE DIASTOLIC VOLUME (MOD BIPLANE) 2D: 155 ML
SL CV PED ECHO LEFT VENTRICLE SYSTOLIC VOLUME (MOD BIPLANE) 2D: 54 ML
TR MAX PG: 9 MMHG
TR PEAK VELOCITY: 1.5 M/S
TRICUSPID ANNULAR PLANE SYSTOLIC EXCURSION: 2.2 CM
TRICUSPID VALVE PEAK REGURGITATION VELOCITY: 1.46 M/S

## 2025-06-03 PROCEDURE — 76825 ECHO EXAM OF FETAL HEART: CPT | Performed by: OBSTETRICS & GYNECOLOGY

## 2025-06-03 PROCEDURE — 99213 OFFICE O/P EST LOW 20 MIN: CPT | Performed by: OBSTETRICS & GYNECOLOGY

## 2025-06-03 PROCEDURE — 76816 OB US FOLLOW-UP PER FETUS: CPT | Performed by: OBSTETRICS & GYNECOLOGY

## 2025-06-03 PROCEDURE — 93325 DOPPLER ECHO COLOR FLOW MAPG: CPT | Performed by: OBSTETRICS & GYNECOLOGY

## 2025-06-03 PROCEDURE — 76827 ECHO EXAM OF FETAL HEART: CPT | Performed by: OBSTETRICS & GYNECOLOGY

## 2025-06-03 PROCEDURE — NC001 PR NO CHARGE: Performed by: OBSTETRICS & GYNECOLOGY

## 2025-06-05 ENCOUNTER — HOSPITAL ENCOUNTER (EMERGENCY)
Facility: HOSPITAL | Age: 30
Discharge: HOME | End: 2025-06-05
Attending: EMERGENCY MEDICINE
Payer: COMMERCIAL

## 2025-06-05 ENCOUNTER — APPOINTMENT (EMERGENCY)
Dept: CT IMAGING | Facility: HOSPITAL | Age: 30
End: 2025-06-05
Payer: COMMERCIAL

## 2025-06-05 ENCOUNTER — TELEPHONE (OUTPATIENT)
Age: 30
End: 2025-06-05

## 2025-06-05 VITALS
HEART RATE: 87 BPM | DIASTOLIC BLOOD PRESSURE: 58 MMHG | TEMPERATURE: 98.6 F | RESPIRATION RATE: 17 BRPM | WEIGHT: 235.89 LBS | OXYGEN SATURATION: 98 % | SYSTOLIC BLOOD PRESSURE: 100 MMHG | BODY MASS INDEX: 34.84 KG/M2

## 2025-06-05 DIAGNOSIS — J02.0 STREP PHARYNGITIS: Primary | ICD-10-CM

## 2025-06-05 LAB
ALBUMIN SERPL BCG-MCNC: 3.2 G/DL (ref 3.5–5)
ALP SERPL-CCNC: 60 U/L (ref 34–104)
ALT SERPL W P-5'-P-CCNC: 7 U/L (ref 7–52)
ANION GAP SERPL CALCULATED.3IONS-SCNC: 6 MMOL/L (ref 4–13)
AST SERPL W P-5'-P-CCNC: 10 U/L (ref 13–39)
BASOPHILS # BLD AUTO: 0.03 THOUSANDS/ÂΜL (ref 0–0.1)
BASOPHILS NFR BLD AUTO: 0 % (ref 0–1)
BILIRUB SERPL-MCNC: 0.98 MG/DL (ref 0.2–1)
BUN SERPL-MCNC: 7 MG/DL (ref 5–25)
CALCIUM ALBUM COR SERPL-MCNC: 8.9 MG/DL (ref 8.3–10.1)
CALCIUM SERPL-MCNC: 8.3 MG/DL (ref 8.4–10.2)
CHLORIDE SERPL-SCNC: 104 MMOL/L (ref 96–108)
CO2 SERPL-SCNC: 23 MMOL/L (ref 21–32)
CREAT SERPL-MCNC: 0.51 MG/DL (ref 0.6–1.3)
EOSINOPHIL # BLD AUTO: 0.03 THOUSAND/ÂΜL (ref 0–0.61)
EOSINOPHIL NFR BLD AUTO: 0 % (ref 0–6)
ERYTHROCYTE [DISTWIDTH] IN BLOOD BY AUTOMATED COUNT: 12.7 % (ref 11.6–15.1)
FLUAV AG UPPER RESP QL IA.RAPID: NEGATIVE
FLUBV AG UPPER RESP QL IA.RAPID: NEGATIVE
GFR SERPL CREATININE-BSD FRML MDRD: 131 ML/MIN/1.73SQ M
GLUCOSE SERPL-MCNC: 103 MG/DL (ref 65–140)
GLUCOSE SERPL-MCNC: 107 MG/DL (ref 65–140)
HCT VFR BLD AUTO: 30.4 % (ref 34.8–46.1)
HGB BLD-MCNC: 10.3 G/DL (ref 11.5–15.4)
IMM GRANULOCYTES # BLD AUTO: 0.06 THOUSAND/UL (ref 0–0.2)
IMM GRANULOCYTES NFR BLD AUTO: 1 % (ref 0–2)
LACTATE SERPL-SCNC: 0.7 MMOL/L (ref 0.5–2)
LYMPHOCYTES # BLD AUTO: 0.79 THOUSANDS/ÂΜL (ref 0.6–4.47)
LYMPHOCYTES NFR BLD AUTO: 7 % (ref 14–44)
MCH RBC QN AUTO: 30.3 PG (ref 26.8–34.3)
MCHC RBC AUTO-ENTMCNC: 33.9 G/DL (ref 31.4–37.4)
MCV RBC AUTO: 89 FL (ref 82–98)
MONOCYTES # BLD AUTO: 0.87 THOUSAND/ÂΜL (ref 0.17–1.22)
MONOCYTES NFR BLD AUTO: 7 % (ref 4–12)
NEUTROPHILS # BLD AUTO: 10.15 THOUSANDS/ÂΜL (ref 1.85–7.62)
NEUTS SEG NFR BLD AUTO: 85 % (ref 43–75)
NRBC BLD AUTO-RTO: 0 /100 WBCS
PLATELET # BLD AUTO: 102 THOUSANDS/UL (ref 149–390)
PMV BLD AUTO: 11.8 FL (ref 8.9–12.7)
POTASSIUM SERPL-SCNC: 3.9 MMOL/L (ref 3.5–5.3)
PROCALCITONIN SERPL-MCNC: <0.05 NG/ML
PROT SERPL-MCNC: 6.1 G/DL (ref 6.4–8.4)
RBC # BLD AUTO: 3.4 MILLION/UL (ref 3.81–5.12)
S PYO DNA THROAT QL NAA+PROBE: DETECTED
SARS-COV+SARS-COV-2 AG RESP QL IA.RAPID: NEGATIVE
SODIUM SERPL-SCNC: 133 MMOL/L (ref 135–147)
WBC # BLD AUTO: 11.93 THOUSAND/UL (ref 4.31–10.16)

## 2025-06-05 PROCEDURE — 82948 REAGENT STRIP/BLOOD GLUCOSE: CPT

## 2025-06-05 PROCEDURE — 83605 ASSAY OF LACTIC ACID: CPT | Performed by: EMERGENCY MEDICINE

## 2025-06-05 PROCEDURE — 96367 TX/PROPH/DG ADDL SEQ IV INF: CPT

## 2025-06-05 PROCEDURE — 96375 TX/PRO/DX INJ NEW DRUG ADDON: CPT

## 2025-06-05 PROCEDURE — 87651 STREP A DNA AMP PROBE: CPT | Performed by: EMERGENCY MEDICINE

## 2025-06-05 PROCEDURE — 80053 COMPREHEN METABOLIC PANEL: CPT | Performed by: EMERGENCY MEDICINE

## 2025-06-05 PROCEDURE — 87811 SARS-COV-2 COVID19 W/OPTIC: CPT | Performed by: EMERGENCY MEDICINE

## 2025-06-05 PROCEDURE — 85025 COMPLETE CBC W/AUTO DIFF WBC: CPT | Performed by: EMERGENCY MEDICINE

## 2025-06-05 PROCEDURE — 87804 INFLUENZA ASSAY W/OPTIC: CPT | Performed by: EMERGENCY MEDICINE

## 2025-06-05 PROCEDURE — 99283 EMERGENCY DEPT VISIT LOW MDM: CPT

## 2025-06-05 PROCEDURE — 84145 PROCALCITONIN (PCT): CPT | Performed by: EMERGENCY MEDICINE

## 2025-06-05 PROCEDURE — 36415 COLL VENOUS BLD VENIPUNCTURE: CPT | Performed by: EMERGENCY MEDICINE

## 2025-06-05 PROCEDURE — 96365 THER/PROPH/DIAG IV INF INIT: CPT

## 2025-06-05 PROCEDURE — 99285 EMERGENCY DEPT VISIT HI MDM: CPT | Performed by: EMERGENCY MEDICINE

## 2025-06-05 PROCEDURE — 70491 CT SOFT TISSUE NECK W/DYE: CPT

## 2025-06-05 RX ORDER — ACETAMINOPHEN 10 MG/ML
1000 INJECTION, SOLUTION INTRAVENOUS ONCE
Status: COMPLETED | OUTPATIENT
Start: 2025-06-05 | End: 2025-06-05

## 2025-06-05 RX ORDER — DEXAMETHASONE SODIUM PHOSPHATE 10 MG/ML
10 INJECTION, SOLUTION INTRAMUSCULAR; INTRAVENOUS ONCE
Status: COMPLETED | OUTPATIENT
Start: 2025-06-05 | End: 2025-06-05

## 2025-06-05 RX ADMIN — ACETAMINOPHEN 1000 MG: 10 INJECTION INTRAVENOUS at 09:45

## 2025-06-05 RX ADMIN — IOHEXOL 85 ML: 350 INJECTION, SOLUTION INTRAVENOUS at 10:29

## 2025-06-05 RX ADMIN — AMPICILLIN SODIUM AND SULBACTAM SODIUM 3 G: 10; 5 INJECTION, POWDER, FOR SOLUTION INTRAMUSCULAR; INTRAVENOUS at 11:00

## 2025-06-05 RX ADMIN — DEXAMETHASONE SODIUM PHOSPHATE 10 MG: 10 INJECTION INTRAMUSCULAR; INTRAVENOUS at 09:45

## 2025-06-05 RX ADMIN — SODIUM CHLORIDE 1000 ML: 0.9 INJECTION, SOLUTION INTRAVENOUS at 09:44

## 2025-06-05 NOTE — ED PROVIDER NOTES
Time reflects when diagnosis was documented in both MDM as applicable and the Disposition within this note       Time User Action Codes Description Comment    6/5/2025 11:44 AM Bandar Gonzalez Add [J02.0] Strep pharyngitis           ED Disposition       ED Disposition   Discharge    Condition   Stable    Date/Time   Thu Jun 5, 2025 11:28 AM    Comment   Syed Taveras discharge to home/self care.                   Assessment & Plan       Medical Decision Making  29-year-old female complained of fairly sudden onset severe sore throat this a.m..  Discussed with ENT on-call, did obtain CT soft tissue neck to rule out RPA or other deep abscess.  Fortunately this was reassuring.  She did test positive for strep pharyngitis.  Received Unasyn and Decadron in ED, will discharge on Augmentin.  Discussed outpatient follow-up and return precautions.    Amount and/or Complexity of Data Reviewed  Labs: ordered. Decision-making details documented in ED Course.  Radiology: ordered and independent interpretation performed. Decision-making details documented in ED Course.    Risk  Prescription drug management.        ED Course as of 06/05/25 1200   Thu Jun 05, 2025   0950 Discussed with ENT on-call, they agree with plan for CT soft tissue neck to rule out deep abscess.       Medications   sodium chloride 0.9 % bolus 1,000 mL (0 mL Intravenous Stopped 6/5/25 1143)   acetaminophen (Ofirmev) injection 1,000 mg (0 mg Intravenous Stopped 6/5/25 1100)   dexamethasone (PF) (DECADRON) injection 10 mg (10 mg Intravenous Given 6/5/25 0945)   ampicillin-sulbactam (UNASYN) 3 g in sodium chloride 0.9 % 100 mL IVPB (3 g Intravenous New Bag 6/5/25 1100)   iohexol (OMNIPAQUE) 350 MG/ML injection (SINGLE-DOSE) 85 mL (85 mL Intravenous Given 6/5/25 1029)       ED Risk Strat Scores                    No data recorded        SBIRT 22yo+      Flowsheet Row Most Recent Value   Initial Alcohol Screen: US AUDIT-C     1. How often do you have a drink  containing alcohol? 0 Filed at: 06/05/2025 1008   2. How many drinks containing alcohol do you have on a typical day you are drinking?  0 Filed at: 06/05/2025 1008   3b. FEMALE Any Age, or MALE 65+: How often do you have 4 or more drinks on one occassion? 0 Filed at: 06/05/2025 1008   Audit-C Score 0 Filed at: 06/05/2025 1008   CHERRI: How many times in the past year have you...    Used an illegal drug or used a prescription medication for non-medical reasons? Never Filed at: 06/05/2025 1008                            History of Present Illness       Chief Complaint   Patient presents with    Sore Throat     Pt reports R sided throat swelling and pain radiating into R ear.  Pt reports she is unable to maintain saliva.  Pt is 23 weeks pregnant.        Past Medical History[1]   Past Surgical History[2]   Family History[3]   Social History[4]   E-Cigarette/Vaping    E-Cigarette Use Never User       E-Cigarette/Vaping Substances    Nicotine No     THC No     CBD No     Flavoring No     Other No     Unknown No       I have reviewed and agree with the history as documented.     Syed is a very pleasant 30 yo F here for evaluation of sore throat.  Says that symptoms woke her from sleep this morning around 3 or 4 AM.  Complains of moderate to severe pain especially on the right side.  She says she is having trouble opening her mouth or swallowing.  She has been able to tolerate a small amount of water today.  Has not taken any medication for pain. Denies fevers.  She is also approximately 23 weeks pregnant.  She states she is feeling baby move, no significant abdominal or pelvic pain.  No vaginal bleeding or abnormal discharge.  Denies urinary symptoms.      Sore Throat  Associated symptoms: ear pain and trouble swallowing    Associated symptoms: no abdominal pain, no chest pain, no chills, no cough, no fever, no rash, no shortness of breath and no voice change        Review of Systems   Constitutional:  Negative for chills  and fever.   HENT:  Positive for ear pain, sore throat and trouble swallowing. Negative for voice change.    Eyes:  Negative for visual disturbance.   Respiratory:  Negative for cough and shortness of breath.    Cardiovascular:  Negative for chest pain and palpitations.   Gastrointestinal:  Negative for abdominal pain and vomiting.   Genitourinary:  Negative for dysuria and hematuria.   Musculoskeletal:  Negative for arthralgias and back pain.   Skin:  Negative for color change and rash.   Neurological:  Negative for syncope.   All other systems reviewed and are negative.          Objective       ED Triage Vitals [06/05/25 0911]   Temperature Pulse Blood Pressure Respirations SpO2 Patient Position - Orthostatic VS   98.6 °F (37 °C) (!) 121 129/61 20 98 % Sitting      Temp Source Heart Rate Source BP Location FiO2 (%) Pain Score    Oral Monitor Right arm -- --      Vitals      Date and Time Temp Pulse SpO2 Resp BP Pain Score FACES Pain Rating User   06/05/25 1156 -- 87 98 % 17 100/58 -- -- [place]   06/05/25 0911 98.6 °F (37 °C) 121 98 % 20 129/61 -- --             Physical Exam  Vitals and nursing note reviewed.   Constitutional:       General: She is not in acute distress.     Appearance: She is well-developed.   HENT:      Head: Normocephalic and atraumatic.      Mouth/Throat:      Mouth: Mucous membranes are moist.      Pharynx: Posterior oropharyngeal erythema present. No oropharyngeal exudate or uvula swelling.      Comments: Mild trismus, pharyngeal erythema without significant exudates.  Uvula is approximately midline.    Eyes:      Conjunctiva/sclera: Conjunctivae normal.     Neck:      Comments: Mild swelling and moderate to severe tenderness right anterior neck.  No crepitus or skin changes.  Cardiovascular:      Rate and Rhythm: Normal rate and regular rhythm.      Heart sounds: No murmur heard.  Pulmonary:      Effort: Pulmonary effort is normal. No respiratory distress.      Breath sounds: Normal breath  sounds.   Abdominal:      Palpations: Abdomen is soft.      Tenderness: There is no abdominal tenderness.     Musculoskeletal:         General: No swelling.      Cervical back: Neck supple.   Lymphadenopathy:      Cervical: Cervical adenopathy present.     Skin:     General: Skin is warm and dry.      Capillary Refill: Capillary refill takes less than 2 seconds.     Neurological:      Mental Status: She is alert.     Psychiatric:         Mood and Affect: Mood normal.         Results Reviewed       Procedure Component Value Units Date/Time    Fingerstick Glucose (POCT) [[id number]]  (Normal) Collected: 06/05/25 1104    Lab Status: Final result Specimen: Blood Updated: 06/05/25 1105     POC Glucose 103 mg/dl     Strep A PCR [[id number]]  (Abnormal) Collected: 06/05/25 0948    Lab Status: Final result Specimen: Throat Updated: 06/05/25 1037     STREP A PCR Detected    FLU/COVID Rapid Antigen (30 min. TAT) - Preferred screening test in ED [[id number]]  (Normal) Collected: 06/05/25 0948    Lab Status: Final result Specimen: Nares from Nose Updated: 06/05/25 1035     SARS COV Rapid Antigen Negative     Influenza A Rapid Antigen Negative     Influenza B Rapid Antigen Negative    Narrative:      This test has been performed using the Quidel Kiara 2 FLU+SARS Antigen test under the Emergency Use Authorization (EUA). This test has been validated by the  and verified by the performing laboratory. The Kiara uses lateral flow immunofluorescent sandwich assay to detect SARS-COV, Influenza A and Influenza B Antigen.     The Quidel Kiara 2 SARS Antigen test does not differentiate between SARS-CoV and SARS-CoV-2.     Negative results are presumptive and may be confirmed with a molecular assay, if necessary, for patient management. Negative results do not rule out SARS-CoV-2 or influenza infection and should not be used as the sole basis for treatment or patient management decisions. A negative test result may occur if the  level of antigen in a sample is below the limit of detection of this test.     Positive results are indicative of the presence of viral antigens, but do not rule out bacterial infection or co-infection with other viruses.     All test results should be used as an adjunct to clinical observations and other information available to the provider.    FOR PEDIATRIC PATIENTS - copy/paste COVID Guidelines URL to browser: [email or web address]    CBC and differential [[id number]]  (Abnormal) Collected: 06/05/25 0943    Lab Status: Final result Specimen: Blood from Arm, Left Updated: 06/05/25 1033     WBC 11.93 Thousand/uL      RBC 3.40 Million/uL      Hemoglobin 10.3 g/dL      Hematocrit 30.4 %      MCV 89 fL      MCH 30.3 pg      MCHC 33.9 g/dL      RDW 12.7 %      MPV 11.8 fL      Platelets 102 Thousands/uL      nRBC 0 /100 WBCs      Segmented % 85 %      Immature Grans % 1 %      Lymphocytes % 7 %      Monocytes % 7 %      Eosinophils Relative 0 %      Basophils Relative 0 %      Absolute Neutrophils 10.15 Thousands/µL      Absolute Immature Grans 0.06 Thousand/uL      Absolute Lymphocytes 0.79 Thousands/µL      Absolute Monocytes 0.87 Thousand/µL      Eosinophils Absolute 0.03 Thousand/µL      Basophils Absolute 0.03 Thousands/µL     Procalcitonin [[id number]]  (Normal) Collected: 06/05/25 0943    Lab Status: Final result Specimen: Blood from Arm, Left Updated: 06/05/25 1033     Procalcitonin <0.05 ng/ml     Comprehensive metabolic panel [[id number]]  (Abnormal) Collected: 06/05/25 0943    Lab Status: Final result Specimen: Blood from Arm, Left Updated: 06/05/25 1005     Sodium 133 mmol/L      Potassium 3.9 mmol/L      Chloride 104 mmol/L      CO2 23 mmol/L      ANION GAP 6 mmol/L      BUN 7 mg/dL      Creatinine 0.51 mg/dL      Glucose 107 mg/dL      Calcium 8.3 mg/dL      Corrected Calcium 8.9 mg/dL      AST 10 U/L      ALT 7 U/L      Alkaline Phosphatase 60 U/L       Total Protein 6.1 g/dL      Albumin 3.2 g/dL      Total Bilirubin 0.98 mg/dL      eGFR 131 ml/min/1.73sq m     Narrative:      National Kidney Disease Foundation guidelines for Chronic Kidney Disease (CKD):     Stage 1 with normal or high GFR (GFR > 90 mL/min/1.73 square meters)    Stage 2 Mild CKD (GFR = 60-89 mL/min/1.73 square meters)    Stage 3A Moderate CKD (GFR = 45-59 mL/min/1.73 square meters)    Stage 3B Moderate CKD (GFR = 30-44 mL/min/1.73 square meters)    Stage 4 Severe CKD (GFR = 15-29 mL/min/1.73 square meters)    Stage 5 End Stage CKD (GFR <15 mL/min/1.73 square meters)  Note: GFR calculation is accurate only with a steady state creatinine    Lactic acid, plasma (w/reflex if result > 2.0) [[id number]]  (Normal) Collected: 06/05/25 0901    Lab Status: Final result Specimen: Blood from Arm, Left Updated: 06/05/25 1004     LACTIC ACID 0.7 mmol/L     Narrative:      Result may be elevated if tourniquet was used during collection.            CT soft tissue neck with contrast   Final Interpretation by Sarthak Argueta MD (06/05 1058)   Normal CT of the neck soft tissues.      Workstation performed: [id number]             Procedures    ED Medication and Procedure Management   Prior to Admission Medications   Prescriptions Last Dose Informant Patient Reported? Taking?   Blood Glucose Monitoring Suppl (OneTouch Verio) w/Device KIT   No No   Sig: T2DM and pregnancy   Cholecalciferol (VITAMIN D3) 1,000 units tablet   No No   Sig: Take 2 tablets (2,000 Units total) by mouth daily   Insulin Glargine Solostar (Lantus SoloStar) 100 UNIT/ML SOPN   No No   Sig: Inject 0.1 mL (10 Units total) under the skin daily at bedtime At 9 PM daily. To be titrated. T2DM and pregnancy.   Insulin Pen Needle 31G X 5 MM MISC   No No   Sig: Inject under the skin daily at bedtime Use one pen needle per injection.   Lancets (OneTouch Delica Plus [id number]) MISC   No No   Sig: Use 4 a day. T2DM and pregnancy.   OneTouch Verio test  strip   No No   Sig: Use 1 each 4 (four) times a day Test 4 times a day and as instructed. T2DM and pregnancy.   Prenatal MV & Min w/FA-DHA (Prenatal Gummies) 0.18-25 MG CHEW   No No   Sig: Chew 1 gum in the morning   acetaminophen (TYLENOL) 500 mg tablet   No No   Sig: Take 1 tablet (500 mg total) by mouth every 6 (six) hours as needed for mild pain   aspirin (ECOTRIN LOW STRENGTH) 81 mg EC tablet   No No   Sig: Take 2 tablets (162 mg total) by mouth daily   guaiFENesin (MUCINEX) 600 mg 12 hr tablet   No No   Sig: Take 2 tablets (1,200 mg total) by mouth every 12 (twelve) hours   Patient not taking: Reported on 6/3/2025   hydrocortisone 2.5 % cream   No No   Sig: Apply topically 2 (two) times a day      Facility-Administered Medications Last Administration Doses Remaining   cyanocobalamin injection 1,000 mcg 2025  3:44 PM         Patient's Medications   Discharge Prescriptions    AMOXICILLIN-CLAVULANATE (AUGMENTIN) 875-125 MG PER TABLET    Take 1 tablet by mouth every 12 (twelve) hours for 7 days       Start Date: 2025  End Date: 2025       Order Dose: 1 tablet       Quantity: 14 tablet    Refills: 0     No discharge procedures on file.  ED SEPSIS DOCUMENTATION   Time reflects when diagnosis was documented in both MDM as applicable and the Disposition within this note       Time User Action Codes Description Comment    2025 11:44 AM Bandar Gonzalez [J02.0] Strep pharyngitis                    [1]   Past Medical History:  Diagnosis Date    Diabetes mellitus (HCC)     History of delivery of macrosomal infant 2017    History of gestational diabetes 2017    History of pre-eclampsia 2017    History of transfusion     after  - had fever with transfusion    Nonintractable headache 2024    Obesity (BMI 35.0-39.9 without comorbidity)     Peroneal tendon injury, left, initial encounter 11/10/2023    S/P laparoscopic sleeve gastrectomy 2020    Sprain of anterior talofibular ligament  of left ankle, initial encounter 11/10/2023    Uses Chinese as primary spoken language    [2]   Past Surgical History:  Procedure Laterality Date     SECTION  2017    CHOLECYSTECTOMY      DC LAPS GSTRC RSTRICTIV PX LONGITUDINAL GASTRECTOMY N/A 2020    Procedure: LAP SLEEVE GASTRECTOMY, INTRAOP EGD;  Surgeon: Da Rogers MD;  Location: [facility];  Service: Bariatrics   [3]   Family History  Problem Relation Name Age of Onset    Diabetes Mother Marcela Harkins     Diabetes Father Spfredrick Desir     Diabetes Sister Mariza Desir     Sleep apnea Sister      Sleep apnea Brother          2 of the 3 borthers have EAGLE    No Known Problems Brother      No Known Problems Son      Diabetes Maternal Grandmother Beba Goodwinzquez     Skin cancer Maternal Grandmother Beba Goodwinzquez     No Known Problems Maternal Grandfather      Diabetes Paternal Grandmother      No Known Problems Paternal Grandfather      No Known Problems Maternal Aunt      No Known Problems Maternal Aunt      No Known Problems Paternal Aunt      No Known Problems Paternal Aunt      Heart disease Family          CARDIOVASCULAR DISEASE    Breast cancer Neg Hx      Colon cancer Neg Hx      Ovarian cancer Neg Hx     [4]   Social History  Tobacco Use    Smoking status: Never     Passive exposure: Never    Smokeless tobacco: Never   Vaping Use    Vaping status: Never Used   Substance Use Topics    Alcohol use: No    Drug use: No        Bandar Gonzalez MD  25 1200

## 2025-06-05 NOTE — Clinical Note
Syed Taveras was seen and treated in our emergency department on 6/5/2025.                Diagnosis:     Syed  may return to work on return date.    She may return on this date: 06/06/2025         If you have any questions or concerns, please don't hesitate to call.      Bandar Gonzalez MD    ______________________________           _______________          _______________  Hospital Representative                              Date                                Time

## 2025-06-05 NOTE — TELEPHONE ENCOUNTER
Patient called to reschedule 23 week ob twin appt due to being in there ER for a sore throat and and ear ache. I did not see anything available. Please follow up with the patient

## 2025-06-06 NOTE — ASSESSMENT & PLAN NOTE
Maternal ECHO completed 6/3/25 -     Normal left ventricle     EF 55%    Orders:    CBC and differential; Future    RPR-Syphilis Screening (Total Syphilis IGG/IGM); Future    Ferritin; Future    RPR-Syphilis Screening (Total Syphilis IGG/IGM); Future

## 2025-06-06 NOTE — ASSESSMENT & PLAN NOTE
Lantus 24 units nightly  Follow-up with diabetes program 6/10/25  Reviewed goal blood sugars are fasting less than 95 and 2-hour postprandials less than 120  Encourage dietary and medication compliance.  Encouraged close contact with diabetes and pregnancy program.  Lab Results   Component Value Date    HGBA1C 5.9 (H) 03/11/2025

## 2025-06-06 NOTE — PROGRESS NOTES
Name: Syed Taveras      : 1995      MRN: [id number]  Encounter Provider: LES Bergman  Encounter Date: 2025   Encounter department: [facility]  :  Assessment & Plan  23 weeks gestation of pregnancy  Continue PNV and LDASA daily   Follow up with PNC 25  28 week labs ordered   Common discomforts of pregnancy and precautions including  labor reviewed.  Signs and symptoms to report reviewed.    Orders:    CBC and differential; Future    RPR-Syphilis Screening (Total Syphilis IGG/IGM); Future    Ferritin; Future    RPR-Syphilis Screening (Total Syphilis IGG/IGM); Future      Monochorionic diamniotic twin gestation in second trimester  Follow-up with  center scheduled 25           Pregnancy affected by previous bariatric surgery, currently in second trimester  Has repeat B12 and D levels ordered          History of  delivery, currently pregnant  Written information provided about             Marginal insertion of umbilical cord affecting management of mother in second trimester  Baby B       Follow up scheduled 25         Type 2 diabetes mellitus affecting pregnancy, antepartum  Lantus 24 units nightly  Follow-up with diabetes program 6/10/25  Reviewed goal blood sugars are fasting less than 95 and 2-hour postprandials less than 120  Encourage dietary and medication compliance.  Encouraged close contact with diabetes and pregnancy program.  Lab Results   Component Value Date    HGBA1C 5.9 (H) 2025              Postpartum cardiomyopathy  Maternal ECHO completed 6/3/25 -     Normal left ventricle     EF 55%    Orders:    CBC and differential; Future    RPR-Syphilis Screening (Total Syphilis IGG/IGM); Future    Ferritin; Future    RPR-Syphilis Screening (Total Syphilis IGG/IGM); Future    Vasa previa, fetus 1 of multiple gestation  Baby A      Follow up PNC 25       RTO 4 weeks     History of Present  Illness   HPI  Syed Taveras is a 29 y.o. female who presents for PNV      Denies loss of fluid, vaginal bleeding and abdominal pain.  Confirms fetal movement x 2.  Tolerating prenatal vitamin, vitamin D, low-dose aspirin and Augmentin well.  Started Augmentin a few days ago for throat infection.  Fasting blood sugars typically less than 95 and 2-hour postprandials 120-140s.  Reviewed maternal-fetal echo-normal left ventricle and ejection fraction 55%.    History obtained from: patient    Review of Systems   Constitutional:  Negative for chills and fever.   Respiratory: Negative.     Cardiovascular: Negative.    Genitourinary: Negative.      Medical History Reviewed by provider this encounter:  Allergies  Meds     .     Objective   LMP 12/25/2024 (Exact Date)      Physical Exam  Vitals reviewed.   Constitutional:       Appearance: Normal appearance.     Neurological:      Mental Status: She is alert and oriented to person, place, and time.     Psychiatric:         Mood and Affect: Mood normal.         Behavior: Behavior normal.

## 2025-06-06 NOTE — ASSESSMENT & PLAN NOTE
Continue PNV and LDASA daily   Follow up with PNC 25  28 week labs ordered   Common discomforts of pregnancy and precautions including  labor reviewed.  Signs and symptoms to report reviewed.    Orders:    CBC and differential; Future    RPR-Syphilis Screening (Total Syphilis IGG/IGM); Future    Ferritin; Future    RPR-Syphilis Screening (Total Syphilis IGG/IGM); Future

## 2025-06-09 ENCOUNTER — ROUTINE PRENATAL (OUTPATIENT)
Dept: OBGYN CLINIC | Facility: MEDICAL CENTER | Age: 30
End: 2025-06-09
Payer: COMMERCIAL

## 2025-06-09 VITALS — SYSTOLIC BLOOD PRESSURE: 110 MMHG | DIASTOLIC BLOOD PRESSURE: 68 MMHG | WEIGHT: 234.1 LBS | BODY MASS INDEX: 34.57 KG/M2

## 2025-06-09 DIAGNOSIS — O30.032 MONOCHORIONIC DIAMNIOTIC TWIN GESTATION IN SECOND TRIMESTER: Primary | ICD-10-CM

## 2025-06-09 DIAGNOSIS — O99.842 PREGNANCY AFFECTED BY PREVIOUS BARIATRIC SURGERY, CURRENTLY IN SECOND TRIMESTER: ICD-10-CM

## 2025-06-09 DIAGNOSIS — O34.219 HISTORY OF CESAREAN DELIVERY, CURRENTLY PREGNANT: ICD-10-CM

## 2025-06-09 DIAGNOSIS — O43.192 MARGINAL INSERTION OF UMBILICAL CORD AFFECTING MANAGEMENT OF MOTHER IN SECOND TRIMESTER: ICD-10-CM

## 2025-06-09 DIAGNOSIS — Z3A.23 23 WEEKS GESTATION OF PREGNANCY: ICD-10-CM

## 2025-06-09 DIAGNOSIS — O24.119 TYPE 2 DIABETES MELLITUS AFFECTING PREGNANCY, ANTEPARTUM: ICD-10-CM

## 2025-06-09 PROCEDURE — 99213 OFFICE O/P EST LOW 20 MIN: CPT | Performed by: NURSE PRACTITIONER

## 2025-06-12 ENCOUNTER — NURSE TRIAGE (OUTPATIENT)
Age: 30
End: 2025-06-12

## 2025-06-12 ENCOUNTER — HOSPITAL ENCOUNTER (OUTPATIENT)
Facility: HOSPITAL | Age: 30
Discharge: HOME | End: 2025-06-12
Attending: OBSTETRICS & GYNECOLOGY | Admitting: OBSTETRICS & GYNECOLOGY
Payer: COMMERCIAL

## 2025-06-12 VITALS — HEART RATE: 78 BPM | SYSTOLIC BLOOD PRESSURE: 108 MMHG | DIASTOLIC BLOOD PRESSURE: 59 MMHG

## 2025-06-12 DIAGNOSIS — O30.032 MONOCHORIONIC DIAMNIOTIC TWIN GESTATION IN SECOND TRIMESTER: ICD-10-CM

## 2025-06-12 DIAGNOSIS — O24.119 TYPE 2 DIABETES MELLITUS AFFECTING PREGNANCY, ANTEPARTUM: ICD-10-CM

## 2025-06-12 PROBLEM — O36.8190 DECREASED FETAL MOVEMENT: Status: ACTIVE | Noted: 2025-06-12

## 2025-06-12 LAB
BILIRUB UR QL STRIP: NEGATIVE
CLARITY UR: CLEAR
COLOR UR: YELLOW
GLUCOSE UR STRIP-MCNC: NEGATIVE MG/DL
HGB UR QL STRIP.AUTO: NEGATIVE
KETONES UR STRIP-MCNC: NEGATIVE MG/DL
LEUKOCYTE ESTERASE UR QL STRIP: NEGATIVE
NITRITE UR QL STRIP: NEGATIVE
PH UR STRIP.AUTO: 6 [PH] (ref 4.5–8)
PROT UR STRIP-MCNC: NEGATIVE MG/DL
SP GR UR STRIP.AUTO: >=1.03 (ref 1–1.03)
UROBILINOGEN UR QL STRIP.AUTO: 0.2 E.U./DL

## 2025-06-12 PROCEDURE — 99214 OFFICE O/P EST MOD 30 MIN: CPT

## 2025-06-12 PROCEDURE — 81003 URINALYSIS AUTO W/O SCOPE: CPT

## 2025-06-12 NOTE — TELEPHONE ENCOUNTER
"REASON FOR CONVERSATION: Decreased Fetal Movement    SYMPTOMS: Twin pregnancy - decreased fetal movement. No movement since 2am.     OTHER HEALTH INFORMATION: OB 24w1d  twin pregnancy has felt no fetal movement since 2am. Tried eating and drinking at 6am which did not evoke any movement from babies. Denies LOF, vaginal bleeding, contractions. Patient will proceed to [facility] triage now.     ESC sent to Maggy Beckett CNM and L&D Charge RN at .    PROTOCOL DISPOSITION: Go to LD Now (Or to Office With PCP Approval)    CARE ADVICE PROVIDED: Proceed to [facility] L&D triage now.    PRACTICE FOLLOW-UP: N/A          Reason for Disposition   Pregnant 23 or more weeks and baby moving less today by kick count (e.g., kick count < 5 in 1 hour or < 10 in 2 hours)    Answer Assessment - Initial Assessment Questions  1. FETAL MOVEMENT: \"Has the baby's movement decreased or changed significantly from normal?\" (e.g., yes, no; describe)      Yesterday baby moving a lot, today around 2am patient started to have no movement. Had food and drink at 6am.   2. REED: \"What date are you expecting to deliver?\"       10/1/25  3. PREGNANCY: \"How many weeks pregnant are you?\"       24w1d  4. OTHER SYMPTOMS: \"Do you have any other symptoms?\" (e.g., abdominal pain, leaking fluid from vagina, vaginal bleeding, etc.)      denies    Protocols used: Pregnancy - Decreased Fetal Movement-ADULT-[place]    "

## 2025-06-12 NOTE — DISCHARGE INSTR - AVS FIRST PAGE
Medications and Pregnancy  The following list of over-the-counter medications is usually considered safe to take during pregnancy. Take care to not double up on products containing acetaminophen (Tylenol).   Colds/Sore Throat  Robitussin DM - Plain (guaifenesin)  Saline nasal spray  Warm salt water gargle  Cepacol throat lozenges or mouthwash (cetylpyridinium)  Sucrets (hexylresoricinol)  Allergy  AVOID the “D” - or DECONGESTANT  Claritin (loratadine)  Zrytec (cetirizine)  Allerga (fexofenadine)  Headaches / Aches and Pains  Tylenol (acetaminophen)  Do NOT exceed more than 3000 mg of Tylenol in a 24-hour period.  Heartburn  Mylanta (aluminum hydroxide/simethicone, magnesium hydroxide)  Maalaox (aluminum magnesium hydroxide, magnesium hydroxide)  Tums (calcium carbonate)  Riopan (magaldrate)  Constipation  Colace (docusate sodium)  Surfak (docusate sodium)  MiraLAX  Glycerin suppositories  Fleets enema (sodium phosphate & sodium biphosphate)  Nausea/Vomiting  Vitamin B6 (pyridoxine) - May take 50 mg at bedtime, 25 mg in the morning, 25 mg in the afternoon  Unisom (doxylamine) - May use for nausea/vomiting - (cut a 25 mg tablet in half). May cause drowsiness.   Sleep  Benadryl (diphenhydramine) - Take 1-2 tablets as needed at bedtime  Unisom (doxylamine) 25 mg tablet - As needed at bedtime  Melatonin 5 mg tablet - As needed at bedtime    Generally the generic form of medicine is usually lower priced than the brand name form of the medicine.   Talk to your healthcare provider before you take any medicines.  Many medicines may harm your baby if you take them when you are pregnant. Do not take any medicines, vitamins, herbs, or supplements without first talking to your healthcare provider.     Warning signs during pregnancy  [phone] Answering service during non-business hours     1. Vaginal bleeding  2. Sharp abdominal pain that does not go away  3. Fever (more than 100.4 and is not relieved by Tylenol)  4.  Persistent vomiting lasting greater than 24 hours  5. Chest pain   6. Pain or burning when you urinate  7. Severe headache that doesn't resolve with Tylenol  8. Blurred vision or seeing spots in your vision  9. Sudden swelling of your face or hands  10. Redness, swelling or pain in a leg  11. A sudden weight gain in just a few days  12. Decrease in your baby's movement (after 28 weeks or the 6th month of pregnancy)for 24 hrs  13. A loss of watery fluid from your vagina - can be a gush, a trickle or continuous wetness  14. After 20 weeks of pregnancy, rhythmic cramping (greater than 4 per hour) or menstrual like low/pelvic pain  15. You have cravings for substances such as nely, dirt, laundry starch, or ice.    Am I in labor?  As you enter the final stages of your pregnancy, your body will give signs that labor is approaching. The following information should help you to understand these signs and make it easier for you to determine whether you are in labour.  Some of the signs and symptoms of going into labor may include:  - Period-like cramps  - Backache  - Diarrhea  - Mucous discharge or ‘show’  - Gush or trickle of water as the membranes break  - Contractions    Engagement  As you move closer to delivery, your baby’s head may drop and become engaged in your pelvis in preparation for labour. If you are expecting your first baby, you may notice pressure in your groin and on your bladder beginning up to four weeks before the birth. You may also notice that you can breathe a little easier and have a little more appetite as your baby drops, and is not pushed up against your diaphragm and stomach quite so much. This is sometimes known as “lightening”, as women generally feel lighter.    Show  During your pregnancy a mucous plug fills your cervix. Towards the end of pregnancy, the cervix becomes softer and this mucous plug may become loosened and start to come away. The process of discharging this mucous is called a  "‘show’ and might often contain streaks of blood or may also be brownish in color. This is different from any flow of fresh blood which you would report immediately to your doctor or the hospital. The show may continue over a period of hours or even days. It is one of the signs that your body is preparing for birth. Labor may begin in the next few days, hours or weeks following a show. There is no need to phone the hospital if you have had a show.    Water breaking (rupture of membranes)  This may occur at any time prior to the start of labor, or at any time during labor. The break may be low, near the opening of the uterus, and will produce a gush of amniotic fluid. If this occurs, place a sanitary pad on and note the color of the fluid. Call the hospital. You will usually be asked to come in to the hospital.    Another type of amniotic fluid leak may occur higher up in the amniotic sack, or top of the uterus. This will be less obvious to you and you may only notice a trickle of fluid. Since many women have a heavy vaginal discharge or leak a small amount of urine towards the end of their pregnancy and it is often difficult to tell the difference between urine and amniotic fluid - urine is often yellow; where amniotic fluid is usually clear, or has a pink tinge, and has a \"sweet\" odor. If you are unsure, call the hospital.    If the color of the fluid is green or brownish, it indicates that your baby has passed a bowel movement (meconium) inside the uterus. It is very common to have meconium-stained amniotic fluid in a pregnancy over 41 weeks, but this may also be a sign that your baby is distressed. You will need to call the hospital immediately and then come into the hospital.    Contractions  [place] contractions  [place] contractions are sometimes mistaken for labor. These “practice” contractions usually start custodial through the pregnancy and continue right through to the end. These contractions are " often irregular and can be uncomfortable and tight. [place] contractions usually increase in regularity and strength towards the end of your pregnancy, preparing your uterus for the birth. Sometimes it is difficult to distinguish between these [place] contractions and labor contractions. Below are the common differences between the two.  Labor contractions  True labor contractions usually increase in strength and duration. In order to time your contractions, time the interval between the start of one contraction to the start of the next. Early labor contractions are often likened to period cramps with or without backache.    [place] contractions    Labor contractions    usually irregular and short    become regular with time    do not get closer together    get closer together with time    do not get stronger     become stronger    walking does not make them stronger  walking can make them stronger    lying down may make them go away   lying down does not make them go away    uncomfortable and tight--not painful   Painful - can't walk, talk or breathe through them    How does labor start?  Labor can start in different ways. You may be start experiencing some period like pains or contractions. You might notice that these tightenings/contractions start to get stronger, closer and last longer than before. Or you might start with some back ache or a stomach upset that gets stronger and develops into regular contractions. In approximately 10-15% of women, labour will start when your membranes rupture. Contractions usually follow.    Should I call my doctor?  You should call your doctor when:  - your moe break  - you have bright blood loss  - your contractions are regular and five minutes apart  - if you have decreased fetal movement

## 2025-06-12 NOTE — PROGRESS NOTES
Progress Note - OB/GYN   Name: Syed Taveras 29 y.o. female I MRN: [id number]  Unit/Bed#: LD TRIAGE  I Date of Admission: 2025   Date of Service: 2025 I Hospital Day: 0     Assessment & Plan  Decreased fetal movement        Syed Taveras is a 29 y.o.  at 24w1d who was evaluated today for decreased fetal movement.DVP within normal limits with fetal movement seen on ultrasound, no contractions on tocometer. Patient was feeling fetal movement in triage. Safe for discharge to home with return precautions reviewed.     Arcelia Freeman MD  OB/GYN PGY1  2025 9:39 AM    SUBJECTIVE:    Syed Taveras 29 y.o.  at 24w1d with an Estimated Date of Delivery: 10/1/25 presenting with decreased fetal movement first noticed at 0200. Patient denies vaginal bleeding. Patient denies regular uterine contractions. Patient does not have headache, blurry vision, epigastric pain, or edema. Fetal movement is decreased.   Her current pregnancy is notable for twin pregnancy, gestational diabetes  ROS  General: No fevers, chills or night sweats  Cardiovascular: No chest pain, or wheezing  HEENT: No visual changes  Pulmonary: No cough,  GI: No diarrhea, no nausea, no blood in stools or black stools  : No dysuria or hematuria  Lower Extremity: Edema wnl for pregnancy    OBJECTIVE  Vitals:   Vitals:    25 0920   BP: 108/59   Pulse: 78     There is no height or weight on file to calculate BMI.  GBS: Not Tested  Blood type: O  Estimated Date of Delivery: 10/1/25    General Physical Exam:  General: Well appearing, no acute distress  Cardiovascular: normal  Lungs: non-labored breathing  Abdomen: Soft, Non-tender, Gravid  Lower extremities: Edema wnl for pregnancy    Fetal monitoring:  Imaging:      Abd. US                  Labs:   Recent Results (from the past 24 hours)   Urine Macroscopic, POC    Collection Time: 25  9:37 AM   Result Value Ref Range    Color, UA Yellow     Clarity, UA  Clear     pH, UA 6.0 4.5 - 8.0    Leukocytes, UA Negative Negative    Nitrite, UA Negative Negative    Protein, UA Negative Negative mg/dl    Glucose, UA Negative Negative mg/dl    Ketones, UA Negative Negative mg/dl    Urobilinogen, UA 0.2 0.2, 1.0 E.U./dl E.U./dl    Bilirubin, UA Negative Negative    Occult Blood, UA Negative Negative    Specific Gravity, UA >=1.030 1.003 - 1.030      Wet mount negative  SVE 0/0/-4

## 2025-06-15 DIAGNOSIS — E55.9 VITAMIN D DEFICIENCY: ICD-10-CM

## 2025-06-15 DIAGNOSIS — Z3A.14 14 WEEKS GESTATION OF PREGNANCY: ICD-10-CM

## 2025-06-16 RX ORDER — MULTIVIT-MIN/IRON/FOLIC ACID/K 18-600-40
2000 CAPSULE ORAL DAILY
Qty: 30 TABLET | Refills: 2 | Status: SHIPPED | OUTPATIENT
Start: 2025-06-16

## 2025-06-19 ENCOUNTER — APPOINTMENT (OUTPATIENT)
Dept: LAB | Facility: CLINIC | Age: 30
End: 2025-06-19
Payer: COMMERCIAL

## 2025-06-19 ENCOUNTER — TELEPHONE (OUTPATIENT)
Age: 30
End: 2025-06-19

## 2025-06-19 ENCOUNTER — ULTRASOUND (OUTPATIENT)
Dept: PERINATAL CARE | Facility: OTHER | Age: 30
End: 2025-06-19
Payer: COMMERCIAL

## 2025-06-19 ENCOUNTER — ANCILLARY PROCEDURE (OUTPATIENT)
Dept: PERINATAL CARE | Facility: OTHER | Age: 30
End: 2025-06-19
Attending: OBSTETRICS & GYNECOLOGY
Payer: COMMERCIAL

## 2025-06-19 ENCOUNTER — TELEPHONE (OUTPATIENT)
Dept: OBGYN CLINIC | Facility: CLINIC | Age: 30
End: 2025-06-19

## 2025-06-19 ENCOUNTER — TRANSCRIBE ORDERS (OUTPATIENT)
Dept: LAB | Facility: CLINIC | Age: 30
End: 2025-06-19

## 2025-06-19 VITALS
WEIGHT: 237.4 LBS | HEIGHT: 69 IN | SYSTOLIC BLOOD PRESSURE: 104 MMHG | DIASTOLIC BLOOD PRESSURE: 60 MMHG | HEART RATE: 83 BPM | BODY MASS INDEX: 35.16 KG/M2

## 2025-06-19 DIAGNOSIS — Z90.3 H/O GASTRIC SLEEVE: ICD-10-CM

## 2025-06-19 DIAGNOSIS — Z34.92 SECOND TRIMESTER PREGNANCY: ICD-10-CM

## 2025-06-19 DIAGNOSIS — O30.032 MONOCHORIONIC DIAMNIOTIC TWIN GESTATION IN SECOND TRIMESTER: ICD-10-CM

## 2025-06-19 DIAGNOSIS — Z3A.25 25 WEEKS GESTATION OF PREGNANCY: Primary | ICD-10-CM

## 2025-06-19 DIAGNOSIS — Z3A.23 23 WEEKS GESTATION OF PREGNANCY: ICD-10-CM

## 2025-06-19 DIAGNOSIS — Z90.3 H/O GASTRIC SLEEVE: Primary | ICD-10-CM

## 2025-06-19 DIAGNOSIS — Z3A.25 25 WEEKS GESTATION OF PREGNANCY: ICD-10-CM

## 2025-06-19 LAB
25(OH)D3 SERPL-MCNC: 26.5 NG/ML (ref 30–100)
BASOPHILS # BLD AUTO: 0.02 THOUSANDS/ÂΜL (ref 0–0.1)
BASOPHILS NFR BLD AUTO: 0 % (ref 0–1)
EOSINOPHIL # BLD AUTO: 0.04 THOUSAND/ÂΜL (ref 0–0.61)
EOSINOPHIL NFR BLD AUTO: 0 % (ref 0–6)
ERYTHROCYTE [DISTWIDTH] IN BLOOD BY AUTOMATED COUNT: 12.3 % (ref 11.6–15.1)
FERRITIN SERPL-MCNC: 11 NG/ML (ref 30–307)
FOLATE SERPL-MCNC: 13.4 NG/ML
HCT VFR BLD AUTO: 34.9 % (ref 34.8–46.1)
HGB BLD-MCNC: 11 G/DL (ref 11.5–15.4)
IMM GRANULOCYTES # BLD AUTO: 0.07 THOUSAND/UL (ref 0–0.2)
IMM GRANULOCYTES NFR BLD AUTO: 1 % (ref 0–2)
LYMPHOCYTES # BLD AUTO: 1.45 THOUSANDS/ÂΜL (ref 0.6–4.47)
LYMPHOCYTES NFR BLD AUTO: 14 % (ref 14–44)
MCH RBC QN AUTO: 29.3 PG (ref 26.8–34.3)
MCHC RBC AUTO-ENTMCNC: 31.5 G/DL (ref 31.4–37.4)
MCV RBC AUTO: 93 FL (ref 82–98)
MONOCYTES # BLD AUTO: 0.77 THOUSAND/ÂΜL (ref 0.17–1.22)
MONOCYTES NFR BLD AUTO: 7 % (ref 4–12)
NEUTROPHILS # BLD AUTO: 8.39 THOUSANDS/ÂΜL (ref 1.85–7.62)
NEUTS SEG NFR BLD AUTO: 78 % (ref 43–75)
NRBC BLD AUTO-RTO: 0 /100 WBCS
PLATELET # BLD AUTO: 135 THOUSANDS/UL (ref 149–390)
PMV BLD AUTO: 12.9 FL (ref 8.9–12.7)
RBC # BLD AUTO: 3.76 MILLION/UL (ref 3.81–5.12)
TREPONEMA PALLIDUM IGG+IGM AB [PRESENCE] IN SERUM OR PLASMA BY IMMUNOASSAY: NORMAL
VIT B12 SERPL-MCNC: 211 PG/ML (ref 180–914)
WBC # BLD AUTO: 10.74 THOUSAND/UL (ref 4.31–10.16)

## 2025-06-19 PROCEDURE — 76817 TRANSVAGINAL US OBSTETRIC: CPT | Performed by: OBSTETRICS & GYNECOLOGY

## 2025-06-19 PROCEDURE — 82306 VITAMIN D 25 HYDROXY: CPT

## 2025-06-19 PROCEDURE — 82728 ASSAY OF FERRITIN: CPT

## 2025-06-19 PROCEDURE — NC001 PR NO CHARGE: Performed by: OBSTETRICS & GYNECOLOGY

## 2025-06-19 PROCEDURE — 76816 OB US FOLLOW-UP PER FETUS: CPT | Performed by: OBSTETRICS & GYNECOLOGY

## 2025-06-19 PROCEDURE — 82746 ASSAY OF FOLIC ACID SERUM: CPT

## 2025-06-19 PROCEDURE — 76821 MIDDLE CEREBRAL ARTERY ECHO: CPT | Performed by: OBSTETRICS & GYNECOLOGY

## 2025-06-19 PROCEDURE — 36415 COLL VENOUS BLD VENIPUNCTURE: CPT

## 2025-06-19 PROCEDURE — 85025 COMPLETE CBC W/AUTO DIFF WBC: CPT

## 2025-06-19 PROCEDURE — 99214 OFFICE O/P EST MOD 30 MIN: CPT | Performed by: OBSTETRICS & GYNECOLOGY

## 2025-06-19 PROCEDURE — 86780 TREPONEMA PALLIDUM: CPT

## 2025-06-19 PROCEDURE — 82607 VITAMIN B-12: CPT

## 2025-06-19 NOTE — TELEPHONE ENCOUNTER
Received call from Mimi in [facility]. Patient in lab for collection, orders for vitamin d 25 hydroxy and vitamin b12/folate are showing as pending. Reviewed office visit note from 6/9, reordered labs per protocol so they can be completed.

## 2025-06-19 NOTE — TELEPHONE ENCOUNTER
"Patient currently at the lab to get her labs done. Labs are saying pending on their side but in patients chart for us it shows \"active - needs to be collected\".     Was trying to connect with the lab but every time the line connects it then disconnects right away.     Did call the lab and left detailed message to try to call the office back.   "
no

## 2025-06-20 ENCOUNTER — RESULTS FOLLOW-UP (OUTPATIENT)
Dept: OBGYN CLINIC | Facility: MEDICAL CENTER | Age: 30
End: 2025-06-20

## 2025-06-20 NOTE — PROGRESS NOTES
There has been appropriate and concordant twin interval fetal growth.  The estimated fetal weight on twin A is 1 lb  15 oz which is normal at the 72%.   The estimated fetal weight on twin B is 1 lb 15 oz which is normal at the 75%. Growth discordance is 1.1 % which is normal.    A VASA PREVIA occurs when fetal blood vessels that are unprotected by the umbilical cord or placenta run through the amniotic membranes and traverse the cervix or are within 2 cm of the internal os. There are two types of vasa previa (Type I and II). Type I vasa previa occurs with a velamentous cord insertion between umbilical cord and placenta and fetal vessels that run freely within the amniotic membranes overlie the cervix or in close proximity to it; pregnancies with resolved previa or low-lying placenta are at risk for this subtype. Type II vasa previa occurs when the placenta contains a succenturiate lobe or is multilobed (typically bilobed) and fetal vessels that connect the two lobes course over or near the cervix. This patient's vasa previa appears to be type 1. If membranes rupture, these vessels may rupture with resultant fetal hemorrhage, exsanguination, or even death. Prenatal diagnosis of vasa previa by ultrasound is approximately 98%.  morality in vasa previa is very much dependent upon prenatal diagnosis. Survival is higher with antenatally detected cases compared with intrapartum or  diagnosis. Approximately 28% of prenatally diagnosed vasa previa cases result in emergent  delivery. Management of prenatally diagnosed vasa previa for a escalante pregnancy includes  corticosteroids between 28-32 weeks, consideration of  hospitalization at 30-34 weeks, and scheduled delivery at 34-37 weeks.     Given that she has monochorionic twins and type 2 DM, her care needs to be individualized.      At the conclusion of our discussion, we discussed reevaluation in 2 weeks with a twin-twin  transfusion evaluation and initiation of  surveillance twice a week with planned admission between 30 and 32 weeks and delivery by 34 weeks gestation.

## 2025-06-23 DIAGNOSIS — Z3A.23 23 WEEKS GESTATION OF PREGNANCY: ICD-10-CM

## 2025-06-23 DIAGNOSIS — O99.842 PREGNANCY AFFECTED BY PREVIOUS BARIATRIC SURGERY, CURRENTLY IN SECOND TRIMESTER: ICD-10-CM

## 2025-06-23 DIAGNOSIS — O30.032 MONOCHORIONIC DIAMNIOTIC TWIN GESTATION IN SECOND TRIMESTER: ICD-10-CM

## 2025-06-23 DIAGNOSIS — R79.0 LOW FERRITIN LEVEL: Primary | ICD-10-CM

## 2025-06-23 RX ORDER — SODIUM CHLORIDE 9 MG/ML
20 INJECTION, SOLUTION INTRAVENOUS ONCE
Status: CANCELLED | OUTPATIENT
Start: 2025-06-26

## 2025-06-25 ENCOUNTER — ROUTINE PRENATAL (OUTPATIENT)
Dept: OBGYN CLINIC | Facility: MEDICAL CENTER | Age: 30
End: 2025-06-25
Payer: COMMERCIAL

## 2025-06-25 VITALS — BODY MASS INDEX: 35 KG/M2 | SYSTOLIC BLOOD PRESSURE: 118 MMHG | WEIGHT: 237 LBS | DIASTOLIC BLOOD PRESSURE: 66 MMHG

## 2025-06-25 DIAGNOSIS — Z30.2 REQUEST FOR STERILIZATION: ICD-10-CM

## 2025-06-25 DIAGNOSIS — R79.0 LOW FERRITIN LEVEL: ICD-10-CM

## 2025-06-25 DIAGNOSIS — O99.842 PREGNANCY AFFECTED BY PREVIOUS BARIATRIC SURGERY, CURRENTLY IN SECOND TRIMESTER: ICD-10-CM

## 2025-06-25 DIAGNOSIS — O24.119 TYPE 2 DIABETES MELLITUS AFFECTING PREGNANCY, ANTEPARTUM: ICD-10-CM

## 2025-06-25 DIAGNOSIS — Z3A.26 26 WEEKS GESTATION OF PREGNANCY: Primary | ICD-10-CM

## 2025-06-25 DIAGNOSIS — O34.219 HISTORY OF CESAREAN DELIVERY, CURRENTLY PREGNANT: ICD-10-CM

## 2025-06-25 DIAGNOSIS — O30.032 MONOCHORIONIC DIAMNIOTIC TWIN GESTATION IN SECOND TRIMESTER: ICD-10-CM

## 2025-06-25 PROCEDURE — 99214 OFFICE O/P EST MOD 30 MIN: CPT | Performed by: OBSTETRICS & GYNECOLOGY

## 2025-06-25 NOTE — ASSESSMENT & PLAN NOTE
copied from [facility] note and  reviewed today   Given that she has monochorionic twins and type 2 DM, her care needs to be individualized.       At the conclusion of our discussion, we discussed reevaluation in 2 weeks with a twin-twin transfusion evaluation and initiation of  surveillance twice a week with planned admission between 30 and 32 weeks and delivery by 34 weeks gestation.

## 2025-06-25 NOTE — ASSESSMENT & PLAN NOTE
to have repeat  C/S and  requesting  permanent  sterilization at the time    Messaged nurse navigators to get her  scheduled   Patient states in  event of emergency  desires full  resuscitation

## 2025-06-25 NOTE — ASSESSMENT & PLAN NOTE
2 times weekly  APFS at [facility]   Recommended  admission from 30-32 weeks given  vasa previa   We discussed  risks of  PTL and  if signs or  symptoms  needs to be  present for evaluation  ASAP

## 2025-06-25 NOTE — PROGRESS NOTES
Name: Syed Taveras      : 1995      MRN: [id number]  Encounter Provider: Laverne Davis MD  Encounter Date: 2025   Encounter department: [facility]  :  Assessment & Plan  Type 2 diabetes mellitus affecting pregnancy, antepartum    Lab Results   Component Value Date    HGBA1C 5.9 (H) 2025          currently  on only  Lantus   Discussed  importance of  diabetic follow  up in pregnancy to improve maternal  and    care   States has been consistent with checking her  readings but  not as  consistent with reporting     Vasa previa in labor and delivery, antepartum, fetus 1    copied from [facility] note and  reviewed today   Given that she has monochorionic twins and type 2 DM, her care needs to be individualized.       At the conclusion of our discussion, we discussed reevaluation in 2 weeks with a twin-twin transfusion evaluation and initiation of  surveillance twice a week with planned admission between 30 and 32 weeks and delivery by 34 weeks gestation.     Pregnancy affected by previous bariatric surgery, currently in second trimester         History of  delivery, currently pregnant       to have repeat  C/S and  requesting  permanent  sterilization at the time    Messaged nurse navigators to get her  scheduled   Patient states in  event of emergency  desires full  resuscitation    Monochorionic diamniotic twin gestation in second trimester       2 times weekly  APFS at [facility]   Recommended  admission from 30-32 weeks given  vasa previa   We discussed  risks of  PTL and  if signs or  symptoms  needs to be  present for evaluation  ASAP  Low ferritin level       will be  initiating  iron infusions today   26 weeks gestation of pregnancy       High  risk pregnancy as per  above   Fetal movement  an PTL precautions  reviewed    Request for sterilization       Guera Ortega  discussed and signed today   Away permanent  irreversible    Alternatives discussed but  desires to proceed with permanent  sterilization        History of Present Illness   HPI  Syed Taveras is a 29 y.o. female who presents for routine  prenatal care   No LOF or  VB  no  contraction  +FM   Does feel occasional pelvic pressure   States sugars well  controlled  - does not have  readings with her    History obtained from: patient    Review of Systems       Objective   /66   Wt 108 kg (237 lb)   LMP 12/25/2024 (Exact Date)   BMI 35.00 kg/m²      Physical Exam  Vitals reviewed.   Constitutional:       Appearance: Normal appearance.   HENT:      Head: Normocephalic and atraumatic.      Nose: Nose normal.     Eyes:      Conjunctiva/sclera: Conjunctivae normal.     Pulmonary:      Effort: Pulmonary effort is normal.   Abdominal:      Comments: Gravid  non tender       Neurological:      General: No focal deficit present.      Mental Status: She is alert and oriented to person, place, and time.     Psychiatric:         Mood and Affect: Mood normal.         Behavior: Behavior normal.

## 2025-06-25 NOTE — ASSESSMENT & PLAN NOTE
Lab Results   Component Value Date    HGBA1C 5.9 (H) 2025          currently  on only  Lantus   Discussed  importance of  diabetic follow  up in pregnancy to improve maternal  and    care   States has been consistent with checking her  readings but  not as  consistent with reporting

## 2025-06-25 NOTE — ASSESSMENT & PLAN NOTE
Ma 32  discussed and signed today   Away permanent  irreversible   Alternatives discussed but  desires to proceed with permanent  sterilization

## 2025-06-26 ENCOUNTER — HOSPITAL ENCOUNTER (OUTPATIENT)
Dept: INFUSION CENTER | Facility: CLINIC | Age: 30
Discharge: HOME | End: 2025-06-26
Attending: OBSTETRICS & GYNECOLOGY
Payer: COMMERCIAL

## 2025-06-26 VITALS
DIASTOLIC BLOOD PRESSURE: 71 MMHG | HEART RATE: 98 BPM | RESPIRATION RATE: 18 BRPM | SYSTOLIC BLOOD PRESSURE: 108 MMHG | TEMPERATURE: 98.1 F

## 2025-06-26 DIAGNOSIS — Z3A.23 23 WEEKS GESTATION OF PREGNANCY: ICD-10-CM

## 2025-06-26 DIAGNOSIS — O99.842 PREGNANCY AFFECTED BY PREVIOUS BARIATRIC SURGERY, CURRENTLY IN SECOND TRIMESTER: ICD-10-CM

## 2025-06-26 DIAGNOSIS — O30.032 MONOCHORIONIC DIAMNIOTIC TWIN GESTATION IN SECOND TRIMESTER: ICD-10-CM

## 2025-06-26 DIAGNOSIS — R79.0 LOW FERRITIN LEVEL: Primary | ICD-10-CM

## 2025-06-26 PROCEDURE — 96365 THER/PROPH/DIAG IV INF INIT: CPT

## 2025-06-26 RX ORDER — SODIUM CHLORIDE 9 MG/ML
20 INJECTION, SOLUTION INTRAVENOUS ONCE
Status: CANCELLED | OUTPATIENT
Start: 2025-06-30

## 2025-06-26 RX ORDER — SODIUM CHLORIDE 9 MG/ML
20 INJECTION, SOLUTION INTRAVENOUS ONCE
Status: COMPLETED | OUTPATIENT
Start: 2025-06-26 | End: 2025-06-26

## 2025-06-26 RX ADMIN — SODIUM CHLORIDE 20 ML/HR: 0.9 INJECTION, SOLUTION INTRAVENOUS at 14:33

## 2025-06-26 RX ADMIN — IRON SUCROSE 200 MG: 20 INJECTION, SOLUTION INTRAVENOUS at 14:33

## 2025-06-26 NOTE — PROGRESS NOTES
Pt arrives to unit with no new complaints, tolerated first time #1/5 venofer without complications. Confirmed next appt 6-30-25 2:30 AVS declined, pt uses mychart.

## 2025-06-30 ENCOUNTER — HOSPITAL ENCOUNTER (OUTPATIENT)
Dept: INFUSION CENTER | Facility: CLINIC | Age: 30
Discharge: HOME | End: 2025-06-30
Attending: OBSTETRICS & GYNECOLOGY
Payer: COMMERCIAL

## 2025-06-30 VITALS
TEMPERATURE: 97.2 F | RESPIRATION RATE: 18 BRPM | DIASTOLIC BLOOD PRESSURE: 63 MMHG | HEART RATE: 93 BPM | SYSTOLIC BLOOD PRESSURE: 95 MMHG

## 2025-06-30 DIAGNOSIS — Z3A.23 23 WEEKS GESTATION OF PREGNANCY: ICD-10-CM

## 2025-06-30 DIAGNOSIS — O99.842 PREGNANCY AFFECTED BY PREVIOUS BARIATRIC SURGERY, CURRENTLY IN SECOND TRIMESTER: ICD-10-CM

## 2025-06-30 DIAGNOSIS — R79.0 LOW FERRITIN LEVEL: Primary | ICD-10-CM

## 2025-06-30 DIAGNOSIS — O30.032 MONOCHORIONIC DIAMNIOTIC TWIN GESTATION IN SECOND TRIMESTER: ICD-10-CM

## 2025-06-30 PROCEDURE — 96365 THER/PROPH/DIAG IV INF INIT: CPT

## 2025-06-30 RX ORDER — SODIUM CHLORIDE 9 MG/ML
20 INJECTION, SOLUTION INTRAVENOUS ONCE
Status: COMPLETED | OUTPATIENT
Start: 2025-06-30 | End: 2025-06-30

## 2025-06-30 RX ORDER — SODIUM CHLORIDE 9 MG/ML
20 INJECTION, SOLUTION INTRAVENOUS ONCE
Status: CANCELLED | OUTPATIENT
Start: 2025-07-02

## 2025-06-30 RX ADMIN — IRON SUCROSE 200 MG: 20 INJECTION, SOLUTION INTRAVENOUS at 14:55

## 2025-06-30 RX ADMIN — SODIUM CHLORIDE 20 ML/HR: 0.9 INJECTION, SOLUTION INTRAVENOUS at 14:54

## 2025-06-30 NOTE — PROGRESS NOTES
Pt arrived to unit without complaint, tolerating  Venofer infusion without any adverse reaction. Pt  without question or concern, AVS declined, pt aware of next appt 7/2/25 0561

## 2025-07-01 PROBLEM — O24.112 PREGNANCY COMPLICATED BY PRE-EXISTING TYPE 2 DIABETES, SECOND TRIMESTER: Status: ACTIVE | Noted: 2025-07-01

## 2025-07-01 NOTE — PATIENT INSTRUCTIONS
Thank you for choosing us for your  care today.  If you have any questions about your ultrasound or care, please do not hesitate to contact us or your primary obstetrician.        Some general instructions for your pregnancy are:    Exercise: Aim for 150 minutes per week of regular exercise.  Walking is great!  Nutrition: Choose healthy sources of calcium, iron, and protein.  Avoid ultraprocessed foods and added sugar.  Learn about Preeclampsia: preeclampsia is a common, potentially serious high blood pressure complication in pregnancy.  A blood pressure of 140mmHg (systolic or top number) or 90mmHg (diastolic or bottom number) should be evaluated by your doctor.  Aspirin is sometimes prescribed in early pregnancy to prevent preeclampsia in women with risk factors - ask your obstetrician if you should be on this medication.  For more resources, visit:  https://[email or web address].[email or web address]  If you smoke, please try to quit completely but also try to reduce your smoking by as much as possible (as soon as possible).  Do not vape.  Please also avoid cannabis products.  Other warning signs to watch out for in pregnancy or postpartum: chest pain, obstructed breathing or shortness of breath, seizures, thoughts of hurting yourself or your baby, bleeding, a painful or swollen leg, fever, or headache (see [facility] Warning Sign[facility]).  If these happen call 911.  Itching is also not normal in pregnancy and if you experience this, especially over your hands and feet, potentially worse at night, notify your doctors.     Kick Counts in Pregnancy   AMBULATORY CARE:   Kick counts  measure how much your baby is moving in your womb. A kick from your baby can be felt as a twist, turn, swish, roll, or jab. It is common to feel your baby kicking at 26 to 28 weeks of pregnancy. You may feel your baby kick as early as 20 weeks of pregnancy. You may want to start counting at 28 weeks.   Contact your  doctor immediately if:   You feel a change in the number of kicks or movements of your baby.      You feel fewer than 10 kicks within 2 hours.      You have questions or concerns about your baby's movements.     Why measure kick counts:  Your baby's movement may provide information about your baby's health. He or she may move less, or not at all, if there are problems. Your baby may move less if he or she is not getting enough oxygen or nutrition from the placenta. Do not smoke while you are pregnant. Smoking decreases the amount of oxygen that gets to your baby. Talk to your healthcare provider if you need help to quit smoking. Tell your healthcare provider as soon as you feel a change in your baby's movements.  When to measure kick counts:   Measure kick counts at the same time every day.       Measure kick counts when your baby is awake and most active. Your baby may be most active in the evening.     How to measure kick counts:  Check that your baby is awake before you measure kick counts. You can wake up your baby by lightly pushing on your belly, walking, or drinking something cold. Your healthcare provider may tell you different ways to measure kick counts. You may be told to do the following:  Use a chart or clock to keep track of the time you start and finish counting.      Sit in a chair or lie on your left side.      Place your hands on the largest part of your belly.      Count until you reach 10 kicks. Write down how much time it takes to count 10 kicks.      It may take 30 minutes to 2 hours to count 10 kicks. It should not take more than 2 hours to count 10 kicks.     Follow up with your doctor as directed:  Write down your questions so you remember to ask them during your visits.   © Copyright [employer] 2023 Information is for End User's use only and may not be sold, redistributed or otherwise used for commercial purposes.  The above information is an  only. It is not intended as  medical advice for individual conditions or treatments. Talk to your doctor, nurse or pharmacist before following any medical regimen to see if it is safe and effective for you.

## 2025-07-02 ENCOUNTER — HOSPITAL ENCOUNTER (OUTPATIENT)
Dept: INFUSION CENTER | Facility: CLINIC | Age: 30
Discharge: HOME | End: 2025-07-02
Attending: OBSTETRICS & GYNECOLOGY
Payer: COMMERCIAL

## 2025-07-02 VITALS
RESPIRATION RATE: 16 BRPM | TEMPERATURE: 97.2 F | SYSTOLIC BLOOD PRESSURE: 109 MMHG | HEART RATE: 89 BPM | DIASTOLIC BLOOD PRESSURE: 70 MMHG

## 2025-07-02 DIAGNOSIS — R79.0 LOW FERRITIN LEVEL: Primary | ICD-10-CM

## 2025-07-02 DIAGNOSIS — Z3A.23 23 WEEKS GESTATION OF PREGNANCY: ICD-10-CM

## 2025-07-02 DIAGNOSIS — O30.032 MONOCHORIONIC DIAMNIOTIC TWIN GESTATION IN SECOND TRIMESTER: ICD-10-CM

## 2025-07-02 DIAGNOSIS — O99.842 PREGNANCY AFFECTED BY PREVIOUS BARIATRIC SURGERY, CURRENTLY IN SECOND TRIMESTER: ICD-10-CM

## 2025-07-02 PROCEDURE — 96365 THER/PROPH/DIAG IV INF INIT: CPT

## 2025-07-02 RX ORDER — SODIUM CHLORIDE 9 MG/ML
20 INJECTION, SOLUTION INTRAVENOUS ONCE
Status: CANCELLED | OUTPATIENT
Start: 2025-07-10

## 2025-07-02 RX ORDER — SODIUM CHLORIDE 9 MG/ML
20 INJECTION, SOLUTION INTRAVENOUS ONCE
Status: COMPLETED | OUTPATIENT
Start: 2025-07-02 | End: 2025-07-02

## 2025-07-02 RX ADMIN — IRON SUCROSE 200 MG: 20 INJECTION, SOLUTION INTRAVENOUS at 15:02

## 2025-07-02 RX ADMIN — SODIUM CHLORIDE 20 ML/HR: 0.9 INJECTION, SOLUTION INTRAVENOUS at 14:55

## 2025-07-02 NOTE — PLAN OF CARE
Problem: INFECTION - ADULT  Goal: Absence or prevention of progression during hospitalization  Description: INTERVENTIONS:  - Assess and monitor for signs and symptoms of infection  - Monitor lab/diagnostic results  - Monitor all insertion sites, i.e. indwelling lines, tubes, and drains  - Monitor endotracheal if appropriate and nasal secretions for changes in amount and color  - [place] appropriate cooling/warming therapies per order  - Administer medications as ordered  - Instruct and encourage patient and family to use good hand hygiene technique  - Identify and instruct in appropriate isolation precautions for identified infection/condition  7/2/2025 1507 by Ele uYan RN  Outcome: Progressing  7/2/2025 1507 by Ele Yuan RN  Outcome: Progressing  Goal: Absence of fever/infection during neutropenic period  Description: INTERVENTIONS:  - Monitor WBC  - Perform strict hand hygiene  - Limit to healthy visitors only  - No plants, dried, fresh or silk flowers with pate in patient room  7/2/2025 1507 by Ele Yuan RN  Outcome: Progressing  7/2/2025 1507 by Ele Yuan RN  Outcome: Progressing     Problem: Knowledge Deficit  Goal: Patient/family/caregiver demonstrates understanding of disease process, treatment plan, medications, and discharge instructions  Description: Complete learning assessment and assess knowledge base.  Interventions:  - Provide teaching at level of understanding  - Provide teaching via preferred learning methods  7/2/2025 1507 by Ele Yuan RN  Outcome: Progressing  7/2/2025 1507 by Ele Yuan RN  Outcome: Progressing

## 2025-07-02 NOTE — PLAN OF CARE
Problem: INFECTION - ADULT  Goal: Absence or prevention of progression during hospitalization  Description: INTERVENTIONS:  - Assess and monitor for signs and symptoms of infection  - Monitor lab/diagnostic results  - Monitor all insertion sites, i.e. indwelling lines, tubes, and drains  - Monitor endotracheal if appropriate and nasal secretions for changes in amount and color  - [place] appropriate cooling/warming therapies per order  - Administer medications as ordered  - Instruct and encourage patient and family to use good hand hygiene technique  - Identify and instruct in appropriate isolation precautions for identified infection/condition  Outcome: Progressing  Goal: Absence of fever/infection during neutropenic period  Description: INTERVENTIONS:  - Monitor WBC  - Perform strict hand hygiene  - Limit to healthy visitors only  - No plants, dried, fresh or silk flowers with pate in patient room  Outcome: Progressing     Problem: Knowledge Deficit  Goal: Patient/family/caregiver demonstrates understanding of disease process, treatment plan, medications, and discharge instructions  Description: Complete learning assessment and assess knowledge base.  Interventions:  - Provide teaching at level of understanding  - Provide teaching via preferred learning methods  Outcome: Progressing

## 2025-07-03 ENCOUNTER — ULTRASOUND (OUTPATIENT)
Dept: PERINATAL CARE | Facility: OTHER | Age: 30
End: 2025-07-03
Payer: COMMERCIAL

## 2025-07-03 VITALS
WEIGHT: 244.4 LBS | BODY MASS INDEX: 36.2 KG/M2 | HEART RATE: 102 BPM | SYSTOLIC BLOOD PRESSURE: 104 MMHG | HEIGHT: 69 IN | DIASTOLIC BLOOD PRESSURE: 60 MMHG

## 2025-07-03 DIAGNOSIS — O30.032 MONOCHORIONIC DIAMNIOTIC TWIN GESTATION IN SECOND TRIMESTER: ICD-10-CM

## 2025-07-03 DIAGNOSIS — Z3A.27 27 WEEKS GESTATION OF PREGNANCY: Primary | ICD-10-CM

## 2025-07-03 DIAGNOSIS — O24.112 PREGNANCY COMPLICATED BY PRE-EXISTING TYPE 2 DIABETES, SECOND TRIMESTER: ICD-10-CM

## 2025-07-03 PROCEDURE — 59025 FETAL NON-STRESS TEST: CPT | Performed by: OBSTETRICS & GYNECOLOGY

## 2025-07-03 NOTE — PROGRESS NOTES
"FOLLOW-UP: MATERNAL-FETAL MEDICINE  Name: Syed Taveras      : 1995      MRN: [id number]  Encounter Provider: Delia Dietrich RN  Encounter Date: 7/3/2025   Encounter department: [facility]  :  Assessment & Plan  27 weeks gestation of pregnancy         Monochorionic diamniotic twin gestation in second trimester       Reactive NST x 2 today  Continue antepartum fetal heart rate surveillance  Pregnancy complicated by pre-existing type 2 diabetes, second trimester    Lab Results   Component Value Date    HGBA1C 5.9 (H) 2025            Vasa previa, single or unspecified fetus             History of Present Illness     Syed Taveras is a 29 y.o. female  at 27w1d who presents for NST (found under the pregnancy episode) which I reviewed the RN assessment and agree.     Objective   /60 (BP Location: Right arm, Patient Position: Sitting, Cuff Size: Large)   Pulse 102   Ht 5' 9\" (1.753 m)   Wt 111 kg (244 lb 6.4 oz)   LMP 2024 (Exact Date)   BMI 36.09 kg/m²      Patient's last menstrual period was 2024 (exact date).  Estimated Delivery Date: 10/1/2025, by Last Menstrual Period        Please refer to \"Imaging\" for ultrasound report from today's visit.       "

## 2025-07-03 NOTE — PROGRESS NOTES
Non-Stress Testing:    Non-Stress test, equipment, procedure, and expected outcomes explained. Reviewed fetal kick counts and when to call OB.Verified patient understanding of fetal kick counts with teach back method. Patient reports feeling daily fetal movements. Patient has no questions or concerns.     Reviewed non-stress test with Dr. Lew in-person

## 2025-07-08 ENCOUNTER — TELEPHONE (OUTPATIENT)
Age: 30
End: 2025-07-08

## 2025-07-08 PROBLEM — O30.033 MONOCHORIONIC DIAMNIOTIC TWIN GESTATION IN THIRD TRIMESTER: Status: ACTIVE | Noted: 2025-07-08

## 2025-07-08 NOTE — PROGRESS NOTES
"FOLLOW-UP: MATERNAL-FETAL MEDICINE  Name: Syed Taveras      : 1995      MRN: [id number]  Encounter Provider:  NURSE LAXMI  Encounter Date: 2025   Encounter department: [facility]  :  Assessment & Plan  28 weeks gestation of pregnancy         Monochorionic diamniotic twin gestation in third trimester       Reactive NST x 2 today  Continue antepartum fetal heart rate testing  Vasa previa, single or unspecified fetus             History of Present Illness     Syed Taveras is a 29 y.o. female  at 27w6d who presents for NST (found under the pregnancy episode) which I reviewed the RN assessment and agree.     Objective   LMP 2024 (Exact Date)      Patient's last menstrual period was 2024 (exact date).  Estimated Delivery Date: 10/1/2025, by Last Menstrual Period        Please refer to \"Imaging\" for ultrasound report from today's visit.       "

## 2025-07-09 ENCOUNTER — ULTRASOUND (OUTPATIENT)
Age: 30
End: 2025-07-09
Payer: COMMERCIAL

## 2025-07-09 VITALS
HEIGHT: 69 IN | HEART RATE: 102 BPM | BODY MASS INDEX: 35.96 KG/M2 | WEIGHT: 242.8 LBS | SYSTOLIC BLOOD PRESSURE: 102 MMHG | DIASTOLIC BLOOD PRESSURE: 58 MMHG

## 2025-07-09 DIAGNOSIS — O30.033 MONOCHORIONIC DIAMNIOTIC TWIN GESTATION IN THIRD TRIMESTER: ICD-10-CM

## 2025-07-09 DIAGNOSIS — O24.119 TYPE 2 DIABETES MELLITUS AFFECTING PREGNANCY, ANTEPARTUM: Primary | ICD-10-CM

## 2025-07-09 DIAGNOSIS — Z3A.28 28 WEEKS GESTATION OF PREGNANCY: Primary | ICD-10-CM

## 2025-07-09 DIAGNOSIS — O30.032 MONOCHORIONIC DIAMNIOTIC TWIN GESTATION IN SECOND TRIMESTER: ICD-10-CM

## 2025-07-09 DIAGNOSIS — O24.119 TYPE 2 DIABETES MELLITUS AFFECTING PREGNANCY, ANTEPARTUM: ICD-10-CM

## 2025-07-09 PROCEDURE — 59025 FETAL NON-STRESS TEST: CPT | Performed by: OBSTETRICS & GYNECOLOGY

## 2025-07-09 RX ORDER — INSULIN GLARGINE 100 [IU]/ML
24 INJECTION, SOLUTION SUBCUTANEOUS
Qty: 15 ML | Refills: 1 | Status: ON HOLD | OUTPATIENT
Start: 2025-07-09

## 2025-07-09 RX ORDER — INSULIN GLARGINE 100 [IU]/ML
24 INJECTION, SOLUTION SUBCUTANEOUS
Qty: 15 ML | Refills: 0 | Status: CANCELLED | OUTPATIENT
Start: 2025-07-09

## 2025-07-09 RX ORDER — INSULIN ASPART 100 [IU]/ML
4 INJECTION, SOLUTION INTRAVENOUS; SUBCUTANEOUS
Qty: 15 ML | Refills: 0 | Status: ON HOLD | OUTPATIENT
Start: 2025-07-09

## 2025-07-09 NOTE — PATIENT INSTRUCTIONS
Thank you for choosing us for your  care today.  If you have any questions about your ultrasound or care, please do not hesitate to contact us or your primary obstetrician.        Some general instructions for your pregnancy are:    Exercise: Aim for 150 minutes per week of regular exercise.  Walking is great!  Nutrition: Choose healthy sources of calcium, iron, and protein.  Avoid ultraprocessed foods and added sugar.  Learn about Preeclampsia: preeclampsia is a common, potentially serious high blood pressure complication in pregnancy.  A blood pressure of 140mmHg (systolic or top number) or 90mmHg (diastolic or bottom number) should be evaluated by your doctor.  Aspirin is sometimes prescribed in early pregnancy to prevent preeclampsia in women with risk factors - ask your obstetrician if you should be on this medication.  For more resources, visit:  https://www.highriskpregnancyinfo.[email or web address]  If you smoke, please try to quit completely but also try to reduce your smoking by as much as possible (as soon as possible).  Do not vape.  Please also avoid cannabis products.  Other warning signs to watch out for in pregnancy or postpartum: chest pain, obstructed breathing or shortness of breath, seizures, thoughts of hurting yourself or your baby, bleeding, a painful or swollen leg, fever, or headache (see [facility] Warning Sign[facility]).  If these happen call 911.  Itching is also not normal in pregnancy and if you experience this, especially over your hands and feet, potentially worse at night, notify your doctors.     Kick Counts in Pregnancy   AMBULATORY CARE:   Kick counts  measure how much your baby is moving in your womb. A kick from your baby can be felt as a twist, turn, swish, roll, or jab. It is common to feel your baby kicking at 26 to 28 weeks of pregnancy. You may feel your baby kick as early as 20 weeks of pregnancy. You may want to start counting at 28 weeks.   Contact your  doctor immediately if:   You feel a change in the number of kicks or movements of your baby.      You feel fewer than 10 kicks within 2 hours.      You have questions or concerns about your baby's movements.     Why measure kick counts:  Your baby's movement may provide information about your baby's health. He or she may move less, or not at all, if there are problems. Your baby may move less if he or she is not getting enough oxygen or nutrition from the placenta. Do not smoke while you are pregnant. Smoking decreases the amount of oxygen that gets to your baby. Talk to your healthcare provider if you need help to quit smoking. Tell your healthcare provider as soon as you feel a change in your baby's movements.  When to measure kick counts:   Measure kick counts at the same time every day.       Measure kick counts when your baby is awake and most active. Your baby may be most active in the evening.     How to measure kick counts:  Check that your baby is awake before you measure kick counts. You can wake up your baby by lightly pushing on your belly, walking, or drinking something cold. Your healthcare provider may tell you different ways to measure kick counts. You may be told to do the following:  Use a chart or clock to keep track of the time you start and finish counting.      Sit in a chair or lie on your left side.      Place your hands on the largest part of your belly.      Count until you reach 10 kicks. Write down how much time it takes to count 10 kicks.      It may take 30 minutes to 2 hours to count 10 kicks. It should not take more than 2 hours to count 10 kicks.     Follow up with your doctor as directed:  Write down your questions so you remember to ask them during your visits.   © Copyright [employer] 2023 Information is for End User's use only and may not be sold, redistributed or otherwise used for commercial purposes.  The above information is an  only. It is not intended as  medical advice for individual conditions or treatments. Talk to your doctor, nurse or pharmacist before following any medical regimen to see if it is safe and effective for you.

## 2025-07-09 NOTE — PROGRESS NOTES
Repeat Non-Stress Testing:    Patient verbalizes +FM. Pt denies ALL:               Leaking of fluid   Contractions   Vaginal bleeding   Decreased fetal movement    Patient is performing daily kick counts. Patient has no questions or concerns.   NST strip reviewed by Dr. Lew in-person.

## 2025-07-09 NOTE — PROGRESS NOTES
Syed stated she used her last Insulin last night and ran out. I sent a message to the Diabetes team to refill for patient as soon as possible. Patient aware.

## 2025-07-10 ENCOUNTER — ROUTINE PRENATAL (OUTPATIENT)
Dept: OBGYN CLINIC | Facility: MEDICAL CENTER | Age: 30
End: 2025-07-10
Payer: COMMERCIAL

## 2025-07-10 ENCOUNTER — TELEPHONE (OUTPATIENT)
Dept: OBGYN CLINIC | Facility: CLINIC | Age: 30
End: 2025-07-10

## 2025-07-10 ENCOUNTER — CLINICAL SUPPORT (OUTPATIENT)
Age: 30
End: 2025-07-10

## 2025-07-10 ENCOUNTER — HOSPITAL ENCOUNTER (OUTPATIENT)
Dept: INFUSION CENTER | Facility: CLINIC | Age: 30
Discharge: HOME | End: 2025-07-10
Attending: OBSTETRICS & GYNECOLOGY
Payer: COMMERCIAL

## 2025-07-10 ENCOUNTER — DOCUMENTATION (OUTPATIENT)
Dept: OBGYN CLINIC | Facility: MEDICAL CENTER | Age: 30
End: 2025-07-10

## 2025-07-10 VITALS
TEMPERATURE: 98.1 F | DIASTOLIC BLOOD PRESSURE: 67 MMHG | RESPIRATION RATE: 18 BRPM | SYSTOLIC BLOOD PRESSURE: 102 MMHG | HEART RATE: 92 BPM

## 2025-07-10 VITALS
HEIGHT: 69 IN | DIASTOLIC BLOOD PRESSURE: 70 MMHG | WEIGHT: 246.6 LBS | BODY MASS INDEX: 36.53 KG/M2 | SYSTOLIC BLOOD PRESSURE: 122 MMHG

## 2025-07-10 DIAGNOSIS — Z3A.23 23 WEEKS GESTATION OF PREGNANCY: ICD-10-CM

## 2025-07-10 DIAGNOSIS — O24.112 PREGNANCY COMPLICATED BY PRE-EXISTING TYPE 2 DIABETES, SECOND TRIMESTER: ICD-10-CM

## 2025-07-10 DIAGNOSIS — O30.033 MONOCHORIONIC DIAMNIOTIC TWIN GESTATION IN THIRD TRIMESTER: ICD-10-CM

## 2025-07-10 DIAGNOSIS — Z98.84 STATUS POST LAPAROSCOPIC SLEEVE GASTRECTOMY: ICD-10-CM

## 2025-07-10 DIAGNOSIS — O99.842 PREGNANCY AFFECTED BY PREVIOUS BARIATRIC SURGERY, CURRENTLY IN SECOND TRIMESTER: ICD-10-CM

## 2025-07-10 DIAGNOSIS — Z32.2 ENCOUNTER FOR CHILDBIRTH INSTRUCTION: Primary | ICD-10-CM

## 2025-07-10 DIAGNOSIS — Z3A.28 28 WEEKS GESTATION OF PREGNANCY: ICD-10-CM

## 2025-07-10 DIAGNOSIS — O34.219 HISTORY OF CESAREAN DELIVERY, CURRENTLY PREGNANT: ICD-10-CM

## 2025-07-10 DIAGNOSIS — O30.032 MONOCHORIONIC DIAMNIOTIC TWIN GESTATION IN SECOND TRIMESTER: ICD-10-CM

## 2025-07-10 DIAGNOSIS — O24.119 TYPE 2 DIABETES MELLITUS AFFECTING PREGNANCY, ANTEPARTUM: ICD-10-CM

## 2025-07-10 DIAGNOSIS — R79.0 LOW FERRITIN LEVEL: Primary | ICD-10-CM

## 2025-07-10 LAB
DME PARACHUTE DELIVERY DATE REQUESTED: NORMAL
DME PARACHUTE ITEM DESCRIPTION: NORMAL
DME PARACHUTE ORDER STATUS: NORMAL
DME PARACHUTE SUPPLIER NAME: NORMAL
DME PARACHUTE SUPPLIER PHONE: NORMAL

## 2025-07-10 PROCEDURE — 96365 THER/PROPH/DIAG IV INF INIT: CPT

## 2025-07-10 PROCEDURE — 99214 OFFICE O/P EST MOD 30 MIN: CPT | Performed by: STUDENT IN AN ORGANIZED HEALTH CARE EDUCATION/TRAINING PROGRAM

## 2025-07-10 RX ORDER — SODIUM CHLORIDE 9 MG/ML
20 INJECTION, SOLUTION INTRAVENOUS ONCE
Status: CANCELLED | OUTPATIENT
Start: 2025-07-15

## 2025-07-10 RX ORDER — SODIUM CHLORIDE 9 MG/ML
20 INJECTION, SOLUTION INTRAVENOUS ONCE
Status: COMPLETED | OUTPATIENT
Start: 2025-07-10 | End: 2025-07-10

## 2025-07-10 RX ADMIN — SODIUM CHLORIDE 20 ML/HR: 9 INJECTION, SOLUTION INTRAVENOUS at 14:31

## 2025-07-10 RX ADMIN — IRON SUCROSE 200 MG: 20 INJECTION, SOLUTION INTRAVENOUS at 14:31

## 2025-07-10 NOTE — PROGRESS NOTES
3RD TRIMESTER CHECK-IN      Overall how are you feeling? Patient reports to be doing well overall. Has C/S scheduled 8/21.     Compliant with routine OB appointments? Yes.     Have you completed your 3rd trimester lab work? Ordered at today's visit.      Have you reviewed the contents of 3rd trimester folder from office? Provided to patient at today's visit.                Have you decided on a pediatrician? [facility] [address]                            Questions on paperwork to go back to office? No questions at this time.  Questions on the baby birth certificate forms? No questions at this time.

## 2025-07-10 NOTE — TELEPHONE ENCOUNTER
PA for insulin glargine solostar 100 units  SUBMITTED to [employer]     via    [x]CMM-KEY: [id number]  []Surescripts-Case ID #   []Availity-Auth ID # NDC #   []Faxed to plan   []Other website  []Phone call Case ID #     []PA sent as URGENT    All office notes, labs and other pertaining documents and studies sent. Clinical questions answered. Awaiting determination from insurance company.     Turnaround time for your insurance to make a decision on your Prior Authorization can take 7-21 business days.

## 2025-07-11 ENCOUNTER — ANCILLARY PROCEDURE (OUTPATIENT)
Dept: PERINATAL CARE | Facility: OTHER | Age: 30
End: 2025-07-11
Attending: OBSTETRICS & GYNECOLOGY
Payer: COMMERCIAL

## 2025-07-11 ENCOUNTER — TELEPHONE (OUTPATIENT)
Dept: PERINATAL CARE | Facility: CLINIC | Age: 30
End: 2025-07-11

## 2025-07-11 VITALS
SYSTOLIC BLOOD PRESSURE: 104 MMHG | HEIGHT: 69 IN | HEART RATE: 93 BPM | DIASTOLIC BLOOD PRESSURE: 58 MMHG | BODY MASS INDEX: 36.23 KG/M2 | WEIGHT: 244.6 LBS

## 2025-07-11 DIAGNOSIS — O30.033 MONOCHORIONIC DIAMNIOTIC TWIN GESTATION IN THIRD TRIMESTER: Primary | ICD-10-CM

## 2025-07-11 DIAGNOSIS — O24.113 PREGNANCY COMPLICATED BY PRE-EXISTING TYPE 2 DIABETES IN THIRD TRIMESTER: ICD-10-CM

## 2025-07-11 DIAGNOSIS — Z3A.28 28 WEEKS GESTATION OF PREGNANCY: ICD-10-CM

## 2025-07-11 DIAGNOSIS — Z3A.27 27 WEEKS GESTATION OF PREGNANCY: ICD-10-CM

## 2025-07-11 PROCEDURE — NC001 PR NO CHARGE: Performed by: OBSTETRICS & GYNECOLOGY

## 2025-07-11 PROCEDURE — 76815 OB US LIMITED FETUS(S): CPT | Performed by: OBSTETRICS & GYNECOLOGY

## 2025-07-11 PROCEDURE — 59025 FETAL NON-STRESS TEST: CPT | Performed by: PHYSICIAN ASSISTANT

## 2025-07-11 PROCEDURE — 76821 MIDDLE CEREBRAL ARTERY ECHO: CPT | Performed by: OBSTETRICS & GYNECOLOGY

## 2025-07-11 NOTE — Clinical Note
"2025    LES Barber  [address] [address]    Patient: Syed Taveras   YOB: 1995   Date of Visit: 2025   Nature of this communication: {Blank Single:08673::\"Priority: ***\",\"Routine\",\"Routine though please note ***\",\"***\"}     This patient was seen recently in our  office.      Please don't hesitate to contact our office with any concerns or questions.     Sincerely,      Velia Perry MD  Attending Physician, Maternal-Fetal Medicine  [facility]        "

## 2025-07-11 NOTE — PROGRESS NOTES
[facility]:     Syed Taveras was seen today at 28w2d gestational age for NST (found under the pregnancy episode). NST was reactive for both Twin A and Twin B. I reviewed the RN assessment and agree.

## 2025-07-11 NOTE — TELEPHONE ENCOUNTER
Reason for Call: Issue with Insulin Prescription (Pt sent [employer] that her prescription for glargine was denied.) and Diabetes Type 2 (Currently Pregnant; 28w2d)    Outcome: Spoke with pharmacy. Prescriptions are ready for pickup. Sent [employer] msg to notify pt.    Delfina Hernandez RD  Diabetes Educator   [facility]  Diabetes and Pregnancy Program

## 2025-07-11 NOTE — TELEPHONE ENCOUNTER
PA for  insulin glargine solostar 100 units  CANCELLED due to     []Approval on file-dates approved   [x]Medication already on Formulary          []Brand Name Preferred  []Patient no longer covered by insurance  []Therapy Changed/Medication Discontinued    Patient advised by     []My Chart Message  []Phone call    Scanned into Media  yes

## 2025-07-11 NOTE — PROGRESS NOTES
Repeat Non-Stress Testing:    Patient verbalizes +FM. Pt denies ALL:               Leaking of fluid   Contractions   Vaginal bleeding   Decreased fetal movement of either baby    Patient is performing daily kick counts. Patient has no questions or concerns.   NST strip reviewed by DAREK Chandler in-person.

## 2025-07-11 NOTE — LETTER
2025     Robson Lira MD  [address] [place] [address]  [address] [place] [address]    Patient: Syed Taveras   YOB: 1995   Date of Visit: 2025       Dear Dr. Robson Lira MD:    Thank you for referring Syed Taveras to me for evaluation. Below are my notes for this consultation.    If you have questions, please do not hesitate to call me. I look forward to following your patient along with you.         Sincerely,        Velia Perry MD        CC: No Recipients    Velia Perry MD  2025 11:50 AM  Sign when Signing Visit  [facility]: Syed was seen today for TTTS surveillance. Report with images are contained in the ultrasound report located under Chart Review tab in Epic.       -----------------------------------------    Problem List Items Addressed This Visit          Other Pediatrics    Vasa previa    See Dr. Infante's last note for plan.  She is being admitted on  at 12 PM. She has a  delivery scheduled for .  This is 34w1d which is ALPS (52l8y-50p6w) range.  ALPS study excluded pregestational diabetes and multifetal gestation.  Given pregestational diabetes would hold off on  late  steroids in this scenario.            Obstetrics/Gynecology    Monochorionic diamniotic twin gestation in second trimester - Primary    Poly in one sac today.  Could be related to her pregestational diabetes though given mo-di twins advise a short interval TTTS followup study.  Velia Perry            Other Visit Diagnoses         28 weeks gestation of pregnancy          Pregnancy complicated by pre-existing type 2 diabetes in third trimester               was utilized throughout.    The time spent on this established patient on the encounter date included  10 minutes face-to-face service time counseling regarding results and coordinating care, and  10 minutes charting, totalling 20  minutes.  Please call our office at [phone] with questions.  -MD Dania Thornton PA-C  2025  3:11 PM  Attested  [facility]:     Syed Taveras was seen today at 28w2d gestational age for NST (found under the pregnancy episode). NST was reactive for both Twin A and Twin B. I reviewed the RN assessment and agree.   Attestation signed by eVlia Perry MD at 2025  8:53 AM:  Please see my separate note on this patient.

## 2025-07-11 NOTE — Clinical Note
2025     LES Barber  [address] [facility]  [address] [place] [address]    Patient: Syed Taveras   YOB: 1995   Date of Visit: 2025       Dear Dr. Nancy Senior, LES Chinchilla, MD Yuan Lew, MD Luz Marina Fay, LES Guillermo, MD Sabino Infante, MD Humaira Garcia, LES Hernandez, LES Cadet, ROSALINO Hebert, MD Dania Miranda, KADE Davis, MD Karen Rahman, MD Saniya Hickey, MD Dina Dhillon, LES Perry, MD Charlotte Vazquez, MD Robson Lira, MD Oc Hi, DO Di Ugarte, MD Danni Smith, MD Liliana Lynch MD:    Thank you for referring Syed Taveras to me for evaluation. Below are my notes for this consultation.    If you have questions, please do not hesitate to call me. I look forward to following your patient along with you.         Sincerely,        Velia Perry MD        CC: Nilda Chinchilla, MD Yuan Lew, MD Luz Marina Fay, LES Guillermo, MD Sabino Infante, MD Humaira Garcia, LES Hernandez, LES Cadet, ROSALINO Hebert, MD Dania Miranda, KADE Davis, MD Karen Rahman, MD Saniya Hickey, MD Dina Dhillon, LES Perry, MD Charlotte Vazquez, MD Robson Lira, MD Oc Hi, DO Di Ugarte, MD Danni Smith, MD Liliana Lynch, MD Velia Perry MD  2025 11:50 AM  Sign when Signing Visit  [facility]: Syed was seen today for TTTS surveillance. Report with images are contained in the ultrasound report located under Chart Review tab in Epic.       -----------------------------------------    Problem List Items Addressed This Visit          Other Pediatrics    Vasa previa    See Dr. Infante's last note for plan.  She is being admitted on   at 12 PM. She has a  delivery scheduled for .  This is 34w1d which is ALPS (07s1u-73t5r) range.  ALPS study excluded pregestational diabetes and multifetal gestation.  Given pregestational diabetes would hold off on  late  steroids in this scenario.            Obstetrics/Gynecology    Monochorionic diamniotic twin gestation in second trimester - Primary    Poly in one sac today.  Could be related to her pregestational diabetes though given mo-di twins advise a short interval TTTS followup study.  Velia Perry            Other Visit Diagnoses         28 weeks gestation of pregnancy          Pregnancy complicated by pre-existing type 2 diabetes in third trimester               was utilized throughout.    The time spent on this established patient on the encounter date included  10 minutes face-to-face service time counseling regarding results and coordinating care, and  10 minutes charting, totalling 20 minutes.  Please call our office at [phone] with questions.  -MD Dania Thornton PA-C  2025  3:11 PM  Attested  [facility]:     Syed Taveras was seen today at 28w2d gestational age for NST (found under the pregnancy episode). NST was reactive for both Twin A and Twin B. I reviewed the RN assessment and agree.   Attestation signed by Velia Perry MD at 2025  8:53 AM:  Please see my separate note on this patient.

## 2025-07-12 NOTE — PROGRESS NOTES
[facility]: Syed was seen today for TTTS surveillance. Report with images are contained in the ultrasound report located under Chart Review tab in Epic.       -----------------------------------------    Problem List Items Addressed This Visit          Other Pediatrics    Vasa previa    See Dr. Infante's last note for plan.  She is being admitted on  at 12 PM. She has a  delivery scheduled for .  This is 34w1d which is ALPS (85a2q-58r2k) range.  ALPS study excluded pregestational diabetes and multifetal gestation.  Given pregestational diabetes would hold off on  late  steroids in this scenario.            Obstetrics/Gynecology    Monochorionic diamniotic twin gestation in second trimester - Primary    Poly in one sac today.  Could be related to her pregestational diabetes though given mo-di twins advise a short interval TTTS followup study.  Velia Perry            Other Visit Diagnoses         28 weeks gestation of pregnancy          Pregnancy complicated by pre-existing type 2 diabetes in third trimester               was utilized throughout.    The time spent on this established patient on the encounter date included  10 minutes face-to-face service time counseling regarding results and coordinating care, and  10 minutes charting, totalling 20 minutes.  Please call our office at [phone] with questions.  -Velia Perry MD

## 2025-07-12 NOTE — ASSESSMENT & PLAN NOTE
Poly in one sac today.  Could be related to her pregestational diabetes though given mo-di twins advise a short interval TTTS followup study.  Velia Perry

## 2025-07-12 NOTE — ASSESSMENT & PLAN NOTE
See Dr. Infante's last note for plan.  She is being admitted on  at 12 PM. She has a  delivery scheduled for .  This is 34w1d which is ALPS (32v8n-78y7i) range.  ALPS study excluded pregestational diabetes and multifetal gestation.  Given pregestational diabetes would hold off on  late  steroids in this scenario.

## 2025-07-14 ENCOUNTER — TELEPHONE (OUTPATIENT)
Dept: OBGYN CLINIC | Facility: MEDICAL CENTER | Age: 30
End: 2025-07-14

## 2025-07-14 NOTE — PROGRESS NOTES
Name: Syed Taveras      : 1995      MRN: [id number]  Encounter Provider: Di Ugarte MD  Encounter Date: 7/10/2025   Encounter department: [facility]  :  Assessment & Plan  Vasa previa in labor and delivery, antepartum, fetus 1  - ADT21 completed for admission on  at 30 weeks for vasa previa, patient anticipates arrival at 12:00 noon  - Repeat LTCS currently planned for     Orders:    Transfer to other facility    Type 2 diabetes mellitus affecting pregnancy, antepartum  - On Lantus QHS and novolog before lunch and dinner  - Betamethasone on admission -  - MFM and endocrinology consultation for insulin titration after betamethasone    Lab Results   Component Value Date    HGBA1C 5.9 (H) 2025            Pregnancy complicated by pre-existing type 2 diabetes, second trimester    Lab Results   Component Value Date    HGBA1C 5.9 (H) 2025            28 weeks gestation of pregnancy    Orders:    RPR-Syphilis Screening (Total Syphilis IGG/IGM); Future    CBC and differential; Future    Anemia Panel w/Reflex, OB; Future    Status post laparoscopic sleeve gastrectomy         History of  delivery, currently pregnant         Monochorionic diamniotic twin gestation in third trimester             History of Present Illness   Visit conducted with [employer].    The patient denies leakage of fluid, pelvic pain, or vaginal bleeding.  Reports fetal movement.  Anticipating antepartum admission on  for vasa previa.        Syed Taveras is a 29 y.o. female who presents for routine prenatal care.    History obtained from: patient    Review of Systems   Constitutional:  Negative for chills and fever.   Respiratory:  Negative for cough and shortness of breath.    Cardiovascular:  Negative for chest pain and leg swelling.   Gastrointestinal:  Negative for abdominal pain, nausea and vomiting.   Genitourinary:   "Negative for dysuria, frequency and urgency.   Neurological:  Negative for dizziness, light-headedness and headaches.     Medical History Reviewed by provider this encounter:     .     Objective   /70   Ht 5' 9\" (1.753 m)   Wt 112 kg (246 lb 9.6 oz)   LMP 12/25/2024 (Exact Date)   BMI 36.42 kg/m²      Physical Exam  Constitutional:       Appearance: Normal appearance.   HENT:      Head: Normocephalic and atraumatic.     Cardiovascular:      Rate and Rhythm: Normal rate.   Pulmonary:      Effort: Pulmonary effort is normal.     Skin:     General: Skin is warm.     Neurological:      General: No focal deficit present.      Mental Status: She is alert.     Psychiatric:         Mood and Affect: Mood normal.           "

## 2025-07-14 NOTE — ASSESSMENT & PLAN NOTE
- On Lantus QHS and novolog before lunch and dinner  - Betamethasone on admission 7/24-7/25  - MFM and endocrinology consultation for insulin titration after betamethasone    Lab Results   Component Value Date    HGBA1C 5.9 (H) 03/11/2025

## 2025-07-14 NOTE — TELEPHONE ENCOUNTER
Left message on vm to return call to make sure patient is aware of plan below.    ----- Message from Tim LEMUS sent at 7/14/2025  7:19 AM EDT -----  Regarding: FW: mo-di, vasa previa, being admitted 7/24, being delivered 8/21/25.    ----- Message -----  From: Karen Rahman MD  Sent: 7/11/2025  12:50 PM EDT  To: LES Bergman; Laverne Elias-Norris#  Subject: FW: mo-di, vasa previa, being admitted 7/24,#    Hey friends    See below -- plan for admit for vasa previa surveillance 7/24 in setting of mono-di twins. I havent met her yet so just making sure we're all aware of her.    OB navigators  -- can cancel appts after, as will be admitted until delivery after that point. Please confirm with pt she is also aware of this plan before you do  ----- Message -----  From: Velia Perry MD  Sent: 7/11/2025  12:28 PM EDT  To: Karen Rahman MD; Kamila Tyler  Subject: mo-di, vasa previa, being admitted 7/24, isaac#    Hi Kamila - this pt will be admitted to [facility] 7/24 and will need weekly TTTS check in the hospital as well as a twin growth the week before delivery.  Can you help coordinate?

## 2025-07-14 NOTE — ASSESSMENT & PLAN NOTE
- ADT21 completed for admission on 7/24 at 30 weeks for joe previa, patient anticipates arrival at 12:00 noon  - Repeat LTCS currently planned for 8/21    Orders:    Transfer to other facility

## 2025-07-15 ENCOUNTER — HOSPITAL ENCOUNTER (OUTPATIENT)
Dept: INFUSION CENTER | Facility: CLINIC | Age: 30
Discharge: HOME | End: 2025-07-15
Attending: OBSTETRICS & GYNECOLOGY
Payer: COMMERCIAL

## 2025-07-15 ENCOUNTER — ULTRASOUND (OUTPATIENT)
Dept: PERINATAL CARE | Facility: CLINIC | Age: 30
End: 2025-07-15
Payer: COMMERCIAL

## 2025-07-15 VITALS
WEIGHT: 247.6 LBS | SYSTOLIC BLOOD PRESSURE: 108 MMHG | HEIGHT: 69 IN | HEART RATE: 97 BPM | DIASTOLIC BLOOD PRESSURE: 64 MMHG | BODY MASS INDEX: 36.67 KG/M2

## 2025-07-15 VITALS
SYSTOLIC BLOOD PRESSURE: 113 MMHG | DIASTOLIC BLOOD PRESSURE: 70 MMHG | TEMPERATURE: 97.9 F | RESPIRATION RATE: 20 BRPM | HEART RATE: 82 BPM

## 2025-07-15 DIAGNOSIS — O30.032 MONOCHORIONIC DIAMNIOTIC TWIN GESTATION IN SECOND TRIMESTER: ICD-10-CM

## 2025-07-15 DIAGNOSIS — R79.0 LOW FERRITIN LEVEL: Primary | ICD-10-CM

## 2025-07-15 DIAGNOSIS — O99.842 PREGNANCY AFFECTED BY PREVIOUS BARIATRIC SURGERY, CURRENTLY IN SECOND TRIMESTER: ICD-10-CM

## 2025-07-15 DIAGNOSIS — Z3A.23 23 WEEKS GESTATION OF PREGNANCY: ICD-10-CM

## 2025-07-15 DIAGNOSIS — Z3A.28 28 WEEKS GESTATION OF PREGNANCY: Primary | ICD-10-CM

## 2025-07-15 PROCEDURE — 59025 FETAL NON-STRESS TEST: CPT | Performed by: OBSTETRICS & GYNECOLOGY

## 2025-07-15 PROCEDURE — 96365 THER/PROPH/DIAG IV INF INIT: CPT

## 2025-07-15 RX ORDER — SODIUM CHLORIDE 9 MG/ML
20 INJECTION, SOLUTION INTRAVENOUS ONCE
Status: CANCELLED | OUTPATIENT
Start: 2025-07-15

## 2025-07-15 RX ORDER — SODIUM CHLORIDE 9 MG/ML
20 INJECTION, SOLUTION INTRAVENOUS ONCE
Status: COMPLETED | OUTPATIENT
Start: 2025-07-15 | End: 2025-07-15

## 2025-07-15 RX ADMIN — IRON SUCROSE 200 MG: 20 INJECTION, SOLUTION INTRAVENOUS at 14:59

## 2025-07-15 RX ADMIN — SODIUM CHLORIDE 20 ML/HR: 0.9 INJECTION, SOLUTION INTRAVENOUS at 14:56

## 2025-07-16 NOTE — PATIENT INSTRUCTIONS
Recuento de patadas phyllis el embarazo    CUIDADO AMBULATORIO:   El conteo de patadas mide cuánto se mueve tu bebé en tu útero. Yazan patada de escamilla bebé se puede sentir chato un giro, un giro, un chasquido, o un golpe. Es común sentir las patadas de escamilla bebé entre las semanas 26 y 28 de embarazo. Es posible que sienta las patadas de escamilla bebé a partir de las 20 semanas de embarazo. Quizás quieras empezar a contar a las 28 semanas.     Comuníquese con escamilla médico inmediatamente si:   Sientes un cambio en la cantidad de patadas o movimientos de tu bebé.    Sientes menos de 10 patadas en 2 horas.   Tiene preguntas o inquietudes sobre los movimientos de escamilla bebé.   Por qué medir el número de patadas:  El movimiento de escamilla bebé puede proporcionar información sobre escamilla evelia. Es posible que se mueva menos o nada en absoluto si hay problemas. Es posible que escamilla bebé se mueva menos si no recibe suficiente oxígeno o nutrición de la placenta. No fume mientras esté embarazada. Fumar disminuye la cantidad de oxígeno que llega a escamilla bebé. Hable con escamilla proveedor de atención médica si necesita ayuda para dejar de fumar. Informe a escamilla proveedor de atención médica garcia pronto chato sienta un cambio en los movimientos de escamilla bebé.    Cuándo medir el conteo de patadas:   Mida el conteo de patadas a la misma hora todos los días.    Mida el conteo de patadas cuando escamilla bebé esté despierto y más activo. Escamilla bebé puede estar más activo por la noche.  Cómo medir el conteo de patadas: verifique que escamilla bebé esté despierto antes de medir el conteo de patadas. Puedes despertar a tu bebé empujándolo ligeramente sobre tu vientre, caminando o bebiendo algo frío. Escamilla proveedor de atención médica puede indicarle diferentes formas de medir el conteo de patadas.   Es posible que le indiquen que jamia lo siguiente:  Utilice un gráfico o un reloj para realizar un seguimiento de la hora en que comienza y termina de contar.   Siéntate en yazan silla o acuéstate sobre tu lado  sarah.   Coloca tus ajc en la parte más ye de tu vientre.   Cuenta hasta llegar a 10 patadas. Escribe cuánto tiempo lleva contar 10 patadas.   Puede llevar de 30 minutos a 2 horas contar 10 patadas. No debería yusef más de 2 horas contar 10 patadas.  Jamia un seguimiento con escamilla médico según las indicaciones: escriba alo preguntas para recordar hacerlas phyllis alo visitas.     © Copyright [employer] 2023 La información es para uso exclusivo del usuario final y no puede venderse, redistribuirse ni utilizarse de otro modo con fines comerciales.  La información arriba es solo ayuda educacional. No pretende ser un consejo médico para afecciones o tratamientos individuales. Hable con escamilla médico, enfermera o farmacéutico antes de seguir cualquier régimen médico para kyree si es seguro y eficaz para usted. Prueba sin estrés para el embarazo     LO QUE NECESITAS SABER:   ¿Qué necesito saber sobre yazan prueba sin estrés?  Yazan prueba sin estrés mide la frecuencia cardíaca y los movimientos de escamilla bebé. Sin estrés significa que no se aplicará estrés a escamilla bebé phyllis la prueba.    ¿Cómo me preparo para yazan prueba sin estrés?  Escamilla proveedor de atención médica hablará con usted sobre cómo prepararse para esta prueba. Es posible que escamilla proveedor le indique que coma y pamela muchos líquidos antes de la prueba. Si fuma, es posible que le pidan que no fume 2 horas antes de la prueba. Escamilla proveedor también le indicará qué medicamentos debe yusef o no el día de escamilla prueba.    ¿Qué pasará phyllis yazan prueba sin estrés?  Es posible que le pidan que se recueste o se recueste en yazan cama para realizar la prueba. Se colocarán reji o dos cinturones con sensores alrededor de escamilla abdomen. La frecuencia cardíaca de escamilla bebé se registrará con yazan máquina. Si escamilla bebé no se mueve, es posible que esté dormido. Es posible que escamilla proveedor de atención médica jamia un ruido cerca de escamilla abdomen para intentar despertar a escamilla bebé. La prueba suele durar unos 20  minutos, bari puede tardar más si es necesario despertar al bebé.    ¿Qué necesito saber sobre los resultados de la prueba?  Se espera que escamilla bebé se mueva al menos 2 veces phyllis un período de tiempo determinado. Se espera que la frecuencia cardíaca de escamilla bebé aumente yazan cierta cantidad de latidos por minuto phyllis el movimiento. Si escamilla bebé no se mueve chato se esperaba, es posible que sea necesario repetir la prueba o que usted necesite otras pruebas.    CONTRATO DE CUIDADO:   Tiene derecho a ayudar a planificar escamilla atención. Conozca escamilla condición de evelia y cómo puede tratarse. Analice las opciones de tratamiento con alo proveedores de atención médica para decidir qué atención desea recibir. Siempre tienes derecho a rechazar el tratamiento. La información arriba es solo ayuda educacional. No pretende ser un consejo médico para afecciones o tratamientos individuales. Hable con escamilla médico, enfermera o farmacéutico antes de seguir cualquier régimen médico para kyree si es seguro y eficaz para usted.  © Copyright [employer] 2023 La información es para uso exclusivo del usuario final y no puede venderse, redistribuirse ni utilizarse de otro modo con fines comerciales.

## 2025-07-17 PROBLEM — Z3A.29 29 WEEKS GESTATION OF PREGNANCY: Status: ACTIVE | Noted: 2025-03-20

## 2025-07-18 ENCOUNTER — ANCILLARY PROCEDURE (OUTPATIENT)
Dept: PERINATAL CARE | Facility: CLINIC | Age: 30
End: 2025-07-18
Payer: COMMERCIAL

## 2025-07-18 ENCOUNTER — ULTRASOUND (OUTPATIENT)
Dept: PERINATAL CARE | Facility: CLINIC | Age: 30
End: 2025-07-18
Payer: COMMERCIAL

## 2025-07-18 VITALS
DIASTOLIC BLOOD PRESSURE: 62 MMHG | BODY MASS INDEX: 37.18 KG/M2 | HEIGHT: 69 IN | OXYGEN SATURATION: 98 % | WEIGHT: 251 LBS | SYSTOLIC BLOOD PRESSURE: 118 MMHG | HEART RATE: 84 BPM

## 2025-07-18 DIAGNOSIS — O30.032 MONOCHORIONIC DIAMNIOTIC TWIN GESTATION IN SECOND TRIMESTER: ICD-10-CM

## 2025-07-18 DIAGNOSIS — Z3A.29 29 WEEKS GESTATION OF PREGNANCY: ICD-10-CM

## 2025-07-18 DIAGNOSIS — Z3A.29 29 WEEKS GESTATION OF PREGNANCY: Primary | ICD-10-CM

## 2025-07-18 DIAGNOSIS — O24.119 TYPE 2 DIABETES MELLITUS AFFECTING PREGNANCY, ANTEPARTUM: ICD-10-CM

## 2025-07-18 PROCEDURE — 99214 OFFICE O/P EST MOD 30 MIN: CPT | Performed by: OBSTETRICS & GYNECOLOGY

## 2025-07-18 PROCEDURE — 76816 OB US FOLLOW-UP PER FETUS: CPT | Performed by: OBSTETRICS & GYNECOLOGY

## 2025-07-18 PROCEDURE — NC001 PR NO CHARGE: Performed by: OBSTETRICS & GYNECOLOGY

## 2025-07-18 PROCEDURE — 76821 MIDDLE CEREBRAL ARTERY ECHO: CPT | Performed by: OBSTETRICS & GYNECOLOGY

## 2025-07-18 PROCEDURE — 59025 FETAL NON-STRESS TEST: CPT | Performed by: OBSTETRICS & GYNECOLOGY

## 2025-07-18 NOTE — ASSESSMENT & PLAN NOTE
She denies any signs or symptoms of  labor today.  We discussed the importance of promptly seeking OB care if she experiences contractions, leaking of fluid, or vaginal bleeding.  She is scheduled for antepartum admission next week as well as a late  .

## 2025-07-18 NOTE — PROGRESS NOTES
Repeat Non-Stress Testing:    Used [facility] [employer] [facility]  ID# [id number] for the following information     Patient verbalizes +FM. Pt denies ALL:               Leaking of fluid   Contractions   Vaginal bleeding   Decreased fetal movement    Patient is performing daily kick counts. Patient has no questions or concerns.   NST strip reviewed by Dr. Vazquez in-person.

## 2025-07-18 NOTE — ASSESSMENT & PLAN NOTE
Twin A is estimated to weigh 95%ile for gestational age with normal amniotic fluid. Twin B is estimated to weigh 80%ile for gestational age with mild polyhydramnios. Fetal bladders are normal for both twins, MCA Doppler studies are normal. These findings are not consistent with TTTS or TAPS. NSTs were reactive for gestational age, patient denies any obstetric complaints today. Admission is scheduled 25 for known vasa previa. We reviewed  labor and pre-eclampsia precautions, she will have one more NST next week prior to hospital admission.

## 2025-07-18 NOTE — LETTER
"   Date: 2025    Karen Rahman MD  [address] [place] [address]    Patient: Syed Taveras   YOB: 1995   Date of Visit: 2025   Gestational age 29w2d   Nature of this communication: Routine       This patient was seen recently in our  office.  Please see ultrasound report under \"Imaging\" tab.  Please don't hesitate to contact our office with any concerns or questions.      Sincerely,      Charlotte Vazquez MD  Attending Physician, Maternal-Fetal Medicine  [facility]    "

## 2025-07-18 NOTE — ASSESSMENT & PLAN NOTE
Syed does report some mild swelling bilaterally in her lower extremities, without chest pain or shortness of breath.  She is normotensive today.  Her most recent echo on 6/30/2025 demonstrated an ejection fraction of 55% with left ventricular cavity size at the upper limit of normal.  She does notably have a history of cardiomyopathy, and twin pregnancy also puts her at risk for recurrence.  I recommend a low threshold to recheck a proBNP and/or echocardiogram if her symptoms persist, although I have a low clinical suspicion today for cardiomyopathy in light of her very recent normal echo, her normal vital signs today, and her reassuring clinical presentation.

## 2025-07-18 NOTE — PROGRESS NOTES
FOLLOW-UP: MATERNAL-FETAL MEDICINE  Name: Syed Taveras      : 1995      MRN: [id number]  Encounter Provider: Charlotte Vazquez MD  Encounter Date: 2025   Encounter department: [facility]  :  Assessment & Plan  29 weeks gestation of pregnancy  Twin A is estimated to weigh 95%ile for gestational age with normal amniotic fluid. Twin B is estimated to weigh 80%ile for gestational age with mild polyhydramnios. Fetal bladders are normal for both twins, MCA Doppler studies are normal. These findings are not consistent with TTTS or TAPS. NSTs were reactive for gestational age, patient denies any obstetric complaints today. Admission is scheduled 25 for known vasa previa. We reviewed  labor and pre-eclampsia precautions, she will have one more NST next week prior to hospital admission.     Type 2 diabetes mellitus affecting pregnancy, antepartum  We discussed the status of her type 2 diabetes, and I reviewed her blood glucose flowsheet.  She remains in contact with our diabetes educators, and takes 24 units Lantus nightly.  Her fasting and post breakfast glucose are within the normal range.  She does have slight postprandial hyperglycemia after lunch and dinner, which may require some treatment with mealtime insulin in the near future.    Postpartum cardiomyopathy  Syed does report some mild swelling bilaterally in her lower extremities, without chest pain or shortness of breath.  She is normotensive today.  Her most recent echo on 2025 demonstrated an ejection fraction of 55% with left ventricular cavity size at the upper limit of normal.  She does notably have a history of cardiomyopathy, and twin pregnancy also puts her at risk for recurrence.  I recommend a low threshold to recheck a proBNP and/or echocardiogram if her symptoms persist, although I have a low clinical suspicion today for cardiomyopathy in light of her very recent normal  "echo, her normal vital signs today, and her reassuring clinical presentation.    Vasa previa in labor and delivery, antepartum, fetus 1  She denies any signs or symptoms of  labor today.  We discussed the importance of promptly seeking OB care if she experiences contractions, leaking of fluid, or vaginal bleeding.  She is scheduled for antepartum admission next week as well as a late  .      History of Present Illness     Syed Taveras is a 29 y.o. female  at 29w2d who presents [facility]. She reports no obstetric complaints today.    Objective   /62 (BP Location: Right arm, Patient Position: Sitting, Cuff Size: Standard)   Pulse 84   Ht 5' 9\" (1.753 m)   Wt 114 kg (251 lb)   LMP 2024 (Exact Date)   SpO2 98%   BMI 37.07 kg/m²      Patient's last menstrual period was 2024 (exact date).  Estimated Delivery Date: 10/1/2025, by Last Menstrual Period  Gen: Well appearing, no distress  Pulm: Non-labored breathing  Extremities: Bilateral symmetric trace lower extremity edema  Results    Please refer to \"Imaging\" for ultrasound report from today's visit.     Administrative Statements   MDM:  I. Diagnoses/Problems addressed:  moderate  II.  Data: moderate  III.  Risk of morbidity: Moderate    via [employer] was utilized for the visit.  "

## 2025-07-18 NOTE — ASSESSMENT & PLAN NOTE
We discussed the status of her type 2 diabetes, and I reviewed her blood glucose flowsheet.  She remains in contact with our diabetes educators, and takes 24 units Lantus nightly.  Her fasting and post breakfast glucose are within the normal range.  She does have slight postprandial hyperglycemia after lunch and dinner, which may require some treatment with mealtime insulin in the near future.

## 2025-07-19 ENCOUNTER — APPOINTMENT (OUTPATIENT)
Dept: LAB | Facility: HOSPITAL | Age: 30
End: 2025-07-19
Payer: COMMERCIAL

## 2025-07-19 ENCOUNTER — HOSPITAL ENCOUNTER (EMERGENCY)
Facility: HOSPITAL | Age: 30
Discharge: HOME | End: 2025-07-19
Attending: EMERGENCY MEDICINE | Admitting: EMERGENCY MEDICINE
Payer: COMMERCIAL

## 2025-07-19 VITALS
SYSTOLIC BLOOD PRESSURE: 108 MMHG | DIASTOLIC BLOOD PRESSURE: 59 MMHG | WEIGHT: 254.63 LBS | BODY MASS INDEX: 37.6 KG/M2 | HEART RATE: 72 BPM | RESPIRATION RATE: 18 BRPM | TEMPERATURE: 98.1 F | OXYGEN SATURATION: 99 %

## 2025-07-19 DIAGNOSIS — Z3A.28 28 WEEKS GESTATION OF PREGNANCY: ICD-10-CM

## 2025-07-19 DIAGNOSIS — K08.89 PAIN, DENTAL: Primary | ICD-10-CM

## 2025-07-19 LAB
25(OH)D3 SERPL-MCNC: 24.5 NG/ML (ref 30–100)
ALBUMIN SERPL BCG-MCNC: 2.9 G/DL (ref 3.5–5)
ALP SERPL-CCNC: 94 U/L (ref 34–104)
ALT SERPL W P-5'-P-CCNC: 6 U/L (ref 7–52)
ANION GAP SERPL CALCULATED.3IONS-SCNC: 7 MMOL/L (ref 4–13)
AST SERPL W P-5'-P-CCNC: 10 U/L (ref 13–39)
BASOPHILS # BLD AUTO: 0.02 THOUSANDS/ÂΜL (ref 0–0.1)
BASOPHILS NFR BLD AUTO: 0 % (ref 0–1)
BILIRUB SERPL-MCNC: 0.67 MG/DL (ref 0.2–1)
BUN SERPL-MCNC: 5 MG/DL (ref 5–25)
CALCIUM ALBUM COR SERPL-MCNC: 9.5 MG/DL (ref 8.3–10.1)
CALCIUM SERPL-MCNC: 8.6 MG/DL (ref 8.4–10.2)
CHLORIDE SERPL-SCNC: 107 MMOL/L (ref 96–108)
CO2 SERPL-SCNC: 23 MMOL/L (ref 21–32)
CREAT SERPL-MCNC: 0.58 MG/DL (ref 0.6–1.3)
EOSINOPHIL # BLD AUTO: 0.05 THOUSAND/ÂΜL (ref 0–0.61)
EOSINOPHIL NFR BLD AUTO: 1 % (ref 0–6)
ERYTHROCYTE [DISTWIDTH] IN BLOOD BY AUTOMATED COUNT: 14 % (ref 11.6–15.1)
FERRITIN SERPL-MCNC: 190 NG/ML (ref 30–307)
FOLATE SERPL-MCNC: 9.9 NG/ML
GFR SERPL CREATININE-BSD FRML MDRD: 125 ML/MIN/1.73SQ M
GLUCOSE SERPL-MCNC: 145 MG/DL (ref 65–140)
HCT VFR BLD AUTO: 33 % (ref 34.8–46.1)
HGB BLD-MCNC: 11.2 G/DL (ref 11.5–15.4)
IMM GRANULOCYTES # BLD AUTO: 0.04 THOUSAND/UL (ref 0–0.2)
IMM GRANULOCYTES NFR BLD AUTO: 1 % (ref 0–2)
IRON SATN MFR SERPL: 32 % (ref 15–50)
IRON SERPL-MCNC: 107 UG/DL (ref 50–212)
LYMPHOCYTES # BLD AUTO: 1.46 THOUSANDS/ÂΜL (ref 0.6–4.47)
LYMPHOCYTES NFR BLD AUTO: 17 % (ref 14–44)
MCH RBC QN AUTO: 29.9 PG (ref 26.8–34.3)
MCHC RBC AUTO-ENTMCNC: 33.9 G/DL (ref 31.4–37.4)
MCV RBC AUTO: 88 FL (ref 82–98)
MONOCYTES # BLD AUTO: 0.66 THOUSAND/ÂΜL (ref 0.17–1.22)
MONOCYTES NFR BLD AUTO: 8 % (ref 4–12)
NEUTROPHILS # BLD AUTO: 6.32 THOUSANDS/ÂΜL (ref 1.85–7.62)
NEUTS SEG NFR BLD AUTO: 73 % (ref 43–75)
NRBC BLD AUTO-RTO: 0 /100 WBCS
PLATELET # BLD AUTO: 119 THOUSANDS/UL (ref 149–390)
PMV BLD AUTO: 13.2 FL (ref 8.9–12.7)
POTASSIUM SERPL-SCNC: 3.6 MMOL/L (ref 3.5–5.3)
PROT SERPL-MCNC: 5.5 G/DL (ref 6.4–8.4)
PTH-INTACT SERPL-MCNC: 25 PG/ML (ref 12–88)
RBC # BLD AUTO: 3.75 MILLION/UL (ref 3.81–5.12)
SODIUM SERPL-SCNC: 137 MMOL/L (ref 135–147)
TIBC SERPL-MCNC: 336 UG/DL (ref 250–450)
TRANSFERRIN SERPL-MCNC: 240 MG/DL (ref 203–362)
UIBC SERPL-MCNC: 229 UG/DL (ref 155–355)
VIT B12 SERPL-MCNC: 170 PG/ML (ref 180–914)
WBC # BLD AUTO: 8.55 THOUSAND/UL (ref 4.31–10.16)

## 2025-07-19 PROCEDURE — 85025 COMPLETE CBC W/AUTO DIFF WBC: CPT

## 2025-07-19 PROCEDURE — 99282 EMERGENCY DEPT VISIT SF MDM: CPT

## 2025-07-19 PROCEDURE — 86780 TREPONEMA PALLIDUM: CPT

## 2025-07-19 PROCEDURE — 99284 EMERGENCY DEPT VISIT MOD MDM: CPT | Performed by: PHYSICIAN ASSISTANT

## 2025-07-19 RX ORDER — AMOXICILLIN 500 MG/1
500 CAPSULE ORAL EVERY 8 HOURS SCHEDULED
Qty: 21 CAPSULE | Refills: 0 | Status: SHIPPED | OUTPATIENT
Start: 2025-07-19 | End: 2025-07-26

## 2025-07-19 RX ORDER — ACETAMINOPHEN 500 MG
500 TABLET ORAL EVERY 6 HOURS PRN
Qty: 20 TABLET | Refills: 0 | Status: SHIPPED | OUTPATIENT
Start: 2025-07-19

## 2025-07-19 NOTE — DISCHARGE INSTRUCTIONS
Please refer to the attached information for strict return instructions. If symptoms worsen or new symptoms develop please return to the ER. Begin using prescribed antibiotic. Please follow up with our dental clinic for re-evaluation.

## 2025-07-19 NOTE — ED PROVIDER NOTES
Time reflects when diagnosis was documented in both MDM as applicable and the Disposition within this note       Time User Action Codes Description Comment    7/19/2025  1:01 PM ElliotMichelleEduardo Add [K08.89] Pain, dental           ED Disposition       ED Disposition   Discharge    Condition   Stable    Date/Time   Sat Jul 19, 2025  1:01 PM    Comment   Syed Taveras discharge to home/self care.                   Assessment & Plan       Medical Decision Making  Left upper dental pain over the past few days, this is localized around her left upper canine and first premolar.  She has very slight gingival swelling and redness to the area as well as gingival tenderness without induration or fluctuance.  No facial swelling otherwise noted.  Small overlying tender lymphadenopathy noted.  Given concern for dental infection developing, will cover with amoxicillin given pregnancy.  Normal fetal heart tones for both babies, positive fetal movement.  No pregnancy related symptoms noted.  Referral was placed to dentistry, advised to call first thing Monday morning to arrange prompt follow-up with dentistry.  Strict return to ED indications discussed.    Risk  OTC drugs.  Prescription drug management.             Medications - No data to display    ED Risk Strat Scores                    No data recorded        SBIRT 20yo+      Flowsheet Row Most Recent Value   Initial Alcohol Screen: US AUDIT-C     1. How often do you have a drink containing alcohol? 0 Filed at: 07/19/2025 1153   2. How many drinks containing alcohol do you have on a typical day you are drinking?  0 Filed at: 07/19/2025 1153   3a. Male UNDER 65: How often do you have five or more drinks on one occasion? 0 Filed at: 07/19/2025 1153   3b. FEMALE Any Age, or MALE 65+: How often do you have 4 or more drinks on one occassion? 0 Filed at: 07/19/2025 1153   Audit-C Score 0 Filed at: 07/19/2025 1153   CHERRI: How many times in the past year have you...    Used  "an illegal drug or used a prescription medication for non-medical reasons? Never Filed at: 07/19/2025 2699                            History of Present Illness       Chief Complaint   Patient presents with    Mouth Lesions     Pt reports lesion on left side of mouth since yesterday. Pt states she is 29 weeks pregnant. Denies OB related complaints at this time.        Past Medical History[1]   Past Surgical History[2]   Family History[3]   Social History[4]   E-Cigarette/Vaping    E-Cigarette Use Never User       E-Cigarette/Vaping Substances    Nicotine No     THC No     CBD No     Flavoring No     Other No     Unknown No       I have reviewed and agree with the history as documented.     Syed is a 29-year-old female presenting with left upper dental pain over the past day or 2.  Describes pain to gum just above her left upper canine/first premolar she also reports feeling a \"ball\" over her cheek which is slightly tender and mobile.  No fevers or chills.  Pain with chewing in that area but no difficulty swallowing.  She is approximately 29 weeks and 3 days pregnant with twin gestation.  Of note, there is a plan for admission to the hospital on 7/24 for vasa previa.  She has no associated abdominal or pelvic pain, vaginal bleeding, vaginal discharge.  She does have a dentist but has not yet seen her dentist for this issue.      History provided by:  Patient      Review of Systems   Constitutional:  Negative for chills and fever.   HENT:  Positive for dental problem. Negative for congestion, rhinorrhea and sore throat.    Eyes:  Negative for pain and visual disturbance.   Respiratory:  Negative for cough, shortness of breath and wheezing.    Cardiovascular:  Negative for chest pain and palpitations.   Gastrointestinal:  Negative for abdominal pain, nausea and vomiting.   Genitourinary:  Negative for dysuria, frequency and urgency.   Musculoskeletal:  Negative for back pain, neck pain and neck stiffness.   Skin:  " Negative for rash and wound.   Neurological:  Negative for dizziness, weakness, light-headedness and numbness.           Objective       ED Triage Vitals   Temperature Pulse Blood Pressure Respirations SpO2 Patient Position - Orthostatic VS   07/19/25 1108 07/19/25 1108 07/19/25 1108 07/19/25 1108 07/19/25 1108 07/19/25 1230   98.1 °F (36.7 °C) 100 (S) (!) 158/108 20 98 % Sitting      Temp Source Heart Rate Source BP Location FiO2 (%) Pain Score    07/19/25 1108 07/19/25 1108 07/19/25 1108 -- --    Oral Monitor Right arm        Vitals      Date and Time Temp Pulse SpO2 Resp BP Pain Score FACES Pain Rating User   07/19/25 1300 -- 72 99 % 18 108/59 -- -- LAP   07/19/25 1230 -- 68 99 % 18 112/64 -- -- EK   07/19/25 1155 -- 77 98 % 18 112/59 -- -- EK   07/19/25 1108 98.1 °F (36.7 °C) 100 98 % 20  158/108 Linnea JERRY from OB called and made aware of BP. -- -- HMR            Physical Exam  Constitutional:       General: She is not in acute distress.     Appearance: She is well-developed. She is not diaphoretic.   HENT:      Head: Normocephalic and atraumatic.      Right Ear: Tympanic membrane and external ear normal.      Left Ear: Tympanic membrane and external ear normal.      Nose: Nose normal.      Mouth/Throat:      Comments: Tenderness to gingiva with slight redness above left upper canine and first premolar.  Mild swelling of the gingiva, no induration or fluctuance appreciable.  She also has a tender, mobile facial lymph node over the area.  No trismus.  Oropharynx otherwise clear.  No Mau's    Eyes:      Extraocular Movements:      Right eye: Normal extraocular motion.      Left eye: Normal extraocular motion.      Conjunctiva/sclera: Conjunctivae normal.      Pupils: Pupils are equal, round, and reactive to light.       Cardiovascular:      Rate and Rhythm: Normal rate and regular rhythm.   Pulmonary:      Effort: Pulmonary effort is normal. No accessory muscle usage or respiratory distress.   Abdominal:       General: Abdomen is flat. There is no distension.      Comments: Gravid abdomen.  No abdominal tenderness.  Fetal heart tones: Baby A, 150 bpm, baby B, 152 bpm.  Positive fetal movement for both.     Musculoskeletal:      Cervical back: Normal range of motion. No rigidity.     Skin:     General: Skin is warm and dry.      Capillary Refill: Capillary refill takes less than 2 seconds.      Findings: No erythema or rash.     Neurological:      Mental Status: She is alert and oriented to person, place, and time.      Motor: No abnormal muscle tone.      Coordination: Coordination normal.     Psychiatric:         Behavior: Behavior normal.         Thought Content: Thought content normal.         Judgment: Judgment normal.         Results Reviewed       None            No orders to display       Procedures    ED Medication and Procedure Management   Prior to Admission Medications   Prescriptions Last Dose Informant Patient Reported? Taking?   Blood Glucose Monitoring Suppl (OneTouch Verio) w/Device KIT   No No   Sig: T2DM and pregnancy   D3-1000 25 MCG (1000 UT) tablet   No No   Sig: TAKE 2 TABLETS (2,000 UNITS TOTAL) BY MOUTH DAILY   Insulin Glargine Solostar (Lantus SoloStar) 100 UNIT/ML SOPN   No No   Sig: Inject 0.24 mL (24 Units total) under the skin daily at bedtime At 9 PM daily. To be titrated. T2DM and pregnancy.   Insulin Pen Needle 31G X 5 MM MISC   No No   Sig: Inject under the skin daily at bedtime Use one pen needle per injection.   Lancets (OneTouch Delica Plus [id number]) MISC   No No   Sig: Use 4 a day. T2DM and pregnancy.   OneTouch Verio test strip   No No   Sig: Use 1 each 4 (four) times a day Test 4 times a day and as instructed. T2DM and pregnancy.   Prenatal MV & Min w/FA-DHA (Prenatal Gummies) 0.18-25 MG CHEW   No No   Sig: Chew 1 gum in the morning   acetaminophen (TYLENOL) 500 mg tablet   No No   Sig: Take 1 tablet (500 mg total) by mouth every 6 (six) hours as needed for mild pain   aspirin  (ECOTRIN LOW STRENGTH) 81 mg EC tablet   No No   Sig: Take 2 tablets (162 mg total) by mouth daily   hydrocortisone 2.5 % cream   No No   Sig: Apply topically 2 (two) times a day   insulin aspart (NovoLOG FlexPen) 100 UNIT/ML injection pen   No No   Sig: Inject 4 Units under the skin 2 (two) times a day before lunch and dinner      Facility-Administered Medications Last Administration Doses Remaining   cyanocobalamin injection 1,000 mcg 1/20/2025  3:44 PM         Discharge Medication List as of 7/19/2025  1:20 PM        START taking these medications    Details   amoxicillin (AMOXIL) 500 mg capsule Take 1 capsule (500 mg total) by mouth every 8 (eight) hours for 7 days, Starting Sat 7/19/2025, Until Sat 7/26/2025, Normal      benzocaine (ORAJEL) 10 % mucosal gel Apply 1 Application to the mouth or throat as needed for mucositis, Starting Sat 7/19/2025, Normal           CONTINUE these medications which have NOT CHANGED    Details   acetaminophen (TYLENOL) 500 mg tablet Take 1 tablet (500 mg total) by mouth every 6 (six) hours as needed for mild pain, Starting Sat 5/3/2025, Normal      aspirin (ECOTRIN LOW STRENGTH) 81 mg EC tablet Take 2 tablets (162 mg total) by mouth daily, Starting Thu 3/20/2025, Normal      Blood Glucose Monitoring Suppl (OneTouch Verio) w/Device KIT T2DM and pregnancy, Normal      D3-1000 25 MCG (1000 UT) tablet TAKE 2 TABLETS (2,000 UNITS TOTAL) BY MOUTH DAILY, Starting Mon 6/16/2025, Normal      hydrocortisone 2.5 % cream Apply topically 2 (two) times a day, Starting Sat 5/3/2025, Normal      insulin aspart (NovoLOG FlexPen) 100 UNIT/ML injection pen Inject 4 Units under the skin 2 (two) times a day before lunch and dinner, Starting Wed 7/9/2025, Normal      Insulin Glargine Solostar (Lantus SoloStar) 100 UNIT/ML SOPN Inject 0.24 mL (24 Units total) under the skin daily at bedtime At 9 PM daily. To be titrated. T2DM and pregnancy., Starting Wed 7/9/2025, Normal      Insulin Pen Needle 31G X 5  MM MISC Inject under the skin daily at bedtime Use one pen needle per injection., Starting Wed 2025, Until Tue 7/15/2025, Normal      Lancets (OneTouch Delica Plus [id number]) MISC Use 4 a day. T2DM and pregnancy., Normal      OneTouch Verio test strip Use 1 each 4 (four) times a day Test 4 times a day and as instructed. T2DM and pregnancy., Starting Thu 4/10/2025, Normal      Prenatal MV & Min w/FA-DHA (Prenatal Gummies) 0.18-25 MG CHEW Chew 1 gum in the morning, Starting Mon 2/10/2025, Normal             ED SEPSIS DOCUMENTATION   Time reflects when diagnosis was documented in both MDM as applicable and the Disposition within this note       Time User Action Codes Description Comment    2025  1:01 PM Eduardo Zarate Add [K08.89] Pain, dental                    [1]   Past Medical History:  Diagnosis Date    Diabetes mellitus (HCC)     History of delivery of macrosomal infant     History of gestational diabetes     History of pre-eclampsia     History of transfusion     after  - had fever with transfusion    Nonintractable headache 2024    Obesity (BMI 35.0-39.9 without comorbidity)     Peroneal tendon injury, left, initial encounter 11/10/2023    S/P laparoscopic sleeve gastrectomy 2020    Sprain of anterior talofibular ligament of left ankle, initial encounter 11/10/2023    Uses Yi as primary spoken language    [2]   Past Surgical History:  Procedure Laterality Date     SECTION  2017    CHOLECYSTECTOMY      MS LAPS GSTRC RSTRICTIV PX LONGITUDINAL GASTRECTOMY N/A 2020    Procedure: LAP SLEEVE GASTRECTOMY, INTRAOP EGD;  Surgeon: Da Rogers MD;  Location: [facility];  Service: Bariatrics   [3]   Family History  Problem Relation Name Age of Onset    Diabetes Mother Marcela Harkins     Diabetes Father Spfredrick Desir     Diabetes Sister Mariza Desir     Sleep apnea Sister      Sleep apnea Brother          2 of the 3 borthers have EAGLE    No  Known Problems Brother      No Known Problems Son      Diabetes Maternal Grandmother Beba Harkins     Skin cancer Maternal Grandmother Beba Goodwinzquez     No Known Problems Maternal Grandfather      Diabetes Paternal Grandmother      No Known Problems Paternal Grandfather      No Known Problems Maternal Aunt      No Known Problems Maternal Aunt      No Known Problems Paternal Aunt      No Known Problems Paternal Aunt      Heart disease Family          CARDIOVASCULAR DISEASE    Breast cancer Neg Hx      Colon cancer Neg Hx      Ovarian cancer Neg Hx     [4]   Social History  Tobacco Use    Smoking status: Never     Passive exposure: Never    Smokeless tobacco: Never   Vaping Use    Vaping status: Never Used   Substance Use Topics    Alcohol use: No    Drug use: No        Eduardo Zarate PA-C  07/19/25 9557

## 2025-07-19 NOTE — ED NOTES
OBGYN resident, Danni Coelho MD, made aware of improved BP reading of 112/59.      [place] Rendon RN  07/19/25 3392

## 2025-07-20 ENCOUNTER — HOSPITAL ENCOUNTER (INPATIENT)
Facility: HOSPITAL | Age: 30
LOS: 3 days | Discharge: HOME | DRG: 540 | End: 2025-07-24
Attending: OBSTETRICS & GYNECOLOGY | Admitting: OBSTETRICS & GYNECOLOGY
Payer: COMMERCIAL

## 2025-07-20 DIAGNOSIS — Z3A.29 29 WEEKS GESTATION OF PREGNANCY: ICD-10-CM

## 2025-07-20 DIAGNOSIS — Z78.9 BREASTFEEDING (INFANT): ICD-10-CM

## 2025-07-20 DIAGNOSIS — Z98.891 S/P REPEAT LOW TRANSVERSE C-SECTION: Primary | ICD-10-CM

## 2025-07-20 PROBLEM — R10.2 PELVIC CRAMPING: Status: ACTIVE | Noted: 2025-07-20

## 2025-07-20 LAB
BACTERIA UR QL AUTO: ABNORMAL /HPF
BILIRUB UR QL STRIP: NEGATIVE
CLARITY UR: ABNORMAL
COLOR UR: YELLOW
FIBRONECTIN FETAL VAG QL: POSITIVE
GLUCOSE UR STRIP-MCNC: ABNORMAL MG/DL
HGB UR QL STRIP.AUTO: NEGATIVE
KETONES UR STRIP-MCNC: NEGATIVE MG/DL
LEUKOCYTE ESTERASE UR QL STRIP: ABNORMAL
MUCOUS THREADS UR QL AUTO: ABNORMAL
NITRITE UR QL STRIP: NEGATIVE
NON-SQ EPI CELLS URNS QL MICRO: ABNORMAL /HPF
PH UR STRIP.AUTO: 6.5 [PH]
PROT UR STRIP-MCNC: ABNORMAL MG/DL
RBC #/AREA URNS AUTO: ABNORMAL /HPF
SP GR UR STRIP.AUTO: 1.02 (ref 1–1.03)
TREPONEMA PALLIDUM IGG+IGM AB [PRESENCE] IN SERUM OR PLASMA BY IMMUNOASSAY: NORMAL
UROBILINOGEN UR STRIP-ACNC: 2 MG/DL
WBC #/AREA URNS AUTO: ABNORMAL /HPF

## 2025-07-20 PROCEDURE — 82731 ASSAY OF FETAL FIBRONECTIN: CPT

## 2025-07-20 PROCEDURE — 81001 URINALYSIS AUTO W/SCOPE: CPT

## 2025-07-20 PROCEDURE — 76817 TRANSVAGINAL US OBSTETRIC: CPT

## 2025-07-20 PROCEDURE — NC001 PR NO CHARGE: Performed by: OBSTETRICS & GYNECOLOGY

## 2025-07-20 RX ORDER — AMOXICILLIN 500 MG/1
500 CAPSULE ORAL EVERY 8 HOURS SCHEDULED
Status: CANCELLED | OUTPATIENT
Start: 2025-07-20 | End: 2025-07-26

## 2025-07-20 RX ORDER — ACETAMINOPHEN 10 MG/ML
1000 INJECTION, SOLUTION INTRAVENOUS ONCE
Status: COMPLETED | OUTPATIENT
Start: 2025-07-20 | End: 2025-07-20

## 2025-07-20 RX ADMIN — ACETAMINOPHEN 1000 MG: 10 INJECTION INTRAVENOUS at 21:58

## 2025-07-20 RX ADMIN — SODIUM CHLORIDE 1000 ML: 0.9 INJECTION, SOLUTION INTRAVENOUS at 21:55

## 2025-07-21 ENCOUNTER — ANESTHESIA (INPATIENT)
Dept: LABOR AND DELIVERY | Facility: HOSPITAL | Age: 30
DRG: 540 | End: 2025-07-21
Payer: COMMERCIAL

## 2025-07-21 ENCOUNTER — CLINICAL DOCUMENTATION ONLY (OUTPATIENT)
Dept: OTHER | Facility: HOSPITAL | Age: 30
End: 2025-07-21

## 2025-07-21 PROBLEM — Z98.891 S/P REPEAT LOW TRANSVERSE C-SECTION: Status: ACTIVE | Noted: 2025-07-21

## 2025-07-21 PROBLEM — K04.8 DENTAL CYST: Status: ACTIVE | Noted: 2025-07-21

## 2025-07-21 LAB
ABO GROUP BLD: NORMAL
ALBUMIN SERPL BCG-MCNC: 2.9 G/DL (ref 3.5–5)
ALP SERPL-CCNC: 90 U/L (ref 34–104)
ALT SERPL W P-5'-P-CCNC: 6 U/L (ref 7–52)
ANION GAP SERPL CALCULATED.3IONS-SCNC: 8 MMOL/L (ref 4–13)
AST SERPL W P-5'-P-CCNC: 10 U/L (ref 13–39)
BASE EXCESS BLDCOA CALC-SCNC: -7.1 MMOL/L (ref 3–11)
BASE EXCESS BLDCOA CALC-SCNC: -9.6 MMOL/L (ref 3–11)
BASE EXCESS BLDCOV CALC-SCNC: -4.8 MMOL/L (ref 1–9)
BASE EXCESS BLDCOV CALC-SCNC: -7.3 MMOL/L (ref 1–9)
BILIRUB SERPL-MCNC: 0.72 MG/DL (ref 0.2–1)
BLD GP AB SCN SERPL QL: NEGATIVE
BUN SERPL-MCNC: 7 MG/DL (ref 5–25)
CALCIUM ALBUM COR SERPL-MCNC: 9.4 MG/DL (ref 8.3–10.1)
CALCIUM SERPL-MCNC: 8.5 MG/DL (ref 8.4–10.2)
CHLORIDE SERPL-SCNC: 108 MMOL/L (ref 96–108)
CO2 SERPL-SCNC: 22 MMOL/L (ref 21–32)
CREAT SERPL-MCNC: 0.65 MG/DL (ref 0.6–1.3)
ERYTHROCYTE [DISTWIDTH] IN BLOOD BY AUTOMATED COUNT: 14 % (ref 11.6–15.1)
ERYTHROCYTE [DISTWIDTH] IN BLOOD BY AUTOMATED COUNT: 14.1 % (ref 11.6–15.1)
EST. AVERAGE GLUCOSE BLD GHB EST-MCNC: 137 MG/DL
GFR SERPL CREATININE-BSD FRML MDRD: 121 ML/MIN/1.73SQ M
GLUCOSE SERPL-MCNC: 123 MG/DL (ref 65–140)
GLUCOSE SERPL-MCNC: 132 MG/DL (ref 65–140)
GLUCOSE SERPL-MCNC: 138 MG/DL (ref 65–140)
GLUCOSE SERPL-MCNC: 138 MG/DL (ref 65–140)
GLUCOSE SERPL-MCNC: 171 MG/DL (ref 65–140)
GLUCOSE SERPL-MCNC: 81 MG/DL (ref 65–140)
HBA1C MFR BLD: 6.4 %
HCO3 BLDCOA-SCNC: 19.5 MMOL/L (ref 17.3–27.3)
HCO3 BLDCOA-SCNC: 21.1 MMOL/L (ref 17.3–27.3)
HCO3 BLDCOV-SCNC: 19.6 MMOL/L (ref 12.2–28.6)
HCO3 BLDCOV-SCNC: 22.6 MMOL/L (ref 12.2–28.6)
HCT VFR BLD AUTO: 32.9 % (ref 34.8–46.1)
HCT VFR BLD AUTO: 37.8 % (ref 34.8–46.1)
HGB BLD-MCNC: 11.2 G/DL (ref 11.5–15.4)
HGB BLD-MCNC: 12.3 G/DL (ref 11.5–15.4)
MCH RBC QN AUTO: 30.1 PG (ref 26.8–34.3)
MCH RBC QN AUTO: 30.8 PG (ref 26.8–34.3)
MCHC RBC AUTO-ENTMCNC: 32.5 G/DL (ref 31.4–37.4)
MCHC RBC AUTO-ENTMCNC: 34 G/DL (ref 31.4–37.4)
MCV RBC AUTO: 90 FL (ref 82–98)
MCV RBC AUTO: 92 FL (ref 82–98)
O2 CT VFR BLDCOA CALC: 9.1 ML/DL
O2 CT VFR BLDCOA CALC: 9.2 ML/DL
OXYHGB MFR BLDCOA: 42.4 %
OXYHGB MFR BLDCOA: 43.4 %
OXYHGB MFR BLDCOV: 41.2 %
OXYHGB MFR BLDCOV: 69.3 %
PCO2 BLDCOA: 52.5 MM[HG] (ref 30–60)
PCO2 BLDCOA: 54.9 MM[HG] (ref 30–60)
PCO2 BLDCOV: 44.6 MM HG (ref 27–43)
PCO2 BLDCOV: 50.5 MM HG (ref 27–43)
PH BLDCOA: 7.17 [PH] (ref 7.23–7.43)
PH BLDCOA: 7.22 [PH] (ref 7.23–7.43)
PH BLDCOV: 7.26 [PH] (ref 7.19–7.49)
PH BLDCOV: 7.27 [PH] (ref 7.19–7.49)
PLATELET # BLD AUTO: 116 THOUSANDS/UL (ref 149–390)
PLATELET # BLD AUTO: 125 THOUSANDS/UL (ref 149–390)
PMV BLD AUTO: 13.4 FL (ref 8.9–12.7)
PMV BLD AUTO: 13.6 FL (ref 8.9–12.7)
PO2 BLDCOA: 21.5 MM HG (ref 5–25)
PO2 BLDCOA: 21.7 MM HG (ref 5–25)
PO2 BLDCOV: 18.6 MM HG (ref 15–45)
PO2 BLDCOV: 30.9 MM HG (ref 15–45)
POTASSIUM SERPL-SCNC: 3.6 MMOL/L (ref 3.5–5.3)
PROT SERPL-MCNC: 5.5 G/DL (ref 6.4–8.4)
RBC # BLD AUTO: 3.64 MILLION/UL (ref 3.81–5.12)
RBC # BLD AUTO: 4.09 MILLION/UL (ref 3.81–5.12)
RH BLD: POSITIVE
SAO2 % BLDCOV: 14.1 ML/DL
SAO2 % BLDCOV: 8.5 ML/DL
SODIUM SERPL-SCNC: 138 MMOL/L (ref 135–147)
SPECIMEN EXPIRATION DATE: NORMAL
TREPONEMA PALLIDUM IGG+IGM AB [PRESENCE] IN SERUM OR PLASMA BY IMMUNOASSAY: NORMAL
WBC # BLD AUTO: 11.58 THOUSAND/UL (ref 4.31–10.16)
WBC # BLD AUTO: 9.83 THOUSAND/UL (ref 4.31–10.16)

## 2025-07-21 PROCEDURE — 86901 BLOOD TYPING SEROLOGIC RH(D): CPT

## 2025-07-21 PROCEDURE — 94762 N-INVAS EAR/PLS OXIMTRY CONT: CPT

## 2025-07-21 PROCEDURE — 86923 COMPATIBILITY TEST ELECTRIC: CPT

## 2025-07-21 PROCEDURE — 59514 CESAREAN DELIVERY ONLY: CPT | Performed by: OBSTETRICS & GYNECOLOGY

## 2025-07-21 PROCEDURE — 83036 HEMOGLOBIN GLYCOSYLATED A1C: CPT

## 2025-07-21 PROCEDURE — NC001 PR NO CHARGE: Performed by: OBSTETRICS & GYNECOLOGY

## 2025-07-21 PROCEDURE — 87150 DNA/RNA AMPLIFIED PROBE: CPT

## 2025-07-21 PROCEDURE — 85027 COMPLETE CBC AUTOMATED: CPT

## 2025-07-21 PROCEDURE — 82805 BLOOD GASES W/O2 SATURATION: CPT | Performed by: OBSTETRICS & GYNECOLOGY

## 2025-07-21 PROCEDURE — 80053 COMPREHEN METABOLIC PANEL: CPT

## 2025-07-21 PROCEDURE — 86850 RBC ANTIBODY SCREEN: CPT

## 2025-07-21 PROCEDURE — 99255 IP/OBS CONSLTJ NEW/EST HI 80: CPT | Performed by: OBSTETRICS & GYNECOLOGY

## 2025-07-21 PROCEDURE — 88307 TISSUE EXAM BY PATHOLOGIST: CPT | Performed by: PATHOLOGY

## 2025-07-21 PROCEDURE — 4A1HXCZ MONITORING OF PRODUCTS OF CONCEPTION, CARDIAC RATE, EXTERNAL APPROACH: ICD-10-PCS | Performed by: OBSTETRICS & GYNECOLOGY

## 2025-07-21 PROCEDURE — 99203 OFFICE O/P NEW LOW 30 MIN: CPT

## 2025-07-21 PROCEDURE — 86780 TREPONEMA PALLIDUM: CPT

## 2025-07-21 PROCEDURE — 82948 REAGENT STRIP/BLOOD GLUCOSE: CPT

## 2025-07-21 PROCEDURE — 86900 BLOOD TYPING SEROLOGIC ABO: CPT

## 2025-07-21 RX ORDER — METHYLERGONOVINE MALEATE 0.2 MG/ML
INJECTION INTRAVENOUS AS NEEDED
Status: DISCONTINUED | OUTPATIENT
Start: 2025-07-21 | End: 2025-07-21

## 2025-07-21 RX ORDER — ONDANSETRON 2 MG/ML
4 INJECTION INTRAMUSCULAR; INTRAVENOUS EVERY 8 HOURS PRN
Status: DISCONTINUED | OUTPATIENT
Start: 2025-07-21 | End: 2025-07-21

## 2025-07-21 RX ORDER — SODIUM CHLORIDE, SODIUM LACTATE, POTASSIUM CHLORIDE, CALCIUM CHLORIDE 600; 310; 30; 20 MG/100ML; MG/100ML; MG/100ML; MG/100ML
125 INJECTION, SOLUTION INTRAVENOUS CONTINUOUS
Status: DISCONTINUED | OUTPATIENT
Start: 2025-07-21 | End: 2025-07-24 | Stop reason: HOSPADM

## 2025-07-21 RX ORDER — TRANEXAMIC ACID 100 MG/ML
INJECTION, SOLUTION INTRAVENOUS
Status: COMPLETED
Start: 2025-07-21 | End: 2025-07-21

## 2025-07-21 RX ORDER — SODIUM CHLORIDE, SODIUM LACTATE, POTASSIUM CHLORIDE, CALCIUM CHLORIDE 600; 310; 30; 20 MG/100ML; MG/100ML; MG/100ML; MG/100ML
INJECTION, SOLUTION INTRAVENOUS CONTINUOUS PRN
Status: DISCONTINUED | OUTPATIENT
Start: 2025-07-21 | End: 2025-07-21

## 2025-07-21 RX ORDER — DOCUSATE SODIUM 100 MG/1
100 CAPSULE, LIQUID FILLED ORAL 2 TIMES DAILY
Status: DISCONTINUED | OUTPATIENT
Start: 2025-07-21 | End: 2025-07-21

## 2025-07-21 RX ORDER — CEFAZOLIN SODIUM 2 G/50ML
2000 SOLUTION INTRAVENOUS ONCE
Status: COMPLETED | OUTPATIENT
Start: 2025-07-21 | End: 2025-07-21

## 2025-07-21 RX ORDER — BETAMETHASONE SODIUM PHOSPHATE AND BETAMETHASONE ACETATE 3; 3 MG/ML; MG/ML
12 INJECTION, SUSPENSION INTRA-ARTICULAR; INTRALESIONAL; INTRAMUSCULAR; SOFT TISSUE EVERY 24 HOURS
Status: DISCONTINUED | OUTPATIENT
Start: 2025-07-21 | End: 2025-07-21

## 2025-07-21 RX ORDER — SENNOSIDES 8.6 MG
1 TABLET ORAL
Status: DISCONTINUED | OUTPATIENT
Start: 2025-07-21 | End: 2025-07-24 | Stop reason: HOSPADM

## 2025-07-21 RX ORDER — ACETAMINOPHEN 325 MG/1
650 TABLET ORAL EVERY 6 HOURS SCHEDULED
Status: DISCONTINUED | OUTPATIENT
Start: 2025-07-21 | End: 2025-07-22

## 2025-07-21 RX ORDER — ENOXAPARIN SODIUM 100 MG/ML
40 INJECTION SUBCUTANEOUS DAILY
Status: DISCONTINUED | OUTPATIENT
Start: 2025-07-22 | End: 2025-07-24 | Stop reason: HOSPADM

## 2025-07-21 RX ORDER — INSULIN GLARGINE 100 [IU]/ML
24 INJECTION, SOLUTION SUBCUTANEOUS
Status: DISCONTINUED | OUTPATIENT
Start: 2025-07-21 | End: 2025-07-21

## 2025-07-21 RX ORDER — NIFEDIPINE 10 MG/1
10 CAPSULE ORAL ONCE
Status: COMPLETED | OUTPATIENT
Start: 2025-07-21 | End: 2025-07-21

## 2025-07-21 RX ORDER — MORPHINE SULFATE 0.5 MG/ML
INJECTION, SOLUTION EPIDURAL; INTRATHECAL; INTRAVENOUS
Status: COMPLETED
Start: 2025-07-21 | End: 2025-07-21

## 2025-07-21 RX ORDER — NALBUPHINE HYDROCHLORIDE 10 MG/ML
5 INJECTION INTRAMUSCULAR; INTRAVENOUS; SUBCUTANEOUS
Status: ACTIVE | OUTPATIENT
Start: 2025-07-21 | End: 2025-07-22

## 2025-07-21 RX ORDER — MAGNESIUM SULFATE HEPTAHYDRATE 40 MG/ML
2 INJECTION, SOLUTION INTRAVENOUS CONTINUOUS
Status: DISCONTINUED | OUTPATIENT
Start: 2025-07-21 | End: 2025-07-21

## 2025-07-21 RX ORDER — ONDANSETRON 2 MG/ML
4 INJECTION INTRAMUSCULAR; INTRAVENOUS EVERY 8 HOURS PRN
Status: DISCONTINUED | OUTPATIENT
Start: 2025-07-21 | End: 2025-07-24 | Stop reason: HOSPADM

## 2025-07-21 RX ORDER — INSULIN LISPRO 100 [IU]/ML
4 INJECTION, SOLUTION INTRAVENOUS; SUBCUTANEOUS
Status: DISCONTINUED | OUTPATIENT
Start: 2025-07-21 | End: 2025-07-21

## 2025-07-21 RX ORDER — OXYTOCIN/0.9 % SODIUM CHLORIDE 30/500 ML
PLASTIC BAG, INJECTION (ML) INTRAVENOUS CONTINUOUS PRN
Status: DISCONTINUED | OUTPATIENT
Start: 2025-07-21 | End: 2025-07-21

## 2025-07-21 RX ORDER — ONDANSETRON 2 MG/ML
INJECTION INTRAMUSCULAR; INTRAVENOUS
Status: COMPLETED
Start: 2025-07-21 | End: 2025-07-21

## 2025-07-21 RX ORDER — AMOXICILLIN 500 MG/1
500 CAPSULE ORAL EVERY 8 HOURS SCHEDULED
Status: DISCONTINUED | OUTPATIENT
Start: 2025-07-21 | End: 2025-07-21

## 2025-07-21 RX ORDER — FENTANYL CITRATE 50 UG/ML
INJECTION, SOLUTION INTRAMUSCULAR; INTRAVENOUS AS NEEDED
Status: DISCONTINUED | OUTPATIENT
Start: 2025-07-21 | End: 2025-07-21

## 2025-07-21 RX ORDER — DIPHENHYDRAMINE HYDROCHLORIDE 50 MG/ML
25 INJECTION, SOLUTION INTRAMUSCULAR; INTRAVENOUS EVERY 6 HOURS PRN
Status: DISCONTINUED | OUTPATIENT
Start: 2025-07-21 | End: 2025-07-24 | Stop reason: HOSPADM

## 2025-07-21 RX ORDER — OXYTOCIN/0.9 % SODIUM CHLORIDE 30/500 ML
PLASTIC BAG, INJECTION (ML) INTRAVENOUS
Status: COMPLETED
Start: 2025-07-21 | End: 2025-07-21

## 2025-07-21 RX ORDER — OXYTOCIN/0.9 % SODIUM CHLORIDE 30/500 ML
62.5 PLASTIC BAG, INJECTION (ML) INTRAVENOUS ONCE
Status: COMPLETED | OUTPATIENT
Start: 2025-07-21 | End: 2025-07-21

## 2025-07-21 RX ORDER — FENTANYL CITRATE 50 UG/ML
INJECTION, SOLUTION INTRAMUSCULAR; INTRAVENOUS
Status: COMPLETED
Start: 2025-07-21 | End: 2025-07-21

## 2025-07-21 RX ORDER — MAGNESIUM SULFATE HEPTAHYDRATE 40 MG/ML
2 INJECTION, SOLUTION INTRAVENOUS ONCE
Status: COMPLETED | OUTPATIENT
Start: 2025-07-21 | End: 2025-07-21

## 2025-07-21 RX ORDER — TRANEXAMIC ACID 100 MG/ML
INJECTION, SOLUTION INTRAVENOUS AS NEEDED
Status: DISCONTINUED | OUTPATIENT
Start: 2025-07-21 | End: 2025-07-21

## 2025-07-21 RX ORDER — CALCIUM CARBONATE 500 MG/1
1000 TABLET, CHEWABLE ORAL DAILY PRN
Status: DISCONTINUED | OUTPATIENT
Start: 2025-07-21 | End: 2025-07-21

## 2025-07-21 RX ORDER — MAGNESIUM SULFATE HEPTAHYDRATE 40 MG/ML
4 INJECTION, SOLUTION INTRAVENOUS ONCE
Status: COMPLETED | OUTPATIENT
Start: 2025-07-21 | End: 2025-07-21

## 2025-07-21 RX ORDER — AMOXICILLIN 500 MG/1
500 CAPSULE ORAL EVERY 8 HOURS SCHEDULED
Status: DISCONTINUED | OUTPATIENT
Start: 2025-07-21 | End: 2025-07-24 | Stop reason: HOSPADM

## 2025-07-21 RX ORDER — NALOXONE HYDROCHLORIDE 0.4 MG/ML
0.1 INJECTION, SOLUTION INTRAMUSCULAR; INTRAVENOUS; SUBCUTANEOUS
Status: ACTIVE | OUTPATIENT
Start: 2025-07-21 | End: 2025-07-22

## 2025-07-21 RX ORDER — KETOROLAC TROMETHAMINE 30 MG/ML
15 INJECTION, SOLUTION INTRAMUSCULAR; INTRAVENOUS EVERY 6 HOURS SCHEDULED
Status: DISCONTINUED | OUTPATIENT
Start: 2025-07-21 | End: 2025-07-21

## 2025-07-21 RX ORDER — CALCIUM CARBONATE 500 MG/1
1000 TABLET, CHEWABLE ORAL DAILY PRN
Status: DISCONTINUED | OUTPATIENT
Start: 2025-07-21 | End: 2025-07-24 | Stop reason: HOSPADM

## 2025-07-21 RX ORDER — MORPHINE SULFATE 0.5 MG/ML
INJECTION, SOLUTION EPIDURAL; INTRATHECAL; INTRAVENOUS AS NEEDED
Status: DISCONTINUED | OUTPATIENT
Start: 2025-07-21 | End: 2025-07-21

## 2025-07-21 RX ORDER — SIMETHICONE 80 MG
80 TABLET,CHEWABLE ORAL 4 TIMES DAILY PRN
Status: DISCONTINUED | OUTPATIENT
Start: 2025-07-21 | End: 2025-07-24 | Stop reason: HOSPADM

## 2025-07-21 RX ORDER — IBUPROFEN 600 MG/1
600 TABLET, FILM COATED ORAL EVERY 6 HOURS
Status: DISCONTINUED | OUTPATIENT
Start: 2025-07-22 | End: 2025-07-21

## 2025-07-21 RX ORDER — DOCUSATE SODIUM 100 MG/1
100 CAPSULE, LIQUID FILLED ORAL 2 TIMES DAILY
Status: DISCONTINUED | OUTPATIENT
Start: 2025-07-21 | End: 2025-07-24 | Stop reason: HOSPADM

## 2025-07-21 RX ORDER — ASPIRIN 81 MG/1
162 TABLET ORAL DAILY
Status: DISCONTINUED | OUTPATIENT
Start: 2025-07-21 | End: 2025-07-21

## 2025-07-21 RX ORDER — SODIUM CHLORIDE 9 MG/ML
INJECTION, SOLUTION INTRAVENOUS CONTINUOUS PRN
Status: DISCONTINUED | OUTPATIENT
Start: 2025-07-21 | End: 2025-07-21

## 2025-07-21 RX ORDER — DEXTROSE MONOHYDRATE AND SODIUM CHLORIDE 5; .9 G/100ML; G/100ML
100 INJECTION, SOLUTION INTRAVENOUS CONTINUOUS
Status: DISCONTINUED | OUTPATIENT
Start: 2025-07-21 | End: 2025-07-21

## 2025-07-21 RX ORDER — BUPIVACAINE HYDROCHLORIDE 7.5 MG/ML
INJECTION, SOLUTION INTRASPINAL AS NEEDED
Status: DISCONTINUED | OUTPATIENT
Start: 2025-07-21 | End: 2025-07-21

## 2025-07-21 RX ORDER — ACETAMINOPHEN 325 MG/1
650 TABLET ORAL EVERY 4 HOURS PRN
Status: DISCONTINUED | OUTPATIENT
Start: 2025-07-21 | End: 2025-07-21

## 2025-07-21 RX ORDER — OXYCODONE HYDROCHLORIDE 5 MG/1
5 TABLET ORAL EVERY 4 HOURS PRN
Status: DISPENSED | OUTPATIENT
Start: 2025-07-21 | End: 2025-07-22

## 2025-07-21 RX ORDER — CALCIUM GLUCONATE 98 MG/ML
1 INJECTION, SOLUTION INTRAVENOUS ONCE AS NEEDED
Status: DISCONTINUED | OUTPATIENT
Start: 2025-07-21 | End: 2025-07-21

## 2025-07-21 RX ORDER — BENZOCAINE/MENTHOL 6 MG-10 MG
1 LOZENGE MUCOUS MEMBRANE DAILY PRN
Status: DISCONTINUED | OUTPATIENT
Start: 2025-07-21 | End: 2025-07-24 | Stop reason: HOSPADM

## 2025-07-21 RX ORDER — ONDANSETRON 2 MG/ML
INJECTION INTRAMUSCULAR; INTRAVENOUS AS NEEDED
Status: DISCONTINUED | OUTPATIENT
Start: 2025-07-21 | End: 2025-07-21

## 2025-07-21 RX ORDER — BUPIVACAINE HYDROCHLORIDE 7.5 MG/ML
INJECTION, SOLUTION INTRASPINAL
Status: COMPLETED
Start: 2025-07-21 | End: 2025-07-21

## 2025-07-21 RX ADMIN — MAGNESIUM SULFATE HEPTAHYDRATE 2 G/HR: 40 INJECTION, SOLUTION INTRAVENOUS at 08:09

## 2025-07-21 RX ADMIN — BUPIVACAINE HYDROCHLORIDE IN DEXTROSE 1.6 ML: 7.5 INJECTION, SOLUTION SUBARACHNOID at 10:21

## 2025-07-21 RX ADMIN — OXYCODONE HYDROCHLORIDE 5 MG: 5 TABLET ORAL at 15:51

## 2025-07-21 RX ADMIN — FENTANYL CITRATE 15 MCG: 50 INJECTION INTRAMUSCULAR; INTRAVENOUS at 10:21

## 2025-07-21 RX ADMIN — DEXTROSE AND SODIUM CHLORIDE 100 ML/HR: 5; .9 INJECTION, SOLUTION INTRAVENOUS at 08:07

## 2025-07-21 RX ADMIN — TRANEXAMIC ACID 1 G: 100 INJECTION INTRAVENOUS at 10:50

## 2025-07-21 RX ADMIN — METHYLERGONOVINE MALEATE 0.2 MG: 0.2 INJECTION, SOLUTION INTRAMUSCULAR; INTRAVENOUS at 10:49

## 2025-07-21 RX ADMIN — MAGNESIUM SULFATE HEPTAHYDRATE 2 G: 40 INJECTION, SOLUTION INTRAVENOUS at 07:51

## 2025-07-21 RX ADMIN — SODIUM CHLORIDE 0.5 UNITS/HR: 9 INJECTION, SOLUTION INTRAVENOUS at 08:07

## 2025-07-21 RX ADMIN — BETAMETHASONE SODIUM PHOSPHATE AND BETAMETHASONE ACETATE 12 MG: 3; 3 INJECTION, SUSPENSION INTRA-ARTICULAR; INTRALESIONAL; INTRAMUSCULAR at 00:49

## 2025-07-21 RX ADMIN — Medication 62.5 MILLI-UNITS/MIN: at 12:18

## 2025-07-21 RX ADMIN — INSULIN GLARGINE 24 UNITS: 100 INJECTION, SOLUTION SUBCUTANEOUS at 00:49

## 2025-07-21 RX ADMIN — NIFEDIPINE 10 MG: 10 CAPSULE ORAL at 08:23

## 2025-07-21 RX ADMIN — MORPHINE SULFATE 0.1 MG: 0.5 INJECTION, SOLUTION EPIDURAL; INTRATHECAL; INTRAVENOUS at 10:21

## 2025-07-21 RX ADMIN — PHENYLEPHRINE HYDROCHLORIDE 30 MCG/MIN: 10 INJECTION INTRAVENOUS at 10:14

## 2025-07-21 RX ADMIN — MAGNESIUM SULFATE HEPTAHYDRATE 4 G: 40 INJECTION, SOLUTION INTRAVENOUS at 07:33

## 2025-07-21 RX ADMIN — Medication 999 MILLI-UNITS/MIN: at 10:44

## 2025-07-21 RX ADMIN — AMOXICILLIN 500 MG: 500 CAPSULE ORAL at 01:17

## 2025-07-21 RX ADMIN — AMOXICILLIN 500 MG: 500 CAPSULE ORAL at 22:18

## 2025-07-21 RX ADMIN — CEFAZOLIN SODIUM 2000 MG: 2 SOLUTION INTRAVENOUS at 09:56

## 2025-07-21 RX ADMIN — ACETAMINOPHEN 650 MG: 325 TABLET ORAL at 20:02

## 2025-07-21 RX ADMIN — OXYCODONE HYDROCHLORIDE 5 MG: 5 TABLET ORAL at 22:18

## 2025-07-21 RX ADMIN — SODIUM CHLORIDE 5 MILLION UNITS: 0.9 INJECTION, SOLUTION INTRAVENOUS at 07:44

## 2025-07-21 RX ADMIN — AMOXICILLIN 500 MG: 500 CAPSULE ORAL at 06:06

## 2025-07-21 RX ADMIN — SODIUM CHLORIDE, SODIUM LACTATE, POTASSIUM CHLORIDE, AND CALCIUM CHLORIDE 125 ML/HR: .6; .31; .03; .02 INJECTION, SOLUTION INTRAVENOUS at 20:03

## 2025-07-21 RX ADMIN — ACETAMINOPHEN 650 MG: 325 TABLET ORAL at 13:33

## 2025-07-21 RX ADMIN — ONDANSETRON 4 MG: 2 INJECTION INTRAMUSCULAR; INTRAVENOUS at 11:42

## 2025-07-21 RX ADMIN — SODIUM CHLORIDE: 0.9 INJECTION, SOLUTION INTRAVENOUS at 09:44

## 2025-07-21 RX ADMIN — AZITHROMYCIN DIHYDRATE 500 MG: 500 INJECTION, POWDER, LYOPHILIZED, FOR SOLUTION INTRAVENOUS at 10:02

## 2025-07-21 RX ADMIN — DIPHENHYDRAMINE HYDROCHLORIDE 25 MG: 50 INJECTION INTRAMUSCULAR; INTRAVENOUS at 12:58

## 2025-07-21 RX ADMIN — Medication 999 MILLI-UNITS/MIN: at 11:05

## 2025-07-21 RX ADMIN — SODIUM CHLORIDE, SODIUM LACTATE, POTASSIUM CHLORIDE, AND CALCIUM CHLORIDE: .6; .31; .03; .02 INJECTION, SOLUTION INTRAVENOUS at 09:50

## 2025-07-21 NOTE — CONSULTS
"Consultation - Maternal Fetal Medicine   Syed Taveras 29 y.o. female MRN: [id number]  Unit/Bed#: -01 Encounter: [id number]  Admit date: 2025.  Today's date: 25      Assessment & Plan  Pelvic cramping  Syed is a 30 yo  w/ prior cs at 29w5d admitted due to concern for  labor and pelvic cramping due to positive FFN.  labor workup negative on admission. Complaining of increased frequency and pain of contractions this morning. Speculum exam performed and cervix visually 0.5cm dilated. SVE deferred in setting of vasa previa. No bleeding or pooling appreciated    Plan:   - Betamethasone for fetal lung maturation (-)  - GBS pending  - Continuous monitoring  - NPO this morning  - Initiate magnesium sulfate for neuroprotection  - Initiate penicillin for GBS prophylaxis   - Reassess exam in 1hr  Vasa previa  - stable vasa previa followed by M     Plan:   - BTM 12mg x2, continue ASA 81  - GBS pending   - See remainder of plan under \"Pelvic cramping\"  Pregnancy complicated by pre-existing type 2 diabetes, second trimester  At home regiment: 24 units lantus at bedtime, 4 units lispro before lunch/dinner     Lab Results   Component Value Date    HGBA1C 5.9 (H) 2025     -Fasting and 2h postprandial fingersticks  - Initiate insulin gtt while NPO  Monochorionic diamniotic twin gestation in third trimester  - Continuous monitoring  Status post laparoscopic sleeve gastrectomy  Avoid NSAIDs  Request for sterilization  COLLIN Ortega signed  on   Postpartum cardiomyopathy  Echo on 2025 demonstrated an ejection fraction of 55% with left ventricular cavity size at the upper limit of normal.  She does notably have a history of cardiomyopathy, and twin pregnancy also puts her at risk for recurrence. [facility] recommends a low threshold to recheck a proBNP and/or echocardiogram if her symptoms persist,     - Pt is currently asymptomatic   Dental cyst  On Amoxicillin for 7 " days.      Physician Requesting Consult: America Aguilar MD  Reason for Consult / Principal Problem:  labor  Subspeciality: Perinatology    As you know, Ms. Taveras is a 29 y.o. year-old  with an REED of Estimated Date of Delivery: 10/1/25 at 29w5d with di-di twins, Hospital Day: 2, admitted for pelvic cramping and positive FFN in setting of vasa previa.  This morning, she is complaining of increased frequency and strength of contractions. They are now occurring every 2-3 minutes. Denies VB, LOF, of DFM. Denies chest pain, SOB, LE pain/swelling.    Other obstetric review of symptoms:  Contractions: Frequency: Every 2-3 minutes and Intensity: strong.    Leakage of fluid: None.    Bleeding: None.    Fetal movement: present.      Review of Systems  Pertinent items listed in HPI  Other history is as follows:    Historical Information   OB History    Para Term  AB Living   2 1 1   1   SAB IAB Ectopic Multiple Live Births       1      # Outcome Date GA Lbr Aris/2nd Weight Sex Type Anes PTL Lv   2 Current            1 Term 17 39w0d  4819 g (10 lb 10 oz) M CS-LTranv Spinal  ZARIA      Obstetric Comments    17     Gynecologic history: Patient's last menstrual period was 2024 (exact date).     Past Medical History[1]  Past Surgical History[2]  Social History   Social History     Substance and Sexual Activity   Alcohol Use No     Social History     Substance and Sexual Activity   Drug Use No     Tobacco Use History[3]  Family History: Family History[4]    Meds/Allergies   Prior to Admission Medications   Prescriptions Last Dose Informant Patient Reported? Taking?   Blood Glucose Monitoring Suppl (OneTouch Verio) w/Device KIT 2025  No Yes   Sig: T2DM and pregnancy   D3-1000 25 MCG (1000 UT) tablet 2025  No Yes   Sig: TAKE 2 TABLETS (2,000 UNITS TOTAL) BY MOUTH DAILY   Insulin Glargine Solostar (Lantus SoloStar) 100 UNIT/ML SOPN 2025  No Yes   Sig: Inject  0.24 mL (24 Units total) under the skin daily at bedtime At 9 PM daily. To be titrated. T2DM and pregnancy.   Insulin Pen Needle 31G X 5 MM MISC   No No   Sig: Inject under the skin daily at bedtime Use one pen needle per injection.   Lancets (OneTouch Delica Plus [id number]) MISC 7/20/2025  No Yes   Sig: Use 4 a day. T2DM and pregnancy.   OneTouch Verio test strip 7/20/2025  No Yes   Sig: Use 1 each 4 (four) times a day Test 4 times a day and as instructed. T2DM and pregnancy.   Prenatal MV & Min w/FA-DHA (Prenatal Gummies) 0.18-25 MG CHEW 7/20/2025  No Yes   Sig: Chew 1 gum in the morning   acetaminophen (TYLENOL) 500 mg tablet More than a month  No No   Sig: Take 1 tablet (500 mg total) by mouth every 6 (six) hours as needed for mild pain   acetaminophen (TYLENOL) 500 mg tablet More than a month  No No   Sig: Take 1 tablet (500 mg total) by mouth every 6 (six) hours as needed for mild pain or moderate pain   amoxicillin (AMOXIL) 500 mg capsule 7/20/2025  No Yes   Sig: Take 1 capsule (500 mg total) by mouth every 8 (eight) hours for 7 days   aspirin (ECOTRIN LOW STRENGTH) 81 mg EC tablet 7/20/2025  No Yes   Sig: Take 2 tablets (162 mg total) by mouth daily   benzocaine (ORAJEL) 10 % mucosal gel 7/20/2025  No Yes   Sig: Apply 1 Application to the mouth or throat as needed for mucositis   hydrocortisone 2.5 % cream 7/20/2025  No Yes   Sig: Apply topically 2 (two) times a day   insulin aspart (NovoLOG FlexPen) 100 UNIT/ML injection pen 7/20/2025  No Yes   Sig: Inject 4 Units under the skin 2 (two) times a day before lunch and dinner      Facility-Administered Medications Last Administration Doses Remaining   cyanocobalamin injection 1,000 mcg 1/20/2025  3:44 PM         Medication Administration - last 24 hours from 07/20/2025 0652 to 07/21/2025 0652         Date/Time Order Dose Route Action Action by     07/20/2025 2300 EDT sodium chloride 0.9 % bolus 1,000 mL 0 mL Intravenous Stopped Shelbi Hanny, RN     07/20/2025  "2155 EDT sodium chloride 0.9 % bolus 1,000 mL 1,000 mL Intravenous New Bag Shelbi Gamino RN     07/20/2025 2215 EDT acetaminophen (Ofirmev) injection 1,000 mg 0 mg Intravenous Stopped Shelbi Gamino RN     07/20/2025 2158 EDT acetaminophen (Ofirmev) injection 1,000 mg 1,000 mg Intravenous New Bag Shelbi Gamino RN     07/21/2025 0606 EDT amoxicillin (AMOXIL) capsule 500 mg 500 mg Oral Given Alessia Farias RN     07/21/2025 0117 EDT amoxicillin (AMOXIL) capsule 500 mg 500 mg Oral Given Shelbi Gamino RN     07/21/2025 0049 EDT insulin glargine (LANTUS) subcutaneous injection 24 Units 0.24 mL 24 Units Subcutaneous Given Shelbi Gamino RN     07/21/2025 0049 EDT betamethasone acetate-betamethasone sodium phosphate (CELESTONE) injection 12 mg 12 mg Intramuscular Given Shelbi Gamino RN            Current Facility-Administered Medications:     acetaminophen (TYLENOL) tablet 650 mg, Q4H PRN    amoxicillin (AMOXIL) capsule 500 mg, Q8H LINN    aspirin (ECOTRIN LOW STRENGTH) EC tablet 162 mg, Daily    benzocaine (ORAJEL) 10 % mucosal gel 1 Application, BID PRN    betamethasone acetate-betamethasone sodium phosphate (CELESTONE) injection 12 mg, Q24H    calcium carbonate (TUMS) chewable tablet 1,000 mg, Daily PRN    Cholecalciferol (VITAMIN D3) tablet 2,000 Units, Daily    docusate sodium (COLACE) capsule 100 mg, BID    insulin glargine (LANTUS) subcutaneous injection 24 Units 0.24 mL, HS    insulin lispro (HumALOG/ADMELOG) 100 units/mL subcutaneous injection 4 Units, BID before lunch/dinner    ondansetron (ZOFRAN) injection 4 mg, Q8H PRN    prenatal multivitamin tablet 1 tablet, Daily  Allergies[5]    Objective    Patient Vitals for the past 24 hrs:   BP Temp Temp src Pulse Resp Height Weight   07/21/25 0119 -- -- -- -- -- 5' 9\" (1.753 m) 114 kg (251 lb)   07/21/25 0032 117/67 98 °F (36.7 °C) Oral 68 18 -- --   07/20/25 2108 114/67 98 °F (36.7 °C) Oral 95 18 -- --     Vitals: Blood pressure 117/67, pulse 68, temperature 98 " "°F (36.7 °C), temperature source Oral, resp. rate 18, height 5' 9\" (1.753 m), weight 114 kg (251 lb), last menstrual period 12/25/2024, not currently breastfeeding.Body mass index is 37.07 kg/m².    Physical Exam  Vitals reviewed. Exam conducted with a chaperone present.   Constitutional:       General: She is not in acute distress.     Appearance: Normal appearance.   HENT:      Head: Normocephalic and atraumatic.      Nose: Nose normal.      Mouth/Throat:      Pharynx: Oropharynx is clear.     Eyes:      Conjunctiva/sclera: Conjunctivae normal.       Cardiovascular:      Rate and Rhythm: Normal rate.   Pulmonary:      Effort: Pulmonary effort is normal.   Abdominal:      Palpations: Abdomen is soft.      Tenderness: There is no abdominal tenderness. There is no guarding.      Comments: Gravid   Genitourinary:     Comments:   Normal external female genitalia  Cervix fully visualized and visibly dilated 0.5cm  Physiologic discharge  No bleeding, pooling, abnormal discharge, or lesions noted    Neurological:      Mental Status: She is alert.     Psychiatric:         Mood and Affect: Mood normal.         Behavior: Behavior normal.       Cervical exam deferred due to vasa previa    Prenatal Labs: Blood Type:   Lab Results   Component Value Date    ABO O 07/21/2025     , D (Rh type):   Lab Results   Component Value Date    RH Positive 07/21/2025     , Antibody Screen: Negative  , HCT/HGB:   Lab Results   Component Value Date    HCT 32.9 (L) 07/21/2025    HGB 11.2 (L) 07/21/2025      , Platelets: 125     Rubella: immune  , VDRL/RPR: non-reactive   , Hep B: non-reactive   , HIV: non-reactive   , Group B Strep: unknown    Results from last 7 days   Lab Units 07/21/25  0036 07/19/25  1056   WBC Thousand/uL 9.83 8.55   TOTAL NEUT ABS Thousands/µL  --  6.32   HEMOGLOBIN g/dL 11.2* 11.2*   MCV fL 90 88   PLATELETS Thousands/uL 125* 119*     Results from last 7 days   Lab Units 07/19/25  1056   SEGS PCT % 73   MONO PCT % 8 "   EOS PCT % 1     Results from last 7 days   Lab Units 25  0036 25  1056   POTASSIUM mmol/L 3.6 3.6   CHLORIDE mmol/L 108 107   CO2 mmol/L 22 23   BUN mg/dL 7 5   CREATININE mg/dL 0.65 0.58*   EGFR ml/min/1.73sq m 121 125     Results from last 7 days   Lab Units 25  0036 25  1056   AST U/L 10* 10*   ALT U/L 6* 6*   ALK PHOS U/L 90 94     Results from last 7 days   Lab Units 25  0036 25  1056   PLATELETS Thousands/uL 125* 119*     Results from last 7 days   Lab Units 25  0605 25  0318 25  0046   POC GLUCOSE mg/dl 138 138 81       Fetal data Baby A:  Nonstress test: date 25   Baseline: 125 for orange tracing, 125 for purple tracing  Variability: moderate  Accelerations: present, 10x10  Decelerations: absent  Contractions: present  Assessment: if multiples: reactive for orange tracing, reactive for purple tracing  Plan: continue with continuous monitoring            Shazia Reddy MD  2025  6:52 AM             [1]   Past Medical History:  Diagnosis Date    Diabetes mellitus (HCC)     History of delivery of macrosomal infant 2017    History of gestational diabetes 2017    History of pre-eclampsia 2017    History of transfusion     after  - had fever with transfusion    Nonintractable headache 2024    Obesity (BMI 35.0-39.9 without comorbidity)     Peroneal tendon injury, left, initial encounter 11/10/2023    S/P laparoscopic sleeve gastrectomy 2020    Sprain of anterior talofibular ligament of left ankle, initial encounter 11/10/2023    Uses Kazakh as primary spoken language    [2]   Past Surgical History:  Procedure Laterality Date     SECTION  2017    CHOLECYSTECTOMY      FL LAPS GSTRC RSTRICTIV PX LONGITUDINAL GASTRECTOMY N/A 2020    Procedure: LAP SLEEVE GASTRECTOMY, INTRAOP EGD;  Surgeon: Da Rogers MD;  Location: [facility];  Service: Bariatrics   [3]   Social History  Tobacco Use   Smoking Status  Never    Passive exposure: Never   Smokeless Tobacco Never   [4]   Family History  Problem Relation Name Age of Onset    Diabetes Mother Marcela Harkins     Diabetes Father Spfredrick Desir     Diabetes Sister Mariza Desir     Sleep apnea Sister      Sleep apnea Brother          2 of the 3 borthers have EAGLE    No Known Problems Brother      No Known Problems Son      Diabetes Maternal Grandmother Beba Torin     Skin cancer Maternal Grandmother Beba Torin     No Known Problems Maternal Grandfather      Diabetes Paternal Grandmother      No Known Problems Paternal Grandfather      No Known Problems Maternal Aunt      No Known Problems Maternal Aunt      No Known Problems Paternal Aunt      No Known Problems Paternal Aunt      Heart disease Family          CARDIOVASCULAR DISEASE    Breast cancer Neg Hx      Colon cancer Neg Hx      Ovarian cancer Neg Hx     [5] No Known Allergies

## 2025-07-21 NOTE — ANESTHESIA PROCEDURE NOTES
Spinal Block    Start time: 7/21/2025 10:21 AM  Reason for block: primary anesthetic  Staffing  Performed by: Roly Meng DO  Authorized by: Roly Meng DO    Preanesthetic Checklist  Completed: patient identified, IV checked, site marked, risks and benefits discussed, surgical consent, monitors and equipment checked, pre-op evaluation and timeout performed  Spinal Block  Patient position: sitting  Prep: ChloraPrep  Patient monitoring: frequent blood pressure checks and continuous pulse ox  Approach: midline  Location: L3-4  Needle  Needle gauge: 24 G  Needle length: 3.5 in  Assessment  Sensory level: T4  Injection Assessment:  negative aspiration for heme, no paresthesia on injection and positive aspiration for clear CSF.  Post-procedure:  site cleaned

## 2025-07-21 NOTE — ANESTHESIA PREPROCEDURE EVALUATION
Procedure:   SECTION () REPEAT (Uterus)    Relevant Problems   CARDIO   (+) Chronic migraine without aura without status migrainosus, not intractable      ENDO   (+) Pregnancy complicated by pre-existing type 2 diabetes, second trimester   (+) Type 2 diabetes mellitus affecting pregnancy, antepartum      GYN   (+) 29 weeks gestation of pregnancy   (+) Monochorionic diamniotic twin gestation in second trimester   (+) Monochorionic diamniotic twin gestation in third trimester      NEURO/PSYCH   (+) Chronic migraine without aura without status migrainosus, not intractable        Physical Exam    Airway     Mallampati score: II  TM Distance: >3 FB        Cardiovascular  Cardiovascular exam normal    Dental   No notable dental hx     Pulmonary  Pulmonary exam normal     Neurological  - normal exam    Other Findings  post-pubertal.      Anesthesia Plan  ASA Score- 3     Anesthesia Type- spinal with ASA Monitors.         Additional Monitors:     Airway Plan:            Plan Factors-    Chart reviewed.        Patient is not a current smoker.              Induction-     Postoperative Plan-     Perioperative Resuscitation Plan - Level 1 - Full Code.       Informed Consent- Anesthetic plan and risks discussed with patient.        NPO Status:  No vitals data found for the desired time range.        
None known

## 2025-07-21 NOTE — DISCHARGE SUMMARY
Obstetrics Discharge Summary   Syed Taveras 29 y.o. female MRN: [id number]  Unit/Bed#: -01 Encounter: [id number]    Admission Date: 2025     Discharge Date: 25    Admitting Diagnoses:   Pregnancy at 29w  Mono-di twin gestation  Vasa previa  Request for sterilization  T2DM  S/p gastric sleeve    Discharge Diagnoses:   Delivered  Postpartum hemorrhage    Procedures:   Repeat  w/Classical Incision    Admitting Attending: Jeff  Delivery Attending: Toussaint-Foster  Discharge Attending: Susan    Delivery  Route of Delivery:  Classical    Reason for  delivery: Vasa previa    Anesthesia: Spinal    QBL: 1270 mL    Delivery:   Baby A 25 1042  Baby B 25 1042    Baby's Weight:   Baby A 3 lb 14 oz  Baby B 3 lb 8.2 oz    Apgar scores:   Baby A 7 and 8 at 1 and 5 minutes  Baby B 8 and 8 at 1 and 5 minutes    Hospital Course:   Syed Taveras is now a 29 y.o.  who was initially admitted at 29w4d for uterine contractions. Betamethasone was administered for fetal maturity. Magnesium was started for fetal neuroprotection. Given how uncomfortable with contractions patient was with a twin gestation and vasa previa, the recommendation was made to move forward with a  delivery rather than waiting for 48hr and steroid completion. A trial of procardia 10 mg IR was done, but this did not provide significant relief.    The patient's post partum course was unremarkable.. Her preoperative hemoglobin was 12.3 g/dL, postoperative was 9.1 and received Venofer. Her postoperative pain was well controlled with oral analgesics.    On day of discharge, she was ambulating and able to reasonably perform all ADLs. She was voiding and had appropriate bowel function. Pain was well controlled. She was discharged home on post-operative day #3 without complications. Patient was instructed to follow up with her OB as an outpatient and was given appropriate warnings to  call provider if she develops signs of infection or uncontrolled pain. Mom's blood type is O positive, so RhoGAM was not indicated.      Complications:   None apparent    Condition at discharge:   good     Provisions for Follow-Up Care:  See after visit summary for information related to follow-up care and any pertinent home health orders.      Disposition:   Home    Planned Readmission:   No    Discharge Medications:   Please see AVS for a complete list of discharge medications.    Discharge instructions :   Please see AVS for complete discharge instructions.

## 2025-07-21 NOTE — PLAN OF CARE
Problem: PAIN - ADULT  Goal: Verbalizes/displays adequate comfort level or baseline comfort level  Description: Interventions:  - Encourage patient to monitor pain and request assistance  - Assess pain using appropriate pain scale  - Administer analgesics as ordered based on type and severity of pain and evaluate response  - Implement non-pharmacological measures as appropriate and evaluate response  - Consider cultural and social influences on pain and pain management  - Notify physician/advanced practitioner if interventions unsuccessful or patient reports new pain  - Educate patient/family on pain management process including their role and importance of  reporting pain   - Provide non-pharmacologic/complimentary pain relief interventions  Outcome: Progressing     Problem: INFECTION - ADULT  Goal: Absence or prevention of progression during hospitalization  Description: INTERVENTIONS:  - Assess and monitor for signs and symptoms of infection  - Monitor lab/diagnostic results  - Monitor all insertion sites, i.e. indwelling lines, tubes, and drains  - Monitor endotracheal if appropriate and nasal secretions for changes in amount and color  - [place] appropriate cooling/warming therapies per order  - Administer medications as ordered  - Instruct and encourage patient and family to use good hand hygiene technique  - Identify and instruct in appropriate isolation precautions for identified infection/condition  Outcome: Progressing  Goal: Absence of fever/infection during neutropenic period  Description: INTERVENTIONS:  - Monitor WBC  - Perform strict hand hygiene  - Limit to healthy visitors only  - No plants, dried, fresh or silk flowers with pate in patient room  Outcome: Progressing     Problem: SAFETY ADULT  Goal: Patient will remain free of falls  Description: INTERVENTIONS:  - Educate patient/family on patient safety including physical limitations  - Instruct patient to call for assistance with activity   -  Keep Call bell within reach  - Keep bed low and locked with side rails adjusted as appropriate  - Keep care items and personal belongings within reach  - Initiate and maintain comfort rounds  Outcome: Progressing  Goal: Maintain or return to baseline ADL function  Description: INTERVENTIONS:  -  Assess patient's ability to carry out ADLs; assess patient's baseline for ADL function and identify physical deficits which impact ability to perform ADLs (bathing, care of mouth/teeth, toileting, grooming, dressing, etc.)  - Assess/evaluate cause of self-care deficits   - Assess range of motion  - Assess patient's mobility; develop plan if impaired  - Assess patient's need for assistive devices and provide as appropriate  - Encourage maximum independence but intervene and supervise when necessary  - Involve family in performance of ADLs  - Assess for home care needs following discharge   - Consider OT consult to assist with ADL evaluation and planning for discharge  - Provide patient education as appropriate  - Monitor functional capacity and physical performance, use of AM PAC & JH-HLM   - Monitor gait, balance and fatigue with ambulation    Outcome: Progressing  Goal: Maintains/Returns to pre admission functional level  Description: INTERVENTIONS:  - Set and communicate daily mobility goal to care team and patient/family/caregiver.   - Record patient progress and toleration of activity level   Outcome: Progressing     Problem: DISCHARGE PLANNING  Goal: Discharge to home or other facility with appropriate resources  Description: INTERVENTIONS:  - Identify barriers to discharge w/patient and caregiver  - Arrange for needed discharge resources and transportation as appropriate  - Identify discharge learning needs (meds, wound care, etc.)  - Arrange for interpretive services to assist at discharge as needed  - Refer to Case Management Department for coordinating discharge planning if the patient needs post-hospital services  based on physician/advanced practitioner order or complex needs related to functional status, cognitive ability, or social support system  Outcome: Progressing     Problem: Knowledge Deficit  Goal: Patient/family/caregiver demonstrates understanding of disease process, treatment plan, medications, and discharge instructions  Description: Complete learning assessment and assess knowledge base.  Interventions:  - Provide teaching at level of understanding  - Provide teaching via preferred learning methods  Outcome: Progressing

## 2025-07-21 NOTE — PROGRESS NOTES
"Progress Note - OB/GYN  Syed Taveras 29 y.o. female MRN: [id number]  Unit/Bed#: -01 Encounter: [id number]      Subjective:  feeling well    Pain: controlled  Tolerating PO: yes  Voiding: Palomares in place, good output  Flatus: no  BM: no  Ambulating: no  Chest pain: no  Shortness of breath: no  Leg pain: no  Lochia: normal    Vitals:   /59 (BP Location: Left arm)   Pulse 76   Temp 97.6 °F (36.4 °C) (Oral)   Resp 16   Ht 5' 9\" (1.753 m)   Wt 114 kg (251 lb)   LMP 12/25/2024 (Exact Date)   SpO2 100%   Breastfeeding Yes   BMI 37.07 kg/m²       Intake/Output Summary (Last 24 hours) at 7/21/2025 1645  Last data filed at 7/21/2025 1601  Gross per 24 hour   Intake 1600 ml   Output 2320 ml   Net -720 ml       Invasive Devices       Peripheral Intravenous Line  Duration             Peripheral IV 07/20/25 Right Wrist <1 day    Peripheral IV 07/21/25 Right Hand <1 day              Drain  Duration             Urethral Catheter Non-latex 16 Fr. <1 day                    Physical Exam:   GEN: Syed Taveras appears well, alert and oriented x 3, pleasant and cooperative  CARDIOPULMONARY: CTA b/l; no murmurs, rubs, or gallops; no rhonchi, rales, or wheezing  ABDOMEN: soft, no tenderness, no distention, fundus @ umbilicus, Incision C/D/I  EXTREMITIES: SCDs on, b/l Darío's sign negative      Labs:   Admission on 07/20/2025   Component Date Value    Color, UA 07/20/2025 Yellow     Clarity, UA 07/20/2025 Turbid     Specific Gravity, UA 07/20/2025 1.020     pH, UA 07/20/2025 6.5     Leukocytes, UA 07/20/2025 Trace (A)     Nitrite, UA 07/20/2025 Negative     Protein, UA 07/20/2025 Trace (A)     Glucose, UA 07/20/2025 1000 (1%) (A)     Ketones, UA 07/20/2025 Negative     Urobilinogen, UA 07/20/2025 2.0 (A)     Bilirubin, UA 07/20/2025 Negative     Occult Blood, UA 07/20/2025 Negative     RBC, UA 07/20/2025 1-2     WBC, UA 07/20/2025 2-4 (A)     Epithelial Cells 07/20/2025 Moderate (A)     Bacteria, UA " 07/20/2025 Occasional     MUCUS THREADS 07/20/2025 Occasional (A)     Fetal Fibronectin 07/20/2025 Positive     ABO Grouping 07/21/2025 O     Rh Factor 07/21/2025 Positive     Antibody Screen 07/21/2025 Negative     Specimen Expiration Date 07/21/2025 20250724     Sodium 07/21/2025 138     Potassium 07/21/2025 3.6     Chloride 07/21/2025 108     CO2 07/21/2025 22     ANION GAP 07/21/2025 8     BUN 07/21/2025 7     Creatinine 07/21/2025 0.65     Glucose 07/21/2025 123     Calcium 07/21/2025 8.5     Corrected Calcium 07/21/2025 9.4     AST 07/21/2025 10 (L)     ALT 07/21/2025 6 (L)     Alkaline Phosphatase 07/21/2025 90     Total Protein 07/21/2025 5.5 (L)     Albumin 07/21/2025 2.9 (L)     Total Bilirubin 07/21/2025 0.72     eGFR 07/21/2025 121     WBC 07/21/2025 9.83     RBC 07/21/2025 3.64 (L)     Hemoglobin 07/21/2025 11.2 (L)     Hematocrit 07/21/2025 32.9 (L)     MCV 07/21/2025 90     MCH 07/21/2025 30.8     MCHC 07/21/2025 34.0     RDW 07/21/2025 14.1     Platelets 07/21/2025 125 (L)     MPV 07/21/2025 13.6 (H)     Hemoglobin A1C 07/21/2025 6.4 (H)     EAG 07/21/2025 137     Treponema Pallidium Tota* 07/21/2025 Non-reactive     POC Glucose 07/21/2025 81     POC Glucose 07/21/2025 138     POC Glucose 07/21/2025 138     WBC 07/21/2025 11.58 (H)     RBC 07/21/2025 4.09     Hemoglobin 07/21/2025 12.3     Hematocrit 07/21/2025 37.8     MCV 07/21/2025 92     MCH 07/21/2025 30.1     MCHC 07/21/2025 32.5     RDW 07/21/2025 14.0     Platelets 07/21/2025 116 (L)     MPV 07/21/2025 13.4 (H)     Unit Product Code 07/21/2025 S8315S87     Unit Number 07/21/2025 [id number]     Unit ABO 07/21/2025 O     Unit RH 07/21/2025 POS     Crossmatch 07/21/2025 Compatible     Unit Dispense Status 07/21/2025 Crossmatched     Unit Product Volume 07/21/2025 350     POC Glucose 07/21/2025 132     POC Glucose 07/21/2025 171 (H)     pH, Cord Burton 07/21/2025 7.268     pCO2, Cord Burton 07/21/2025 50.5 (H)     pO2, Cord Burton 07/21/2025 18.6      HCO3, Cord Burton 07/21/2025 22.6     Base Exc, Cord Burton 07/21/2025 -4.8 (L)     O2 Cont, Cord Burton 07/21/2025 8.5     O2 HGB,VENOUS CORD 07/21/2025 41.2     pH, Cord Art 07/21/2025 7.168 (L)     pCO2, Cord Art 07/21/2025 54.9     pO2, Cord Art 07/21/2025 21.7     HCO3, Cord Art 07/21/2025 19.5     Base Exc, Cord Art 07/21/2025 -9.6 (L)     O2 Content, Cord Art 07/21/2025 9.2     O2 Hgb, Arterial Cord 07/21/2025 42.4     pH, Cord Burton 07/21/2025 7.261     pCO2, Cord Burton 07/21/2025 44.6 (H)     pO2, Cord Burton 07/21/2025 30.9     HCO3, Cord Burton 07/21/2025 19.6     Base Exc, Cord Burton 07/21/2025 -7.3 (L)     O2 Cont, Cord Burton 07/21/2025 14.1     O2 HGB,VENOUS CORD 07/21/2025 69.3     pH, Cord Art 07/21/2025 7.221 (L)     pCO2, Cord Art 07/21/2025 52.5     pO2, Cord Art 07/21/2025 21.5     HCO3, Cord Art 07/21/2025 21.1     Base Exc, Cord Art 07/21/2025 -7.1 (L)     O2 Content, Cord Art 07/21/2025 9.1     O2 Hgb, Arterial Cord 07/21/2025 43.4          Assessment:  s/p RCS, stable    Plan:  - Routine postoperative care  - QBL 1270cc, Hb 12.3 g/dL --> f/u AM CBC  - Palomares in place, monitor UOP, plan for void trial in am  - Encourage ambulation  - DVT ppx: SCDs, Lovenox to start in AM      Yordy Phillips MD  7/21/2025  4:45 PM

## 2025-07-21 NOTE — LACTATION NOTE
CONSULT - LACTATION  Syed Taveras 29 y.o. female MRN: [id number]    [facility] L&D Room / Bed:  423/ 423-01 Encounter: [id number]    Maternal Information     MOTHER:  N/A  Maternal Age: This patient's mother is not on file.  OB History: This patient's mother is not on file.  Previous breast reduction surgery? No    Lactation history:   Has patient previously breast fed:     How long had patient previously breast fed:     Previous breast feeding complications:     This patient's mother is not on file.    Birth information:  YOB: 1995   Time of birth:     Sex: female   Delivery type:     Birth Weight: No birth weight on file.   Percent of Weight Change: Birth weight not on file     Gestational Age: <None>   [unfilled]    Reason for Consult:    Reason for Consult: Initial assessment (ext) - 20 min    Risk Factors:    Risk Factors: NICU infant, LPI, Multiples, Language barrier (Lebanese Langauge,  twins 29w5d)    Breast and nipple assessment:   Breasts/Nipples  Date Pumping Initiated: 25  Time Pumping Initiated: 1600  Left Breast: Soft  Right Breast: Soft  Left Nipple: Everted (dark round areola)  Right Nipple: Everted (dark round areola)  Intervention: Breast pump  Breastfeeding Status: Yes  Breastfeeding Progress: Not yet established, Pumping only  Reasons for not Breastfeeding: Infant medical condition    Breast Pump:    Breast Pump  Pump: Manual, Electric, Personal (Will need to place CM consult for breast pump.)  Pump Review/Education: Setup, frequency, and cleaning, Milk storage  Initiated by: TANNER Shi  Date Initiated: 25    Feeding recommendations:  pump every 2-3 hours    Lebanese  used (#[id number]) via [email or web address].    Consulted with Syed for an initial visit.     Patient would like to pump for  baby boy twins in NICU.    Educated on purpose of pumping d/t infant separation and  protecting/establishing milk supply.    Encouraged patient to pump every 2-3 hours, at least 8-12 pumps in 24 hours, with at least 1-2 times over night not going longer than 5hrs.    Demonstrated and cycled hospital grade DEBP. Instructions given on pumping.  Discussed duration, frequency, set up and different modes of the pump.  Reviewed supplies of pump, including assembly- placement of flanges and sizing of flanges.   Demonstrated use of hand pump.  Discussed labeling and storage of breast milk. CDC breast milk storage guidelines reviewed, Wolof provided.  Discussed cleaning of breast pump parts.    Reviewed timeline of breast milk transitioning. Assured patient that it is normal to not collect output at this point.    Discussed with family to notify staff once breast milk output starts to increase for proper storage, and/or to be taken down to NICU.    Provided with NICU packet in Wolof.    Patient to decide on breast pump through insurance.    Patient was encouraged to contact lactation for continued support and/or questions that arise.    TANNER Olmos 7/21/2025 4:19 PM

## 2025-07-21 NOTE — PLAN OF CARE
Problem: PAIN - ADULT  Goal: Verbalizes/displays adequate comfort level or baseline comfort level  Description: Interventions:  - Encourage patient to monitor pain and request assistance  - Assess pain using appropriate pain scale  - Administer analgesics as ordered based on type and severity of pain and evaluate response  - Implement non-pharmacological measures as appropriate and evaluate response  - Consider cultural and social influences on pain and pain management  - Notify physician/advanced practitioner if interventions unsuccessful or patient reports new pain  - Educate patient/family on pain management process including their role and importance of  reporting pain   - Provide non-pharmacologic/complimentary pain relief interventions  Outcome: Progressing     Problem: INFECTION - ADULT  Goal: Absence or prevention of progression during hospitalization  Description: INTERVENTIONS:  - Assess and monitor for signs and symptoms of infection  - Monitor lab/diagnostic results  - Monitor all insertion sites, i.e. indwelling lines, tubes, and drains  - Monitor endotracheal if appropriate and nasal secretions for changes in amount and color  - [place] appropriate cooling/warming therapies per order  - Administer medications as ordered  - Instruct and encourage patient and family to use good hand hygiene technique  - Identify and instruct in appropriate isolation precautions for identified infection/condition  Outcome: Progressing  Goal: Absence of fever/infection during neutropenic period  Description: INTERVENTIONS:  - Monitor WBC  - Perform strict hand hygiene  - Limit to healthy visitors only  - No plants, dried, fresh or silk flowers with pate in patient room  Outcome: Progressing     Problem: SAFETY ADULT  Goal: Patient will remain free of falls  Description: INTERVENTIONS:  - Educate patient/family on patient safety including physical limitations  - Instruct patient to call for assistance with activity   -  Consider consulting OT/PT to assist with strengthening/mobility based on AM PAC & JH-HLM score  - Consult OT/PT to assist with strengthening/mobility   - Keep Call bell within reach  - Keep bed low and locked with side rails adjusted as appropriate  - Keep care items and personal belongings within reach  - Initiate and maintain comfort rounds  - Make Fall Risk Sign visible to staff  - Offer Toileting every  Hours, in advance of need  - Initiate/Maintain alarm  - Obtain necessary fall risk management equipment:   - Apply yellow socks and bracelet for high fall risk patients  - Consider moving patient to room near nurses station  Outcome: Progressing  Goal: Maintain or return to baseline ADL function  Description: INTERVENTIONS:  -  Assess patient's ability to carry out ADLs; assess patient's baseline for ADL function and identify physical deficits which impact ability to perform ADLs (bathing, care of mouth/teeth, toileting, grooming, dressing, etc.)  - Assess/evaluate cause of self-care deficits   - Assess range of motion  - Assess patient's mobility; develop plan if impaired  - Assess patient's need for assistive devices and provide as appropriate  - Encourage maximum independence but intervene and supervise when necessary  - Involve family in performance of ADLs  - Assess for home care needs following discharge   - Consider OT consult to assist with ADL evaluation and planning for discharge  - Provide patient education as appropriate  - Monitor functional capacity and physical performance, use of AM PAC & JH-HLM   - Monitor gait, balance and fatigue with ambulation    Outcome: Progressing  Goal: Maintains/Returns to pre admission functional level  Description: INTERVENTIONS:  - Perform AM-PAC 6 Click Basic Mobility/ Daily Activity assessment daily.  - Set and communicate daily mobility goal to care team and patient/family/caregiver.   - Collaborate with rehabilitation services on mobility goals if consulted  -  Perform Range of Motion  times a day.  - Reposition patient every  hours.  - Dangle patient  times a day  - Stand patient  times a day  - Ambulate patient  times a day  - Out of bed to chair  times a day   - Out of bed for meals times a day  - Out of bed for toileting  - Record patient progress and toleration of activity level   Outcome: Progressing     Problem: DISCHARGE PLANNING  Goal: Discharge to home or other facility with appropriate resources  Description: INTERVENTIONS:  - Identify barriers to discharge w/patient and caregiver  - Arrange for needed discharge resources and transportation as appropriate  - Identify discharge learning needs (meds, wound care, etc.)  - Arrange for interpretive services to assist at discharge as needed  - Refer to Case Management Department for coordinating discharge planning if the patient needs post-hospital services based on physician/advanced practitioner order or complex needs related to functional status, cognitive ability, or social support system  Outcome: Progressing     Problem: Knowledge Deficit  Goal: Patient/family/caregiver demonstrates understanding of disease process, treatment plan, medications, and discharge instructions  Description: Complete learning assessment and assess knowledge base.  Interventions:  - Provide teaching at level of understanding  - Provide teaching via preferred learning methods  Outcome: Progressing

## 2025-07-21 NOTE — ASSESSMENT & PLAN NOTE
Lab Results   Component Value Date    HGBA1C 5.9 (H) 03/11/2025       Recent Labs     07/21/25  0046   POCGLU 81       Blood Sugar Average: Last 72 hrs:  (P) 81

## 2025-07-21 NOTE — H&P
H & P- Obstetrics   Syed Taveras 29 y.o. female MRN: [id number]  Unit/Bed#:  TRIAGE  Encounter: [id number]    Assessment: 29 y.o.  at 29w4d admitted for {scmobprob:45492}.  SVE: ***  FHT: ***  Clinical EFW: *** ; Cephalic confirmed by ***  GBS status: ***   Postpartum contraception plan: ***    Plan:   No new Assessment & Plan notes have been filed under this hospital service since the last note was generated.  Service: OB/GYN        Discussed case and plan w/ . ***      Chief Complaint: {scmobyo:48635}    HPI: Syed Taveras is a 29 y.o.  with an REED of 10/1/2025, by Last Menstrual Period at 29w4d who is being admitted for {scmobprob:82132}. She {gen contractions:504930}, has {scmLOF:64068}, and reports {scmobvb:99089}. She {scmfm:93618}    Problem List[1]    Baby complications/comments: none***    Review of Systems    OB Hx:  OB History    Para Term  AB Living   2 1 1   1   SAB IAB Ectopic Multiple Live Births       1      # Outcome Date GA Lbr Aris/2nd Weight Sex Type Anes PTL Lv   2 Current            1 Term 17 39w0d  4819 g (10 lb 10 oz) M CS-LTranv Spinal  ZARIA      Obstetric Comments    17       Past Medical Hx:  Past Medical History[2]    Past Surgical hx:  Past Surgical History[3]    Social Hx:  Alcohol use: ***  Tobacco use: ***  Other substance use: ***    Other: ***    Allergies[4]      Facility-Administered Medications Prior to Admission:     cyanocobalamin injection 1,000 mcg    Medications Prior to Admission:     amoxicillin (AMOXIL) 500 mg capsule    aspirin (ECOTRIN LOW STRENGTH) 81 mg EC tablet    benzocaine (ORAJEL) 10 % mucosal gel    Blood Glucose Monitoring Suppl (OneTouch Verio) w/Device KIT    D3-1000 25 MCG (1000 UT) tablet    hydrocortisone 2.5 % cream    insulin aspart (NovoLOG FlexPen) 100 UNIT/ML injection pen    Insulin Glargine Solostar (Lantus SoloStar) 100 UNIT/ML SOPN    Lancets (OneTouch Delica Plus [id number])  MISC    OneTouch Verio test strip    Prenatal MV & Min w/FA-DHA (Prenatal Gummies) 0.18-25 MG CHEW    acetaminophen (TYLENOL) 500 mg tablet    acetaminophen (TYLENOL) 500 mg tablet    Insulin Pen Needle 31G X 5 MM MISC    Objective:  Temp:  [98 °F (36.7 °C)] 98 °F (36.7 °C)  HR:  [95] 95  BP: (114)/(67) 114/67  Resp:  [18] 18  There is no height or weight on file to calculate BMI.     Physical Exam:  OBGyn Exam       FHT:  Baseline Rate (FHR): 130 bpm  Variability: Moderate  Accelerations: 15 x 15 or greater  Decelerations: None    TOCO:   Contraction Frequency (minutes): n/a  Contraction Duration (seconds): n/a  Contraction Intensity: Moderate (per pt.)    Lab Results   Component Value Date    WBC 8.55 07/19/2025    HGB 11.2 (L) 07/19/2025    HCT 33.0 (L) 07/19/2025     (L) 07/19/2025     Lab Results   Component Value Date     05/03/2017    K 3.6 07/19/2025     07/19/2025    CO2 23 07/19/2025    BUN 5 07/19/2025    CREATININE 0.58 (L) 07/19/2025    GLUCOSE 239 (H) 12/29/2019    AST 10 (L) 07/19/2025    ALT 6 (L) 07/19/2025     Prenatal Labs: Reviewed ***     Blood type: ***  Antibody: ***  GBS: ***  HIV: ***  Rubella: ***  Syphilis IgM/IgG: ***  HBsAg: ***  HCAb: ***  Chlamydia: ***  Gonorrhea: ***  Diabetes 1 hour screen: ***  3 hour glucose: ***  Platelets: ***  Hgb: ***  {Expected LOS:68412}  {Admission Status:79849}    Signature/Title: Amanuel Neil Jr, MD  Date: 7/20/2025  Time: 11:29 PM        [1]   Patient Active Problem List  Diagnosis    Postpartum cardiomyopathy    Gallbladder disease    Status post laparoscopic sleeve gastrectomy    Diabetes mellitus (HCC)    Elevated bilirubin    History of nephrolithiasis    Chronic migraine without aura without status migrainosus, not intractable    Monochorionic diamniotic twin gestation in second trimester    Type 2 diabetes mellitus affecting pregnancy, antepartum    At risk for hypertension    29 weeks gestation of pregnancy    History of   delivery, currently pregnant    Class 1 obesity due to excess calories with serious comorbidity and body mass index (BMI) of 32.0 to 32.9 in adult    Vitamin D deficiency disease    Congestion of nasal sinus    Vasa previa in labor and delivery, antepartum, fetus 1    Marginal insertion of umbilical cord affecting management of mother in second trimester    Pregnancy affected by previous bariatric surgery, currently in second trimester    Decreased fetal movement    Low ferritin level    Request for sterilization    Pregnancy complicated by pre-existing type 2 diabetes, second trimester    Vasa previa    Monochorionic diamniotic twin gestation in third trimester    Pelvic cramping   [2]   Past Medical History:  Diagnosis Date    Diabetes mellitus (HCC)     History of delivery of macrosomal infant     History of gestational diabetes     History of pre-eclampsia     History of transfusion     after  - had fever with transfusion    Nonintractable headache 2024    Obesity (BMI 35.0-39.9 without comorbidity)     Peroneal tendon injury, left, initial encounter 11/10/2023    S/P laparoscopic sleeve gastrectomy 2020    Sprain of anterior talofibular ligament of left ankle, initial encounter 11/10/2023    Uses Romanian as primary spoken language    [3]   Past Surgical History:  Procedure Laterality Date     SECTION  2017    CHOLECYSTECTOMY      OH LAPS GSTRC RSTRICTIV PX LONGITUDINAL GASTRECTOMY N/A 2020    Procedure: LAP SLEEVE GASTRECTOMY, INTRAOP EGD;  Surgeon: Da Rogers MD;  Location: [facility];  Service: Bariatrics   [4] No Known Allergies

## 2025-07-21 NOTE — QUICK NOTE
Pt discussed in multidisciplinary signout.  Night OB team admitted this pt with plan to consult [facility] overnight, and I am covering today.  Pt familiar to [facility] for vasa previa, type II DM, planned for admission 7/24/25.  Presentation per team was with discomfort and contractions.  She was given her full dose lantus last night and also was given betamethasone dose.    Advised insulin drip, magnesium sulfate initiation, NICU consultation, and penicillin, and reevaluation within the hour.  If pt is laboring, delivery is advised.    Velia Perry  7:22 AM  07/21/25

## 2025-07-21 NOTE — PROCEDURES
Rufinogloriajustine Taveras, a  at 29w4d with an REED of 10/1/2025, by Last Menstrual Period, was seen at [facility] for the following procedure(s): $Procedure Type: US - Transvaginal]                   Ultrasound Other  Cervical Length: 2.69  Placenta Previa: No  Vasa Previa: Yes                   Ultrasound Probe Disinfection    A transvaginal ultrasound was performed.   Prior to use, disinfection was performed with High Level Disinfection Process (Trophon)      Amanuel Neil Jr, MD  25  9:59 PM

## 2025-07-21 NOTE — PROGRESS NOTES
L&D Triage Note - OB/GYN  Syed Taveras 29 y.o. female MRN: [id number]  Unit/Bed#:  TRIAGE  Encounter: [id number]      ASSESSMENT:    Syed Taveras is a 29 y.o.  at 29w4d presenting with abdominal pain with known stable vasa previa, dichorionic monoamniotic twins, and preexisting type 2 diabetes mellitus.    labor workup is negative and pain is under control. Patient is safe to discharge home with  labor precautions.  PLAN:    1)  labor workup   - Speculum exam    - neg for pooling, blood, ferning or Nitrazine blue   - neg for trichomonads, clue cells, or hyphae  - Cervical length wnl   - no contractions on tocolytic   - UA negative   - FFN pending    2) Pain management   - Fluids, 1 liter NS bolus   - IV Tylenol 1,000 mg     3) 29 weeks gestation of pregnancy  - Continue routine prenatal care  - Discharge from OB triage with  labor precautions    - Reviewed rupture of membranes, false vs true labor, decreased fetal movement, and vaginal bleeding   - Pt to call provider with any concerns and follow up at her next scheduled prenatal appointment    - Case discussed with Dr. Aguilar    SUBJECTIVE:    Syed Taveras 29 y.o.  at 29w4d with an Estimated Date of Delivery: 10/1/25 who presents to triage for abdominal pain. Reports onset of lower abdominal pain at 7pm, recurring every 3-4 minutes for 5 minutes. She endorses her pain to be 10/10. She otherwise denies leakage of fluid, vaginal bleeding, and decreased fetal movement. Denies urinary symptoms, recent intercourse, or physical exertion.     Her current obstetrical history is significant for monochorionic diamniotic twins, vasa previa, and preexisting type 2 diabetes mellitus managed with insulin     Her past obstetrical history is significant for delivery of macrosomal infant, gestational diabetes, pre-eclampsia    OBJECTIVE:    Vitals:    25 2108   BP: 114/67   Pulse: 95   Resp: 18    Temp: 98 °F (36.7 °C)       ROS:  Constitutional: Negative  Respiratory: Negative  Cardiovascular: Negative    Gastrointestinal: Negative    General Physical Exam:  General: Well appearing, no distress  Respiratory: Unlabored breathing  Cardiovascular: Regular rate.  Abdomen: Soft, gravid, tenderness of lower abdomen with palpation  Fundal Height: Appropriate for gestational age.  Extremities: Warm and well perfused.  Non tender.      Cervical Exam  Speculum: Cervical os is visually closed, multiparous, absent of lesions  SVE:  omitted due to vasa previa     FETAL ASSESSMENT:  Fetal heart rate:    Galien:      KOH/WTMT:     Infection:   - No clue cells    - No hyphae   - No trichomonads present    Membrane status   - No ferning   - No nitrazene   - No pooling     Urine Dip    - Trace leukocytes, negative for nitrates    Imaging:       TVUS   - Cervical length    - 2.69 cm    - 2.61 cm    - 2.72 cm   - Presentation: vertex               Amanuel Neil MD  OBGYN, PGY-2  Luz Marina Benites MS4  07/20/25  10:06 PM

## 2025-07-21 NOTE — ASSESSMENT & PLAN NOTE
At home regiment: 24 units lantus at bedtime, 4 units lispro before lunch/dinner     Lab Results   Component Value Date    HGBA1C 5.9 (H) 03/11/2025     -Fasting and 2h postprandial fingersticks  - Initiate insulin gtt while NPO

## 2025-07-21 NOTE — ASSESSMENT & PLAN NOTE
labor workup negative   - Speculum exam cervix visually closed  - Cervical length of  2.61cm previous 3.95 cm on  MFM    - UA negative  - Microscopy negative   - FFN positive    Plan:   - BTM 1st dose and 2nd 24h later  - Collected GBS  - Continuous monitoring  - Admission

## 2025-07-21 NOTE — ASSESSMENT & PLAN NOTE
"- stable vasa previa followed by MFM     Plan:   - BTM 12mg x2, continue ASA 81  - GBS pending   - See remainder of plan under \"Pelvic cramping\"  "

## 2025-07-21 NOTE — ASSESSMENT & PLAN NOTE
Echo on 6/30/2025 demonstrated an ejection fraction of 55% with left ventricular cavity size at the upper limit of normal.  She does notably have a history of cardiomyopathy, and twin pregnancy also puts her at risk for recurrence. [facility] recommends a low threshold to recheck a proBNP and/or echocardiogram if her symptoms persist,     - Pt is currently asymptomatic

## 2025-07-21 NOTE — ASSESSMENT & PLAN NOTE
- stable vasa previa followed by MFM   - Repeat LTCS planned for 8/21    Plan:   - MFM consult  - BTM 12mg x2, continue ASA 81  - GBS pending   - Given positive FFN, plan to admit now for scheduled LTCS on 8/21

## 2025-07-21 NOTE — CONSULTS
NICU Prenatal Consult   Syed Taveras 29 y.o. female MRN: [id number]  Unit/Bed#: -01 Encounter: [id number]    Consulting Physician: Yardlie Toussaint-Foster*      A NICU Prenatal Consult has been requested by OB due to  labor in the context of vaso previa..     Syed Taveras is a 29 y.o. , with an REED of 10/01/25 at 29w5d GA admitted for PTL in the context of vasa previa and monochorionic diamniotic twins. She was given a dose of Betamethasone, started on latency antibiotics and then on magnesium for neuro protection.    Syed is expecting mono di twin males. Estimated fetal weight is Baby A 1,751 g 95%ile and Baby B 1,588 g 80%ile as of today). She intends to breastfeed and is okay with donor milk.     Along with Seyd , her sister, was present for the consultation.     Prenatal Care:Yes, see Presenting Problem above    Prenatal Labs:  Lab Results   Component Value Date/Time    ABO Grouping O 2025 12:36 AM    Rh Factor Positive 2025 12:36 AM    Hepatitis B Surface Ag Non-reactive 2025 09:49 AM    Hepatitis C Ab Non-reactive 2025 09:49 AM    Rubella IgG Quant 26.3 2025 09:49 AM       Unknown labs at this time: GBS    Pregnancy complications: Problem List[1]  Maternal medical history: Past Medical History[2]  Medications at home:   Facility-Administered Medications Prior to Admission:     cyanocobalamin injection 1,000 mcg    Medications Prior to Admission:     amoxicillin (AMOXIL) 500 mg capsule    aspirin (ECOTRIN LOW STRENGTH) 81 mg EC tablet    benzocaine (ORAJEL) 10 % mucosal gel    Blood Glucose Monitoring Suppl (OneTouch Verio) w/Device KIT    D3-1000 25 MCG (1000 UT) tablet    hydrocortisone 2.5 % cream    insulin aspart (NovoLOG FlexPen) 100 UNIT/ML injection pen    Insulin Glargine Solostar (Lantus SoloStar) 100 UNIT/ML SOPN    Lancets (OneTouch Delica Plus [place]30E) MISC    OneTouch Verio test strip    Prenatal MV & Min w/FA-DHA  (Prenatal Gummies) 0.18-25 MG CHEW    acetaminophen (TYLENOL) 500 mg tablet    acetaminophen (TYLENOL) 500 mg tablet    Insulin Pen Needle 31G X 5 MM MISC  Maternal social history:  Social History     Tobacco Use    Smoking status: Never     Passive exposure: Never    Smokeless tobacco: Never   Substance Use Topics    Alcohol use: No     Maternal  medications:  steroids: Betamethasone  Other medications: Magnesium, antibiotics      I spoke with Syed Taveras today regarding the upcoming birth of her unborn twins.  We discussed the baby's possible medical problems and the specialized care needed to address these problems.  This discussion included, but was not limited to:     Attendance of physician/AUNG/ nursing, and/or respiratory therapist at the delivery and delivery room management , Possible need for IV access, umbilical lines, and/or PICC lines, Possible need for prolonged parenteral nutrition, Potential need for transfusion of blood products, Risk of bronchopulmonary dysplasia, Risk of feedings problems and the potential need for IV fluids and gavage tube feeds, Risk of hypoglycemia requiring dextrose infusion, Risk of hypothermia and need for radiant warmer and/or isolette, Risk of immature cardiorespiratory control and need for monitoring and/or caffeine, Risk of intraventricular hemorrhage and possible need for brain ultrasound, Risk of jaundice requiring phototherapy, Risk of necrotizing enterocolitis and advantages of expressed breast milk, Risk of prolonged patent ductus arteriosus and possible need for pharmacologic or surgical treatment , Risk of respiratory distress and possible need for support (oxygen, CPAP, intubation, and/or surfactant), Risk of retinopathy of prematurity and possible need for ophthalmologic exams , Risk of sepsis and possible need for lab tests and IV antibiotics, and Survival and neurodevelopmental outcomes for babies born at 29 weeks     All of  Syed's questions were answered to the best of my ability, and she was encouraged to contact us with any further questions.      Thank you for including us in the care of Syed PattonAlbertozhane. Please reach out to the on-call Neonatologist via [employer] for any further questions that may arise.    I spent 50 minutes in consultation with Syed DesirLinnettewilmer, of which 80% was in direct communication.    Chary Toledo  - Medicine         [1]   Patient Active Problem List  Diagnosis    Postpartum cardiomyopathy    Gallbladder disease    Status post laparoscopic sleeve gastrectomy    Diabetes mellitus (HCC)    Elevated bilirubin    History of nephrolithiasis    Chronic migraine without aura without status migrainosus, not intractable    Monochorionic diamniotic twin gestation in second trimester    Type 2 diabetes mellitus affecting pregnancy, antepartum    At risk for hypertension    29 weeks gestation of pregnancy    History of  delivery, currently pregnant    Class 1 obesity due to excess calories with serious comorbidity and body mass index (BMI) of 32.0 to 32.9 in adult    Vitamin D deficiency disease    Congestion of nasal sinus    Vasa previa in labor and delivery, antepartum, fetus 1    Marginal insertion of umbilical cord affecting management of mother in second trimester    Pregnancy affected by previous bariatric surgery, currently in second trimester    Decreased fetal movement    Low ferritin level    Request for sterilization    Pregnancy complicated by pre-existing type 2 diabetes, second trimester    Vasa previa    Monochorionic diamniotic twin gestation in third trimester    Pelvic cramping    Dental cyst    S/P repeat low transverse     Postpartum hemorrhage   [2]   Past Medical History:  Diagnosis Date    Diabetes mellitus (HCC)     History of delivery of macrosomal infant     History of gestational diabetes 2017    History of pre-eclampsia 2017     History of transfusion     after  - had fever with transfusion    Nonintractable headache 2024    Obesity (BMI 35.0-39.9 without comorbidity)     Peroneal tendon injury, left, initial encounter 11/10/2023    S/P laparoscopic sleeve gastrectomy 2020    Sprain of anterior talofibular ligament of left ankle, initial encounter 11/10/2023    Uses Tunisian as primary spoken language

## 2025-07-21 NOTE — ASSESSMENT & PLAN NOTE
Echo on 6/30/2025 demonstrated an ejection fraction of 55% with left ventricular cavity size at the upper limit of normal.  She does notably have a history of cardiomyopathy, and twin pregnancy also puts her at risk for recurrence. [facility] recommends a low threshold to recheck a proBNP and/or echocardiogram if her symptoms persist,     - Today reassuring clinical presentation, Pt is asymptomatic

## 2025-07-21 NOTE — ASSESSMENT & PLAN NOTE
Syed is a 28 yo  w/ prior cs at 29w5d admitted due to concern for  labor and pelvic cramping due to positive FFN.  labor workup negative on admission. Complaining of increased frequency and pain of contractions this morning. Speculum exam performed and cervix visually 0.5cm dilated. SVE deferred in setting of vasa previa. No bleeding or pooling appreciated    Plan:   - Betamethasone for fetal lung maturation (-)  - GBS pending  - Continuous monitoring  - NPO this morning  - Initiate magnesium sulfate for neuroprotection  - Initiate penicillin for GBS prophylaxis   - Reassess exam in 1hr

## 2025-07-21 NOTE — PROGRESS NOTES
Evaluated patient at bedside. Received procardia 10 mg IR at 0830. Patient still having pain. Rates 10/10 now. Plan to proceed to OR for  delivery.    D/w Dr. Toussaint

## 2025-07-21 NOTE — OP NOTE
OPERATIVE REPORT  PATIENT NAME: Syed Taveras    :  1995  MRN: [id number]  Pt Location: [facility] L&D OR ROOM 01    SURGERY DATE: 2025    Surgeons and Role:     * Yardlie Toussaint-Foster, DO - Primary     * Yordy Phillips MD - Assisting    Preop Diagnosis:  Pregnancy at 29w  Mono-di twin gestation  Vasa previa  History of  delivery x1      Procedure(s) (LRB):   SECTION () REPEAT (N/A)    Specimen(s):  ID Type Source Tests Collected by Time Destination   1 : FOR PATHOLOGY Tissue Placenta TISSUE EXAM Yardlie Toussaint-Foster, DO 2025 1028    A : Baby A Cord Blood Cord BLOOD GAS, VENOUS, CORD, BLOOD GAS, ARTERIAL, CORD Yardlie Toussaint-Foster, DO 2025 1028    B :  Cord Blood Cord BLOOD GAS, VENOUS, CORD, BLOOD GAS, ARTERIAL, CORD Yardlie Toussaint-Foster, DO 2025 1028        Surgical QBL:  No data recorded    Drains:  None    Anesthesia Type:   Spinal    Operative Indications:  Vasa Previa     Gio Group Classification System:  Multiple pregnancy +  is GIO GROUP 8    Operative Findings:  1. Baby A Viable male  on 2025 at 1042 with APGARs of 7 and 8 at 1 and 5 minutes. Fetus weighed 3lb 14oz.  2. Baby B Viable male  on 2025 at 1042 with APGARs of 8 and 8 at 1 and 5 minutes. Fetus weighed 3lb 8.2oz.  3. Clear amniotic fluid x 2  4. Intact placenta with two 3-vessel cords.  5. Normal uterus, bilateral tubes and ovaries  6. Adhesions absent    Umbilical Cord Venous Blood Gas:  Results from last 7 days   Lab Units 25  1028   PH COV  7.268  7.261   PCO2 COV mm HG 50.5*  44.6*   HCO3 COV mmol/L 22.6  19.6   BASE EXC COV mmol/L -4.8*  -7.3*   O2 CT CD VB mL/dL 8.5  14.1   O2 HGB, VENOUS CORD % 41.2  69.3     Umbilical Cord Arterial Blood Gas:  Results from last 7 days   Lab Units 25  1028   PH COA  7.168*  7.221*   PCO2 COA  54.9  52.5   PO2 COA mm HG 21.7  21.5   HCO3 COA mmol/L 19.5  21.1   BASE EXC COA  mmol/L -9.6*  -7.1*   O2 CONTENT CORD ART ml/dl 9.2  9.1   O2 HGB, ARTERIAL CORD % 42.4  43.4       Operative Technique  The patient was taken to the operating room. Spinal anesthesia was adequately established and ancef and azithromycin were given for preoperative prophylaxis. The patient was then placed in the dorsal supine position with a left tilt of the hips. The patient was then prepped with chlorhexadine for vaginal and abdominal prep and a saldivar catheter was inserted. Patient was then draped in the usual sterile fashion for a Pfannenstiel skin incision.      A time out was performed to confirm correct patient and correct procedure.     An incision was made in the skin with a surgical scalpel and sharp dissection was carried out over subsequent layers of tissue including the fascia, followed by the Bovie electrocautery for hemostasis. The fascia was incised at the midline and the fascial incision was extended bilaterally using the curved Tucker scissors.      The superior edge of the fascial incision was grasped with Kocher clamps, tented up and the underlying rectus muscles were dissected off bluntly and sharply using the scalpel. The same was done inferiorly. The rectus muscles were then divided at midline and the peritoneum was identified, tented up at its upper margin taking care to avoid the bladder, and then entered. The peritoneal incision was extended superiorly and inferiorly.     The Marcin-O retractor was inserted and a transverse incision was made in the lower uterine segment using a new surgical blade. The uterine incision was extended cephalad and caudal using blunt dissection. The first amniotic sac was entered. Clear fluid noted.    The surgeon's hand was placed into the uterine cavity. The fetal head of Baby A was identified and elevated through the uterine incision with the assistance of fundal pressure. With gentle traction, the shoulder was delivered, followed by the rest of the fetal  body. There was no nuchal cord noted.     The surgeon's hand was again placed into the uterine cavity. The fetal head of Baby B was identified and elevated through the uterine incision with the assistance of fundal pressure. With gentle traction, the shoulder was delivered, followed by the rest of the fetal body. There was no nuchal cord noted.     On delivery the cords were doubly clamped and cut after delayed cord clamping. The infants were then passed off the table to the awaiting  staff. The neonates were noted to cry spontaneously and moved all extremities.     Venous and arterial blood gas, cord blood, and portion of cords were obtained for analysis and routine blood testing. The placenta was then sent to pathology. Placenta was noted to be intact with two inserted three-vessel cords.  Oxytocin was administered by IV infusion to enhance uterine contraction. The uterus was exteriorized and cleared of all clots and debris.      The uterine incision was re approximated using an 0 Vicryl in a running locked fashion. Uterus was atonic. Methergine and TXA were given. A second vertical imbricating stitch with the same suture was applied. The uterine incision was examined and noted to be hemostatic. The posterior cul-de-sac was cleared of all clots and debris The uterus was replaced into the abdomen and the pericolic gutters were cleared of all clots.  The left angle of the hysterotomy had an area of oozing that required additional suture. Two figure-of-eight's were placed. Hemostasis improved. Surgicel was placed on the hysterotomy. The fascia was re approximated using an 0 Vicryl in a running fashion. The subcutaneous tissue was irrigated and cleared of all clots and debris. Good hemostasis was noted with Bovie electrocautery. The subcutaneous tissue was reapproximated with plaingut suture. The skin incision was closed using 4-0 monocryl and a meplilex dressing. Good hemostasis was noted. Patient tolerated  the procedure well. All needle, sponge, and instrument counts were noted to be correct x 2 at the end of the procedure.  Patient was transferred to the recovery room in stable condition. Dr. Toussaint-Foster was present and participated in the entire surgery.    Complications:   None apparent    Patient Disposition:  PACU         SIGNATURE: Yordy Phillips MD  DATE: July 21, 2025  TIME: 11:50 AM

## 2025-07-21 NOTE — ASSESSMENT & PLAN NOTE
At home regiment: 24 units lantus at bedtime, 4 units lispro before lunch/dinner     Lab Results   Component Value Date    HGBA1C 5.9 (H) 03/11/2025     -Glucose fingerstick

## 2025-07-22 ENCOUNTER — APPOINTMENT (INPATIENT)
Dept: PERINATAL CARE | Facility: OTHER | Age: 30
DRG: 540 | End: 2025-07-22
Payer: COMMERCIAL

## 2025-07-22 DIAGNOSIS — Z3A.29 29 WEEKS GESTATION OF PREGNANCY: ICD-10-CM

## 2025-07-22 DIAGNOSIS — O24.119 TYPE 2 DIABETES MELLITUS AFFECTING PREGNANCY, ANTEPARTUM: ICD-10-CM

## 2025-07-22 DIAGNOSIS — O30.033 MONOCHORIONIC DIAMNIOTIC TWIN GESTATION IN THIRD TRIMESTER: Primary | ICD-10-CM

## 2025-07-22 LAB
ABO GROUP BLD BPU: NORMAL
BASOPHILS # BLD AUTO: 0.03 THOUSANDS/ÂΜL (ref 0–0.1)
BASOPHILS NFR BLD AUTO: 0 % (ref 0–1)
BPU ID: NORMAL
CROSSMATCH: NORMAL
DME PARACHUTE DELIVERY DATE REQUESTED: NORMAL
DME PARACHUTE ITEM DESCRIPTION: NORMAL
DME PARACHUTE ORDER STATUS: NORMAL
DME PARACHUTE SUPPLIER NAME: NORMAL
DME PARACHUTE SUPPLIER PHONE: NORMAL
EOSINOPHIL # BLD AUTO: 0.01 THOUSAND/ÂΜL (ref 0–0.61)
EOSINOPHIL NFR BLD AUTO: 0 % (ref 0–6)
ERYTHROCYTE [DISTWIDTH] IN BLOOD BY AUTOMATED COUNT: 14.2 % (ref 11.6–15.1)
GP B STREP DNA SPEC QL NAA+PROBE: NEGATIVE
HCT VFR BLD AUTO: 26.6 % (ref 34.8–46.1)
HGB BLD-MCNC: 9.1 G/DL (ref 11.5–15.4)
IMM GRANULOCYTES # BLD AUTO: 0.11 THOUSAND/UL (ref 0–0.2)
IMM GRANULOCYTES NFR BLD AUTO: 1 % (ref 0–2)
LYMPHOCYTES # BLD AUTO: 1.46 THOUSANDS/ÂΜL (ref 0.6–4.47)
LYMPHOCYTES NFR BLD AUTO: 9 % (ref 14–44)
MCH RBC QN AUTO: 31.1 PG (ref 26.8–34.3)
MCHC RBC AUTO-ENTMCNC: 34.2 G/DL (ref 31.4–37.4)
MCV RBC AUTO: 91 FL (ref 82–98)
MONOCYTES # BLD AUTO: 1.26 THOUSAND/ÂΜL (ref 0.17–1.22)
MONOCYTES NFR BLD AUTO: 8 % (ref 4–12)
NEUTROPHILS # BLD AUTO: 13.28 THOUSANDS/ÂΜL (ref 1.85–7.62)
NEUTS SEG NFR BLD AUTO: 82 % (ref 43–75)
NRBC BLD AUTO-RTO: 0 /100 WBCS
PLATELET # BLD AUTO: 118 THOUSANDS/UL (ref 149–390)
PMV BLD AUTO: 13.4 FL (ref 8.9–12.7)
RBC # BLD AUTO: 2.93 MILLION/UL (ref 3.81–5.12)
UNIT DISPENSE STATUS: NORMAL
UNIT PRODUCT CODE: NORMAL
UNIT PRODUCT VOLUME: 350 ML
UNIT RH: NORMAL
WBC # BLD AUTO: 16.15 THOUSAND/UL (ref 4.31–10.16)
ZINC SERPL-MCNC: 46 UG/DL (ref 44–115)

## 2025-07-22 PROCEDURE — 85025 COMPLETE CBC W/AUTO DIFF WBC: CPT

## 2025-07-22 PROCEDURE — 99024 POSTOP FOLLOW-UP VISIT: CPT | Performed by: OBSTETRICS & GYNECOLOGY

## 2025-07-22 RX ORDER — OXYCODONE HYDROCHLORIDE 5 MG/1
5 TABLET ORAL EVERY 4 HOURS PRN
Refills: 0 | Status: DISCONTINUED | OUTPATIENT
Start: 2025-07-22 | End: 2025-07-24 | Stop reason: HOSPADM

## 2025-07-22 RX ORDER — ACETAMINOPHEN 325 MG/1
975 TABLET ORAL EVERY 6 HOURS SCHEDULED
Status: DISCONTINUED | OUTPATIENT
Start: 2025-07-22 | End: 2025-07-24 | Stop reason: HOSPADM

## 2025-07-22 RX ADMIN — DOCUSATE SODIUM 100 MG: 100 CAPSULE, LIQUID FILLED ORAL at 09:05

## 2025-07-22 RX ADMIN — IRON SUCROSE 200 MG: 20 INJECTION, SOLUTION INTRAVENOUS at 09:02

## 2025-07-22 RX ADMIN — AMOXICILLIN 500 MG: 500 CAPSULE ORAL at 23:11

## 2025-07-22 RX ADMIN — ACETAMINOPHEN 650 MG: 325 TABLET ORAL at 03:01

## 2025-07-22 RX ADMIN — AMOXICILLIN 500 MG: 500 CAPSULE ORAL at 14:51

## 2025-07-22 RX ADMIN — Medication 2000 UNITS: at 09:05

## 2025-07-22 RX ADMIN — DOCUSATE SODIUM 100 MG: 100 CAPSULE, LIQUID FILLED ORAL at 17:27

## 2025-07-22 RX ADMIN — AMOXICILLIN 500 MG: 500 CAPSULE ORAL at 06:07

## 2025-07-22 RX ADMIN — ACETAMINOPHEN 650 MG: 325 TABLET ORAL at 09:06

## 2025-07-22 RX ADMIN — ACETAMINOPHEN 975 MG: 325 TABLET, FILM COATED ORAL at 21:31

## 2025-07-22 RX ADMIN — ENOXAPARIN SODIUM 40 MG: 40 INJECTION SUBCUTANEOUS at 09:02

## 2025-07-22 RX ADMIN — PRENATAL VIT W/ FE FUMARATE-FA TAB 27-0.8 MG 1 TABLET: 27-0.8 TAB at 09:05

## 2025-07-22 RX ADMIN — ACETAMINOPHEN 650 MG: 325 TABLET ORAL at 14:51

## 2025-07-22 RX ADMIN — OXYCODONE HYDROCHLORIDE 5 MG: 5 TABLET ORAL at 15:11

## 2025-07-22 NOTE — ASSESSMENT & PLAN NOTE
- Routine postoperative care  - QBL 1270cc, Hb 12.3 g/dL --> f/u AM CBC  - Palomares in place, monitor UOP, plan for void trial in am  - Encourage ambulation  - DVT ppx: SCDs, Lovenox to start in AM

## 2025-07-22 NOTE — PROGRESS NOTES
Progress Note - OB/GYN   Name: Syed Taveras 29 y.o. female I MRN: [id number]  Unit/Bed#: -01 I Date of Admission: 2025   Date of Service: 2025 I Hospital Day: 1     Assessment & Plan  Pregnancy complicated by pre-existing type 2 diabetes, second trimester  At home regiment: 24 units lantus at bedtime, 4 units lispro before lunch/dinner     Lab Results   Component Value Date    HGBA1C 6.4 (H) 2025     -Fasting and 2h postprandial fingersticks  - Initiate insulin gtt while NPO  Status post laparoscopic sleeve gastrectomy  Avoid NSAIDs  Request for sterilization  MA 31 signed  on   Postpartum cardiomyopathy  Echo on 2025 demonstrated an ejection fraction of 55% with left ventricular cavity size at the upper limit of normal.  She does notably have a history of cardiomyopathy, and twin pregnancy also puts her at risk for recurrence. [facility] recommends a low threshold to recheck a proBNP and/or echocardiogram if her symptoms persist,     - Pt is currently asymptomatic   Dental cyst  On Amoxicillin for 7 days.  S/P repeat low transverse   - Routine postoperative care  - QBL 1270cc, Hb 12.3 g/dL --> f/u AM CBC  - Palomares in place, monitor UOP, plan for void trial in am  - Encourage ambulation  - DVT ppx: SCDs, Lovenox to start in AM  Postpartum hemorrhage  QBL 1270, methergine and TXA given intra-op  Vasa previa  - stable vasa previa followed by MFM     Plan:   - BTM 12mg x2, continue ASA 81    OB Post-Partum Progress Note  Subjective   Post delivery. Patient is doing well. Lochia WNL. Pain well controlled. Denies any dizziness, lightheadedness. She has no concerns this AM. She just had questions about status of her babies.      Pain: yes, cramping, improved with meds  Tolerating PO: yes  Voiding: yes  Flatus: yes  BM: no  Ambulating: yes  Breastfeeding:  yes  Chest pain: no  Shortness of breath: no  Leg pain: no  Lochia: WNL    Objective :  Temp:  [97.4 °F (36.3 °C)-97.9 °F (36.6  °C)] 97.9 °F (36.6 °C)  HR:  [] 75  BP: (102-121)/(55-74) 113/64  Resp:  [16-19] 16  SpO2:  [98 %-100 %] 99 %  O2 Device: None (Room air)    GEN: Syed Taveras appears well, alert and oriented x 3, pleasant and cooperative   CARDIO: Regular Rate  RESP:  Non-labored breathing  ABDOMEN: soft, appropriate tenderness, firm fundus below umbilicus, Incision C/D/I   EXTREMITIES: Non tender, trace edema appropriate for post-pregnancy, no erythema      Lab Results: I have reviewed the following results:  Lab Results   Component Value Date    WBC 11.58 (H) 07/21/2025    HGB 12.3 07/21/2025    HCT 37.8 07/21/2025    MCV 92 07/21/2025     (L) 07/21/2025

## 2025-07-22 NOTE — LACTATION NOTE
CONSULT - LACTATION  Syed DesirLobodustinwilmer 29 y.o. female MRN: [id number]    [facility] L&D Room / Bed:  423/ 423-01 Encounter: [id number]    Maternal Information     Reason for Consult:    Reason for Consult: Follow-up assessment (routine) -10 min, Pump Follow Up - 5 min    Risk Factors:    Risk Factors: NICU infant, Multiples, Language barrier     Breast Pump:    Breast Pump  Pump: Electric, Personal (CM consult placed for Medela Pump in Style)  Pump Review/Education: Setup, frequency, and cleaning, Milk storage  Initiated by: TANNER Shi  Date Initiated: 07/21/25    Feeding recommendations:  pump every 2-3 hours    Met with Syed for a follow-up to check in with her regarding pumping for her twin boys in the NICU. Patient states that pumping is going well, patient verbalized she understands she will not see colostrum yet, and understands that pumping is for stimulation right now in order to protect her milk supply.     CM consult placed for Medela Pump in Style.    Family was in the room at the time of encounter, patient denies having any further questions or concerns. Patient encouraged to follow-up with lactation as needed.     Brisa Hood 7/22/2025 2:05 PM

## 2025-07-22 NOTE — ASSESSMENT & PLAN NOTE
Echo on 6/30/2025 demonstrated an ejection fraction of 55% with left ventricular cavity size at the upper limit of normal.  She does notably have a history of cardiomyopathy, and twin pregnancy also puts her at risk for recurrence. [facility] recommends a low threshold to recheck a proBNP and/or echocardiogram if her symptoms persist,     - Pt is currently asymptomatic

## 2025-07-22 NOTE — PLAN OF CARE
Problem: PAIN - ADULT  Goal: Verbalizes/displays adequate comfort level or baseline comfort level  Description: Interventions:  - Encourage patient to monitor pain and request assistance  - Assess pain using appropriate pain scale  - Administer analgesics as ordered based on type and severity of pain and evaluate response  - Implement non-pharmacological measures as appropriate and evaluate response  - Consider cultural and social influences on pain and pain management  - Notify physician/advanced practitioner if interventions unsuccessful or patient reports new pain  - Educate patient/family on pain management process including their role and importance of  reporting pain   - Provide non-pharmacologic/complimentary pain relief interventions  Outcome: Progressing     Problem: INFECTION - ADULT  Goal: Absence or prevention of progression during hospitalization  Description: INTERVENTIONS:  - Assess and monitor for signs and symptoms of infection  - Monitor lab/diagnostic results  - Monitor all insertion sites, i.e. indwelling lines, tubes, and drains  - Monitor endotracheal if appropriate and nasal secretions for changes in amount and color  - [place] appropriate cooling/warming therapies per order  - Administer medications as ordered  - Instruct and encourage patient and family to use good hand hygiene technique  - Identify and instruct in appropriate isolation precautions for identified infection/condition  Outcome: Progressing  Goal: Absence of fever/infection during neutropenic period  Description: INTERVENTIONS:  - Monitor WBC  - Perform strict hand hygiene  - Limit to healthy visitors only  - No plants, dried, fresh or silk flowers with pate in patient room  Outcome: Progressing     Problem: SAFETY ADULT  Goal: Patient will remain free of falls  Description: INTERVENTIONS:  - Educate patient/family on patient safety including physical limitations  - Instruct patient to call for assistance with activity   -  Keep Call bell within reach  - Keep bed low and locked with side rails adjusted as appropriate  - Keep care items and personal belongings within reach  - Initiate and maintain comfort rounds  Outcome: Progressing  Goal: Maintain or return to baseline ADL function  Description: INTERVENTIONS:  -  Assess patient's ability to carry out ADLs; assess patient's baseline for ADL function and identify physical deficits which impact ability to perform ADLs (bathing, care of mouth/teeth, toileting, grooming, dressing, etc.)  - Assess/evaluate cause of self-care deficits   - Assess range of motion  - Assess patient's mobility; develop plan if impaired  - Assess patient's need for assistive devices and provide as appropriate  - Encourage maximum independence but intervene and supervise when necessary  - Involve family in performance of ADLs  - Assess for home care needs following discharge   - Consider OT consult to assist with ADL evaluation and planning for discharge  - Provide patient education as appropriate  - Monitor functional capacity and physical performance, use of AM PAC & JH-HLM   - Monitor gait, balance and fatigue with ambulation    Outcome: Progressing  Goal: Maintains/Returns to pre admission functional level  Description: INTERVENTIONS:  - Perform AM-PAC 6 Click Basic Mobility/ Daily Activity assessment daily.  - Set and communicate daily mobility goal to care team and patient/family/caregiver.   - Out of bed for toileting  - Record patient progress and toleration of activity level   Outcome: Progressing     Problem: DISCHARGE PLANNING  Goal: Discharge to home or other facility with appropriate resources  Description: INTERVENTIONS:  - Identify barriers to discharge w/patient and caregiver  - Arrange for needed discharge resources and transportation as appropriate  - Identify discharge learning needs (meds, wound care, etc.)  - Arrange for interpretive services to assist at discharge as needed  - Refer to Case  Management Department for coordinating discharge planning if the patient needs post-hospital services based on physician/advanced practitioner order or complex needs related to functional status, cognitive ability, or social support system  Outcome: Progressing     Problem: Knowledge Deficit  Goal: Patient/family/caregiver demonstrates understanding of disease process, treatment plan, medications, and discharge instructions  Description: Complete learning assessment and assess knowledge base.  Interventions:  - Provide teaching at level of understanding  - Provide teaching via preferred learning methods  Outcome: Progressing     Problem: POSTPARTUM  Goal: Experiences normal postpartum course  Description: INTERVENTIONS:  - Monitor maternal vital signs  - Assess uterine involution and lochia  Outcome: Progressing  Goal: Appropriate maternal -  bonding  Description: INTERVENTIONS:  - Identify family support  - Assess for appropriate maternal/infant bonding   -Encourage maternal/infant bonding opportunities  - Referral to  or  as needed  Outcome: Progressing  Goal: Establishment of infant feeding pattern  Description: INTERVENTIONS:  - Assess breast/bottle feeding  - Refer to lactation as needed  Outcome: Progressing  Goal: Incision(s), wounds(s) or drain site(s) healing without S/S of infection  Description: INTERVENTIONS  - Assess and document dressing, incision, wound bed, drain sites and surrounding tissue  - Provide patient and family education  Outcome: Progressing

## 2025-07-22 NOTE — ASSESSMENT & PLAN NOTE
At home regiment: 24 units lantus at bedtime, 4 units lispro before lunch/dinner     Lab Results   Component Value Date    HGBA1C 6.4 (H) 07/21/2025     -Fasting and 2h postprandial fingersticks  - Initiate insulin gtt while NPO

## 2025-07-22 NOTE — PLAN OF CARE
Problem: PAIN - ADULT  Goal: Verbalizes/displays adequate comfort level or baseline comfort level  Description: Interventions:  - Encourage patient to monitor pain and request assistance  - Assess pain using appropriate pain scale  - Administer analgesics as ordered based on type and severity of pain and evaluate response  - Implement non-pharmacological measures as appropriate and evaluate response  - Consider cultural and social influences on pain and pain management  - Notify physician/advanced practitioner if interventions unsuccessful or patient reports new pain  - Educate patient/family on pain management process including their role and importance of  reporting pain   - Provide non-pharmacologic/complimentary pain relief interventions  Outcome: Progressing     Problem: INFECTION - ADULT  Goal: Absence or prevention of progression during hospitalization  Description: INTERVENTIONS:  - Assess and monitor for signs and symptoms of infection  - Monitor lab/diagnostic results  - Monitor all insertion sites, i.e. indwelling lines, tubes, and drains  - Monitor endotracheal if appropriate and nasal secretions for changes in amount and color  - [place] appropriate cooling/warming therapies per order  - Administer medications as ordered  - Instruct and encourage patient and family to use good hand hygiene technique  - Identify and instruct in appropriate isolation precautions for identified infection/condition  Outcome: Progressing  Goal: Absence of fever/infection during neutropenic period  Description: INTERVENTIONS:  - Monitor WBC  - Perform strict hand hygiene  - Limit to healthy visitors only  - No plants, dried, fresh or silk flowers with pate in patient room  Outcome: Progressing     Problem: SAFETY ADULT  Goal: Patient will remain free of falls  Description: INTERVENTIONS:  - Educate patient/family on patient safety including physical limitations  - Instruct patient to call for assistance with activity   -  Consider consulting OT/PT to assist with strengthening/mobility based on AM PAC & JH-HLM score  - Consult OT/PT to assist with strengthening/mobility   - Keep Call bell within reach  - Keep bed low and locked with side rails adjusted as appropriate  - Keep care items and personal belongings within reach  - Initiate and maintain comfort rounds  - Make Fall Risk Sign visible to staff  - Offer Toileting every  Hours, in advance of need  - Initiate/Maintain alarm  - Obtain necessary fall risk management equipment:   - Apply yellow socks and bracelet for high fall risk patients  - Consider moving patient to room near nurses station  Outcome: Progressing  Goal: Maintain or return to baseline ADL function  Description: INTERVENTIONS:  -  Assess patient's ability to carry out ADLs; assess patient's baseline for ADL function and identify physical deficits which impact ability to perform ADLs (bathing, care of mouth/teeth, toileting, grooming, dressing, etc.)  - Assess/evaluate cause of self-care deficits   - Assess range of motion  - Assess patient's mobility; develop plan if impaired  - Assess patient's need for assistive devices and provide as appropriate  - Encourage maximum independence but intervene and supervise when necessary  - Involve family in performance of ADLs  - Assess for home care needs following discharge   - Consider OT consult to assist with ADL evaluation and planning for discharge  - Provide patient education as appropriate  - Monitor functional capacity and physical performance, use of AM PAC & JH-HLM   - Monitor gait, balance and fatigue with ambulation    Outcome: Progressing  Goal: Maintains/Returns to pre admission functional level  Description: INTERVENTIONS:  - Perform AM-PAC 6 Click Basic Mobility/ Daily Activity assessment daily.  - Set and communicate daily mobility goal to care team and patient/family/caregiver.   - Collaborate with rehabilitation services on mobility goals if consulted  -  Perform Range of Motion  times a day.  - Reposition patient every  hours.  - Dangle patient  times a day  - Stand patient  times a day  - Ambulate patient times a day  - Out of bed to chair  times a day   - Out of bed for meals  times a day  - Out of bed for toileting  - Record patient progress and toleration of activity level   Outcome: Progressing     Problem: DISCHARGE PLANNING  Goal: Discharge to home or other facility with appropriate resources  Description: INTERVENTIONS:  - Identify barriers to discharge w/patient and caregiver  - Arrange for needed discharge resources and transportation as appropriate  - Identify discharge learning needs (meds, wound care, etc.)  - Arrange for interpretive services to assist at discharge as needed  - Refer to Case Management Department for coordinating discharge planning if the patient needs post-hospital services based on physician/advanced practitioner order or complex needs related to functional status, cognitive ability, or social support system  Outcome: Progressing     Problem: Knowledge Deficit  Goal: Patient/family/caregiver demonstrates understanding of disease process, treatment plan, medications, and discharge instructions  Description: Complete learning assessment and assess knowledge base.  Interventions:  - Provide teaching at level of understanding  - Provide teaching via preferred learning methods  Outcome: Progressing     Problem: POSTPARTUM  Goal: Experiences normal postpartum course  Description: INTERVENTIONS:  - Monitor maternal vital signs  - Assess uterine involution and lochia  Outcome: Progressing  Goal: Appropriate maternal -  bonding  Description: INTERVENTIONS:  - Identify family support  - Assess for appropriate maternal/infant bonding   -Encourage maternal/infant bonding opportunities  - Referral to  or  as needed  Outcome: Progressing  Goal: Establishment of infant feeding pattern  Description: INTERVENTIONS:  -  Assess breast/bottle feeding  - Refer to lactation as needed  Outcome: Progressing  Goal: Incision(s), wounds(s) or drain site(s) healing without S/S of infection  Description: INTERVENTIONS  - Assess and document dressing, incision, wound bed, drain sites and surrounding tissue  - Provide patient and family education  - Perform skin care/dressing changes every   Outcome: Progressing

## 2025-07-23 LAB
VIT A SERPL-MCNC: 23.3 UG/DL (ref 18.9–57.3)
VIT B1 BLD-SCNC: 95.7 NMOL/L (ref 66.5–200)

## 2025-07-23 PROCEDURE — 99024 POSTOP FOLLOW-UP VISIT: CPT | Performed by: NURSE PRACTITIONER

## 2025-07-23 RX ADMIN — AMOXICILLIN 500 MG: 500 CAPSULE ORAL at 08:20

## 2025-07-23 RX ADMIN — ACETAMINOPHEN 975 MG: 325 TABLET, FILM COATED ORAL at 15:53

## 2025-07-23 RX ADMIN — ACETAMINOPHEN 975 MG: 325 TABLET, FILM COATED ORAL at 10:00

## 2025-07-23 RX ADMIN — DOCUSATE SODIUM 100 MG: 100 CAPSULE, LIQUID FILLED ORAL at 22:46

## 2025-07-23 RX ADMIN — BENZOCAINE 1 APPLICATION: 220 GEL, DENTIFRICE DENTAL at 08:18

## 2025-07-23 RX ADMIN — ACETAMINOPHEN 975 MG: 325 TABLET, FILM COATED ORAL at 03:23

## 2025-07-23 RX ADMIN — AMOXICILLIN 500 MG: 500 CAPSULE ORAL at 14:46

## 2025-07-23 RX ADMIN — DOCUSATE SODIUM 100 MG: 100 CAPSULE, LIQUID FILLED ORAL at 08:18

## 2025-07-23 RX ADMIN — ENOXAPARIN SODIUM 40 MG: 40 INJECTION SUBCUTANEOUS at 08:18

## 2025-07-23 RX ADMIN — AMOXICILLIN 500 MG: 500 CAPSULE ORAL at 22:45

## 2025-07-23 RX ADMIN — OXYCODONE HYDROCHLORIDE 5 MG: 5 TABLET ORAL at 01:41

## 2025-07-23 RX ADMIN — ACETAMINOPHEN 975 MG: 325 TABLET, FILM COATED ORAL at 22:44

## 2025-07-23 RX ADMIN — PRENATAL VIT W/ FE FUMARATE-FA TAB 27-0.8 MG 1 TABLET: 27-0.8 TAB at 08:18

## 2025-07-23 RX ADMIN — Medication 2000 UNITS: at 08:18

## 2025-07-23 NOTE — ASSESSMENT & PLAN NOTE
QBL 1270cc, Hb 12.3 g/dL --> post op 9.1 s/p venofer   - Routine postoperative care  - pain mgt: APAP  and roxicodone PRN   - Encourage ambulation/ bonding ( infants are in NICU)   - lactation support   - DVT ppx: SCDs, Lovenox to start in AM  - PP contraception Uncertain

## 2025-07-23 NOTE — CASE MANAGEMENT
Case Management Mom/Baby/NICU  Assessment & Discharge Planning Note    Patient name Syed Taveras  Location /-01 MRN [id number]  : 1995 Date 2025       Current Admission Date: 2025  Current Admission Diagnosis:S/P repeat low transverse    Patient Active Problem List    Diagnosis Date Noted     delivery 2025    Dental cyst 2025    S/P repeat low transverse  2025    Postpartum hemorrhage 2025    Pelvic cramping 2025    Vasa previa 2025    Monochorionic diamniotic twin gestation in third trimester 2025    Pregnancy complicated by pre-existing type 2 diabetes, second trimester 2025    Request for sterilization 2025    Low ferritin level 2025    Decreased fetal movement 2025    Pregnancy affected by previous bariatric surgery, currently in second trimester 2025    Vasa previa in labor and delivery, antepartum, fetus 1 2025    Marginal insertion of umbilical cord affecting management of mother in second trimester 2025    Congestion of nasal sinus 2025    Class 1 obesity due to excess calories with serious comorbidity and body mass index (BMI) of 32.0 to 32.9 in adult 04/10/2025    Vitamin D deficiency disease 04/10/2025    History of  delivery, currently pregnant 2025    Monochorionic diamniotic twin gestation in second trimester 2025    Type 2 diabetes mellitus affecting pregnancy, antepartum 2025    At risk for hypertension 2025    29 weeks gestation of pregnancy 2025    History of nephrolithiasis 2024    Diabetes mellitus (HCC) 2021    Elevated bilirubin 2021    Status post laparoscopic sleeve gastrectomy 2021    Chronic migraine without aura without status migrainosus, not intractable 2021    Postpartum cardiomyopathy 2017    Gallbladder disease 2017      LOS (days): 2  Geometric  Mean LOS (GMLOS) (days):   Days to GMLOS:     OBJECTIVE:    Risk of Unplanned Readmission Score: 17.83         Current admission status: Inpatient       Preferred Pharmacy:   [id number] [place] - [address]  [address]  [place] [address]  Phone: [phone] Fax: [phone]    Primary Care Provider: LES Barber    Primary Insurance: [employer]  Secondary Insurance:     ASSESSMENT  Active Health Care Proxies    There are no active Health Care Proxies on file.       Mom/Baby/NICU Assessment  Assessment completed with: MOB  Mental Status: Alert  Reason for consult: Breast Pump/DME  Reason for NICU admission: Low birth weight, Prematurity  Gestational Age - Weeks: 29  Gestational Age - Days: 5  Mother of the Baby’s (MOB) name: Syed DesirTorin  Age of Mom: 29  Father of the Baby’s (FOB) name: Tej Payne  Baby’s name: George Gabrielmilind Desir and Darius Pattones  Baby’s gender: Male  Medical insurance provider for Baby: [place]  Plan and interventions to secure baby’s insurance ( that baby is covered for the first 30 days under MOB; footprint card = ID/proof of life until SSN card received): MOB verbalizes that they will contact their insurance to add baby ASAP.  Other children (of MOB and/or FOB; include gender and ages): MOB has an 8 year old son  Household members/MOB lives with: Other child/children  Will this be the living arrangements for you and baby after discharge?: Yes  Support system: Family, Friends  Are you currently working with any community programs (such as [facility] [place]?): No  Are you interested in a referral for a parenting education/community support agency?: No  Do you have all the necessary baby supplies?: Yes  Method of feeding: Breastmilk  Does MOB have a breast pump?: No, CM to order.  Pump type ordered: Medela Pump in Style Maxflow  Pump delivered by: CM  Government assistance programs currently  accessing: SNAP/Food stamps, WIC  Government assistance programs needed/resource information provided: SNAP/Food stamps, WIC  Childcare arrangements planned: MOB  MOB’s Employment/Schooling: Work FT  FOB’s Employment/Schooling:  (Unknown)  Does patient have a history of substance use disorder?: No  Urine Drug Screen results for MOB: N/A  Urine Drug Screen results for Baby: N/A  Was a referral placed to Childline?: No  Does patient have a mental health diagnosis?: No  Current legal concerns: None  Domestic/Intimate Partner Violence History: No  Typical means of transportation used: Personal vehicle  Durable medical equipment (DME) needed:: None         DISCHARGE DETAILS  Mom/Baby/NICU DC Planning  [facility] Care: [facility]  Pediatrician planned: Other ([facility])  Discharge Transportation Plan: MOB’s vehicle  NICU Resources: NICU Packet Provided  Discharge disposition for baby: Home with parent(s)/family    CM met with MOB at [facility] using [place] interpretor #[id number].  MOB reports having all necessary baby supplies including 2 carseats and 2 cribs.  MOB receives WIC and food stamps. MOB denies any food or housing concerns. NICU resource packet provided to mother in [place].  Medela breast pump delivered to MOB, delivery slip signed.  MOB to reach out to CM department if interested in additional resources while babies are in the NICU.

## 2025-07-23 NOTE — ASSESSMENT & PLAN NOTE
29w 5d monochorionic/ diamniotic twins  in labor     Initially admitted for cervical shortening and BTM ( vasa previa)

## 2025-07-23 NOTE — PLAN OF CARE
Problem: PAIN - ADULT  Goal: Verbalizes/displays adequate comfort level or baseline comfort level  Description: Interventions:  - Encourage patient to monitor pain and request assistance  - Assess pain using appropriate pain scale  - Administer analgesics as ordered based on type and severity of pain and evaluate response  - Implement non-pharmacological measures as appropriate and evaluate response  - Consider cultural and social influences on pain and pain management  - Notify physician/advanced practitioner if interventions unsuccessful or patient reports new pain  - Educate patient/family on pain management process including their role and importance of  reporting pain   - Provide non-pharmacologic/complimentary pain relief interventions  Outcome: Progressing     Problem: INFECTION - ADULT  Goal: Absence or prevention of progression during hospitalization  Description: INTERVENTIONS:  - Assess and monitor for signs and symptoms of infection  - Monitor lab/diagnostic results  - Monitor all insertion sites, i.e. indwelling lines, tubes, and drains  - Monitor endotracheal if appropriate and nasal secretions for changes in amount and color  - [place] appropriate cooling/warming therapies per order  - Administer medications as ordered  - Instruct and encourage patient and family to use good hand hygiene technique  - Identify and instruct in appropriate isolation precautions for identified infection/condition  Outcome: Progressing  Goal: Absence of fever/infection during neutropenic period  Description: INTERVENTIONS:  - Monitor WBC  - Perform strict hand hygiene  - Limit to healthy visitors only  - No plants, dried, fresh or silk flowers with pate in patient room  Outcome: Progressing     Problem: SAFETY ADULT  Goal: Patient will remain free of falls  Description: INTERVENTIONS:  - Educate patient/family on patient safety including physical limitations  - Instruct patient to call for assistance with activity   -  Consider consulting OT/PT to assist with strengthening/mobility based on AM PAC & JH-HLM score  - Consult OT/PT to assist with strengthening/mobility   - Keep Call bell within reach  - Keep bed low and locked with side rails adjusted as appropriate  - Keep care items and personal belongings within reach  - Initiate and maintain comfort rounds  - Make Fall Risk Sign visible to staff  - Offer Toileting every  Hours, in advance of need  - Initiate/Maintain alarm  - Obtain necessary fall risk management equipment:   - Apply yellow socks and bracelet for high fall risk patients  - Consider moving patient to room near nurses station  Outcome: Progressing  Goal: Maintain or return to baseline ADL function  Description: INTERVENTIONS:  -  Assess patient's ability to carry out ADLs; assess patient's baseline for ADL function and identify physical deficits which impact ability to perform ADLs (bathing, care of mouth/teeth, toileting, grooming, dressing, etc.)  - Assess/evaluate cause of self-care deficits   - Assess range of motion  - Assess patient's mobility; develop plan if impaired  - Assess patient's need for assistive devices and provide as appropriate  - Encourage maximum independence but intervene and supervise when necessary  - Involve family in performance of ADLs  - Assess for home care needs following discharge   - Consider OT consult to assist with ADL evaluation and planning for discharge  - Provide patient education as appropriate  - Monitor functional capacity and physical performance, use of AM PAC & JH-HLM   - Monitor gait, balance and fatigue with ambulation    Outcome: Progressing  Goal: Maintains/Returns to pre admission functional level  Description: INTERVENTIONS:  - Perform AM-PAC 6 Click Basic Mobility/ Daily Activity assessment daily.  - Set and communicate daily mobility goal to care team and patient/family/caregiver.   - Collaborate with rehabilitation services on mobility goals if consulted  -  Perform Range of Motion  times a day.  - Reposition patient every  hours.  - Dangle patient  times a day  - Stand patient  times a day  - Ambulate patient  times a day  - Out of bed to chair  times a day   - Out of bed for meals  times a day  - Out of bed for toileting  - Record patient progress and toleration of activity level   Outcome: Progressing     Problem: DISCHARGE PLANNING  Goal: Discharge to home or other facility with appropriate resources  Description: INTERVENTIONS:  - Identify barriers to discharge w/patient and caregiver  - Arrange for needed discharge resources and transportation as appropriate  - Identify discharge learning needs (meds, wound care, etc.)  - Arrange for interpretive services to assist at discharge as needed  - Refer to Case Management Department for coordinating discharge planning if the patient needs post-hospital services based on physician/advanced practitioner order or complex needs related to functional status, cognitive ability, or social support system  Outcome: Progressing     Problem: Knowledge Deficit  Goal: Patient/family/caregiver demonstrates understanding of disease process, treatment plan, medications, and discharge instructions  Description: Complete learning assessment and assess knowledge base.  Interventions:  - Provide teaching at level of understanding  - Provide teaching via preferred learning methods  Outcome: Progressing     Problem: POSTPARTUM  Goal: Experiences normal postpartum course  Description: INTERVENTIONS:  - Monitor maternal vital signs  - Assess uterine involution and lochia  Outcome: Progressing  Goal: Appropriate maternal -  bonding  Description: INTERVENTIONS:  - Identify family support  - Assess for appropriate maternal/infant bonding   -Encourage maternal/infant bonding opportunities  - Referral to  or  as needed  Outcome: Progressing  Goal: Establishment of infant feeding pattern  Description: INTERVENTIONS:  -  Assess breast/bottle feeding  - Refer to lactation as needed  Outcome: Progressing  Goal: Incision(s), wounds(s) or drain site(s) healing without S/S of infection  Description: INTERVENTIONS  - Assess and document dressing, incision, wound bed, drain sites and surrounding tissue  - Provide patient and family education  - Perform skin care/dressing changes every   Outcome: Progressing

## 2025-07-23 NOTE — PROGRESS NOTES
Progress Note - OB/GYN   Name: Syed Taveras 29 y.o. female I MRN: [id number]  Unit/Bed#: -01 I Date of Admission: 2025   Date of Service: 2025 I Hospital Day: 2     Assessment & Plan  Pregnancy complicated by pre-existing type 2 diabetes, second trimester  Not medicated pre-pregnancy   At home regimen prior to delivery : 24 units lantus at bedtime, 4 units lispro before lunch/dinner   Lab Results   Component Value Date    HGBA1C 6.4 (H) 2025     Status post laparoscopic sleeve gastrectomy  Avoid NSAIDs  Request for sterilization  MA 31 signed  on   Postpartum cardiomyopathy  Echo on 2025 demonstrated an ejection fraction of 55% with left ventricular cavity size at the upper limit of normal.  She does notably have a history of cardiomyopathy, and twin pregnancy also puts her at risk for recurrence. [facility] recommends a low threshold to recheck a proBNP and/or echocardiogram if her symptoms persist,     - Pt is currently asymptomatic   Dental cyst  On Amoxicillin for 7 days.  S/P repeat low transverse    QBL 1270cc, Hb 12.3 g/dL --> post op 9.1 s/p venofer   - Routine postoperative care  - pain mgt: APAP  and roxicodone PRN   - Encourage ambulation/ bonding ( infants are in NICU)   - lactation support   - DVT ppx: SCDs, Lovenox to start in AM  - PP contraception Uncertain   Postpartum hemorrhage  QBL 1270, methergine and TXA given intra-op   delivery  29w 5d monochorionic/ diamniotic twins  in labor     Initially admitted for cervical shortening and BTM ( vasa previa)       Disposition    - Anticipate discharge home on PPD# 2      Subjective/Objective     Chief Complaint: Postpartum State     Subjective:    Syed Taveras is PPD/POD#2 s/p  repeat  section, low transverse incision. She has no current complaints.  Pain is well controlled. She is recovering well and is stable.     Feeding:  pumping infants in NICU  Tolerating PO: yes  Nausea or Vomiting:  "no  Voiding: yes  Flatus: yes; has had no bowel movement.   Ambulating: yes  Chest pain: no  Shortness of breath: no  Leg pain: no  Lochia: small    Vitals:   /76 (BP Location: Left arm)   Pulse 84   Temp 98.8 °F (37.1 °C) (Oral)   Resp 18   Ht 5' 9\" (1.753 m)   Wt 114 kg (251 lb)   LMP 12/25/2024 (Exact Date)   SpO2 98%   Breastfeeding Yes   BMI 37.07 kg/m²       Physical Exam:   GEN: Syed Taveras appears well, alert and oriented x 3, pleasant and cooperative   CARDIO: RRR, no murmurs or rubs  RESP:  CTAB, no wheezes or rales  ABDOMEN: soft, no tenderness, no distention, fundus @ U, Incision C/D/I- covered with Mepilex dressing   EXTREMITIES:  non tender, trace edema, b/l Darío's sign negative      Labs:     Hemoglobin   Date Value Ref Range Status   07/22/2025 9.1 (L) 11.5 - 15.4 g/dL Final   07/21/2025 12.3 11.5 - 15.4 g/dL Final   08/20/2015 12.1 11.5 - 15.4 g/dL Final     WBC   Date Value Ref Range Status   07/22/2025 16.15 (H) 4.31 - 10.16 Thousand/uL Final   07/21/2025 11.58 (H) 4.31 - 10.16 Thousand/uL Final   08/20/2015 7.49 4.31 - 10.16 Thousand/uL Final     Platelets   Date Value Ref Range Status   07/22/2025 118 (L) 149 - 390 Thousands/uL Final   07/21/2025 116 (L) 149 - 390 Thousands/uL Final     Comment:     This is an appended report.  These results have been appended to a previously preliminary verified report.   08/20/2015 176 149 - 390 Thousand/uL Final     Creatinine   Date Value Ref Range Status   07/21/2025 0.65 0.60 - 1.30 mg/dL Final     Comment:     Standardized to IDMS reference method   07/19/2025 0.58 (L) 0.60 - 1.30 mg/dL Final     Comment:     Standardized to IDMS reference method   06/30/2021 0.72 0.40 - 1.10 mg/dL Final     Comment:     Specimen slightly hemolyzed, results may be affected.   10/17/2019 0.85 0.40 - 1.10 mg/dL Final     AST   Date Value Ref Range Status   07/21/2025 10 (L) 13 - 39 U/L Final   07/19/2025 10 (L) 13 - 39 U/L Final   06/30/2021 18 <41 " U/L Final     Comment:     Specimen slightly hemolyzed, results may be affected.   10/17/2019 20 <41 U/L Final     ALT   Date Value Ref Range Status   07/21/2025 6 (L) 7 - 52 U/L Final     Comment:     Specimen collection should occur prior to Sulfasalazine administration due to the potential for falsely depressed results.    07/19/2025 6 (L) 7 - 52 U/L Final     Comment:     Specimen collection should occur prior to Sulfasalazine administration due to the potential for falsely depressed results.    06/30/2021 17 <56 U/L Final     Comment:     Specimen slightly hemolyzed, results may be affected.   10/17/2019 35 <56 U/L Final          LES Bergman  7/23/2025  10:11 AM

## 2025-07-23 NOTE — PLAN OF CARE
Problem: PAIN - ADULT  Goal: Verbalizes/displays adequate comfort level or baseline comfort level  Description: Interventions:  - Encourage patient to monitor pain and request assistance  - Assess pain using appropriate pain scale  - Administer analgesics as ordered based on type and severity of pain and evaluate response  - Implement non-pharmacological measures as appropriate and evaluate response  - Consider cultural and social influences on pain and pain management  - Notify physician/advanced practitioner if interventions unsuccessful or patient reports new pain  - Educate patient/family on pain management process including their role and importance of  reporting pain   - Provide non-pharmacologic/complimentary pain relief interventions  Outcome: Progressing     Problem: INFECTION - ADULT  Goal: Absence or prevention of progression during hospitalization  Description: INTERVENTIONS:  - Assess and monitor for signs and symptoms of infection  - Monitor lab/diagnostic results  - Monitor all insertion sites, i.e. indwelling lines, tubes, and drains  - Monitor endotracheal if appropriate and nasal secretions for changes in amount and color  - Conway appropriate cooling/warming therapies per order  - Administer medications as ordered  - Instruct and encourage patient and family to use good hand hygiene technique  - Identify and instruct in appropriate isolation precautions for identified infection/condition  Outcome: Progressing  Goal: Absence of fever/infection during neutropenic period  Description: INTERVENTIONS:  - Monitor WBC  - Perform strict hand hygiene  - Limit to healthy visitors only  - No plants, dried, fresh or silk flowers with pate in patient room  Outcome: Progressing     Problem: SAFETY ADULT  Goal: Patient will remain free of falls  Description: INTERVENTIONS:  - Educate patient/family on patient safety including physical limitations  - Instruct patient to call for assistance with activity   -  Consider consulting OT/PT to assist with strengthening/mobility based on AM PAC & JH-HLM score  - Consult OT/PT to assist with strengthening/mobility   - Keep Call bell within reach  - Keep bed low and locked with side rails adjusted as appropriate  - Keep care items and personal belongings within reach  - Initiate and maintain comfort rounds  - Make Fall Risk Sign visible to staff  - Apply yellow socks and bracelet for high fall risk patients  - Consider moving patient to room near nurses station  Outcome: Progressing  Goal: Maintain or return to baseline ADL function  Description: INTERVENTIONS:  -  Assess patient's ability to carry out ADLs; assess patient's baseline for ADL function and identify physical deficits which impact ability to perform ADLs (bathing, care of mouth/teeth, toileting, grooming, dressing, etc.)  - Assess/evaluate cause of self-care deficits   - Assess range of motion  - Assess patient's mobility; develop plan if impaired  - Assess patient's need for assistive devices and provide as appropriate  - Encourage maximum independence but intervene and supervise when necessary  - Involve family in performance of ADLs  - Assess for home care needs following discharge   - Consider OT consult to assist with ADL evaluation and planning for discharge  - Provide patient education as appropriate  - Monitor functional capacity and physical performance, use of AM PAC & JH-HLM   - Monitor gait, balance and fatigue with ambulation    Outcome: Progressing  Goal: Maintains/Returns to pre admission functional level  Description: INTERVENTIONS:  - Perform AM-PAC 6 Click Basic Mobility/ Daily Activity assessment daily.  - Set and communicate daily mobility goal to care team and patient/family/caregiver.   - Collaborate with rehabilitation services on mobility goals if consulted  - Out of bed for toileting  - Record patient progress and toleration of activity level   Outcome: Progressing     Problem: DISCHARGE  PLANNING  Goal: Discharge to home or other facility with appropriate resources  Description: INTERVENTIONS:  - Identify barriers to discharge w/patient and caregiver  - Arrange for needed discharge resources and transportation as appropriate  - Identify discharge learning needs (meds, wound care, etc.)  - Arrange for interpretive services to assist at discharge as needed  - Refer to Case Management Department for coordinating discharge planning if the patient needs post-hospital services based on physician/advanced practitioner order or complex needs related to functional status, cognitive ability, or social support system  Outcome: Progressing     Problem: Knowledge Deficit  Goal: Patient/family/caregiver demonstrates understanding of disease process, treatment plan, medications, and discharge instructions  Description: Complete learning assessment and assess knowledge base.  Interventions:  - Provide teaching at level of understanding  - Provide teaching via preferred learning methods  Outcome: Progressing     Problem: POSTPARTUM  Goal: Experiences normal postpartum course  Description: INTERVENTIONS:  - Monitor maternal vital signs  - Assess uterine involution and lochia  Outcome: Progressing  Goal: Appropriate maternal -  bonding  Description: INTERVENTIONS:  - Identify family support  - Assess for appropriate maternal/infant bonding   -Encourage maternal/infant bonding opportunities  - Referral to  or  as needed  Outcome: Progressing  Goal: Establishment of infant feeding pattern  Description: INTERVENTIONS:  - Assess breast/bottle feeding  - Refer to lactation as needed  Outcome: Progressing  Goal: Incision(s), wounds(s) or drain site(s) healing without S/S of infection  Description: INTERVENTIONS  - Assess and document dressing, incision, wound bed, drain sites and surrounding tissue  - Provide patient and family education  - Perform skin care/dressing changes   Outcome:  Progressing

## 2025-07-23 NOTE — LACTATION NOTE
CONSULT - LACTATION  Syed Taveras 29 y.o. female MRN: [id number]    [facility] L&D Room / Bed:  423/-01 Encounter: [id number]    Maternal Information     MOTHER:  N/A  Maternal Age: This patient's mother is not on file.  OB History: This patient's mother is not on file.    Birth information:  YOB: 1995   Time of birth:     Sex: female     Reason for Consult:    Reason for Consult: Follow-up assessment (routine) -10 min, Pump Follow Up - 5 min    Risk Factors:    Risk Factors: NICU infant    Breast and nipple assessment:   Breasts/Nipples  Date Pumping Initiated: 07/21/25  Time Pumping Initiated: 1600  Left Breast: Soft  Right Breast: Soft  Left Nipple: Everted  Right Nipple: Everted  Intervention: Breast pump  Breastfeeding Status: Yes  Breastfeeding Progress: Pumping only  Reasons for not Breastfeeding: Infant medical condition    Breast Pump:    Breast Pump  Pump: Electric, Personal (CM dropped off her Medela Pump in style, wants to use when she is at home.)  Pump Review/Education: Setup, frequency, and cleaning, Milk storage  Initiated by: TANNER Shi  Date Initiated: 07/21/25      Feeding recommendations:  pump every 2-3 hours    Followed up with Syed to check in with her regarding pumping for NICU infant twin boys. Patient states that pumping is going well, and reports that she pumped 5 mls of colostrum at 0100 7/23, 8 mls of colostrum at 0900 7/23, and 11 mls of colostrum at 1200 7/23. Reviewed proper colostrum storage guidelines.     Case management did drop off her Medela Pump In Style, pt wants to use that when she goes home, doing well with hospital electric pump at this time. Patient denies any assistance needed, questions or concerns at this time.     Encouraged to contact lactation if needed.    Brisa Hood 7/23/2025 1:31 PM

## 2025-07-23 NOTE — ASSESSMENT & PLAN NOTE
Echo on 6/30/2025 demonstrated an ejection fraction of 55% with left ventricular cavity size at the upper limit of normal.  She does notably have a history of cardiomyopathy, and twin pregnancy also puts her at risk for recurrence. [facility] recommends a low threshold to recheck a proBNP and/or echocardiogram if her symptoms persist,     - Pt is currently asymptomatic

## 2025-07-23 NOTE — ASSESSMENT & PLAN NOTE
Not medicated pre-pregnancy   At home regimen prior to delivery : 24 units lantus at bedtime, 4 units lispro before lunch/dinner   Lab Results   Component Value Date    HGBA1C 6.4 (H) 07/21/2025

## 2025-07-24 ENCOUNTER — TRANSITIONAL CARE MANAGEMENT (OUTPATIENT)
Dept: FAMILY MEDICINE CLINIC | Facility: CLINIC | Age: 30
End: 2025-07-24

## 2025-07-24 VITALS
SYSTOLIC BLOOD PRESSURE: 115 MMHG | HEIGHT: 69 IN | DIASTOLIC BLOOD PRESSURE: 68 MMHG | TEMPERATURE: 98.2 F | HEART RATE: 66 BPM | BODY MASS INDEX: 37.18 KG/M2 | WEIGHT: 251 LBS | OXYGEN SATURATION: 98 % | RESPIRATION RATE: 16 BRPM

## 2025-07-24 PROCEDURE — 99024 POSTOP FOLLOW-UP VISIT: CPT | Performed by: OBSTETRICS & GYNECOLOGY

## 2025-07-24 RX ORDER — OXYCODONE HYDROCHLORIDE 5 MG/1
5 TABLET ORAL EVERY 4 HOURS PRN
Qty: 10 TABLET | Refills: 0 | Status: CANCELLED | OUTPATIENT
Start: 2025-07-24 | End: 2025-08-03

## 2025-07-24 RX ORDER — ACETAMINOPHEN 325 MG/1
975 TABLET ORAL EVERY 6 HOURS PRN
Qty: 30 TABLET | Refills: 0 | Status: SHIPPED | OUTPATIENT
Start: 2025-07-24

## 2025-07-24 RX ORDER — OXYCODONE HYDROCHLORIDE 5 MG/1
5 TABLET ORAL EVERY 4 HOURS PRN
Qty: 6 TABLET | Refills: 0 | Status: SHIPPED | OUTPATIENT
Start: 2025-07-24 | End: 2025-08-03

## 2025-07-24 RX ADMIN — DOCUSATE SODIUM 100 MG: 100 CAPSULE, LIQUID FILLED ORAL at 11:07

## 2025-07-24 RX ADMIN — Medication 2000 UNITS: at 11:07

## 2025-07-24 RX ADMIN — AMOXICILLIN 500 MG: 500 CAPSULE ORAL at 08:01

## 2025-07-24 RX ADMIN — PRENATAL VIT W/ FE FUMARATE-FA TAB 27-0.8 MG 1 TABLET: 27-0.8 TAB at 11:06

## 2025-07-24 RX ADMIN — ENOXAPARIN SODIUM 40 MG: 40 INJECTION SUBCUTANEOUS at 11:07

## 2025-07-24 RX ADMIN — ACETAMINOPHEN 975 MG: 325 TABLET, FILM COATED ORAL at 11:06

## 2025-07-24 NOTE — PROGRESS NOTES
Progress Note - OB/GYN   Name: Syed Taveras 29 y.o. female I MRN: [id number]  Unit/Bed#: -01 I Date of Admission: 2025   Date of Service: 2025 I Hospital Day: 3     Assessment & Plan  Pregnancy complicated by pre-existing type 2 diabetes, second trimester  Not medicated pre-pregnancy   At home regimen prior to delivery : 24 units lantus at bedtime, 4 units lispro before lunch/dinner   Lab Results   Component Value Date    HGBA1C 6.4 (H) 2025     Status post laparoscopic sleeve gastrectomy  Avoid NSAIDs  Postpartum cardiomyopathy  Echo on 2025 demonstrated an ejection fraction of 55% with left ventricular cavity size at the upper limit of normal.  She does notably have a history of cardiomyopathy, and twin pregnancy also puts her at risk for recurrence. [facility] recommends a low threshold to recheck a proBNP and/or echocardiogram if her symptoms persist    - Pt is currently asymptomatic   Dental cyst  On Amoxicillin for 7 days.  Postpartum hemorrhage  QBL 1270, methergine and TXA given intra-op   delivery  29w 5d monochorionic/ diamniotic twins  in labor     Initially admitted for cervical shortening and BTM ( vasa previa)   Delivery by classical  section  - QBL 1270cc, Hb 12.3 g/dL --> post op 9.1 s/p venofer   - Routine postoperative care  - pain mgt: APAP  and roxicodone PRN   - Encourage ambulation/ bonding ( infants are in NICU)   - lactation support   - DVT ppx: SCDs, Lovenox to start in AM  - PP contraception Uncertain     OB Post-Partum Progress Note  Subjective   Syed Taveras is a patient of: [facility]. She is PPD#3 s/p Repeat Classical  Section. Recovering well and is stable.     Disposition    - Anticipate discharge home on PPD# 3-4  Barriers to discharge: None     Arcelia Freeman MD  OBGYN Resident  2025 10:01 AM    Subjective/Objective     Chief Complaint: Postpartum State     Subjective:    Syed Taveras has no current  complaints and had no acute events overnight. Her pain is well controlled. She is currently voiding. She is ambulating. Patient is currently passing flatus. She is tolerating PO, and denies nausea or vomitting. She denies fever, chills, chest pain, shortness of breath, or calf tenderness. Lochia is normal. She is Bottle feeding.     Objective :  Temp:  [97.6 °F (36.4 °C)-99 °F (37.2 °C)] 98.2 °F (36.8 °C)  HR:  [66-87] 66  BP: (115-123)/(68-76) 115/68  Resp:  [16-18] 16  SpO2:  [98 %] 98 %  O2 Device: None (Room air)    GEN: Syed Taveras appears well, alert and oriented x 3, pleasant and cooperative   CARDIO: Regular Rate  RESP:  Non-labored breathing  ABDOMEN: soft, appropriate tenderness, firm fundus below umbilicus, Incision C/D/I   EXTREMITIES: Non tender, trace edema appropriate for post-pregnancy, no erythema     Lab Results: I have reviewed the following results:  Lab Results   Component Value Date    WBC 16.15 (H) 07/22/2025    HGB 9.1 (L) 07/22/2025    HCT 26.6 (L) 07/22/2025    MCV 91 07/22/2025     (L) 07/22/2025

## 2025-07-24 NOTE — PLAN OF CARE
Problem: PAIN - ADULT  Goal: Verbalizes/displays adequate comfort level or baseline comfort level  Description: Interventions:  - Encourage patient to monitor pain and request assistance  - Assess pain using appropriate pain scale  - Administer analgesics as ordered based on type and severity of pain and evaluate response  - Implement non-pharmacological measures as appropriate and evaluate response  - Consider cultural and social influences on pain and pain management  - Notify physician/advanced practitioner if interventions unsuccessful or patient reports new pain  - Educate patient/family on pain management process including their role and importance of  reporting pain   - Provide non-pharmacologic/complimentary pain relief interventions  Outcome: Progressing     Problem: INFECTION - ADULT  Goal: Absence or prevention of progression during hospitalization  Description: INTERVENTIONS:  - Assess and monitor for signs and symptoms of infection  - Monitor lab/diagnostic results  - Monitor all insertion sites, i.e. indwelling lines, tubes, and drains  - Monitor endotracheal if appropriate and nasal secretions for changes in amount and color  - [place] appropriate cooling/warming therapies per order  - Administer medications as ordered  - Instruct and encourage patient and family to use good hand hygiene technique  - Identify and instruct in appropriate isolation precautions for identified infection/condition  Outcome: Progressing  Goal: Absence of fever/infection during neutropenic period  Description: INTERVENTIONS:  - Monitor WBC  - Perform strict hand hygiene  - Limit to healthy visitors only  - No plants, dried, fresh or silk flowers with pate in patient room  Outcome: Progressing     Problem: SAFETY ADULT  Goal: Patient will remain free of falls  Description: INTERVENTIONS:  - Educate patient/family on patient safety including physical limitations  - Instruct patient to call for assistance with activity   -  Consider consulting OT/PT to assist with strengthening/mobility based on AM PAC & JH-HLM score  - Consult OT/PT to assist with strengthening/mobility   - Keep Call bell within reach  - Keep bed low and locked with side rails adjusted as appropriate  - Keep care items and personal belongings within reach  - Initiate and maintain comfort rounds  - Make Fall Risk Sign visible to staff  - Apply yellow socks and bracelet for high fall risk patients  - Consider moving patient to room near nurses station  Outcome: Progressing  Goal: Maintain or return to baseline ADL function  Description: INTERVENTIONS:  -  Assess patient's ability to carry out ADLs; assess patient's baseline for ADL function and identify physical deficits which impact ability to perform ADLs (bathing, care of mouth/teeth, toileting, grooming, dressing, etc.)  - Assess/evaluate cause of self-care deficits   - Assess range of motion  - Assess patient's mobility; develop plan if impaired  - Assess patient's need for assistive devices and provide as appropriate  - Encourage maximum independence but intervene and supervise when necessary  - Involve family in performance of ADLs  - Assess for home care needs following discharge   - Consider OT consult to assist with ADL evaluation and planning for discharge  - Provide patient education as appropriate  - Monitor functional capacity and physical performance, use of AM PAC & JH-HLM   - Monitor gait, balance and fatigue with ambulation    Outcome: Progressing  Goal: Maintains/Returns to pre admission functional level  Description: INTERVENTIONS:  - Perform AM-PAC 6 Click Basic Mobility/ Daily Activity assessment daily.  - Set and communicate daily mobility goal to care team and patient/family/caregiver.   - Collaborate with rehabilitation services on mobility goals if consulted  - Out of bed for toileting  - Record patient progress and toleration of activity level   Outcome: Progressing     Problem: DISCHARGE  PLANNING  Goal: Discharge to home or other facility with appropriate resources  Description: INTERVENTIONS:  - Identify barriers to discharge w/patient and caregiver  - Arrange for needed discharge resources and transportation as appropriate  - Identify discharge learning needs (meds, wound care, etc.)  - Arrange for interpretive services to assist at discharge as needed  - Refer to Case Management Department for coordinating discharge planning if the patient needs post-hospital services based on physician/advanced practitioner order or complex needs related to functional status, cognitive ability, or social support system  Outcome: Progressing     Problem: Knowledge Deficit  Goal: Patient/family/caregiver demonstrates understanding of disease process, treatment plan, medications, and discharge instructions  Description: Complete learning assessment and assess knowledge base.  Interventions:  - Provide teaching at level of understanding  - Provide teaching via preferred learning methods  Outcome: Progressing     Problem: POSTPARTUM  Goal: Experiences normal postpartum course  Description: INTERVENTIONS:  - Monitor maternal vital signs  - Assess uterine involution and lochia  Outcome: Progressing  Goal: Appropriate maternal -  bonding  Description: INTERVENTIONS:  - Identify family support  - Assess for appropriate maternal/infant bonding   -Encourage maternal/infant bonding opportunities  - Referral to  or  as needed  Outcome: Progressing  Goal: Establishment of infant feeding pattern  Description: INTERVENTIONS:  - Assess breast/bottle feeding  - Refer to lactation as needed  Outcome: Progressing  Goal: Incision(s), wounds(s) or drain site(s) healing without S/S of infection  Description: INTERVENTIONS  - Assess and document dressing, incision, wound bed, drain sites and surrounding tissue  - Provide patient and family education  - Perform skin care/dressing changes every   Outcome:  Progressing

## 2025-07-24 NOTE — UTILIZATION REVIEW
"Mother discharged 2025  BABIES in NICU      NOTIFICATION OF INPATIENT ADMISSION   MATERNITY/DELIVERY AUTHORIZATION REQUEST   SERVICING FACILITY:   [facility]  Parent Child Health - L&D, , NICU  [address] [place] [address]  Tax ID: [id number]  NPI: [id number] ATTENDING PROVIDER:  Attending Name and NPI#: Yardlie Toussaint-foster, Do [[id number]]  Address: [address] [place] [address]  Phone: [phone]     ADMISSION INFORMATION:  Place of Service: Inpatient [facility]  Place of Service Code: 21  Inpatient Admission Date/Time: 25 12:30 AM  Discharge Date/Time: 2025 11:46 AM  Admitting Diagnosis Code/Description:  28 weeks gestation of pregnancy   Mother: Syed Taveras 1995 Estimated Date of Delivery: 10/1/25  Delivering clinician:      Nitin, 1 Baby Boy (Syed) [[id number]]         Nitin, 2 Baby Boy (Syed) [[id number]]       OB History          2    Para   2    Term   1       1    AB        Living   3         SAB        IAB        Ectopic        Multiple   1    Live Births   3           Obstetric Comments    17             [place] Name & MRN:   Information for the patient's :  Abran Taveras Baby Josue (Syed) [[id number]]    Delivery Information:  Sex: male  Delivered 2025 10:42 AM by , Low Transverse; Gestational Age: 29w5d     Measurements:  Weight: 3 lb 14 oz (1759 g);  Height: 16.14\"    APGAR 1 minute 5 minutes 10 minutes   Totals: 7 8      Information for the patient's :  Chica Taveras Baby Boy (Syed) [[id number]]   [place] Delivery Information:  Sex: male  Delivered 2025 10:42 AM by , Low Transverse; Gestational Age: 29w5d    [place] Measurements:  Weight: 3 lb 8.2 oz (1594 g);  Height:      APGAR 1 minute 5 minutes 10 minutes   Totals: 8 8       UTILIZATION REVIEW CONTACT:  Joselin Parra, " Utilization   Network Utilization Review Department  Phone: [phone]  Fax [phone]  Email: [email or web address]  Contact for approvals/pending authorizations, clinical reviews, and discharge.   PHYSICIAN ADVISORY SERVICES:  Medical Necessity Denial & Rtnu-qe-Mxyo Review  Phone: [phone]  Fax: [phone]  Email: [email or web address]   DISCHARGE SUPPORT TEAM:  For Patients Discharge Needs & Updates  Phone: [phone] opt. 2 Fax: [phone]  Email: [email or web address]

## 2025-07-24 NOTE — PLAN OF CARE
Problem: PAIN - ADULT  Goal: Verbalizes/displays adequate comfort level or baseline comfort level  Description: Interventions:  - Encourage patient to monitor pain and request assistance  - Assess pain using appropriate pain scale  - Administer analgesics as ordered based on type and severity of pain and evaluate response  - Implement non-pharmacological measures as appropriate and evaluate response  - Consider cultural and social influences on pain and pain management  - Notify physician/advanced practitioner if interventions unsuccessful or patient reports new pain  - Educate patient/family on pain management process including their role and importance of  reporting pain   - Provide non-pharmacologic/complimentary pain relief interventions  Outcome: Adequate for Discharge     Problem: INFECTION - ADULT  Goal: Absence or prevention of progression during hospitalization  Description: INTERVENTIONS:  - Assess and monitor for signs and symptoms of infection  - Monitor lab/diagnostic results  - Monitor all insertion sites, i.e. indwelling lines, tubes, and drains  - Monitor endotracheal if appropriate and nasal secretions for changes in amount and color  - Withams appropriate cooling/warming therapies per order  - Administer medications as ordered  - Instruct and encourage patient and family to use good hand hygiene technique  - Identify and instruct in appropriate isolation precautions for identified infection/condition  Outcome: Adequate for Discharge  Goal: Absence of fever/infection during neutropenic period  Description: INTERVENTIONS:  - Monitor WBC  - Perform strict hand hygiene  - Limit to healthy visitors only  - No plants, dried, fresh or silk flowers with pate in patient room  Outcome: Adequate for Discharge     Problem: SAFETY ADULT  Goal: Patient will remain free of falls  Description: INTERVENTIONS:  - Educate patient/family on patient safety including physical limitations  - Instruct patient to call  for assistance with activity   - Consider consulting OT/PT to assist with strengthening/mobility based on AM PAC & JH-HLM score  - Consult OT/PT to assist with strengthening/mobility   - Keep Call bell within reach  - Keep bed low and locked with side rails adjusted as appropriate  - Keep care items and personal belongings within reach  - Initiate and maintain comfort rounds  - Make Fall Risk Sign visible to staff  - Apply yellow socks and bracelet for high fall risk patients  - Consider moving patient to room near nurses station  Outcome: Adequate for Discharge  Goal: Maintain or return to baseline ADL function  Description: INTERVENTIONS:  -  Assess patient's ability to carry out ADLs; assess patient's baseline for ADL function and identify physical deficits which impact ability to perform ADLs (bathing, care of mouth/teeth, toileting, grooming, dressing, etc.)  - Assess/evaluate cause of self-care deficits   - Assess range of motion  - Assess patient's mobility; develop plan if impaired  - Assess patient's need for assistive devices and provide as appropriate  - Encourage maximum independence but intervene and supervise when necessary  - Involve family in performance of ADLs  - Assess for home care needs following discharge   - Consider OT consult to assist with ADL evaluation and planning for discharge  - Provide patient education as appropriate  - Monitor functional capacity and physical performance, use of AM PAC & JH-HLM   - Monitor gait, balance and fatigue with ambulation    Outcome: Adequate for Discharge  Goal: Maintains/Returns to pre admission functional level  Description: INTERVENTIONS:  - Perform AM-PAC 6 Click Basic Mobility/ Daily Activity assessment daily.  - Set and communicate daily mobility goal to care team and patient/family/caregiver.   - Collaborate with rehabilitation services on mobility goals if consulted  - Out of bed for toileting  - Record patient progress and toleration of activity  level   Outcome: Adequate for Discharge     Problem: DISCHARGE PLANNING  Goal: Discharge to home or other facility with appropriate resources  Description: INTERVENTIONS:  - Identify barriers to discharge w/patient and caregiver  - Arrange for needed discharge resources and transportation as appropriate  - Identify discharge learning needs (meds, wound care, etc.)  - Arrange for interpretive services to assist at discharge as needed  - Refer to Case Management Department for coordinating discharge planning if the patient needs post-hospital services based on physician/advanced practitioner order or complex needs related to functional status, cognitive ability, or social support system  Outcome: Adequate for Discharge     Problem: Knowledge Deficit  Goal: Patient/family/caregiver demonstrates understanding of disease process, treatment plan, medications, and discharge instructions  Description: Complete learning assessment and assess knowledge base.  Interventions:  - Provide teaching at level of understanding  - Provide teaching via preferred learning methods  Outcome: Adequate for Discharge     Problem: POSTPARTUM  Goal: Experiences normal postpartum course  Description: INTERVENTIONS:  - Monitor maternal vital signs  - Assess uterine involution and lochia  Outcome: Adequate for Discharge  Goal: Appropriate maternal -  bonding  Description: INTERVENTIONS:  - Identify family support  - Assess for appropriate maternal/infant bonding   -Encourage maternal/infant bonding opportunities  - Referral to  or  as needed  Outcome: Adequate for Discharge  Goal: Establishment of infant feeding pattern  Description: INTERVENTIONS:  - Assess breast/bottle feeding  - Refer to lactation as needed  Outcome: Adequate for Discharge  Goal: Incision(s), wounds(s) or drain site(s) healing without S/S of infection  Description: INTERVENTIONS  - Assess and document dressing, incision, wound bed, drain sites  and surrounding tissue  - Provide patient and family education    Outcome: Adequate for Discharge

## 2025-07-24 NOTE — ASSESSMENT & PLAN NOTE
Echo on 6/30/2025 demonstrated an ejection fraction of 55% with left ventricular cavity size at the upper limit of normal.  She does notably have a history of cardiomyopathy, and twin pregnancy also puts her at risk for recurrence. [facility] recommends a low threshold to recheck a proBNP and/or echocardiogram if her symptoms persist    - Pt is currently asymptomatic

## 2025-07-24 NOTE — CONSULTS
Mom of 29 week twin BOYS Discharged Home Prior to seeing Lactation today.     Inpatient Lactation consult is being closed due to lactation services provided. Please refer to previous Lactation notes to see feeding plan for the breastfeeding dyad.     Please reach out to [facility] for an outpatient lactation appointment, as needed. You may contact them at [phone].

## 2025-07-24 NOTE — ASSESSMENT & PLAN NOTE
- QBL 1270cc, Hb 12.3 g/dL --> post op 9.1 s/p venofer   - Routine postoperative care  - pain mgt: APAP  and roxicodone PRN   - Encourage ambulation/ bonding ( infants are in NICU)   - lactation support   - DVT ppx: SCDs, Lovenox to start in AM  - PP contraception Uncertain

## 2025-07-25 ENCOUNTER — VBI (OUTPATIENT)
Dept: ADMINISTRATIVE | Facility: OTHER | Age: 30
End: 2025-07-25

## 2025-07-25 NOTE — TELEPHONE ENCOUNTER
07/25/25 1:01 PM     Chart reviewed for Diabetic Eye Exam ; nothing is submitted to the patient's insurance at this time.     Dania Kinney MA   [facility]

## 2025-07-28 PROCEDURE — 88307 TISSUE EXAM BY PATHOLOGIST: CPT | Performed by: PATHOLOGY

## 2025-07-30 ENCOUNTER — TELEPHONE (OUTPATIENT)
Dept: OBGYN CLINIC | Facility: MEDICAL CENTER | Age: 30
End: 2025-07-30

## 2025-08-03 DIAGNOSIS — Z91.89 AT RISK FOR HYPERTENSION: ICD-10-CM

## 2025-08-03 DIAGNOSIS — O30.031 MONOCHORIONIC DIAMNIOTIC TWIN GESTATION IN FIRST TRIMESTER: ICD-10-CM

## 2025-08-04 RX ORDER — ASPIRIN 81 MG/1
162 TABLET, COATED ORAL DAILY
Qty: 60 TABLET | Refills: 5 | Status: SHIPPED | OUTPATIENT
Start: 2025-08-04

## 2025-08-06 ENCOUNTER — TELEPHONE (OUTPATIENT)
Dept: OBGYN CLINIC | Facility: MEDICAL CENTER | Age: 30
End: 2025-08-06

## 2025-08-18 ENCOUNTER — POSTPARTUM VISIT (OUTPATIENT)
Dept: OBGYN CLINIC | Facility: MEDICAL CENTER | Age: 30
End: 2025-08-18
Payer: COMMERCIAL

## 2025-08-18 VITALS
HEIGHT: 69 IN | BODY MASS INDEX: 33.47 KG/M2 | WEIGHT: 226 LBS | SYSTOLIC BLOOD PRESSURE: 120 MMHG | DIASTOLIC BLOOD PRESSURE: 76 MMHG

## 2025-08-18 DIAGNOSIS — O24.119 TYPE 2 DIABETES MELLITUS AFFECTING PREGNANCY, ANTEPARTUM: ICD-10-CM

## 2025-08-18 DIAGNOSIS — Z30.2 REQUEST FOR STERILIZATION: ICD-10-CM

## 2025-08-18 DIAGNOSIS — Z30.011 ENCOUNTER FOR INITIAL PRESCRIPTION OF CONTRACEPTIVE PILLS: ICD-10-CM

## 2025-08-18 DIAGNOSIS — Z98.891 S/P REPEAT LOW TRANSVERSE C-SECTION: ICD-10-CM

## 2025-08-18 PROCEDURE — 99214 OFFICE O/P EST MOD 30 MIN: CPT | Performed by: PHYSICIAN ASSISTANT

## 2025-08-18 RX ORDER — ACETAMINOPHEN AND CODEINE PHOSPHATE 120; 12 MG/5ML; MG/5ML
1 SOLUTION ORAL DAILY
Qty: 84 TABLET | Refills: 1 | Status: SHIPPED | OUTPATIENT
Start: 2025-08-18

## 2025-08-21 PROBLEM — R10.2 PELVIC CRAMPING: Status: RESOLVED | Noted: 2025-07-20 | Resolved: 2025-08-21

## 2025-08-21 PROBLEM — O34.219 HISTORY OF CESAREAN DELIVERY, CURRENTLY PREGNANT: Status: RESOLVED | Noted: 2025-04-03 | Resolved: 2025-08-21

## 2025-08-21 PROBLEM — Z3A.29 29 WEEKS GESTATION OF PREGNANCY: Status: RESOLVED | Noted: 2025-03-20 | Resolved: 2025-08-21

## 2025-08-21 PROBLEM — O36.8190 DECREASED FETAL MOVEMENT: Status: RESOLVED | Noted: 2025-06-12 | Resolved: 2025-08-21

## 2025-08-21 PROBLEM — O43.192 MARGINAL INSERTION OF UMBILICAL CORD AFFECTING MANAGEMENT OF MOTHER IN SECOND TRIMESTER: Status: RESOLVED | Noted: 2025-05-20 | Resolved: 2025-08-21

## (undated) DEVICE — 3000CC GUARDIAN II: Brand: GUARDIAN

## (undated) DEVICE — Device

## (undated) DEVICE — TRAVELKIT CONTAINS FIRST STEP KIT (200ML EP-4 KIT) AND SOILED SCOPE BAG - 1 KIT: Brand: TRAVELKIT CONTAINS FIRST STEP KIT AND SOILED SCOPE BAG

## (undated) DEVICE — VISUALIZATION SYSTEM: Brand: CLEARIFY

## (undated) DEVICE — NEEDLE SPINAL18G X 3.5 IN QUINCKE

## (undated) DEVICE — INTENDED FOR TISSUE SEPARATION, AND OTHER PROCEDURES THAT REQUIRE A SHARP SURGICAL BLADE TO PUNCTURE OR CUT.: Brand: BARD-PARKER SAFETY BLADES SIZE 15, STERILE

## (undated) DEVICE — IRRIG ENDO FLO TUBING

## (undated) DEVICE — VIOLET BRAIDED (POLYGLACTIN 910), SYNTHETIC ABSORBABLE SUTURE: Brand: COATED VICRYL

## (undated) DEVICE — GLOVE PI ULTRA TOUCH SZ.7.0

## (undated) DEVICE — TUBING SMOKE EVAC W/FILTRATION DEVICE PLUMEPORT ACTIV

## (undated) DEVICE — SKIN MARKER DUAL TIP WITH RULER CAP, FLEXIBLE RULER AND LABELS: Brand: DEVON

## (undated) DEVICE — ALLENTOWN LAP CHOLE APP PACK: Brand: CARDINAL HEALTH

## (undated) DEVICE — ENDOPATH 5MM CURVED SCISSORS WITH MONOPOLAR CAUTERY: Brand: ENDOPATH

## (undated) DEVICE — GLOVE SRG BIOGEL 8

## (undated) DEVICE — ASTOUND STANDARD SURGICAL GOWN, XL: Brand: CONVERTORS

## (undated) DEVICE — URETERAL CATHETER ADAPTOR TIP

## (undated) DEVICE — PMI DISPOSABLE PUNCTURE CLOSURE DEVICE / SUTURE GRASPER: Brand: PMI

## (undated) DEVICE — SUT MONOCRYL 4-0 PS-2 27 IN Y426H

## (undated) DEVICE — KENDALL SCD SEQUENTIAL COMPRESSION COMFORT SLEEVES, KNEE LENGTH, MEDIUM: Brand: KENDALL SCD

## (undated) DEVICE — WEBRIL 6 IN UNSTERILE

## (undated) DEVICE — COVIDIEN GIA BLACK (XTRA THICK) RELOAD

## (undated) DEVICE — TROCARS: Brand: KII® OPTICAL ACCESS SYSTEM

## (undated) DEVICE — GLOVE INDICATOR PI UNDERGLOVE SZ 7 BLUE

## (undated) DEVICE — [HIGH FLOW INSUFFLATOR,  DO NOT USE IF PACKAGE IS DAMAGED,  KEEP DRY,  KEEP AWAY FROM SUNLIGHT,  PROTECT FROM HEAT AND RADIOACTIVE SOURCES.]: Brand: PNEUMOSURE

## (undated) DEVICE — TIBURON LAPAROSCOPIC ABDOMINAL DRAPE: Brand: CONVERTORS

## (undated) DEVICE — COVIDIEN ENDO GIA PURPLE (MED) RELOAD 60MM

## (undated) DEVICE — SYRINGE 20ML LL

## (undated) DEVICE — TUBING SUCTION 5MM X 12 FT

## (undated) DEVICE — POWER SHELL SIGNIA

## (undated) DEVICE — ADHESIVE SKIN CLSR DERMABOND NX

## (undated) DEVICE — PENCIL ELECTROSURG E-Z CLEAN -0035H

## (undated) DEVICE — WOUND RETRACTOR AND PROTECTOR: Brand: ALEXIS O WOUND PROTECTOR-RETRACTOR

## (undated) DEVICE — TROCAR: Brand: KII FIOS FIRST ENTRY

## (undated) DEVICE — PACK C-SECTION PBDS

## (undated) DEVICE — TROCAR VISIPORT

## (undated) DEVICE — VISIGI 3D®  CALIBRATION SYSTEM  SIZE 36FR SLEEVE/STD: Brand: BOEHRINGER® VISIGI 3D™ SLEEVE GASTRECTOMY CALIBRATION SYSTEM, SIZE 36FR

## (undated) DEVICE — SYRINGE 30ML LL

## (undated) DEVICE — CHLORAPREP HI-LITE 26ML ORANGE

## (undated) DEVICE — SCD SEQUENTIAL COMPRESSION COMFORT SLEEVE MEDIUM KNEE LENGTH: Brand: KENDALL SCD